# Patient Record
Sex: MALE | Race: WHITE | NOT HISPANIC OR LATINO
[De-identification: names, ages, dates, MRNs, and addresses within clinical notes are randomized per-mention and may not be internally consistent; named-entity substitution may affect disease eponyms.]

---

## 2017-01-04 ENCOUNTER — RX RENEWAL (OUTPATIENT)
Age: 57
End: 2017-01-04

## 2017-01-05 ENCOUNTER — OTHER (OUTPATIENT)
Age: 57
End: 2017-01-05

## 2017-01-09 ENCOUNTER — RX RENEWAL (OUTPATIENT)
Age: 57
End: 2017-01-09

## 2017-02-03 ENCOUNTER — RX RENEWAL (OUTPATIENT)
Age: 57
End: 2017-02-03

## 2017-02-08 ENCOUNTER — RX RENEWAL (OUTPATIENT)
Age: 57
End: 2017-02-08

## 2017-03-06 ENCOUNTER — RX RENEWAL (OUTPATIENT)
Age: 57
End: 2017-03-06

## 2017-03-20 ENCOUNTER — RX RENEWAL (OUTPATIENT)
Age: 57
End: 2017-03-20

## 2017-04-03 ENCOUNTER — RX RENEWAL (OUTPATIENT)
Age: 57
End: 2017-04-03

## 2017-04-05 ENCOUNTER — RX RENEWAL (OUTPATIENT)
Age: 57
End: 2017-04-05

## 2017-04-08 ENCOUNTER — RX RENEWAL (OUTPATIENT)
Age: 57
End: 2017-04-08

## 2017-04-13 ENCOUNTER — RX RENEWAL (OUTPATIENT)
Age: 57
End: 2017-04-13

## 2017-04-20 ENCOUNTER — OUTPATIENT (OUTPATIENT)
Dept: OUTPATIENT SERVICES | Facility: HOSPITAL | Age: 57
LOS: 1 days | End: 2017-04-20
Payer: MEDICAID

## 2017-04-20 ENCOUNTER — APPOINTMENT (OUTPATIENT)
Dept: FAMILY MEDICINE | Facility: HOSPITAL | Age: 57
End: 2017-04-20

## 2017-04-20 VITALS
OXYGEN SATURATION: 99 % | BODY MASS INDEX: 35.14 KG/M2 | WEIGHT: 251 LBS | RESPIRATION RATE: 16 BRPM | DIASTOLIC BLOOD PRESSURE: 86 MMHG | SYSTOLIC BLOOD PRESSURE: 129 MMHG | HEART RATE: 88 BPM | HEIGHT: 71 IN | TEMPERATURE: 98 F

## 2017-04-20 DIAGNOSIS — E11.9 TYPE 2 DIABETES MELLITUS WITHOUT COMPLICATIONS: ICD-10-CM

## 2017-04-20 DIAGNOSIS — K21.9 GASTRO-ESOPHAGEAL REFLUX DISEASE WITHOUT ESOPHAGITIS: ICD-10-CM

## 2017-04-20 DIAGNOSIS — R10.13 EPIGASTRIC PAIN: ICD-10-CM

## 2017-04-20 DIAGNOSIS — K92.0 HEMATEMESIS: ICD-10-CM

## 2017-04-20 DIAGNOSIS — R25.2 CRAMP AND SPASM: ICD-10-CM

## 2017-04-21 ENCOUNTER — FORM ENCOUNTER (OUTPATIENT)
Age: 57
End: 2017-04-21

## 2017-04-22 PROCEDURE — 85025 COMPLETE CBC W/AUTO DIFF WBC: CPT

## 2017-04-22 PROCEDURE — G0463: CPT

## 2017-04-22 PROCEDURE — 80053 COMPREHEN METABOLIC PANEL: CPT

## 2017-04-22 PROCEDURE — 83735 ASSAY OF MAGNESIUM: CPT

## 2017-04-22 PROCEDURE — 36415 COLL VENOUS BLD VENIPUNCTURE: CPT

## 2017-04-22 PROCEDURE — 83036 HEMOGLOBIN GLYCOSYLATED A1C: CPT

## 2017-04-22 PROCEDURE — 72074 X-RAY EXAM THORAC SPINE4/>VW: CPT

## 2017-04-22 PROCEDURE — 82728 ASSAY OF FERRITIN: CPT

## 2017-04-22 PROCEDURE — 72070 X-RAY EXAM THORAC SPINE 2VWS: CPT

## 2017-05-03 ENCOUNTER — OUTPATIENT (OUTPATIENT)
Dept: OUTPATIENT SERVICES | Facility: HOSPITAL | Age: 57
LOS: 1 days | End: 2017-05-03
Payer: MEDICAID

## 2017-05-03 ENCOUNTER — APPOINTMENT (OUTPATIENT)
Dept: FAMILY MEDICINE | Facility: HOSPITAL | Age: 57
End: 2017-05-03

## 2017-05-03 VITALS
DIASTOLIC BLOOD PRESSURE: 96 MMHG | OXYGEN SATURATION: 98 % | RESPIRATION RATE: 14 BRPM | SYSTOLIC BLOOD PRESSURE: 141 MMHG | TEMPERATURE: 98 F | WEIGHT: 250 LBS | HEART RATE: 83 BPM | BODY MASS INDEX: 34.87 KG/M2

## 2017-05-03 DIAGNOSIS — E11.9 TYPE 2 DIABETES MELLITUS WITHOUT COMPLICATIONS: ICD-10-CM

## 2017-05-03 DIAGNOSIS — I10 ESSENTIAL (PRIMARY) HYPERTENSION: ICD-10-CM

## 2017-05-03 PROCEDURE — G0463: CPT

## 2017-05-03 PROCEDURE — 82043 UR ALBUMIN QUANTITATIVE: CPT

## 2017-05-19 ENCOUNTER — RX RENEWAL (OUTPATIENT)
Age: 57
End: 2017-05-19

## 2017-05-30 ENCOUNTER — RX RENEWAL (OUTPATIENT)
Age: 57
End: 2017-05-30

## 2017-06-19 ENCOUNTER — RX RENEWAL (OUTPATIENT)
Age: 57
End: 2017-06-19

## 2017-07-13 ENCOUNTER — MEDICATION RENEWAL (OUTPATIENT)
Age: 57
End: 2017-07-13

## 2017-07-13 RX ORDER — NAPROXEN 500 MG/1
500 TABLET ORAL
Qty: 14 | Refills: 0 | Status: DISCONTINUED | COMMUNITY
Start: 2017-05-03 | End: 2017-07-13

## 2017-07-14 ENCOUNTER — MEDICATION RENEWAL (OUTPATIENT)
Age: 57
End: 2017-07-14

## 2017-07-18 ENCOUNTER — RX RENEWAL (OUTPATIENT)
Age: 57
End: 2017-07-18

## 2017-09-05 ENCOUNTER — MEDICATION RENEWAL (OUTPATIENT)
Age: 57
End: 2017-09-05

## 2017-09-05 LAB — MICROALBUMIN/CREAT 24H UR-RTO: 10

## 2017-09-07 ENCOUNTER — MEDICATION RENEWAL (OUTPATIENT)
Age: 57
End: 2017-09-07

## 2017-09-11 ENCOUNTER — RX RENEWAL (OUTPATIENT)
Age: 57
End: 2017-09-11

## 2017-10-03 ENCOUNTER — RX RENEWAL (OUTPATIENT)
Age: 57
End: 2017-10-03

## 2017-10-03 ENCOUNTER — APPOINTMENT (OUTPATIENT)
Dept: OPHTHALMOLOGY | Facility: CLINIC | Age: 57
End: 2017-10-03

## 2017-10-09 ENCOUNTER — RX RENEWAL (OUTPATIENT)
Age: 57
End: 2017-10-09

## 2017-10-10 ENCOUNTER — RX RENEWAL (OUTPATIENT)
Age: 57
End: 2017-10-10

## 2017-11-01 ENCOUNTER — OTHER (OUTPATIENT)
Age: 57
End: 2017-11-01

## 2017-11-02 ENCOUNTER — MEDICATION RENEWAL (OUTPATIENT)
Age: 57
End: 2017-11-02

## 2017-11-06 ENCOUNTER — MEDICATION RENEWAL (OUTPATIENT)
Age: 57
End: 2017-11-06

## 2017-11-08 ENCOUNTER — MEDICATION RENEWAL (OUTPATIENT)
Age: 57
End: 2017-11-08

## 2017-11-18 ENCOUNTER — OUTPATIENT (OUTPATIENT)
Dept: OUTPATIENT SERVICES | Facility: HOSPITAL | Age: 57
LOS: 1 days | End: 2017-11-18
Payer: MEDICAID

## 2017-11-18 ENCOUNTER — APPOINTMENT (OUTPATIENT)
Dept: FAMILY MEDICINE | Facility: HOSPITAL | Age: 57
End: 2017-11-18

## 2017-11-18 VITALS
RESPIRATION RATE: 14 BRPM | DIASTOLIC BLOOD PRESSURE: 101 MMHG | BODY MASS INDEX: 35.01 KG/M2 | WEIGHT: 251 LBS | TEMPERATURE: 97.3 F | OXYGEN SATURATION: 97 % | SYSTOLIC BLOOD PRESSURE: 146 MMHG | HEART RATE: 71 BPM

## 2017-11-18 PROCEDURE — G0463: CPT

## 2017-11-18 PROCEDURE — 36415 COLL VENOUS BLD VENIPUNCTURE: CPT

## 2017-11-18 PROCEDURE — 80061 LIPID PANEL: CPT

## 2017-11-18 PROCEDURE — 82043 UR ALBUMIN QUANTITATIVE: CPT

## 2017-11-18 PROCEDURE — 80053 COMPREHEN METABOLIC PANEL: CPT

## 2017-11-29 DIAGNOSIS — E11.9 TYPE 2 DIABETES MELLITUS WITHOUT COMPLICATIONS: ICD-10-CM

## 2017-11-29 DIAGNOSIS — M19.90 UNSPECIFIED OSTEOARTHRITIS, UNSPECIFIED SITE: ICD-10-CM

## 2017-11-29 DIAGNOSIS — Z00.00 ENCOUNTER FOR GENERAL ADULT MEDICAL EXAMINATION WITHOUT ABNORMAL FINDINGS: ICD-10-CM

## 2017-11-29 DIAGNOSIS — F32.9 MAJOR DEPRESSIVE DISORDER, SINGLE EPISODE, UNSPECIFIED: ICD-10-CM

## 2018-02-06 ENCOUNTER — RX RENEWAL (OUTPATIENT)
Age: 58
End: 2018-02-06

## 2018-02-12 LAB
LDLC SERPL DIRECT ASSAY-MCNC: 129
MICROALBUMIN/CREAT 24H UR-RTO: 12

## 2018-02-21 ENCOUNTER — OUTPATIENT (OUTPATIENT)
Dept: OUTPATIENT SERVICES | Facility: HOSPITAL | Age: 58
LOS: 1 days | End: 2018-02-21
Payer: MEDICAID

## 2018-02-21 ENCOUNTER — APPOINTMENT (OUTPATIENT)
Dept: FAMILY MEDICINE | Facility: HOSPITAL | Age: 58
End: 2018-02-21

## 2018-02-21 VITALS
DIASTOLIC BLOOD PRESSURE: 97 MMHG | BODY MASS INDEX: 35.15 KG/M2 | RESPIRATION RATE: 16 BRPM | HEART RATE: 69 BPM | TEMPERATURE: 98.1 F | OXYGEN SATURATION: 96 % | SYSTOLIC BLOOD PRESSURE: 144 MMHG | WEIGHT: 252 LBS

## 2018-02-21 DIAGNOSIS — Z00.00 ENCOUNTER FOR GENERAL ADULT MEDICAL EXAMINATION WITHOUT ABNORMAL FINDINGS: ICD-10-CM

## 2018-02-21 PROCEDURE — G0463: CPT

## 2018-02-23 DIAGNOSIS — E11.9 TYPE 2 DIABETES MELLITUS WITHOUT COMPLICATIONS: ICD-10-CM

## 2018-05-10 ENCOUNTER — OUTPATIENT (OUTPATIENT)
Dept: OUTPATIENT SERVICES | Facility: HOSPITAL | Age: 58
LOS: 1 days | End: 2018-05-10
Payer: MEDICAID

## 2018-05-10 ENCOUNTER — MEDICATION RENEWAL (OUTPATIENT)
Age: 58
End: 2018-05-10

## 2018-05-10 DIAGNOSIS — E11.9 TYPE 2 DIABETES MELLITUS WITHOUT COMPLICATIONS: ICD-10-CM

## 2018-05-10 PROCEDURE — 85025 COMPLETE CBC W/AUTO DIFF WBC: CPT

## 2018-05-10 PROCEDURE — 80061 LIPID PANEL: CPT

## 2018-05-10 PROCEDURE — 36415 COLL VENOUS BLD VENIPUNCTURE: CPT

## 2018-05-10 PROCEDURE — 83036 HEMOGLOBIN GLYCOSYLATED A1C: CPT

## 2018-05-10 PROCEDURE — 80053 COMPREHEN METABOLIC PANEL: CPT

## 2018-05-15 ENCOUNTER — OUTPATIENT (OUTPATIENT)
Dept: OUTPATIENT SERVICES | Facility: HOSPITAL | Age: 58
LOS: 1 days | End: 2018-05-15
Payer: MEDICAID

## 2018-05-15 ENCOUNTER — APPOINTMENT (OUTPATIENT)
Dept: FAMILY MEDICINE | Facility: HOSPITAL | Age: 58
End: 2018-05-15

## 2018-05-15 VITALS
TEMPERATURE: 98.6 F | BODY MASS INDEX: 34.17 KG/M2 | HEART RATE: 75 BPM | RESPIRATION RATE: 16 BRPM | SYSTOLIC BLOOD PRESSURE: 131 MMHG | OXYGEN SATURATION: 96 % | DIASTOLIC BLOOD PRESSURE: 88 MMHG | WEIGHT: 245 LBS

## 2018-05-15 DIAGNOSIS — Z00.00 ENCOUNTER FOR GENERAL ADULT MEDICAL EXAMINATION WITHOUT ABNORMAL FINDINGS: ICD-10-CM

## 2018-05-15 PROCEDURE — G0463: CPT

## 2018-05-15 NOTE — ASSESSMENT
[FreeTextEntry1] : 57 year old male pt here today for DM f/u\par \par \par #DM\par -lipids elevated on most recent blood work. Will increase atorvastatin to 40 mg daily\par -c/w glyburide\par -podiatry referral\par -opthalmology referral\par \par #Health Maintenance\par -labs renewed with pt\par -meds refilled\par \par \par f/u in 3 months\par

## 2018-05-15 NOTE — COUNSELING
[Weight management counseling provided] : Weight management [Healthy eating counseling provided] : healthy eating [Weight Self Once Weekly] : Weight self once weekly [Decrease Portions] : Decrease food portions [Low Salt Diet] : Low salt diet [Walking] : Walking [None] : None

## 2018-05-16 DIAGNOSIS — E11.9 TYPE 2 DIABETES MELLITUS WITHOUT COMPLICATIONS: ICD-10-CM

## 2018-05-31 ENCOUNTER — RX RENEWAL (OUTPATIENT)
Age: 58
End: 2018-05-31

## 2018-08-20 LAB
HBA1C MFR BLD HPLC: 5.7
LDLC SERPL DIRECT ASSAY-MCNC: NORMAL

## 2018-08-24 LAB
HBA1C MFR BLD HPLC: 5.7
LDLC SERPL DIRECT ASSAY-MCNC: NORMAL
LDLC SERPL DIRECT ASSAY-MCNC: NORMAL
MICROALBUMIN/CREAT 24H UR-RTO: 12

## 2018-09-06 ENCOUNTER — OUTPATIENT (OUTPATIENT)
Dept: OUTPATIENT SERVICES | Facility: HOSPITAL | Age: 58
LOS: 1 days | End: 2018-09-06
Payer: MEDICAID

## 2018-09-06 ENCOUNTER — APPOINTMENT (OUTPATIENT)
Dept: PODIATRY | Facility: HOSPITAL | Age: 58
End: 2018-09-06

## 2018-09-06 DIAGNOSIS — Z00.00 ENCOUNTER FOR GENERAL ADULT MEDICAL EXAMINATION WITHOUT ABNORMAL FINDINGS: ICD-10-CM

## 2018-09-06 PROCEDURE — G0463: CPT

## 2018-09-07 DIAGNOSIS — E11.9 TYPE 2 DIABETES MELLITUS WITHOUT COMPLICATIONS: ICD-10-CM

## 2018-09-07 DIAGNOSIS — B35.3 TINEA PEDIS: ICD-10-CM

## 2018-09-12 ENCOUNTER — OUTPATIENT (OUTPATIENT)
Dept: OUTPATIENT SERVICES | Facility: HOSPITAL | Age: 58
LOS: 1 days | End: 2018-09-12
Payer: MEDICAID

## 2018-09-12 ENCOUNTER — APPOINTMENT (OUTPATIENT)
Dept: FAMILY MEDICINE | Facility: HOSPITAL | Age: 58
End: 2018-09-12

## 2018-09-12 ENCOUNTER — MED ADMIN CHARGE (OUTPATIENT)
Age: 58
End: 2018-09-12

## 2018-09-12 VITALS
WEIGHT: 238 LBS | RESPIRATION RATE: 16 BRPM | HEART RATE: 79 BPM | OXYGEN SATURATION: 98 % | BODY MASS INDEX: 33.19 KG/M2 | DIASTOLIC BLOOD PRESSURE: 90 MMHG | SYSTOLIC BLOOD PRESSURE: 139 MMHG | TEMPERATURE: 98 F

## 2018-09-12 DIAGNOSIS — Z00.00 ENCOUNTER FOR GENERAL ADULT MEDICAL EXAMINATION WITHOUT ABNORMAL FINDINGS: ICD-10-CM

## 2018-09-12 PROCEDURE — G0463: CPT

## 2018-09-12 NOTE — COUNSELING
[Weight management counseling provided] : Weight management [Activity counseling provided] : activity [Disease Management counseling provided] : disease management  [Smoking cessation counseling provided] : smoking cessation [Weight Self Once Weekly] : Weight self once weekly [Low Fat Diet] : Low fat diet [Low Salt Diet] : Low salt diet [Take medications as directed] : Take medications as directed [Check feet daily] : Check feet daily [Maintain symptoms  log] : Maintain symptoms log [Take your weight daily] : Take your weight daily [Maintain weight log] : Maintain weight log [Check BP at home ___ x/week] : Check blood pressure at home [unfilled] times per week [Keep BP log to bring to next visit] : Keep BP log to bring to next visit [Avoid Social Isolation] : Avoid social isolation [None] : None

## 2018-09-12 NOTE — HISTORY OF PRESENT ILLNESS
[Diabetes Mellitus] : Diabetes Mellitus [Patient was last seen on ___] : Patient was last seen on [unfilled] [No episodes] : No hypoglycemic episodes since the last visit. [Regular podiatrist visits] : Patient had regular podiatrist visits [Understanding of foot care] : Patient expressed understanding of foot care [Most Recent A1C: ___] : Most recent A1C was [unfilled] [Target A1C:  ___] : Target A1C is [unfilled] [Near target goal] : A1C near target goal [High Intensity] : Patient is currently on high intensity statin therapy [FreeTextEntry6] : 58 year old male pt presents for DM follow up. Pt compliant with meds. Recently had podiatry and opthalmology appt. No complaints [de-identified] : less than 5.7

## 2018-09-12 NOTE — PLAN
[FreeTextEntry1] : 58 year old male pt presents today for DM follow up\par \par #DM maintenance \par -c/w glyburide. good glycemic control on medication\par -pt got opthalmology and podiatry exam done recently\par -lifestyle changes encouraged\par -weight loss commended\par \par #Health Maintenance \par -Flu Shot done at [facility]\par -Tetanus shot given today\par -GI referral for Colonoscopy. Last colonoscopy in 2013 recommended 3-5 follow up\par \par \par -f/u in 4 months\par -FIT kit

## 2018-09-13 DIAGNOSIS — E11.9 TYPE 2 DIABETES MELLITUS WITHOUT COMPLICATIONS: ICD-10-CM

## 2018-10-30 ENCOUNTER — EMERGENCY (EMERGENCY)
Facility: HOSPITAL | Age: 58
LOS: 1 days | Discharge: HOME | End: 2018-10-30
Attending: INTERNAL MEDICINE | Admitting: INTERNAL MEDICINE
Payer: MEDICAID

## 2018-10-30 VITALS
TEMPERATURE: 98 F | WEIGHT: 244.93 LBS | HEIGHT: 71 IN | DIASTOLIC BLOOD PRESSURE: 97 MMHG | RESPIRATION RATE: 18 BRPM | OXYGEN SATURATION: 96 % | SYSTOLIC BLOOD PRESSURE: 150 MMHG | HEART RATE: 88 BPM

## 2018-10-30 VITALS
TEMPERATURE: 98 F | RESPIRATION RATE: 18 BRPM | HEART RATE: 80 BPM | OXYGEN SATURATION: 99 % | SYSTOLIC BLOOD PRESSURE: 134 MMHG | DIASTOLIC BLOOD PRESSURE: 80 MMHG

## 2018-10-30 DIAGNOSIS — R05 COUGH: ICD-10-CM

## 2018-10-30 LAB
ALBUMIN SERPL ELPH-MCNC: 3.4 G/DL — SIGNIFICANT CHANGE UP (ref 3.3–5)
ALP SERPL-CCNC: 71 U/L — SIGNIFICANT CHANGE UP (ref 40–120)
ALT FLD-CCNC: 23 U/L DA — SIGNIFICANT CHANGE UP (ref 10–45)
ANION GAP SERPL CALC-SCNC: 15 MMOL/L — SIGNIFICANT CHANGE UP (ref 5–17)
APPEARANCE UR: CLEAR — SIGNIFICANT CHANGE UP
APTT BLD: 29.1 SEC — SIGNIFICANT CHANGE UP (ref 27.5–37.4)
AST SERPL-CCNC: 20 U/L — SIGNIFICANT CHANGE UP (ref 10–40)
BASOPHILS # BLD AUTO: 0.1 K/UL — SIGNIFICANT CHANGE UP (ref 0–0.2)
BASOPHILS NFR BLD AUTO: 0.5 % — SIGNIFICANT CHANGE UP (ref 0–2)
BILIRUB SERPL-MCNC: 0.6 MG/DL — SIGNIFICANT CHANGE UP (ref 0.2–1.2)
BILIRUB UR-MCNC: NEGATIVE — SIGNIFICANT CHANGE UP
BUN SERPL-MCNC: 12 MG/DL — SIGNIFICANT CHANGE UP (ref 7–23)
CALCIUM SERPL-MCNC: 9.2 MG/DL — SIGNIFICANT CHANGE UP (ref 8.4–10.5)
CHLORIDE SERPL-SCNC: 100 MMOL/L — SIGNIFICANT CHANGE UP (ref 96–108)
CO2 SERPL-SCNC: 23 MMOL/L — SIGNIFICANT CHANGE UP (ref 22–31)
COLOR SPEC: YELLOW — SIGNIFICANT CHANGE UP
CREAT SERPL-MCNC: 1.14 MG/DL — SIGNIFICANT CHANGE UP (ref 0.5–1.3)
DIFF PNL FLD: NEGATIVE — SIGNIFICANT CHANGE UP
EOSINOPHIL # BLD AUTO: 0.1 K/UL — SIGNIFICANT CHANGE UP (ref 0–0.5)
EOSINOPHIL NFR BLD AUTO: 0.8 % — SIGNIFICANT CHANGE UP (ref 0–6)
GLUCOSE SERPL-MCNC: 133 MG/DL — HIGH (ref 70–99)
GLUCOSE UR QL: NEGATIVE — SIGNIFICANT CHANGE UP
HCT VFR BLD CALC: 37.6 % — LOW (ref 39–50)
HGB BLD-MCNC: 13.2 G/DL — SIGNIFICANT CHANGE UP (ref 13–17)
INR BLD: 1.04 RATIO — SIGNIFICANT CHANGE UP (ref 0.88–1.16)
KETONES UR-MCNC: NEGATIVE — SIGNIFICANT CHANGE UP
LACTATE SERPL-SCNC: 1.2 MMOL/L — SIGNIFICANT CHANGE UP (ref 0.7–2)
LEUKOCYTE ESTERASE UR-ACNC: NEGATIVE — SIGNIFICANT CHANGE UP
LYMPHOCYTES # BLD AUTO: 1.8 K/UL — SIGNIFICANT CHANGE UP (ref 1–3.3)
LYMPHOCYTES # BLD AUTO: 13.9 % — SIGNIFICANT CHANGE UP (ref 13–44)
MCHC RBC-ENTMCNC: 35.1 GM/DL — SIGNIFICANT CHANGE UP (ref 32–36)
MCHC RBC-ENTMCNC: 35.1 PG — HIGH (ref 27–34)
MCV RBC AUTO: 99.9 FL — SIGNIFICANT CHANGE UP (ref 80–100)
MONOCYTES # BLD AUTO: 1.1 K/UL — HIGH (ref 0–0.9)
MONOCYTES NFR BLD AUTO: 8.5 % — SIGNIFICANT CHANGE UP (ref 1–9)
NEUTROPHILS # BLD AUTO: 10 K/UL — HIGH (ref 1.8–7.4)
NEUTROPHILS NFR BLD AUTO: 76.2 % — SIGNIFICANT CHANGE UP (ref 43–77)
NITRITE UR-MCNC: NEGATIVE — SIGNIFICANT CHANGE UP
NT-PROBNP SERPL-SCNC: 202 PG/ML — SIGNIFICANT CHANGE UP (ref 0–300)
PH UR: 6 — SIGNIFICANT CHANGE UP (ref 5–8)
PLATELET # BLD AUTO: 249 K/UL — SIGNIFICANT CHANGE UP (ref 150–400)
POTASSIUM SERPL-MCNC: 3.7 MMOL/L — SIGNIFICANT CHANGE UP (ref 3.5–5.3)
POTASSIUM SERPL-SCNC: 3.7 MMOL/L — SIGNIFICANT CHANGE UP (ref 3.5–5.3)
PROT SERPL-MCNC: 7.7 G/DL — SIGNIFICANT CHANGE UP (ref 6–8.3)
PROT UR-MCNC: NEGATIVE — SIGNIFICANT CHANGE UP
PROTHROM AB SERPL-ACNC: 11.6 SEC — SIGNIFICANT CHANGE UP (ref 9.8–12.7)
RBC # BLD: 3.76 M/UL — LOW (ref 4.2–5.8)
RBC # FLD: 11.6 % — SIGNIFICANT CHANGE UP (ref 10.3–14.5)
SODIUM SERPL-SCNC: 138 MMOL/L — SIGNIFICANT CHANGE UP (ref 135–145)
SP GR SPEC: 1.01 — SIGNIFICANT CHANGE UP (ref 1.01–1.02)
TROPONIN I SERPL-MCNC: <.017 NG/ML — LOW (ref 0.02–0.06)
UROBILINOGEN FLD QL: NEGATIVE — SIGNIFICANT CHANGE UP
WBC # BLD: 13.2 K/UL — HIGH (ref 3.8–10.5)
WBC # FLD AUTO: 13.2 K/UL — HIGH (ref 3.8–10.5)

## 2018-10-30 PROCEDURE — 99285 EMERGENCY DEPT VISIT HI MDM: CPT | Mod: 25

## 2018-10-30 PROCEDURE — 94640 AIRWAY INHALATION TREATMENT: CPT

## 2018-10-30 PROCEDURE — 93010 ELECTROCARDIOGRAM REPORT: CPT

## 2018-10-30 PROCEDURE — 71045 X-RAY EXAM CHEST 1 VIEW: CPT | Mod: 26

## 2018-10-30 PROCEDURE — 87040 BLOOD CULTURE FOR BACTERIA: CPT

## 2018-10-30 PROCEDURE — 93005 ELECTROCARDIOGRAM TRACING: CPT

## 2018-10-30 PROCEDURE — 71045 X-RAY EXAM CHEST 1 VIEW: CPT

## 2018-10-30 PROCEDURE — 85730 THROMBOPLASTIN TIME PARTIAL: CPT

## 2018-10-30 PROCEDURE — 83880 ASSAY OF NATRIURETIC PEPTIDE: CPT

## 2018-10-30 PROCEDURE — 87086 URINE CULTURE/COLONY COUNT: CPT

## 2018-10-30 PROCEDURE — 84484 ASSAY OF TROPONIN QUANT: CPT

## 2018-10-30 PROCEDURE — 83605 ASSAY OF LACTIC ACID: CPT

## 2018-10-30 PROCEDURE — 85027 COMPLETE CBC AUTOMATED: CPT

## 2018-10-30 PROCEDURE — 96374 THER/PROPH/DIAG INJ IV PUSH: CPT

## 2018-10-30 PROCEDURE — 80053 COMPREHEN METABOLIC PANEL: CPT

## 2018-10-30 PROCEDURE — 85610 PROTHROMBIN TIME: CPT

## 2018-10-30 RX ORDER — IPRATROPIUM/ALBUTEROL SULFATE 18-103MCG
3 AEROSOL WITH ADAPTER (GRAM) INHALATION
Qty: 0 | Refills: 0 | Status: COMPLETED | OUTPATIENT
Start: 2018-10-30 | End: 2018-10-30

## 2018-10-30 RX ORDER — SODIUM CHLORIDE 9 MG/ML
3400 INJECTION INTRAMUSCULAR; INTRAVENOUS; SUBCUTANEOUS ONCE
Qty: 0 | Refills: 0 | Status: COMPLETED | OUTPATIENT
Start: 2018-10-30 | End: 2018-10-30

## 2018-10-30 RX ORDER — CIPROFLOXACIN LACTATE 400MG/40ML
1 VIAL (ML) INTRAVENOUS
Qty: 10 | Refills: 0
Start: 2018-10-30 | End: 2018-11-08

## 2018-10-30 RX ORDER — ALBUTEROL 90 UG/1
2 AEROSOL, METERED ORAL
Qty: 1 | Refills: 0
Start: 2018-10-30 | End: 2018-11-28

## 2018-10-30 RX ADMIN — SODIUM CHLORIDE 3400 MILLILITER(S): 9 INJECTION INTRAMUSCULAR; INTRAVENOUS; SUBCUTANEOUS at 08:39

## 2018-10-30 RX ADMIN — Medication 3 MILLILITER(S): at 09:59

## 2018-10-30 RX ADMIN — Medication 3 MILLILITER(S): at 09:58

## 2018-10-30 RX ADMIN — Medication 3 MILLILITER(S): at 09:19

## 2018-10-30 NOTE — ED PROVIDER NOTE - PHYSICAL EXAMINATION
General:     NAD, well-nourished, well looking  Head:     NC/AT, EOMI, oral mucosa moist  Neck:     trachea midline  Lungs:     bilateral wheezing   CVS:     S1S2, RRR, no m/g/r  Abd:     +BS, s/nt/nd, no organomegaly  Ext:    2+ radial and pedal pulses, no c/c/e  Neuro: AAOx3, no sensory/motor deficits

## 2018-10-30 NOTE — ED PROVIDER NOTE - PMH
Benign hypertension    Type 2 diabetes mellitus without complication, without long-term current use of insulin

## 2018-10-30 NOTE — ED PROVIDER NOTE - CHPI ED SYMPTOMS POS
CHEST CONGESTION/COUGH/phlegm , pain on chest antteriorly phlegm , pain on chest anteriorly/CHEST CONGESTION/COUGH

## 2018-10-30 NOTE — ED PROVIDER NOTE - NS ED ROS FT
Constitutional: (-) fever  (-)chills  (-)sweats  Eyes/ENT: (-) blurry vision, (-) epistaxis  (-)rhinorrhea   (-) sore throat    Cardiovascular: (+) chest pain, (-) palpitations (-) edema   Respiratory: (+) cough, (-) shortness of breath   Gastrointestinal: (-)nausea  (-)vomiting, (-) diarrhea  (-) abdominal pain   :  (-)dysuria, (-)frequency, (-)urgency, (-)hematuria  Musculoskeletal: (-) neck pain, (+) back pain, (-) joint pain  Integumentary: (-) rash, (-) edema  Neurological: (-) headache, (-) altered mental status  (-)LOC

## 2018-10-31 LAB
CULTURE RESULTS: NO GROWTH — SIGNIFICANT CHANGE UP
SPECIMEN SOURCE: SIGNIFICANT CHANGE UP

## 2018-11-04 LAB
CULTURE RESULTS: SIGNIFICANT CHANGE UP
CULTURE RESULTS: SIGNIFICANT CHANGE UP
SPECIMEN SOURCE: SIGNIFICANT CHANGE UP
SPECIMEN SOURCE: SIGNIFICANT CHANGE UP

## 2018-12-07 ENCOUNTER — RX RENEWAL (OUTPATIENT)
Age: 58
End: 2018-12-07

## 2018-12-12 ENCOUNTER — RX RENEWAL (OUTPATIENT)
Age: 58
End: 2018-12-12

## 2018-12-14 ENCOUNTER — RX RENEWAL (OUTPATIENT)
Age: 58
End: 2018-12-14

## 2019-02-25 ENCOUNTER — RX RENEWAL (OUTPATIENT)
Age: 59
End: 2019-02-25

## 2019-03-07 PROBLEM — I10 ESSENTIAL (PRIMARY) HYPERTENSION: Chronic | Status: ACTIVE | Noted: 2018-10-30

## 2019-03-12 ENCOUNTER — OUTPATIENT (OUTPATIENT)
Dept: OUTPATIENT SERVICES | Facility: HOSPITAL | Age: 59
LOS: 1 days | End: 2019-03-12
Payer: MEDICAID

## 2019-03-12 ENCOUNTER — LABORATORY RESULT (OUTPATIENT)
Age: 59
End: 2019-03-12

## 2019-03-12 ENCOUNTER — APPOINTMENT (OUTPATIENT)
Age: 59
End: 2019-03-12

## 2019-03-12 VITALS
SYSTOLIC BLOOD PRESSURE: 145 MMHG | BODY MASS INDEX: 32.9 KG/M2 | OXYGEN SATURATION: 98 % | TEMPERATURE: 97.8 F | HEART RATE: 80 BPM | HEIGHT: 71 IN | DIASTOLIC BLOOD PRESSURE: 91 MMHG | RESPIRATION RATE: 16 BRPM | WEIGHT: 235 LBS

## 2019-03-12 DIAGNOSIS — Z00.00 ENCOUNTER FOR GENERAL ADULT MEDICAL EXAMINATION WITHOUT ABNORMAL FINDINGS: ICD-10-CM

## 2019-03-12 PROCEDURE — 87902 NFCT AGT GNTYP ALYS HEP C: CPT

## 2019-03-12 PROCEDURE — 80061 LIPID PANEL: CPT

## 2019-03-12 PROCEDURE — 83036 HEMOGLOBIN GLYCOSYLATED A1C: CPT

## 2019-03-12 PROCEDURE — G0463: CPT

## 2019-03-12 PROCEDURE — 80053 COMPREHEN METABOLIC PANEL: CPT

## 2019-03-12 PROCEDURE — 87521 HEPATITIS C PROBE&RVRS TRNSC: CPT

## 2019-03-12 PROCEDURE — 86803 HEPATITIS C AB TEST: CPT

## 2019-03-12 NOTE — ASSESSMENT
[FreeTextEntry1] : 58 year old male pt here today for health Maintenance visit\par \par #Health Maintenance\par -hep c testing done today\par -vaccines up to date\par -glyburide discontinued based on prior A1C\par -lifestyle changes stressed\par -dash diet\par -f/u cbc/ A1C/ lipid panel/ A1C. CMP\par -RTC  in 3 months to follow up htn\par -NSAIDS prn for joint pain

## 2019-03-12 NOTE — COUNSELING
[Weight management counseling provided] : Weight management [Healthy eating counseling provided] : healthy eating [Low Salt Diet] : Low salt diet [Decrease Portions] : Decrease food portions [Keep Food Diary] : Keep food diary [Walking] : Walking

## 2019-03-12 NOTE — HISTORY OF PRESENT ILLNESS
[FreeTextEntry1] : health maintenance visit [de-identified] : 58 year old male pt her today for health maintenance visit. Only complaint being aches and pains related to his job as a .

## 2019-03-18 ENCOUNTER — RX RENEWAL (OUTPATIENT)
Age: 59
End: 2019-03-18

## 2019-03-25 ENCOUNTER — RX RENEWAL (OUTPATIENT)
Age: 59
End: 2019-03-25

## 2019-06-03 ENCOUNTER — OUTPATIENT (OUTPATIENT)
Dept: OUTPATIENT SERVICES | Facility: HOSPITAL | Age: 59
LOS: 1 days | End: 2019-06-03

## 2019-06-03 ENCOUNTER — RX RENEWAL (OUTPATIENT)
Age: 59
End: 2019-06-03

## 2019-06-03 DIAGNOSIS — Z00.00 ENCOUNTER FOR GENERAL ADULT MEDICAL EXAMINATION WITHOUT ABNORMAL FINDINGS: ICD-10-CM

## 2019-06-04 LAB
ALBUMIN SERPL ELPH-MCNC: 4.1 G/DL
ALP BLD-CCNC: 59 U/L
ALT SERPL-CCNC: 26 U/L
ANION GAP SERPL CALC-SCNC: 14 MMOL/L
AST SERPL-CCNC: 39 U/L
BASOPHILS # BLD AUTO: 0.03 K/UL
BASOPHILS NFR BLD AUTO: 0.4 %
BILIRUB SERPL-MCNC: 0.5 MG/DL
BUN SERPL-MCNC: 17 MG/DL
CALCIUM SERPL-MCNC: 8.8 MG/DL
CHLORIDE SERPL-SCNC: 105 MMOL/L
CHOLEST SERPL-MCNC: 235 MG/DL
CHOLEST/HDLC SERPL: 6.2 RATIO
CO2 SERPL-SCNC: 23 MMOL/L
CREAT SERPL-MCNC: 1.21 MG/DL
EOSINOPHIL # BLD AUTO: 0.12 K/UL
EOSINOPHIL NFR BLD AUTO: 1.7 %
ESTIMATED AVERAGE GLUCOSE: 108 MG/DL
GLUCOSE SERPL-MCNC: 156 MG/DL
HBA1C MFR BLD HPLC: 5.4 %
HCT VFR BLD CALC: 40.8 %
HCV AB SER QL: REACTIVE
HCV S/CO RATIO: 9.78 S/CO
HDLC SERPL-MCNC: 38 MG/DL
HGB BLD-MCNC: 14 G/DL
IMM GRANULOCYTES NFR BLD AUTO: 0.4 %
LDLC SERPL CALC-MCNC: NORMAL MG/DL
LYMPHOCYTES # BLD AUTO: 1.23 K/UL
LYMPHOCYTES NFR BLD AUTO: 17.6 %
MAN DIFF?: NORMAL
MCHC RBC-ENTMCNC: 34.3 GM/DL
MCHC RBC-ENTMCNC: 35.4 PG
MCV RBC AUTO: 103.3 FL
MONOCYTES # BLD AUTO: 0.5 K/UL
MONOCYTES NFR BLD AUTO: 7.2 %
NEUTROPHILS # BLD AUTO: 5.06 K/UL
NEUTROPHILS NFR BLD AUTO: 72.7 %
PLATELET # BLD AUTO: 206 K/UL
POTASSIUM SERPL-SCNC: 4.6 MMOL/L
PROT SERPL-MCNC: 6.7 G/DL
RBC # BLD: 3.95 M/UL
RBC # FLD: 12.3 %
SODIUM SERPL-SCNC: 142 MMOL/L
TRIGL SERPL-MCNC: 1318 MG/DL
WBC # FLD AUTO: 6.97 K/UL

## 2019-06-07 ENCOUNTER — APPOINTMENT (OUTPATIENT)
Dept: FAMILY MEDICINE | Facility: HOSPITAL | Age: 59
End: 2019-06-07

## 2019-06-07 ENCOUNTER — OUTPATIENT (OUTPATIENT)
Dept: OUTPATIENT SERVICES | Facility: HOSPITAL | Age: 59
LOS: 1 days | End: 2019-06-07
Payer: MEDICAID

## 2019-06-07 ENCOUNTER — RX RENEWAL (OUTPATIENT)
Age: 59
End: 2019-06-07

## 2019-06-07 VITALS
BODY MASS INDEX: 32.36 KG/M2 | RESPIRATION RATE: 16 BRPM | HEART RATE: 79 BPM | TEMPERATURE: 98.2 F | SYSTOLIC BLOOD PRESSURE: 135 MMHG | WEIGHT: 232 LBS | DIASTOLIC BLOOD PRESSURE: 88 MMHG | OXYGEN SATURATION: 98 %

## 2019-06-07 DIAGNOSIS — Z87.891 PERSONAL HISTORY OF NICOTINE DEPENDENCE: ICD-10-CM

## 2019-06-07 DIAGNOSIS — Z00.00 ENCOUNTER FOR GENERAL ADULT MEDICAL EXAMINATION WITHOUT ABNORMAL FINDINGS: ICD-10-CM

## 2019-06-07 DIAGNOSIS — M25.561 PAIN IN RIGHT KNEE: ICD-10-CM

## 2019-06-07 DIAGNOSIS — M25.562 PAIN IN RIGHT KNEE: ICD-10-CM

## 2019-06-07 PROCEDURE — G0463: CPT

## 2019-06-07 NOTE — PHYSICAL EXAM
[Well Nourished] : well nourished [No Acute Distress] : no acute distress [Well Developed] : well developed [PERRL] : pupils equal round and reactive to light [Well-Appearing] : well-appearing [Normal Sclera/Conjunctiva] : normal sclera/conjunctiva [Normal Oropharynx] : the oropharynx was normal [Normal Outer Ear/Nose] : the outer ears and nose were normal in appearance [EOMI] : extraocular movements intact [Supple] : supple [No Lymphadenopathy] : no lymphadenopathy [No JVD] : no jugular venous distention [Thyroid Normal, No Nodules] : the thyroid was normal and there were no nodules present [No Respiratory Distress] : no respiratory distress  [Clear to Auscultation] : lungs were clear to auscultation bilaterally [Normal Rate] : normal rate  [Regular Rhythm] : with a regular rhythm [No Accessory Muscle Use] : no accessory muscle use [Normal S1, S2] : normal S1 and S2 [No Murmur] : no murmur heard [No Carotid Bruits] : no carotid bruits [No Abdominal Bruit] : a ~M bruit was not heard ~T in the abdomen [No Varicosities] : no varicosities [Pedal Pulses Present] : the pedal pulses are present [No Extremity Clubbing/Cyanosis] : no extremity clubbing/cyanosis [No Palpable Aorta] : no palpable aorta [No Edema] : there was no peripheral edema [Non-distended] : non-distended [Soft] : abdomen soft [No Masses] : no abdominal mass palpated [Non Tender] : non-tender [No HSM] : no HSM [Normal Bowel Sounds] : normal bowel sounds [Normal Posterior Cervical Nodes] : no posterior cervical lymphadenopathy [No CVA Tenderness] : no CVA  tenderness [Normal Anterior Cervical Nodes] : no anterior cervical lymphadenopathy [No Spinal Tenderness] : no spinal tenderness [Grossly Normal Strength/Tone] : grossly normal strength/tone [No Joint Swelling] : no joint swelling [Normal Gait] : normal gait [Coordination Grossly Intact] : coordination grossly intact [No Focal Deficits] : no focal deficits [Deep Tendon Reflexes (DTR)] : deep tendon reflexes were 2+ and symmetric [Normal Affect] : the affect was normal [Normal Insight/Judgement] : insight and judgment were intact [de-identified] : hypertrophic skin rash with slight erythema and excoriation, notably of fungal rash on plantar aspect of L foot.

## 2019-06-07 NOTE — HISTORY OF PRESENT ILLNESS
[FreeTextEntry1] : Called in for abnormal lab work. [de-identified] : 58M presents to clinic due to call for abnormal lab work. Patient noticed to have positive HCV ab's with negative HCV RNA reflexive test. After looking through charts, this HCV diagnosis had been found in the past, and seem to be a past infection, was seen by hepatology and further appropriate was taken, including HepA titers (neg) and HepB vaccine series started in 2015. Patient understands this diagnosis and has been informed in the past, admits he has had HIV testing negative in the past and declines it today. Patient also with complaints of foot rash, known to be fungal has not been compliant with taking the proper medication.

## 2019-06-11 DIAGNOSIS — R10.13 EPIGASTRIC PAIN: ICD-10-CM

## 2019-06-11 DIAGNOSIS — Z23 ENCOUNTER FOR IMMUNIZATION: ICD-10-CM

## 2019-06-15 LAB
GRAZOPREVIR RESISTANCE: NORMAL
HCV NS3 MUT DET ISLT GENOTYP: NOT DETECTED
PARITAPREVIR RESISTANCE: NORMAL
SIMPREVIR RESISTANCE: NORMAL

## 2019-06-22 ENCOUNTER — APPOINTMENT (OUTPATIENT)
Dept: FAMILY MEDICINE | Facility: HOSPITAL | Age: 59
End: 2019-06-22

## 2019-06-27 ENCOUNTER — RX RENEWAL (OUTPATIENT)
Age: 59
End: 2019-06-27

## 2019-10-21 ENCOUNTER — APPOINTMENT (OUTPATIENT)
Dept: FAMILY MEDICINE | Facility: HOSPITAL | Age: 59
End: 2019-10-21

## 2019-12-13 ENCOUNTER — APPOINTMENT (OUTPATIENT)
Dept: FAMILY MEDICINE | Facility: HOSPITAL | Age: 59
End: 2019-12-13

## 2020-01-11 ENCOUNTER — MEDICATION RENEWAL (OUTPATIENT)
Age: 60
End: 2020-01-11

## 2020-01-13 ENCOUNTER — RESULT CHARGE (OUTPATIENT)
Age: 60
End: 2020-01-13

## 2020-01-14 ENCOUNTER — OUTPATIENT (OUTPATIENT)
Dept: OUTPATIENT SERVICES | Facility: HOSPITAL | Age: 60
LOS: 1 days | End: 2020-01-14
Payer: MEDICAID

## 2020-01-14 ENCOUNTER — APPOINTMENT (OUTPATIENT)
Dept: FAMILY MEDICINE | Facility: HOSPITAL | Age: 60
End: 2020-01-14
Payer: MEDICAID

## 2020-01-14 VITALS
BODY MASS INDEX: 31.8 KG/M2 | WEIGHT: 228 LBS | TEMPERATURE: 97.7 F | OXYGEN SATURATION: 96 % | DIASTOLIC BLOOD PRESSURE: 102 MMHG | HEART RATE: 79 BPM | SYSTOLIC BLOOD PRESSURE: 163 MMHG | RESPIRATION RATE: 17 BRPM

## 2020-01-14 VITALS — SYSTOLIC BLOOD PRESSURE: 140 MMHG | DIASTOLIC BLOOD PRESSURE: 95 MMHG

## 2020-01-14 DIAGNOSIS — Z00.00 ENCOUNTER FOR GENERAL ADULT MEDICAL EXAMINATION WITHOUT ABNORMAL FINDINGS: ICD-10-CM

## 2020-01-14 PROCEDURE — 80061 LIPID PANEL: CPT

## 2020-01-14 PROCEDURE — 93010 ELECTROCARDIOGRAM REPORT: CPT

## 2020-01-14 PROCEDURE — G0463: CPT

## 2020-01-14 PROCEDURE — 93005 ELECTROCARDIOGRAM TRACING: CPT

## 2020-01-14 PROCEDURE — 83036 HEMOGLOBIN GLYCOSYLATED A1C: CPT

## 2020-01-14 PROCEDURE — 80053 COMPREHEN METABOLIC PANEL: CPT

## 2020-01-14 RX ORDER — IBUPROFEN 600 MG/1
600 TABLET, FILM COATED ORAL 3 TIMES DAILY
Qty: 30 | Refills: 2 | Status: COMPLETED | COMMUNITY
Start: 2017-05-10 | End: 2020-01-14

## 2020-01-14 RX ORDER — VITS A,C,E/LUTEIN/MINERALS 300MCG-200
TABLET ORAL DAILY
Qty: 90 | Refills: 0 | Status: COMPLETED | COMMUNITY
Start: 2019-03-12 | End: 2020-01-14

## 2020-01-14 RX ORDER — CLOTRIMAZOLE AND BETAMETHASONE DIPROPIONATE 10; .5 MG/G; MG/G
1-0.05 CREAM TOPICAL TWICE DAILY
Qty: 1 | Refills: 1 | Status: COMPLETED | COMMUNITY
Start: 2018-09-06 | End: 2020-01-14

## 2020-01-14 NOTE — HISTORY OF PRESENT ILLNESS
[FreeTextEntry1] : CPE [de-identified] : 59M with HTN, HLD, GERD, and anxiety presents for CPE\par -States quetiapine is for sleep. Has seen psychiatrists in the past. States he would go days without sleeping and nothing else helped. States that that coincided with prior heavy alcohol use. Denies diagnosis of bipolar disorder. States he still has occasional anxiety, but insomnia is well-controlled. States that his psychiatrist told him he requires a high dose of quetiapine because he is resistant to it\par -On ROS, patient endorsed occasional sensation of racing heart. Stated it occurrs mostly when he is anxious\par -Patient also complains of dry eyes upon awakening\par -Patient also notes  hard mass on the chest. First noticed it about one year prior. Is not associated with pain, numbness, or tingling\par -Health risk assessment below

## 2020-01-14 NOTE — PLAN
[FreeTextEntry1] : 59M with anxiety, HTN, and HLD presents for annual health maintenance\par -BP elevated initially, improved on repeat manual measurement. Will not change antihypertensive regimen at this time. Medications refilled\par -Social work referral for anxiety. Discussed with patient the importance of psychiatric follow up to help determine if high dose of quetiapine is still appropriate as he ages, or if the dose should be reduced\par -Lubricating eyedrops for dry eyes in the morning. Discussed that this is likely due to dry air during the winter\par -EKG performed to r/o QTc prolongation given patient recorded occasional sensation of heart racing and is on a high dose of quetiapine. QTc not prolonged. Will monitor\par -Recommended reducing intake of carbohydrates and reducing salt intake. Advised patient to continue high intake of vegetables\par -Recommended regular exercise, at least 30 minutes of moderate activity daily\par -CBC, CMP, lipid panel, HgbA1c ordered\par -RTC 3 months for HTN f/u\par \par Patient requested lab test results by mail as his phone is not working

## 2020-01-14 NOTE — HEALTH RISK ASSESSMENT
[] : Yes [0-5] : 0-5 [Never (0 pts)] : Never (0 points) [No] : In the past 12 months have you used drugs other than those required for medical reasons? No [No falls in past year] : Patient reported no falls in the past year [1] : 2) Feeling down, depressed, or hopeless for several days (1) [HIV test declined] : HIV test declined [Alone] : lives alone [Employed] : employed [Fully functional (bathing, dressing, toileting, transferring, walking, feeding)] : Fully functional (bathing, dressing, toileting, transferring, walking, feeding) [de-identified] : 10 years, 1/2 pack a day [de-identified] : Stopped 4 years ago. Prior daily alcohol use in his 20s [de-identified] : Walking [de-identified] : Eats out frequently, including at his brother's restaurant. Frequently eats bagels for breakfast, skips lunch, and has pasta for dinner. 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

## 2020-01-14 NOTE — PHYSICAL EXAM
[No Edema] : there was no peripheral edema [No Extremity Clubbing/Cyanosis] : no extremity clubbing/cyanosis [Soft] : abdomen soft [Non Tender] : non-tender [Non-distended] : non-distended [No Masses] : no abdominal mass palpated [Normal Bowel Sounds] : normal bowel sounds [Kyphosis] : no kyphosis [Scoliosis] : no scoliosis [Normal] : affect was normal and insight and judgment were intact [de-identified] : Bony growth off the base of the right costal cartilage. Nontender

## 2020-01-14 NOTE — REVIEW OF SYSTEMS
[Dryness] : dryness [Palpitations] : palpitations [Back Pain] : back pain [Joint Pain] : joint pain [Dizziness] : dizziness [Headache] : headache [Anxiety] : anxiety [Fever] : no fever [Chills] : no chills [Pain] : no pain [Redness] : no redness [Earache] : no earache [Hearing Loss] : no hearing loss [Chest Pain] : no chest pain [Shortness Of Breath] : no shortness of breath [Cough] : no cough [Abdominal Pain] : no abdominal pain [Nausea] : no nausea [Vomiting] : no vomiting [Dysuria] : no dysuria [Incontinence] : no incontinence [Itching] : no itching [Skin Rash] : no skin rash [Suicidal] : not suicidal [Insomnia] : no insomnia [Depression] : no depression [Easy Bleeding] : no easy bleeding [FreeTextEntry9] : B/l shoulder pain, s/p multiple shoulder surgeries [Easy Bruising] : no easy bruising [de-identified] : HA if he reads too long, dizziness if he stands up too fast [de-identified] : Reports occasional anxiety

## 2020-01-18 LAB
ALBUMIN SERPL ELPH-MCNC: 4.5 G/DL
ALP BLD-CCNC: 68 U/L
ALT SERPL-CCNC: 17 U/L
ANION GAP SERPL CALC-SCNC: 14 MMOL/L
AST SERPL-CCNC: 32 U/L
BASOPHILS # BLD AUTO: 0.04 K/UL
BASOPHILS NFR BLD AUTO: 0.5 %
BILIRUB SERPL-MCNC: 0.6 MG/DL
BUN SERPL-MCNC: 14 MG/DL
CALCIUM SERPL-MCNC: 9.8 MG/DL
CHLORIDE SERPL-SCNC: 101 MMOL/L
CHOLEST SERPL-MCNC: 168 MG/DL
CHOLEST/HDLC SERPL: 3.5 RATIO
CO2 SERPL-SCNC: 25 MMOL/L
CREAT SERPL-MCNC: 0.97 MG/DL
EOSINOPHIL # BLD AUTO: 0.17 K/UL
EOSINOPHIL NFR BLD AUTO: 2.2 %
ESTIMATED AVERAGE GLUCOSE: 128 MG/DL
GLUCOSE SERPL-MCNC: 121 MG/DL
HBA1C MFR BLD HPLC: 6.1 %
HCT VFR BLD CALC: 41.4 %
HDLC SERPL-MCNC: 48 MG/DL
HGB BLD-MCNC: 14.3 G/DL
IMM GRANULOCYTES NFR BLD AUTO: 0.5 %
LDLC SERPL CALC-MCNC: NORMAL MG/DL
LYMPHOCYTES # BLD AUTO: 2.25 K/UL
LYMPHOCYTES NFR BLD AUTO: 28.9 %
MAN DIFF?: NORMAL
MCHC RBC-ENTMCNC: 34.5 GM/DL
MCHC RBC-ENTMCNC: 35.1 PG
MCV RBC AUTO: 101.7 FL
MONOCYTES # BLD AUTO: 0.63 K/UL
MONOCYTES NFR BLD AUTO: 8.1 %
NEUTROPHILS # BLD AUTO: 4.65 K/UL
NEUTROPHILS NFR BLD AUTO: 59.8 %
PLATELET # BLD AUTO: 281 K/UL
POTASSIUM SERPL-SCNC: 4.4 MMOL/L
PROT SERPL-MCNC: 7.1 G/DL
RBC # BLD: 4.07 M/UL
RBC # FLD: 11.7 %
SODIUM SERPL-SCNC: 140 MMOL/L
TRIGL SERPL-MCNC: 491 MG/DL
WBC # FLD AUTO: 7.78 K/UL

## 2020-01-23 ENCOUNTER — FORM ENCOUNTER (OUTPATIENT)
Age: 60
End: 2020-01-23

## 2020-01-24 ENCOUNTER — OUTPATIENT (OUTPATIENT)
Dept: OUTPATIENT SERVICES | Facility: HOSPITAL | Age: 60
LOS: 1 days | End: 2020-01-24
Payer: MEDICAID

## 2020-01-24 DIAGNOSIS — M89.8X9 OTHER SPECIFIED DISORDERS OF BONE, UNSPECIFIED SITE: ICD-10-CM

## 2020-01-24 PROCEDURE — 71045 X-RAY EXAM CHEST 1 VIEW: CPT | Mod: 26

## 2020-01-24 PROCEDURE — 71045 X-RAY EXAM CHEST 1 VIEW: CPT

## 2020-05-21 ENCOUNTER — TRANSCRIPTION ENCOUNTER (OUTPATIENT)
Age: 60
End: 2020-05-21

## 2020-07-22 ENCOUNTER — OUTPATIENT (OUTPATIENT)
Dept: OUTPATIENT SERVICES | Facility: HOSPITAL | Age: 60
LOS: 1 days | End: 2020-07-22
Payer: MEDICAID

## 2020-07-22 ENCOUNTER — APPOINTMENT (OUTPATIENT)
Dept: FAMILY MEDICINE | Facility: HOSPITAL | Age: 60
End: 2020-07-22

## 2020-07-22 VITALS
SYSTOLIC BLOOD PRESSURE: 164 MMHG | BODY MASS INDEX: 29.43 KG/M2 | TEMPERATURE: 97.6 F | DIASTOLIC BLOOD PRESSURE: 103 MMHG | HEART RATE: 74 BPM | WEIGHT: 211 LBS | OXYGEN SATURATION: 97 % | RESPIRATION RATE: 16 BRPM

## 2020-07-22 VITALS — SYSTOLIC BLOOD PRESSURE: 146 MMHG | DIASTOLIC BLOOD PRESSURE: 103 MMHG

## 2020-07-22 DIAGNOSIS — Z00.00 ENCOUNTER FOR GENERAL ADULT MEDICAL EXAMINATION WITHOUT ABNORMAL FINDINGS: ICD-10-CM

## 2020-07-22 PROCEDURE — G0463: CPT

## 2020-07-23 DIAGNOSIS — M54.9 DORSALGIA, UNSPECIFIED: ICD-10-CM

## 2020-07-23 NOTE — PHYSICAL EXAM
[No Respiratory Distress] : no respiratory distress  [No Acute Distress] : no acute distress [Normal Rate] : normal rate  [Clear to Auscultation] : lungs were clear to auscultation bilaterally [Regular Rhythm] : with a regular rhythm [No Spinal Tenderness] : no spinal tenderness [Normal S1, S2] : normal S1 and S2 [No Murmur] : no murmur heard [No Rash] : no rash [Coordination Grossly Intact] : coordination grossly intact [Grossly Normal Strength/Tone] : grossly normal strength/tone [Normal Gait] : normal gait [No Focal Deficits] : no focal deficits [de-identified] : Right paravertebral tenderness of thoracic area on deep palpation; left paravertebral area intact, no tenderness [de-identified] : R arm with ROM intact, reported pain in the right upper scapular area of back with arms placed behind back; symmetry of shoulders

## 2020-07-23 NOTE — REVIEW OF SYSTEMS
[Muscle Pain] : muscle pain [Back Pain] : back pain [Negative] : Gastrointestinal [Muscle Weakness] : no muscle weakness

## 2020-07-23 NOTE — HISTORY OF PRESENT ILLNESS
[FreeTextEntry8] : 60 yo male with h/o anxiety/depression, HTN, h/p insomnia, Hypertriglyceridemia ( in 1/2020, improved from previous level of 1318), prediabetes (A1C of 6.1% on 1/2020), former smoker, presenting today for an acute visit with c/o right back pain and R arm pain for the past "couple of weeks". He reports pain to be sharp, is able to point specifically to where pain is (right shoulder blade), at times feels like it's "shooting from the back to the front of chest). Pt does not know of any trauma to the affected part of the body. Reports that he starts having the pain especially at the end of the day, when going to sleep, making it difficult to sleep on that side of the body. 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 Walking does not affect the pain, bending forward does not affect the pain. He denies any fever, denies decreased sensation, tingling or numbness of the affected arm. States that naproxen has not helped with the pain.

## 2020-08-05 ENCOUNTER — APPOINTMENT (OUTPATIENT)
Dept: FAMILY MEDICINE | Facility: HOSPITAL | Age: 60
End: 2020-08-05

## 2020-08-05 ENCOUNTER — OUTPATIENT (OUTPATIENT)
Dept: OUTPATIENT SERVICES | Facility: HOSPITAL | Age: 60
LOS: 1 days | End: 2020-08-05
Payer: MEDICAID

## 2020-08-05 VITALS
SYSTOLIC BLOOD PRESSURE: 128 MMHG | DIASTOLIC BLOOD PRESSURE: 88 MMHG | OXYGEN SATURATION: 97 % | TEMPERATURE: 98.6 F | HEART RATE: 83 BPM | RESPIRATION RATE: 16 BRPM

## 2020-08-05 DIAGNOSIS — Z00.00 ENCOUNTER FOR GENERAL ADULT MEDICAL EXAMINATION WITHOUT ABNORMAL FINDINGS: ICD-10-CM

## 2020-08-05 PROCEDURE — 86769 SARS-COV-2 COVID-19 ANTIBODY: CPT

## 2020-08-05 PROCEDURE — 80048 BASIC METABOLIC PNL TOTAL CA: CPT

## 2020-08-05 PROCEDURE — G0463: CPT

## 2020-08-05 RX ORDER — NAPROXEN 500 MG/1
500 TABLET ORAL
Qty: 60 | Refills: 5 | Status: COMPLETED | COMMUNITY
Start: 2017-07-13 | End: 2020-08-05

## 2020-08-05 RX ORDER — POLYETHYLENE GLYCOL, PROPYLENE GLYCOL .4; .3 G/100ML; G/100ML
0.4-0.3 LIQUID OPHTHALMIC EVERY 4 HOURS
Qty: 14 | Refills: 5 | Status: COMPLETED | COMMUNITY
Start: 2020-01-14 | End: 2020-08-05

## 2020-08-05 RX ORDER — FAMOTIDINE 10 MG/1
10 TABLET, FILM COATED ORAL DAILY
Qty: 30 | Refills: 0 | Status: COMPLETED | COMMUNITY
Start: 2020-07-22 | End: 2020-08-05

## 2020-08-05 RX ORDER — CYCLOBENZAPRINE HYDROCHLORIDE 10 MG/1
10 TABLET, FILM COATED ORAL
Qty: 10 | Refills: 0 | Status: COMPLETED | COMMUNITY
Start: 2020-07-22 | End: 2020-08-05

## 2020-08-05 NOTE — HISTORY OF PRESENT ILLNESS
[FreeTextEntry1] : f/u back pain [de-identified] : 59M with HTN, HLD, GERD, and anxiety presents for f/u R back and arm pain\par -Patient reports that right upper back pain has not improved. Was not alleviated by flexeril or naproxen. Worse when lying on it. States pain is a 9/10. Radiates to right chest\par -Also reports b/l calf cramps for the past few days. States he had similar cramps when he had hypokalemia treated with potassium chloride\par -Patient also is concerned because he saw advertisements stating that ranitidine causes cancer and stopped taking the medication. Has had substernal burning in the morning since stopping ranitidine. Requests an alternative

## 2020-08-05 NOTE — PHYSICAL EXAM
[Normal] : normal rate, regular rhythm, normal S1 and S2 and no murmur heard [No Rash] : no rash [de-identified] : Numbness of R posterior proximal arm (cannot distinguish sharp/soft). No other focal neurologic deficits [de-identified] : Positive right thoracic paraspinal tenderness. Positive R Neer test. Positive R Bañuelos-Franck. R shoulder normal to inspection and palpation, full passive and active ROM. L shoulder exam normal

## 2020-08-05 NOTE — ASSESSMENT
[FreeTextEntry1] : 59M with HTN, HLD, GERD, and anxiety presents for f/u R back and shoulder pain\par -R paraspinal thoracic tenderness likely musculoskeletal, but R arm numbness and R shoulder pain, as well as lack of improvement with flexeril, raise concern for nerve impingement in the shoulder\par -Refer to ortho\par -Continue anti-inflammatory therapy PRN\par -Substituted ranitidine with famotidine for patient caused by recall

## 2020-08-05 NOTE — REVIEW OF SYSTEMS
[Joint Pain] : joint pain [Muscle Pain] : muscle pain [Back Pain] : back pain [Chest Pain] : no chest pain [Palpitations] : no palpitations [Cough] : no cough [Shortness Of Breath] : no shortness of breath [Joint Stiffness] : no joint stiffness [Joint Swelling] : no joint swelling [Itching] : no itching [Muscle Weakness] : no muscle weakness [Skin Rash] : no skin rash

## 2020-08-07 DIAGNOSIS — R25.2 CRAMP AND SPASM: ICD-10-CM

## 2020-08-10 LAB
ANION GAP SERPL CALC-SCNC: 25 MMOL/L
BUN SERPL-MCNC: 18 MG/DL
CALCIUM SERPL-MCNC: 9.8 MG/DL
CHLORIDE SERPL-SCNC: 96 MMOL/L
CO2 SERPL-SCNC: 20 MMOL/L
CREAT SERPL-MCNC: 1.14 MG/DL
GLUCOSE SERPL-MCNC: 111 MG/DL
POTASSIUM SERPL-SCNC: 4.5 MMOL/L
SARS-COV-2 IGG SERPL IA-ACNC: 30.7 AU/ML
SARS-COV-2 IGG SERPL QL IA: POSITIVE
SODIUM SERPL-SCNC: 141 MMOL/L

## 2020-08-11 ENCOUNTER — APPOINTMENT (OUTPATIENT)
Dept: ORTHOPEDIC SURGERY | Facility: HOSPITAL | Age: 60
End: 2020-08-11

## 2020-08-11 ENCOUNTER — RESULT REVIEW (OUTPATIENT)
Age: 60
End: 2020-08-11

## 2020-08-11 ENCOUNTER — OUTPATIENT (OUTPATIENT)
Dept: OUTPATIENT SERVICES | Facility: HOSPITAL | Age: 60
LOS: 1 days | End: 2020-08-11
Payer: MEDICAID

## 2020-08-11 VITALS
SYSTOLIC BLOOD PRESSURE: 119 MMHG | RESPIRATION RATE: 15 BRPM | WEIGHT: 198 LBS | BODY MASS INDEX: 27.62 KG/M2 | TEMPERATURE: 97.1 F | HEART RATE: 94 BPM | DIASTOLIC BLOOD PRESSURE: 87 MMHG | OXYGEN SATURATION: 98 %

## 2020-08-11 DIAGNOSIS — Z00.00 ENCOUNTER FOR GENERAL ADULT MEDICAL EXAMINATION WITHOUT ABNORMAL FINDINGS: ICD-10-CM

## 2020-08-11 DIAGNOSIS — R20.0 ANESTHESIA OF SKIN: ICD-10-CM

## 2020-08-11 DIAGNOSIS — R25.2 CRAMP AND SPASM: ICD-10-CM

## 2020-08-11 PROCEDURE — 93970 EXTREMITY STUDY: CPT

## 2020-08-11 PROCEDURE — G0463: CPT

## 2020-08-11 PROCEDURE — 93970 EXTREMITY STUDY: CPT | Mod: 26

## 2020-08-11 RX ORDER — FAMOTIDINE 20 MG/1
20 TABLET, FILM COATED ORAL DAILY
Qty: 90 | Refills: 2 | Status: DISCONTINUED | COMMUNITY
Start: 2020-08-05 | End: 2020-08-11

## 2020-08-12 DIAGNOSIS — R63.4 ABNORMAL WEIGHT LOSS: ICD-10-CM

## 2020-08-12 DIAGNOSIS — R20.0 ANESTHESIA OF SKIN: ICD-10-CM

## 2020-09-08 ENCOUNTER — APPOINTMENT (OUTPATIENT)
Dept: ORTHOPEDIC SURGERY | Facility: HOSPITAL | Age: 60
End: 2020-09-08

## 2020-09-29 ENCOUNTER — OUTPATIENT (OUTPATIENT)
Dept: OUTPATIENT SERVICES | Facility: HOSPITAL | Age: 60
LOS: 1 days | Discharge: ANOTHER HOSPITAL | End: 2020-09-29
Payer: MEDICAID

## 2020-09-29 ENCOUNTER — INPATIENT (INPATIENT)
Facility: HOSPITAL | Age: 60
LOS: 16 days | Discharge: HOME | End: 2020-10-16
Attending: PSYCHIATRY & NEUROLOGY | Admitting: PSYCHIATRY & NEUROLOGY
Payer: MEDICAID

## 2020-09-29 VITALS
RESPIRATION RATE: 16 BRPM | DIASTOLIC BLOOD PRESSURE: 103 MMHG | TEMPERATURE: 98 F | HEART RATE: 77 BPM | HEIGHT: 71 IN | OXYGEN SATURATION: 100 % | SYSTOLIC BLOOD PRESSURE: 152 MMHG

## 2020-09-29 LAB
AMPHET UR-MCNC: NEGATIVE — SIGNIFICANT CHANGE UP
ANION GAP SERPL CALC-SCNC: 17 MMO/L — HIGH (ref 7–14)
ANISOCYTOSIS BLD QL: SLIGHT — SIGNIFICANT CHANGE UP
APAP SERPL-MCNC: < 15 UG/ML — LOW (ref 15–25)
APPEARANCE UR: CLEAR — SIGNIFICANT CHANGE UP
BARBITURATES UR SCN-MCNC: NEGATIVE — SIGNIFICANT CHANGE UP
BASOPHILS # BLD AUTO: 0.02 K/UL — SIGNIFICANT CHANGE UP (ref 0–0.2)
BASOPHILS NFR BLD AUTO: 0.4 % — SIGNIFICANT CHANGE UP (ref 0–2)
BENZODIAZ UR-MCNC: NEGATIVE — SIGNIFICANT CHANGE UP
BILIRUB UR-MCNC: NEGATIVE — SIGNIFICANT CHANGE UP
BLOOD UR QL VISUAL: NEGATIVE — SIGNIFICANT CHANGE UP
BUN SERPL-MCNC: 18 MG/DL — SIGNIFICANT CHANGE UP (ref 7–23)
CALCIUM SERPL-MCNC: 9.9 MG/DL — SIGNIFICANT CHANGE UP (ref 8.4–10.5)
CANNABINOIDS UR-MCNC: NEGATIVE — SIGNIFICANT CHANGE UP
CHLORIDE SERPL-SCNC: 97 MMOL/L — LOW (ref 98–107)
CO2 SERPL-SCNC: 25 MMOL/L — SIGNIFICANT CHANGE UP (ref 22–31)
COCAINE METAB.OTHER UR-MCNC: NEGATIVE — SIGNIFICANT CHANGE UP
COLOR SPEC: YELLOW — SIGNIFICANT CHANGE UP
CREAT SERPL-MCNC: 0.98 MG/DL — SIGNIFICANT CHANGE UP (ref 0.5–1.3)
EOSINOPHIL # BLD AUTO: 0.04 K/UL — SIGNIFICANT CHANGE UP (ref 0–0.5)
EOSINOPHIL NFR BLD AUTO: 0.8 % — SIGNIFICANT CHANGE UP (ref 0–6)
ETHANOL BLD-MCNC: < 10 MG/DL — SIGNIFICANT CHANGE UP
GLUCOSE SERPL-MCNC: 114 MG/DL — HIGH (ref 70–99)
GLUCOSE UR-MCNC: NEGATIVE — SIGNIFICANT CHANGE UP
HCT VFR BLD CALC: 29 % — LOW (ref 39–50)
HGB BLD-MCNC: 9.9 G/DL — LOW (ref 13–17)
IMM GRANULOCYTES NFR BLD AUTO: 1.4 % — SIGNIFICANT CHANGE UP (ref 0–1.5)
KETONES UR-MCNC: NEGATIVE — SIGNIFICANT CHANGE UP
LEUKOCYTE ESTERASE UR-ACNC: NEGATIVE — SIGNIFICANT CHANGE UP
LYMPHOCYTES # BLD AUTO: 1.34 K/UL — SIGNIFICANT CHANGE UP (ref 1–3.3)
LYMPHOCYTES # BLD AUTO: 26.6 % — SIGNIFICANT CHANGE UP (ref 13–44)
MACROCYTES BLD QL: SLIGHT — SIGNIFICANT CHANGE UP
MANUAL SMEAR VERIFICATION: SIGNIFICANT CHANGE UP
MCHC RBC-ENTMCNC: 34.1 % — SIGNIFICANT CHANGE UP (ref 32–36)
MCHC RBC-ENTMCNC: 36.3 PG — HIGH (ref 27–34)
MCV RBC AUTO: 106.2 FL — HIGH (ref 80–100)
METHADONE UR-MCNC: NEGATIVE — SIGNIFICANT CHANGE UP
MONOCYTES # BLD AUTO: 0.45 K/UL — SIGNIFICANT CHANGE UP (ref 0–0.9)
MONOCYTES NFR BLD AUTO: 8.9 % — SIGNIFICANT CHANGE UP (ref 2–14)
NEUTROPHILS # BLD AUTO: 3.11 K/UL — SIGNIFICANT CHANGE UP (ref 1.8–7.4)
NEUTROPHILS NFR BLD AUTO: 61.9 % — SIGNIFICANT CHANGE UP (ref 43–77)
NITRITE UR-MCNC: NEGATIVE — SIGNIFICANT CHANGE UP
NRBC # FLD: 0 K/UL — SIGNIFICANT CHANGE UP (ref 0–0)
OPIATES UR-MCNC: NEGATIVE — SIGNIFICANT CHANGE UP
OXYCODONE UR-MCNC: NEGATIVE — SIGNIFICANT CHANGE UP
PCP UR-MCNC: NEGATIVE — SIGNIFICANT CHANGE UP
PH UR: 6.5 — SIGNIFICANT CHANGE UP (ref 5–8)
PLATELET # BLD AUTO: 198 K/UL — SIGNIFICANT CHANGE UP (ref 150–400)
PLATELET COUNT - ESTIMATE: NORMAL — SIGNIFICANT CHANGE UP
PMV BLD: 9.7 FL — SIGNIFICANT CHANGE UP (ref 7–13)
POTASSIUM SERPL-MCNC: 3.5 MMOL/L — SIGNIFICANT CHANGE UP (ref 3.5–5.3)
POTASSIUM SERPL-SCNC: 3.5 MMOL/L — SIGNIFICANT CHANGE UP (ref 3.5–5.3)
PROT UR-MCNC: 10 — SIGNIFICANT CHANGE UP
RBC # BLD: 2.73 M/UL — LOW (ref 4.2–5.8)
RBC # FLD: 14.1 % — SIGNIFICANT CHANGE UP (ref 10.3–14.5)
SALICYLATES SERPL-MCNC: < 5 MG/DL — LOW (ref 15–30)
SODIUM SERPL-SCNC: 139 MMOL/L — SIGNIFICANT CHANGE UP (ref 135–145)
SP GR SPEC: 1.02 — SIGNIFICANT CHANGE UP (ref 1–1.04)
TSH SERPL-MCNC: 1.91 UIU/ML — SIGNIFICANT CHANGE UP (ref 0.27–4.2)
UROBILINOGEN FLD QL: NORMAL — SIGNIFICANT CHANGE UP
WBC # BLD: 5.03 K/UL — SIGNIFICANT CHANGE UP (ref 3.8–10.5)
WBC # FLD AUTO: 5.03 K/UL — SIGNIFICANT CHANGE UP (ref 3.8–10.5)

## 2020-09-29 RX ORDER — ACETAMINOPHEN 500 MG
650 TABLET ORAL ONCE
Refills: 0 | Status: COMPLETED | OUTPATIENT
Start: 2020-09-29 | End: 2020-09-29

## 2020-09-29 RX ORDER — HYDRALAZINE HCL 50 MG
10 TABLET ORAL ONCE
Refills: 0 | Status: COMPLETED | OUTPATIENT
Start: 2020-09-29 | End: 2020-09-29

## 2020-09-29 RX ADMIN — Medication 50 MILLIGRAM(S): at 23:07

## 2020-09-29 RX ADMIN — Medication 10 MILLIGRAM(S): at 22:59

## 2020-09-29 RX ADMIN — Medication 650 MILLIGRAM(S): at 21:19

## 2020-09-29 NOTE — ED ADULT NURSE REASSESSMENT NOTE - NS ED NURSE REASSESS COMMENT FT1
Received report from RN. Pt resting comfortably, resp. even and unlabored. Reports slight nausea and HA. Pt noted to be hypertensive. MD Murillo made aware. Awaiting med orders. Denies CP, SOB, and dizziness. See CIWA in flowsheet. VS as noted. NSR on the CM. NAD. Will continue to monitor.

## 2020-09-29 NOTE — ED PROVIDER NOTE - OBJECTIVE STATEMENT
61 yo M with hx of HTN and HLD presenting with depressed mood x 2 weeks. Patient states he has been suffering from severe depression associated with loss of appetite, loss of interest in usual activities, and associated 61 yo M with hx of HTN and HLD presenting with depressed mood x 2 weeks. Patient states he has been suffering from severe depression associated with loss of appetite/interest in usual activities, difficulty sleeping and associated suicidal thoughts. He denies plan for suicide or hx of suicidal attempts. He denies any major life changes and states that he "has no friends." Patient also reports drinking a pint of tequila daily for the last week, last drink last night. He denies tremors/seizures, hx of withdrawal seizures or hospitalizations related to alcohol consumption.

## 2020-09-29 NOTE — ED PROVIDER NOTE - ATTENDING CONTRIBUTION TO CARE
60M with hx of HTN and HLD presenting with binge drinking episode and worsening depression x 2 weeks. Patient states he has been suffering from severe depression associated with loss of appetite/interest in usual activities, difficulty sleeping and associated suicidal thoughts. no plan. Reports drinking a pint of tequila daily for the last week, last drink last night.     ***GEN - NAD; well appearing; A+O x3 ***HEAD - NC/AT ***EYES/NOSE - PERRL, EOMI, mucous membranes moist, no discharge ***THROAT: Oral cavity and pharynx normal. No inflammation, swelling, exudate, or lesions.  ***NECK: Neck supple, non-tender without lymphadenopathy, no masses, no thyromegaly.   ***PULMONARY - CTA b/l, symmetric breath sounds. ***CARDIAC -s1s2, RRR, no M,G,R  ***ABDOMEN - +BS, ND, NT, soft, no guarding, no rebound, no masses   ***BACK - no CVA tenderness, Normal  spine ***EXTREMITIES - symmetric pulses, 2+ dp, capillary refill < 2 seconds, no clubbing, no cyanosis, no edema ***SKIN - no rash or bruising   ***NEUROLOGIC - alert, CN 2-12 intact ***PSYCH - insight and judgment nl, memory nl, affect nl, thought nl    MDM: 60M with passive SI and binge drinking episode x2 weeks. med eval, psych eval.

## 2020-09-29 NOTE — ED ADULT NURSE NOTE - OBJECTIVE STATEMENT
pt to ED Room 11, a/ox4 c/o depression and SI. pt denies plan. pt states he has been increasingly depressed the last two weeks and has been drinking a small bottle of tequila daily for the last two weeks. pt denies auditory and visual hallucinations. pt denies cp, sob, v/d, abd pain. Speaking in full sentences, breathing unlabored. Bed in lowest position, pt in no acute distress, will continue to assess.

## 2020-09-29 NOTE — ED ADULT TRIAGE NOTE - CHIEF COMPLAINT QUOTE
Pt c/o increased depression for the past 2 weeks, has been drinking daily, last drink was yesterday. Reports suicidal thoughts, denies plan/intent. Hx: HTN, HLD. Sent in by MD for r/o withdrawals. No tremors visible in triage. Had nausea earlier today, c/o  HA and CP at this time.

## 2020-09-29 NOTE — ED ADULT NURSE REASSESSMENT NOTE - NS ED NURSE REASSESS COMMENT FT1
Report given to BILLY Ziegler in . Pt resting comfortably, resp. even and unlabored. Denies CP, SOB, HA, and dizziness. NAD. NSR on the CM. VS as noted. Transported to . Report given to BILLY Ziegler in . Pt resting comfortably, resp. even and unlabored. Denies CP, SOB, HA, and dizziness. NAD. NSR on the CM. VS as noted. Belongings across room 21. Transported to . Report given to BILLY Ziegler in . Pt resting comfortably, resp. even and unlabored. Denies CP, SOB, HA, and dizziness. NAD. NSR on the CM. VS as noted. Belongings in locker @ . Transported to . Report given to BILLY Ziegler in . Pt resting comfortably, resp. even and unlabored. Denies CP, SOB, HA, and dizziness. NAD. NSR on the CM. VS as noted. Belongings in  locker #2. Transported to . Report given to BILLY Ziegler in . Pt resting comfortably, resp. even and unlabored. Denies CP, SOB, HA, and dizziness. NAD. NSR on the CM. VS as noted. Belongings in  locker #2. IV access discontinued. Transported to .

## 2020-09-29 NOTE — ED PROVIDER NOTE - CLINICAL SUMMARY MEDICAL DECISION MAKING FREE TEXT BOX
59 yo M with hx of HTN and HLD presenting with depressed mood with associated decreased appetite, difficulty sleeping and suicidal thoughts without plan/intent. Patient is well appearing with depressed affect. Patient is everyday drinker, 1 pint per day over the last 2 weeks, concerning for possible withdrawal. Current withdrawal symptoms include tongue fasciculations, tachycardia and hypertension.  Will order basic lab work, CIWA monitoring and likelly consult psych for inpatient psych.

## 2020-09-29 NOTE — ED PROVIDER NOTE - PHYSICAL EXAMINATION
General: Patient is well appearing and in no apparent distress, AAO x 3.   HEENT: Head atraumatic. Oral mucosa moist. Conjunctiva normal.   Cardiac: Regular rhythm and rate. No pretibial edema b/l  Respiratory: No respiratory distress, speaking in full sentences. Lungs clear b/l and symmetric  Gastrointestinal: Abdomen soft, nondistended, nontender  Musculoskeletal: Moves all extremities spontaneously. No tremors appreciated. Mild tongue fasciculations.   Neurological: AAOx3.   Psychiatric: Cooperative with exam, appears withdrawn and avoids eye contact.

## 2020-09-29 NOTE — ED PROVIDER NOTE - NS ED ROS FT
Constitutional: Denies weakness, fatigue, fevers and chills.   HEENT: Denies injury, headache, LOC, dizziness/LH. Denies visual changes. Denies nasal discharge. Denies neck pain/stiffness.   Cardiovascular: Denies chest pain, palpitations.  Respiratory: Denies shortness of breath, cough.   Gastrointestinal: +decreased PO. Denies abdominal pain, nausea, vomiting, diarrhea, constipation.   Musculoskeletal: Denies LE swelling, extremity pain.  Genitourinary: Denies dysuria or hematuria.  Neurological: Denies focal weakness or tingling.

## 2020-09-30 DIAGNOSIS — F41.9 ANXIETY DISORDER, UNSPECIFIED: ICD-10-CM

## 2020-09-30 DIAGNOSIS — F33.2 MAJOR DEPRESSIVE DISORDER, RECURRENT SEVERE WITHOUT PSYCHOTIC FEATURES: ICD-10-CM

## 2020-09-30 DIAGNOSIS — F10.10 ALCOHOL ABUSE, UNCOMPLICATED: ICD-10-CM

## 2020-09-30 LAB — SARS-COV-2 RNA SPEC QL NAA+PROBE: SIGNIFICANT CHANGE UP

## 2020-09-30 PROCEDURE — 99285 EMERGENCY DEPT VISIT HI MDM: CPT

## 2020-09-30 PROCEDURE — 99222 1ST HOSP IP/OBS MODERATE 55: CPT

## 2020-09-30 RX ORDER — SERTRALINE 25 MG/1
50 TABLET, FILM COATED ORAL ONCE
Refills: 0 | Status: DISCONTINUED | OUTPATIENT
Start: 2020-09-30 | End: 2020-10-16

## 2020-09-30 RX ORDER — HALOPERIDOL DECANOATE 100 MG/ML
5 INJECTION INTRAMUSCULAR EVERY 6 HOURS
Refills: 0 | Status: DISCONTINUED | OUTPATIENT
Start: 2020-09-30 | End: 2020-10-16

## 2020-09-30 RX ORDER — THIAMINE MONONITRATE (VIT B1) 100 MG
100 TABLET ORAL DAILY
Refills: 0 | Status: COMPLETED | OUTPATIENT
Start: 2020-09-30 | End: 2020-10-02

## 2020-09-30 RX ORDER — FAMOTIDINE 10 MG/ML
20 INJECTION INTRAVENOUS DAILY
Refills: 0 | Status: DISCONTINUED | OUTPATIENT
Start: 2020-09-30 | End: 2020-10-02

## 2020-09-30 RX ORDER — SIMETHICONE 80 MG/1
80 TABLET, CHEWABLE ORAL ONCE
Refills: 0 | Status: COMPLETED | OUTPATIENT
Start: 2020-09-30 | End: 2020-09-30

## 2020-09-30 RX ORDER — NICOTINE POLACRILEX 2 MG
2 GUM BUCCAL
Refills: 0 | Status: DISCONTINUED | OUTPATIENT
Start: 2020-09-30 | End: 2020-10-16

## 2020-09-30 RX ORDER — TRAZODONE HCL 50 MG
50 TABLET ORAL AT BEDTIME
Refills: 0 | Status: DISCONTINUED | OUTPATIENT
Start: 2020-09-30 | End: 2020-10-01

## 2020-09-30 RX ORDER — QUETIAPINE FUMARATE 200 MG/1
300 TABLET, FILM COATED ORAL AT BEDTIME
Refills: 0 | Status: DISCONTINUED | OUTPATIENT
Start: 2020-09-30 | End: 2020-10-13

## 2020-09-30 RX ORDER — SERTRALINE 25 MG/1
50 TABLET, FILM COATED ORAL DAILY
Refills: 0 | Status: DISCONTINUED | OUTPATIENT
Start: 2020-10-01 | End: 2020-10-05

## 2020-09-30 RX ORDER — INFLUENZA VIRUS VACCINE 15; 15; 15; 15 UG/.5ML; UG/.5ML; UG/.5ML; UG/.5ML
0.5 SUSPENSION INTRAMUSCULAR ONCE
Refills: 0 | Status: COMPLETED | OUTPATIENT
Start: 2020-09-30 | End: 2020-10-01

## 2020-09-30 RX ORDER — ASPIRIN/CALCIUM CARB/MAGNESIUM 324 MG
81 TABLET ORAL DAILY
Refills: 0 | Status: DISCONTINUED | OUTPATIENT
Start: 2020-09-30 | End: 2020-10-16

## 2020-09-30 RX ORDER — TAMSULOSIN HYDROCHLORIDE 0.4 MG/1
0.4 CAPSULE ORAL AT BEDTIME
Refills: 0 | Status: DISCONTINUED | OUTPATIENT
Start: 2020-09-30 | End: 2020-10-16

## 2020-09-30 RX ORDER — NICOTINE POLACRILEX 2 MG
1 GUM BUCCAL DAILY
Refills: 0 | Status: DISCONTINUED | OUTPATIENT
Start: 2020-10-01 | End: 2020-10-16

## 2020-09-30 RX ORDER — ATORVASTATIN CALCIUM 80 MG/1
40 TABLET, FILM COATED ORAL AT BEDTIME
Refills: 0 | Status: DISCONTINUED | OUTPATIENT
Start: 2020-09-30 | End: 2020-10-07

## 2020-09-30 RX ORDER — FOLIC ACID 0.8 MG
1 TABLET ORAL DAILY
Refills: 0 | Status: COMPLETED | OUTPATIENT
Start: 2020-09-30 | End: 2020-10-06

## 2020-09-30 RX ORDER — METOPROLOL TARTRATE 50 MG
50 TABLET ORAL DAILY
Refills: 0 | Status: DISCONTINUED | OUTPATIENT
Start: 2020-09-30 | End: 2020-10-02

## 2020-09-30 RX ORDER — LOSARTAN POTASSIUM 100 MG/1
50 TABLET, FILM COATED ORAL DAILY
Refills: 0 | Status: DISCONTINUED | OUTPATIENT
Start: 2020-09-30 | End: 2020-10-16

## 2020-09-30 RX ORDER — DIPHENHYDRAMINE HCL 50 MG
50 CAPSULE ORAL EVERY 4 HOURS
Refills: 0 | Status: DISCONTINUED | OUTPATIENT
Start: 2020-09-30 | End: 2020-10-13

## 2020-09-30 RX ORDER — LANOLIN ALCOHOL/MO/W.PET/CERES
5 CREAM (GRAM) TOPICAL AT BEDTIME
Refills: 0 | Status: DISCONTINUED | OUTPATIENT
Start: 2020-09-30 | End: 2020-10-16

## 2020-09-30 RX ADMIN — ATORVASTATIN CALCIUM 40 MILLIGRAM(S): 80 TABLET, FILM COATED ORAL at 20:36

## 2020-09-30 RX ADMIN — QUETIAPINE FUMARATE 300 MILLIGRAM(S): 200 TABLET, FILM COATED ORAL at 20:37

## 2020-09-30 RX ADMIN — SIMETHICONE 80 MILLIGRAM(S): 80 TABLET, CHEWABLE ORAL at 00:50

## 2020-09-30 RX ADMIN — Medication 2 MILLIGRAM(S): at 17:33

## 2020-09-30 RX ADMIN — Medication 5 MILLIGRAM(S): at 20:36

## 2020-09-30 RX ADMIN — Medication 50 MILLIGRAM(S): at 11:45

## 2020-09-30 RX ADMIN — Medication 1 MILLIGRAM(S): at 11:45

## 2020-09-30 RX ADMIN — Medication 1 TABLET(S): at 11:45

## 2020-09-30 RX ADMIN — Medication 100 MILLIGRAM(S): at 11:45

## 2020-09-30 RX ADMIN — TAMSULOSIN HYDROCHLORIDE 0.4 MILLIGRAM(S): 0.4 CAPSULE ORAL at 20:37

## 2020-09-30 RX ADMIN — Medication 81 MILLIGRAM(S): at 11:45

## 2020-09-30 RX ADMIN — LOSARTAN POTASSIUM 50 MILLIGRAM(S): 100 TABLET, FILM COATED ORAL at 11:45

## 2020-09-30 RX ADMIN — FAMOTIDINE 20 MILLIGRAM(S): 10 INJECTION INTRAVENOUS at 11:45

## 2020-09-30 NOTE — ED BEHAVIORAL HEALTH ASSESSMENT NOTE - SUICIDE RISK FACTORS
Mood Disorder current/past/Agitation/Severe Anxiety/Panic/Recent onset of current/past psychiatric diagnosis/Hopelessness or despair/Anhedonia/Unable to engage in safety planning/Insomnia

## 2020-09-30 NOTE — ED BEHAVIORAL HEALTH NOTE - BEHAVIORAL HEALTH NOTE
COVID Exposure Screen- Patient    1.	*In the past 14 days, have you been around anyone with a positive COVID-19 test?*   (  ) Yes   ( X ) No   (  ) Unknown- Reason (e.g. patient uncertain, sedated, refusing to answer, etc.):  ______  IF YES PROCEED TO QUESTION #2. IF NO or UNKNOWN, PLEASE SKIP TO QUESTION #7  2.	Were you within 6 feet of them for at least 15 minutes? (  ) Yes   (  ) No   (  ) Unknown- Reason: ______    3.	Have you provided care for them? (  ) Yes   (  ) No   (  ) Unknown- Reason: ______    4.	Have you had direct physical contact with them (touched, hugged, or kissed them)? (  ) Yes   (  ) No    (  ) Unknown- Reason: ______    5.	Have you shared eating or drinking utensils with them? (  ) Yes   (  ) No    (  ) Unknown- Reason: ______    6.	Have they sneezed, coughed, or somehow got respiratory droplets on you? (  ) Yes   (  ) No    (  ) Unknown- Reason: ______    7.	*Have you been out of [place] within the past 14 days?*  (  ) Yes   ( X ) No   (  ) Unknown- Reason (e.g. patient uncertain, sedated, refusing to answer, etc.): _______  IF YES PLEASE ANSWER THE FOLLOWING QUESTIONS:  8.	Which state/country have you been to? ______   9.	Were you there over 24 hours? (  ) Yes   (  ) No    (  ) Unknown- Reason: ______    10.	What date did you return to [facility]? ______

## 2020-09-30 NOTE — ED BEHAVIORAL HEALTH ASSESSMENT NOTE - OTHER
P[facility] STOP Reference #: [id number] - no controlled substances prescribed see HPI for details sister in law self employed

## 2020-09-30 NOTE — ED BEHAVIORAL HEALTH ASSESSMENT NOTE - MEDICAL ISSUES AND PLAN (INCLUDE STANDING AND PRN MEDICATION)
Pepcid 20mg daily, ASA 81mg daily, Lipitor 40mg HS, Folic acid 1mg daily, Tamsulosin 0.4mg HS, MVI 1 tab daily, Losartan 50mg daily, Metoprolol 50mg daily, Folic acid daily

## 2020-09-30 NOTE — ED ADULT NURSE REASSESSMENT NOTE - NS ED NURSE REASSESS COMMENT FT1
break rn : pt noted to have 5 beats of PAT. nad noted. talking to psych md at bedside. md gu aware, no further orders. will continue to monitor.

## 2020-09-30 NOTE — ED ADULT NURSE REASSESSMENT NOTE - NS ED NURSE REASSESS COMMENT FT1
Pt is awake, a&ox4, clam with depressed affect. pt denies intent or plan to harm self. No s/s of etoh w/d observed or reported, CIWA score 0. Vitals obtained, Pt denies HA, chest pain, dizziness, SOB, blurred vision. Covid result pending for [facility] voluntary admission. Meal and PO fluids provided and tolerated. Will continue to monitor for safety.

## 2020-09-30 NOTE — ED BEHAVIORAL HEALTH ASSESSMENT NOTE - SELF INJURIOUS BEHAVIOR WITHOUT SUICIDAL INTENT:
Post-Op Assessment Note      CV Status:  Stable    Mental Status:  Alert and awake    Hydration Status:  Euvolemic    PONV Controlled:  Controlled    Airway Patency:  Patent    Post Op Vitals Reviewed: Yes          Staff: CRNA           BP 95/50 (03/19/18 1539)    Temp      Pulse 81 (03/19/18 1539)   Resp 16 (03/19/18 1539)    SpO2 99 % (03/19/18 1539)
None known

## 2020-09-30 NOTE — ED BEHAVIORAL HEALTH NOTE - BEHAVIORAL HEALTH NOTE
Sister In Law: Catrina [place] - [phone]     Sister In Law brought him to crisis clinic and they were referred to ED for possible withdrawal symptoms.   Pt has never been . Father  in a fire when pt 13. Mom passed away in  - he fell into his first depression and has always had some sort of mental health issues.   In the last 9 months, patient has been more depressed, not functioning, telling his family that he's not been sleeping and eating. He has not been working consistently when usually he works daily. Recently has only been working as a  occasionally.   Rents a room in [place] in a boarding house and is continuously talking to sister in law making suicidal statements "does not want to be here" "life isn't worth living". He has been making these statements for the last 9 months.  Never made any suicidal plans. No past SA.   Hx of being addicted to opioids that were prescribed by PMD and was once hospitalized for mixing opioids and alcohol. Pt was addicted for a year [id number]. No recent opioid abuse. Active alcohol use: he stopped for many years but restarted 8 months ago (amount unknown) and when he's drinking alcohol he makes more suicidal statements and appears more depressed. When he's not depressed he's appropriately social, not isolative and works daily.   Treated as an outpatient - unknown meds.   DUI - half-way: .   Sister in Law fears that if he gets turned away he may harm himself and possibly overdose.   Unaware if there's access to firearms.   Medical Problems: HTN  Family: none known          ISTOP :Reference #: [id number]  There are no results for the search terms that you entered.

## 2020-09-30 NOTE — ED BEHAVIORAL HEALTH ASSESSMENT NOTE - DESCRIPTION (FIRST USE, LAST USE, QUANTITY, FREQUENCY, DURATION)
smoking 5-6 cigarettes per day for the past 3-4 yrs hx of alcoholism - relapsed x 8 months ago. Last drink: 1 bottle tequila on 9/28/2020 PM reported past hx of opioid abuse - has maintained sobriety  for yrs now

## 2020-09-30 NOTE — ED BEHAVIORAL HEALTH ASSESSMENT NOTE - HPI (INCLUDE ILLNESS QUALITY, SEVERITY, DURATION, TIMING, CONTEXT, MODIFYING FACTORS, ASSOCIATED SIGNS AND SYMPTOMS)
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  Pt has hx of depression and anxiety, has not been under the care of a psychiatrist for many yrs now, no hx of in-Pt psych admissions, no hx of SA and hx of alcohol and opiate abuse (sober from opiates for many yrs now).  Today, self presented to the as a referral from his MD due to concern for possible withdrawal symptoms.  As he was being seen at the main ED, he verbalized feeling increasingly depressed and was having suicidal thoughts but no intent or plans.      He is seen bedside.  Appeared calm, dysphoric and cooperative.  Reports that he has been suffering from depression and anxiety for many yrs now.  However, for the past 6-7 months, claims that said depression has been worsening.  Describes depressive symptoms as having sad mood daily + anhedonia.  There is associated dec appetite (of which, he claimed loosing around 50 lbs within this yr), low energy level, impaired concentration, and early/middle/terminal insomnia.  He reports increasingly feeling lonely lately and feels hopeless/ helpless/ worthless - as he is alone in his apartment; Reported that he struggles with his ADLs and iADLs.  All attributed to his worsening depression.  Admits that intermittently, been having passive SI but no intent or plans raised.  Claimed that he was sober (from alcohol) for many years but restarted to drink again x 8 months ago as a means to later on alleviate, depressive symptoms.  Along with the depression, Pt reported experiencing anxiety symptoms.  Described anxiety as having panic attacks - with sudden onset of palpitations, SOB, chest tightness and abdominal discomfort.  Claims each panic attacks lasting for a few hours.  There is also experience of anticipatory anxiety and each panic attack, following a crescendo-decrescendo pattern.  Along with this described panic attack episodes, he also admitted to excessively worrying about everything; but mostly, about his finances, feelings of loneliness because he lives alone, etc.  Pt denied experiencing any other specific anxiety disorder symptoms.  He denied experiencing any hypomanic/ manic symptoms.  Does not feel paranoid nor experiencing any perceptual disturbances.  Pt reports he is being prescribed with Seroquel 500mg HS which he gets from his PCP - for control of his "anxiety".  However, lately  - the clinic where he follows up has refused to refill the Seroquel.      COLLATERAL INFORMATION FROM HIS SISTER: Who reported that she initially brought Pt to the [facility] for an evaluation but was later adviced there my staff that they should proceed to the ED because there was concern that Pt might be exhibiting possible withdrawal symptoms.    Sister reports that for the past 9 months, she has seen Pt to be more depressed; not eating and not sleeping well.  Overall, the Pt has not been functioning well.  For the past 9 months, the Pt has been continuously talking to the sister and making making suicidal statements like " he does not want to be here" or that "life is not worth living".  There has never been any intent or plans verbalized though.   Pt has hx of alcohol and opioid abuse.  He has maintained sobriety from the opioid but not from the alcohol use.  Pt reportedly relapsed x 8 months ago.  Sister reported that whenever he drinks alcohol, the pt would make suicidal statements, also appearing "more depressed".   Sister raised concern re: possibility that Pt may harm himself and possibly overdose.

## 2020-09-30 NOTE — ED BEHAVIORAL HEALTH ASSESSMENT NOTE - VIOLENCE PROTECTIVE FACTORS:
Insight into violence risk and need for management/treatment/Residential stability/Relationship stability

## 2020-09-30 NOTE — ED BEHAVIORAL HEALTH ASSESSMENT NOTE - RISK ASSESSMENT
RISK ASSESSMENT:   Modifiable risk factors: depression, anxiety, SI  Unmodifiable risk factors: hx of mood and anxiety disorders, passive Suicidality, Pt is doing poorly, comorbid substance, not receiving treatment   Protective factors: domiciled, part time employed, help seeking, sister is supportive, future-oriented, feels a sense of responsibility to family, no access to guns, no hx of violence, no family hx of SA, no hx of SA, currently not intoxicated or in withdrawal, no complex medical issues or chronic pain, no objective findings of acute suicidality or homicidality, not acutely manic or psychotic, Holiness    At this time given all risks taken into consideration, the Pt is at low acute risk of suicide but remains at chronically elevated risk of harming self.  Hence, Pt is not be deemed dischargeable back to the community.  Current increased risk can be mitigated by a psychiatric admission with supervision, continuing psych meds with titration towards efficacious dosing range Low Acute Suicide Risk

## 2020-09-30 NOTE — ED BEHAVIORAL HEALTH ASSESSMENT NOTE - AXIS IV
Occupational problems/Problems with primary support/Problem related to social environment/Economic problems/Other psychosocial and environmental problems

## 2020-09-30 NOTE — ED BEHAVIORAL HEALTH ASSESSMENT NOTE - ACTIVATING EVENTS/STRESSORS
Inadequate social supports/Non-compliant or not receiving treatment/Substance intoxication or withdrawal/Current or pending social isolation

## 2020-09-30 NOTE — ED BEHAVIORAL HEALTH ASSESSMENT NOTE - SUMMARY
60/M with hx of depression and anxiety, has not been under the care of a psychiatrist for many yrs now, no hx of in-Pt psych admissions, no hx of SA and hx of alcohol and opiate abuse (sober from opiates for many yrs now).  Today, self presented to the as a referral from his MD due to concern for possible withdrawal symptoms.  As he was being seen at the main ED, he verbalized feeling increasingly depressed and was having suicidal thoughts but no intent or plans.    At this time, the pt continues to endorse symptoms of worsening depression and anxiety.  Depressive symptoms meet severe MDD criteria whereas anxiety symptoms likely meeting DILIA with panic attacks; an underlying panic disorder cannot be excluded.  These symptoms have been propagated by ongoing alcoholism (which is a natural depressant).  He has relapsed from alcohol - alcoholism noted as a means to alleviate his "pain" (as per Pt).  There are also psychosocial stressors which influence the underlying psych symptoms like limited social support, financial difficulties, dealing with ongoing life situation like the ongoing pandemic, living alone - hence, feeling more lonely, etc.  At this time, though he is not actively suicidal, pt is having passive/ intermittent SI (no intent or plans).  He is unable to partake towards safety planning enough to warrant discharging him.  Rather, he is help seeking and requests admission for the purpose of mood stabilization and ensuring safety.      RECOMMENDATIONS:   1. WILL DEFER STARTING ANTIDEPRESSANT TO PRIMARY TREATMENT TEAM.  likely to start on an SSRI/ SNRI; also consider Mirtazepine (in view of poor appetite and sleep disturbance). Do not think that an AAP like Seroquel is appropriate treatment fort this pt as he is not psychotic nor manic.    2. CIWA activated symptoms  3. PRN: Ativan 2mg PO/IM q6Hrs for agitation; Trazodone 50mg HS PRN for sleep disturbances (discuss rare side effect of priapism)  4. to control his BP prior to transfer to in-Pt psych unit  - once medically cleared, facilitate admission to [facility] on 9.13 status

## 2020-09-30 NOTE — ED BEHAVIORAL HEALTH ASSESSMENT NOTE - DETAILS
Dr TERENCE Martinez sister - hx of breast cA sister updated , discussed with Dr ROSALINO Foster no hx of SA or self injurious behavior

## 2020-09-30 NOTE — ED BEHAVIORAL HEALTH ASSESSMENT NOTE - DESCRIPTION
Since his ED arrival, the Pt has been calm and cooperative.  There has been no agitation/aggressive behavior.  No verbalization of active SI/HI.   There are no signs/symptoms of acute elisa or florid psychosis.  Pt is not showing any signs/symptoms of intoxication or withdrawal.  Pt is not delirious.  He has not tested limits.. Has maintained appropriate boundaries. Pt has been easily redirected.  Pt's BP however has not been controlled.  Overall, there has been no management issues.    Vital Signs Last 24 Hrs  T(C): 37 (30 Sep 2020 02:01), Max: 37 (30 Sep 2020 02:01)  T(F): 98.6 (30 Sep 2020 02:01), Max: 98.6 (30 Sep 2020 02:01)  HR: 81 (30 Sep 2020 02:01) (54 - 83)  BP: 162/117 (30 Sep 2020 02:01) (152/103 - 185/105)  BP(mean): --  RR: 16 (30 Sep 2020 02:01) (16 - 18)  SpO2: 97% (30 Sep 2020 02:01) (97% - 100%) HTN, DM, GERD, HLD single, no children, has 5 siblings (reports a brother whom he has not spoken to in yrs over some "past misunderstanding". Buddhism. Likes to watch TV shows (60's, 70's 80's theme)

## 2020-09-30 NOTE — ED BEHAVIORAL HEALTH ASSESSMENT NOTE - SUICIDE PROTECTIVE FACTORS
Responsibility to family and others/Amish beliefs/Supportive social network of family or friends/Positive therapeutic relationships/Identifies reasons for living/Engaged in work or school/Has future plans

## 2020-10-01 ENCOUNTER — OUTPATIENT (OUTPATIENT)
Dept: OUTPATIENT SERVICES | Facility: HOSPITAL | Age: 60
LOS: 1 days | End: 2020-10-01
Payer: MEDICAID

## 2020-10-01 PROCEDURE — 99232 SBSQ HOSP IP/OBS MODERATE 35: CPT

## 2020-10-01 PROCEDURE — 90853 GROUP PSYCHOTHERAPY: CPT

## 2020-10-01 RX ORDER — CALCIUM CARBONATE 500(1250)
1 TABLET ORAL
Refills: 0 | Status: DISCONTINUED | OUTPATIENT
Start: 2020-10-01 | End: 2020-10-16

## 2020-10-01 RX ORDER — TRAZODONE HCL 50 MG
50 TABLET ORAL AT BEDTIME
Refills: 0 | Status: DISCONTINUED | OUTPATIENT
Start: 2020-10-01 | End: 2020-10-02

## 2020-10-01 RX ADMIN — Medication 1 PATCH: at 08:05

## 2020-10-01 RX ADMIN — Medication 1 PATCH: at 19:43

## 2020-10-01 RX ADMIN — Medication 5 MILLIGRAM(S): at 20:03

## 2020-10-01 RX ADMIN — Medication 50 MILLIGRAM(S): at 20:03

## 2020-10-01 RX ADMIN — Medication 50 MILLIGRAM(S): at 08:05

## 2020-10-01 RX ADMIN — Medication 1 TABLET(S): at 21:55

## 2020-10-01 RX ADMIN — ATORVASTATIN CALCIUM 40 MILLIGRAM(S): 80 TABLET, FILM COATED ORAL at 20:03

## 2020-10-01 RX ADMIN — FAMOTIDINE 20 MILLIGRAM(S): 10 INJECTION INTRAVENOUS at 08:05

## 2020-10-01 RX ADMIN — Medication 81 MILLIGRAM(S): at 08:05

## 2020-10-01 RX ADMIN — Medication 100 MILLIGRAM(S): at 08:05

## 2020-10-01 RX ADMIN — TAMSULOSIN HYDROCHLORIDE 0.4 MILLIGRAM(S): 0.4 CAPSULE ORAL at 20:03

## 2020-10-01 RX ADMIN — Medication 50 MILLIGRAM(S): at 00:11

## 2020-10-01 RX ADMIN — Medication 50 MILLIGRAM(S): at 21:55

## 2020-10-01 RX ADMIN — QUETIAPINE FUMARATE 300 MILLIGRAM(S): 200 TABLET, FILM COATED ORAL at 20:03

## 2020-10-01 RX ADMIN — Medication 1 MILLIGRAM(S): at 08:05

## 2020-10-01 RX ADMIN — SERTRALINE 50 MILLIGRAM(S): 25 TABLET, FILM COATED ORAL at 08:05

## 2020-10-01 RX ADMIN — Medication 1 TABLET(S): at 08:05

## 2020-10-01 RX ADMIN — Medication 2 MILLIGRAM(S): at 20:00

## 2020-10-01 RX ADMIN — LOSARTAN POTASSIUM 50 MILLIGRAM(S): 100 TABLET, FILM COATED ORAL at 08:05

## 2020-10-01 RX ADMIN — INFLUENZA VIRUS VACCINE 0.5 MILLILITER(S): 15; 15; 15; 15 SUSPENSION INTRAMUSCULAR at 12:46

## 2020-10-02 DIAGNOSIS — M19.019 PRIMARY OSTEOARTHRITIS, UNSPECIFIED SHOULDER: Chronic | ICD-10-CM

## 2020-10-02 PROCEDURE — 99232 SBSQ HOSP IP/OBS MODERATE 35: CPT

## 2020-10-02 PROCEDURE — 93010 ELECTROCARDIOGRAM REPORT: CPT

## 2020-10-02 PROCEDURE — 99223 1ST HOSP IP/OBS HIGH 75: CPT

## 2020-10-02 RX ORDER — METOPROLOL TARTRATE 50 MG
100 TABLET ORAL DAILY
Refills: 0 | Status: DISCONTINUED | OUTPATIENT
Start: 2020-10-03 | End: 2020-10-16

## 2020-10-02 RX ORDER — IBUPROFEN 200 MG
600 TABLET ORAL EVERY 6 HOURS
Refills: 0 | Status: DISCONTINUED | OUTPATIENT
Start: 2020-10-02 | End: 2020-10-08

## 2020-10-02 RX ORDER — PANTOPRAZOLE SODIUM 20 MG/1
40 TABLET, DELAYED RELEASE ORAL
Refills: 0 | Status: DISCONTINUED | OUTPATIENT
Start: 2020-10-03 | End: 2020-10-16

## 2020-10-02 RX ORDER — LIDOCAINE 4 G/100G
2 CREAM TOPICAL ONCE
Refills: 0 | Status: COMPLETED | OUTPATIENT
Start: 2020-10-02 | End: 2020-10-02

## 2020-10-02 RX ORDER — PANTOPRAZOLE SODIUM 20 MG/1
40 TABLET, DELAYED RELEASE ORAL ONCE
Refills: 0 | Status: COMPLETED | OUTPATIENT
Start: 2020-10-02 | End: 2020-10-02

## 2020-10-02 RX ORDER — LIDOCAINE 4 G/100G
2 CREAM TOPICAL DAILY
Refills: 0 | Status: DISCONTINUED | OUTPATIENT
Start: 2020-10-02 | End: 2020-10-16

## 2020-10-02 RX ORDER — TRAZODONE HCL 50 MG
100 TABLET ORAL AT BEDTIME
Refills: 0 | Status: DISCONTINUED | OUTPATIENT
Start: 2020-10-02 | End: 2020-10-14

## 2020-10-02 RX ADMIN — Medication 5 MILLIGRAM(S): at 20:46

## 2020-10-02 RX ADMIN — Medication 1 TABLET(S): at 09:57

## 2020-10-02 RX ADMIN — TAMSULOSIN HYDROCHLORIDE 0.4 MILLIGRAM(S): 0.4 CAPSULE ORAL at 20:46

## 2020-10-02 RX ADMIN — Medication 1 PATCH: at 09:59

## 2020-10-02 RX ADMIN — Medication 1 PATCH: at 20:46

## 2020-10-02 RX ADMIN — Medication 1 PATCH: at 09:58

## 2020-10-02 RX ADMIN — SERTRALINE 50 MILLIGRAM(S): 25 TABLET, FILM COATED ORAL at 09:59

## 2020-10-02 RX ADMIN — LOSARTAN POTASSIUM 50 MILLIGRAM(S): 100 TABLET, FILM COATED ORAL at 09:57

## 2020-10-02 RX ADMIN — Medication 600 MILLIGRAM(S): at 22:15

## 2020-10-02 RX ADMIN — Medication 1 MILLIGRAM(S): at 17:58

## 2020-10-02 RX ADMIN — Medication 600 MILLIGRAM(S): at 21:55

## 2020-10-02 RX ADMIN — Medication 2 MILLIGRAM(S): at 06:48

## 2020-10-02 RX ADMIN — Medication 2 MILLIGRAM(S): at 21:54

## 2020-10-02 RX ADMIN — Medication 50 MILLIGRAM(S): at 09:57

## 2020-10-02 RX ADMIN — Medication 100 MILLIGRAM(S): at 20:46

## 2020-10-02 RX ADMIN — LIDOCAINE 2 PATCH: 4 CREAM TOPICAL at 20:46

## 2020-10-02 RX ADMIN — Medication 81 MILLIGRAM(S): at 09:57

## 2020-10-02 RX ADMIN — Medication 2 MILLIGRAM(S): at 10:00

## 2020-10-02 RX ADMIN — Medication 100 MILLIGRAM(S): at 09:59

## 2020-10-02 RX ADMIN — LIDOCAINE 2 PATCH: 4 CREAM TOPICAL at 12:19

## 2020-10-02 RX ADMIN — Medication 600 MILLIGRAM(S): at 12:59

## 2020-10-02 RX ADMIN — ATORVASTATIN CALCIUM 40 MILLIGRAM(S): 80 TABLET, FILM COATED ORAL at 20:45

## 2020-10-02 RX ADMIN — Medication 30 MILLILITER(S): at 22:05

## 2020-10-02 RX ADMIN — QUETIAPINE FUMARATE 300 MILLIGRAM(S): 200 TABLET, FILM COATED ORAL at 20:46

## 2020-10-02 RX ADMIN — PANTOPRAZOLE SODIUM 40 MILLIGRAM(S): 20 TABLET, DELAYED RELEASE ORAL at 09:59

## 2020-10-02 RX ADMIN — Medication 1 MILLIGRAM(S): at 09:57

## 2020-10-02 RX ADMIN — Medication 1 MILLIGRAM(S): at 12:26

## 2020-10-02 RX ADMIN — Medication 1 MILLIGRAM(S): at 23:14

## 2020-10-02 NOTE — CONSULT NOTE ADULT - SUBJECTIVE AND OBJECTIVE BOX
HPI:     PAST MEDICAL & SURGICAL HISTORY:  Benign hypertension    Type 2 diabetes mellitus without complication, without long-term current use of insulin    Shoulder arthritis  Multiple surgeries on left shoulder        Review of Systems:   CONSTITUTIONAL: No fever, weight loss, or fatigue  EYES: No eye pain, visual disturbances, or discharge  ENMT:  No difficulty hearing, tinnitus, vertigo; No sinus or throat pain  NECK: No pain or stiffness  RESPIRATORY: No cough, wheezing, chills or hemoptysis; No shortness of breath  CARDIOVASCULAR: No chest pain, palpitations, dizziness, or leg swelling  GASTROINTESTINAL: No abdominal or epigastric pain. No nausea, vomiting, or hematemesis; No diarrhea or constipation. No melena or hematochezia.  GENITOURINARY: No dysuria, frequency, hematuria, or incontinence  NEUROLOGICAL: No headaches, memory loss, loss of strength, numbness, or tremors  SKIN: No itching, burning, rashes, or lesions   LYMPH NODES: No enlarged glands  ENDOCRINE: No heat or cold intolerance; No hair loss  MUSCULOSKELETAL: No joint pain or swelling; No muscle, back, or extremity pain  HEME/LYMPH: No easy bruising, or bleeding gums  ALLERY AND IMMUNOLOGIC: No hives or eczema    Allergies    No Known Drug Allergies  shellfish (Unknown)    Intolerances        Social History:     FAMILY HISTORY:      MEDICATIONS  (STANDING):  aspirin  chewable 81 milliGRAM(s) Oral daily  atorvastatin 40 milliGRAM(s) Oral at bedtime  folic acid 1 milliGRAM(s) Oral daily  lidocaine   Patch 2 Patch Transdermal daily  LORazepam     Tablet 1 milliGRAM(s) Oral every 6 hours  losartan 50 milliGRAM(s) Oral daily  melatonin. 5 milliGRAM(s) Oral at bedtime  multivitamin 1 Tablet(s) Oral daily  nicotine -  14 mG/24Hr(s) Patch 1 patch Transdermal daily  QUEtiapine 300 milliGRAM(s) Oral at bedtime  sertraline 50 milliGRAM(s) Oral once  sertraline 50 milliGRAM(s) Oral daily  tamsulosin 0.4 milliGRAM(s) Oral at bedtime  traZODone 100 milliGRAM(s) Oral at bedtime    MEDICATIONS  (PRN):  calcium carbonate    500 mG (Tums) Chewable 1 Tablet(s) Chew two times a day PRN heart burn  diphenhydrAMINE 50 milliGRAM(s) Oral every 4 hours PRN anxiety or insomnia  haloperidol     Tablet 5 milliGRAM(s) Oral every 6 hours PRN agitation  ibuprofen  Tablet. 600 milliGRAM(s) Oral every 6 hours PRN Mild Pain (1 - 3), Moderate Pain (4 - 6)  LORazepam     Tablet 2 milliGRAM(s) Oral every 2 hours PRN CIWA score increase by 2 points and current CIWA score GREATER THAN 9  LORazepam     Tablet 2 milliGRAM(s) Oral every 6 hours PRN Agitation  LORazepam   Injectable 2 milliGRAM(s) IntraMuscular every 6 hours PRN SEVERE AGITATION  nicotine  Polacrilex Gum 2 milliGRAM(s) Oral every 3 hours PRN nicotine cravings      Vital Signs Last 24 Hrs  T(C): 36.6 (02 Oct 2020 06:37), Max: 36.6 (01 Oct 2020 20:14)  T(F): 97.9 (02 Oct 2020 06:37), Max: 97.9 (02 Oct 2020 06:37)  HR: 59 (02 Oct 2020 17:15) (59 - 86)  BP: 152/99 (02 Oct 2020 17:15) (140/90 - 152/99)  BP(mean): --  RR: --  SpO2: --  CAPILLARY BLOOD GLUCOSE            PHYSICAL EXAM:  GENERAL: NAD, well-developed  HEAD:  Atraumatic, Normocephalic  EYES: EOMI, conjunctiva and sclera clear  NECK: Supple, No JVD  CHEST/LUNG: Clear to auscultation bilaterally; No wheeze  HEART: Regular rate and rhythm; No murmurs, rubs, or gallops  ABDOMEN: Soft, Nontender, Nondistended; Bowel sounds present  EXTREMITIES:  2+ Peripheral Pulses, No clubbing, cyanosis, or edema  NEUROLOGY: non-focal  SKIN: No rashes or lesions    LABS:                    EKG(personally reviewed):    RADIOLOGY & ADDITIONAL TESTS:    Imaging Personally Reviewed:    Consultant(s) Notes Reviewed:      Care Discussed with Consultants/Other Providers:   HPI: Pt is 60M admitted to [facility] 9/30/20 for management of depression.  He complains today of sharp pain in his right side radiating to shoulder, which he can't lift because of pain.  In August he saw PCP for similar complaints, was referred to ortho and CT scan was ordered which insurance wouldn't approve.  he had numbness in his fingers on the right at the time which comes and goes, is not there now.     PAST MEDICAL & SURGICAL HISTORY:  Benign hypertension    Type 2 diabetes mellitus without complication, without long-term current use of insulin    Shoulder arthritis  Multiple surgeries on left shoulder        Review of Systems:   CONSTITUTIONAL: No fever, weight loss, or fatigue  EYES: No eye pain, visual disturbances, or discharge  ENMT:  No difficulty hearing, tinnitus, vertigo; No sinus or throat pain  NECK: No pain or stiffness  RESPIRATORY: No cough, wheezing, chills or hemoptysis; No shortness of breath  CARDIOVASCULAR: No chest pain, palpitations, dizziness, or leg swelling  GASTROINTESTINAL: No abdominal or epigastric pain. No nausea, vomiting, or hematemesis; No diarrhea or constipation. No melena or hematochezia.  GENITOURINARY: No dysuria, frequency, hematuria, or incontinence  NEUROLOGICAL: No headaches, memory loss, loss of strength, numbness, or tremors  SKIN: No itching, burning, rashes, or lesions   LYMPH NODES: No enlarged glands  ENDOCRINE: No heat or cold intolerance; No hair loss  MUSCULOSKELETAL: No joint pain or swelling; No muscle, back, or extremity pain  HEME/LYMPH: No easy bruising, or bleeding gums  ALLERY AND IMMUNOLOGIC: No hives or eczema    Allergies    No Known Drug Allergies  shellfish (Unknown)    Intolerances        Social History: relapsed etoh dependence, prior heroin use, none recently, no tob    FAMILY HISTORY:noncontributory      MEDICATIONS  (STANDING):  aspirin  chewable 81 milliGRAM(s) Oral daily  atorvastatin 40 milliGRAM(s) Oral at bedtime  folic acid 1 milliGRAM(s) Oral daily  lidocaine   Patch 2 Patch Transdermal daily  LORazepam     Tablet 1 milliGRAM(s) Oral every 6 hours  losartan 50 milliGRAM(s) Oral daily  melatonin. 5 milliGRAM(s) Oral at bedtime  multivitamin 1 Tablet(s) Oral daily  nicotine -  14 mG/24Hr(s) Patch 1 patch Transdermal daily  QUEtiapine 300 milliGRAM(s) Oral at bedtime  sertraline 50 milliGRAM(s) Oral once  sertraline 50 milliGRAM(s) Oral daily  tamsulosin 0.4 milliGRAM(s) Oral at bedtime  traZODone 100 milliGRAM(s) Oral at bedtime    MEDICATIONS  (PRN):  calcium carbonate    500 mG (Tums) Chewable 1 Tablet(s) Chew two times a day PRN heart burn  diphenhydrAMINE 50 milliGRAM(s) Oral every 4 hours PRN anxiety or insomnia  haloperidol     Tablet 5 milliGRAM(s) Oral every 6 hours PRN agitation  ibuprofen  Tablet. 600 milliGRAM(s) Oral every 6 hours PRN Mild Pain (1 - 3), Moderate Pain (4 - 6)  LORazepam     Tablet 2 milliGRAM(s) Oral every 2 hours PRN CIWA score increase by 2 points and current CIWA score GREATER THAN 9  LORazepam     Tablet 2 milliGRAM(s) Oral every 6 hours PRN Agitation  LORazepam   Injectable 2 milliGRAM(s) IntraMuscular every 6 hours PRN SEVERE AGITATION  nicotine  Polacrilex Gum 2 milliGRAM(s) Oral every 3 hours PRN nicotine cravings      Vital Signs Last 24 Hrs  T(C): 36.6 (02 Oct 2020 06:37), Max: 36.6 (01 Oct 2020 20:14)  T(F): 97.9 (02 Oct 2020 06:37), Max: 97.9 (02 Oct 2020 06:37)  HR: 59 (02 Oct 2020 17:15) (59 - 86)  BP: 152/99 (02 Oct 2020 17:15) (140/90 - 152/99)  BP(mean): --  RR: --  SpO2: --  CAPILLARY BLOOD GLUCOSE            PHYSICAL EXAM:  GENERAL: complaining of pain, well-developed  HEAD:  Atraumatic, Normocephalic  EYES: EOMI, conjunctiva and sclera clear  NECK: Supple, No JVD  CHEST/LUNG: Clear to auscultation bilaterally; No wheeze  HEART: Regular rate and rhythm; No murmurs, rubs, or gallops  ABDOMEN: Soft, Nontender, Nondistended; Bowel sounds present  EXTREMITIES:  2+ Peripheral Pulses, No clubbing, cyanosis, or edema  MSK: Right sided pain on upper back reproducible with palpation of muscle around right scapula. Full passive ROM right shoulder  NEUROLOGY: non-focal  SKIN: No rashes or lesions    LABS:      Reviewed in sunrise              EKG(personally reviewed): NSR no changes vs prior        Care Discussed with Consultants/Other Providers: Dr Lewis

## 2020-10-02 NOTE — CONSULT NOTE ADULT - ASSESSMENT
60M HTN etoh abuse GERD HLD BPH hx DM (not on meds) with right upper back and shoulder pain, recurring for several months    Plan:  Right sided back pain: EKG normal no suspicion of cardiac source. Trial of lidoderm on upper right back, tylenol prn. Acoid opioids. Outpatient ortho follow up    HTN: BP elevated, may be a component of pain but it has been persistent.  Will increase toprol to 100ng daily.  Monitor BP.  Continue losartan    Hx DM, now ?diet controlled: Check HbA1c 10/5    GERD: Continue protonix    HLD: Continue statin    Depression: Management per primary team

## 2020-10-03 PROCEDURE — 99232 SBSQ HOSP IP/OBS MODERATE 35: CPT

## 2020-10-03 RX ORDER — ACETAMINOPHEN 500 MG
650 TABLET ORAL ONCE
Refills: 0 | Status: COMPLETED | OUTPATIENT
Start: 2020-10-03 | End: 2020-10-03

## 2020-10-03 RX ADMIN — Medication 1 MILLIGRAM(S): at 12:13

## 2020-10-03 RX ADMIN — Medication 650 MILLIGRAM(S): at 11:30

## 2020-10-03 RX ADMIN — LIDOCAINE 2 PATCH: 4 CREAM TOPICAL at 21:00

## 2020-10-03 RX ADMIN — Medication 30 MILLILITER(S): at 20:31

## 2020-10-03 RX ADMIN — TAMSULOSIN HYDROCHLORIDE 0.4 MILLIGRAM(S): 0.4 CAPSULE ORAL at 20:16

## 2020-10-03 RX ADMIN — Medication 600 MILLIGRAM(S): at 10:59

## 2020-10-03 RX ADMIN — Medication 1 MILLIGRAM(S): at 06:37

## 2020-10-03 RX ADMIN — Medication 100 MILLIGRAM(S): at 09:19

## 2020-10-03 RX ADMIN — Medication 5 MILLIGRAM(S): at 20:15

## 2020-10-03 RX ADMIN — PANTOPRAZOLE SODIUM 40 MILLIGRAM(S): 20 TABLET, DELAYED RELEASE ORAL at 09:19

## 2020-10-03 RX ADMIN — Medication 1 MILLIGRAM(S): at 18:03

## 2020-10-03 RX ADMIN — Medication 1 MILLIGRAM(S): at 09:19

## 2020-10-03 RX ADMIN — QUETIAPINE FUMARATE 300 MILLIGRAM(S): 200 TABLET, FILM COATED ORAL at 20:15

## 2020-10-03 RX ADMIN — Medication 1 PATCH: at 20:15

## 2020-10-03 RX ADMIN — SERTRALINE 50 MILLIGRAM(S): 25 TABLET, FILM COATED ORAL at 09:19

## 2020-10-03 RX ADMIN — Medication 81 MILLIGRAM(S): at 09:19

## 2020-10-03 RX ADMIN — Medication 600 MILLIGRAM(S): at 20:15

## 2020-10-03 RX ADMIN — LIDOCAINE 2 PATCH: 4 CREAM TOPICAL at 20:15

## 2020-10-03 RX ADMIN — Medication 2 MILLIGRAM(S): at 20:15

## 2020-10-03 RX ADMIN — Medication 1 PATCH: at 09:29

## 2020-10-03 RX ADMIN — Medication 650 MILLIGRAM(S): at 12:00

## 2020-10-03 RX ADMIN — ATORVASTATIN CALCIUM 40 MILLIGRAM(S): 80 TABLET, FILM COATED ORAL at 20:14

## 2020-10-03 RX ADMIN — LOSARTAN POTASSIUM 50 MILLIGRAM(S): 100 TABLET, FILM COATED ORAL at 09:19

## 2020-10-03 RX ADMIN — LIDOCAINE 2 PATCH: 4 CREAM TOPICAL at 00:18

## 2020-10-03 RX ADMIN — Medication 1 PATCH: at 09:19

## 2020-10-03 RX ADMIN — Medication 600 MILLIGRAM(S): at 09:30

## 2020-10-03 RX ADMIN — Medication 1 TABLET(S): at 09:19

## 2020-10-03 RX ADMIN — LIDOCAINE 2 PATCH: 4 CREAM TOPICAL at 09:19

## 2020-10-03 RX ADMIN — Medication 100 MILLIGRAM(S): at 20:16

## 2020-10-04 PROCEDURE — 99232 SBSQ HOSP IP/OBS MODERATE 35: CPT

## 2020-10-04 RX ADMIN — TAMSULOSIN HYDROCHLORIDE 0.4 MILLIGRAM(S): 0.4 CAPSULE ORAL at 20:31

## 2020-10-04 RX ADMIN — Medication 100 MILLIGRAM(S): at 09:15

## 2020-10-04 RX ADMIN — Medication 5 MILLIGRAM(S): at 20:31

## 2020-10-04 RX ADMIN — Medication 1 TABLET(S): at 09:15

## 2020-10-04 RX ADMIN — Medication 1 MILLIGRAM(S): at 18:08

## 2020-10-04 RX ADMIN — Medication 1 PATCH: at 09:16

## 2020-10-04 RX ADMIN — Medication 81 MILLIGRAM(S): at 09:15

## 2020-10-04 RX ADMIN — Medication 1 PATCH: at 09:18

## 2020-10-04 RX ADMIN — ATORVASTATIN CALCIUM 40 MILLIGRAM(S): 80 TABLET, FILM COATED ORAL at 20:31

## 2020-10-04 RX ADMIN — Medication 1 MILLIGRAM(S): at 06:15

## 2020-10-04 RX ADMIN — Medication 1 TABLET(S): at 22:07

## 2020-10-04 RX ADMIN — Medication 1 MILLIGRAM(S): at 12:59

## 2020-10-04 RX ADMIN — LIDOCAINE 2 PATCH: 4 CREAM TOPICAL at 19:49

## 2020-10-04 RX ADMIN — Medication 1 PATCH: at 19:49

## 2020-10-04 RX ADMIN — SERTRALINE 50 MILLIGRAM(S): 25 TABLET, FILM COATED ORAL at 09:16

## 2020-10-04 RX ADMIN — LOSARTAN POTASSIUM 50 MILLIGRAM(S): 100 TABLET, FILM COATED ORAL at 09:15

## 2020-10-04 RX ADMIN — LIDOCAINE 2 PATCH: 4 CREAM TOPICAL at 12:19

## 2020-10-04 RX ADMIN — Medication 1 MILLIGRAM(S): at 23:11

## 2020-10-04 RX ADMIN — Medication 600 MILLIGRAM(S): at 20:31

## 2020-10-04 RX ADMIN — Medication 1 MILLIGRAM(S): at 09:15

## 2020-10-04 RX ADMIN — QUETIAPINE FUMARATE 300 MILLIGRAM(S): 200 TABLET, FILM COATED ORAL at 20:31

## 2020-10-04 RX ADMIN — Medication 600 MILLIGRAM(S): at 23:06

## 2020-10-04 RX ADMIN — Medication 1 PATCH: at 12:20

## 2020-10-04 RX ADMIN — Medication 50 MILLIGRAM(S): at 22:07

## 2020-10-04 RX ADMIN — PANTOPRAZOLE SODIUM 40 MILLIGRAM(S): 20 TABLET, DELAYED RELEASE ORAL at 09:16

## 2020-10-04 RX ADMIN — Medication 100 MILLIGRAM(S): at 20:31

## 2020-10-05 LAB — HBA1C BLD-MCNC: 5.5 % — SIGNIFICANT CHANGE UP (ref 4–5.6)

## 2020-10-05 PROCEDURE — 99232 SBSQ HOSP IP/OBS MODERATE 35: CPT

## 2020-10-05 RX ORDER — CYCLOBENZAPRINE HYDROCHLORIDE 10 MG/1
5 TABLET, FILM COATED ORAL THREE TIMES A DAY
Refills: 0 | Status: DISCONTINUED | OUTPATIENT
Start: 2020-10-05 | End: 2020-10-16

## 2020-10-05 RX ORDER — SERTRALINE 25 MG/1
100 TABLET, FILM COATED ORAL DAILY
Refills: 0 | Status: DISCONTINUED | OUTPATIENT
Start: 2020-10-05 | End: 2020-10-12

## 2020-10-05 RX ADMIN — QUETIAPINE FUMARATE 300 MILLIGRAM(S): 200 TABLET, FILM COATED ORAL at 20:32

## 2020-10-05 RX ADMIN — Medication 1 TABLET(S): at 09:01

## 2020-10-05 RX ADMIN — Medication 1 PATCH: at 12:50

## 2020-10-05 RX ADMIN — ATORVASTATIN CALCIUM 40 MILLIGRAM(S): 80 TABLET, FILM COATED ORAL at 20:32

## 2020-10-05 RX ADMIN — Medication 100 MILLIGRAM(S): at 09:01

## 2020-10-05 RX ADMIN — PANTOPRAZOLE SODIUM 40 MILLIGRAM(S): 20 TABLET, DELAYED RELEASE ORAL at 06:32

## 2020-10-05 RX ADMIN — Medication 1 MILLIGRAM(S): at 23:21

## 2020-10-05 RX ADMIN — Medication 100 MILLIGRAM(S): at 20:31

## 2020-10-05 RX ADMIN — Medication 81 MILLIGRAM(S): at 09:01

## 2020-10-05 RX ADMIN — TAMSULOSIN HYDROCHLORIDE 0.4 MILLIGRAM(S): 0.4 CAPSULE ORAL at 20:31

## 2020-10-05 RX ADMIN — LOSARTAN POTASSIUM 50 MILLIGRAM(S): 100 TABLET, FILM COATED ORAL at 09:01

## 2020-10-05 RX ADMIN — LIDOCAINE 2 PATCH: 4 CREAM TOPICAL at 00:35

## 2020-10-05 RX ADMIN — Medication 5 MILLIGRAM(S): at 20:32

## 2020-10-05 RX ADMIN — Medication 1 MILLIGRAM(S): at 09:01

## 2020-10-05 RX ADMIN — SERTRALINE 50 MILLIGRAM(S): 25 TABLET, FILM COATED ORAL at 09:01

## 2020-10-05 RX ADMIN — CYCLOBENZAPRINE HYDROCHLORIDE 5 MILLIGRAM(S): 10 TABLET, FILM COATED ORAL at 20:32

## 2020-10-05 RX ADMIN — Medication 1 MILLIGRAM(S): at 12:44

## 2020-10-05 RX ADMIN — Medication 1 PATCH: at 12:51

## 2020-10-05 RX ADMIN — Medication 1 MILLIGRAM(S): at 06:28

## 2020-10-05 RX ADMIN — Medication 1 MILLIGRAM(S): at 17:53

## 2020-10-05 RX ADMIN — LIDOCAINE 2 PATCH: 4 CREAM TOPICAL at 12:50

## 2020-10-05 RX ADMIN — Medication 50 MILLIGRAM(S): at 20:32

## 2020-10-06 PROCEDURE — 99232 SBSQ HOSP IP/OBS MODERATE 35: CPT

## 2020-10-06 RX ADMIN — Medication 100 MILLIGRAM(S): at 20:56

## 2020-10-06 RX ADMIN — SERTRALINE 100 MILLIGRAM(S): 25 TABLET, FILM COATED ORAL at 09:02

## 2020-10-06 RX ADMIN — Medication 100 MILLIGRAM(S): at 09:02

## 2020-10-06 RX ADMIN — Medication 1 MILLIGRAM(S): at 06:32

## 2020-10-06 RX ADMIN — Medication 600 MILLIGRAM(S): at 20:55

## 2020-10-06 RX ADMIN — LIDOCAINE 2 PATCH: 4 CREAM TOPICAL at 12:20

## 2020-10-06 RX ADMIN — ATORVASTATIN CALCIUM 40 MILLIGRAM(S): 80 TABLET, FILM COATED ORAL at 20:56

## 2020-10-06 RX ADMIN — Medication 1 PATCH: at 12:20

## 2020-10-06 RX ADMIN — Medication 50 MILLIGRAM(S): at 23:30

## 2020-10-06 RX ADMIN — Medication 1 MILLIGRAM(S): at 12:21

## 2020-10-06 RX ADMIN — LOSARTAN POTASSIUM 50 MILLIGRAM(S): 100 TABLET, FILM COATED ORAL at 09:02

## 2020-10-06 RX ADMIN — Medication 1 MILLIGRAM(S): at 17:39

## 2020-10-06 RX ADMIN — Medication 81 MILLIGRAM(S): at 09:01

## 2020-10-06 RX ADMIN — Medication 1 TABLET(S): at 09:02

## 2020-10-06 RX ADMIN — Medication 1 MILLIGRAM(S): at 09:01

## 2020-10-06 RX ADMIN — Medication 5 MILLIGRAM(S): at 20:56

## 2020-10-06 RX ADMIN — PANTOPRAZOLE SODIUM 40 MILLIGRAM(S): 20 TABLET, DELAYED RELEASE ORAL at 09:02

## 2020-10-06 RX ADMIN — TAMSULOSIN HYDROCHLORIDE 0.4 MILLIGRAM(S): 0.4 CAPSULE ORAL at 20:56

## 2020-10-06 RX ADMIN — Medication 1 MILLIGRAM(S): at 23:30

## 2020-10-06 RX ADMIN — QUETIAPINE FUMARATE 300 MILLIGRAM(S): 200 TABLET, FILM COATED ORAL at 20:56

## 2020-10-07 LAB
ALBUMIN SERPL ELPH-MCNC: 3.8 G/DL — SIGNIFICANT CHANGE UP (ref 3.3–5)
ALP SERPL-CCNC: 54 U/L — SIGNIFICANT CHANGE UP (ref 40–120)
ALT FLD-CCNC: 23 U/L — SIGNIFICANT CHANGE UP (ref 4–41)
ANION GAP SERPL CALC-SCNC: 8 MMO/L — SIGNIFICANT CHANGE UP (ref 7–14)
AST SERPL-CCNC: 24 U/L — SIGNIFICANT CHANGE UP (ref 4–40)
BASOPHILS # BLD AUTO: 0.05 K/UL — SIGNIFICANT CHANGE UP (ref 0–0.2)
BASOPHILS NFR BLD AUTO: 0.7 % — SIGNIFICANT CHANGE UP (ref 0–2)
BILIRUB SERPL-MCNC: 0.3 MG/DL — SIGNIFICANT CHANGE UP (ref 0.2–1.2)
BUN SERPL-MCNC: 16 MG/DL — SIGNIFICANT CHANGE UP (ref 7–23)
CALCIUM SERPL-MCNC: 9.8 MG/DL — SIGNIFICANT CHANGE UP (ref 8.4–10.5)
CHLORIDE SERPL-SCNC: 102 MMOL/L — SIGNIFICANT CHANGE UP (ref 98–107)
CHOLEST SERPL-MCNC: 109 MG/DL — LOW (ref 120–199)
CO2 SERPL-SCNC: 29 MMOL/L — SIGNIFICANT CHANGE UP (ref 22–31)
CREAT SERPL-MCNC: 1.01 MG/DL — SIGNIFICANT CHANGE UP (ref 0.5–1.3)
EOSINOPHIL # BLD AUTO: 0.06 K/UL — SIGNIFICANT CHANGE UP (ref 0–0.5)
EOSINOPHIL NFR BLD AUTO: 0.9 % — SIGNIFICANT CHANGE UP (ref 0–6)
GLUCOSE SERPL-MCNC: 102 MG/DL — HIGH (ref 70–99)
HCT VFR BLD CALC: 29.4 % — LOW (ref 39–50)
HDLC SERPL-MCNC: 56 MG/DL — HIGH (ref 35–55)
HGB BLD-MCNC: 9.3 G/DL — LOW (ref 13–17)
IMM GRANULOCYTES NFR BLD AUTO: 1.3 % — SIGNIFICANT CHANGE UP (ref 0–1.5)
LIPID PNL WITH DIRECT LDL SERPL: 45 MG/DL — SIGNIFICANT CHANGE UP
LYMPHOCYTES # BLD AUTO: 1.51 K/UL — SIGNIFICANT CHANGE UP (ref 1–3.3)
LYMPHOCYTES # BLD AUTO: 22.5 % — SIGNIFICANT CHANGE UP (ref 13–44)
MCHC RBC-ENTMCNC: 31.6 % — LOW (ref 32–36)
MCHC RBC-ENTMCNC: 36 PG — HIGH (ref 27–34)
MCV RBC AUTO: 114 FL — HIGH (ref 80–100)
MONOCYTES # BLD AUTO: 0.62 K/UL — SIGNIFICANT CHANGE UP (ref 0–0.9)
MONOCYTES NFR BLD AUTO: 9.3 % — SIGNIFICANT CHANGE UP (ref 2–14)
NEUTROPHILS # BLD AUTO: 4.37 K/UL — SIGNIFICANT CHANGE UP (ref 1.8–7.4)
NEUTROPHILS NFR BLD AUTO: 65.3 % — SIGNIFICANT CHANGE UP (ref 43–77)
NRBC # FLD: 0 K/UL — SIGNIFICANT CHANGE UP (ref 0–0)
PLATELET # BLD AUTO: 290 K/UL — SIGNIFICANT CHANGE UP (ref 150–400)
PMV BLD: 9.8 FL — SIGNIFICANT CHANGE UP (ref 7–13)
POTASSIUM SERPL-MCNC: 4.5 MMOL/L — SIGNIFICANT CHANGE UP (ref 3.5–5.3)
POTASSIUM SERPL-SCNC: 4.5 MMOL/L — SIGNIFICANT CHANGE UP (ref 3.5–5.3)
PROT SERPL-MCNC: 6.5 G/DL — SIGNIFICANT CHANGE UP (ref 6–8.3)
RBC # BLD: 2.58 M/UL — LOW (ref 4.2–5.8)
RBC # FLD: 14 % — SIGNIFICANT CHANGE UP (ref 10.3–14.5)
SODIUM SERPL-SCNC: 139 MMOL/L — SIGNIFICANT CHANGE UP (ref 135–145)
TRIGL SERPL-MCNC: 88 MG/DL — SIGNIFICANT CHANGE UP (ref 10–149)
WBC # BLD: 6.7 K/UL — SIGNIFICANT CHANGE UP (ref 3.8–10.5)
WBC # FLD AUTO: 6.7 K/UL — SIGNIFICANT CHANGE UP (ref 3.8–10.5)

## 2020-10-07 PROCEDURE — 99232 SBSQ HOSP IP/OBS MODERATE 35: CPT

## 2020-10-07 RX ADMIN — Medication 100 MILLIGRAM(S): at 08:48

## 2020-10-07 RX ADMIN — LIDOCAINE 2 PATCH: 4 CREAM TOPICAL at 08:49

## 2020-10-07 RX ADMIN — Medication 81 MILLIGRAM(S): at 08:48

## 2020-10-07 RX ADMIN — Medication 1 MILLIGRAM(S): at 12:57

## 2020-10-07 RX ADMIN — SERTRALINE 100 MILLIGRAM(S): 25 TABLET, FILM COATED ORAL at 08:49

## 2020-10-07 RX ADMIN — Medication 600 MILLIGRAM(S): at 09:24

## 2020-10-07 RX ADMIN — TAMSULOSIN HYDROCHLORIDE 0.4 MILLIGRAM(S): 0.4 CAPSULE ORAL at 21:05

## 2020-10-07 RX ADMIN — Medication 100 MILLIGRAM(S): at 21:05

## 2020-10-07 RX ADMIN — Medication 1 MILLIGRAM(S): at 07:15

## 2020-10-07 RX ADMIN — Medication 1 MILLIGRAM(S): at 18:51

## 2020-10-07 RX ADMIN — QUETIAPINE FUMARATE 300 MILLIGRAM(S): 200 TABLET, FILM COATED ORAL at 21:05

## 2020-10-07 RX ADMIN — Medication 600 MILLIGRAM(S): at 19:50

## 2020-10-07 RX ADMIN — Medication 1 PATCH: at 08:49

## 2020-10-07 RX ADMIN — LOSARTAN POTASSIUM 50 MILLIGRAM(S): 100 TABLET, FILM COATED ORAL at 08:48

## 2020-10-07 RX ADMIN — Medication 600 MILLIGRAM(S): at 18:50

## 2020-10-07 RX ADMIN — Medication 5 MILLIGRAM(S): at 21:05

## 2020-10-07 RX ADMIN — CYCLOBENZAPRINE HYDROCHLORIDE 5 MILLIGRAM(S): 10 TABLET, FILM COATED ORAL at 21:05

## 2020-10-07 RX ADMIN — Medication 600 MILLIGRAM(S): at 09:50

## 2020-10-07 RX ADMIN — PANTOPRAZOLE SODIUM 40 MILLIGRAM(S): 20 TABLET, DELAYED RELEASE ORAL at 08:49

## 2020-10-08 PROCEDURE — 99231 SBSQ HOSP IP/OBS SF/LOW 25: CPT

## 2020-10-08 PROCEDURE — 99233 SBSQ HOSP IP/OBS HIGH 50: CPT

## 2020-10-08 RX ORDER — COLCHICINE 0.6 MG
0.6 TABLET ORAL
Refills: 0 | Status: COMPLETED | OUTPATIENT
Start: 2020-10-08 | End: 2020-10-13

## 2020-10-08 RX ORDER — ACETAMINOPHEN 500 MG
650 TABLET ORAL ONCE
Refills: 0 | Status: COMPLETED | OUTPATIENT
Start: 2020-10-08 | End: 2020-10-08

## 2020-10-08 RX ORDER — COLCHICINE 0.6 MG
0.6 TABLET ORAL ONCE
Refills: 0 | Status: COMPLETED | OUTPATIENT
Start: 2020-10-08 | End: 2020-10-08

## 2020-10-08 RX ADMIN — PANTOPRAZOLE SODIUM 40 MILLIGRAM(S): 20 TABLET, DELAYED RELEASE ORAL at 08:55

## 2020-10-08 RX ADMIN — LOSARTAN POTASSIUM 50 MILLIGRAM(S): 100 TABLET, FILM COATED ORAL at 08:54

## 2020-10-08 RX ADMIN — Medication 1 MILLIGRAM(S): at 06:18

## 2020-10-08 RX ADMIN — Medication 81 MILLIGRAM(S): at 08:54

## 2020-10-08 RX ADMIN — Medication 650 MILLIGRAM(S): at 18:27

## 2020-10-08 RX ADMIN — Medication 5 MILLIGRAM(S): at 21:11

## 2020-10-08 RX ADMIN — Medication 1 PATCH: at 08:55

## 2020-10-08 RX ADMIN — Medication 100 MILLIGRAM(S): at 08:54

## 2020-10-08 RX ADMIN — Medication 50 MILLIGRAM(S): at 23:58

## 2020-10-08 RX ADMIN — Medication 1 MILLIGRAM(S): at 12:42

## 2020-10-08 RX ADMIN — Medication 1 MILLIGRAM(S): at 18:17

## 2020-10-08 RX ADMIN — SERTRALINE 100 MILLIGRAM(S): 25 TABLET, FILM COATED ORAL at 08:54

## 2020-10-08 RX ADMIN — Medication 0.6 MILLIGRAM(S): at 18:17

## 2020-10-08 RX ADMIN — Medication 1 MILLIGRAM(S): at 23:57

## 2020-10-08 RX ADMIN — Medication 500 MILLIGRAM(S): at 14:15

## 2020-10-08 RX ADMIN — Medication 500 MILLIGRAM(S): at 19:44

## 2020-10-08 RX ADMIN — LIDOCAINE 2 PATCH: 4 CREAM TOPICAL at 08:55

## 2020-10-08 RX ADMIN — Medication 500 MILLIGRAM(S): at 18:18

## 2020-10-08 RX ADMIN — Medication 500 MILLIGRAM(S): at 13:57

## 2020-10-08 RX ADMIN — Medication 600 MILLIGRAM(S): at 06:18

## 2020-10-08 RX ADMIN — Medication 0.6 MILLIGRAM(S): at 13:57

## 2020-10-08 RX ADMIN — Medication 600 MILLIGRAM(S): at 07:00

## 2020-10-08 RX ADMIN — TAMSULOSIN HYDROCHLORIDE 0.4 MILLIGRAM(S): 0.4 CAPSULE ORAL at 21:11

## 2020-10-08 RX ADMIN — CYCLOBENZAPRINE HYDROCHLORIDE 5 MILLIGRAM(S): 10 TABLET, FILM COATED ORAL at 21:11

## 2020-10-08 RX ADMIN — QUETIAPINE FUMARATE 300 MILLIGRAM(S): 200 TABLET, FILM COATED ORAL at 21:11

## 2020-10-08 RX ADMIN — Medication 650 MILLIGRAM(S): at 19:44

## 2020-10-08 RX ADMIN — LIDOCAINE 2 PATCH: 4 CREAM TOPICAL at 21:10

## 2020-10-08 RX ADMIN — Medication 100 MILLIGRAM(S): at 21:11

## 2020-10-08 NOTE — PROGRESS NOTE ADULT - ASSESSMENT
60M HTN etoh abuse GERD HLD BPH hx DM (not on meds) with right upper back and shoulder pain, recurring for several months    Plan:  Ankle pain: Possibly gout, will treat empirically with naproxen and colchicine, check uric acid and inflammatory markers in AM    Macrocytic anemia: Sending iron studies and B12    Right sided back pain: EKG normal no suspicion of cardiac source. Trial of lidoderm on upper right back, tylenol prn. Avoid opioids. Outpatient ortho follow up    HTN: Contineu losartan, metoprolol    Reported Hx DM, no DM now, HbA1c 5.5%    GERD: Continue protonix    HLD: Statin discontinued by primary team due to reported myalgias    Depression: Management per primary team

## 2020-10-08 NOTE — PROGRESS NOTE ADULT - SUBJECTIVE AND OBJECTIVE BOX
CC/Reason for Consult: " I have gout"    SUBJECTIVE / OVERNIGHT EVENTS: left ankle swollen and tender, can't walk, denies trauma.  Reporst history of gout, does not remember in what joint he had a flare in the past or what medicine he took.  Received ibuprofen 600 this AM without effect    MEDICATIONS  (STANDING):  aspirin  chewable 81 milliGRAM(s) Oral daily  colchicine 0.6 milliGRAM(s) Oral two times a day  lidocaine   Patch 2 Patch Transdermal daily  LORazepam     Tablet 1 milliGRAM(s) Oral every 6 hours  losartan 50 milliGRAM(s) Oral daily  melatonin. 5 milliGRAM(s) Oral at bedtime  metoprolol succinate  milliGRAM(s) Oral daily  naproxen 500 milliGRAM(s) Oral two times a day  nicotine -  14 mG/24Hr(s) Patch 1 patch Transdermal daily  pantoprazole    Tablet 40 milliGRAM(s) Oral before breakfast  QUEtiapine 300 milliGRAM(s) Oral at bedtime  sertraline 50 milliGRAM(s) Oral once  sertraline 100 milliGRAM(s) Oral daily  tamsulosin 0.4 milliGRAM(s) Oral at bedtime  traZODone 100 milliGRAM(s) Oral at bedtime    MEDICATIONS  (PRN):  calcium carbonate    500 mG (Tums) Chewable 1 Tablet(s) Chew two times a day PRN heart burn  cyclobenzaprine 5 milliGRAM(s) Oral three times a day PRN Muscle Spasm  diphenhydrAMINE 50 milliGRAM(s) Oral every 4 hours PRN anxiety or insomnia  haloperidol     Tablet 5 milliGRAM(s) Oral every 6 hours PRN agitation  LORazepam     Tablet 2 milliGRAM(s) Oral every 2 hours PRN CIWA score increase by 2 points and current CIWA score GREATER THAN 9  LORazepam     Tablet 2 milliGRAM(s) Oral every 6 hours PRN Agitation  LORazepam   Injectable 2 milliGRAM(s) IntraMuscular every 6 hours PRN SEVERE AGITATION  nicotine  Polacrilex Gum 2 milliGRAM(s) Oral every 3 hours PRN nicotine cravings      Vital Signs Last 24 Hrs  T(C): 36.6 (08 Oct 2020 08:50), Max: 36.7 (07 Oct 2020 19:17)  T(F): 97.8 (08 Oct 2020 08:50), Max: 98.1 (07 Oct 2020 19:17)  HR: 68 (08 Oct 2020 14:02) (68 - 77)  BP: 128/81 (08 Oct 2020 14:02) (128/81 - 144/78)  BP(mean): --  RR: 15              PHYSICAL EXAM:  GENERAL: NAD, well-developed  HEAD:  Atraumatic, Normocephalic  EYES: EOMI, conjunctiva and sclera clear  NECK: Supple, No JVD  CHEST/LUNG: Clear to auscultation bilaterally; No wheeze  HEART: Regular rate and rhythm; No murmurs, rubs, or gallops  ABDOMEN: Soft, Nontender, Nondistended; Bowel sounds present  EXTREMITIES:  2+ Peripheral Pulses, left ankle swollem, tender with ROM and palpation of joint spaces  PSYCH: AAOx3  NEUROLOGY: non-focal  SKIN: No rashes or lesions    LABS:                        9.3    6.70  )-----------( 290      ( 07 Oct 2020 08:06 )             29.4     10-07    139  |  102  |  16  ----------------------------<  102<H>  4.5   |  29  |  1.01    Ca    9.8      07 Oct 2020 08:06    TPro  6.5  /  Alb  3.8  /  TBili  0.3  /  DBili  x   /  AST  24  /  ALT  23  /  AlkPhos  54  10-07        Care Discussed with Consultants/Other Providers: Dr Lewis

## 2020-10-09 LAB
CRP SERPL-MCNC: 45.6 MG/L — HIGH
ERYTHROCYTE [SEDIMENTATION RATE] IN BLOOD: 101 MM/HR — HIGH (ref 1–15)
FERRITIN SERPL-MCNC: 842 NG/ML — HIGH (ref 30–400)
IRON SATN MFR SERPL: 270 UG/DL — SIGNIFICANT CHANGE UP (ref 155–535)
IRON SATN MFR SERPL: 65 UG/DL — SIGNIFICANT CHANGE UP (ref 45–165)
UIBC SERPL-MCNC: 205.1 UG/DL — SIGNIFICANT CHANGE UP (ref 110–370)
URATE SERPL-MCNC: 5.2 MG/DL — SIGNIFICANT CHANGE UP (ref 3.4–8.8)
VIT B12 SERPL-MCNC: 351 PG/ML — SIGNIFICANT CHANGE UP (ref 200–900)

## 2020-10-09 PROCEDURE — 99232 SBSQ HOSP IP/OBS MODERATE 35: CPT

## 2020-10-09 PROCEDURE — 99231 SBSQ HOSP IP/OBS SF/LOW 25: CPT

## 2020-10-09 RX ADMIN — SERTRALINE 100 MILLIGRAM(S): 25 TABLET, FILM COATED ORAL at 08:28

## 2020-10-09 RX ADMIN — Medication 0.6 MILLIGRAM(S): at 20:31

## 2020-10-09 RX ADMIN — QUETIAPINE FUMARATE 300 MILLIGRAM(S): 200 TABLET, FILM COATED ORAL at 20:33

## 2020-10-09 RX ADMIN — Medication 500 MILLIGRAM(S): at 20:32

## 2020-10-09 RX ADMIN — LIDOCAINE 2 PATCH: 4 CREAM TOPICAL at 19:32

## 2020-10-09 RX ADMIN — Medication 500 MILLIGRAM(S): at 08:29

## 2020-10-09 RX ADMIN — Medication 1 MILLIGRAM(S): at 18:37

## 2020-10-09 RX ADMIN — Medication 100 MILLIGRAM(S): at 20:33

## 2020-10-09 RX ADMIN — Medication 0.6 MILLIGRAM(S): at 08:28

## 2020-10-09 RX ADMIN — Medication 500 MILLIGRAM(S): at 21:19

## 2020-10-09 RX ADMIN — Medication 1 PATCH: at 08:34

## 2020-10-09 RX ADMIN — LOSARTAN POTASSIUM 50 MILLIGRAM(S): 100 TABLET, FILM COATED ORAL at 08:28

## 2020-10-09 RX ADMIN — Medication 100 MILLIGRAM(S): at 08:28

## 2020-10-09 RX ADMIN — Medication 1 MILLIGRAM(S): at 07:15

## 2020-10-09 RX ADMIN — TAMSULOSIN HYDROCHLORIDE 0.4 MILLIGRAM(S): 0.4 CAPSULE ORAL at 20:33

## 2020-10-09 RX ADMIN — Medication 1 MILLIGRAM(S): at 13:19

## 2020-10-09 RX ADMIN — Medication 81 MILLIGRAM(S): at 08:28

## 2020-10-09 RX ADMIN — PANTOPRAZOLE SODIUM 40 MILLIGRAM(S): 20 TABLET, DELAYED RELEASE ORAL at 08:28

## 2020-10-09 RX ADMIN — LIDOCAINE 2 PATCH: 4 CREAM TOPICAL at 20:32

## 2020-10-09 RX ADMIN — Medication 5 MILLIGRAM(S): at 20:32

## 2020-10-09 RX ADMIN — Medication 1 PATCH: at 19:32

## 2020-10-09 RX ADMIN — LIDOCAINE 2 PATCH: 4 CREAM TOPICAL at 08:34

## 2020-10-09 NOTE — PROGRESS NOTE ADULT - ASSESSMENT
60M HTN etoh abuse GERD HLD BPH hx DM (not on meds) with right upper back and shoulder pain, recurring for several months    Plan:  Ankle pain: Uric caid is not elevated, but seems to have had some improvement with colchicine and naproxen.  Inflammatory markers are positive.     Macrocytic anemia: B12 low normal, unclear etiology.  Stable.  Possibly ACD as may have some chronic inflammatory disorder.  Studies should be repeated as outpatient and referral to hematology if persists.    Right sided back pain: EKG normal no suspicion of cardiac source. Trial of lidoderm on upper right back, tylenol prn. Avoid opioids. Outpatient ortho follow up    HTN: Continus losartan, metoprolol    Reported Hx DM, no DM now, HbA1c 5.5%    GERD: Continue protonix    HLD: Statin discontinued by primary team due to reported myalgias    Depression: Management per primary team

## 2020-10-09 NOTE — PROGRESS NOTE ADULT - SUBJECTIVE AND OBJECTIVE BOX
CC/Reason for Consult: ankle pain    SUBJECTIVE / OVERNIGHT EVENTS: Still hurts, able to walk    MEDICATIONS  (STANDING):  aspirin  chewable 81 milliGRAM(s) Oral daily  colchicine 0.6 milliGRAM(s) Oral two times a day  lidocaine   Patch 2 Patch Transdermal daily  LORazepam     Tablet 1 milliGRAM(s) Oral every 6 hours  losartan 50 milliGRAM(s) Oral daily  melatonin. 5 milliGRAM(s) Oral at bedtime  metoprolol succinate  milliGRAM(s) Oral daily  naproxen 500 milliGRAM(s) Oral two times a day  nicotine -  14 mG/24Hr(s) Patch 1 patch Transdermal daily  pantoprazole    Tablet 40 milliGRAM(s) Oral before breakfast  QUEtiapine 300 milliGRAM(s) Oral at bedtime  sertraline 50 milliGRAM(s) Oral once  sertraline 100 milliGRAM(s) Oral daily  tamsulosin 0.4 milliGRAM(s) Oral at bedtime  traZODone 100 milliGRAM(s) Oral at bedtime    MEDICATIONS  (PRN):  calcium carbonate    500 mG (Tums) Chewable 1 Tablet(s) Chew two times a day PRN heart burn  cyclobenzaprine 5 milliGRAM(s) Oral three times a day PRN Muscle Spasm  diphenhydrAMINE 50 milliGRAM(s) Oral every 4 hours PRN anxiety or insomnia  haloperidol     Tablet 5 milliGRAM(s) Oral every 6 hours PRN agitation  LORazepam     Tablet 2 milliGRAM(s) Oral every 2 hours PRN CIWA score increase by 2 points and current CIWA score GREATER THAN 9  LORazepam     Tablet 2 milliGRAM(s) Oral every 6 hours PRN Agitation  LORazepam   Injectable 2 milliGRAM(s) IntraMuscular every 6 hours PRN SEVERE AGITATION  nicotine  Polacrilex Gum 2 milliGRAM(s) Oral every 3 hours PRN nicotine cravings      Vital Signs Last 24 Hrs  T(C): 36.2 (09 Oct 2020 08:20), Max: 36.2 (09 Oct 2020 08:20)  T(F): 97.1 (09 Oct 2020 08:20), Max: 97.1 (09 Oct 2020 08:20)  HR: 68 (08 Oct 2020 14:02) (68 - 68)  BP: 128/81 (08 Oct 2020 14:02) (128/81 - 128/81)  BP(mean): --  RR: 15              PHYSICAL EXAM:  GENERAL: NAD, well-developed  HEAD:  Atraumatic, Normocephalic  EYES: EOMI, conjunctiva and sclera clear  NECK: Supple, No JVD  CHEST/LUNG: Clear to auscultation bilaterally; No wheeze  HEART: Regular rate and rhythm; No murmurs, rubs, or gallops  ABDOMEN: Soft, Nontender, Nondistended; Bowel sounds present  EXTREMITIES:  2+ Peripheral Pulses, left ankle swollen, tender with ROM and palpation of joint spaces, less erythema and swelling, less tender than 10/8  PSYCH: AAOx3  NEUROLOGY: non-focal  SKIN: No rashes or lesions    LABS:    Sedimentation Rate, Erythrocyte (10.09.20 @ 08:20)    Sedimentation Rate, Erythrocyte: 101 mm/hr  C-Reactive Protein, Serum (10.09.20 @ 08:20)    C-Reactive Protein, Serum: 45.6 mg/L  Vitamin B12, Serum in AM (10.09.20 @ 08:20)    Vitamin B12, Serum: 351 pg/mL  Uric Acid, Serum (10.09.20 @ 08:20)    Uric Acid, Serum: 5.2 mg/dL  Ferritin, Serum in AM (10.09.20 @ 08:20)    Ferritin, Serum: 842.0 ng/mL  Iron with Total Binding Capacity in AM (10.09.20 @ 08:20)    Iron Total, Serum: 65 ug/dL    Unsaturated Iron Binding Capacity: 205.1 ug/dL    Total Iron Binding Capacity.: 270 ug/dL                              RADIOLOGY & ADDITIONAL TESTS:    Imaging Personally Reviewed:    Consultant(s) Notes Reviewed:      Care Discussed with Consultants/Other Providers:

## 2020-10-10 RX ORDER — ACETAMINOPHEN 500 MG
650 TABLET ORAL ONCE
Refills: 0 | Status: COMPLETED | OUTPATIENT
Start: 2020-10-10 | End: 2020-10-10

## 2020-10-10 RX ADMIN — Medication 500 MILLIGRAM(S): at 20:43

## 2020-10-10 RX ADMIN — Medication 1 PATCH: at 12:37

## 2020-10-10 RX ADMIN — Medication 50 MILLIGRAM(S): at 20:45

## 2020-10-10 RX ADMIN — Medication 100 MILLIGRAM(S): at 20:44

## 2020-10-10 RX ADMIN — Medication 650 MILLIGRAM(S): at 17:19

## 2020-10-10 RX ADMIN — Medication 1 PATCH: at 09:24

## 2020-10-10 RX ADMIN — Medication 500 MILLIGRAM(S): at 21:15

## 2020-10-10 RX ADMIN — PANTOPRAZOLE SODIUM 40 MILLIGRAM(S): 20 TABLET, DELAYED RELEASE ORAL at 09:24

## 2020-10-10 RX ADMIN — LIDOCAINE 2 PATCH: 4 CREAM TOPICAL at 12:37

## 2020-10-10 RX ADMIN — LOSARTAN POTASSIUM 50 MILLIGRAM(S): 100 TABLET, FILM COATED ORAL at 09:23

## 2020-10-10 RX ADMIN — LIDOCAINE 2 PATCH: 4 CREAM TOPICAL at 20:43

## 2020-10-10 RX ADMIN — SERTRALINE 100 MILLIGRAM(S): 25 TABLET, FILM COATED ORAL at 09:24

## 2020-10-10 RX ADMIN — Medication 1 MILLIGRAM(S): at 12:36

## 2020-10-10 RX ADMIN — Medication 100 MILLIGRAM(S): at 09:24

## 2020-10-10 RX ADMIN — Medication 1 MILLIGRAM(S): at 06:54

## 2020-10-10 RX ADMIN — Medication 81 MILLIGRAM(S): at 09:23

## 2020-10-10 RX ADMIN — Medication 0.6 MILLIGRAM(S): at 09:23

## 2020-10-10 RX ADMIN — TAMSULOSIN HYDROCHLORIDE 0.4 MILLIGRAM(S): 0.4 CAPSULE ORAL at 20:44

## 2020-10-10 RX ADMIN — QUETIAPINE FUMARATE 300 MILLIGRAM(S): 200 TABLET, FILM COATED ORAL at 20:44

## 2020-10-10 RX ADMIN — Medication 500 MILLIGRAM(S): at 12:37

## 2020-10-10 RX ADMIN — Medication 0.6 MILLIGRAM(S): at 20:43

## 2020-10-10 RX ADMIN — Medication 500 MILLIGRAM(S): at 09:24

## 2020-10-10 RX ADMIN — Medication 5 MILLIGRAM(S): at 20:43

## 2020-10-10 RX ADMIN — Medication 1 MILLIGRAM(S): at 00:19

## 2020-10-10 RX ADMIN — Medication 1 PATCH: at 20:43

## 2020-10-10 RX ADMIN — Medication 1 MILLIGRAM(S): at 17:20

## 2020-10-11 RX ORDER — ACETAMINOPHEN 500 MG
650 TABLET ORAL EVERY 6 HOURS
Refills: 0 | Status: DISCONTINUED | OUTPATIENT
Start: 2020-10-11 | End: 2020-10-16

## 2020-10-11 RX ADMIN — Medication 100 MILLIGRAM(S): at 20:54

## 2020-10-11 RX ADMIN — LIDOCAINE 2 PATCH: 4 CREAM TOPICAL at 20:54

## 2020-10-11 RX ADMIN — LOSARTAN POTASSIUM 50 MILLIGRAM(S): 100 TABLET, FILM COATED ORAL at 09:08

## 2020-10-11 RX ADMIN — Medication 1 PATCH: at 11:06

## 2020-10-11 RX ADMIN — Medication 650 MILLIGRAM(S): at 13:26

## 2020-10-11 RX ADMIN — Medication 500 MILLIGRAM(S): at 20:54

## 2020-10-11 RX ADMIN — Medication 0.6 MILLIGRAM(S): at 20:55

## 2020-10-11 RX ADMIN — Medication 500 MILLIGRAM(S): at 09:08

## 2020-10-11 RX ADMIN — QUETIAPINE FUMARATE 300 MILLIGRAM(S): 200 TABLET, FILM COATED ORAL at 20:54

## 2020-10-11 RX ADMIN — SERTRALINE 100 MILLIGRAM(S): 25 TABLET, FILM COATED ORAL at 09:09

## 2020-10-11 RX ADMIN — Medication 500 MILLIGRAM(S): at 11:05

## 2020-10-11 RX ADMIN — Medication 5 MILLIGRAM(S): at 20:54

## 2020-10-11 RX ADMIN — PANTOPRAZOLE SODIUM 40 MILLIGRAM(S): 20 TABLET, DELAYED RELEASE ORAL at 09:09

## 2020-10-11 RX ADMIN — Medication 100 MILLIGRAM(S): at 09:08

## 2020-10-11 RX ADMIN — Medication 1 MILLIGRAM(S): at 06:37

## 2020-10-11 RX ADMIN — Medication 81 MILLIGRAM(S): at 09:08

## 2020-10-11 RX ADMIN — CYCLOBENZAPRINE HYDROCHLORIDE 5 MILLIGRAM(S): 10 TABLET, FILM COATED ORAL at 18:11

## 2020-10-11 RX ADMIN — Medication 0.6 MILLIGRAM(S): at 09:08

## 2020-10-11 RX ADMIN — Medication 650 MILLIGRAM(S): at 13:27

## 2020-10-11 RX ADMIN — Medication 1 PATCH: at 11:05

## 2020-10-11 RX ADMIN — LIDOCAINE 2 PATCH: 4 CREAM TOPICAL at 11:03

## 2020-10-11 RX ADMIN — Medication 1 MILLIGRAM(S): at 00:12

## 2020-10-11 RX ADMIN — Medication 50 MILLIGRAM(S): at 20:54

## 2020-10-11 RX ADMIN — TAMSULOSIN HYDROCHLORIDE 0.4 MILLIGRAM(S): 0.4 CAPSULE ORAL at 20:54

## 2020-10-11 RX ADMIN — Medication 1 MILLIGRAM(S): at 13:18

## 2020-10-11 RX ADMIN — Medication 1 MILLIGRAM(S): at 17:04

## 2020-10-12 PROCEDURE — 73610 X-RAY EXAM OF ANKLE: CPT | Mod: 26,LT

## 2020-10-12 PROCEDURE — 99233 SBSQ HOSP IP/OBS HIGH 50: CPT

## 2020-10-12 PROCEDURE — 99231 SBSQ HOSP IP/OBS SF/LOW 25: CPT

## 2020-10-12 RX ORDER — SERTRALINE 25 MG/1
150 TABLET, FILM COATED ORAL DAILY
Refills: 0 | Status: DISCONTINUED | OUTPATIENT
Start: 2020-10-12 | End: 2020-10-16

## 2020-10-12 RX ADMIN — Medication 0.6 MILLIGRAM(S): at 09:00

## 2020-10-12 RX ADMIN — CYCLOBENZAPRINE HYDROCHLORIDE 5 MILLIGRAM(S): 10 TABLET, FILM COATED ORAL at 15:27

## 2020-10-12 RX ADMIN — Medication 500 MILLIGRAM(S): at 21:12

## 2020-10-12 RX ADMIN — Medication 1 MILLIGRAM(S): at 12:00

## 2020-10-12 RX ADMIN — Medication 50 MILLIGRAM(S): at 20:12

## 2020-10-12 RX ADMIN — Medication 100 MILLIGRAM(S): at 09:00

## 2020-10-12 RX ADMIN — TAMSULOSIN HYDROCHLORIDE 0.4 MILLIGRAM(S): 0.4 CAPSULE ORAL at 20:12

## 2020-10-12 RX ADMIN — QUETIAPINE FUMARATE 300 MILLIGRAM(S): 200 TABLET, FILM COATED ORAL at 20:12

## 2020-10-12 RX ADMIN — Medication 1 PATCH: at 09:00

## 2020-10-12 RX ADMIN — LIDOCAINE 2 PATCH: 4 CREAM TOPICAL at 13:06

## 2020-10-12 RX ADMIN — Medication 81 MILLIGRAM(S): at 09:00

## 2020-10-12 RX ADMIN — LOSARTAN POTASSIUM 50 MILLIGRAM(S): 100 TABLET, FILM COATED ORAL at 09:00

## 2020-10-12 RX ADMIN — Medication 500 MILLIGRAM(S): at 09:00

## 2020-10-12 RX ADMIN — SERTRALINE 100 MILLIGRAM(S): 25 TABLET, FILM COATED ORAL at 09:00

## 2020-10-12 RX ADMIN — Medication 500 MILLIGRAM(S): at 20:12

## 2020-10-12 RX ADMIN — PANTOPRAZOLE SODIUM 40 MILLIGRAM(S): 20 TABLET, DELAYED RELEASE ORAL at 09:00

## 2020-10-12 RX ADMIN — Medication 100 MILLIGRAM(S): at 20:12

## 2020-10-12 RX ADMIN — Medication 1 MILLIGRAM(S): at 06:48

## 2020-10-12 RX ADMIN — Medication 0.6 MILLIGRAM(S): at 20:12

## 2020-10-12 RX ADMIN — Medication 5 MILLIGRAM(S): at 20:12

## 2020-10-12 RX ADMIN — Medication 1 MILLIGRAM(S): at 18:35

## 2020-10-12 NOTE — PROGRESS NOTE ADULT - SUBJECTIVE AND OBJECTIVE BOX
CC/Reason for Consult: Gouty arthritis     SUBJECTIVE / OVERNIGHT EVENTS: Feels better, pain and swelling of left ankle improving.     MEDICATIONS  (STANDING):  aspirin  chewable 81 milliGRAM(s) Oral daily  colchicine 0.6 milliGRAM(s) Oral two times a day  lidocaine   Patch 2 Patch Transdermal daily  LORazepam     Tablet 1 milliGRAM(s) Oral every 6 hours  losartan 50 milliGRAM(s) Oral daily  melatonin. 5 milliGRAM(s) Oral at bedtime  metoprolol succinate  milliGRAM(s) Oral daily  naproxen 500 milliGRAM(s) Oral two times a day  nicotine -  14 mG/24Hr(s) Patch 1 patch Transdermal daily  pantoprazole    Tablet 40 milliGRAM(s) Oral before breakfast  QUEtiapine 300 milliGRAM(s) Oral at bedtime  sertraline 100 milliGRAM(s) Oral daily  sertraline 50 milliGRAM(s) Oral once  tamsulosin 0.4 milliGRAM(s) Oral at bedtime  traZODone 100 milliGRAM(s) Oral at bedtime    MEDICATIONS  (PRN):  acetaminophen   Tablet .. 650 milliGRAM(s) Oral every 6 hours PRN Moderate Pain (4 - 6)  calcium carbonate    500 mG (Tums) Chewable 1 Tablet(s) Chew two times a day PRN heart burn  cyclobenzaprine 5 milliGRAM(s) Oral three times a day PRN Muscle Spasm  diphenhydrAMINE 50 milliGRAM(s) Oral every 4 hours PRN anxiety or insomnia  haloperidol     Tablet 5 milliGRAM(s) Oral every 6 hours PRN agitation  LORazepam     Tablet 2 milliGRAM(s) Oral every 2 hours PRN CIWA score increase by 2 points and current CIWA score GREATER THAN 9  LORazepam     Tablet 2 milliGRAM(s) Oral every 6 hours PRN Agitation  LORazepam   Injectable 2 milliGRAM(s) IntraMuscular every 6 hours PRN SEVERE AGITATION  nicotine  Polacrilex Gum 2 milliGRAM(s) Oral every 3 hours PRN nicotine cravings      Vital Signs Last 24 Hrs  T(C): 36.6 (12 Oct 2020 07:57), Max: 36.6 (12 Oct 2020 07:57)  T(F): 97.8 (12 Oct 2020 07:57), Max: 97.8 (12 Oct 2020 07:57)  HR: 60 (12 Oct 2020 07:57) (60 - 60)  BP: 147/87 (12 Oct 2020 07:57) (147/87 - 147/87)  BP(mean): --  RR: 16 (12 Oct 2020 07:57) (16 - 16)  SpO2: --  CAPILLARY BLOOD GLUCOSE            PHYSICAL EXAM:  GENERAL: NAD, well-developed  HEAD:  Atraumatic, Normocephalic  EYES: EOMI, conjunctiva and sclera clear  NECK: Supple, No JVD  CHEST/LUNG: Clear to auscultation bilaterally; No wheeze  HEART: Regular rate and rhythm; No murmurs, rubs, or gallops  ABDOMEN: Soft, Nontender, Nondistended; Bowel sounds present  EXTREMITIES:  2+ Peripheral Pulses, No clubbing, cyanosis, or edema, (+) swelling of left ankle with decreased ROM due to pain.   PSYCH: AAOx3  NEUROLOGY: non-focal  SKIN: No rashes or lesions    LABS:                    RADIOLOGY & ADDITIONAL TESTS:    Imaging Personally Reviewed:    Consultant(s) Notes Reviewed:      Care Discussed with Consultants/Other Providers:

## 2020-10-12 NOTE — PROGRESS NOTE ADULT - ASSESSMENT
60M HTN etoh abuse GERD HLD BPH hx DM (not on meds) with right upper back and shoulder pain, recurring for several months    Plan:  1. Ankle pain: Uric caid is not elevated, but seems to have had some improvement with colchicine and naproxen.  Inflammatory markers are positive. Likely flare of gout, improving on Chochicine and Naprosyn 4/5 days     - Cont. Colchicin and Naprosyn for total 5 days   - rec. X-ray of left ankle.  - Consider low dose steroids if not resolving after 5 days of Colchicine and Naprosyn     2. Macrocytic anemia: B12 low normal, unclear etiology.  Check folate levels. Pt will need outpatient hematology f/u     3. Right sided back pain: EKG normal no suspicion of cardiac source. Trial of lidoderm on upper right back, tylenol prn. Avoid opioids. Outpatient ortho follow up    4. HTN: Continus losartan, metoprolol    5. Reported Hx DM, no DM now, HbA1c 5.5%    6. GERD: Continue protonix    7. HLD: Statin discontinued by primary team due to reported myalgias    8. Depression: Management per primary team

## 2020-10-13 LAB
FOLATE SERPL-MCNC: 15.8 NG/ML — SIGNIFICANT CHANGE UP (ref 4.7–20)
URATE SERPL-MCNC: 6.2 MG/DL — SIGNIFICANT CHANGE UP (ref 3.4–8.8)
VIT B12 SERPL-MCNC: 405 PG/ML — SIGNIFICANT CHANGE UP (ref 200–900)

## 2020-10-13 PROCEDURE — 99231 SBSQ HOSP IP/OBS SF/LOW 25: CPT

## 2020-10-13 PROCEDURE — 99233 SBSQ HOSP IP/OBS HIGH 50: CPT

## 2020-10-13 RX ORDER — DIPHENHYDRAMINE HCL 50 MG
100 CAPSULE ORAL AT BEDTIME
Refills: 0 | Status: DISCONTINUED | OUTPATIENT
Start: 2020-10-13 | End: 2020-10-15

## 2020-10-13 RX ORDER — QUETIAPINE FUMARATE 200 MG/1
200 TABLET, FILM COATED ORAL AT BEDTIME
Refills: 0 | Status: DISCONTINUED | OUTPATIENT
Start: 2020-10-13 | End: 2020-10-15

## 2020-10-13 RX ORDER — COLCHICINE 0.6 MG
0.6 TABLET ORAL
Refills: 0 | Status: COMPLETED | OUTPATIENT
Start: 2020-10-13 | End: 2020-10-16

## 2020-10-13 RX ADMIN — PANTOPRAZOLE SODIUM 40 MILLIGRAM(S): 20 TABLET, DELAYED RELEASE ORAL at 09:56

## 2020-10-13 RX ADMIN — Medication 1 PATCH: at 09:56

## 2020-10-13 RX ADMIN — LIDOCAINE 2 PATCH: 4 CREAM TOPICAL at 19:32

## 2020-10-13 RX ADMIN — Medication 500 MILLIGRAM(S): at 22:11

## 2020-10-13 RX ADMIN — Medication 0.6 MILLIGRAM(S): at 09:32

## 2020-10-13 RX ADMIN — LOSARTAN POTASSIUM 50 MILLIGRAM(S): 100 TABLET, FILM COATED ORAL at 09:32

## 2020-10-13 RX ADMIN — Medication 1 MILLIGRAM(S): at 06:16

## 2020-10-13 RX ADMIN — Medication 1 PATCH: at 19:33

## 2020-10-13 RX ADMIN — TAMSULOSIN HYDROCHLORIDE 0.4 MILLIGRAM(S): 0.4 CAPSULE ORAL at 20:55

## 2020-10-13 RX ADMIN — Medication 81 MILLIGRAM(S): at 09:32

## 2020-10-13 RX ADMIN — LIDOCAINE 2 PATCH: 4 CREAM TOPICAL at 10:23

## 2020-10-13 RX ADMIN — LIDOCAINE 2 PATCH: 4 CREAM TOPICAL at 10:22

## 2020-10-13 RX ADMIN — Medication 500 MILLIGRAM(S): at 09:56

## 2020-10-13 RX ADMIN — LIDOCAINE 2 PATCH: 4 CREAM TOPICAL at 20:54

## 2020-10-13 RX ADMIN — SERTRALINE 150 MILLIGRAM(S): 25 TABLET, FILM COATED ORAL at 09:32

## 2020-10-13 RX ADMIN — Medication 100 MILLIGRAM(S): at 20:55

## 2020-10-13 RX ADMIN — Medication 500 MILLIGRAM(S): at 20:54

## 2020-10-13 RX ADMIN — Medication 5 MILLIGRAM(S): at 20:54

## 2020-10-13 RX ADMIN — Medication 500 MILLIGRAM(S): at 09:55

## 2020-10-13 RX ADMIN — Medication 0.6 MILLIGRAM(S): at 20:54

## 2020-10-13 RX ADMIN — Medication 1 PATCH: at 09:32

## 2020-10-13 RX ADMIN — Medication 0.5 MILLIGRAM(S): at 12:51

## 2020-10-13 RX ADMIN — Medication 0.5 MILLIGRAM(S): at 09:32

## 2020-10-13 RX ADMIN — QUETIAPINE FUMARATE 200 MILLIGRAM(S): 200 TABLET, FILM COATED ORAL at 20:54

## 2020-10-13 RX ADMIN — Medication 0.5 MILLIGRAM(S): at 18:01

## 2020-10-13 RX ADMIN — Medication 0.5 MILLIGRAM(S): at 20:54

## 2020-10-13 RX ADMIN — CYCLOBENZAPRINE HYDROCHLORIDE 5 MILLIGRAM(S): 10 TABLET, FILM COATED ORAL at 09:57

## 2020-10-13 RX ADMIN — Medication 100 MILLIGRAM(S): at 09:32

## 2020-10-13 NOTE — PROGRESS NOTE ADULT - SUBJECTIVE AND OBJECTIVE BOX
CC/Reason for Consult: left ankle pain/swelling    SUBJECTIVE / OVERNIGHT EVENTS: Feels better today, decreased ankle pain and swelling, no fever or chills.     MEDICATIONS  (STANDING):  aspirin  chewable 81 milliGRAM(s) Oral daily  lidocaine   Patch 2 Patch Transdermal daily  LORazepam     Tablet 0.5 milliGRAM(s) Oral four times a day  losartan 50 milliGRAM(s) Oral daily  melatonin. 5 milliGRAM(s) Oral at bedtime  metoprolol succinate  milliGRAM(s) Oral daily  nicotine -  14 mG/24Hr(s) Patch 1 patch Transdermal daily  pantoprazole    Tablet 40 milliGRAM(s) Oral before breakfast  QUEtiapine 300 milliGRAM(s) Oral at bedtime  sertraline 50 milliGRAM(s) Oral once  sertraline 150 milliGRAM(s) Oral daily  tamsulosin 0.4 milliGRAM(s) Oral at bedtime  traZODone 100 milliGRAM(s) Oral at bedtime    MEDICATIONS  (PRN):  acetaminophen   Tablet .. 650 milliGRAM(s) Oral every 6 hours PRN Moderate Pain (4 - 6)  calcium carbonate    500 mG (Tums) Chewable 1 Tablet(s) Chew two times a day PRN heart burn  cyclobenzaprine 5 milliGRAM(s) Oral three times a day PRN Muscle Spasm  diphenhydrAMINE 50 milliGRAM(s) Oral every 4 hours PRN anxiety or insomnia  haloperidol     Tablet 5 milliGRAM(s) Oral every 6 hours PRN agitation  LORazepam     Tablet 2 milliGRAM(s) Oral every 6 hours PRN Agitation  LORazepam   Injectable 2 milliGRAM(s) IntraMuscular every 6 hours PRN SEVERE AGITATION  nicotine  Polacrilex Gum 2 milliGRAM(s) Oral every 3 hours PRN nicotine cravings      Vital Signs Last 24 Hrs  T(C): 36.1 (13 Oct 2020 08:16), Max: 36.6 (12 Oct 2020 19:29)  T(F): 97 (13 Oct 2020 08:16), Max: 97.8 (12 Oct 2020 19:29)  HR: --63  BP: --149/85  BP(mean): --  RR: --  SpO2: --  CAPILLARY BLOOD GLUCOSE            PHYSICAL EXAM:  GENERAL: NAD, well-developed  HEAD:  Atraumatic, Normocephalic  EYES: EOMI, conjunctiva and sclera clear  NECK: Supple, No JVD  CHEST/LUNG: Clear to auscultation bilaterally; No wheeze  HEART: Regular rate and rhythm; No murmurs, rubs, or gallops  ABDOMEN: Soft, Nontender, Nondistended; Bowel sounds present  EXTREMITIES:  2+ Peripheral Pulses, No clubbing, cyanosis, (+) decreased swelling of left ankle, no erythema, (+) improved ROM of left ankle.   PSYCH: AAOx3  NEUROLOGY: non-focal  SKIN: No rashes or lesions    LABS:                    RADIOLOGY & ADDITIONAL TESTS:  < from: Xray Ankle Complete 3 Views, Left (10.12.20 @ 16:16) >  IMPRESSION:  Soft tissue swelling. No tracking soft tissue gas collections, radiopaque foreign bodies, or gross radiographic evidence for osteomyelitis.    No fractures or dislocations.    Congruent ankle mortise with smooth and intacttalar dome.    Preserved remaining visualized joint spaces.    Small posterior calcaneal enthesophyte.    Appearance of a pes cavus deformity on lateral view however nonweightbearing.    No lytic or blastic lesions.    < end of copied text >      Imaging Personally Reviewed:    Consultant(s) Notes Reviewed:      Care Discussed with Consultants/Other Providers:CC/Reason for Consult:

## 2020-10-14 PROCEDURE — 99233 SBSQ HOSP IP/OBS HIGH 50: CPT

## 2020-10-14 PROCEDURE — 99231 SBSQ HOSP IP/OBS SF/LOW 25: CPT

## 2020-10-14 RX ORDER — TRAZODONE HCL 50 MG
150 TABLET ORAL AT BEDTIME
Refills: 0 | Status: DISCONTINUED | OUTPATIENT
Start: 2020-10-14 | End: 2020-10-16

## 2020-10-14 RX ADMIN — Medication 500 MILLIGRAM(S): at 08:28

## 2020-10-14 RX ADMIN — Medication 0.5 MILLIGRAM(S): at 12:33

## 2020-10-14 RX ADMIN — Medication 0.6 MILLIGRAM(S): at 08:27

## 2020-10-14 RX ADMIN — LIDOCAINE 2 PATCH: 4 CREAM TOPICAL at 09:03

## 2020-10-14 RX ADMIN — Medication 81 MILLIGRAM(S): at 08:27

## 2020-10-14 RX ADMIN — Medication 1 PATCH: at 19:39

## 2020-10-14 RX ADMIN — QUETIAPINE FUMARATE 200 MILLIGRAM(S): 200 TABLET, FILM COATED ORAL at 20:51

## 2020-10-14 RX ADMIN — Medication 5 MILLIGRAM(S): at 20:51

## 2020-10-14 RX ADMIN — Medication 150 MILLIGRAM(S): at 20:51

## 2020-10-14 RX ADMIN — Medication 500 MILLIGRAM(S): at 20:51

## 2020-10-14 RX ADMIN — Medication 100 MILLIGRAM(S): at 20:50

## 2020-10-14 RX ADMIN — Medication 1 PATCH: at 09:02

## 2020-10-14 RX ADMIN — LIDOCAINE 2 PATCH: 4 CREAM TOPICAL at 19:37

## 2020-10-14 RX ADMIN — LOSARTAN POTASSIUM 50 MILLIGRAM(S): 100 TABLET, FILM COATED ORAL at 08:27

## 2020-10-14 RX ADMIN — Medication 0.6 MILLIGRAM(S): at 20:50

## 2020-10-14 RX ADMIN — LIDOCAINE 2 PATCH: 4 CREAM TOPICAL at 20:50

## 2020-10-14 RX ADMIN — SERTRALINE 150 MILLIGRAM(S): 25 TABLET, FILM COATED ORAL at 08:28

## 2020-10-14 RX ADMIN — Medication 500 MILLIGRAM(S): at 21:43

## 2020-10-14 RX ADMIN — PANTOPRAZOLE SODIUM 40 MILLIGRAM(S): 20 TABLET, DELAYED RELEASE ORAL at 08:28

## 2020-10-14 RX ADMIN — Medication 100 MILLIGRAM(S): at 08:27

## 2020-10-14 RX ADMIN — TAMSULOSIN HYDROCHLORIDE 0.4 MILLIGRAM(S): 0.4 CAPSULE ORAL at 20:51

## 2020-10-14 RX ADMIN — Medication 0.5 MILLIGRAM(S): at 08:27

## 2020-10-14 RX ADMIN — Medication 0.5 MILLIGRAM(S): at 20:51

## 2020-10-14 NOTE — PROGRESS NOTE ADULT - SUBJECTIVE AND OBJECTIVE BOX
CC/Reason for Consult: Left ankle gout flare    SUBJECTIVE / OVERNIGHT EVENTS: Feels better, decreased pain and swelling of left ankle, able to walk and weigh bearing, denies any fever or chills.     MEDICATIONS  (STANDING):  aspirin  chewable 81 milliGRAM(s) Oral daily  colchicine 0.6 milliGRAM(s) Oral two times a day  lidocaine   Patch 2 Patch Transdermal daily  LORazepam     Tablet 0.5 milliGRAM(s) Oral four times a day  losartan 50 milliGRAM(s) Oral daily  melatonin. 5 milliGRAM(s) Oral at bedtime  metoprolol succinate  milliGRAM(s) Oral daily  naproxen 500 milliGRAM(s) Oral two times a day  nicotine -  14 mG/24Hr(s) Patch 1 patch Transdermal daily  pantoprazole    Tablet 40 milliGRAM(s) Oral before breakfast  QUEtiapine 200 milliGRAM(s) Oral at bedtime  sertraline 150 milliGRAM(s) Oral daily  sertraline 50 milliGRAM(s) Oral once  tamsulosin 0.4 milliGRAM(s) Oral at bedtime  traZODone 100 milliGRAM(s) Oral at bedtime    MEDICATIONS  (PRN):  acetaminophen   Tablet .. 650 milliGRAM(s) Oral every 6 hours PRN Moderate Pain (4 - 6)  calcium carbonate    500 mG (Tums) Chewable 1 Tablet(s) Chew two times a day PRN heart burn  cyclobenzaprine 5 milliGRAM(s) Oral three times a day PRN Muscle Spasm  diphenhydrAMINE 100 milliGRAM(s) Oral at bedtime PRN insomnia  haloperidol     Tablet 5 milliGRAM(s) Oral every 6 hours PRN agitation  LORazepam     Tablet 2 milliGRAM(s) Oral every 6 hours PRN Agitation  LORazepam   Injectable 2 milliGRAM(s) IntraMuscular every 6 hours PRN SEVERE AGITATION  nicotine  Polacrilex Gum 2 milliGRAM(s) Oral every 3 hours PRN nicotine cravings      Vital Signs Last 24 Hrs  T(C): 36.2 (14 Oct 2020 07:47), Max: 36.2 (14 Oct 2020 07:47)  T(F): 97.2 (14 Oct 2020 07:47), Max: 97.2 (14 Oct 2020 07:47)  HR: --60  BP: --150/90  BP(mean): --  RR: --  SpO2: --  CAPILLARY BLOOD GLUCOSE            PHYSICAL EXAM: Focused exam performed due to encounter setting   GENERAL: NAD, well-developed  HEAD:  Atraumatic, Normocephalic  EYES: EOMI, conjunctiva and sclera clear  NECK: Supple, No JVD  CHEST/LUNG: Breathing comfortably  HEART: HR 60  ABDOMEN: Soft, Nontender, Nondistended; Bowel sounds present  EXTREMITIES:  2+ Peripheral Pulses, No clubbing, cyanosis, Much decreased left ankle swelling, no erythema, improving ROM   PSYCH: AAOx3  NEUROLOGY: non-focal  SKIN: No rashes or lesions    LABS:                    RADIOLOGY & ADDITIONAL TESTS:    Imaging Personally Reviewed:    Consultant(s) Notes Reviewed:      Care Discussed with Consultants/Other Providers:

## 2020-10-14 NOTE — PROGRESS NOTE ADULT - ASSESSMENT
60M HTN etoh abuse GERD HLD BPH hx DM (not on meds) with right upper back and shoulder pain, recurring for several months    Plan:  1. Ankle pain: Uric caid is not elevated, but seems to have had some improvement with colchicine and naproxen.  Inflammatory markers are positive. X-ray showed soft tissue swelling and  no acute pathology. Likely flare of gout, improving on Colchicine  and Naprosyn for 5 days    - Cont. Colchicine  and Naprosyn to complete 3 more days   -GI prophylaxis with PPI  - consider rheum consult or rheumatology f/u as an outpatient     2. Macrocytic anemia: B12 low normal, unclear etiology.  B12 and Folate level WNL. Pt will need outpatient hematology f/u     3. Right sided back pain: EKG normal no suspicion of cardiac source. Trial of lidoderm on upper right back, tylenol prn. Avoid opioids. Outpatient ortho follow up    4. HTN: Continus losartan, metoprolol    5. Reported Hx DM, no DM now, HbA1c 5.5%    6. GERD: Continue protonix    7. HLD: Statin discontinued by primary team due to reported myalgias    8. Depression: Management per primary team

## 2020-10-15 PROCEDURE — 99232 SBSQ HOSP IP/OBS MODERATE 35: CPT

## 2020-10-15 PROCEDURE — 99233 SBSQ HOSP IP/OBS HIGH 50: CPT

## 2020-10-15 RX ORDER — DIPHENHYDRAMINE HCL 50 MG
150 CAPSULE ORAL AT BEDTIME
Refills: 0 | Status: DISCONTINUED | OUTPATIENT
Start: 2020-10-15 | End: 2020-10-15

## 2020-10-15 RX ORDER — DIPHENHYDRAMINE HCL 50 MG
150 CAPSULE ORAL AT BEDTIME
Refills: 0 | Status: DISCONTINUED | OUTPATIENT
Start: 2020-10-15 | End: 2020-10-16

## 2020-10-15 RX ORDER — QUETIAPINE FUMARATE 200 MG/1
100 TABLET, FILM COATED ORAL AT BEDTIME
Refills: 0 | Status: DISCONTINUED | OUTPATIENT
Start: 2020-10-15 | End: 2020-10-16

## 2020-10-15 RX ADMIN — LOSARTAN POTASSIUM 50 MILLIGRAM(S): 100 TABLET, FILM COATED ORAL at 08:41

## 2020-10-15 RX ADMIN — Medication 0.5 MILLIGRAM(S): at 08:41

## 2020-10-15 RX ADMIN — SERTRALINE 150 MILLIGRAM(S): 25 TABLET, FILM COATED ORAL at 08:41

## 2020-10-15 RX ADMIN — Medication 0.5 MILLIGRAM(S): at 20:50

## 2020-10-15 RX ADMIN — Medication 150 MILLIGRAM(S): at 20:50

## 2020-10-15 RX ADMIN — Medication 500 MILLIGRAM(S): at 22:43

## 2020-10-15 RX ADMIN — CYCLOBENZAPRINE HYDROCHLORIDE 5 MILLIGRAM(S): 10 TABLET, FILM COATED ORAL at 11:49

## 2020-10-15 RX ADMIN — Medication 0.6 MILLIGRAM(S): at 20:50

## 2020-10-15 RX ADMIN — LIDOCAINE 2 PATCH: 4 CREAM TOPICAL at 08:40

## 2020-10-15 RX ADMIN — Medication 0.6 MILLIGRAM(S): at 08:40

## 2020-10-15 RX ADMIN — CYCLOBENZAPRINE HYDROCHLORIDE 5 MILLIGRAM(S): 10 TABLET, FILM COATED ORAL at 20:53

## 2020-10-15 RX ADMIN — Medication 1 PATCH: at 12:53

## 2020-10-15 RX ADMIN — Medication 81 MILLIGRAM(S): at 08:40

## 2020-10-15 RX ADMIN — Medication 650 MILLIGRAM(S): at 20:53

## 2020-10-15 RX ADMIN — Medication 150 MILLIGRAM(S): at 20:53

## 2020-10-15 RX ADMIN — Medication 500 MILLIGRAM(S): at 20:51

## 2020-10-15 RX ADMIN — Medication 500 MILLIGRAM(S): at 12:53

## 2020-10-15 RX ADMIN — Medication 100 MILLIGRAM(S): at 08:41

## 2020-10-15 RX ADMIN — Medication 0.5 MILLIGRAM(S): at 12:34

## 2020-10-15 RX ADMIN — Medication 1 PATCH: at 08:41

## 2020-10-15 RX ADMIN — Medication 5 MILLIGRAM(S): at 20:50

## 2020-10-15 RX ADMIN — TAMSULOSIN HYDROCHLORIDE 0.4 MILLIGRAM(S): 0.4 CAPSULE ORAL at 20:53

## 2020-10-15 RX ADMIN — QUETIAPINE FUMARATE 100 MILLIGRAM(S): 200 TABLET, FILM COATED ORAL at 20:52

## 2020-10-15 RX ADMIN — Medication 500 MILLIGRAM(S): at 08:41

## 2020-10-15 RX ADMIN — PANTOPRAZOLE SODIUM 40 MILLIGRAM(S): 20 TABLET, DELAYED RELEASE ORAL at 08:41

## 2020-10-15 RX ADMIN — Medication 650 MILLIGRAM(S): at 22:23

## 2020-10-15 NOTE — PROGRESS NOTE ADULT - ASSESSMENT
60M HTN etoh abuse GERD HLD BPH hx DM (not on meds) with right upper back and shoulder pain, recurring for several months    Plan:  1. Ankle pain: Uric caid is not elevated.  Inflammatory markers are positive. X-ray showed soft tissue swelling and  no acute pathology. Likely flare of gout, improving on Colchicine  and Naprosyn.    - Cont. Colchicine  and Naprosyn to complete 3 more days   -PRN Motrin at discharge  -GI prophylaxis with PPI  -  rheumatology f/u as an outpatient     2. Macrocytic anemia: B12 low normal, unclear etiology.  B12 and Folate level WNL. Pt will need outpatient hematology f/u     3. Right sided back pain: EKG normal no suspicion of cardiac source. Trial of lidoderm on upper right back, tylenol prn. Avoid opioids. Outpatient ortho follow up    4. HTN: One episode of elevated BP this morning before meds. asymptomatic.   -Monitor BP  -Continue losartan, metoprolol  -Cont titrate up Losartan if BP persistently elevated.     5. Reported Hx DM, no DM now, HbA1c 5.5%    6. GERD: Continue protonix    7. HLD: Statin discontinued by primary team due to reported myalgias    8. Depression: Management per primary team

## 2020-10-16 VITALS
SYSTOLIC BLOOD PRESSURE: 128 MMHG | HEART RATE: 78 BPM | TEMPERATURE: 98 F | DIASTOLIC BLOOD PRESSURE: 86 MMHG | RESPIRATION RATE: 16 BRPM

## 2020-10-16 PROCEDURE — 99239 HOSP IP/OBS DSCHRG MGMT >30: CPT

## 2020-10-16 PROCEDURE — 99232 SBSQ HOSP IP/OBS MODERATE 35: CPT

## 2020-10-16 RX ORDER — TAMSULOSIN HYDROCHLORIDE 0.4 MG/1
1 CAPSULE ORAL
Qty: 30 | Refills: 0
Start: 2020-10-16 | End: 2020-11-17

## 2020-10-16 RX ORDER — ASPIRIN/CALCIUM CARB/MAGNESIUM 324 MG
1 TABLET ORAL
Qty: 30 | Refills: 0
Start: 2020-10-16 | End: 2020-11-14

## 2020-10-16 RX ORDER — TRAZODONE HCL 50 MG
1 TABLET ORAL
Qty: 30 | Refills: 0
Start: 2020-10-16 | End: 2020-11-14

## 2020-10-16 RX ORDER — CYCLOBENZAPRINE HYDROCHLORIDE 10 MG/1
1 TABLET, FILM COATED ORAL
Qty: 30 | Refills: 0
Start: 2020-10-16 | End: 2020-11-14

## 2020-10-16 RX ORDER — ALBUTEROL 90 UG/1
2 AEROSOL, METERED ORAL
Qty: 1 | Refills: 0
Start: 2020-10-16 | End: 2020-11-14

## 2020-10-16 RX ORDER — METOPROLOL TARTRATE 50 MG
1 TABLET ORAL
Qty: 30 | Refills: 0
Start: 2020-10-16 | End: 2020-11-14

## 2020-10-16 RX ORDER — DIPHENHYDRAMINE HCL 50 MG
3 CAPSULE ORAL
Qty: 90 | Refills: 0
Start: 2020-10-16 | End: 2020-11-14

## 2020-10-16 RX ORDER — TAMSULOSIN HYDROCHLORIDE 0.4 MG/1
1 CAPSULE ORAL
Qty: 30 | Refills: 0
Start: 2020-10-16 | End: 2020-11-14

## 2020-10-16 RX ORDER — LOSARTAN POTASSIUM 100 MG/1
1 TABLET, FILM COATED ORAL
Qty: 30 | Refills: 0
Start: 2020-10-16 | End: 2020-11-14

## 2020-10-16 RX ORDER — PNEUMOCOCCAL 23-VAL P-SAC VAC 25MCG/0.5
0.5 VIAL (ML) INJECTION ONCE
Refills: 0 | Status: COMPLETED | OUTPATIENT
Start: 2020-10-16 | End: 2020-10-16

## 2020-10-16 RX ORDER — LANOLIN ALCOHOL/MO/W.PET/CERES
1 CREAM (GRAM) TOPICAL
Qty: 30 | Refills: 0
Start: 2020-10-16 | End: 2020-11-14

## 2020-10-16 RX ORDER — PANTOPRAZOLE SODIUM 20 MG/1
1 TABLET, DELAYED RELEASE ORAL
Qty: 30 | Refills: 0
Start: 2020-10-16 | End: 2020-11-14

## 2020-10-16 RX ORDER — SERTRALINE 25 MG/1
3 TABLET, FILM COATED ORAL
Qty: 90 | Refills: 0
Start: 2020-10-16 | End: 2020-11-14

## 2020-10-16 RX ADMIN — LIDOCAINE 2 PATCH: 4 CREAM TOPICAL at 09:07

## 2020-10-16 RX ADMIN — SERTRALINE 150 MILLIGRAM(S): 25 TABLET, FILM COATED ORAL at 08:29

## 2020-10-16 RX ADMIN — Medication 81 MILLIGRAM(S): at 08:29

## 2020-10-16 RX ADMIN — Medication 100 MILLIGRAM(S): at 08:29

## 2020-10-16 RX ADMIN — LOSARTAN POTASSIUM 50 MILLIGRAM(S): 100 TABLET, FILM COATED ORAL at 08:29

## 2020-10-16 RX ADMIN — Medication 0.5 MILLILITER(S): at 12:28

## 2020-10-16 RX ADMIN — PANTOPRAZOLE SODIUM 40 MILLIGRAM(S): 20 TABLET, DELAYED RELEASE ORAL at 08:29

## 2020-10-16 RX ADMIN — Medication 0.6 MILLIGRAM(S): at 08:29

## 2020-10-16 RX ADMIN — Medication 1 PATCH: at 08:29

## 2020-10-16 RX ADMIN — Medication 0.5 MILLIGRAM(S): at 08:29

## 2020-10-16 RX ADMIN — Medication 500 MILLIGRAM(S): at 08:29

## 2020-10-16 NOTE — PROGRESS NOTE ADULT - ASSESSMENT
60M HTN etoh abuse GERD HLD BPH hx DM (not on meds) with right upper back and shoulder pain, recurring for several months    Plan:  1. Ankle pain: Uric caid is not elevated.  Inflammatory markers are positive. X-ray showed soft tissue swelling and  no acute pathology. Likely flare of gout, Resolving  on Colchicine  and Naprosyn.    - S/P Colchicine  and Naprosyn X 8 days total    - PRN Motrin at discharge  - GI prophylaxis with PPI  -  rheumatology f/u as an outpatient     2. Macrocytic anemia: B12 low normal, unclear etiology.  B12 and Folate level WNL. Pt will need outpatient hematology f/u     3. Right sided back pain: EKG normal no suspicion of cardiac source. Trial of lidoderm on upper right back, tylenol prn. Avoid opioids. Outpatient ortho follow up    4. HTN: BP well controlled today   -Monitor BP  -Continue losartan, metoprolol  -F/U with [facility]     5. Reported Hx DM, no DM now, HbA1c 5.5%    6. GERD: Continue protonix    7. HLD: Statin discontinued by primary team due to reported myalgias    8. Depression: Management per primary team

## 2020-10-16 NOTE — PROGRESS NOTE ADULT - SUBJECTIVE AND OBJECTIVE BOX
CC/Reason for Consult: Gout flare    SUBJECTIVE / OVERNIGHT EVENTS: No complaints. left ankle swelling and pain resolved. Ambulate well     MEDICATIONS  (STANDING):  aspirin  chewable 81 milliGRAM(s) Oral daily  diphenhydrAMINE 150 milliGRAM(s) Oral at bedtime  lidocaine   Patch 2 Patch Transdermal daily  LORazepam     Tablet 0.5 milliGRAM(s) Oral two times a day  losartan 50 milliGRAM(s) Oral daily  melatonin. 5 milliGRAM(s) Oral at bedtime  metoprolol succinate  milliGRAM(s) Oral daily  nicotine -  14 mG/24Hr(s) Patch 1 patch Transdermal daily  pantoprazole    Tablet 40 milliGRAM(s) Oral before breakfast  QUEtiapine 100 milliGRAM(s) Oral at bedtime  sertraline 50 milliGRAM(s) Oral once  sertraline 150 milliGRAM(s) Oral daily  tamsulosin 0.4 milliGRAM(s) Oral at bedtime  traZODone 150 milliGRAM(s) Oral at bedtime    MEDICATIONS  (PRN):  acetaminophen   Tablet .. 650 milliGRAM(s) Oral every 6 hours PRN Moderate Pain (4 - 6)  calcium carbonate    500 mG (Tums) Chewable 1 Tablet(s) Chew two times a day PRN heart burn  cyclobenzaprine 5 milliGRAM(s) Oral three times a day PRN Muscle Spasm  haloperidol     Tablet 5 milliGRAM(s) Oral every 6 hours PRN agitation  LORazepam     Tablet 2 milliGRAM(s) Oral every 6 hours PRN Agitation  LORazepam   Injectable 2 milliGRAM(s) IntraMuscular every 6 hours PRN SEVERE AGITATION  nicotine  Polacrilex Gum 2 milliGRAM(s) Oral every 3 hours PRN nicotine cravings      Vital Signs Last 24 Hrs  T(C): 36.8 (16 Oct 2020 08:09), Max: 36.8 (16 Oct 2020 08:09)  T(F): 98.2 (16 Oct 2020 08:09), Max: 98.2 (16 Oct 2020 08:09)  HR: 78 (16 Oct 2020 08:09) (78 - 78)  BP: 128/86 (16 Oct 2020 08:09) (128/86 - 128/86)  BP(mean): --  RR: 16 (16 Oct 2020 08:09) (16 - 16)  SpO2: --  CAPILLARY BLOOD GLUCOSE            PHYSICAL EXAM:  GENERAL: NAD, well-developed  HEAD:  Atraumatic, Normocephalic  EYES: EOMI, conjunctiva and sclera clear  NECK: Supple, No JVD  CHEST/LUNG: Clear to auscultation bilaterally; No wheeze  HEART: Regular rate and rhythm; No murmurs, rubs, or gallops  ABDOMEN: Soft, Nontender, Nondistended; Bowel sounds present  EXTREMITIES:  2+ Peripheral Pulses, No clubbing, cyanosis, or edema. Left ankle: no swelling or tenderness, (+) full ROM.   PSYCH: AAOx3  NEUROLOGY: non-focal  SKIN: No rashes or lesions    LABS:                    RADIOLOGY & ADDITIONAL TESTS:    Imaging Personally Reviewed:    Consultant(s) Notes Reviewed:      Care Discussed with Consultants/Other Providers:

## 2020-10-16 NOTE — PROGRESS NOTE ADULT - ATTENDING COMMENTS
Spoke to pt at length about f/u issues. He was instructed to f/u with Rheumatology for his gout and Hematology for his anemia. He understood and agreed with the plan.  D/W Psych re: d/c planning and f/u issues yesterday
Spoke to Rheum consult on the phone. Rec to f/u with Rheum as an outpatient after discharge: [phone].

## 2020-10-21 DIAGNOSIS — Z71.89 OTHER SPECIFIED COUNSELING: ICD-10-CM

## 2020-10-28 ENCOUNTER — OUTPATIENT (OUTPATIENT)
Dept: OUTPATIENT SERVICES | Facility: HOSPITAL | Age: 60
LOS: 1 days | End: 2020-10-28
Payer: MEDICAID

## 2020-10-28 ENCOUNTER — APPOINTMENT (OUTPATIENT)
Dept: FAMILY MEDICINE | Facility: HOSPITAL | Age: 60
End: 2020-10-28

## 2020-10-28 VITALS
DIASTOLIC BLOOD PRESSURE: 75 MMHG | BODY MASS INDEX: 28.87 KG/M2 | WEIGHT: 207 LBS | TEMPERATURE: 97.2 F | SYSTOLIC BLOOD PRESSURE: 122 MMHG | RESPIRATION RATE: 16 BRPM | OXYGEN SATURATION: 100 % | HEART RATE: 74 BPM

## 2020-10-28 DIAGNOSIS — M19.019 PRIMARY OSTEOARTHRITIS, UNSPECIFIED SHOULDER: Chronic | ICD-10-CM

## 2020-10-28 DIAGNOSIS — Z00.00 ENCOUNTER FOR GENERAL ADULT MEDICAL EXAMINATION WITHOUT ABNORMAL FINDINGS: ICD-10-CM

## 2020-10-28 DIAGNOSIS — R25.2 CRAMP AND SPASM: ICD-10-CM

## 2020-10-28 PROCEDURE — G0463: CPT

## 2020-10-30 DIAGNOSIS — M10.9 GOUT, UNSPECIFIED: ICD-10-CM

## 2020-11-02 NOTE — REVIEW OF SYSTEMS
[Joint Pain] : joint pain [Joint Swelling] : joint swelling [Fever] : no fever [Chills] : no chills [Shortness Of Breath] : no shortness of breath [Cough] : no cough

## 2020-11-02 NOTE — HISTORY OF PRESENT ILLNESS
[FreeTextEntry1] : F/u gout and inpatient f/u [de-identified] : 60M with medical hx including MDD, gout, prediabetes, TRIVEDI, and GERD presents for left ankle pain and swelling\par -L ankle pain, swelling and warmth for about one week\par -Pain makes it difficult to sleep. Hurts to even put socks on\par -First episode of gout about 10 years ago, states it usually flares up once every few years, but this is his second episode in one month\par -Minimal relief with advil\par -Does not recall what medications helped his prior gout flares\par \par Interval event: Hospitalized at [facility] 9/30/20-10/16/20 for MDD with suicidal ideation and alcohol use disorder\par -Prior to hospitalization, patient states he was drinking one small bottle of tequila daily\par -Admitted for suicidal thinking\par -Has f/u with psychiatry and  at the [facility]. Apt with psychiatrist Dr. PRITCHETT on 11/9\par \par Discharge medication reconciliation was reviewed with the patient in detail. Changes from prior regimen to discharge regimen are detailed below. For clarity, the outcome of today's medication reconciliation is included here in parentheses rather than in the plan:\par      -Metoprolol tartrate 50mg daily was discontinued. Metoprolol succinate 100mg daily was prescribed (refilled metoprolol succinate 100mg daily)\par              -Note: Initially sent metoprolol tartrate 100mg daily in error. Contacted pharmacy, patient already picked up metoprolol tartrate. Pharmacy will reach out to patient to exchange the medication)\par      -Losartan 50mg daily was continued (refilled)\par      -Quetiapine was discontinued (discontinued)\par      -Sertraline 150mg daily was started (refilled)\par      -Trazodone 150mg qhs was started (refilled)\par      -Lorazepam 0.5mg BID was started (short term refill sent)\par      -Aspirin 81mg daily was continued (refilled)\par      -Flexeril 5mg daily PRN was prescribed (discontinued at this visit)\par      -Banophen 150mg qhs was prescribed (discontinued at this visit)\par      -Albuterol PRN was prescribed (discontinued at this visit; patient denies hx of pulmonary disease)\par      -Atorvastatin 40mg daily was discontinued (restarted/refilled at this visit)\par      -Omeprazole 40mg daily was replaced with pantoprazole 40mg daily (discontinued pantoprazole and resumed/refilled omeprazole)\par      -Tamsulosin 0.4mg qhs was continued (refilled today)\par \par \par Patient also requesting nicotine patch. Currently smokes about 5 cigarettes a day

## 2020-11-02 NOTE — PHYSICAL EXAM
[Normal] : normal rate, regular rhythm, normal S1 and S2 and no murmur heard [de-identified] : Warmth, erythema, and tenderness of the L ankle

## 2020-11-02 NOTE — ASSESSMENT
[FreeTextEntry1] : 60M with PMH including MDD, alcohol use disorder, GERD, and gout presents for acute gout flare of the L ankle and inpatient f/u\par -Acute gout flare of L ankle - Prescribed prednisone course with taper. Discussed that alcohol is the cause of his gout flares, and counselled abstinence from alcohol\par -F/u hospitalization for alcohol use disorder and MDD - F/u with [facility]\par -Nicotine dependence - Prescribed nicotine patch. Emphasized that it should not be used if he continues to smoke\par \par Medication reconciliation performed after review of prior regimen, discharge reconciliation, and current symptoms/clinical reasoning. Results of this reconciliation above in the HPI

## 2020-11-07 ENCOUNTER — OUTPATIENT (OUTPATIENT)
Dept: OUTPATIENT SERVICES | Facility: HOSPITAL | Age: 60
LOS: 1 days | End: 2020-11-07
Payer: MEDICAID

## 2020-11-07 ENCOUNTER — APPOINTMENT (OUTPATIENT)
Dept: FAMILY MEDICINE | Facility: HOSPITAL | Age: 60
End: 2020-11-07

## 2020-11-07 VITALS
DIASTOLIC BLOOD PRESSURE: 91 MMHG | HEIGHT: 71 IN | WEIGHT: 207 LBS | SYSTOLIC BLOOD PRESSURE: 129 MMHG | RESPIRATION RATE: 16 BRPM | OXYGEN SATURATION: 97 % | BODY MASS INDEX: 28.98 KG/M2 | TEMPERATURE: 97 F | HEART RATE: 67 BPM

## 2020-11-07 DIAGNOSIS — M19.019 PRIMARY OSTEOARTHRITIS, UNSPECIFIED SHOULDER: Chronic | ICD-10-CM

## 2020-11-07 DIAGNOSIS — Z00.00 ENCOUNTER FOR GENERAL ADULT MEDICAL EXAMINATION WITHOUT ABNORMAL FINDINGS: ICD-10-CM

## 2020-11-08 NOTE — REVIEW OF SYSTEMS
[Fever] : no fever [Chills] : no chills [Joint Pain] : joint pain [Joint Stiffness] : no joint stiffness [Joint Swelling] : no joint swelling

## 2020-11-08 NOTE — ASSESSMENT
[FreeTextEntry1] : 60M with MDD, HTN, GERD, and gout presents for f/u acute gout of L ankle. CBC, CMP, A1c, lipid panel ordered for overweight and multiple comorbidities. Problem specific plan as above.

## 2020-11-08 NOTE — PHYSICAL EXAM
[Normal] : no acute distress, well nourished, well developed and well-appearing [de-identified] : Left ankle swelling and erythema resolved. No tenderness to palpation. Full ROM and strength

## 2020-11-08 NOTE — HISTORY OF PRESENT ILLNESS
[FreeTextEntry1] : f/u gout [de-identified] : 60M with MDD, HTN, GERD, and prediabetes presents for f/u acute gout of the left ankle\par -Prescribed steroid taper one week prior, currently at 20mg daily\par -Reports swelling and erythema resolved\par -Notes pain has improved, but worsened since decreasing steroid dose\par -Positive hep C noted on chart review. Patient uncertain if this was treated

## 2020-11-09 DIAGNOSIS — M10.9 GOUT, UNSPECIFIED: ICD-10-CM

## 2020-11-11 PROCEDURE — 83036 HEMOGLOBIN GLYCOSYLATED A1C: CPT

## 2020-11-11 PROCEDURE — G0463: CPT

## 2020-11-11 PROCEDURE — 82306 VITAMIN D 25 HYDROXY: CPT

## 2020-11-11 PROCEDURE — 80061 LIPID PANEL: CPT

## 2020-11-11 PROCEDURE — 85025 COMPLETE CBC W/AUTO DIFF WBC: CPT

## 2020-11-11 PROCEDURE — 80053 COMPREHEN METABOLIC PANEL: CPT

## 2020-11-13 LAB
25(OH)D3 SERPL-MCNC: 30.2 NG/ML
ALBUMIN SERPL ELPH-MCNC: 4.5 G/DL
ALP BLD-CCNC: 64 U/L
ALT SERPL-CCNC: 27 U/L
ANION GAP SERPL CALC-SCNC: 15 MMOL/L
AST SERPL-CCNC: 27 U/L
BASOPHILS # BLD AUTO: 0.06 K/UL
BASOPHILS NFR BLD AUTO: 0.5 %
BILIRUB SERPL-MCNC: 0.3 MG/DL
BUN SERPL-MCNC: 10 MG/DL
CALCIUM SERPL-MCNC: 10.3 MG/DL
CHLORIDE SERPL-SCNC: 99 MMOL/L
CHOLEST SERPL-MCNC: 134 MG/DL
CO2 SERPL-SCNC: 25 MMOL/L
CREAT SERPL-MCNC: 0.91 MG/DL
EOSINOPHIL # BLD AUTO: 0.18 K/UL
EOSINOPHIL NFR BLD AUTO: 1.4 %
ESTIMATED AVERAGE GLUCOSE: 117 MG/DL
GLUCOSE SERPL-MCNC: 121 MG/DL
HBA1C MFR BLD HPLC: 5.7 %
HCT VFR BLD CALC: 38.9 %
HDLC SERPL-MCNC: 46 MG/DL
HGB BLD-MCNC: 12.8 G/DL
IMM GRANULOCYTES NFR BLD AUTO: 0.6 %
LDLC SERPL CALC-MCNC: 51 MG/DL
LYMPHOCYTES # BLD AUTO: 1.78 K/UL
LYMPHOCYTES NFR BLD AUTO: 14.1 %
MAN DIFF?: NORMAL
MCHC RBC-ENTMCNC: 32.9 GM/DL
MCHC RBC-ENTMCNC: 34.6 PG
MCV RBC AUTO: 105.1 FL
MONOCYTES # BLD AUTO: 0.99 K/UL
MONOCYTES NFR BLD AUTO: 7.8 %
NEUTROPHILS # BLD AUTO: 9.57 K/UL
NEUTROPHILS NFR BLD AUTO: 75.6 %
NONHDLC SERPL-MCNC: 88 MG/DL
PLATELET # BLD AUTO: 385 K/UL
POTASSIUM SERPL-SCNC: 4.8 MMOL/L
PROT SERPL-MCNC: 7.2 G/DL
RBC # BLD: 3.7 M/UL
RBC # FLD: 12.2 %
SODIUM SERPL-SCNC: 139 MMOL/L
TRIGL SERPL-MCNC: 189 MG/DL
WBC # FLD AUTO: 12.66 K/UL

## 2020-11-30 ENCOUNTER — OUTPATIENT (OUTPATIENT)
Dept: OUTPATIENT SERVICES | Facility: HOSPITAL | Age: 60
LOS: 1 days | End: 2020-11-30
Payer: MEDICAID

## 2020-11-30 ENCOUNTER — APPOINTMENT (OUTPATIENT)
Dept: FAMILY MEDICINE | Facility: HOSPITAL | Age: 60
End: 2020-11-30
Payer: MEDICAID

## 2020-11-30 ENCOUNTER — RESULT REVIEW (OUTPATIENT)
Age: 60
End: 2020-11-30

## 2020-11-30 VITALS — SYSTOLIC BLOOD PRESSURE: 132 MMHG | DIASTOLIC BLOOD PRESSURE: 87 MMHG

## 2020-11-30 VITALS
SYSTOLIC BLOOD PRESSURE: 144 MMHG | HEART RATE: 65 BPM | TEMPERATURE: 97.3 F | OXYGEN SATURATION: 96 % | RESPIRATION RATE: 16 BRPM | DIASTOLIC BLOOD PRESSURE: 94 MMHG

## 2020-11-30 DIAGNOSIS — Z92.29 PERSONAL HISTORY OF OTHER DRUG THERAPY: ICD-10-CM

## 2020-11-30 DIAGNOSIS — Z87.39 PERSONAL HISTORY OF OTHER DISEASES OF THE MUSCULOSKELETAL SYSTEM AND CONNECTIVE TISSUE: ICD-10-CM

## 2020-11-30 DIAGNOSIS — S46.211A STRAIN OF MUSCLE, FASCIA AND TENDON OF OTHER PARTS OF BICEPS, RIGHT ARM, INITIAL ENCOUNTER: ICD-10-CM

## 2020-11-30 DIAGNOSIS — Z87.2 PERSONAL HISTORY OF DISEASES OF THE SKIN AND SUBCUTANEOUS TISSUE: ICD-10-CM

## 2020-11-30 DIAGNOSIS — R03.1 NONSPECIFIC LOW BLOOD-PRESSURE READING: ICD-10-CM

## 2020-11-30 DIAGNOSIS — Z87.19 PERSONAL HISTORY OF OTHER DISEASES OF THE DIGESTIVE SYSTEM: ICD-10-CM

## 2020-11-30 DIAGNOSIS — Z00.00 ENCOUNTER FOR GENERAL ADULT MEDICAL EXAMINATION WITHOUT ABNORMAL FINDINGS: ICD-10-CM

## 2020-11-30 DIAGNOSIS — Z87.898 PERSONAL HISTORY OF OTHER SPECIFIED CONDITIONS: ICD-10-CM

## 2020-11-30 DIAGNOSIS — M19.019 PRIMARY OSTEOARTHRITIS, UNSPECIFIED SHOULDER: Chronic | ICD-10-CM

## 2020-11-30 DIAGNOSIS — L23.7 ALLERGIC CONTACT DERMATITIS DUE TO PLANTS, EXCEPT FOOD: ICD-10-CM

## 2020-11-30 DIAGNOSIS — M25.559 PAIN IN UNSPECIFIED HIP: ICD-10-CM

## 2020-11-30 DIAGNOSIS — Z86.59 PERSONAL HISTORY OF OTHER MENTAL AND BEHAVIORAL DISORDERS: ICD-10-CM

## 2020-11-30 DIAGNOSIS — R19.7 DIARRHEA, UNSPECIFIED: ICD-10-CM

## 2020-11-30 DIAGNOSIS — R10.13 EPIGASTRIC PAIN: ICD-10-CM

## 2020-11-30 DIAGNOSIS — R74.8 ABNORMAL LEVELS OF OTHER SERUM ENZYMES: ICD-10-CM

## 2020-11-30 DIAGNOSIS — Z86.39 PERSONAL HISTORY OF OTHER ENDOCRINE, NUTRITIONAL AND METABOLIC DISEASE: ICD-10-CM

## 2020-11-30 DIAGNOSIS — Z15.89 GENETIC SUSCEPTIBILITY TO OTHER DISEASE: ICD-10-CM

## 2020-11-30 DIAGNOSIS — Z86.19 PERSONAL HISTORY OF OTHER INFECTIOUS AND PARASITIC DISEASES: ICD-10-CM

## 2020-11-30 DIAGNOSIS — M89.8X9 OTHER SPECIFIED DISORDERS OF BONE, UNSPECIFIED SITE: ICD-10-CM

## 2020-11-30 DIAGNOSIS — Z87.448 PERSONAL HISTORY OF OTHER DISEASES OF URINARY SYSTEM: ICD-10-CM

## 2020-11-30 DIAGNOSIS — K76.0 FATTY (CHANGE OF) LIVER, NOT ELSEWHERE CLASSIFIED: ICD-10-CM

## 2020-11-30 DIAGNOSIS — R94.5 ABNORMAL RESULTS OF LIVER FUNCTION STUDIES: ICD-10-CM

## 2020-11-30 DIAGNOSIS — M25.512 PAIN IN LEFT SHOULDER: ICD-10-CM

## 2020-11-30 DIAGNOSIS — R05 COUGH: ICD-10-CM

## 2020-11-30 DIAGNOSIS — M75.100 UNSPECIFIED ROTATOR CUFF TEAR OR RUPTURE OF UNSPECIFIED SHOULDER, NOT SPECIFIED AS TRAUMATIC: ICD-10-CM

## 2020-11-30 DIAGNOSIS — R41.3 OTHER AMNESIA: ICD-10-CM

## 2020-11-30 DIAGNOSIS — Z86.69 PERSONAL HISTORY OF OTHER DISEASES OF THE NERVOUS SYSTEM AND SENSE ORGANS: ICD-10-CM

## 2020-11-30 DIAGNOSIS — B19.10 UNSPECIFIED VIRAL HEPATITIS B W/OUT HEPATIC COMA: ICD-10-CM

## 2020-11-30 DIAGNOSIS — L03.119 CELLULITIS OF UNSPECIFIED PART OF LIMB: ICD-10-CM

## 2020-11-30 DIAGNOSIS — R73.01 IMPAIRED FASTING GLUCOSE: ICD-10-CM

## 2020-11-30 DIAGNOSIS — R79.89 OTHER SPECIFIED ABNORMAL FINDINGS OF BLOOD CHEMISTRY: ICD-10-CM

## 2020-11-30 DIAGNOSIS — H04.123 DRY EYE SYNDROME OF BILATERAL LACRIMAL GLANDS: ICD-10-CM

## 2020-11-30 DIAGNOSIS — M25.569 PAIN IN UNSPECIFIED KNEE: ICD-10-CM

## 2020-11-30 PROCEDURE — 71046 X-RAY EXAM CHEST 2 VIEWS: CPT | Mod: 26

## 2020-11-30 PROCEDURE — 73610 X-RAY EXAM OF ANKLE: CPT | Mod: 26,LT

## 2020-11-30 RX ORDER — LORAZEPAM 0.5 MG/1
0.5 TABLET ORAL TWICE DAILY
Qty: 28 | Refills: 0 | Status: COMPLETED | COMMUNITY
Start: 2020-10-30 | End: 2020-11-30

## 2020-11-30 RX ORDER — PREDNISONE 10 MG/1
10 TABLET ORAL
Qty: 32 | Refills: 0 | Status: COMPLETED | COMMUNITY
Start: 2020-10-28 | End: 2020-11-30

## 2020-12-01 DIAGNOSIS — M10.9 GOUT, UNSPECIFIED: ICD-10-CM

## 2020-12-01 NOTE — REVIEW OF SYSTEMS
[Joint Pain] : joint pain [Joint Swelling] : joint swelling [Fever] : no fever [Chills] : no chills [Chest Pain] : no chest pain [Palpitations] : no palpitations [Shortness Of Breath] : no shortness of breath [Cough] : no cough

## 2020-12-01 NOTE — HISTORY OF PRESENT ILLNESS
[FreeTextEntry1] : L ankle pain [de-identified] : 60M with chronic R shoulder pain and hx gout presents for f/u L ankle pain\par -Reports L ankle pain has been present for 2 months\par -Given hx of gout, patient was treated with allopurinol, indomethacin, and multiple courses of prednisone. However, this has provided only partial relief, and pain recurs when he stops prednisone\par -Patient states he was diagnosed with gout many years ago, and it was not similar to present symptoms. States he has never had pain of the great toe and never had a fluid aspiration done for diagnosis confirmation\par -Reports aching pain of the left medial ankle, worse with walking, better with rest\par -A/w intermittent L ankle swelling, none at present

## 2020-12-01 NOTE — PHYSICAL EXAM
[Normal] : normal rate, regular rhythm, normal S1 and S2 and no murmur heard [de-identified] : L ankle: No TTP, full ROM, no erythema or edema

## 2020-12-01 NOTE — ASSESSMENT
[FreeTextEntry1] : 60M with hx arthritis, HTN, and chronic right shoulder pain presents for L ankle pain two months. Problem specific plan as above.\par

## 2020-12-03 ENCOUNTER — RESULT REVIEW (OUTPATIENT)
Age: 60
End: 2020-12-03

## 2020-12-03 PROCEDURE — G0463: CPT

## 2020-12-03 PROCEDURE — 71046 X-RAY EXAM CHEST 2 VIEWS: CPT

## 2020-12-03 PROCEDURE — 71250 CT THORAX DX C-: CPT | Mod: 26

## 2020-12-03 PROCEDURE — 71250 CT THORAX DX C-: CPT

## 2020-12-03 PROCEDURE — 73610 X-RAY EXAM OF ANKLE: CPT

## 2020-12-07 ENCOUNTER — OUTPATIENT (OUTPATIENT)
Dept: OUTPATIENT SERVICES | Facility: HOSPITAL | Age: 60
LOS: 1 days | End: 2020-12-07
Payer: MEDICAID

## 2020-12-07 ENCOUNTER — EMERGENCY (EMERGENCY)
Facility: HOSPITAL | Age: 60
LOS: 1 days | Discharge: HOME | End: 2020-12-07
Attending: INTERNAL MEDICINE | Admitting: INTERNAL MEDICINE
Payer: MEDICAID

## 2020-12-07 ENCOUNTER — RESULT REVIEW (OUTPATIENT)
Age: 60
End: 2020-12-07

## 2020-12-07 VITALS
RESPIRATION RATE: 16 BRPM | HEART RATE: 75 BPM | DIASTOLIC BLOOD PRESSURE: 91 MMHG | HEIGHT: 71 IN | WEIGHT: 220.02 LBS | TEMPERATURE: 98 F | SYSTOLIC BLOOD PRESSURE: 149 MMHG | OXYGEN SATURATION: 95 %

## 2020-12-07 DIAGNOSIS — S61.451A OPEN BITE OF RIGHT HAND, INITIAL ENCOUNTER: ICD-10-CM

## 2020-12-07 DIAGNOSIS — M19.019 PRIMARY OSTEOARTHRITIS, UNSPECIFIED SHOULDER: Chronic | ICD-10-CM

## 2020-12-07 DIAGNOSIS — M25.511 PAIN IN RIGHT SHOULDER: ICD-10-CM

## 2020-12-07 DIAGNOSIS — M54.12 RADICULOPATHY, CERVICAL REGION: ICD-10-CM

## 2020-12-07 LAB — SARS-COV-2 RNA SPEC QL NAA+PROBE: SIGNIFICANT CHANGE UP

## 2020-12-07 PROCEDURE — 99284 EMERGENCY DEPT VISIT MOD MDM: CPT

## 2020-12-07 PROCEDURE — 90471 IMMUNIZATION ADMIN: CPT

## 2020-12-07 PROCEDURE — 73030 X-RAY EXAM OF SHOULDER: CPT

## 2020-12-07 PROCEDURE — U0003: CPT

## 2020-12-07 PROCEDURE — 99283 EMERGENCY DEPT VISIT LOW MDM: CPT | Mod: 25

## 2020-12-07 PROCEDURE — 73030 X-RAY EXAM OF SHOULDER: CPT | Mod: 26,RT

## 2020-12-07 NOTE — ED PROVIDER NOTE - PATIENT PORTAL LINK FT
You can access the FollowMyHealth Patient Portal offered by [facility] by registering at the following website: [email or web address]. By joining [facility]’s FollowMyHealth portal, you will also be able to view your health information using other applications (apps) compatible with our system.

## 2020-12-07 NOTE — ED PROVIDER NOTE - PHYSICAL EXAMINATION
General:     NAD, well-nourished, well-appearing  Eyes: PERRL  Head:     NC/AT, EOMI, oral mucosa moist  Neck:     trachea midline, FROM, no meningismus   Lungs:     CTA b/l  CVS:     RRR  Abd:     +BS, s/nt/nd  Ext:   right hand: 2 puncture wound on dorsum of hand. +swelling and erythema of dorsum of hand withou streaking up arm. FROM all digits and writs. 2+ radial pulse. sensation intact. cap refill <3  Neuro: AAOx3, no sensory/motor deficits

## 2020-12-07 NOTE — ED PROVIDER NOTE - CLINICAL SUMMARY MEDICAL DECISION MAKING FREE TEXT BOX
cat bite. abx and tdap update. will also covid swab pt. Discussed with patient need to return to ED if symptoms don't continue to improve or recur or develops any new or worsening symptoms that are of concern.

## 2020-12-07 NOTE — ED PROVIDER NOTE - OBJECTIVE STATEMENT
pt 60y m c/o bite to his right hand from his sisters indoor cat. cat is UTD on vaccines. pt unsure of his last tdap. c/o pain and redness and swelling of right hand that worsened this AM. hasn't taken any medications today  of note pt also has intermittent headache, nasal congestion, fatigue for the last week. no known covid + contacts

## 2020-12-07 NOTE — ED PROVIDER NOTE - NSFOLLOWUPINSTRUCTIONS_ED_ALL_ED_FT
Follow up with your primary care provider within 48-72 hours for wound check.   Clean with soap and water daily.  Apply bacitracin and cover.    Take your antibiotics as prescribed  Any increased pain, redness, streaking (red lines), swelling, fever, chills return to ER.         Animal Bite    Animal bites can range from mild to serious. An animal bite can result in a scratch on the skin, a deep open cut, a puncture of the skin, a crush injury, or tearing away of the skin or a body part. Treatment includes wound care, updating your tetanus shot, and in some cases, administering a rabies vaccine. If you were prescribed an antibiotic, take it as told by your health care provider. Do not stop using the antibiotic even if your condition improves.      SEEK IMMEDIATE MEDICAL CARE IF YOU HAVE ANY OF THE FOLLOWING SYMPTOMS: red streaking away from the wound, fluid/blood/pus coming from the wound, fever or chills, trouble moving the injured area, numbness or tingling extending beyond the wound.       1. You were seen in the ED and underwent testing for the novel coronarvirus (COVID-19). The results are not back yet and you will be contacted with the result in 5-7 days, but may take longer. You were also tested for other common viruses such as the flu and cold viruses. You will be notified if you test positive for any of these.    2. Until your test results are back, YOU MUST SELF-QUARANTINE until you are told to other otherwise by [facility] or the local [facility] department. This is extremely important to limit the spread of this virus. Please refer closely to the packet provided to you on the specifics of the process of self-quarantine.    3. If you end up testing positive for the virus, you will instructed as to when you can return to your usual activities. If you do not hear from anyone in 7 days, call [phone].     4. Return to the ED for difficulty breathing.    5. You may take over the counter acetaminophen (Tylenol) 650mg every 6 hours as needed for fever or pain. There is some concern in the medical community about using ibuprofen (Advil, Motrin) and other NSAIDs in people with COVID infections and until there is more research on this subject it may be best to avoid NSAIDs like ibuprofen, unless you have an allergy to acetaminophen (Tylenol).  Do NOT exceed 3500mg acetaminophen in 24 hours.  Please do not take these medications if you do not have pain or fever or if you have any history of liver disease.     -------------    What is a coronavirus?  Coronaviruses are a large family of viruses that cause illnesses ranging from the common cold to more severe diseases such as Middle East Respiratory Syndrome (MERS) and Severe Acute Respiratory Syndrome (SARS).    What is Novel Coronavirus (COVID-19)?  The Centers for Disease Control and Prevention (CDC) is closely monitoring the outbreak caused by COVID-19. For the latest information about COVID-19, visit the CDC website at CDC.gov/Coronavirus    How are coronaviruses spread?  Coronaviruses can be transmitted from person-toperson, usually after close contact with an infected  person (for example, in a household, workplace, or healthcare setting), via droplets that become airborne after a cough or sneeze. These droplets can then infect a nearby person. Transmission can also occur by touching recently contaminated surfaces.    Is there a treatment for a COVID-19?  There is no specific treatment for disease caused by COVID-19. However, many of the symptoms can be treated based on the patient’s clinical condition. Supportive care for infected persons can be highly effective.    What are the symptoms of coronavirus infection?  It depends on the virus, but common signs include fever and/or respiratory symptoms such as cough and shortness of breath. In more severe cases, infection can cause pneumonia, severe acute respiratory syndrome, kidney failure and even death. Fortunately, most cases of COVID-19 have an illness no different than the influenza (flu), with a majority of these patients having mild symptoms and overall mortality which appears to be not much different than the flu.    What can I do to protect myself?  The best precautionary measures:  – washing your hands  – covering your cough  – disinfecting surfaces  – it is also advisable to avoid close contact with anyone showing symptoms of respiratory illness such as coughing and sneezing  – those with symptoms should wear a surgical mask when around others    What can I do to protect those around me?  If you have been identified as someone who may be infected with COVID-19, we recommend you follow the self-isolation procedures outlined on the following page to protect those around you and to limit the spread of this virus.    We recommend the below precautionary steps from now until 14 days from when you returned from your travel or date of your last known possible contact:    — Do not go to work, school or public areas. Avoid using public transportation, ridesharing or taxis.  — As much as possible, separate yourself from other people in your home. If you can, you should stay in a room and away from other people. Also, you should use a separate bathroom if available.  — Wear the supplied mask whenever you are around other people.  — If you have a non-urgent medical appointment, please reschedule for a later date. If the appointment is urgent, please call the health care provider and tell them that you are on self-isolation for possible COVID-19. This will help the health care provider’s office take steps to keep other people from getting infected or exposed. If you can reschedule routine appointments, do so.  — Wash your hands often with soap and water for at least 15 to 20 seconds or clean your hands with an alcohol-based hand  that contains 60 to 95% alcohol, covering all surfaces of your hands and rubbing them together until they feel dry. Soap and water should be used preferentially if hands are visibly dirty.  — Cover your mouth and nose with a tissue when you cough or sneeze. Throw used tissues in a lined trash can. Immediately wash your hands.  — Avoid touching your eyes, nose, and mouth with your hands.  — Avoid sharing personal household items. You should not share dishes, drinking glasses, cups, eating utensils, towels, or bedding with other people or pets in your home. After using these items, they should be washed thoroughly with soap and water.  — Clean and disinfect all “high-touch” surfaces every day. High touch surfaces include counters, tabletops, doorknobs, light switches, remote controls, bathroom fixtures, toilets, phones, keyboards, tablets, and bedside tables. Also, clean any surfaces that may have blood, stool, or body fluids on them.    ------------------------------------------  Information for patients who have received a COVID-19 test.    The COVID-19 (novel coronavirus) test  Results may take up to 5-7 days to become available.      If your result is positive, you will receive a phone call from one of our coronavirus specialists. While we will do our best to also call patients with a negative test result, the sheer volume of tests being performed may make this difficult to do in a timely fashion. If you haven’t heard from us within 5 days and you’d like to check on your results, you can check our [employer] lory or call one of our coronavirus specialists at [address] [place] [address] (available 24/7)    Please DO NOT call the site where you received the test to obtain your results.    If the test is positive -   You will continue home isolation until you are completely well, you have no fever, and it has been at least 14 days since your positive test. The health department in your city or county may also contact you with additional instructions.    If your test is negative -    You will be able to stop home isolation and resume standard precautions, similar to how you would manage the common cold or flu.  If you have any questions, you can reach out to one of our coronavirus specialists  at [address] [place] [address].    REMEMBER - a negative COVID test means you were negative AT THE TIME OF TESTING, and it is possible to have contracted COVID after being tested.        CORONAVIRUS DISCHARGE INSTRUCTIONS  COVID-19 (Coronavirus Disease 2019)    WHAT YOU NEED TO KNOW:    COVID-19 is the disease caused by the 2019 novel (new) coronavirus. It was first found in individuals in a part of [place] in late 2019. The virus is spreading to other countries as infected persons travel. Coronaviruses generally cause respiratory (nose, throat, and lung) infections, such as a cold. They can also cause serious infections such as Middle East respiratory syndrome (MERS) and severe acute respiratory syndrome (SARS). The new virus is related to the SARS coronavirus, so it is officially named SARS coronavirus 2 (SARS-CoV-2).    DISCHARGE INSTRUCTIONS:    If you think you or someone you know may be infected: It is important for anyone who may be infected to get tested right away. Do the following to protect others:     If emergency care is needed, tell the  about the possible infection, or call ahead and tell the emergency department.      Call a healthcare provider to be seen in the office. Anyone who may be infected should not arrive without calling first. The provider will need to protect staff members and other patients.      The person who may be infected needs to wear a medical mask before getting medical care. The mask needs to stay on until the provider says to remove it.    Call your local emergency number (911 in the [place]) or go to the emergency department if: You have signs or symptoms of COVID-19, and any of the following is true:     You traveled within the last 14 days to an area where the virus is active or had close contact with someone who did.      You had close contact within the last 14 days with someone who has a confirmed infection.    Call your doctor if: You do not have signs or symptoms of COVID-19, but any of the following is true:     You traveled within the last 14 days to an area where the virus is active or had close contact with someone who did.      You had close contact within the last 14 days with someone who has a confirmed infection.      You have questions or concerns about your condition or care.    Medicines: You may need any of the following for mild symptoms:     Decongestants help reduce nasal congestion and help you breathe more easily. If you take decongestant pills, they may make you feel restless or cause problems with your sleep. Do not use decongestant sprays for more than a few days.      Cough suppressants help reduce coughing. Ask your healthcare provider which type of cough medicine is best for you.      Acetaminophen decreases pain and fever. It is available without a doctor's order. Ask how much to take and how often to take it. Follow directions. Read the labels of all other medicines you are using to see if they also contain acetaminophen, or ask your doctor or pharmacist. Acetaminophen can cause liver damage if not taken correctly. Do not use more than 4 grams (4,000 milligrams) total of acetaminophen in one day.     Take your medicine as directed. Contact your healthcare provider if you think your medicine is not helping or if you have side effects. Tell him or her if you are allergic to any medicine. Keep a list of the medicines, vitamins, and herbs you take. Include the amounts, and when and why you take them. Bring the list or the pill bottles to follow-up visits. Carry your medicine list with you in case of an emergency.    How the 2019 coronavirus spreads: The virus appears to spread quickly and easily. The following are ways the virus is thought to spread, but more information may be coming:     Droplets are the most common way all coronaviruses spread. The virus can travel in droplets that form when a person talks, coughs, or sneezes. Anyone who breathes in the virus or gets it in his or her eyes can become infected.      An infected person may be able to leave the virus on objects and surfaces. Another person can get the virus on his or her hands by touching the object or surface. Infection happens if the person then touches his or her eyes or mouth with unwashed hands. It is not yet known how long the virus can stay on an object or surface. Evidence shows it may be as long as 9 days at room temperature.      Person-to-person contact may spread the virus. For example, an infected person can spread the virus by shaking hands with someone. At this time, it does not appear that the virus can be passed to a baby during pregnancy or delivery. The virus also does not appear to spread during breastfeeding. If you are pregnant or breastfeeding, talk to your healthcare provider or obstetrician about any concerns you have.      An infected animal may be able to infect a person who touches it. This may happen at live markets or on a farm.    Prevent a 2019 coronavirus infection: Everyone should do the following to prevent getting or spreading the virus:     Wash your hands often. Use soap and water every time you wash your hands. Rub your soapy hands together, lacing your fingers. Use the fingers of one hand to scrub under the nails of the other hand. Wash for at least 20 seconds. Rinse with warm, running water for several seconds. Then dry your hands. Use germ-killing gel if soap and water are not available. Do not touch your eyes or mouth without washing your hands first. Handwashing           Cover a sneeze or cough. Use a tissue that covers your mouth and nose. Throw the tissue away in a trash can right away. Use the bend of your arm if a tissue is not available. Wash your hands well with soap and water or use a hand . Do not stand close to anyone who is sneezing or coughing.      Be careful around others. The best way to prevent infection is to avoid anyone who is infected, but this may be difficult. An infected person may be able to spread the virus before signs or symptoms begin. Until more is known, you may not want to shake hands with others. If you do shake hands, wash your hands or use hand  as soon as possible. You do not need to wear a medical mask if you are well and not caring for an infected person.      Ask about vaccines you may need. No vaccine is available for the new coronavirus. But any infection can affect your immune system. A weakened immune system makes you more vulnerable to the new coronavirus. Until a vaccine against the new virus is developed, do the following:   Get a flu vaccine as soon as recommended each year. The flu vaccine is available starting in September or October. Flu viruses change, so it is important to get a flu vaccine every year.      Talk to your healthcare provider about your vaccine history. Tell him or her if you did not get certain vaccines as a child, or you did not get all recommended doses. Tell him or her if you do not know your vaccine history. He or she will tell you which vaccines you need, and when to get them.           If you have COVID-19: Healthcare providers will give you specific instructions to follow. The following are general guidelines to remind you of how to keep others safe until you are well:     Limit close contact with others. Your healthcare provider will tell you when it is okay to be around others. This may be when you do not have a fever, do not take fever medicine, and have no symptoms. Fluid from your respiratory tract will need to test negative for the virus 2 times at least 24 hours apart. Until then, do the following along with any instructions from your provider:   Only go out of the house for medical appointments. Always call the provider’s office first so he or she can prepare to keep others safe.      Stay at least 6 feet (2 meters) away from others.      Sleep in a different room from others in the house.      Do not shake hands with other people.      Wear a medical mask when others are near you. This can help prevent droplets from spreading the virus when you talk, sneeze, or cough.      Do not share items. Do not share dishes, towels, or other items with anyone. Items need to be washed after you use them.      Do not handle live animals. The virus may be spread to animals, including pets. Until more is known, it is best not to touch, play with, or handle live animals.    Self-care:     Drink more liquids as directed. Liquids will help thin and loosen mucus so you can cough it up. Liquids will also help prevent dehydration. Liquids that help prevent dehydration include water, fruit juice, and broth. Do not drink liquids that contain caffeine. Caffeine can increase your risk for dehydration. Ask your healthcare provider how much liquid to drink each day.      Soothe a sore throat. Gargle with warm salt water. This may help your sore throat feel better. Make salt water by dissolving ¼ teaspoon salt in 1 cup warm water. You may also suck on hard candy or throat lozenges. You may use a sore throat spray.      Use a humidifier or vaporizer. Use a cool mist humidifier or a vaporizer to increase air moisture in your home. This may make it easier for you to breathe and help decrease your cough.      Use saline nasal drops as directed. These help relieve congestion.      Apply petroleum-based jelly around the outside of your nostrils. This can decrease irritation from blowing your nose.      Do not smoke. Nicotine and other chemicals in cigarettes and cigars can make your symptoms worse. They can also cause infections such as bronchitis or pneumonia. Ask your healthcare provider for information if you currently smoke and need help to quit. E-cigarettes or smokeless tobacco still contain nicotine. Talk to your healthcare provider before you use these products.    If you take care of someone who has COVID-19:     Wear a medical mask when you are near the person. This can help protect you from droplets that carry the virus when the person talks, sneezes, or coughs.      Do not allow others to go near the person. No one should come to the person's home unless it is necessary. Keep the room's door shut unless you need to go in or out.      Make sure the person's room has good air flow. You may be able to open the window if the weather allows. An air conditioner can also be turned on to help air move.      Clean items the person uses or touches. Wear disposable gloves to handle the person's laundry. Place the laundry in a plastic bag. Use hot water and soap to wash the person's laundry. Wash eating utensils and other items after the person uses them. Throw your gloves away after you use them.      Clean surfaces often. Use bleach diluted with water or disinfecting wipes. Clean counters, doorknobs, toilet seats, and other surfaces.    Follow up with your doctor as directed: Write down your questions so you remember to ask them during your visits.    For more information:     Centers for Disease Control and Prevention  [address], [place]  Phone: [phone]  Phone: 2-280-7424050  Web Address: http://www.cdc.gov

## 2020-12-07 NOTE — ED ADULT NURSE NOTE - OBJECTIVE STATEMENT
60 yr old male to ED A&Ox4 presents with +cat bite to RT hand.  Pt states that he was bit by his sisters cat while petting the cat.  two puncture wounds noted to RT hand. +redness and swelling noted to RT hand.  Pt also states that he has had a headache and has been feeling weak over the past week.  Pt had COVID back in the spring and is concerned he may have it again.  NO acute resp distress noted. Resp even and unlabored.  [place]. Skin warm and dry.

## 2020-12-07 NOTE — ED PROVIDER NOTE - ATTENDING CONTRIBUTION TO CARE
cat bite. abx and tdap update. will also covid swab pt. Discussed with patient need to return to ED if symptoms don't continue to improve or recur or develops any new or worsening symptoms that are of concern.  Dr. aGn:  I have reviewed and discussed with the PA/ resident the case specifics, including the history, physical assessment, evaluation, conclusion, laboratory results, and medical plan. I agree with the contents, and conclusions. I have personally examined, and interviewed the patient.

## 2020-12-08 ENCOUNTER — NON-APPOINTMENT (OUTPATIENT)
Age: 60
End: 2020-12-08

## 2020-12-10 ENCOUNTER — NON-APPOINTMENT (OUTPATIENT)
Age: 60
End: 2020-12-10

## 2020-12-10 ENCOUNTER — RX RENEWAL (OUTPATIENT)
Age: 60
End: 2020-12-10

## 2020-12-18 ENCOUNTER — OUTPATIENT (OUTPATIENT)
Dept: OUTPATIENT SERVICES | Facility: HOSPITAL | Age: 60
LOS: 1 days | End: 2020-12-18
Payer: MEDICAID

## 2020-12-18 ENCOUNTER — APPOINTMENT (OUTPATIENT)
Dept: MRI IMAGING | Facility: HOSPITAL | Age: 60
End: 2020-12-18
Payer: MEDICAID

## 2020-12-18 ENCOUNTER — APPOINTMENT (OUTPATIENT)
Dept: MRI IMAGING | Facility: HOSPITAL | Age: 60
End: 2020-12-18

## 2020-12-18 DIAGNOSIS — M25.511 PAIN IN RIGHT SHOULDER: ICD-10-CM

## 2020-12-18 DIAGNOSIS — M54.12 RADICULOPATHY, CERVICAL REGION: ICD-10-CM

## 2020-12-18 DIAGNOSIS — M19.019 PRIMARY OSTEOARTHRITIS, UNSPECIFIED SHOULDER: Chronic | ICD-10-CM

## 2020-12-18 PROCEDURE — 73221 MRI JOINT UPR EXTREM W/O DYE: CPT | Mod: 26,RT

## 2020-12-18 PROCEDURE — 72141 MRI NECK SPINE W/O DYE: CPT

## 2020-12-18 PROCEDURE — 73221 MRI JOINT UPR EXTREM W/O DYE: CPT

## 2020-12-18 PROCEDURE — 72141 MRI NECK SPINE W/O DYE: CPT | Mod: 26

## 2020-12-21 ENCOUNTER — APPOINTMENT (OUTPATIENT)
Dept: FAMILY MEDICINE | Facility: HOSPITAL | Age: 60
End: 2020-12-21

## 2020-12-22 ENCOUNTER — OUTPATIENT (OUTPATIENT)
Dept: OUTPATIENT SERVICES | Facility: HOSPITAL | Age: 60
LOS: 1 days | End: 2020-12-22
Payer: MEDICAID

## 2020-12-22 ENCOUNTER — APPOINTMENT (OUTPATIENT)
Dept: FAMILY MEDICINE | Facility: HOSPITAL | Age: 60
End: 2020-12-22

## 2020-12-22 VITALS
WEIGHT: 230 LBS | OXYGEN SATURATION: 97 % | HEART RATE: 70 BPM | TEMPERATURE: 98 F | BODY MASS INDEX: 32.08 KG/M2 | SYSTOLIC BLOOD PRESSURE: 126 MMHG | RESPIRATION RATE: 15 BRPM | DIASTOLIC BLOOD PRESSURE: 81 MMHG

## 2020-12-22 DIAGNOSIS — Z00.00 ENCOUNTER FOR GENERAL ADULT MEDICAL EXAMINATION WITHOUT ABNORMAL FINDINGS: ICD-10-CM

## 2020-12-22 DIAGNOSIS — M19.019 PRIMARY OSTEOARTHRITIS, UNSPECIFIED SHOULDER: Chronic | ICD-10-CM

## 2020-12-22 PROCEDURE — G0463: CPT

## 2020-12-22 RX ORDER — INDOMETHACIN 50 MG/1
50 CAPSULE ORAL
Qty: 30 | Refills: 0 | Status: COMPLETED | COMMUNITY
Start: 2020-11-25 | End: 2020-12-22

## 2020-12-22 RX ORDER — ALLOPURINOL 100 MG/1
100 TABLET ORAL DAILY
Qty: 45 | Refills: 0 | Status: COMPLETED | COMMUNITY
Start: 2020-11-25 | End: 2020-12-22

## 2020-12-22 NOTE — REVIEW OF SYSTEMS
[Joint Pain] : joint pain [Joint Stiffness] : joint stiffness [Muscle Pain] : muscle pain [Fever] : no fever [Chills] : no chills [Shortness Of Breath] : no shortness of breath [Cough] : no cough [Muscle Weakness] : no muscle weakness [Joint Swelling] : no joint swelling

## 2020-12-22 NOTE — PHYSICAL EXAM
[Normal] : normal rate, regular rhythm, normal S1 and S2 and no murmur heard [de-identified] : Right shoulder TTP, ROM limited by pain

## 2020-12-22 NOTE — HISTORY OF PRESENT ILLNESS
[FreeTextEntry1] : f/u R shoulder pain [de-identified] : 60M presents for f/u R shoulder pain\par -Reports ongoing pain of the right shoulder a/w right arm numbness\par -Presents today because he wishes to review MRI results in person and discuss treatment options\par -Patient states he was told in the past that he needs a left shoulder replacement, but did not want it because the stated recovery time was too long\par -Patient currently is not working, states he is limited by the pain in his right shoulder

## 2020-12-22 NOTE — ASSESSMENT
[FreeTextEntry1] : 60M presents for f/u chronic R shoulder pain. Problem specific plan as above.\par

## 2020-12-23 DIAGNOSIS — M25.511 PAIN IN RIGHT SHOULDER: ICD-10-CM

## 2021-01-04 ENCOUNTER — APPOINTMENT (OUTPATIENT)
Dept: ORTHOPEDIC SURGERY | Facility: CLINIC | Age: 61
End: 2021-01-04
Payer: MEDICAID

## 2021-01-04 VITALS
DIASTOLIC BLOOD PRESSURE: 87 MMHG | SYSTOLIC BLOOD PRESSURE: 134 MMHG | HEART RATE: 62 BPM | HEIGHT: 71 IN | WEIGHT: 225 LBS | BODY MASS INDEX: 31.5 KG/M2

## 2021-01-04 PROCEDURE — 99214 OFFICE O/P EST MOD 30 MIN: CPT

## 2021-01-04 PROCEDURE — 99072 ADDL SUPL MATRL&STAF TM PHE: CPT

## 2021-01-04 PROCEDURE — 73502 X-RAY EXAM HIP UNI 2-3 VIEWS: CPT | Mod: RT

## 2021-01-04 NOTE — CONSULT LETTER
[Dear  ___] : Dear  [unfilled], [Consult Letter:] : I had the pleasure of evaluating your patient, [unfilled]. [Please see my note below.] : Please see my note below. [Consult Closing:] : Thank you very much for allowing me to participate in the care of this patient.  If you have any questions, please do not hesitate to contact me. [Sincerely,] : Sincerely, [FreeTextEntry3] : Dr. Stevenson Guajardo \par \par

## 2021-01-04 NOTE — HISTORY OF PRESENT ILLNESS
[de-identified] : LEIF GOODWIN is a 60 year old RHD male presenting to the office complaining of right shoulder pain.  Patient reports pain intermittently for years. Patient denies injury or trauma to the area. He has been under the care of  for his shoulder. He presents today with an MRI and Xray of the right shoulder from [facility]. The patient describes the pain as a dull aching, and occasionally sharp pain localized to the anterior aspect of his right shoulder that is intermittent in nature. His  symptoms are exacerbated with any movement of the shoulder. Patient reports the pain is waking him up at night.  Patient reports associated weakness. Denies numbness and tingling in the upper extremity.  Patient has completed a home exercise program noting improvements in strength and range of motion but not pain. Patient also reports right hip pain. Denies injury or trauma to the area. Patient reports increased pain with internal rotation and adduction of the hip. The pain is located in the groin region. Denies numbness and tingling.  Patient is taking NSAIDs for pain relief with mild relief in symptoms. Patient denies any other complaints at this time.

## 2021-01-04 NOTE — DISCUSSION/SUMMARY
[de-identified] : The underlying pathophysiology was reviewed in great detail with the patient as well as the various treatment options, including ice, analgesics, NSAIDs, Physical therapy, steroid injections.\par \par MRI of the right shoulder was reviewed and discussed in great detail today. \par \par A home exercise sheet was given and discussed with the patient to follow.\par \par Activity modifications and restrictions were discussed. I advised avoiding overhead lifting. I advised the patient to work on good posture.\par \par FU 6 weeks. \par \par All questions were answered, all alternatives discussed and the patient is in complete agreement with that plan. Follow-up appointment as instructed. Any issues and the patient will call or come in sooner.

## 2021-01-04 NOTE — PHYSICAL EXAM
[de-identified] : Right Upper Extremity\par o Shoulder :\par ¦ Inspection/Palpation : tenderness to palpation greater tuberosity,  no swelling, no deformities\par ¦ Range of Motion : ACTIVE FORWARD ELEVATION: Measured at 145 degrees, ACTIVE EXTERNAL ROTATION: Measured at 45 degrees, ACTIVE INTERNAL ROTATION: Measured at T12\par ¦ Strength : external rotation 5/5, internal rotation 5/5, supraspinatus 5/5\par ¦ Stability : no joint instability on provocative testing\par ¦ Tests/Signs : Neer (+), Bañuelos (+)\par o Upper Arm : no tenderness, no swelling, no deformities\par o Muscle Bulk : no atrophy\par o Sensation : sensation intact to light touch\par o Skin : no skin rash or discoloration\par o Vascular Exam : no edema, no cyanosis, radial and ulnar pulses normal\par \par Right Lower Extremity\par o Hip :\par ¦ Inspection/Palpation : no tenderness to palpation, no swelling, no deformity\par ¦ Range of Motion : full with pain on flexion and internal rotation\par ¦ Stability : joint stability intact\par ¦ Strength : hip flexion 5/5\par ¦ Tests and Signs : all tests for stability normal\par o Muscle Tone : tone normal\par o Muscle Bulk : normal muscle bulk present\par o Skin : no erythema, no ecchymosis\par o Sensation : sensation to light touch intact\par o Vascular Exam : no edema, no cyanosis, dorsalis pedis artery pulse 2+, posterior tibial artery pulse 2+\par o Special Tests: FADIR Test (+ groin pain) JACINDA Test (-)\par \par Left Lower Extremity\par o Hip :\par ¦ Inspection/Palpation : no tenderness, no swelling, no deformity\par ¦ Range of Motion : full and painless in all planes, no crepitus\par ¦ Stability : joint stability intact\par ¦ Strength : hip flexion 5/5\par ¦ Tests and Signs : all tests for stability normal\par o Muscle Tone : tone normal\par o Muscle Bulk : normal muscle bulk present\par o Skin : no erythema, no ecchymosis\par o Sensation : sensation to light touch intact\par o Vascular Exam : no edema, no cyanosis, dorsalis pedis artery pulse 2+, posterior tibial artery pulse 2+\par  [de-identified] : o Right Hip and pelvis : AP and lateral views were obtained, there are no soft tissue abnormalities, no fractures, alignment is normal, normal bone density, Mild femoroacetabular osteoarthritis no bony lesions.\par \par  \par o MRI of the right shoulder performed on 12/18/2020 at [facility]: Impression: \par ¦ Tiny low-grade tear at the distal infraspinatus footprint, which would likely be concealed at arthroscopy. \par ¦ Tendinosis of the subscapularis tendon. \par ¦ No full-thickness rotator cuff tear is seen. \par ¦ Small fluid in the subacromial/subdeltoid bursa, likely bursitis.\par ¦ The intra-articular long head of biceps tendon is not well visualized and may be torn. Evaluation is limited by motion artifact.\par ¦ Mild AC joint arthrosis. Small to moderate AC joint effusion.\par ¦ Degeneration and fraying of the labrum.\par ¦ There is likely high-grade chondrosis in the glenoid, with small subchondral cystic change.\par

## 2021-01-05 ENCOUNTER — OUTPATIENT (OUTPATIENT)
Dept: OUTPATIENT SERVICES | Facility: HOSPITAL | Age: 61
LOS: 1 days | End: 2021-01-05
Payer: MEDICAID

## 2021-01-05 ENCOUNTER — APPOINTMENT (OUTPATIENT)
Dept: FAMILY MEDICINE | Facility: HOSPITAL | Age: 61
End: 2021-01-05

## 2021-01-05 VITALS
RESPIRATION RATE: 18 BRPM | DIASTOLIC BLOOD PRESSURE: 84 MMHG | TEMPERATURE: 96.8 F | OXYGEN SATURATION: 98 % | SYSTOLIC BLOOD PRESSURE: 135 MMHG | WEIGHT: 234 LBS | BODY MASS INDEX: 32.64 KG/M2 | HEART RATE: 72 BPM

## 2021-01-05 DIAGNOSIS — Z86.69 PERSONAL HISTORY OF OTHER DISEASES OF THE NERVOUS SYSTEM AND SENSE ORGANS: ICD-10-CM

## 2021-01-05 DIAGNOSIS — R20.0 ANESTHESIA OF SKIN: ICD-10-CM

## 2021-01-05 DIAGNOSIS — Z87.898 PERSONAL HISTORY OF OTHER SPECIFIED CONDITIONS: ICD-10-CM

## 2021-01-05 DIAGNOSIS — G89.29 PAIN IN RIGHT SHOULDER: ICD-10-CM

## 2021-01-05 DIAGNOSIS — Z00.00 ENCOUNTER FOR GENERAL ADULT MEDICAL EXAMINATION WITHOUT ABNORMAL FINDINGS: ICD-10-CM

## 2021-01-05 DIAGNOSIS — M19.019 PRIMARY OSTEOARTHRITIS, UNSPECIFIED SHOULDER: Chronic | ICD-10-CM

## 2021-01-05 DIAGNOSIS — M25.511 PAIN IN RIGHT SHOULDER: ICD-10-CM

## 2021-01-05 PROCEDURE — G0463: CPT

## 2021-01-05 PROCEDURE — 87635 SARS-COV-2 COVID-19 AMP PRB: CPT

## 2021-01-05 RX ORDER — METOPROLOL SUCCINATE 100 MG/1
100 TABLET, EXTENDED RELEASE ORAL
Qty: 90 | Refills: 0 | Status: COMPLETED | COMMUNITY
Start: 2020-11-11 | End: 2021-01-05

## 2021-01-05 RX ORDER — QUETIAPINE FUMARATE 400 MG/1
400 TABLET ORAL
Qty: 30 | Refills: 0 | Status: ACTIVE | COMMUNITY
Start: 2020-12-07

## 2021-01-05 RX ORDER — PHENYLEPHRINE HCL 10 MG
7 TABLET ORAL DAILY
Qty: 14 | Refills: 0 | Status: COMPLETED | COMMUNITY
Start: 2020-10-28 | End: 2021-01-05

## 2021-01-05 RX ORDER — ASPIRIN 81 MG/1
81 TABLET, CHEWABLE ORAL
Qty: 30 | Refills: 0 | Status: COMPLETED | COMMUNITY
Start: 2020-10-16 | End: 2021-01-05

## 2021-01-05 RX ORDER — DIPHENHYDRAMINE HCL 50 MG/1
50 CAPSULE ORAL
Qty: 90 | Refills: 0 | Status: COMPLETED | COMMUNITY
Start: 2020-10-16 | End: 2021-01-05

## 2021-01-05 RX ORDER — SERTRALINE HYDROCHLORIDE 100 MG/1
100 TABLET, FILM COATED ORAL
Qty: 45 | Refills: 0 | Status: COMPLETED | COMMUNITY
Start: 2021-01-02 | End: 2021-01-05

## 2021-01-05 RX ORDER — ALBUTEROL SULFATE 90 UG/1
108 (90 BASE) INHALANT RESPIRATORY (INHALATION)
Qty: 8 | Refills: 0 | Status: COMPLETED | COMMUNITY
Start: 2020-10-16 | End: 2021-01-05

## 2021-01-05 NOTE — HISTORY OF PRESENT ILLNESS
[FreeTextEntry1] : CPE [de-identified] : 60M with OA, prediabetes, obesity, and tobacco dependence presents for CPE\par -[employer] was shut down for COVID-19 positive; requests testing. Asymptomatic\par -Diet: Occasional pizza, chinese food. Yesterday veal cutlet\par -Exercise: Walks\par -Tobacco: Prior smoker, using the patch and gum. Not ready to taper\par -Alcohol: None\par -Drug: None\par -Reports chronic blurry vision and dryness of the right eye. Scheduled to see opthalmology tomorrow\par -Continued right shoulder, right hip, and low back pain. Saw ortho for R shoulder OA since last visit. Conservative therapy recommended. Per patient, ortho also took XR of the right hip, which were normal

## 2021-01-05 NOTE — ASSESSMENT
[FreeTextEntry1] : 60M with OA, prediabetes, obesity, and tobacco dependence presents for CPE\par Encouraged healthy diet and regular exercise.\par Problem specific plan as above.\par

## 2021-01-05 NOTE — PHYSICAL EXAM
[PERRL] : pupils equal round and reactive to light [No Lymphadenopathy] : no lymphadenopathy [Supple] : supple [No Edema] : there was no peripheral edema [No Extremity Clubbing/Cyanosis] : no extremity clubbing/cyanosis [Soft] : abdomen soft [Non Tender] : non-tender [Non-distended] : non-distended [Normal] : affect was normal and insight and judgment were intact [de-identified] : Mild increased vascularity of the conjunctiva b/l. No lesions of the eyes or eyelids visible on gross exam [de-identified] : Low back paraspinal tenderness [de-identified] :  R shoulder abduction limited by pain. Pain of the R hip with crossing of the right leg of the left knee. 5/5 strength of upper and lower extremities

## 2021-01-05 NOTE — REVIEW OF SYSTEMS
[Fever] : no fever [Chills] : no chills [Recent Change In Weight] : ~T no recent weight change [Pain] : no pain [Redness] : no redness [Dryness] : dryness [Vision Problems] : vision problems [Itching] : no itching [Nasal Discharge] : no nasal discharge [Sore Throat] : no sore throat [Chest Pain] : no chest pain [Palpitations] : no palpitations [Shortness Of Breath] : no shortness of breath [Cough] : no cough [Nausea] : no nausea [Vomiting] : no vomiting [Incontinence] : no incontinence [Frequency] : no frequency [Joint Pain] : joint pain [Joint Stiffness] : joint stiffness [Back Pain] : back pain [Joint Swelling] : no joint swelling [Skin Rash] : no skin rash [Headache] : no headache [Dizziness] : no dizziness [Suicidal] : not suicidal [Depression] : depression [Easy Bleeding] : no easy bleeding [Easy Bruising] : no easy bruising

## 2021-02-08 LAB — SARS-COV-2 N GENE NPH QL NAA+PROBE: NOT DETECTED

## 2021-02-22 ENCOUNTER — APPOINTMENT (OUTPATIENT)
Dept: FAMILY MEDICINE | Facility: HOSPITAL | Age: 61
End: 2021-02-22

## 2021-02-22 ENCOUNTER — APPOINTMENT (OUTPATIENT)
Dept: ORTHOPEDIC SURGERY | Facility: CLINIC | Age: 61
End: 2021-02-22
Payer: MEDICAID

## 2021-03-01 ENCOUNTER — APPOINTMENT (OUTPATIENT)
Dept: ORTHOPEDIC SURGERY | Facility: CLINIC | Age: 61
End: 2021-03-01
Payer: MEDICAID

## 2021-03-04 ENCOUNTER — APPOINTMENT (OUTPATIENT)
Dept: ORTHOPEDIC SURGERY | Facility: CLINIC | Age: 61
End: 2021-03-04
Payer: MEDICAID

## 2021-03-04 DIAGNOSIS — M21.829 OTHER SPECIFIED ACQUIRED DEFORMITIES OF UNSPECIFIED UPPER ARM: ICD-10-CM

## 2021-03-04 DIAGNOSIS — M54.12 RADICULOPATHY, CERVICAL REGION: ICD-10-CM

## 2021-03-04 PROCEDURE — 99214 OFFICE O/P EST MOD 30 MIN: CPT | Mod: 25

## 2021-03-04 PROCEDURE — 99072 ADDL SUPL MATRL&STAF TM PHE: CPT

## 2021-03-04 PROCEDURE — 20610 DRAIN/INJ JOINT/BURSA W/O US: CPT | Mod: RT

## 2021-03-04 NOTE — PHYSICAL EXAM
[de-identified] : Cervical Spine/Neck\par Inspection/Palpation :\par ¦ Inspection : alignment midline, normal degree of lordosis present\par ¦ Skin : normal appearance, no masses, no tenderness, trachea midline\par ¦ Palpation : right paraspinal and periscapular musculature is tender to palpation with palpable tightness. \par ¦ Tests and Signs : Spurling’s (+ mild to the right), Lhermitte’s (-) Sangita’s Reflex (-) \par ¦ Range of Motion : arc of motion full in all planes, no crepitus or pain with ROM\par ¦ Stability : no subluxations or other evidence of instability demonstrated during range of motion testing\par o Muscle Strength : paraspinal muscle strength within normal limits\par o Muscle Tone : paraspinal muscle tone within normal limits\par o Muscle Bulk : normal, no atrophy\par o Cervical Lymph Nodes : no lymphadenopathy present\par \par Right Upper Extremity\par o Shoulder :\par ¦ Inspection/Palpation : tenderness to palpation greater tuberosity,  no swelling, no deformities\par ¦ Range of Motion : ACTIVE FORWARD ELEVATION: Measured at 145 degrees, ACTIVE EXTERNAL ROTATION: Measured at 45 degrees, ACTIVE INTERNAL ROTATION: Measured at T12\par ¦ Strength : external rotation 5/5, internal rotation 5/5, supraspinatus 5/5\par ¦ Stability : no joint instability on provocative testing\par ¦ Tests/Signs : Neer (+), Bañuelos (+)\par o Upper Arm : no tenderness, no swelling, no deformities\par o Muscle Bulk : no atrophy\par o Sensation : sensation intact to light touch\par o Skin : no skin rash or discoloration\par o Vascular Exam : no edema, no cyanosis, radial and ulnar pulses normal\par \par Right Lower Extremity\par o Hip :\par ¦ Inspection/Palpation : no tenderness to palpation, no swelling, no deformity\par ¦ Range of Motion : full with pain on flexion and internal rotation\par ¦ Stability : joint stability intact\par ¦ Strength : hip flexion 5/5\par ¦ Tests and Signs : all tests for stability normal\par o Muscle Tone : tone normal\par o Muscle Bulk : normal muscle bulk present\par o Skin : no erythema, no ecchymosis\par o Sensation : sensation to light touch intact\par o Vascular Exam : diffuse lower extremity edema, no cyanosis, dorsalis pedis artery pulse 2+, posterior tibial artery pulse 2+\par o Special Tests: FADIR Test (+ groin pain) JACINDA Test (-)\par \par o Knee :\par ¦ Inspection/Palpation : gastrocnemius tenderness to palpation with moderate diffuse lower leg swelling no deformity\par ¦ Range of Motion : 0-120 degrees, no crepitus\par ¦ Stability : no valgus or varus instability present on provocative testing, Lachman’s Test (-)\par ¦ Strength : flexion and extension 5/5 with pain. \par \par Left Lower Extremity\par o Hip :\par ¦ Inspection/Palpation : no tenderness, no swelling, no deformity\par ¦ Range of Motion : full and painless in all planes, no crepitus\par ¦ Stability : joint stability intact\par ¦ Strength : hip flexion 5/5\par ¦ Tests and Signs : all tests for stability normal\par o Muscle Tone : tone normal\par o Muscle Bulk : normal muscle bulk present\par o Skin : no erythema, no ecchymosis\par o Sensation : sensation to light touch intact\par o Vascular Exam : no edema, no cyanosis, dorsalis pedis artery pulse 2+, posterior tibial artery pulse 2+\par

## 2021-03-04 NOTE — DISCUSSION/SUMMARY
[de-identified] : The underlying pathophysiology was reviewed in great detail with the patient as well as the various treatment options, including ice, analgesics, NSAIDs, Physical therapy, steroid injections.\par \par A prescription was provided for a Duplex US of the right lower extremity to rule out DVT. \par \par A prescription for Physical Therapy was provided for the cervical spine and right shoulder. \par \par Continue home exercise program. A home exercise sheet was given and discussed with the patient to follow.\par \par Activity modifications and restrictions were discussed. I advised avoiding overhead lifting. I advised the patient to work on good posture.\par \par FU once US results are obtained and/or in 6 weeks. \par \par All questions were answered, all alternatives discussed and the patient is in complete agreement with that plan. Follow-up appointment as instructed. Any issues and the patient will call or come in sooner.

## 2021-03-04 NOTE — PROCEDURE
[de-identified] : At this point I recommended a therapeutic injection and under sterile precautions an injection of 4 cc 1% lidocaine with 0.5 cc of Kenalog and 0.5 cc of Dexamethasone- was placed into the subacromial space of the Right shoulder without complication, and after several minutes, the patient felt significant relief.\par \par \par

## 2021-03-04 NOTE — HISTORY OF PRESENT ILLNESS
[de-identified] : LEIF GOODWIN is a 60 year old RHD male presenting to the office complaining of right shoulder pain.  Patient reports pain intermittently for years. Patient denies injury or trauma to the area. He has been under the care of  for his shoulder.  MRI of the right shoulder performed on 12/18/2020 at [facility] revealed Tiny low-grade tear at the distal infraspinatus footprint, which would likely be concealed at arthroscopy,  Tendinosis of the subscapularis tendon,  No full-thickness rotator cuff tear is seen, Small fluid in the subacromial/subdeltoid bursa, likely bursitis, The intra-articular long head of biceps tendon is not well visualized and may be torn. Evaluation is limited by motion artifact., Mild AC joint arthrosis. Small to moderate AC joint effusion,  Degeneration and fraying of the labrum and There is likely high-grade chondrosis in the glenoid, with small subchondral cystic change. He  has been performing a home exercise noting improvements in range of motion and strength but not in pain. \par The patient describes the pain as a dull aching, and occasionally sharp pain localized to the anterior aspect of his right shoulder that is intermittent in nature. His  symptoms are exacerbated with any movement of the shoulder. Patient reports the pain is waking him up at night.  Patient reports associated weakness. Denies numbness and tingling in the upper extremity.. Patient also reports right hip pain. Denies injury or trauma to the area. Patient reports increased pain with internal rotation and adduction of the hip. The pain is located in the groin region. Denies numbness and tingling. Patient notes increased swelling of his right lower leg assocaited with pain. He cannot attribute this swelling to any one action or activity.  Patient is taking NSAIDs for pain relief with mild relief in symptoms. Patient denies any other complaints at this time.

## 2021-03-22 ENCOUNTER — OUTPATIENT (OUTPATIENT)
Dept: OUTPATIENT SERVICES | Facility: HOSPITAL | Age: 61
LOS: 1 days | End: 2021-03-22
Payer: MEDICAID

## 2021-03-22 ENCOUNTER — APPOINTMENT (OUTPATIENT)
Dept: FAMILY MEDICINE | Facility: HOSPITAL | Age: 61
End: 2021-03-22

## 2021-03-22 DIAGNOSIS — Z00.00 ENCOUNTER FOR GENERAL ADULT MEDICAL EXAMINATION WITHOUT ABNORMAL FINDINGS: ICD-10-CM

## 2021-03-22 DIAGNOSIS — M19.019 PRIMARY OSTEOARTHRITIS, UNSPECIFIED SHOULDER: Chronic | ICD-10-CM

## 2021-03-22 PROCEDURE — U0005: CPT

## 2021-03-22 PROCEDURE — U0003: CPT

## 2021-03-22 PROCEDURE — G0463: CPT

## 2021-03-22 NOTE — PLAN
[FreeTextEntry1] : Given patient complaint of BRBPR for 1 week and dyspnea, he was advised to present to the ER immediately for evaluation. Discussed the risk of rapid progression of both GI bleeds and COVID-19. Patient refused, states he will go tomorrow. States he understands the risks of delaying evaluation, but is highly concerned about missing work.

## 2021-03-22 NOTE — HISTORY OF PRESENT ILLNESS
[Home] : at home, [unfilled] , at the time of the visit. [Medical Office: ([facility])___] : at the medical office located in  [Verbal consent obtained from patient] : the patient, [unfilled]

## 2021-03-23 DIAGNOSIS — Z20.822 CONTACT WITH AND (SUSPECTED) EXPOSURE TO COVID-19: ICD-10-CM

## 2021-03-24 ENCOUNTER — NON-APPOINTMENT (OUTPATIENT)
Age: 61
End: 2021-03-24

## 2021-03-24 LAB — SARS-COV-2 N GENE NPH QL NAA+PROBE: NOT DETECTED

## 2021-03-26 ENCOUNTER — INPATIENT (INPATIENT)
Facility: HOSPITAL | Age: 61
LOS: 8 days | Discharge: ANOTHER HOSPITAL | DRG: 628 | End: 2021-04-04
Attending: FAMILY MEDICINE | Admitting: INTERNAL MEDICINE
Payer: MEDICAID

## 2021-03-26 VITALS
RESPIRATION RATE: 17 BRPM | DIASTOLIC BLOOD PRESSURE: 85 MMHG | SYSTOLIC BLOOD PRESSURE: 117 MMHG | WEIGHT: 214.95 LBS | OXYGEN SATURATION: 95 % | TEMPERATURE: 97 F | HEIGHT: 71 IN | HEART RATE: 80 BPM

## 2021-03-26 DIAGNOSIS — E11.10 TYPE 2 DIABETES MELLITUS WITH KETOACIDOSIS WITHOUT COMA: ICD-10-CM

## 2021-03-26 DIAGNOSIS — M19.019 PRIMARY OSTEOARTHRITIS, UNSPECIFIED SHOULDER: Chronic | ICD-10-CM

## 2021-03-26 LAB
ACETONE SERPL-MCNC: ABNORMAL
ALBUMIN SERPL ELPH-MCNC: 3.1 G/DL — LOW (ref 3.3–5)
ALP SERPL-CCNC: 103 U/L — SIGNIFICANT CHANGE UP (ref 40–120)
ALT FLD-CCNC: 19 U/L — SIGNIFICANT CHANGE UP (ref 10–45)
ANION GAP SERPL CALC-SCNC: 10 MMOL/L — SIGNIFICANT CHANGE UP (ref 5–17)
ANION GAP SERPL CALC-SCNC: 18 MMOL/L — HIGH (ref 5–17)
APPEARANCE UR: CLEAR — SIGNIFICANT CHANGE UP
AST SERPL-CCNC: 17 U/L — SIGNIFICANT CHANGE UP (ref 10–40)
BASOPHILS # BLD AUTO: 0.06 K/UL — SIGNIFICANT CHANGE UP (ref 0–0.2)
BASOPHILS NFR BLD AUTO: 0.5 % — SIGNIFICANT CHANGE UP (ref 0–2)
BILIRUB SERPL-MCNC: 0.7 MG/DL — SIGNIFICANT CHANGE UP (ref 0.2–1.2)
BILIRUB UR-MCNC: NEGATIVE — SIGNIFICANT CHANGE UP
BUN SERPL-MCNC: 19 MG/DL — SIGNIFICANT CHANGE UP (ref 7–23)
BUN SERPL-MCNC: 26 MG/DL — HIGH (ref 7–23)
CALCIUM SERPL-MCNC: 8.8 MG/DL — SIGNIFICANT CHANGE UP (ref 8.4–10.5)
CALCIUM SERPL-MCNC: 9.6 MG/DL — SIGNIFICANT CHANGE UP (ref 8.4–10.5)
CHLORIDE SERPL-SCNC: 102 MMOL/L — SIGNIFICANT CHANGE UP (ref 96–108)
CHLORIDE SERPL-SCNC: 91 MMOL/L — LOW (ref 96–108)
CO2 BLDV-SCNC: 19 MMOL/L — LOW (ref 21–29)
CO2 SERPL-SCNC: 16 MMOL/L — LOW (ref 22–31)
CO2 SERPL-SCNC: 25 MMOL/L — SIGNIFICANT CHANGE UP (ref 22–31)
COLOR SPEC: YELLOW — SIGNIFICANT CHANGE UP
CREAT SERPL-MCNC: 1 MG/DL — SIGNIFICANT CHANGE UP (ref 0.5–1.3)
CREAT SERPL-MCNC: 1.31 MG/DL — HIGH (ref 0.5–1.3)
DIFF PNL FLD: NEGATIVE — SIGNIFICANT CHANGE UP
EOSINOPHIL # BLD AUTO: 0.09 K/UL — SIGNIFICANT CHANGE UP (ref 0–0.5)
EOSINOPHIL NFR BLD AUTO: 0.7 % — SIGNIFICANT CHANGE UP (ref 0–6)
GAS PNL BLDV: SIGNIFICANT CHANGE UP
GLUCOSE BLDC GLUCOMTR-MCNC: 199 MG/DL — HIGH (ref 70–99)
GLUCOSE BLDC GLUCOMTR-MCNC: 228 MG/DL — HIGH (ref 70–99)
GLUCOSE BLDC GLUCOMTR-MCNC: 270 MG/DL — HIGH (ref 70–99)
GLUCOSE BLDC GLUCOMTR-MCNC: 317 MG/DL — HIGH (ref 70–99)
GLUCOSE BLDC GLUCOMTR-MCNC: 350 MG/DL — HIGH (ref 70–99)
GLUCOSE BLDC GLUCOMTR-MCNC: 372 MG/DL — HIGH (ref 70–99)
GLUCOSE BLDC GLUCOMTR-MCNC: 419 MG/DL — HIGH (ref 70–99)
GLUCOSE BLDC GLUCOMTR-MCNC: 433 MG/DL — HIGH (ref 70–99)
GLUCOSE SERPL-MCNC: 268 MG/DL — HIGH (ref 70–99)
GLUCOSE SERPL-MCNC: 674 MG/DL — CRITICAL HIGH (ref 70–99)
GLUCOSE UR QL: 1000 MG/DL
HCT VFR BLD CALC: 38.6 % — LOW (ref 39–50)
HGB BLD-MCNC: 13.3 G/DL — SIGNIFICANT CHANGE UP (ref 13–17)
IMM GRANULOCYTES NFR BLD AUTO: 1.9 % — HIGH (ref 0–1.5)
KETONES UR-MCNC: ABNORMAL
LEUKOCYTE ESTERASE UR-ACNC: NEGATIVE — SIGNIFICANT CHANGE UP
LIDOCAIN IGE QN: 133 U/L — SIGNIFICANT CHANGE UP (ref 73–393)
LYMPHOCYTES # BLD AUTO: 1.45 K/UL — SIGNIFICANT CHANGE UP (ref 1–3.3)
LYMPHOCYTES # BLD AUTO: 11.5 % — LOW (ref 13–44)
MAGNESIUM SERPL-MCNC: 1.9 MG/DL — SIGNIFICANT CHANGE UP (ref 1.6–2.6)
MCHC RBC-ENTMCNC: 32.4 PG — SIGNIFICANT CHANGE UP (ref 27–34)
MCHC RBC-ENTMCNC: 34.5 GM/DL — SIGNIFICANT CHANGE UP (ref 32–36)
MCV RBC AUTO: 93.9 FL — SIGNIFICANT CHANGE UP (ref 80–100)
MONOCYTES # BLD AUTO: 0.83 K/UL — SIGNIFICANT CHANGE UP (ref 0–0.9)
MONOCYTES NFR BLD AUTO: 6.6 % — SIGNIFICANT CHANGE UP (ref 2–14)
NEUTROPHILS # BLD AUTO: 9.95 K/UL — HIGH (ref 1.8–7.4)
NEUTROPHILS NFR BLD AUTO: 78.8 % — HIGH (ref 43–77)
NITRITE UR-MCNC: NEGATIVE — SIGNIFICANT CHANGE UP
NRBC # BLD: 0 /100 WBCS — SIGNIFICANT CHANGE UP (ref 0–0)
OB PNL STL: NEGATIVE — SIGNIFICANT CHANGE UP
PCO2 BLDV: 34 MMHG — LOW (ref 42–55)
PH BLDV: 7.35 — SIGNIFICANT CHANGE UP (ref 7.35–7.45)
PH UR: 5 — SIGNIFICANT CHANGE UP (ref 5–8)
PHOSPHATE SERPL-MCNC: 4.1 MG/DL — SIGNIFICANT CHANGE UP (ref 2.5–4.5)
PLATELET # BLD AUTO: 280 K/UL — SIGNIFICANT CHANGE UP (ref 150–400)
PO2 BLDV: 34 MMHG — SIGNIFICANT CHANGE UP (ref 25–45)
POTASSIUM SERPL-MCNC: 2.9 MMOL/L — CRITICAL LOW (ref 3.5–5.3)
POTASSIUM SERPL-MCNC: 4.1 MMOL/L — SIGNIFICANT CHANGE UP (ref 3.5–5.3)
POTASSIUM SERPL-SCNC: 2.9 MMOL/L — CRITICAL LOW (ref 3.5–5.3)
POTASSIUM SERPL-SCNC: 4.1 MMOL/L — SIGNIFICANT CHANGE UP (ref 3.5–5.3)
PROT SERPL-MCNC: 7.8 G/DL — SIGNIFICANT CHANGE UP (ref 6–8.3)
PROT UR-MCNC: NEGATIVE — SIGNIFICANT CHANGE UP
RBC # BLD: 4.11 M/UL — LOW (ref 4.2–5.8)
RBC # FLD: 11.9 % — SIGNIFICANT CHANGE UP (ref 10.3–14.5)
SAO2 % BLDV: 62 % — LOW (ref 67–88)
SARS-COV-2 RNA SPEC QL NAA+PROBE: SIGNIFICANT CHANGE UP
SODIUM SERPL-SCNC: 125 MMOL/L — LOW (ref 135–145)
SODIUM SERPL-SCNC: 137 MMOL/L — SIGNIFICANT CHANGE UP (ref 135–145)
SP GR SPEC: 1.01 — SIGNIFICANT CHANGE UP (ref 1.01–1.02)
TROPONIN I SERPL-MCNC: <.017 NG/ML — LOW (ref 0.02–0.06)
UROBILINOGEN FLD QL: NEGATIVE — SIGNIFICANT CHANGE UP
WBC # BLD: 12.62 K/UL — HIGH (ref 3.8–10.5)
WBC # FLD AUTO: 12.62 K/UL — HIGH (ref 3.8–10.5)

## 2021-03-26 PROCEDURE — 99292 CRITICAL CARE ADDL 30 MIN: CPT

## 2021-03-26 PROCEDURE — G1004: CPT

## 2021-03-26 PROCEDURE — 71260 CT THORAX DX C+: CPT | Mod: 26,MG

## 2021-03-26 PROCEDURE — 74177 CT ABD & PELVIS W/CONTRAST: CPT | Mod: 26,ME

## 2021-03-26 PROCEDURE — 99222 1ST HOSP IP/OBS MODERATE 55: CPT

## 2021-03-26 PROCEDURE — 93010 ELECTROCARDIOGRAM REPORT: CPT

## 2021-03-26 PROCEDURE — 99291 CRITICAL CARE FIRST HOUR: CPT

## 2021-03-26 PROCEDURE — 70450 CT HEAD/BRAIN W/O DYE: CPT | Mod: 26,MF

## 2021-03-26 PROCEDURE — 93971 EXTREMITY STUDY: CPT | Mod: 26,RT

## 2021-03-26 PROCEDURE — 71045 X-RAY EXAM CHEST 1 VIEW: CPT | Mod: 26

## 2021-03-26 RX ORDER — NIFEDIPINE 30 MG
10 TABLET, EXTENDED RELEASE 24 HR ORAL ONCE
Refills: 0 | Status: DISCONTINUED | OUTPATIENT
Start: 2021-03-26 | End: 2021-03-26

## 2021-03-26 RX ORDER — FAMOTIDINE 10 MG/ML
20 INJECTION INTRAVENOUS
Refills: 0 | Status: DISCONTINUED | OUTPATIENT
Start: 2021-03-26 | End: 2021-03-29

## 2021-03-26 RX ORDER — DEXTROSE 50 % IN WATER 50 %
12.5 SYRINGE (ML) INTRAVENOUS ONCE
Refills: 0 | Status: DISCONTINUED | OUTPATIENT
Start: 2021-03-26 | End: 2021-04-01

## 2021-03-26 RX ORDER — CEFTRIAXONE 500 MG/1
1000 INJECTION, POWDER, FOR SOLUTION INTRAMUSCULAR; INTRAVENOUS ONCE
Refills: 0 | Status: COMPLETED | OUTPATIENT
Start: 2021-03-26 | End: 2021-03-26

## 2021-03-26 RX ORDER — POTASSIUM CHLORIDE 20 MEQ
10 PACKET (EA) ORAL
Refills: 0 | Status: COMPLETED | OUTPATIENT
Start: 2021-03-26 | End: 2021-03-26

## 2021-03-26 RX ORDER — SODIUM CHLORIDE 9 MG/ML
1000 INJECTION, SOLUTION INTRAVENOUS
Refills: 0 | Status: DISCONTINUED | OUTPATIENT
Start: 2021-03-26 | End: 2021-04-01

## 2021-03-26 RX ORDER — ASPIRIN/CALCIUM CARB/MAGNESIUM 324 MG
81 TABLET ORAL DAILY
Refills: 0 | Status: DISCONTINUED | OUTPATIENT
Start: 2021-03-26 | End: 2021-04-01

## 2021-03-26 RX ORDER — INSULIN GLARGINE 100 [IU]/ML
20 INJECTION, SOLUTION SUBCUTANEOUS AT BEDTIME
Refills: 0 | Status: DISCONTINUED | OUTPATIENT
Start: 2021-03-26 | End: 2021-03-27

## 2021-03-26 RX ORDER — ACETAMINOPHEN 500 MG
650 TABLET ORAL EVERY 6 HOURS
Refills: 0 | Status: DISCONTINUED | OUTPATIENT
Start: 2021-03-26 | End: 2021-03-31

## 2021-03-26 RX ORDER — ATORVASTATIN CALCIUM 80 MG/1
40 TABLET, FILM COATED ORAL AT BEDTIME
Refills: 0 | Status: DISCONTINUED | OUTPATIENT
Start: 2021-03-26 | End: 2021-04-01

## 2021-03-26 RX ORDER — DEXTROSE 50 % IN WATER 50 %
25 SYRINGE (ML) INTRAVENOUS ONCE
Refills: 0 | Status: DISCONTINUED | OUTPATIENT
Start: 2021-03-26 | End: 2021-04-01

## 2021-03-26 RX ORDER — DEXTROSE 50 % IN WATER 50 %
15 SYRINGE (ML) INTRAVENOUS ONCE
Refills: 0 | Status: DISCONTINUED | OUTPATIENT
Start: 2021-03-26 | End: 2021-04-01

## 2021-03-26 RX ORDER — TRAZODONE HCL 50 MG
150 TABLET ORAL AT BEDTIME
Refills: 0 | Status: DISCONTINUED | OUTPATIENT
Start: 2021-03-26 | End: 2021-04-01

## 2021-03-26 RX ORDER — INSULIN LISPRO 100/ML
VIAL (ML) SUBCUTANEOUS
Refills: 0 | Status: DISCONTINUED | OUTPATIENT
Start: 2021-03-26 | End: 2021-03-27

## 2021-03-26 RX ORDER — CEFTRIAXONE 500 MG/1
1000 INJECTION, POWDER, FOR SOLUTION INTRAMUSCULAR; INTRAVENOUS EVERY 24 HOURS
Refills: 0 | Status: COMPLETED | OUTPATIENT
Start: 2021-03-26 | End: 2021-03-30

## 2021-03-26 RX ORDER — INSULIN HUMAN 100 [IU]/ML
6 INJECTION, SOLUTION SUBCUTANEOUS
Qty: 100 | Refills: 0 | Status: DISCONTINUED | OUTPATIENT
Start: 2021-03-26 | End: 2021-03-26

## 2021-03-26 RX ORDER — HEPARIN SODIUM 5000 [USP'U]/ML
7500 INJECTION INTRAVENOUS; SUBCUTANEOUS EVERY 8 HOURS
Refills: 0 | Status: ACTIVE | OUTPATIENT
Start: 2021-03-26 | End: 2022-02-22

## 2021-03-26 RX ORDER — CHLORHEXIDINE GLUCONATE 213 G/1000ML
1 SOLUTION TOPICAL
Refills: 0 | Status: DISCONTINUED | OUTPATIENT
Start: 2021-03-26 | End: 2021-03-29

## 2021-03-26 RX ORDER — SODIUM CHLORIDE 9 MG/ML
1000 INJECTION INTRAMUSCULAR; INTRAVENOUS; SUBCUTANEOUS ONCE
Refills: 0 | Status: COMPLETED | OUTPATIENT
Start: 2021-03-26 | End: 2021-03-26

## 2021-03-26 RX ORDER — GLUCAGON INJECTION, SOLUTION 0.5 MG/.1ML
1 INJECTION, SOLUTION SUBCUTANEOUS ONCE
Refills: 0 | Status: DISCONTINUED | OUTPATIENT
Start: 2021-03-26 | End: 2021-04-01

## 2021-03-26 RX ORDER — SODIUM CHLORIDE 9 MG/ML
1000 INJECTION, SOLUTION INTRAVENOUS
Refills: 0 | Status: DISCONTINUED | OUTPATIENT
Start: 2021-03-26 | End: 2021-03-30

## 2021-03-26 RX ORDER — INSULIN HUMAN 100 [IU]/ML
8 INJECTION, SOLUTION SUBCUTANEOUS ONCE
Refills: 0 | Status: COMPLETED | OUTPATIENT
Start: 2021-03-26 | End: 2021-03-26

## 2021-03-26 RX ORDER — SERTRALINE 25 MG/1
150 TABLET, FILM COATED ORAL DAILY
Refills: 0 | Status: DISCONTINUED | OUTPATIENT
Start: 2021-03-26 | End: 2021-04-01

## 2021-03-26 RX ORDER — AZITHROMYCIN 500 MG/1
500 TABLET, FILM COATED ORAL EVERY 24 HOURS
Refills: 0 | Status: DISCONTINUED | OUTPATIENT
Start: 2021-03-26 | End: 2021-03-29

## 2021-03-26 RX ORDER — SODIUM CHLORIDE 9 MG/ML
1000 INJECTION, SOLUTION INTRAVENOUS ONCE
Refills: 0 | Status: COMPLETED | OUTPATIENT
Start: 2021-03-26 | End: 2021-03-26

## 2021-03-26 RX ORDER — POTASSIUM CHLORIDE 20 MEQ
40 PACKET (EA) ORAL EVERY 4 HOURS
Refills: 0 | Status: COMPLETED | OUTPATIENT
Start: 2021-03-26 | End: 2021-03-26

## 2021-03-26 RX ORDER — AZITHROMYCIN 500 MG/1
500 TABLET, FILM COATED ORAL ONCE
Refills: 0 | Status: COMPLETED | OUTPATIENT
Start: 2021-03-26 | End: 2021-03-26

## 2021-03-26 RX ADMIN — AZITHROMYCIN 255 MILLIGRAM(S): 500 TABLET, FILM COATED ORAL at 23:45

## 2021-03-26 RX ADMIN — INSULIN GLARGINE 20 UNIT(S): 100 INJECTION, SOLUTION SUBCUTANEOUS at 19:43

## 2021-03-26 RX ADMIN — CEFTRIAXONE 100 MILLIGRAM(S): 500 INJECTION, POWDER, FOR SOLUTION INTRAMUSCULAR; INTRAVENOUS at 18:53

## 2021-03-26 RX ADMIN — FAMOTIDINE 20 MILLIGRAM(S): 10 INJECTION INTRAVENOUS at 19:43

## 2021-03-26 RX ADMIN — SODIUM CHLORIDE 500 MILLILITER(S): 9 INJECTION, SOLUTION INTRAVENOUS at 19:52

## 2021-03-26 RX ADMIN — SODIUM CHLORIDE 100 MILLILITER(S): 9 INJECTION, SOLUTION INTRAVENOUS at 21:30

## 2021-03-26 RX ADMIN — SODIUM CHLORIDE 1000 MILLILITER(S): 9 INJECTION INTRAMUSCULAR; INTRAVENOUS; SUBCUTANEOUS at 13:57

## 2021-03-26 RX ADMIN — Medication 40 MILLIEQUIVALENT(S): at 19:43

## 2021-03-26 RX ADMIN — Medication 100 MILLIEQUIVALENT(S): at 20:45

## 2021-03-26 RX ADMIN — INSULIN HUMAN 8 UNIT(S): 100 INJECTION, SOLUTION SUBCUTANEOUS at 14:46

## 2021-03-26 RX ADMIN — INSULIN HUMAN 6 UNIT(S)/HR: 100 INJECTION, SOLUTION SUBCUTANEOUS at 17:46

## 2021-03-26 RX ADMIN — Medication 650 MILLIGRAM(S): at 22:00

## 2021-03-26 RX ADMIN — Medication 40 MILLIEQUIVALENT(S): at 22:52

## 2021-03-26 RX ADMIN — INSULIN HUMAN 6 UNIT(S)/HR: 100 INJECTION, SOLUTION SUBCUTANEOUS at 15:28

## 2021-03-26 RX ADMIN — SODIUM CHLORIDE 1000 MILLILITER(S): 9 INJECTION INTRAMUSCULAR; INTRAVENOUS; SUBCUTANEOUS at 14:35

## 2021-03-26 RX ADMIN — HEPARIN SODIUM 7500 UNIT(S): 5000 INJECTION INTRAVENOUS; SUBCUTANEOUS at 21:10

## 2021-03-26 RX ADMIN — CEFTRIAXONE 100 MILLIGRAM(S): 500 INJECTION, POWDER, FOR SOLUTION INTRAMUSCULAR; INTRAVENOUS at 15:22

## 2021-03-26 RX ADMIN — Medication 1: at 21:48

## 2021-03-26 RX ADMIN — SODIUM CHLORIDE 1000 MILLILITER(S): 9 INJECTION INTRAMUSCULAR; INTRAVENOUS; SUBCUTANEOUS at 13:20

## 2021-03-26 RX ADMIN — Medication 100 MILLIEQUIVALENT(S): at 21:58

## 2021-03-26 RX ADMIN — Medication 100 MILLIEQUIVALENT(S): at 19:37

## 2021-03-26 RX ADMIN — SODIUM CHLORIDE 1000 MILLILITER(S): 9 INJECTION INTRAMUSCULAR; INTRAVENOUS; SUBCUTANEOUS at 14:00

## 2021-03-26 RX ADMIN — ATORVASTATIN CALCIUM 40 MILLIGRAM(S): 80 TABLET, FILM COATED ORAL at 21:10

## 2021-03-26 RX ADMIN — AZITHROMYCIN 255 MILLIGRAM(S): 500 TABLET, FILM COATED ORAL at 15:28

## 2021-03-26 RX ADMIN — Medication 650 MILLIGRAM(S): at 21:10

## 2021-03-26 RX ADMIN — Medication 150 MILLIGRAM(S): at 22:54

## 2021-03-26 NOTE — ED ADULT NURSE NOTE - CADM POA CENTRAL LINE
[place] [address]
Phone:  [phone] 2 D/M-MODE ECHOCARDIOGRAM     
_______________________________________________________________________________
 
Name:         CHRIS RIVERA            Room:          [address] [place] 
[facility]#:    [id number]     Account #:     [id number]  
Admission:    18    Attend Phys:   Kayla Dias, 
Discharge:                Date of Birth: [birth year]  
Date of Service: 18 1420  Report #:      [id number]
        [id number]
_______________________________________________________________________________
THIS REPORT FOR:  //name//                      
 
 
--------------- APPROVED REPORT --------------
 
 
Study performed:  2018 11:08:43
 
EXAM: Comprehensive 2D, Doppler, and color-flow 
Echocardiogram 
Patient Location: In-Patient   
Room #:  220     Status:  routine
 
      BSA:         1.81
HR: 56 bpm BP:          103/55 mmHg 
Rhythm: NSR     
 
Other Information 
Study Quality: Good
 
Indications
Chest Pain
Dizziness, hx of stroke
 
2D Dimensions
   LVEF(%):  51.21 (&gt;50%)
IVSd:  8.86 (7-11mm) LVOT Diam:  22.87 (18-24mm) 
LVDd:  48.89 mm  
PWd:  7.83 (7-11mm) Ascending Ao:  27.47 (22-36mm)
LVDs:  36.09 (25-40mm) 
Aortic Root:  26.80 mm 
   Espinoza's LVEF:  51.21 %
 
Volumes
Left Atrial Volume (Systole) 
    LA ESV Index:  29.00 mL/m2
 
Aortic Valve
AoV Peak Vasquez.:  1.11 m/s 
AO Peak Gr.:  4.95 mmHg  LVOT Max PG:  3.45 mmHg
AO Mean Gr.:  2.71 mmHg  LVOT Mean P.64 mmHg
    LVOT Max V:  0.93 m/s
AO V2 VTI:  23.12 cm  LVOT Mean V:  0.59 m/s
JAMEEL (VTI):  3.38 cm2  LVOT V1 VTI:  19.01 cm
 
Mitral Valve
 
 
[place] [address]
Phone:  [phone]                     2 D/M-MODE ECHOCARDIOGRAM     
_______________________________________________________________________________
 
Name:         CHRIS RIVERA            Room:          [address] [place] 
[facility]#:    [id number]     Account #:     [id number]  
Admission:    18    Attend Phys:   Kayla Dias, 
Discharge:                Date of Birth: [birth year]  
Date of Service: 18 1420  Report #:      [id number]
        [id number]
_______________________________________________________________________________
    E/A Ratio:  2.06
    MV Decel. Time:  155.40 ms
MV E Max Vasquez.:  0.81 m/s 
MV PHT:  45.06 ms  
MVA (PHT):  4.88 cm2  
 
TDI
E/Lateral E':  3.38 E/Medial E':  5.06
   Medial E' Vasquez.:  0.16 m/s
   Lateral E' Vasquez.:  0.24 m/s
 
Pulmonary Valve
PV Peak Vasquez.:  0.73 m/s PV Peak Gr.:  2.15 mmHg
 
Tricuspid Valve
TR Peak Gr.:  16.07 mmHg RVSP:  21.00 mmHg
 
Left Ventricle
The left ventricle is normal size. There is normal LV segmental wall 
motion. There is normal left ventricular wall thickness. Left 
ventricular systolic function is normal. The left ventricular 
ejection fraction is within the normal range. No left ventricle 
thrombus noted on this study. LVEF is 55-60%. The left ventricular 
diastolic function is normal.
 
Right Ventricle
The right ventricle is normal size. The right ventricular systolic 
function is normal.
 
Atria
The left atrium size is normal. Interatrial septum not well 
visualized.Grossly normal. Negative color flow, bubble study not 
performed The right atrium size is normal.
 
Aortic Valve
The aortic valve is normal in structure. No aortic regurgitation is 
present. There is no aortic valvular stenosis.
 
Mitral Valve
The mitral valve is normal in structure. There is no mitral valve 
regurgitation noted. No evidence of mitral valve stenosis.
 
Tricuspid Valve
The tricuspid valve is normal in structure. Trace tricuspid 
regurgitation. The RVSP is ___21____ mmHg.
 
 
 
[place] [address]
Phone:  [phone]                     2 D/M-MODE ECHOCARDIOGRAM     
_______________________________________________________________________________
 
Name:         CHRIS RIVERA            Room:          [address] [place] 
.#:    [id number]     Account #:     [id number]  
Admission:    18    Attend Phys:   Kayla Dias, 
Discharge:                Date of Birth: [birth year]  
Date of Service: 18 1420  Report #:      [id number]
        [id number]
_______________________________________________________________________________
Pulmonic Valve
The pulmonary valve is normal in structure. Trace pulmonic 
regurgitation.
 
Great Vessels
The aortic root is normal in size. IVC is normal in size and 
collapses with &gt;50% inspiration
 
Pericardium
There is no pericardial effusion.
 
&lt;Conclusion&gt;
LVEF is 55-60%.
There is normal LV segmental wall motion.
Interatrial septum not well visualized.Grossly normal. Negative color 
flow, bubble study not performed
Normal valvular appearance and function
 
 
 
 
 
 
 
 
 
 
 
 
 
 
 
 
 
 
 
 
 
 
 
 
 
 
 
  <ELECTRONICALLY SIGNED>
                                           By: Salomón Sapp MD, FACC   
  18     1420
D: 18 1420   _____________________________________
T: 18 1420   Salomón Sapp MD, FACC     /INF No

## 2021-03-26 NOTE — H&P ADULT - NSHPPHYSICALEXAM_GEN_ALL_CORE
Vital Signs Last 24 Hrs  T(C): 37.2 (26 Mar 2021 17:10), Max: 37.2 (26 Mar 2021 17:10)  T(F): 99 (26 Mar 2021 17:10), Max: 99 (26 Mar 2021 17:10)  HR: 71 (26 Mar 2021 17:10) (71 - 90)  BP: 92/57 (26 Mar 2021 17:10) (92/57 - 117/85)  BP(mean): --  RR: 16 (26 Mar 2021 17:10) (16 - 17)  SpO2: 97% (26 Mar 2021 17:10) (95% - 97%)    Physical Examination:  GENERAL:               Alert, Oriented, No acute distress.    HEENT:                    Pupils equal, reactive to light.  Symmetric. No JVD, Moist MM  PULM:                     Bilateral air entry, Clear to auscultation bilaterally, no significant sputum production, No Rales, No Rhonchi, No Wheezing  CVS:                         S1, S2,  No Murmur  ABD:                        Soft, nondistended, nontender, normoactive bowel sounds,   EXT:                         No edema, nontender, No Cyanosis or Clubbing   Vascular:                Warm Extremities, Normal Capillary refill, Normal Distal Pulses  SKIN:                       Warm and well perfused, no rashes noted.   NEURO:                  Alert, oriented, interactive, nonfocal, follows commands  PSYC:                      Calm, + Insight.

## 2021-03-26 NOTE — CONSULT NOTE ADULT - SUBJECTIVE AND OBJECTIVE BOX
This 60 year old man developed generalized abdominal pain and nausea two days ago. In the ED, a CT scan demonstrated a mass in the RLL and air in the GB and intrahepatic biliary system. The extrahepatic ducts were dilated.      PAST MEDICAL & SURGICAL HISTORY:  HTN  HLD  "pre-DM"    PFSH: All noncontributory    MEDICATIONS REVIEWED:acetaminophen   Tablet .. 650 milliGRAM(s) Oral every 6 hours PRN  aspirin  chewable 81 milliGRAM(s) Oral daily  atorvastatin 40 milliGRAM(s) Oral at bedtime  azithromycin  IVPB 500 milliGRAM(s) IV Intermittent every 24 hours  cefTRIAXone   IVPB 1000 milliGRAM(s) IV Intermittent every 24 hours  chlorhexidine 4% Liquid 1 Application(s) Topical <User Schedule>  dextrose 40% Gel 15 Gram(s) Oral once  dextrose 5%. 1000 milliLiter(s) IV Continuous <Continuous>  dextrose 5%. 1000 milliLiter(s) IV Continuous <Continuous>  dextrose 50% Injectable 25 Gram(s) IV Push once  dextrose 50% Injectable 12.5 Gram(s) IV Push once  dextrose 50% Injectable 25 Gram(s) IV Push once  dextrose 50% Injectable 25 Gram(s) IV Push once  dextrose 50% Injectable 12.5 Gram(s) IV Push once  dextrose 50% Injectable 25 Gram(s) IV Push once  famotidine    Tablet 20 milliGRAM(s) Oral two times a day  glucagon  Injectable 1 milliGRAM(s) IntraMuscular once  glucagon  Injectable 1 milliGRAM(s) IntraMuscular once  heparin   Injectable 7500 Unit(s) SubCutaneous every 8 hours  insulin glargine Injectable (LANTUS) 20 Unit(s) SubCutaneous at bedtime  insulin lispro (ADMELOG) corrective regimen sliding scale   SubCutaneous Before meals and at bedtime  insulin regular Infusion 6 Unit(s)/Hr IV Continuous <Continuous>  lactated ringers. 1000 milliLiter(s) IV Continuous <Continuous>  NIFEdipine IR 10 milliGRAM(s) Oral once  potassium chloride    Tablet ER 40 milliEquivalent(s) Oral every 4 hours  potassium chloride  10 mEq/100 mL IVPB 10 milliEquivalent(s) IV Intermittent every 1 hour  sertraline 150 milliGRAM(s) Oral daily  traZODone 150 milliGRAM(s) Oral at bedtime      ALLERGIES:No Known Drug Allergies  shellfish (Unknown)      PHYSICAL EXAMINATION:  RESPIRATORY: Clear to auscultation bilaterally, respirations non-labored.  CARDIAC: RR, normal S1S2, no murmurs.  ABDOMEN: soft, BS present, no masses, no hernias, no peritoneal signs, no KLS, nontender.  VASCULAR: Equal and normal pulses.  MUSCULOSKELETAL: Full ROM, no deformities.      T(C): 37 (03-26-21 @ 20:00), Max: 37.2 (03-26-21 @ 17:10)  HR: 63 (03-26-21 @ 21:00) (61 - 90)  BP: 117/73 (03-26-21 @ 21:00) (79/62 - 117/85)  RR: 11 (03-26-21 @ 21:00) (11 - 19)  SpO2: 93% (03-26-21 @ 21:00) (91% - 97%)                          13.3   12.62 )-----------( 280      ( 26 Mar 2021 13:30 )             38.6       03-26    137  |  102  |  19  ----------------------------<  268<H>  2.9<LL>   |  25  |  1.00    Ca    8.8      26 Mar 2021 18:46  Phos  4.1     03-26  Mg     1.9     03-26    TPro  7.8  /  Alb  3.1<L>  /  TBili  0.7  /  DBili  x   /  AST  17  /  ALT  19  /  AlkPhos  103  03-26

## 2021-03-26 NOTE — PROGRESS NOTE ADULT - SUBJECTIVE AND OBJECTIVE BOX
Patient is a 60y old  Male who presents with a chief complaint of DKA (26 Mar 2021 19:36)      BRIEF HOSPITAL COURSE:     60M w/ type 2 DM, admitted with mild DKA    Events last 24 hours:   -patient's repeat labs [facility] show closure of anion GAP ( 18-->10)  -K+ 2.9  -will begin transition form insulin drip to lantus and ISS  -also, incidental finding of billiary stone, non obstructing which surgery is aware of  -Right lung infiltrate as well, being Tx for PNA as he is Active smoker.     PAST MEDICAL & SURGICAL HISTORY:  Benign hypertension    Type 2 diabetes mellitus without complication, without long-term current use of insulin    Shoulder arthritis  Multiple surgeries on left shoulder        Review of Systems:  CONSTITUTIONAL: No fever, chills, or fatigue  EYES: No eye pain, visual disturbances, or discharge  ENMT:  No difficulty hearing, tinnitus, vertigo; No sinus or throat pain  NECK: No pain or stiffness  RESPIRATORY: No cough, wheezing, chills or hemoptysis; No shortness of breath  CARDIOVASCULAR: No chest pain, palpitations, dizziness, or leg swelling  GASTROINTESTINAL: No abdominal or epigastric pain. No nausea, vomiting, or hematemesis; No diarrhea or constipation. No melena or hematochezia.  GENITOURINARY: No dysuria, frequency, hematuria, or incontinence  NEUROLOGICAL: No headaches, memory loss, loss of strength, numbness, or tremors  SKIN: No itching, burning, rashes, or lesions   MUSCULOSKELETAL: No joint pain or swelling; No muscle, back, or extremity pain  PSYCHIATRIC: No depression, anxiety, mood swings, or difficulty sleeping      Medications:  azithromycin  IVPB 500 milliGRAM(s) IV Intermittent every 24 hours  cefTRIAXone   IVPB 1000 milliGRAM(s) IV Intermittent every 24 hours    NIFEdipine IR 10 milliGRAM(s) Oral once      acetaminophen   Tablet .. 650 milliGRAM(s) Oral every 6 hours PRN  sertraline 150 milliGRAM(s) Oral daily  traZODone 150 milliGRAM(s) Oral at bedtime      aspirin  chewable 81 milliGRAM(s) Oral daily  heparin   Injectable 7500 Unit(s) SubCutaneous every 8 hours    famotidine    Tablet 20 milliGRAM(s) Oral two times a day      atorvastatin 40 milliGRAM(s) Oral at bedtime  dextrose 40% Gel 15 Gram(s) Oral once  dextrose 50% Injectable 25 Gram(s) IV Push once  dextrose 50% Injectable 12.5 Gram(s) IV Push once  dextrose 50% Injectable 25 Gram(s) IV Push once  dextrose 50% Injectable 25 Gram(s) IV Push once  dextrose 50% Injectable 12.5 Gram(s) IV Push once  dextrose 50% Injectable 25 Gram(s) IV Push once  glucagon  Injectable 1 milliGRAM(s) IntraMuscular once  glucagon  Injectable 1 milliGRAM(s) IntraMuscular once  insulin glargine Injectable (LANTUS) 20 Unit(s) SubCutaneous at bedtime  insulin lispro (ADMELOG) corrective regimen sliding scale   SubCutaneous Before meals and at bedtime  insulin regular Infusion 6 Unit(s)/Hr IV Continuous <Continuous>    dextrose 5%. 1000 milliLiter(s) IV Continuous <Continuous>  dextrose 5%. 1000 milliLiter(s) IV Continuous <Continuous>  lactated ringers Bolus 1000 milliLiter(s) IV Bolus once  lactated ringers. 1000 milliLiter(s) IV Continuous <Continuous>  potassium chloride    Tablet ER 40 milliEquivalent(s) Oral every 4 hours  potassium chloride  10 mEq/100 mL IVPB 10 milliEquivalent(s) IV Intermittent every 1 hour      chlorhexidine 4% Liquid 1 Application(s) Topical <User Schedule>            ICU Vital Signs Last 24 Hrs  T(C): 36.4 (26 Mar 2021 18:10), Max: 37.2 (26 Mar 2021 17:10)  T(F): 97.6 (26 Mar 2021 18:10), Max: 99 (26 Mar 2021 17:10)  HR: 80 (26 Mar 2021 19:00) (61 - 90)  BP: 110/66 (26 Mar 2021 19:00) (79/62 - 117/85)  BP(mean): 79 (26 Mar 2021 19:00) (68 - 79)  RR: 12 (26 Mar 2021 19:00) (12 - 19)  SpO2: 91% (26 Mar 2021 19:00) (91% - 97%)          I&O's Detail    26 Mar 2021 07:01  -  26 Mar 2021 19:46  --------------------------------------------------------  IN:    Insulin: 10.2 mL    IV PiggyBack: 50 mL    Sodium Chloride 0.9% Bolus: 2000 mL  Total IN: 0.2 mL    OUT:  Total OUT: 0 mL    Total NET: 0.2 mL            LABS:                        13.3   12.62 )-----------( 280      ( 26 Mar 2021 13:30 )             38.6     -    137  |  102  |  19  ----------------------------<  268<H>  2.9<LL>   |  25  |  1.00    Ca    8.8      26 Mar 2021 18:46  Phos  4.1       Mg     1.9         TPro  7.8  /  Alb  3.1<L>  /  TBili  0.7  /  DBili  x   /  AST  17  /  ALT  19  /  AlkPhos  103        CARDIAC MARKERS ( 26 Mar 2021 13:30 )  <.017 ng/mL / x     / x     / x     / x          CAPILLARY BLOOD GLUCOSE      POCT Blood Glucose.: 228 mg/dL (26 Mar 2021 19:31)      Urinalysis Basic - ( 26 Mar 2021 13:30 )    Color: Yellow / Appearance: Clear / S.010 / pH: x  Gluc: x / Ketone: Large  / Bili: Negative / Urobili: Negative   Blood: x / Protein: Negative / Nitrite: Negative   Leuk Esterase: Negative / RBC: x / WBC x   Sq Epi: x / Non Sq Epi: x / Bacteria: x      CULTURES:      Physical Examination:    General: No acute distress.  Alert, oriented, interactive, nonfocal    HEENT: Pupils equal, reactive to light.  Symmetric.    PULM: Clear to auscultation bilaterally, no significant sputum production    CVS: Regular rate and rhythm, no murmurs, rubs, or gallops    ABD: Soft, nondistended, nontender, normoactive bowel sounds, no masses    EXT: No edema, nontender    SKIN: Warm and well perfused, no rashes noted.        CRITICAL CARE TIME SPENT: 35 minutes of critical care time spent providing medical care for patient's acute illness/conditions that impairs at least one vital organ system and/or poses a high risk of imminent or life threatening deterioration in the patient's condition. It includes time spent evaluating and treating the patient's acute illness as well as time spent reviewing labs, radiology, discussing goals of care with patient and/or patient's family, and discussing the case with a multidisciplinary team, including the eICU, in an effort to prevent further life threatening deterioration or end organ damage. This time is independent of any procedures performed.

## 2021-03-26 NOTE — ED PROVIDER NOTE - CARE PLAN
Principal Discharge DX:	DKA (diabetic ketoacidoses)  Secondary Diagnosis:	Opacity of lung on imaging study

## 2021-03-26 NOTE — CONSULT NOTE ADULT - ASSESSMENT
IMPRESSION: Air in biliary tract                       As the patient no longer has any abdominal pain, the LFT's are normal, and the abdomen is soft and nontender, I do not believe that there is any surgical problem in the liver/biliary tract, at this time.  However, the patient needs to be closely monitored for any signs or symptoms of cholangitis.    PLAN: Repeat LFT's in am

## 2021-03-26 NOTE — ED ADULT NURSE NOTE - NSIMPLEMENTINTERV_GEN_ALL_ED
Implemented All Fall Risk Interventions:  [place] to call system. Call bell, personal items and telephone within reach. Instruct patient to call for assistance. Room bathroom lighting operational. Non-slip footwear when patient is off stretcher. Physically safe environment: no spills, clutter or unnecessary equipment. Stretcher in lowest position, wheels locked, appropriate side rails in place. Provide visual cue, wrist band, yellow gown, etc. Monitor gait and stability. Monitor for mental status changes and reorient to person, place, and time. Review medications for side effects contributing to fall risk. Reinforce activity limits and safety measures with patient and family.

## 2021-03-26 NOTE — H&P ADULT - HISTORY OF PRESENT ILLNESS
61 yo M with PMHx of HTN, HLD, Pre-DM presents for eval of weakness, N/V, abd pain, chest pain. Patient reports several days of symptoms. He states he does not take any medications for DM. Patient denies trauma or inciting event. Patient also reports constipation with blood on toilet paper after wiping.     ED workup significant for Glucose 674, AG 18, BUN/Cr 26/1.31, Acetone moderate. CT Chest with mass like structure to RLL and CT Abd/Pel with CBD dilation and air in biliary tree. ED interventions include 2L IVF Bolus, IVP insulin,

## 2021-03-26 NOTE — ED PROVIDER NOTE - CLINICAL SUMMARY MEDICAL DECISION MAKING FREE TEXT BOX
61 yo male with h/o HTN, HLD, depression presents to the ED with multiple complaints. Patient c/o chest pain, cough, abdominal pain, nausea, vomiting, decreased appetite, weakness, falls x several days. Associated with weight loss of approx 25 months in last month. Patient also states when he has BM, hes noticed blood on toilet paper x several days. Admits to straining to have BMs. Patient states he was suppose to get an outpatient vascular duplex for right LE swelling (however never had imaging.) Last head injury was 2 weeks ago. Denies fever, chills, diarrhea, urinary sxs, flank pain. PE: as above. A/P: ekg, labs, cxr, ct head, chest, abd/pelvis, ua, fluids, reassess 59 yo male with h/o HTN, HLD, depression presents to the ED with multiple complaints. Patient c/o chest pain, cough, abdominal pain, nausea, vomiting, decreased appetite, weakness, falls x several days. Associated with weight loss of approx 25 months in last month. Patient also states when he has BM, hes noticed blood on toilet paper x several days. Admits to straining to have BMs. Patient states he was suppose to get an outpatient vascular duplex for right LE swelling (however never had imaging.) Last head injury was 2 weeks ago. Denies fever, chills, diarrhea, urinary sxs, flank pain. PE: as above. A/P: ekg, labs, cxr, ct head, chest, abd/pelvis, ua, fluids, reassess    Pt found to be in DKA. Infiltrate in lung noted. Given myriad of complaints, pan scan to consider malignancy. D/W ICU for admission for DKA.

## 2021-03-26 NOTE — H&P ADULT - NSICDXPASTMEDICALHX_GEN_ALL_CORE_FT
PAST MEDICAL HISTORY:  Benign hypertension     Type 2 diabetes mellitus without complication, without long-term current use of insulin

## 2021-03-26 NOTE — ED PROVIDER NOTE - OBJECTIVE STATEMENT
59 yo male with h/o HTN, HLD, depression presents to the ED with multiple complaints. Patient c/o chest pain, cough, abdominal pain, nausea, vomiting, decreased appetite, weakness, falls x several days. Associated with weight loss of approx 25 months in last month. Patient also states when he has BM, hes noticed blood on toilet paper x several days. Admits to straining to have BMs. Patient states he was suppose to get an outpatient vascular duplex for right LE swelling (however never had imaging.) Last head injury was 2 weeks ago. Denies fever, chills, diarrhea, urinary sxs, flank pain.     pmd: family practice clinic. 61 yo male with h/o HTN, HLD, depression presents to the ED with multiple complaints. Patient c/o chest pain, cough, abdominal pain, nausea, vomiting, decreased appetite, weakness, falls x several days. Associated with weight loss of approx 25 months in last month. Patient also states when he has BM, hes noticed blood on toilet paper x several days. Admits to straining to have BMs. Patient states he was suppose to get an outpatient vascular duplex for right LE swelling (however never had imaging.) Last head injury was 2 weeks ago. Denies fever, chills, diarrhea, urinary sxs, flank pain.   PMD: family practice clinic.

## 2021-03-26 NOTE — ED PROVIDER NOTE - PROGRESS NOTE DETAILS
Called patient's pharmacy, confirmed no diabetes medication at this time. Case discussed with ICU SHAHEED Burnette and EICU attending Dr. Lopez, accept admission to ICU CT results still pending, ICU PA Stephan aware and will follow up results. CT results (air in biliary tree and GB lumen, + gallstone) discussed with surgery Dr. Gloria - aware of results. ICU PA Vasile aware of CT findings as well.

## 2021-03-26 NOTE — ED ADULT NURSE NOTE - OBJECTIVE STATEMENT
patient with multiple c/o nausea, loss of appetite, blood on toilet paper, weight loss and frequent falls

## 2021-03-26 NOTE — PROGRESS NOTE ADULT - ASSESSMENT
60M w/ type 2 DM, admitted with mild DKA    Events last 24 hours:   -patient's repeat labs [facility] show closure of anion GAP ( 18-->10)  -K+ 2.9  -will begin transition form insulin drip to lantus and ISS  -also, incidental finding of billiary stone, non obstructing which surgery is aware of  -Right lung infiltrate as well, being Tx for PNA as he is Active smoker.      PLAN:    -admitted to MICU  -On insulin drip, titrate per MICU protocol  - Q1H accuchecks  -aggressive fluid hydration, goal UOP >0.5 cc/kg/hr. will give bolus of LR now  -Q4-6H BMP, magnesium, phosphorous level, acetone  -last set of labs showed closure of anion gap will start transition off Insulin drip now. Also K+ note ella 2.9. Will give 40 mews of KCL PO every 4 hours x 2 as well as KCL rider 10mews x 3. total of 110 MEQS should bring K+ 2.9 up to 4  --give lantusnow, once lantus given, continue insulin drip for 2 hours, and then begin using sliding scale insulin w/ meals and at bedtime or Q6H if remains NPO  -if patient able to tolerate PO start on diet, if not continue IVF at low rate  -once drip id stoipped, will sign patient out to medicine service  -surgical team to follow incidental findings of billiary stone   -will also need to finish course of anbx for treatment of PNA.  -offer nicotine patch   -Diabetic education and counseling  -send HbA1C       CRITICAL CARE TIME SPENT: 35 minutes of critical care time spent providing medical care for patient's acute illness/conditions that impairs at least one vital organ system and/or poses a high risk of imminent or life threatening deterioration in the patient's condition. It includes time spent evaluating and treating the patient's acute illness as well as time spent reviewing labs, radiology, discussing goals of care with patient and/or patient's family, and discussing the case with a multidisciplinary team, including the eICU, in an effort to prevent further life threatening deterioration or end organ damage. This time is independent of any procedures performed.

## 2021-03-26 NOTE — PROVIDER CONTACT NOTE (EICU) - BACKGROUND
60 M [facility] history of HTN, HLD, and depression presents with chest pain, cough, abdominal pain, nausea, vomiting, lethargy, and weight loss. Has multiple laboratory abnormalities including hypglycemia, hyponatremia. AGMA, and ketonuria. Given fluids and ordered for insulin gtt for DKA.

## 2021-03-26 NOTE — H&P ADULT - NSHPREVIEWOFSYSTEMS_GEN_ALL_CORE
Review of Systems:   	CONSTITUTIONAL: Reports fatigue  	ENT: Denies dysphagia, odynophagia, blurred vision, tinnitus, neck stiffness  	RESPIRATORY: Reports cough  	CARDIOVASCULAR: Denies chest pain, palpitations irregular HR  	GASTROINTESTINAL:  Reports abd pain, N/V  	GENITOURINARY: Dysuria, hematuria, urinary frequency, urinary incontinence  	NEUROLOGICAL: Denies headaches, syncope, near syncope, dizziness, lightheadedness, headaches, seizures  	SKIN: Denies rashes, masses, bruising, lesions   	ENDOCRINE: Denies heat or cold intolerance; No hair loss  	MUSCULOSKELETAL: Denies history of OA or gout, joint swelling  	PSYCHIATRIC: Denies depression, anxiety, mood swings, or difficulty sleeping  	HEME: Denies history of DVT/PE, blood transfusion    	All other ROS reviewed and negative except as otherwise stated

## 2021-03-26 NOTE — ED PROVIDER NOTE - ATTENDING CONTRIBUTION TO CARE
Fredis with SHAHEED Wise. 61 yo male with h/o HTN, HLD, depression presents to the ED with multiple complaints. Patient c/o chest pain, cough, abdominal pain, nausea, vomiting, decreased appetite, weakness, falls x several days. Associated with weight loss of approx 25 months in last month. Patient also states when he has BM, hes noticed blood on toilet paper x several days. Admits to straining to have BMs. Patient states he was suppose to get an outpatient vascular duplex for right LE swelling (however never had imaging.) Last head injury was 2 weeks ago. Denies fever, chills, diarrhea, urinary sxs, flank pain. PE: as above. A/P: ekg, labs, cxr, ct head, chest, abd/pelvis, ua, fluids, reassess    Pt found to be in DKA. Infiltrate in lung noted. Given myriad of complaints, pan scan to consider malignancy. D/W ICU for admission for DKA.     Patient was critically ill with a high probability of imminent or life threatening deterioration.  direct patient care (not related to procedure), additional history taking, interpretation of diagnostic studies, documentation, consultation with other physicians, telephone consultation with the patient's family  I have personally provided the amount of critical care time documented below excluding time spent on separate procedures.     I performed a face to face bedside interview with patient regarding history of present illness, review of symptoms and past medical history. I completed an independent physical exam.  I have discussed the patient's plan of care with Physician Assistant (PA). I agree with note as stated above, having amended the EMR as needed to reflect my findings.   This includes History of Present Illness, HIV, Past Medical/Surgical/Family/Social History, Allergies and Home Medications, Review of Systems, Physical Exam, and any Progress Notes during the time I functioned as the attending physician for this patient.

## 2021-03-26 NOTE — H&P ADULT - ASSESSMENT
ASSESSMENT:  1. DKA  2. RLL mass like lesion  3. Gallstones, Biliary tree air  4. HARRISON      PLAN:  - Admit to ICU  - Insulin gtt, titrate per normogram  - LR @ 100cc/hr  - Monitor Potassium  - Trend labs and supplement lytes  - Endocrine consult  - Monitor I&Os, Avoid nephrotoxins, Renal dose meds  - Surgery consult  - Broad spectrum abx for PNA was mass  - Eventual Thoracic Surgery consult for lesion biopsy  - NPO except meds, allow ice chips  - PPX: Pepcid and Heparin SQ  - GOC: Full CODE

## 2021-03-27 LAB
A1C WITH ESTIMATED AVERAGE GLUCOSE RESULT: >15.5 % — HIGH (ref 4–5.6)
ANION GAP SERPL CALC-SCNC: 12 MMOL/L — SIGNIFICANT CHANGE UP (ref 5–17)
ANION GAP SERPL CALC-SCNC: 12 MMOL/L — SIGNIFICANT CHANGE UP (ref 5–17)
ANION GAP SERPL CALC-SCNC: 13 MMOL/L — SIGNIFICANT CHANGE UP (ref 5–17)
ANION GAP SERPL CALC-SCNC: 13 MMOL/L — SIGNIFICANT CHANGE UP (ref 5–17)
ANION GAP SERPL CALC-SCNC: 15 MMOL/L — SIGNIFICANT CHANGE UP (ref 5–17)
ANION GAP SERPL CALC-SCNC: 9 MMOL/L — SIGNIFICANT CHANGE UP (ref 5–17)
BUN SERPL-MCNC: 11 MG/DL — SIGNIFICANT CHANGE UP (ref 7–23)
BUN SERPL-MCNC: 13 MG/DL — SIGNIFICANT CHANGE UP (ref 7–23)
BUN SERPL-MCNC: 13 MG/DL — SIGNIFICANT CHANGE UP (ref 7–23)
BUN SERPL-MCNC: 14 MG/DL — SIGNIFICANT CHANGE UP (ref 7–23)
BUN SERPL-MCNC: 15 MG/DL — SIGNIFICANT CHANGE UP (ref 7–23)
BUN SERPL-MCNC: 16 MG/DL — SIGNIFICANT CHANGE UP (ref 7–23)
CALCIUM SERPL-MCNC: 8.3 MG/DL — LOW (ref 8.4–10.5)
CALCIUM SERPL-MCNC: 8.5 MG/DL — SIGNIFICANT CHANGE UP (ref 8.4–10.5)
CALCIUM SERPL-MCNC: 8.7 MG/DL — SIGNIFICANT CHANGE UP (ref 8.4–10.5)
CALCIUM SERPL-MCNC: 8.8 MG/DL — SIGNIFICANT CHANGE UP (ref 8.4–10.5)
CALCIUM SERPL-MCNC: 8.9 MG/DL — SIGNIFICANT CHANGE UP (ref 8.4–10.5)
CALCIUM SERPL-MCNC: 9.2 MG/DL — SIGNIFICANT CHANGE UP (ref 8.4–10.5)
CHLORIDE SERPL-SCNC: 101 MMOL/L — SIGNIFICANT CHANGE UP (ref 96–108)
CHLORIDE SERPL-SCNC: 103 MMOL/L — SIGNIFICANT CHANGE UP (ref 96–108)
CHLORIDE SERPL-SCNC: 104 MMOL/L — SIGNIFICANT CHANGE UP (ref 96–108)
CHLORIDE SERPL-SCNC: 104 MMOL/L — SIGNIFICANT CHANGE UP (ref 96–108)
CHLORIDE SERPL-SCNC: 99 MMOL/L — SIGNIFICANT CHANGE UP (ref 96–108)
CHLORIDE SERPL-SCNC: 99 MMOL/L — SIGNIFICANT CHANGE UP (ref 96–108)
CO2 SERPL-SCNC: 18 MMOL/L — LOW (ref 22–31)
CO2 SERPL-SCNC: 20 MMOL/L — LOW (ref 22–31)
CO2 SERPL-SCNC: 21 MMOL/L — LOW (ref 22–31)
CO2 SERPL-SCNC: 22 MMOL/L — SIGNIFICANT CHANGE UP (ref 22–31)
CO2 SERPL-SCNC: 22 MMOL/L — SIGNIFICANT CHANGE UP (ref 22–31)
CO2 SERPL-SCNC: 23 MMOL/L — SIGNIFICANT CHANGE UP (ref 22–31)
COVID-19 SPIKE DOMAIN AB INTERP: POSITIVE
COVID-19 SPIKE DOMAIN ANTIBODY RESULT: 5.46 U/ML — HIGH
CREAT SERPL-MCNC: 0.77 MG/DL — SIGNIFICANT CHANGE UP (ref 0.5–1.3)
CREAT SERPL-MCNC: 0.79 MG/DL — SIGNIFICANT CHANGE UP (ref 0.5–1.3)
CREAT SERPL-MCNC: 0.79 MG/DL — SIGNIFICANT CHANGE UP (ref 0.5–1.3)
CREAT SERPL-MCNC: 0.9 MG/DL — SIGNIFICANT CHANGE UP (ref 0.5–1.3)
CREAT SERPL-MCNC: 0.96 MG/DL — SIGNIFICANT CHANGE UP (ref 0.5–1.3)
CREAT SERPL-MCNC: 1.34 MG/DL — HIGH (ref 0.5–1.3)
CULTURE RESULTS: NO GROWTH — SIGNIFICANT CHANGE UP
ESTIMATED AVERAGE GLUCOSE: >398 MG/DL — HIGH (ref 68–114)
GLUCOSE BLDC GLUCOMTR-MCNC: 274 MG/DL — HIGH (ref 70–99)
GLUCOSE BLDC GLUCOMTR-MCNC: 315 MG/DL — HIGH (ref 70–99)
GLUCOSE BLDC GLUCOMTR-MCNC: 319 MG/DL — HIGH (ref 70–99)
GLUCOSE BLDC GLUCOMTR-MCNC: 351 MG/DL — HIGH (ref 70–99)
GLUCOSE SERPL-MCNC: 172 MG/DL — HIGH (ref 70–99)
GLUCOSE SERPL-MCNC: 299 MG/DL — HIGH (ref 70–99)
GLUCOSE SERPL-MCNC: 305 MG/DL — HIGH (ref 70–99)
GLUCOSE SERPL-MCNC: 374 MG/DL — HIGH (ref 70–99)
GLUCOSE SERPL-MCNC: 380 MG/DL — HIGH (ref 70–99)
GLUCOSE SERPL-MCNC: 394 MG/DL — HIGH (ref 70–99)
HCT VFR BLD CALC: 34.3 % — LOW (ref 39–50)
HCV AB S/CO SERPL IA: 9.4 S/CO — HIGH (ref 0–0.99)
HCV AB SERPL-IMP: REACTIVE
HGB BLD-MCNC: 11.9 G/DL — LOW (ref 13–17)
MAGNESIUM SERPL-MCNC: 1.7 MG/DL — SIGNIFICANT CHANGE UP (ref 1.6–2.6)
MCHC RBC-ENTMCNC: 32.2 PG — SIGNIFICANT CHANGE UP (ref 27–34)
MCHC RBC-ENTMCNC: 34.7 GM/DL — SIGNIFICANT CHANGE UP (ref 32–36)
MCV RBC AUTO: 92.7 FL — SIGNIFICANT CHANGE UP (ref 80–100)
NRBC # BLD: 0 /100 WBCS — SIGNIFICANT CHANGE UP (ref 0–0)
PHOSPHATE SERPL-MCNC: 2.8 MG/DL — SIGNIFICANT CHANGE UP (ref 2.5–4.5)
PLATELET # BLD AUTO: 234 K/UL — SIGNIFICANT CHANGE UP (ref 150–400)
POTASSIUM SERPL-MCNC: 3.7 MMOL/L — SIGNIFICANT CHANGE UP (ref 3.5–5.3)
POTASSIUM SERPL-MCNC: 3.8 MMOL/L — SIGNIFICANT CHANGE UP (ref 3.5–5.3)
POTASSIUM SERPL-MCNC: 3.8 MMOL/L — SIGNIFICANT CHANGE UP (ref 3.5–5.3)
POTASSIUM SERPL-MCNC: 3.9 MMOL/L — SIGNIFICANT CHANGE UP (ref 3.5–5.3)
POTASSIUM SERPL-MCNC: 4 MMOL/L — SIGNIFICANT CHANGE UP (ref 3.5–5.3)
POTASSIUM SERPL-MCNC: 4.2 MMOL/L — SIGNIFICANT CHANGE UP (ref 3.5–5.3)
POTASSIUM SERPL-SCNC: 3.7 MMOL/L — SIGNIFICANT CHANGE UP (ref 3.5–5.3)
POTASSIUM SERPL-SCNC: 3.8 MMOL/L — SIGNIFICANT CHANGE UP (ref 3.5–5.3)
POTASSIUM SERPL-SCNC: 3.8 MMOL/L — SIGNIFICANT CHANGE UP (ref 3.5–5.3)
POTASSIUM SERPL-SCNC: 3.9 MMOL/L — SIGNIFICANT CHANGE UP (ref 3.5–5.3)
POTASSIUM SERPL-SCNC: 4 MMOL/L — SIGNIFICANT CHANGE UP (ref 3.5–5.3)
POTASSIUM SERPL-SCNC: 4.2 MMOL/L — SIGNIFICANT CHANGE UP (ref 3.5–5.3)
RBC # BLD: 3.7 M/UL — LOW (ref 4.2–5.8)
RBC # FLD: 12.1 % — SIGNIFICANT CHANGE UP (ref 10.3–14.5)
SARS-COV-2 IGG+IGM SERPL QL IA: 5.46 U/ML — HIGH
SARS-COV-2 IGG+IGM SERPL QL IA: POSITIVE
SODIUM SERPL-SCNC: 130 MMOL/L — LOW (ref 135–145)
SODIUM SERPL-SCNC: 132 MMOL/L — LOW (ref 135–145)
SODIUM SERPL-SCNC: 135 MMOL/L — SIGNIFICANT CHANGE UP (ref 135–145)
SODIUM SERPL-SCNC: 137 MMOL/L — SIGNIFICANT CHANGE UP (ref 135–145)
SODIUM SERPL-SCNC: 138 MMOL/L — SIGNIFICANT CHANGE UP (ref 135–145)
SODIUM SERPL-SCNC: 138 MMOL/L — SIGNIFICANT CHANGE UP (ref 135–145)
SPECIMEN SOURCE: SIGNIFICANT CHANGE UP
WBC # BLD: 11.84 K/UL — HIGH (ref 3.8–10.5)
WBC # FLD AUTO: 11.84 K/UL — HIGH (ref 3.8–10.5)

## 2021-03-27 PROCEDURE — 71045 X-RAY EXAM CHEST 1 VIEW: CPT | Mod: 26

## 2021-03-27 PROCEDURE — 99232 SBSQ HOSP IP/OBS MODERATE 35: CPT | Mod: GC

## 2021-03-27 PROCEDURE — 99232 SBSQ HOSP IP/OBS MODERATE 35: CPT

## 2021-03-27 RX ORDER — SODIUM CHLORIDE 9 MG/ML
1000 INJECTION, SOLUTION INTRAVENOUS ONCE
Refills: 0 | Status: COMPLETED | OUTPATIENT
Start: 2021-03-27 | End: 2021-03-27

## 2021-03-27 RX ORDER — INSULIN LISPRO 100/ML
5 VIAL (ML) SUBCUTANEOUS
Refills: 0 | Status: DISCONTINUED | OUTPATIENT
Start: 2021-03-27 | End: 2021-03-28

## 2021-03-27 RX ORDER — ONDANSETRON 8 MG/1
4 TABLET, FILM COATED ORAL EVERY 8 HOURS
Refills: 0 | Status: DISCONTINUED | OUTPATIENT
Start: 2021-03-27 | End: 2021-04-01

## 2021-03-27 RX ORDER — POLYETHYLENE GLYCOL 3350 17 G/17G
17 POWDER, FOR SOLUTION ORAL DAILY
Refills: 0 | Status: DISCONTINUED | OUTPATIENT
Start: 2021-03-27 | End: 2021-03-27

## 2021-03-27 RX ORDER — INSULIN GLARGINE 100 [IU]/ML
25 INJECTION, SOLUTION SUBCUTANEOUS AT BEDTIME
Refills: 0 | Status: DISCONTINUED | OUTPATIENT
Start: 2021-03-27 | End: 2021-03-28

## 2021-03-27 RX ORDER — INSULIN LISPRO 100/ML
4 VIAL (ML) SUBCUTANEOUS
Refills: 0 | Status: DISCONTINUED | OUTPATIENT
Start: 2021-03-27 | End: 2021-03-27

## 2021-03-27 RX ORDER — SENNA PLUS 8.6 MG/1
2 TABLET ORAL AT BEDTIME
Refills: 0 | Status: DISCONTINUED | OUTPATIENT
Start: 2021-03-27 | End: 2021-04-01

## 2021-03-27 RX ORDER — POTASSIUM CHLORIDE 20 MEQ
40 PACKET (EA) ORAL ONCE
Refills: 0 | Status: COMPLETED | OUTPATIENT
Start: 2021-03-27 | End: 2021-03-27

## 2021-03-27 RX ORDER — INSULIN LISPRO 100/ML
VIAL (ML) SUBCUTANEOUS AT BEDTIME
Refills: 0 | Status: DISCONTINUED | OUTPATIENT
Start: 2021-03-27 | End: 2021-04-01

## 2021-03-27 RX ORDER — HYDROCORTISONE 1 %
1 OINTMENT (GRAM) TOPICAL
Refills: 0 | Status: DISCONTINUED | OUTPATIENT
Start: 2021-03-27 | End: 2021-04-01

## 2021-03-27 RX ORDER — POLYETHYLENE GLYCOL 3350 17 G/17G
17 POWDER, FOR SOLUTION ORAL DAILY
Refills: 0 | Status: DISCONTINUED | OUTPATIENT
Start: 2021-03-27 | End: 2021-03-28

## 2021-03-27 RX ORDER — BENZOCAINE AND MENTHOL 5; 1 G/100ML; G/100ML
1 LIQUID ORAL EVERY 6 HOURS
Refills: 0 | Status: DISCONTINUED | OUTPATIENT
Start: 2021-03-27 | End: 2021-04-01

## 2021-03-27 RX ORDER — INSULIN LISPRO 100/ML
VIAL (ML) SUBCUTANEOUS AT BEDTIME
Refills: 0 | Status: DISCONTINUED | OUTPATIENT
Start: 2021-03-27 | End: 2021-03-27

## 2021-03-27 RX ORDER — INSULIN LISPRO 100/ML
VIAL (ML) SUBCUTANEOUS
Refills: 0 | Status: DISCONTINUED | OUTPATIENT
Start: 2021-03-27 | End: 2021-04-01

## 2021-03-27 RX ORDER — TAMSULOSIN HYDROCHLORIDE 0.4 MG/1
0.4 CAPSULE ORAL AT BEDTIME
Refills: 0 | Status: DISCONTINUED | OUTPATIENT
Start: 2021-03-27 | End: 2021-04-01

## 2021-03-27 RX ADMIN — FAMOTIDINE 20 MILLIGRAM(S): 10 INJECTION INTRAVENOUS at 05:20

## 2021-03-27 RX ADMIN — AZITHROMYCIN 255 MILLIGRAM(S): 500 TABLET, FILM COATED ORAL at 23:11

## 2021-03-27 RX ADMIN — SERTRALINE 150 MILLIGRAM(S): 25 TABLET, FILM COATED ORAL at 11:45

## 2021-03-27 RX ADMIN — Medication 4: at 17:42

## 2021-03-27 RX ADMIN — Medication 40 MILLIEQUIVALENT(S): at 22:03

## 2021-03-27 RX ADMIN — TAMSULOSIN HYDROCHLORIDE 0.4 MILLIGRAM(S): 0.4 CAPSULE ORAL at 21:42

## 2021-03-27 RX ADMIN — Medication 150 MILLIGRAM(S): at 21:42

## 2021-03-27 RX ADMIN — HEPARIN SODIUM 7500 UNIT(S): 5000 INJECTION INTRAVENOUS; SUBCUTANEOUS at 14:20

## 2021-03-27 RX ADMIN — ATORVASTATIN CALCIUM 40 MILLIGRAM(S): 80 TABLET, FILM COATED ORAL at 21:42

## 2021-03-27 RX ADMIN — Medication 4 UNIT(S): at 17:43

## 2021-03-27 RX ADMIN — SENNA PLUS 2 TABLET(S): 8.6 TABLET ORAL at 21:42

## 2021-03-27 RX ADMIN — Medication 5: at 11:45

## 2021-03-27 RX ADMIN — Medication 650 MILLIGRAM(S): at 11:49

## 2021-03-27 RX ADMIN — Medication 4 UNIT(S): at 11:46

## 2021-03-27 RX ADMIN — HEPARIN SODIUM 7500 UNIT(S): 5000 INJECTION INTRAVENOUS; SUBCUTANEOUS at 21:43

## 2021-03-27 RX ADMIN — Medication 81 MILLIGRAM(S): at 11:45

## 2021-03-27 RX ADMIN — Medication 100 MILLIGRAM(S): at 21:36

## 2021-03-27 RX ADMIN — Medication 650 MILLIGRAM(S): at 00:00

## 2021-03-27 RX ADMIN — FAMOTIDINE 20 MILLIGRAM(S): 10 INJECTION INTRAVENOUS at 17:42

## 2021-03-27 RX ADMIN — HEPARIN SODIUM 7500 UNIT(S): 5000 INJECTION INTRAVENOUS; SUBCUTANEOUS at 05:20

## 2021-03-27 RX ADMIN — SODIUM CHLORIDE 1000 MILLILITER(S): 9 INJECTION, SOLUTION INTRAVENOUS at 20:28

## 2021-03-27 RX ADMIN — CEFTRIAXONE 100 MILLIGRAM(S): 500 INJECTION, POWDER, FOR SOLUTION INTRAMUSCULAR; INTRAVENOUS at 20:24

## 2021-03-27 RX ADMIN — BENZOCAINE AND MENTHOL 1 LOZENGE: 5; 1 LIQUID ORAL at 20:26

## 2021-03-27 RX ADMIN — Medication 4: at 21:43

## 2021-03-27 RX ADMIN — INSULIN GLARGINE 25 UNIT(S): 100 INJECTION, SOLUTION SUBCUTANEOUS at 21:44

## 2021-03-27 RX ADMIN — CHLORHEXIDINE GLUCONATE 1 APPLICATION(S): 213 SOLUTION TOPICAL at 08:08

## 2021-03-27 RX ADMIN — Medication 3: at 08:10

## 2021-03-27 NOTE — PROGRESS NOTE ADULT - SUBJECTIVE AND OBJECTIVE BOX
The patient denies any abdominal pain today. He is tolerating PO fluids. He remains afebrile with a slightly elevated WBC.     PFSH: All noncontributory    MEDICATIONS REVIEWED:acetaminophen   Tablet .. 650 milliGRAM(s) Oral every 6 hours PRN  aspirin  chewable 81 milliGRAM(s) Oral daily  atorvastatin 40 milliGRAM(s) Oral at bedtime  azithromycin  IVPB 500 milliGRAM(s) IV Intermittent every 24 hours  benzocaine 15 mG/menthol 3.6 mG (Sugar-Free) Lozenge 1 Lozenge Oral every 6 hours PRN  cefTRIAXone   IVPB 1000 milliGRAM(s) IV Intermittent every 24 hours  chlorhexidine 4% Liquid 1 Application(s) Topical <User Schedule>  dextrose 40% Gel 15 Gram(s) Oral once  dextrose 5%. 1000 milliLiter(s) IV Continuous <Continuous>  dextrose 5%. 1000 milliLiter(s) IV Continuous <Continuous>  dextrose 50% Injectable 25 Gram(s) IV Push once  dextrose 50% Injectable 12.5 Gram(s) IV Push once  dextrose 50% Injectable 25 Gram(s) IV Push once  dextrose 50% Injectable 25 Gram(s) IV Push once  dextrose 50% Injectable 12.5 Gram(s) IV Push once  dextrose 50% Injectable 25 Gram(s) IV Push once  famotidine    Tablet 20 milliGRAM(s) Oral two times a day  glucagon  Injectable 1 milliGRAM(s) IntraMuscular once  glucagon  Injectable 1 milliGRAM(s) IntraMuscular once  heparin   Injectable 7500 Unit(s) SubCutaneous every 8 hours  hydrocortisone 2.5% Rectal Cream 1 Application(s) Rectal two times a day  insulin glargine Injectable (LANTUS) 20 Unit(s) SubCutaneous at bedtime  insulin lispro (ADMELOG) corrective regimen sliding scale   SubCutaneous Before meals and at bedtime  insulin lispro (ADMELOG) corrective regimen sliding scale   SubCutaneous at bedtime  insulin lispro Injectable (ADMELOG) 4 Unit(s) SubCutaneous three times a day before meals  lactated ringers. 1000 milliLiter(s) IV Continuous <Continuous>  polyethylene glycol 3350 17 Gram(s) Oral daily PRN  senna 2 Tablet(s) Oral at bedtime  sertraline 150 milliGRAM(s) Oral daily  tamsulosin 0.4 milliGRAM(s) Oral at bedtime  traZODone 150 milliGRAM(s) Oral at bedtime      ALLERGIES:No Known Drug Allergies  shellfish (Unknown)      PHYSICAL EXAMINATION:  RESPIRATORY: Clear to auscultation bilaterally, respirations non-labored.  CARDIAC: RR, normal S1S2, no murmurs.  ABDOMEN: soft, BS present, no masses, no hernias, no peritoneal signs, no KLS, nontender.  VASCULAR: Equal and normal pulses.  MUSCULOSKELETAL: Full ROM, no deformities.      T(C): 37.1 (03-27-21 @ 15:47), Max: 37.2 (03-27-21 @ 05:04)  HR: 75 (03-27-21 @ 15:47) (61 - 80)  BP: 109/75 (03-27-21 @ 15:47) (91/57 - 123/78)  RR: 20 (03-27-21 @ 15:47) (11 - 20)  SpO2: 96% (03-27-21 @ 15:47) (91% - 98%)                          11.9   11.84 )-----------( 234      ( 27 Mar 2021 05:00 )             34.3       03-27    135  |  101  |  11  ----------------------------<  374<H>  3.8   |  21<L>  |  0.96    Ca    8.5      27 Mar 2021 14:20  Phos  2.8     03-27  Mg     1.7     03-27    TPro  7.8  /  Alb  3.1<L>  /  TBili  0.7  /  DBili  x   /  AST  17  /  ALT  19  /  AlkPhos  103  03-26

## 2021-03-27 NOTE — PROGRESS NOTE ADULT - ASSESSMENT
Physical Examination:  GENERAL:               Alert, Oriented, No acute distress.    HEENT:                    Pupils equal, reactive to light.  Symmetric. No JVD, Moist MM  PULM:                      Bilateral air entry, No Rales, No Rhonchi, No Wheezing  CVS:                        S1, S2,  No Murmur  ABD:                        Soft, nondistended, nontender, normoactive bowel sounds,   EXT:                         No edema, nontender, No Cyanosis or Clubbing   Vascular:                 Warm Extremities, Normal Capillary refill, Normal Distal Pulses  SKIN:                       Warm and well perfused, no rashes noted.   NEURO:                   Alert, oriented, interactive, nonfocal, follows commands  PSYC:                      Calm, + Insight.    Assessment:  1. Diabetic ketoacidosis   2. Acute kidney injury   3. Right upper lobe opacity   4. Gallstones  5. Hypertension  6. Hyperlipidemia     Plan  - DKA resolved and patient transitioned off insulin infusion overnight. He is now on basal/bolus insulin regimen with sliding scale coverage  - HARRISON is improving with hydration as well   - Oral diet encouraged  - CT scan showing heterogenous mass, ? fluid filled cavity. Seems to be new compared to 3 months ago. Suspicious for an abscess given rapid growth.  - IV antibiotics  - ID consult recommended   - Surgery follow up   - BP control   - Statins   - DVT prophylaxis  - Will follow

## 2021-03-27 NOTE — PROGRESS NOTE ADULT - SUBJECTIVE AND OBJECTIVE BOX
Patient is a 60y old  Male who presents with a chief complaint of DKA (27 Mar 2021 19:37)      BRIEF HOSPITAL COURSE:     60M w/ type 2 DM, also with incidental finding of biliary stone (non-obstructing), initially admitted to ICU with DKA (Anion gap of 18 at max), GAP quickly closed and aptient was transferred out of ICU overnight. Now with concern for potential re-opening of anion gap    Events last 24 hours:   -on review of labs for tonight, last set at 7pm this evening, it appears that although anion gap is still closed and within normal limits, it has been slowly "opening" back up (10 --> 12 -->12-->13-->9-->13-->15 at 7pm tonight), also serum bicarb has been slowly decreasing as well most recently noted at 18. No acetone or VBG  -patient was appropriately transitioned from insulin infusion to lantus and ISS plus meal time covdrage insulin  -will repeat labs at 0100 to ensure anion gap does not continue to open, with mag/phos/acetone/vbg      PAST MEDICAL & SURGICAL HISTORY:  Benign hypertension    Type 2 diabetes mellitus without complication, without long-term current use of insulin    Shoulder arthritis  Multiple surgeries on left shoulder        Review of Systems:  CONSTITUTIONAL: No fever, chills, or fatigue  EYES: No eye pain, visual disturbances, or discharge  ENMT:  No difficulty hearing, tinnitus, vertigo; No sinus or throat pain  NECK: No pain or stiffness  RESPIRATORY: No cough, wheezing, chills or hemoptysis; No shortness of breath  CARDIOVASCULAR: No chest pain, palpitations, dizziness, or leg swelling  GASTROINTESTINAL: No abdominal or epigastric pain. No nausea, vomiting, or hematemesis; No diarrhea or constipation. No melena or hematochezia.  GENITOURINARY: No dysuria, frequency, hematuria, or incontinence  NEUROLOGICAL: No headaches, memory loss, loss of strength, numbness, or tremors  SKIN: No itching, burning, rashes, or lesions   MUSCULOSKELETAL: No joint pain or swelling; No muscle, back, or extremity pain  PSYCHIATRIC: No depression, anxiety, mood swings, or difficulty sleeping      Medications:  azithromycin  IVPB 500 milliGRAM(s) IV Intermittent every 24 hours  cefTRIAXone   IVPB 1000 milliGRAM(s) IV Intermittent every 24 hours    tamsulosin 0.4 milliGRAM(s) Oral at bedtime    guaiFENesin   Syrup  (Sugar-Free) 100 milliGRAM(s) Oral every 6 hours PRN    acetaminophen   Tablet .. 650 milliGRAM(s) Oral every 6 hours PRN  ondansetron Injectable 4 milliGRAM(s) IV Push every 8 hours PRN  sertraline 150 milliGRAM(s) Oral daily  traZODone 150 milliGRAM(s) Oral at bedtime      aspirin  chewable 81 milliGRAM(s) Oral daily  heparin   Injectable 7500 Unit(s) SubCutaneous every 8 hours    famotidine    Tablet 20 milliGRAM(s) Oral two times a day  polyethylene glycol 3350 17 Gram(s) Oral daily  senna 2 Tablet(s) Oral at bedtime      atorvastatin 40 milliGRAM(s) Oral at bedtime  dextrose 40% Gel 15 Gram(s) Oral once  dextrose 50% Injectable 25 Gram(s) IV Push once  dextrose 50% Injectable 12.5 Gram(s) IV Push once  dextrose 50% Injectable 25 Gram(s) IV Push once  dextrose 50% Injectable 25 Gram(s) IV Push once  dextrose 50% Injectable 12.5 Gram(s) IV Push once  dextrose 50% Injectable 25 Gram(s) IV Push once  glucagon  Injectable 1 milliGRAM(s) IntraMuscular once  glucagon  Injectable 1 milliGRAM(s) IntraMuscular once  insulin glargine Injectable (LANTUS) 25 Unit(s) SubCutaneous at bedtime  insulin lispro (ADMELOG) corrective regimen sliding scale   SubCutaneous three times a day before meals  insulin lispro (ADMELOG) corrective regimen sliding scale   SubCutaneous at bedtime  insulin lispro Injectable (ADMELOG) 5 Unit(s) SubCutaneous three times a day before meals    dextrose 5%. 1000 milliLiter(s) IV Continuous <Continuous>  dextrose 5%. 1000 milliLiter(s) IV Continuous <Continuous>  lactated ringers Bolus 1000 milliLiter(s) IV Bolus once  lactated ringers. 1000 milliLiter(s) IV Continuous <Continuous>  potassium chloride    Tablet ER 40 milliEquivalent(s) Oral once      benzocaine 15 mG/menthol 3.6 mG (Sugar-Free) Lozenge 1 Lozenge Oral every 6 hours PRN  chlorhexidine 4% Liquid 1 Application(s) Topical <User Schedule>  hydrocortisone 2.5% Rectal Cream 1 Application(s) Rectal two times a day            ICU Vital Signs Last 24 Hrs  T(C): 37.9 (27 Mar 2021 19:47), Max: 37.9 (27 Mar 2021 19:47)  T(F): 100.2 (27 Mar 2021 19:47), Max: 100.2 (27 Mar 2021 19:47)  HR: 90 (27 Mar 2021 19:47) (61 - 90)  BP: 124/68 (27 Mar 2021 19:47) (91/57 - 124/68)  BP(mean): 69 (27 Mar 2021 02:00) (65 - 93)  RR: 13 (27 Mar 2021 19:47) (11 - 20)  SpO2: 95% (27 Mar 2021 19:47) (91% - 98%)          I&O's Detail    26 Mar 2021 07:01  -  27 Mar 2021 07:00  --------------------------------------------------------  IN:    Insulin: 21.2 mL    IV PiggyBack: 250 mL    IV PiggyBack: 300 mL    IV PiggyBack: 50 mL    Lactated Ringers: 700 mL    Lactated Ringers Bolus: 1000 mL    Oral Fluid: 480 mL    Sodium Chloride 0.9% Bolus: 2000 mL  Total IN: 4801.2 mL    OUT:    Voided (mL): 350 mL  Total OUT: 350 mL    Total NET: 4451.2 mL      27 Mar 2021 07:01  -  27 Mar 2021 20:06  --------------------------------------------------------  IN:    Lactated Ringers: 500 mL    Oral Fluid: 1020 mL  Total IN: 1520 mL    OUT:    Voided (mL): 1300 mL  Total OUT: 1300 mL    Total NET: 220 mL            LABS:                        11.9   11.84 )-----------( 234      ( 27 Mar 2021 05:00 )             34.3     03-27    132<L>  |  99  |  13  ----------------------------<  380<H>  3.7   |  18<L>  |  1.34<H>    Ca    8.3<L>      27 Mar 2021 19:00  Phos  2.8       Mg     1.7         TPro  7.8  /  Alb  3.1<L>  /  TBili  0.7  /  DBili  x   /  AST  17  /  ALT  19  /  AlkPhos  103        CARDIAC MARKERS ( 26 Mar 2021 13:30 )  <.017 ng/mL / x     / x     / x     / x          CAPILLARY BLOOD GLUCOSE      POCT Blood Glucose.: 315 mg/dL (27 Mar 2021 17:40)      Urinalysis Basic - ( 26 Mar 2021 13:30 )    Color: Yellow / Appearance: Clear / S.010 / pH: x  Gluc: x / Ketone: Large  / Bili: Negative / Urobili: Negative   Blood: x / Protein: Negative / Nitrite: Negative   Leuk Esterase: Negative / RBC: x / WBC x   Sq Epi: x / Non Sq Epi: x / Bacteria: x      CULTURES:  Culture Results:   No growth (21 @ 13:30)      Physical Examination:    General: No acute distress.  Alert, oriented, interactive, nonfocal    HEENT: Pupils equal, reactive to light.  Symmetric.    PULM: Clear to auscultation bilaterally, no significant sputum production    CVS: Regular rate and rhythm, no murmurs, rubs, or gallops    ABD: Soft, nondistended, nontender, normoactive bowel sounds, no masses    EXT: No edema, nontender    SKIN: Warm and well perfused, no rashes noted.        CRITICAL CARE TIME SPENT:  35 minutes of critical care time spent providing medical care for patient's acute illness/conditions that impairs at least one vital organ system and/or poses a high risk of imminent or life threatening deterioration in the patient's condition. It includes time spent evaluating and treating the patient's acute illness as well as time spent reviewing labs, radiology, discussing goals of care with patient and/or patient's family, and discussing the case with a multidisciplinary team, including the eICU, in an effort to prevent further life threatening deterioration or end organ damage. This time is independent of any procedures performed.

## 2021-03-27 NOTE — PROGRESS NOTE ADULT - ASSESSMENT
59 yo M with PMHx of HTN, HLD, Pre-DM admitted for DKA after presenting to the ER with c/o weakness, N/V, abdominal pain and chest pain, found have glucose level of 674, AG 18, BUN/Cr 26/1.31, Acetone moderate. CT Chest with mass like structure to RLL and CT Abd/Pel with CBD dilation and air in biliary tree. ED interventions include 2L IVF Bolus, IVP insulin. Patient was admitted to ICU, now downgraded to telemetry. (26 Mar 2021 16:30)    #Diabetic Ketoacidosis   -BGM on admission 674, +acetone   -Improved  -Downgraded from ICU overnight   -S/P insulin drip   -Continue Insulin sliding scale  -Continue pre-meal insulin at 4units  -Continue Lantus 20units qhs   -Trend BGM closely and titrate insulin as per requirements  -Accuchecks AC&HS  -Hypoglycemia Protocol   -Diet: Consistent Carbs w/ Evening Snack  -Follow up HbA1c    #Hypertension   -DASH/TLC diet   -Hold Bp meds at this time due to hypotension  -monitor BP & titrate BP meds PRN    #Hyperlipidemia  -Continue Lipitor 40mg po qhs      61 yo M with PMHx of HTN, HLD, Pre-DM admitted for DKA after presenting to the ER with c/o weakness, N/V, abdominal pain and chest pain, found have glucose level of 674, AG 18, BUN/Cr 26/1.31, Acetone moderate. CT Chest with mass like structure to RLL and CT Abd/Pel with CBD dilation and air in biliary tree. ED interventions include 2L IVF Bolus, IVP insulin. Patient was admitted to ICU, now downgraded to telemetry. (26 Mar 2021 16:30)    #Diabetic Ketoacidosis   -Hx of prediabetes   -BGM on admission 674, +acetone   -Improved  -Downgraded from ICU overnight   -S/P insulin drip   -Continue Insulin sliding scale  -Continue pre-meal insulin at 4units  -Continue Lantus 20units qhs   -Continue IVF LR @ 100ml/hr   -Trend BGM closely and titrate insulin as per requirements  -Accuchecks AC&HS  -Hypoglycemia Protocol   -DM educator   -Diet: Consistent Carbs w/ Evening Snack  -HbA1C 11/2020: 5.7%  -Follow up HbA1c  -Trend and replete electrolytes PRN  -BMP, phos q4hrs     #Right Lower Lobe masslike opacity   -CT chest: 4.5 cm heterogeneous in attenuation masslike opacity within the right lower lobe abutting the right major fissure and right hilar region, (changed from 12/2020). Small nodules are identified adjacent to the mass measuring up to 5 mm   -Recent low dose CT chest for lung cancer screening done 12/2020 without evidence of opacity to RLL noted now  -History of cigarette smoking, now on nicotine patches/ gum, recent weight loss  -Continue Ceftriaxone and azithromycin for now     #Hypertension   -DASH/TLC diet   -Hold Bp meds at this time due to hypotension  -monitor BP & titrate BP meds PRN    #Hyperlipidemia  -Continue Lipitor 40mg po qhs     #Hemorrhoids/Constipation  -Start Miralax PRN, Senna qhs  -Hydrocortisone rectal cream q12hrs     #VTE Prophylaxis  -Continue heparin 7500u subQ q8hrs    Above discussed with Dr. Quintero

## 2021-03-27 NOTE — PROGRESS NOTE ADULT - SUBJECTIVE AND OBJECTIVE BOX
HPI: 61 yo M with PMHx of HTN, HLD, Pre-DM presents for eval of weakness, N/V, abd pain, chest pain. Patient reports several days of symptoms. He states he does not take any medications for DM. Patient denies trauma or inciting event. Patient also reports constipation with blood on toilet paper after wiping.     ED workup significant for Glucose 674, AG 18, BUN/Cr 26/1.31, Acetone moderate. CT Chest with mass like structure to RLL and CT Abd/Pel with CBD dilation and air in biliary tree. ED interventions include 2L IVF Bolus, IVP insulin,  (26 Mar 2021 16:30)      MEDICATIONS  (STANDING):  aspirin  chewable 81 milliGRAM(s) Oral daily  atorvastatin 40 milliGRAM(s) Oral at bedtime  azithromycin  IVPB 500 milliGRAM(s) IV Intermittent every 24 hours  cefTRIAXone   IVPB 1000 milliGRAM(s) IV Intermittent every 24 hours  chlorhexidine 4% Liquid 1 Application(s) Topical <User Schedule>  dextrose 40% Gel 15 Gram(s) Oral once  dextrose 5%. 1000 milliLiter(s) (50 mL/Hr) IV Continuous <Continuous>  dextrose 5%. 1000 milliLiter(s) (100 mL/Hr) IV Continuous <Continuous>  dextrose 50% Injectable 25 Gram(s) IV Push once  dextrose 50% Injectable 12.5 Gram(s) IV Push once  dextrose 50% Injectable 25 Gram(s) IV Push once  dextrose 50% Injectable 25 Gram(s) IV Push once  dextrose 50% Injectable 12.5 Gram(s) IV Push once  dextrose 50% Injectable 25 Gram(s) IV Push once  famotidine    Tablet 20 milliGRAM(s) Oral two times a day  glucagon  Injectable 1 milliGRAM(s) IntraMuscular once  glucagon  Injectable 1 milliGRAM(s) IntraMuscular once  heparin   Injectable 7500 Unit(s) SubCutaneous every 8 hours  insulin glargine Injectable (LANTUS) 20 Unit(s) SubCutaneous at bedtime  insulin lispro (ADMELOG) corrective regimen sliding scale   SubCutaneous Before meals and at bedtime  insulin lispro (ADMELOG) corrective regimen sliding scale   SubCutaneous at bedtime  insulin lispro Injectable (ADMELOG) 4 Unit(s) SubCutaneous three times a day before meals  lactated ringers. 1000 milliLiter(s) (100 mL/Hr) IV Continuous <Continuous>  sertraline 150 milliGRAM(s) Oral daily  traZODone 150 milliGRAM(s) Oral at bedtime    MEDICATIONS  (PRN):  acetaminophen   Tablet .. 650 milliGRAM(s) Oral every 6 hours PRN Temp greater or equal to 38C (100.4F), Mild Pain (1 - 3)  benzocaine 15 mG/menthol 3.6 mG (Sugar-Free) Lozenge 1 Lozenge Oral every 6 hours PRN Sore Throat      Vital Signs Last 24 Hrs  T(C): 37.2 (27 Mar 2021 05:04), Max: 37.2 (26 Mar 2021 17:10)  T(F): 99 (27 Mar 2021 05:04), Max: 99 (26 Mar 2021 17:10)  HR: 65 (27 Mar 2021 05:04) (61 - 90)  BP: 123/78 (27 Mar 2021 05:04) (79/62 - 123/78)  BP(mean): 69 (27 Mar 2021 02:00) (65 - 93)  RR: 14 (27 Mar 2021 05:04) (11 - 19)  SpO2: 98% (27 Mar 2021 05:04) (91% - 98%)    GEN: NAD, comfortable, resting in bed  HEENT: NC/AT, EOMI, PERRLA, MMM, clear conjunctiva and sclera, normal hearing, no nasal discharge, throat clear, no thrush, normal dentition.   NECK: supple, no JVD, no LAD, no thyromegaly  BACK:  ROM intact, no spinal/paraspinal tenderness  CV: S1S2, RRR, no mumur  RESP: good air movement, CTABL, no rales, rhonchi or wheezing, respirations unlabored  ABD: +BS, soft, ND, NT, no guarding, no rigidity, no HSM  EXT: +2 radial and pedal pulses, no edema, no calve tenderness  SKIN: No visible Rashes or Ulcers  MSK: ROM intact in all extremities  NEURO:  sensory and CN 2-12 Grossly intact, no motor deficits, no, saddle anesthesia, AOx3  PSYCH: normal behavior         LABS:                          11.9   11.84 )-----------( 234      ( 27 Mar 2021 05:00 )             34.3     27 Mar 2021 08:00    137    |  104    |  14     ----------------------------<  305    4.0     |  20     |  0.79     Ca    9.2        27 Mar 2021 08:00  Phos  2.8       27 Mar 2021 05:00  Mg     1.7       27 Mar 2021 05:00    TPro  7.8    /  Alb  3.1    /  TBili  0.7    /  DBili  x      /  AST  17     /  ALT  19     /  AlkPhos  103    26 Mar 2021 13:30    LIVER FUNCTIONS - ( 26 Mar 2021 13:30 )  Alb: 3.1 g/dL / Pro: 7.8 g/dL / ALK PHOS: 103 U/L / ALT: 19 U/L / AST: 17 U/L / GGT: x             CAPILLARY BLOOD GLUCOSE      POCT Blood Glucose.: 274 mg/dL (27 Mar 2021 08:09)  POCT Blood Glucose.: 199 mg/dL (26 Mar 2021 21:42)  POCT Blood Glucose.: 228 mg/dL (26 Mar 2021 19:31)  POCT Blood Glucose.: 270 mg/dL (26 Mar 2021 18:32)  POCT Blood Glucose.: 317 mg/dL (26 Mar 2021 17:35)  POCT Blood Glucose.: 372 mg/dL (26 Mar 2021 16:53)  POCT Blood Glucose.: 350 mg/dL (26 Mar 2021 16:52)  POCT Blood Glucose.: 433 mg/dL (26 Mar 2021 15:32)  POCT Blood Glucose.: 419 mg/dL (26 Mar 2021 15:31)    CARDIAC MARKERS ( 26 Mar 2021 13:30 )  <.017 ng/mL / x     / x     / x     / x          Urinalysis Basic - ( 26 Mar 2021 13:30 )    Color: Yellow / Appearance: Clear / S.010 / pH: x  Gluc: x / Ketone: Large  / Bili: Negative / Urobili: Negative   Blood: x / Protein: Negative / Nitrite: Negative   Leuk Esterase: Negative / RBC: x / WBC x   Sq Epi: x / Non Sq Epi: x / Bacteria: x        RADIOLOGY:         HPI: 59 yo M with PMHx of HTN, HLD, Pre-DM presented for eval of weakness, N/V, abd pain, chest pain. Patient reports several days of symptoms. He states he does not take any medications for DM. Patient denies trauma or inciting event. Patient also reports constipation with blood on toilet paper after wiping. ED workup significant for Glucose 674, AG 18, BUN/Cr 26/1.31, Acetone moderate. CT Chest with mass like structure to RLL and CT Abd/Pel with CBD dilation and air in biliary tree. ED interventions include 2L IVF Bolus, IVP insulin,  (26 Mar 2021 16:30)    Subjective: Patient seen and examined at bedside in no apparent distress, c/o feeling tired, continues with minimal blood after BM. Patient downgraded from ICU overnight, insulin gtt discontinued. BGM have improved, continues on corrective insulin sliding scale, pre meal insulin and Lantus qhs.     REVIEW OF SYSTEMS:    CONSTITUTIONAL: No weakness, fevers or chills, + tiredness  EYES/ENT: No visual changes;  No vertigo or throat pain   NECK: No pain or stiffness  RESPIRATORY: No cough, wheezing, hemoptysis; No shortness of breath  CARDIOVASCULAR: No chest pain or palpitations  GASTROINTESTINAL: No abdominal or epigastric pain. No nausea, vomiting, or hematemesis; No diarrhea or constipation. No melena or hematochezia.  GENITOURINARY: No dysuria, frequency or hematuria  NEUROLOGICAL: No numbness or weakness  SKIN: No itching, rashes    Vital Signs Last 24 Hrs  T(C): 37.2 (27 Mar 2021 05:04), Max: 37.2 (26 Mar 2021 17:10)  T(F): 99 (27 Mar 2021 05:04), Max: 99 (26 Mar 2021 17:10)  HR: 65 (27 Mar 2021 05:04) (61 - 90)  BP: 123/78 (27 Mar 2021 05:04) (79/62 - 123/78)  BP(mean): 69 (27 Mar 2021 02:00) (65 - 93)  RR: 14 (27 Mar 2021 05:04) (11 - 19)  SpO2: 98% (27 Mar 2021 05:04) (91% - 98%)    PHYSICAL EXAM:  GENERAL: NAD, lying in bed comfortably  HEAD:  Atraumatic, Normocephalic  EYES: EOMI, PERRLA, conjunctiva and sclera clear  ENT: Moist mucous membranes  NECK: Supple, No JVD  CHEST/LUNG: Clear to auscultation bilaterally; No rales, rhonchi, wheezing, or rubs. Unlabored respirations  HEART: Regular rate and rhythm; No murmurs, rubs, or gallops  ABDOMEN: Bowel sounds present; Soft, Nontender, Nondistended. No hepatomegally  EXTREMITIES:  2+ Peripheral Pulses, brisk capillary refill. No clubbing, cyanosis, or edema  NERVOUS SYSTEM:  Alert & Oriented X3, speech clear. No deficits   MSK: FROM all 4 extremities, full and equal strength  SKIN: No rashes or lesions    MEDICATIONS  (STANDING):  aspirin  chewable 81 milliGRAM(s) Oral daily  atorvastatin 40 milliGRAM(s) Oral at bedtime  azithromycin  IVPB 500 milliGRAM(s) IV Intermittent every 24 hours  cefTRIAXone   IVPB 1000 milliGRAM(s) IV Intermittent every 24 hours  chlorhexidine 4% Liquid 1 Application(s) Topical <User Schedule>  dextrose 40% Gel 15 Gram(s) Oral once  dextrose 5%. 1000 milliLiter(s) (50 mL/Hr) IV Continuous <Continuous>  dextrose 5%. 1000 milliLiter(s) (100 mL/Hr) IV Continuous <Continuous>  dextrose 50% Injectable 25 Gram(s) IV Push once  dextrose 50% Injectable 12.5 Gram(s) IV Push once  dextrose 50% Injectable 25 Gram(s) IV Push once  dextrose 50% Injectable 25 Gram(s) IV Push once  dextrose 50% Injectable 12.5 Gram(s) IV Push once  dextrose 50% Injectable 25 Gram(s) IV Push once  famotidine    Tablet 20 milliGRAM(s) Oral two times a day  glucagon  Injectable 1 milliGRAM(s) IntraMuscular once  glucagon  Injectable 1 milliGRAM(s) IntraMuscular once  heparin   Injectable 7500 Unit(s) SubCutaneous every 8 hours  hydrocortisone 2.5% Rectal Cream 1 Application(s) Rectal two times a day  insulin glargine Injectable (LANTUS) 20 Unit(s) SubCutaneous at bedtime  insulin lispro (ADMELOG) corrective regimen sliding scale   SubCutaneous Before meals and at bedtime  insulin lispro (ADMELOG) corrective regimen sliding scale   SubCutaneous at bedtime  insulin lispro Injectable (ADMELOG) 4 Unit(s) SubCutaneous three times a day before meals  lactated ringers. 1000 milliLiter(s) (100 mL/Hr) IV Continuous <Continuous>  senna 2 Tablet(s) Oral at bedtime  sertraline 150 milliGRAM(s) Oral daily  tamsulosin 0.4 milliGRAM(s) Oral at bedtime  traZODone 150 milliGRAM(s) Oral at bedtime    MEDICATIONS  (PRN):  acetaminophen   Tablet .. 650 milliGRAM(s) Oral every 6 hours PRN Temp greater or equal to 38C (100.4F), Mild Pain (1 - 3)  benzocaine 15 mG/menthol 3.6 mG (Sugar-Free) Lozenge 1 Lozenge Oral every 6 hours PRN Sore Throat  polyethylene glycol 3350 17 Gram(s) Oral daily PRN Constipation    LABS:                          11.9   11.84 )-----------( 234      ( 27 Mar 2021 05:00 )             34.3     27 Mar 2021 08:00    137    |  104    |  14     ----------------------------<  305    4.0     |  20     |  0.79     Ca    9.2        27 Mar 2021 08:00  Phos  2.8       27 Mar 2021 05:00  Mg     1.7       27 Mar 2021 05:00    TPro  7.8    /  Alb  3.1    /  TBili  0.7    /  DBili  x      /  AST  17     /  ALT  19     /  AlkPhos  103    26 Mar 2021 13:30    LIVER FUNCTIONS - ( 26 Mar 2021 13:30 )  Alb: 3.1 g/dL / Pro: 7.8 g/dL / ALK PHOS: 103 U/L / ALT: 19 U/L / AST: 17 U/L / GGT: x             CAPILLARY BLOOD GLUCOSE      POCT Blood Glucose.: 274 mg/dL (27 Mar 2021 08:09)  POCT Blood Glucose.: 199 mg/dL (26 Mar 2021 21:42)  POCT Blood Glucose.: 228 mg/dL (26 Mar 2021 19:31)  POCT Blood Glucose.: 270 mg/dL (26 Mar 2021 18:32)  POCT Blood Glucose.: 317 mg/dL (26 Mar 2021 17:35)  POCT Blood Glucose.: 372 mg/dL (26 Mar 2021 16:53)  POCT Blood Glucose.: 350 mg/dL (26 Mar 2021 16:52)  POCT Blood Glucose.: 433 mg/dL (26 Mar 2021 15:32)  POCT Blood Glucose.: 419 mg/dL (26 Mar 2021 15:31)    CARDIAC MARKERS ( 26 Mar 2021 13:30 )  <.017 ng/mL / x     / x     / x     / x          Urinalysis Basic - ( 26 Mar 2021 13:30 )    Color: Yellow / Appearance: Clear / S.010 / pH: x  Gluc: x / Ketone: Large  / Bili: Negative / Urobili: Negative   Blood: x / Protein: Negative / Nitrite: Negative   Leuk Esterase: Negative / RBC: x / WBC x   Sq Epi: x / Non Sq Epi: x / Bacteria: x        RADIOLOGY:  < from: CT Chest w/ IV Cont (21 @ 14:44) >  FINDINGS:  CHEST:  LUNGS AND LARGE AIRWAYS: Patent central airways. There is a 4.5 cm heterogeneous in attenuation masslike opacity within the right lower lobe abutting the right major fissure and right hilar region, representing interval change from prior examination dated 12/3/2020. Small nodules are identified adjacent to the mass measuring up to 5 mm.  PLEURA: No evidence for pleural effusion. No pneumothorax.  VESSELS: Stable variant anatomy identified with a venous conduit extending from the left thorax into the left brachiocephalic vein. Thoracic aortic caliber appears unremarkable.  HEART: Heart size is normal. No pericardial effusion. Coronary arterial calcification.  MEDIASTINUM AND LIZZETTE: No mediastinal lymphadenopathy. No left hilar lymphadenopathy.  CHEST WALL AND LOWER NECK: Within normal limits.    ABDOMEN AND PELVIS:  LIVER: Normal in size. No focal hepatic masses.  BILE DUCTS: No evidence for intrahepatic biliary ductal dilatation. Scattered foci of air identified within the intrahepatic biliary tree. There is increased caliber of the extrahepatic CBD measuring 9-10 mm.  GALLBLADDER: A gallstone is noted in the gallbladder lumen measuring 1.1 cm. Air is noted within the gallbladder lumen. Contracted state of the gallbladder limits assessment for wall thickening. Emphysematous cholecystitis cannot be excluded. Correlate with clinical as well as procedure history recommended.  SPLEEN: Within normal limits. Stable 2.4 x 1.6 cm soft tissue nodularity medial to the spleen, likely accessory splenic tissue.  PANCREAS: Within normal limits.  ADRENALS: Within normal limits.  KIDNEYS/URETERS: Within normal limits.    BLADDER: Within normal limits.  REPRODUCTIVE ORGANS: The prostate appears unremarkable.    BOWEL: Fecal material scattered throughout the colon. No evidence for mechanical bowel obstruction. Colonic diverticulosis. No colonic wall thickening. The appendix appears unremarkable.  PERITONEUM: No free intraperitoneal air or fluid.  VESSELS: Within normal limits.  RETROPERITONEUM/LYMPH NODES: No lymphadenopathy.  ABDOMINAL WALL: Within normal limits.  BONES: Degenerative changes.    IMPRESSION:  1. There is a 4.5 cm heterogeneous in attenuation masslike opacity within the right lower lobe abutting the right major fissure and right hilar region, representing interval change from prior examination dated 12/3/2020. Small nodules are identified adjacent to the mass measuring up to 5 mm. Differential considerations would include both neoplastic and infectious etiologies.  2. Scattered foci of air identified within the intrahepatic biliary tree. There is increased caliber of the extrahepatic CBD measuring 9-10 mm.  A gallstone is noted in the gallbladder lumen measuring 1.1 cm. Air is noted within the gallbladder lumen. Contracted state of the gallbladder limits assessment for wall thickening. Emphysematous cholecystitis cannot be excluded. Correlate with clinical as well as procedure history recommended.               HPI: 61 yo M with PMHx of HTN, HLD, Pre-DM presented for eval of weakness, N/V, abd pain, chest pain. Patient reports several days of symptoms. He states he does not take any medications for DM. Patient denies trauma or inciting event. Patient also reports constipation with blood on toilet paper after wiping. ED workup significant for Glucose 674, AG 18, BUN/Cr 26/1.31, Acetone moderate. CT Chest with mass like structure to RLL and CT Abd/Pel with CBD dilation and air in biliary tree. ED interventions include 2L IVF Bolus, IVP insulin,  (26 Mar 2021 16:30)    Subjective: Patient seen and examined at bedside in no apparent distress, c/o feeling tired, continues with minimal blood after BM. Patient downgraded from ICU overnight, insulin gtt discontinued. BGM have improved, continues on corrective insulin sliding scale, pre meal insulin and Lantus qhs.     REVIEW OF SYSTEMS:    CONSTITUTIONAL: No weakness, fevers or chills, + tiredness  EYES/ENT: No visual changes;  No vertigo or throat pain   NECK: No pain or stiffness  RESPIRATORY: No cough, wheezing, hemoptysis; No shortness of breath  CARDIOVASCULAR: No chest pain or palpitations  GASTROINTESTINAL: No abdominal or epigastric pain. No nausea, vomiting, or hematemesis; No diarrhea or constipation. No melena or hematochezia.  GENITOURINARY: No dysuria, frequency or hematuria  NEUROLOGICAL: No numbness or weakness  SKIN: No itching, rashes    Vital Signs Last 24 Hrs  T(C): 37.2 (27 Mar 2021 05:04), Max: 37.2 (26 Mar 2021 17:10)  T(F): 99 (27 Mar 2021 05:04), Max: 99 (26 Mar 2021 17:10)  HR: 65 (27 Mar 2021 05:04) (61 - 90)  BP: 123/78 (27 Mar 2021 05:04) (79/62 - 123/78)  BP(mean): 69 (27 Mar 2021 02:00) (65 - 93)  RR: 14 (27 Mar 2021 05:04) (11 - 19)  SpO2: 98% (27 Mar 2021 05:04) (91% - 98%)    PHYSICAL EXAM:  GENERAL: NAD, lying in bed comfortably  HEAD:  Atraumatic, Normocephalic  EYES: EOMI, PERRLA, conjunctiva and sclera clear  ENT: Moist mucous membranes  NECK: Supple, No JVD  CHEST/LUNG: Clear to auscultation bilaterally; No rales, rhonchi, wheezing, or rubs. Unlabored respirations  HEART: Regular rate and rhythm; No murmurs, rubs, or gallops  ABDOMEN: Bowel sounds present; Soft, generalized tenderness to palpation, more pronounced to epigastric region and suprapubic, no guarding/ rebound   EXTREMITIES:  2+ Peripheral Pulses, brisk capillary refill. No clubbing, cyanosis, or edema  NERVOUS SYSTEM:  Alert & Oriented X3, speech clear. No deficits   MSK: FROM all 4 extremities, full and equal strength  SKIN: No rashes or lesions    MEDICATIONS  (STANDING):  aspirin  chewable 81 milliGRAM(s) Oral daily  atorvastatin 40 milliGRAM(s) Oral at bedtime  azithromycin  IVPB 500 milliGRAM(s) IV Intermittent every 24 hours  cefTRIAXone   IVPB 1000 milliGRAM(s) IV Intermittent every 24 hours  chlorhexidine 4% Liquid 1 Application(s) Topical <User Schedule>  dextrose 40% Gel 15 Gram(s) Oral once  dextrose 5%. 1000 milliLiter(s) (50 mL/Hr) IV Continuous <Continuous>  dextrose 5%. 1000 milliLiter(s) (100 mL/Hr) IV Continuous <Continuous>  dextrose 50% Injectable 25 Gram(s) IV Push once  dextrose 50% Injectable 12.5 Gram(s) IV Push once  dextrose 50% Injectable 25 Gram(s) IV Push once  dextrose 50% Injectable 25 Gram(s) IV Push once  dextrose 50% Injectable 12.5 Gram(s) IV Push once  dextrose 50% Injectable 25 Gram(s) IV Push once  famotidine    Tablet 20 milliGRAM(s) Oral two times a day  glucagon  Injectable 1 milliGRAM(s) IntraMuscular once  glucagon  Injectable 1 milliGRAM(s) IntraMuscular once  heparin   Injectable 7500 Unit(s) SubCutaneous every 8 hours  hydrocortisone 2.5% Rectal Cream 1 Application(s) Rectal two times a day  insulin glargine Injectable (LANTUS) 20 Unit(s) SubCutaneous at bedtime  insulin lispro (ADMELOG) corrective regimen sliding scale   SubCutaneous Before meals and at bedtime  insulin lispro (ADMELOG) corrective regimen sliding scale   SubCutaneous at bedtime  insulin lispro Injectable (ADMELOG) 4 Unit(s) SubCutaneous three times a day before meals  lactated ringers. 1000 milliLiter(s) (100 mL/Hr) IV Continuous <Continuous>  senna 2 Tablet(s) Oral at bedtime  sertraline 150 milliGRAM(s) Oral daily  tamsulosin 0.4 milliGRAM(s) Oral at bedtime  traZODone 150 milliGRAM(s) Oral at bedtime    MEDICATIONS  (PRN):  acetaminophen   Tablet .. 650 milliGRAM(s) Oral every 6 hours PRN Temp greater or equal to 38C (100.4F), Mild Pain (1 - 3)  benzocaine 15 mG/menthol 3.6 mG (Sugar-Free) Lozenge 1 Lozenge Oral every 6 hours PRN Sore Throat  polyethylene glycol 3350 17 Gram(s) Oral daily PRN Constipation    LABS:                          11.9   11.84 )-----------( 234      ( 27 Mar 2021 05:00 )             34.3     27 Mar 2021 08:00    137    |  104    |  14     ----------------------------<  305    4.0     |  20     |  0.79     Ca    9.2        27 Mar 2021 08:00  Phos  2.8       27 Mar 2021 05:00  Mg     1.7       27 Mar 2021 05:00    TPro  7.8    /  Alb  3.1    /  TBili  0.7    /  DBili  x      /  AST  17     /  ALT  19     /  AlkPhos  103    26 Mar 2021 13:30    LIVER FUNCTIONS - ( 26 Mar 2021 13:30 )  Alb: 3.1 g/dL / Pro: 7.8 g/dL / ALK PHOS: 103 U/L / ALT: 19 U/L / AST: 17 U/L / GGT: x             CAPILLARY BLOOD GLUCOSE      POCT Blood Glucose.: 274 mg/dL (27 Mar 2021 08:09)  POCT Blood Glucose.: 199 mg/dL (26 Mar 2021 21:42)  POCT Blood Glucose.: 228 mg/dL (26 Mar 2021 19:31)  POCT Blood Glucose.: 270 mg/dL (26 Mar 2021 18:32)  POCT Blood Glucose.: 317 mg/dL (26 Mar 2021 17:35)  POCT Blood Glucose.: 372 mg/dL (26 Mar 2021 16:53)  POCT Blood Glucose.: 350 mg/dL (26 Mar 2021 16:52)  POCT Blood Glucose.: 433 mg/dL (26 Mar 2021 15:32)  POCT Blood Glucose.: 419 mg/dL (26 Mar 2021 15:31)    CARDIAC MARKERS ( 26 Mar 2021 13:30 )  <.017 ng/mL / x     / x     / x     / x          Urinalysis Basic - ( 26 Mar 2021 13:30 )    Color: Yellow / Appearance: Clear / S.010 / pH: x  Gluc: x / Ketone: Large  / Bili: Negative / Urobili: Negative   Blood: x / Protein: Negative / Nitrite: Negative   Leuk Esterase: Negative / RBC: x / WBC x   Sq Epi: x / Non Sq Epi: x / Bacteria: x        RADIOLOGY:  < from: CT Chest w/ IV Cont (21 @ 14:44) >  FINDINGS:  CHEST:  LUNGS AND LARGE AIRWAYS: Patent central airways. There is a 4.5 cm heterogeneous in attenuation masslike opacity within the right lower lobe abutting the right major fissure and right hilar region, representing interval change from prior examination dated 12/3/2020. Small nodules are identified adjacent to the mass measuring up to 5 mm.  PLEURA: No evidence for pleural effusion. No pneumothorax.  VESSELS: Stable variant anatomy identified with a venous conduit extending from the left thorax into the left brachiocephalic vein. Thoracic aortic caliber appears unremarkable.  HEART: Heart size is normal. No pericardial effusion. Coronary arterial calcification.  MEDIASTINUM AND LIZZETTE: No mediastinal lymphadenopathy. No left hilar lymphadenopathy.  CHEST WALL AND LOWER NECK: Within normal limits.    ABDOMEN AND PELVIS:  LIVER: Normal in size. No focal hepatic masses.  BILE DUCTS: No evidence for intrahepatic biliary ductal dilatation. Scattered foci of air identified within the intrahepatic biliary tree. There is increased caliber of the extrahepatic CBD measuring 9-10 mm.  GALLBLADDER: A gallstone is noted in the gallbladder lumen measuring 1.1 cm. Air is noted within the gallbladder lumen. Contracted state of the gallbladder limits assessment for wall thickening. Emphysematous cholecystitis cannot be excluded. Correlate with clinical as well as procedure history recommended.  SPLEEN: Within normal limits. Stable 2.4 x 1.6 cm soft tissue nodularity medial to the spleen, likely accessory splenic tissue.  PANCREAS: Within normal limits.  ADRENALS: Within normal limits.  KIDNEYS/URETERS: Within normal limits.    BLADDER: Within normal limits.  REPRODUCTIVE ORGANS: The prostate appears unremarkable.    BOWEL: Fecal material scattered throughout the colon. No evidence for mechanical bowel obstruction. Colonic diverticulosis. No colonic wall thickening. The appendix appears unremarkable.  PERITONEUM: No free intraperitoneal air or fluid.  VESSELS: Within normal limits.  RETROPERITONEUM/LYMPH NODES: No lymphadenopathy.  ABDOMINAL WALL: Within normal limits.  BONES: Degenerative changes.    IMPRESSION:  1. There is a 4.5 cm heterogeneous in attenuation masslike opacity within the right lower lobe abutting the right major fissure and right hilar region, representing interval change from prior examination dated 12/3/2020. Small nodules are identified adjacent to the mass measuring up to 5 mm. Differential considerations would include both neoplastic and infectious etiologies.  2. Scattered foci of air identified within the intrahepatic biliary tree. There is increased caliber of the extrahepatic CBD measuring 9-10 mm.  A gallstone is noted in the gallbladder lumen measuring 1.1 cm. Air is noted within the gallbladder lumen. Contracted state of the gallbladder limits assessment for wall thickening. Emphysematous cholecystitis cannot be excluded. Correlate with clinical as well as procedure history recommended.

## 2021-03-27 NOTE — PROGRESS NOTE ADULT - ASSESSMENT
60M w/ type 2 DM, also with incidental finding of biliary stone (non-obstructing), initially admitted to ICU with DKA (Anion gap of 18 at max), GAP quickly closed and aptient was transferred out of ICU overnight. Now with concern for potential re-opening of anion gap    PLAN:  patient transitioned from Insulin drip to lantus, Sliding scale and mealtime coverage oivernight  -today patient remains hyperglycemic with BS noted between mid 200's to 380 tonight and review of labs show slowly increasing anion gap. GAP still close ella this time but has increased from 10 (when patient was transitoned off insulin drip) to 15 tonight with concomitant drop in serum bicarb.  -will send repeat labs at 0100 on 3/28/2021 to include BMP, mag, phos, acetone, and vbg  -if anion GAP >18-20 will consider restarting insulin gtt.   -for now will give additional sliding scale and lantus coverage  -surgical team continues to follow biliary stone, no surgical intevention at this time, on anbx  -K+ noted at 3.7. Considering possible need of insulin drip overnight will replete for goal K+ of 4-4.5.  -will f/u labs        CRITICAL CARE TIME SPENT:  35 minutes of critical care time spent providing medical care for patient's acute illness/conditions that impairs at least one vital organ system and/or poses a high risk of imminent or life threatening deterioration in the patient's condition. It includes time spent evaluating and treating the patient's acute illness as well as time spent reviewing labs, radiology, discussing goals of care with patient and/or patient's family, and discussing the case with a multidisciplinary team, including the eICU, in an effort to prevent further life threatening deterioration or end organ damage. This time is independent of any procedures performed.

## 2021-03-27 NOTE — PROGRESS NOTE ADULT - SUBJECTIVE AND OBJECTIVE BOX
Follow-up Critical Care Progress Note  Chief Complaint : Type 2 diabetes mellitus with ketoacidosis without coma    Admitted to the ICU overnight for DKA and transferred out Family medicine service. Seen this morning. States he feels fine, pain is better. No nausea or vomiting. But did endorse rectal bleeding when he wipes. No history of DM, but has been told he is prediabetic. Endorses about 20 pound weight loss over the last few months.     Allergies :No Known Drug Allergies  shellfish (Unknown)    PAST MEDICAL & SURGICAL HISTORY:  Benign hypertension    Type 2 diabetes mellitus without complication, without long-term current use of insulin    Shoulder arthritis  Multiple surgeries on left shoulder    Medications:  MEDICATIONS  (STANDING):  aspirin  chewable 81 milliGRAM(s) Oral daily  atorvastatin 40 milliGRAM(s) Oral at bedtime  azithromycin  IVPB 500 milliGRAM(s) IV Intermittent every 24 hours  cefTRIAXone   IVPB 1000 milliGRAM(s) IV Intermittent every 24 hours  chlorhexidine 4% Liquid 1 Application(s) Topical <User Schedule>  dextrose 40% Gel 15 Gram(s) Oral once  dextrose 5%. 1000 milliLiter(s) (50 mL/Hr) IV Continuous <Continuous>  dextrose 5%. 1000 milliLiter(s) (100 mL/Hr) IV Continuous <Continuous>  dextrose 50% Injectable 25 Gram(s) IV Push once  dextrose 50% Injectable 12.5 Gram(s) IV Push once  dextrose 50% Injectable 25 Gram(s) IV Push once  dextrose 50% Injectable 25 Gram(s) IV Push once  dextrose 50% Injectable 12.5 Gram(s) IV Push once  dextrose 50% Injectable 25 Gram(s) IV Push once  famotidine    Tablet 20 milliGRAM(s) Oral two times a day  glucagon  Injectable 1 milliGRAM(s) IntraMuscular once  glucagon  Injectable 1 milliGRAM(s) IntraMuscular once  heparin   Injectable 7500 Unit(s) SubCutaneous every 8 hours  insulin glargine Injectable (LANTUS) 20 Unit(s) SubCutaneous at bedtime  insulin lispro (ADMELOG) corrective regimen sliding scale   SubCutaneous Before meals and at bedtime  insulin lispro (ADMELOG) corrective regimen sliding scale   SubCutaneous at bedtime  lactated ringers. 1000 milliLiter(s) (100 mL/Hr) IV Continuous <Continuous>  sertraline 150 milliGRAM(s) Oral daily  traZODone 150 milliGRAM(s) Oral at bedtime    MEDICATIONS  (PRN):  acetaminophen   Tablet .. 650 milliGRAM(s) Oral every 6 hours PRN Temp greater or equal to 38C (100.4F), Mild Pain (1 - 3)  benzocaine 15 mG/menthol 3.6 mG (Sugar-Free) Lozenge 1 Lozenge Oral every 6 hours PRN Sore Throat      LABS:                        11.9   11.84 )-----------( 234      ( 27 Mar 2021 05:00 )             34.3         137  |  104  |  14  ----------------------------<  305<H>  4.0   |  20<L>  |  0.79    Ca    9.2      27 Mar 2021 08:00  Phos  2.8       Mg     1.7         TPro  7.8  /  Alb  3.1<L>  /  TBili  0.7  /  DBili  x   /  AST  17  /  ALT  19  /  AlkPhos  103      CARDIAC MARKERS ( 26 Mar 2021 13:30 )  <.017 ng/mL / x     / x     / x     / x        Urinalysis Basic - ( 26 Mar 2021 13:30 )    Color: Yellow / Appearance: Clear / S.010 / pH: x  Gluc: x / Ketone: Large  / Bili: Negative / Urobili: Negative   Blood: x / Protein: Negative / Nitrite: Negative   Leuk Esterase: Negative / RBC: x / WBC x   Sq Epi: x / Non Sq Epi: x / Bacteria: x    VITALS:  T(C): 37.2 (21 @ 05:04), Max: 37.2 (21 @ 17:10)  T(F): 99 (21 @ 05:04), Max: 99 (21 @ 17:10)  HR: 65 (21 @ 05:04) (61 - 90)  BP: 123/78 (21 @ 05:04) (79/62 - 123/78)  BP(mean): 69 (21 @ 02:00) (65 - 93)  ABP: --  ABP(mean): --  RR: 14 (21 @ 05:04) (11 - 19)  SpO2: 98% (21 @ 05:04) (91% - 98%)  CVP(mm Hg): --  CVP(cm H2O): --    Ins and Outs     21 @ 07:01  -  21 @ 07:00  --------------------------------------------------------  IN: 4801.2 mL / OUT: 350 mL / NET: 4451.2 mL    21 @ 07:01  -  21 @ 08:45  --------------------------------------------------------  IN: 200 mL / OUT: 0 mL / NET: 200 mL    Height (cm): 180.3 (21 @ 12:38)  Weight (kg): 97.5 (21 @ 12:38)  BMI (kg/m2): 30 (21 @ 12:38)    I&O's Detail    26 Mar 2021 07:  -  27 Mar 2021 07:00  --------------------------------------------------------  IN:    Insulin: 21.2 mL    IV PiggyBack: 250 mL    IV PiggyBack: 300 mL    IV PiggyBack: 50 mL    Lactated Ringers: 700 mL    Lactated Ringers Bolus: 1000 mL    Oral Fluid: 480 mL    Sodium Chloride 0.9% Bolus: 2000 mL  Total IN: 4801.2 mL    OUT:    Voided (mL): 350 mL  Total OUT: 350 mL    Total NET: 4451.2 mL      27 Mar 2021 07:  -  27 Mar 2021 08:45  --------------------------------------------------------  IN:    Lactated Ringers: 200 mL  Total IN: 200 mL    OUT:  Total OUT: 0 mL    Total NET: 200 mL

## 2021-03-28 LAB
ACETONE SERPL-MCNC: ABNORMAL
ALBUMIN SERPL ELPH-MCNC: 2.5 G/DL — LOW (ref 3.3–5)
ALP SERPL-CCNC: 80 U/L — SIGNIFICANT CHANGE UP (ref 40–120)
ALT FLD-CCNC: 14 U/L — SIGNIFICANT CHANGE UP (ref 10–45)
ANION GAP SERPL CALC-SCNC: 11 MMOL/L — SIGNIFICANT CHANGE UP (ref 5–17)
ANION GAP SERPL CALC-SCNC: 11 MMOL/L — SIGNIFICANT CHANGE UP (ref 5–17)
ANION GAP SERPL CALC-SCNC: 13 MMOL/L — SIGNIFICANT CHANGE UP (ref 5–17)
ANION GAP SERPL CALC-SCNC: 8 MMOL/L — SIGNIFICANT CHANGE UP (ref 5–17)
AST SERPL-CCNC: 15 U/L — SIGNIFICANT CHANGE UP (ref 10–40)
BILIRUB DIRECT SERPL-MCNC: 0.1 MG/DL — SIGNIFICANT CHANGE UP (ref 0–0.2)
BILIRUB INDIRECT FLD-MCNC: 0.3 MG/DL — SIGNIFICANT CHANGE UP (ref 0.2–1)
BILIRUB SERPL-MCNC: 0.4 MG/DL — SIGNIFICANT CHANGE UP (ref 0.2–1.2)
BUN SERPL-MCNC: 10 MG/DL — SIGNIFICANT CHANGE UP (ref 7–23)
BUN SERPL-MCNC: 10 MG/DL — SIGNIFICANT CHANGE UP (ref 7–23)
BUN SERPL-MCNC: 11 MG/DL — SIGNIFICANT CHANGE UP (ref 7–23)
BUN SERPL-MCNC: 13 MG/DL — SIGNIFICANT CHANGE UP (ref 7–23)
BUN SERPL-MCNC: 7 MG/DL — SIGNIFICANT CHANGE UP (ref 7–23)
BUN SERPL-MCNC: 8 MG/DL — SIGNIFICANT CHANGE UP (ref 7–23)
CALCIUM SERPL-MCNC: 8.3 MG/DL — LOW (ref 8.4–10.5)
CALCIUM SERPL-MCNC: 8.7 MG/DL — SIGNIFICANT CHANGE UP (ref 8.4–10.5)
CALCIUM SERPL-MCNC: 8.8 MG/DL — SIGNIFICANT CHANGE UP (ref 8.4–10.5)
CHLORIDE SERPL-SCNC: 100 MMOL/L — SIGNIFICANT CHANGE UP (ref 96–108)
CHLORIDE SERPL-SCNC: 100 MMOL/L — SIGNIFICANT CHANGE UP (ref 96–108)
CHLORIDE SERPL-SCNC: 101 MMOL/L — SIGNIFICANT CHANGE UP (ref 96–108)
CHLORIDE SERPL-SCNC: 102 MMOL/L — SIGNIFICANT CHANGE UP (ref 96–108)
CHLORIDE SERPL-SCNC: 102 MMOL/L — SIGNIFICANT CHANGE UP (ref 96–108)
CHLORIDE SERPL-SCNC: 103 MMOL/L — SIGNIFICANT CHANGE UP (ref 96–108)
CO2 BLDV-SCNC: 23 MMOL/L — SIGNIFICANT CHANGE UP (ref 21–29)
CO2 SERPL-SCNC: 20 MMOL/L — LOW (ref 22–31)
CO2 SERPL-SCNC: 20 MMOL/L — LOW (ref 22–31)
CO2 SERPL-SCNC: 21 MMOL/L — LOW (ref 22–31)
CO2 SERPL-SCNC: 22 MMOL/L — SIGNIFICANT CHANGE UP (ref 22–31)
CO2 SERPL-SCNC: 23 MMOL/L — SIGNIFICANT CHANGE UP (ref 22–31)
CO2 SERPL-SCNC: 24 MMOL/L — SIGNIFICANT CHANGE UP (ref 22–31)
CREAT SERPL-MCNC: 0.74 MG/DL — SIGNIFICANT CHANGE UP (ref 0.5–1.3)
CREAT SERPL-MCNC: 0.75 MG/DL — SIGNIFICANT CHANGE UP (ref 0.5–1.3)
CREAT SERPL-MCNC: 0.8 MG/DL — SIGNIFICANT CHANGE UP (ref 0.5–1.3)
CREAT SERPL-MCNC: 0.82 MG/DL — SIGNIFICANT CHANGE UP (ref 0.5–1.3)
CREAT SERPL-MCNC: 0.84 MG/DL — SIGNIFICANT CHANGE UP (ref 0.5–1.3)
CREAT SERPL-MCNC: 0.96 MG/DL — SIGNIFICANT CHANGE UP (ref 0.5–1.3)
CRP SERPL-MCNC: 118 MG/L — HIGH
ERYTHROCYTE [SEDIMENTATION RATE] IN BLOOD: 102 MM/HR — HIGH (ref 0–20)
GLUCOSE BLDC GLUCOMTR-MCNC: 207 MG/DL — HIGH (ref 70–99)
GLUCOSE BLDC GLUCOMTR-MCNC: 208 MG/DL — HIGH (ref 70–99)
GLUCOSE BLDC GLUCOMTR-MCNC: 252 MG/DL — HIGH (ref 70–99)
GLUCOSE BLDC GLUCOMTR-MCNC: 312 MG/DL — HIGH (ref 70–99)
GLUCOSE BLDC GLUCOMTR-MCNC: 557 MG/DL — CRITICAL HIGH (ref 70–99)
GLUCOSE SERPL-MCNC: 222 MG/DL — HIGH (ref 70–99)
GLUCOSE SERPL-MCNC: 253 MG/DL — HIGH (ref 70–99)
GLUCOSE SERPL-MCNC: 267 MG/DL — HIGH (ref 70–99)
GLUCOSE SERPL-MCNC: 282 MG/DL — HIGH (ref 70–99)
GLUCOSE SERPL-MCNC: 291 MG/DL — HIGH (ref 70–99)
GLUCOSE SERPL-MCNC: 330 MG/DL — HIGH (ref 70–99)
HCT VFR BLD CALC: 33.3 % — LOW (ref 39–50)
HGB BLD-MCNC: 11.7 G/DL — LOW (ref 13–17)
MAGNESIUM SERPL-MCNC: 1.3 MG/DL — LOW (ref 1.6–2.6)
MAGNESIUM SERPL-MCNC: 2 MG/DL — SIGNIFICANT CHANGE UP (ref 1.6–2.6)
MCHC RBC-ENTMCNC: 32.4 PG — SIGNIFICANT CHANGE UP (ref 27–34)
MCHC RBC-ENTMCNC: 35.1 GM/DL — SIGNIFICANT CHANGE UP (ref 32–36)
MCV RBC AUTO: 92.2 FL — SIGNIFICANT CHANGE UP (ref 80–100)
NRBC # BLD: 0 /100 WBCS — SIGNIFICANT CHANGE UP (ref 0–0)
PCO2 BLDV: 31 MMHG — LOW (ref 42–55)
PH BLDV: 7.45 — SIGNIFICANT CHANGE UP (ref 7.35–7.45)
PHOSPHATE SERPL-MCNC: 2.8 MG/DL — SIGNIFICANT CHANGE UP (ref 2.5–4.5)
PLATELET # BLD AUTO: 214 K/UL — SIGNIFICANT CHANGE UP (ref 150–400)
PO2 BLDV: 16 MMHG — LOW (ref 25–45)
POTASSIUM SERPL-MCNC: 3.3 MMOL/L — LOW (ref 3.5–5.3)
POTASSIUM SERPL-MCNC: 3.4 MMOL/L — LOW (ref 3.5–5.3)
POTASSIUM SERPL-MCNC: 3.4 MMOL/L — LOW (ref 3.5–5.3)
POTASSIUM SERPL-MCNC: 3.7 MMOL/L — SIGNIFICANT CHANGE UP (ref 3.5–5.3)
POTASSIUM SERPL-MCNC: 3.9 MMOL/L — SIGNIFICANT CHANGE UP (ref 3.5–5.3)
POTASSIUM SERPL-MCNC: 3.9 MMOL/L — SIGNIFICANT CHANGE UP (ref 3.5–5.3)
POTASSIUM SERPL-SCNC: 3.3 MMOL/L — LOW (ref 3.5–5.3)
POTASSIUM SERPL-SCNC: 3.4 MMOL/L — LOW (ref 3.5–5.3)
POTASSIUM SERPL-SCNC: 3.4 MMOL/L — LOW (ref 3.5–5.3)
POTASSIUM SERPL-SCNC: 3.7 MMOL/L — SIGNIFICANT CHANGE UP (ref 3.5–5.3)
POTASSIUM SERPL-SCNC: 3.9 MMOL/L — SIGNIFICANT CHANGE UP (ref 3.5–5.3)
POTASSIUM SERPL-SCNC: 3.9 MMOL/L — SIGNIFICANT CHANGE UP (ref 3.5–5.3)
PROCALCITONIN SERPL-MCNC: 0.08 NG/ML — SIGNIFICANT CHANGE UP
PROT SERPL-MCNC: 6.9 G/DL — SIGNIFICANT CHANGE UP (ref 6–8.3)
RBC # BLD: 3.61 M/UL — LOW (ref 4.2–5.8)
RBC # FLD: 11.9 % — SIGNIFICANT CHANGE UP (ref 10.3–14.5)
SAO2 % BLDV: 24 % — LOW (ref 67–88)
SODIUM SERPL-SCNC: 133 MMOL/L — LOW (ref 135–145)
SODIUM SERPL-SCNC: 134 MMOL/L — LOW (ref 135–145)
SODIUM SERPL-SCNC: 134 MMOL/L — LOW (ref 135–145)
SODIUM SERPL-SCNC: 135 MMOL/L — SIGNIFICANT CHANGE UP (ref 135–145)
SODIUM SERPL-SCNC: 135 MMOL/L — SIGNIFICANT CHANGE UP (ref 135–145)
SODIUM SERPL-SCNC: 136 MMOL/L — SIGNIFICANT CHANGE UP (ref 135–145)
WBC # BLD: 11.17 K/UL — HIGH (ref 3.8–10.5)
WBC # FLD AUTO: 11.17 K/UL — HIGH (ref 3.8–10.5)

## 2021-03-28 PROCEDURE — 99232 SBSQ HOSP IP/OBS MODERATE 35: CPT | Mod: GC

## 2021-03-28 PROCEDURE — 99232 SBSQ HOSP IP/OBS MODERATE 35: CPT

## 2021-03-28 RX ORDER — POTASSIUM CHLORIDE 20 MEQ
40 PACKET (EA) ORAL
Refills: 0 | Status: COMPLETED | OUTPATIENT
Start: 2021-03-28 | End: 2021-03-28

## 2021-03-28 RX ORDER — POTASSIUM CHLORIDE 20 MEQ
40 PACKET (EA) ORAL ONCE
Refills: 0 | Status: COMPLETED | OUTPATIENT
Start: 2021-03-28 | End: 2021-03-28

## 2021-03-28 RX ORDER — MAGNESIUM SULFATE 500 MG/ML
1 VIAL (ML) INJECTION
Refills: 0 | Status: COMPLETED | OUTPATIENT
Start: 2021-03-28 | End: 2021-03-28

## 2021-03-28 RX ORDER — POLYETHYLENE GLYCOL 3350 17 G/17G
17 POWDER, FOR SOLUTION ORAL
Refills: 0 | Status: DISCONTINUED | OUTPATIENT
Start: 2021-03-28 | End: 2021-04-01

## 2021-03-28 RX ORDER — INSULIN LISPRO 100/ML
7 VIAL (ML) SUBCUTANEOUS
Refills: 0 | Status: DISCONTINUED | OUTPATIENT
Start: 2021-03-28 | End: 2021-03-29

## 2021-03-28 RX ORDER — INSULIN GLARGINE 100 [IU]/ML
30 INJECTION, SOLUTION SUBCUTANEOUS AT BEDTIME
Refills: 0 | Status: DISCONTINUED | OUTPATIENT
Start: 2021-03-28 | End: 2021-03-29

## 2021-03-28 RX ADMIN — Medication 1 APPLICATION(S): at 05:41

## 2021-03-28 RX ADMIN — Medication 100 MILLIGRAM(S): at 21:17

## 2021-03-28 RX ADMIN — CEFTRIAXONE 100 MILLIGRAM(S): 500 INJECTION, POWDER, FOR SOLUTION INTRAMUSCULAR; INTRAVENOUS at 18:15

## 2021-03-28 RX ADMIN — Medication 40 MILLIEQUIVALENT(S): at 10:24

## 2021-03-28 RX ADMIN — Medication 6: at 09:01

## 2021-03-28 RX ADMIN — SENNA PLUS 2 TABLET(S): 8.6 TABLET ORAL at 21:14

## 2021-03-28 RX ADMIN — Medication 40 MILLIEQUIVALENT(S): at 03:50

## 2021-03-28 RX ADMIN — FAMOTIDINE 20 MILLIGRAM(S): 10 INJECTION INTRAVENOUS at 05:39

## 2021-03-28 RX ADMIN — HEPARIN SODIUM 7500 UNIT(S): 5000 INJECTION INTRAVENOUS; SUBCUTANEOUS at 05:39

## 2021-03-28 RX ADMIN — Medication 1 APPLICATION(S): at 18:16

## 2021-03-28 RX ADMIN — FAMOTIDINE 20 MILLIGRAM(S): 10 INJECTION INTRAVENOUS at 18:14

## 2021-03-28 RX ADMIN — SODIUM CHLORIDE 100 MILLILITER(S): 9 INJECTION, SOLUTION INTRAVENOUS at 21:12

## 2021-03-28 RX ADMIN — Medication 81 MILLIGRAM(S): at 13:08

## 2021-03-28 RX ADMIN — Medication 100 MILLIGRAM(S): at 00:32

## 2021-03-28 RX ADMIN — Medication 4: at 18:15

## 2021-03-28 RX ADMIN — TAMSULOSIN HYDROCHLORIDE 0.4 MILLIGRAM(S): 0.4 CAPSULE ORAL at 21:12

## 2021-03-28 RX ADMIN — Medication 5 UNIT(S): at 09:00

## 2021-03-28 RX ADMIN — HEPARIN SODIUM 7500 UNIT(S): 5000 INJECTION INTRAVENOUS; SUBCUTANEOUS at 13:08

## 2021-03-28 RX ADMIN — Medication 150 MILLIGRAM(S): at 21:12

## 2021-03-28 RX ADMIN — HEPARIN SODIUM 7500 UNIT(S): 5000 INJECTION INTRAVENOUS; SUBCUTANEOUS at 21:11

## 2021-03-28 RX ADMIN — SERTRALINE 150 MILLIGRAM(S): 25 TABLET, FILM COATED ORAL at 13:08

## 2021-03-28 RX ADMIN — AZITHROMYCIN 255 MILLIGRAM(S): 500 TABLET, FILM COATED ORAL at 23:12

## 2021-03-28 RX ADMIN — Medication 4: at 21:13

## 2021-03-28 RX ADMIN — ATORVASTATIN CALCIUM 40 MILLIGRAM(S): 80 TABLET, FILM COATED ORAL at 21:12

## 2021-03-28 RX ADMIN — Medication 100 MILLIGRAM(S): at 04:31

## 2021-03-28 RX ADMIN — INSULIN GLARGINE 30 UNIT(S): 100 INJECTION, SOLUTION SUBCUTANEOUS at 21:13

## 2021-03-28 RX ADMIN — Medication 100 GRAM(S): at 03:50

## 2021-03-28 RX ADMIN — Medication 7 UNIT(S): at 18:14

## 2021-03-28 RX ADMIN — Medication 100 GRAM(S): at 04:43

## 2021-03-28 RX ADMIN — Medication 40 MILLIEQUIVALENT(S): at 13:09

## 2021-03-28 RX ADMIN — Medication 100 GRAM(S): at 01:35

## 2021-03-28 RX ADMIN — Medication 100 GRAM(S): at 02:45

## 2021-03-28 RX ADMIN — Medication 7 UNIT(S): at 13:06

## 2021-03-28 RX ADMIN — Medication 4: at 13:06

## 2021-03-28 NOTE — PROGRESS NOTE ADULT - SUBJECTIVE AND OBJECTIVE BOX
The patient has been tolerating solid food with only mild increase in diffuse abdominal discomfort. He denied and nausea or vomiting. The patient passed normal stool earlier today.    PFSH: All noncontributory    MEDICATIONS REVIEWED:acetaminophen   Tablet .. 650 milliGRAM(s) Oral every 6 hours PRN  aspirin  chewable 81 milliGRAM(s) Oral daily  atorvastatin 40 milliGRAM(s) Oral at bedtime  azithromycin  IVPB 500 milliGRAM(s) IV Intermittent every 24 hours  benzocaine 15 mG/menthol 3.6 mG (Sugar-Free) Lozenge 1 Lozenge Oral every 6 hours PRN  cefTRIAXone   IVPB 1000 milliGRAM(s) IV Intermittent every 24 hours  chlorhexidine 4% Liquid 1 Application(s) Topical <User Schedule>  dextrose 40% Gel 15 Gram(s) Oral once  dextrose 5%. 1000 milliLiter(s) IV Continuous <Continuous>  dextrose 5%. 1000 milliLiter(s) IV Continuous <Continuous>  dextrose 50% Injectable 25 Gram(s) IV Push once  dextrose 50% Injectable 12.5 Gram(s) IV Push once  dextrose 50% Injectable 25 Gram(s) IV Push once  dextrose 50% Injectable 25 Gram(s) IV Push once  dextrose 50% Injectable 12.5 Gram(s) IV Push once  dextrose 50% Injectable 25 Gram(s) IV Push once  famotidine    Tablet 20 milliGRAM(s) Oral two times a day  glucagon  Injectable 1 milliGRAM(s) IntraMuscular once  glucagon  Injectable 1 milliGRAM(s) IntraMuscular once  guaiFENesin   Syrup  (Sugar-Free) 100 milliGRAM(s) Oral every 6 hours PRN  heparin   Injectable 7500 Unit(s) SubCutaneous every 8 hours  hydrocortisone 2.5% Rectal Cream 1 Application(s) Rectal two times a day  insulin glargine Injectable (LANTUS) 30 Unit(s) SubCutaneous at bedtime  insulin lispro (ADMELOG) corrective regimen sliding scale   SubCutaneous three times a day before meals  insulin lispro (ADMELOG) corrective regimen sliding scale   SubCutaneous at bedtime  insulin lispro Injectable (ADMELOG) 7 Unit(s) SubCutaneous three times a day before meals  lactated ringers. 1000 milliLiter(s) IV Continuous <Continuous>  ondansetron Injectable 4 milliGRAM(s) IV Push every 8 hours PRN  polyethylene glycol 3350 17 Gram(s) Oral two times a day  senna 2 Tablet(s) Oral at bedtime  sertraline 150 milliGRAM(s) Oral daily  tamsulosin 0.4 milliGRAM(s) Oral at bedtime  traZODone 150 milliGRAM(s) Oral at bedtime      ALLERGIES:No Known Drug Allergies  shellfish (Unknown)      PHYSICAL EXAMINATION:  RESPIRATORY: Clear to auscultation bilaterally, respirations non-labored.  CARDIAC: RR, normal S1S2, no murmurs.  ABDOMEN: soft, BS present, no masses, no hernias, no peritoneal signs, no KLS, nontender.  VASCULAR: Equal and normal pulses.  MUSCULOSKELETAL: Full ROM, no deformities.      T(C): 37.1 (03-28-21 @ 16:12), Max: 37.9 (03-27-21 @ 19:47)  HR: 80 (03-28-21 @ 16:12) (72 - 90)  BP: 118/73 (03-28-21 @ 16:12) (108/61 - 133/75)  RR: 11 (03-28-21 @ 16:12) (11 - 18)  SpO2: 96% (03-28-21 @ 16:12) (95% - 99%)                          11.7   11.17 )-----------( 214      ( 28 Mar 2021 11:54 )             33.3       03-28    134<L>  |  102  |  10  ----------------------------<  282<H>  3.7   |  24  |  0.80    Ca    8.3<L>      28 Mar 2021 14:50  Phos  2.8     03-27  Mg     2.0     03-28

## 2021-03-28 NOTE — DIETITIAN INITIAL EVALUATION ADULT. - ADD RECOMMEND
Ongoing diet education throughout hospitalization. Recommend f/u with outpatient RD/CDE for further support. Trend weights, labs & PO intake.

## 2021-03-28 NOTE — DIETITIAN INITIAL EVALUATION ADULT. - OTHER INFO
59 yo M with PMHx of HTN, HLD, Pre-DM admitted for DKA after presenting to the ER with c/o weakness, N/V, abdominal pain and chest pain, found have glucose level of 674, AG 18, BUN/Cr 26/1.31, Acetone moderate. CT Chest with mass like structure to RLL and CT Abd/Pel with CBD dilation and air in biliary tree.   Pt now tolerating diet- ate 100% of lunch, still reporting some abdominal pain. No nausea/vomiting. UBW 220lbs, indicates 6% weight loss in 2 weeks. Current A1c > 15.5%. Pt states that he has been very stressed due to loss of job, housing situation. States that his sister has been a support person. Limited knowledge on carbohydrate sources. Reviewed general teachings on consistent carbohydrate recommendations +written materials, but will need further review. Encouraged pt to follow up with outpatient RD/CDE for ongoing support/compliance with recommendations. Last BM 3/28. Menu provided and ordering procedures reviewed.

## 2021-03-28 NOTE — DIETITIAN INITIAL EVALUATION ADULT. - ORAL INTAKE PTA/DIET HISTORY
Pt reports polydipsia & polyuria PTA. States that he hasn't been able to tolerate food over the past 1.5 weeks +nausea/vomiting. Was not following any particular dietary guidelines PTA, was avoiding bread. 11/2020 A1c 5.7%, no prior DMII management. Does not eat shellfish. Reports hx swallowing difficulty with solid meats.

## 2021-03-28 NOTE — DIETITIAN INITIAL EVALUATION ADULT. - PERSON TAUGHT/METHOD
Consistent carbohydrate, MyPlate/verbal instruction/written material/patient instructed/teach back - (Patient repeats in own words)

## 2021-03-28 NOTE — CONSULT NOTE ADULT - ASSESSMENT
Full consult to follow 60y male with hx DM presented for eval of weakness, N/V, abd pain, chest pain. Patient reports several days of symptoms. He had cough for about 2 weeks, no fevers. CT Chest with mass like structure to RLL and CT Abd/Pel with CBD dilation and air in biliary tree. He was started on IV abx. Concern raised possible lung abscess. Pt denies any recent dental or sinus problems or interventions, no abx prior to admission, denies any abd pain    PLAN:  Cont CTX, ZMax for now  f/u cultures, sputum, MRSA screen, legionella Ag  check baseline ESR, CRP and procalcitonin  would consider diagnostic bronch  d/w Dr Velazquez

## 2021-03-28 NOTE — DIETITIAN NUTRITION RISK NOTIFICATION - ADDITIONAL COMMENTS/DIETITIAN RECOMMENDATIONS
1. Appetite returning with improving BG   2. Encouraged prioritization of protein at meals   3. Ongoing diet education   4. Recommend consult to CDE

## 2021-03-28 NOTE — DIETITIAN NUTRITION RISK NOTIFICATION - TREATMENT: THE FOLLOWING DIET HAS BEEN RECOMMENDED
Diet, Consistent Carbohydrate w/Evening Snack:   DASH/TLC {Sodium & Cholesterol Restricted} (03-27-21 @ 12:59) [Active]

## 2021-03-28 NOTE — PROGRESS NOTE ADULT - SUBJECTIVE AND OBJECTIVE BOX
Chart Review: 60M with a PMH Left Thyroid Nodule, SM and a known history of Pre-DM. He presented to the ED with abdominal pain, cough,, N/V, Chest Pain, intermittent and unintentional weight loss of over 25lbs in the last month. In the ED he was found to Hypoxic on RA with Borderline BPs. Laboratory findings were significant for WBC of 12.62 an AG of 18 with Serum glucose of 674. CT Angio reveled a RLL 4.5cm Heterogeneous masslike opacity, not seen in prior studies in December. Pt was initiated on IV Abx then admitted to ICU for further management.         Subjective: Pt was seen and examined at bedside. Per the nurse, overnight, the pt spiked a low grade Fever with Tmax on 100.2F, pt also complained of persistent productive cough for which he was initiated on antitussives, apart from the above the pt remaineded vitally stable. This morning the pt states that he has not had a BM as of yet. He endorses bowel discomfort from failure to empty his bowels, he also endorses persistence of his cough and states and denies any further febrile episodes          Vital Signs Last 24 Hrs  T(C): 37.1 (28 Mar 2021 16:12), Max: 37.9 (27 Mar 2021 19:47)  T(F): 98.7 (28 Mar 2021 16:12), Max: 100.2 (27 Mar 2021 19:47)  HR: 80 (28 Mar 2021 16:12) (72 - 90)  BP: 118/73 (28 Mar 2021 16:12) (108/61 - 133/75)  RR: 11 (28 Mar 2021 16:12) (11 - 18)  SpO2: 96% (28 Mar 2021 16:12) (95% - 99%)    I&O's Summary    27 Mar 2021 07:01  -  28 Mar 2021 07:00  --------------------------------------------------------  IN: 1520 mL / OUT: 3200 mL / NET: -1680 mL    28 Mar 2021 07:01  -  28 Mar 2021 17:46  --------------------------------------------------------  IN: 480 mL / OUT: 2700 mL / NET: -2220 mL  CAPILLARY BLOOD GLUCOSE  POCT Blood Glucose.: 208 mg/dL (28 Mar 2021 17:24)  POCT Blood Glucose.: 207 mg/dL (28 Mar 2021 13:03)  POCT Blood Glucose.: 252 mg/dL (28 Mar 2021 08:59)  POCT Blood Glucose.: 557 mg/dL (28 Mar 2021 08:57)  POCT Blood Glucose.: 319 mg/dL (27 Mar 2021 21:41)      PHYSICAL EXAM:  Constitutional: NAD, awake and alert, well-developed  HEENT: PERR, EOMI, Normal Hearing, MMM  Neck: Soft and supple, No LAD, No JVD  Respiratory: Breath sounds clear with decreased air entry at the left base,   Cardiovascular: S1 and S2, regular rate and rhythm, no Murmurs, gallops or rubs  Gastrointestinal: (+) Bowel Sounds, soft, nontender, mildly distended, no guarding/rebound  Extremities: No peripheral edema  Vascular: 2+ peripheral pulses  Neurological: A/O x 3, no focal deficits  Musculoskeletal: 5/5 strength b/l upper and lower extremities  Skin: No rashes    MEDICATIONS:  MEDICATIONS  (STANDING):  aspirin  chewable 81 milliGRAM(s) Oral daily  atorvastatin 40 milliGRAM(s) Oral at bedtime  azithromycin  IVPB 500 milliGRAM(s) IV Intermittent every 24 hours  cefTRIAXone   IVPB 1000 milliGRAM(s) IV Intermittent every 24 hours  chlorhexidine 4% Liquid 1 Application(s) Topical <User Schedule>  dextrose 40% Gel 15 Gram(s) Oral once  famotidine    Tablet 20 milliGRAM(s) Oral two times a day  glucagon  Injectable 1 milliGRAM(s) IntraMuscular once  glucagon  Injectable 1 milliGRAM(s) IntraMuscular once  heparin   Injectable 7500 Unit(s) SubCutaneous every 8 hours  hydrocortisone 2.5% Rectal Cream 1 Application(s) Rectal two times a day  insulin glargine Injectable (LANTUS) 30 Unit(s) SubCutaneous at bedtime  insulin lispro (ADMELOG) corrective regimen sliding scale   SubCutaneous three times a day before meals  insulin lispro (ADMELOG) corrective regimen sliding scale   SubCutaneous at bedtime  insulin lispro Injectable (ADMELOG) 7 Unit(s) SubCutaneous three times a day before meals  lactated ringers. 1000 milliLiter(s) (100 mL/Hr) IV Continuous <Continuous>  polyethylene glycol 3350 17 Gram(s) Oral two times a day  senna 2 Tablet(s) Oral at bedtime  sertraline 150 milliGRAM(s) Oral daily  tamsulosin 0.4 milliGRAM(s) Oral at bedtime  traZODone 150 milliGRAM(s) Oral at bedtime      LABS: All Labs Reviewed:                        11.7   11.17 )-----------( 214      ( 28 Mar 2021 11:54 )             33.3     03-28    134<L>  |  102  |  10  ----------------------------<  282<H>  3.7   |  24  |  0.80    Ca    8.3<L>      28 Mar 2021 14:50  Phos  2.8     03-27  Mg     2.0     03-28      Blood Culture: 03-26 @ 13:30  Organism --  Gram Stain Blood -- Gram Stain --  Specimen Source .Urine Clean Catch (Midstream)  Culture-Blood --        Imaging:     < from: CT Chest w/ IV Cont (03.26.21 @ 14:44) >  IMPRESSION:  1. There is a 4.5 cm heterogeneous in attenuation masslike opacity within the right lower lobe abutting the right major fissure and right hilar region, representing interval change from prior examination dated 12/3/2020. Small nodules are identified adjacent to the mass measuring up to 5 mm. Differential considerations would include both neoplastic and infectious etiologies.  2. Scattered foci of air identified within the intrahepatic biliary tree. There is increased caliber of the extrahepatic CBD measuring 9-10 mm.  A gallstone is noted in the gallbladder lumen measuring 1.1 cm. Air is noted within the gallbladder lumen. Contracted state of the gallbladder limits assessment for wall thickening. Emphysematous cholecystitis cannot be excluded. Correlate with clinical as well as procedure history recommended.

## 2021-03-28 NOTE — PROGRESS NOTE ADULT - ASSESSMENT
60M with a PMH Left Thyroid Nodule, HLD, Hep C and a known history of Pre-DM who presented to the ED with abdominal pain, cough, N/V, Chest Pain, intermittent and unintentional weight loss of over 25lbs in the last month. In the ED he was found to Hypoxic on RA with Borderline BPs. Laboratory findings were significant for WBC of 12.62 an AG of 18 with Serum glucose of 674. CT Angio reveled a RLL 4.5cm Heterogeneous masslike opacity, not seen in prior studies in December. Pt was initiated on IV Abx then admitted to ICU for further management. Pt was down graded after closure of his AG. Pt with progressive cough on current Abx with mild fevers the night prior. BCx and Sputum Cxs drawn with ID consulted for possible impending Abscess vs Postobstructive PNA in the setting of possible Pulmonary CA.       #DKA in the setting of New Dx of T2DM  -DKA resolved, Uncontrolled Sugars persists  -CMP this am: Closed AG- 13 from AG of 18 on admission with moderate ketones   -S/p downgrade from ICU   -F/u AM BMP continue to monitor for recurrence of AG   -Continue with ISS  -Continue with IVF Maintenance Fluids at 100cc/h  -Continue with IV Abx, as source of Infection may be PNA   -ID Consulted for possible pulmonary Abscess, f/u recc  -Continue basal Insulin/Lantus increased to 30U from 25U the night prior    -Continue with Pre-Meal Ademelog increased to 7U TID from 5U   -Hypoglycemic Protocol in place  -Diet: Carb-consistent   -DM educator consulted for pt education on New Dx of T2DM and medication management       #Productive Cough/Pulmonary Nodule   -Thought to be in the setting of CAP, consider Pulmonary Abscess vs Malignancy   -Pt with a known H/o Smoking   -Tmax 100.2 on 3/27/21, day 2 of IV Abx   -CT Angio: RLL 4.5cm Heterogeneous masslike opacity, not seen in prior studies in December  -Continue with IV Abx for now   -Would like to optimize Abx, consider Zosyn/Vanc await ID Recommendations   -ID Consulted for further evaluation   -F/u Blood and Sputum Cx  -Tylenol PRN for fevers   -Consider repeat CT as an outpt for close follow up         #Intermittent Hematochezia   #Abdominal Pain    -CT Chest: Cholelithiasis with air within GB, r/o Emphysematous Cholecystitis   -Blood streaks on wiping-Thought to be in the setting of Constipation/ Hemorrhoids  -CT Chest: Fecal Matter throughout colon w/o Obstruction, diverticulosis present    -F/u Colonoscopy as an out pt to also rule out malignancy   -FOBT deferred for now    -Continue with Famotidine 20mg qD   -Optimize Bowel Regimen to Miralax BID  -Continue with Senna qHs   -Continue with IVF Maintenance Fluids at 100cc/h  -PO Hydration encouraged   -Ambulation as tolerated also encouraged     #Advanced Directives   -Full Code per pt  -Pt with Capacity     #Chronic Ds Management  -BPH: Continue with Flomax 0.4mg qHs ;Monitor for signs of Urinary Retention   -Left Thyroid Nodule; To be followed up in an outpt setting   -HLD: Continue with Atorvastatin 40mg qHs   -Insomnia: Continue with Trazadone 150mg qHs   -Hepatitis C: Currently in Remission, Reflex MRNA negative in 2019    #DVT PPX  -SubQ Heparin          case d/w Dr. Quintero

## 2021-03-28 NOTE — PROGRESS NOTE ADULT - SUBJECTIVE AND OBJECTIVE BOX
Follow-up Critical Care Progress Note  Chief Complaint : Type 2 diabetes mellitus with ketoacidosis without coma    Endorsing cough and pleuritic chest pain. Other wise feels fine. Constipated.     Allergies :No Known Drug Allergies  shellfish (Unknown)      PAST MEDICAL & SURGICAL HISTORY:  Benign hypertension    Type 2 diabetes mellitus without complication, without long-term current use of insulin    Shoulder arthritis  Multiple surgeries on left shoulder        Medications:  MEDICATIONS  (STANDING):  aspirin  chewable 81 milliGRAM(s) Oral daily  atorvastatin 40 milliGRAM(s) Oral at bedtime  azithromycin  IVPB 500 milliGRAM(s) IV Intermittent every 24 hours  cefTRIAXone   IVPB 1000 milliGRAM(s) IV Intermittent every 24 hours  chlorhexidine 4% Liquid 1 Application(s) Topical <User Schedule>  dextrose 40% Gel 15 Gram(s) Oral once  dextrose 5%. 1000 milliLiter(s) (50 mL/Hr) IV Continuous <Continuous>  dextrose 5%. 1000 milliLiter(s) (100 mL/Hr) IV Continuous <Continuous>  dextrose 50% Injectable 25 Gram(s) IV Push once  dextrose 50% Injectable 12.5 Gram(s) IV Push once  dextrose 50% Injectable 25 Gram(s) IV Push once  dextrose 50% Injectable 25 Gram(s) IV Push once  dextrose 50% Injectable 12.5 Gram(s) IV Push once  dextrose 50% Injectable 25 Gram(s) IV Push once  famotidine    Tablet 20 milliGRAM(s) Oral two times a day  glucagon  Injectable 1 milliGRAM(s) IntraMuscular once  glucagon  Injectable 1 milliGRAM(s) IntraMuscular once  heparin   Injectable 7500 Unit(s) SubCutaneous every 8 hours  hydrocortisone 2.5% Rectal Cream 1 Application(s) Rectal two times a day  insulin glargine Injectable (LANTUS) 30 Unit(s) SubCutaneous at bedtime  insulin lispro (ADMELOG) corrective regimen sliding scale   SubCutaneous three times a day before meals  insulin lispro (ADMELOG) corrective regimen sliding scale   SubCutaneous at bedtime  insulin lispro Injectable (ADMELOG) 7 Unit(s) SubCutaneous three times a day before meals  lactated ringers. 1000 milliLiter(s) (100 mL/Hr) IV Continuous <Continuous>  polyethylene glycol 3350 17 Gram(s) Oral two times a day  senna 2 Tablet(s) Oral at bedtime  sertraline 150 milliGRAM(s) Oral daily  tamsulosin 0.4 milliGRAM(s) Oral at bedtime  traZODone 150 milliGRAM(s) Oral at bedtime    MEDICATIONS  (PRN):  acetaminophen   Tablet .. 650 milliGRAM(s) Oral every 6 hours PRN Temp greater or equal to 38C (100.4F), Mild Pain (1 - 3)  benzocaine 15 mG/menthol 3.6 mG (Sugar-Free) Lozenge 1 Lozenge Oral every 6 hours PRN Sore Throat  guaiFENesin   Syrup  (Sugar-Free) 100 milliGRAM(s) Oral every 6 hours PRN Cough  ondansetron Injectable 4 milliGRAM(s) IV Push every 8 hours PRN Nausea and/or Vomiting      LABS:                        11.7   11.17 )-----------( 214      ( 28 Mar 2021 11:54 )             33.3     03-28    136  |  103  |  7   ----------------------------<  291<H>  3.4<L>   |  20<L>  |  0.75    Ca    8.3<L>      28 Mar 2021 10:18  Phos  2.8     03-27  Mg     2.0     03-28                          CULTURES: (if applicable)    Culture - Urine (collected 03-26-21 @ 13:30)  Source: .Urine Clean Catch (Midstream)  Final Report (03-27-21 @ 14:49):    No growth  VITALS:  T(C): 37.4 (03-28-21 @ 14:05), Max: 37.9 (03-27-21 @ 19:47)  T(F): 99.3 (03-28-21 @ 14:05), Max: 100.2 (03-27-21 @ 19:47)  HR: 72 (03-28-21 @ 14:05) (72 - 90)  BP: 108/61 (03-28-21 @ 14:05) (108/61 - 133/75)  BP(mean): --  ABP: --  ABP(mean): --  RR: 12 (03-28-21 @ 14:05) (12 - 20)  SpO2: 96% (03-28-21 @ 14:05) (95% - 99%)  CVP(mm Hg): --  CVP(cm H2O): --    Ins and Outs     03-27-21 @ 07:01  -  03-28-21 @ 07:00  --------------------------------------------------------  IN: 1520 mL / OUT: 3200 mL / NET: -1680 mL    03-28-21 @ 07:01  -  03-28-21 @ 14:35  --------------------------------------------------------  IN: 480 mL / OUT: 2700 mL / NET: -2220 mL        Height (cm): 180.3 (03-26-21 @ 12:38)  Weight (kg): 97.5 (03-26-21 @ 12:38)  BMI (kg/m2): 30 (03-26-21 @ 12:38)        I&O's Detail    27 Mar 2021 07:01  -  28 Mar 2021 07:00  --------------------------------------------------------  IN:    Lactated Ringers: 500 mL    Oral Fluid: 1020 mL  Total IN: 1520 mL    OUT:    Voided (mL): 3200 mL  Total OUT: 3200 mL    Total NET: -1680 mL      28 Mar 2021 07:01  -  28 Mar 2021 14:35  --------------------------------------------------------  IN:    Oral Fluid: 480 mL  Total IN: 480 mL    OUT:    Voided (mL): 2700 mL  Total OUT: 2700 mL    Total NET: -2220 mL

## 2021-03-28 NOTE — CONSULT NOTE ADULT - SUBJECTIVE AND OBJECTIVE BOX
HPI:   Patient is a 60y male with    REVIEW OF SYSTEMS:  All other review of systems negative (Comprehensive ROS)    PAST MEDICAL & SURGICAL HISTORY:  Benign hypertension    Type 2 diabetes mellitus without complication, without long-term current use of insulin    Shoulder arthritis  Multiple surgeries on left shoulder        Allergies    No Known Drug Allergies  shellfish (Unknown)    Intolerances        Antimicrobials Day #    azithromycin  IVPB 500 milliGRAM(s) IV Intermittent every 24 hours  cefTRIAXone   IVPB 1000 milliGRAM(s) IV Intermittent every 24 hours    Other Medications:  acetaminophen   Tablet .. 650 milliGRAM(s) Oral every 6 hours PRN  aspirin  chewable 81 milliGRAM(s) Oral daily  atorvastatin 40 milliGRAM(s) Oral at bedtime  benzocaine 15 mG/menthol 3.6 mG (Sugar-Free) Lozenge 1 Lozenge Oral every 6 hours PRN  chlorhexidine 4% Liquid 1 Application(s) Topical <User Schedule>  dextrose 40% Gel 15 Gram(s) Oral once  dextrose 5%. 1000 milliLiter(s) IV Continuous <Continuous>  dextrose 5%. 1000 milliLiter(s) IV Continuous <Continuous>  dextrose 50% Injectable 25 Gram(s) IV Push once  dextrose 50% Injectable 12.5 Gram(s) IV Push once  dextrose 50% Injectable 25 Gram(s) IV Push once  dextrose 50% Injectable 25 Gram(s) IV Push once  dextrose 50% Injectable 12.5 Gram(s) IV Push once  dextrose 50% Injectable 25 Gram(s) IV Push once  famotidine    Tablet 20 milliGRAM(s) Oral two times a day  glucagon  Injectable 1 milliGRAM(s) IntraMuscular once  glucagon  Injectable 1 milliGRAM(s) IntraMuscular once  guaiFENesin   Syrup  (Sugar-Free) 100 milliGRAM(s) Oral every 6 hours PRN  heparin   Injectable 7500 Unit(s) SubCutaneous every 8 hours  hydrocortisone 2.5% Rectal Cream 1 Application(s) Rectal two times a day  insulin glargine Injectable (LANTUS) 30 Unit(s) SubCutaneous at bedtime  insulin lispro (ADMELOG) corrective regimen sliding scale   SubCutaneous three times a day before meals  insulin lispro (ADMELOG) corrective regimen sliding scale   SubCutaneous at bedtime  insulin lispro Injectable (ADMELOG) 7 Unit(s) SubCutaneous three times a day before meals  lactated ringers. 1000 milliLiter(s) IV Continuous <Continuous>  ondansetron Injectable 4 milliGRAM(s) IV Push every 8 hours PRN  polyethylene glycol 3350 17 Gram(s) Oral two times a day  senna 2 Tablet(s) Oral at bedtime  sertraline 150 milliGRAM(s) Oral daily  tamsulosin 0.4 milliGRAM(s) Oral at bedtime  traZODone 150 milliGRAM(s) Oral at bedtime      FAMILY HISTORY:      SOCIAL HISTORY:  Smoking:     ETOH:     Drug Use:     Single     T(F): 99.3 (03-28-21 @ 14:05), Max: 100.2 (03-27-21 @ 19:47)  HR: 72 (03-28-21 @ 14:05)  BP: 108/61 (03-28-21 @ 14:05)  RR: 12 (03-28-21 @ 14:05)  SpO2: 96% (03-28-21 @ 14:05)  Wt(kg): --    PHYSICAL EXAM:  General: alert, no acute distress  Eyes:  anicteric, no conjunctival injection, no discharge  Oropharynx: no lesions or injection 	  Neck: supple, without adenopathy  Lungs: clear to auscultation  Heart: regular rate and rhythm; no murmur, rubs or gallops  Abdomen: soft, nondistended, nontender, without mass or organomegaly  Skin: no lesions  Extremities: no clubbing, cyanosis, or edema  Neurologic: alert, oriented, moves all extremities    LAB RESULTS:                        11.7   11.17 )-----------( 214      ( 28 Mar 2021 11:54 )             33.3     03-28    136  |  103  |  7   ----------------------------<  291<H>  3.4<L>   |  20<L>  |  0.75    Ca    8.3<L>      28 Mar 2021 10:18  Phos  2.8     03-27  Mg     2.0     03-28            MICROBIOLOGY REVIEWED:    RADIOLOGY REVIEWED:   HPI:   Patient is a 60y male with hx DM presented for eval of weakness, N/V, abd pain, chest pain. Patient reports several days of symptoms. He had cough for about 2 weeks, no fevers. CT Chest with mass like structure to RLL and CT Abd/Pel with CBD dilation and air in biliary tree. He was started on IV abx. Concern raised possible lung abscess. Pt denies any recent dental or sinus problems or interventions, no abx prior to admission, denies any abd pain    REVIEW OF SYSTEMS:  All other review of systems negative (Comprehensive ROS)    PAST MEDICAL & SURGICAL HISTORY:  Benign hypertension    Type 2 diabetes mellitus without complication, without long-term current use of insulin    Shoulder arthritis  Multiple surgeries on left shoulder        Allergies    No Known Drug Allergies  shellfish (Unknown)    Intolerances        Antimicrobials Day #    azithromycin  IVPB 500 milliGRAM(s) IV Intermittent every 24 hours  cefTRIAXone   IVPB 1000 milliGRAM(s) IV Intermittent every 24 hours    Other Medications:  acetaminophen   Tablet .. 650 milliGRAM(s) Oral every 6 hours PRN  aspirin  chewable 81 milliGRAM(s) Oral daily  atorvastatin 40 milliGRAM(s) Oral at bedtime  benzocaine 15 mG/menthol 3.6 mG (Sugar-Free) Lozenge 1 Lozenge Oral every 6 hours PRN  chlorhexidine 4% Liquid 1 Application(s) Topical <User Schedule>  dextrose 40% Gel 15 Gram(s) Oral once  dextrose 5%. 1000 milliLiter(s) IV Continuous <Continuous>  dextrose 5%. 1000 milliLiter(s) IV Continuous <Continuous>  dextrose 50% Injectable 25 Gram(s) IV Push once  dextrose 50% Injectable 12.5 Gram(s) IV Push once  dextrose 50% Injectable 25 Gram(s) IV Push once  dextrose 50% Injectable 25 Gram(s) IV Push once  dextrose 50% Injectable 12.5 Gram(s) IV Push once  dextrose 50% Injectable 25 Gram(s) IV Push once  famotidine    Tablet 20 milliGRAM(s) Oral two times a day  glucagon  Injectable 1 milliGRAM(s) IntraMuscular once  glucagon  Injectable 1 milliGRAM(s) IntraMuscular once  guaiFENesin   Syrup  (Sugar-Free) 100 milliGRAM(s) Oral every 6 hours PRN  heparin   Injectable 7500 Unit(s) SubCutaneous every 8 hours  hydrocortisone 2.5% Rectal Cream 1 Application(s) Rectal two times a day  insulin glargine Injectable (LANTUS) 30 Unit(s) SubCutaneous at bedtime  insulin lispro (ADMELOG) corrective regimen sliding scale   SubCutaneous three times a day before meals  insulin lispro (ADMELOG) corrective regimen sliding scale   SubCutaneous at bedtime  insulin lispro Injectable (ADMELOG) 7 Unit(s) SubCutaneous three times a day before meals  lactated ringers. 1000 milliLiter(s) IV Continuous <Continuous>  ondansetron Injectable 4 milliGRAM(s) IV Push every 8 hours PRN  polyethylene glycol 3350 17 Gram(s) Oral two times a day  senna 2 Tablet(s) Oral at bedtime  sertraline 150 milliGRAM(s) Oral daily  tamsulosin 0.4 milliGRAM(s) Oral at bedtime  traZODone 150 milliGRAM(s) Oral at bedtime      FAMILY HISTORY:      SOCIAL HISTORY:  Smoking:     ETOH:     Drug Use:     Single     T(F): 99.3 (03-28-21 @ 14:05), Max: 100.2 (03-27-21 @ 19:47)  HR: 72 (03-28-21 @ 14:05)  BP: 108/61 (03-28-21 @ 14:05)  RR: 12 (03-28-21 @ 14:05)  SpO2: 96% (03-28-21 @ 14:05)  Wt(kg): --    PHYSICAL EXAM:  General: alert, no acute distress  Eyes:  anicteric, no conjunctival injection, no discharge  Oropharynx: no lesions or injection 	  Neck: supple, without adenopathy  Lungs: clear to auscultation  Heart: regular rate and rhythm; no murmur, rubs or gallops  Abdomen: soft, nondistended, nontender, without mass or organomegaly  Skin: no lesions  Extremities: no clubbing, cyanosis, or edema  Neurologic: alert, oriented, moves all extremities    LAB RESULTS:                        11.7   11.17 )-----------( 214      ( 28 Mar 2021 11:54 )             33.3     03-28    136  |  103  |  7   ----------------------------<  291<H>  3.4<L>   |  20<L>  |  0.75    Ca    8.3<L>      28 Mar 2021 10:18  Phos  2.8     03-27  Mg     2.0     03-28            MICROBIOLOGY REVIEWED:        RADIOLOGY REVIEWED:  < from: CT Chest w/ IV Cont (03.26.21 @ 14:44) >  1. There is a 4.5 cm heterogeneous in attenuation masslike opacity within the right lower lobe abutting the right major fissure and right hilar region, representing interval change from prior examination dated 12/3/2020. Small nodules are identified adjacent to the mass measuring up to 5 mm. Differential considerations would include both neoplastic and infectious etiologies.  2. Scattered foci of air identified within the intrahepatic biliary tree. There is increased caliber of the extrahepatic CBD measuring 9-10 mm.  A gallstone is noted in the gallbladder lumen measuring 1.1 cm. Air is noted within the gallbladder lumen. Contracted state of the gallbladder limits assessment for wall thickening. Emphysematous cholecystitis cannot be excluded. Correlate with clinical as well as procedure history recommended.    < end of copied text >

## 2021-03-28 NOTE — CHART NOTE - NSCHARTNOTEFT_GEN_A_CORE
-patient's repeat LABS have returned    03-27    134<L>  |  100  |  13  ----------------------------<  267<H>  3.4<L>   |  23  |  0.96    Ca    8.3<L>      27 Mar 2021 23:28  Phos  2.8     03-27  Mg     1.3     03-27    TPro  7.8  /  Alb  3.1<L>  /  TBili  0.7  /  DBili  x   /  AST  17  /  ALT  19  /  AlkPhos  103  03-26        -patients anion gap has improved 15 -->11  Scr improved 1.34 --> 0.86    -however on prior labs his K= was 3.7, now it is 3.4 this is despite recieiving 40 MEQS Po KCL  -a mag level was checked with these labs and was also noted to be very low, this is why patient's potassium has dropped despite repletion.    -for now will order total of 4 grams of mag sulfate and another 40 MEQS of KCL    -I have instructed bedside RN to give 2 grams of mag sulfate first, then give the Po KCL, then give additional 2 grams of mag    -repeat AM labs    -as anio gap has improved patient will not need insulin infusion would continue current insulin regimen.   -follow aftab closely

## 2021-03-28 NOTE — PROGRESS NOTE ADULT - ASSESSMENT
IMPRESSION:  IMPRESSION: No evidence of biliary tract disease    PLAN: Continue solids PO           Monitor LFT's - in am

## 2021-03-29 DIAGNOSIS — I10 ESSENTIAL (PRIMARY) HYPERTENSION: ICD-10-CM

## 2021-03-29 DIAGNOSIS — Z71.89 OTHER SPECIFIED COUNSELING: ICD-10-CM

## 2021-03-29 DIAGNOSIS — Z29.9 ENCOUNTER FOR PROPHYLACTIC MEASURES, UNSPECIFIED: ICD-10-CM

## 2021-03-29 DIAGNOSIS — K64.9 UNSPECIFIED HEMORRHOIDS: ICD-10-CM

## 2021-03-29 DIAGNOSIS — E43 UNSPECIFIED SEVERE PROTEIN-CALORIE MALNUTRITION: ICD-10-CM

## 2021-03-29 DIAGNOSIS — R10.9 UNSPECIFIED ABDOMINAL PAIN: ICD-10-CM

## 2021-03-29 DIAGNOSIS — R91.8 OTHER NONSPECIFIC ABNORMAL FINDING OF LUNG FIELD: ICD-10-CM

## 2021-03-29 DIAGNOSIS — E11.10 TYPE 2 DIABETES MELLITUS WITH KETOACIDOSIS WITHOUT COMA: ICD-10-CM

## 2021-03-29 DIAGNOSIS — E78.5 HYPERLIPIDEMIA, UNSPECIFIED: ICD-10-CM

## 2021-03-29 DIAGNOSIS — K59.00 CONSTIPATION, UNSPECIFIED: ICD-10-CM

## 2021-03-29 LAB
ALBUMIN SERPL ELPH-MCNC: 2.2 G/DL — LOW (ref 3.3–5)
ALP SERPL-CCNC: 70 U/L — SIGNIFICANT CHANGE UP (ref 40–120)
ALT FLD-CCNC: 12 U/L — SIGNIFICANT CHANGE UP (ref 10–45)
ANION GAP SERPL CALC-SCNC: 9 MMOL/L — SIGNIFICANT CHANGE UP (ref 5–17)
AST SERPL-CCNC: 14 U/L — SIGNIFICANT CHANGE UP (ref 10–40)
BILIRUB SERPL-MCNC: 0.5 MG/DL — SIGNIFICANT CHANGE UP (ref 0.2–1.2)
BUN SERPL-MCNC: 6 MG/DL — LOW (ref 7–23)
CALCIUM SERPL-MCNC: 8.8 MG/DL — SIGNIFICANT CHANGE UP (ref 8.4–10.5)
CHLORIDE SERPL-SCNC: 105 MMOL/L — SIGNIFICANT CHANGE UP (ref 96–108)
CO2 SERPL-SCNC: 24 MMOL/L — SIGNIFICANT CHANGE UP (ref 22–31)
CREAT SERPL-MCNC: 0.74 MG/DL — SIGNIFICANT CHANGE UP (ref 0.5–1.3)
GLUCOSE BLDC GLUCOMTR-MCNC: 223 MG/DL — HIGH (ref 70–99)
GLUCOSE BLDC GLUCOMTR-MCNC: 230 MG/DL — HIGH (ref 70–99)
GLUCOSE BLDC GLUCOMTR-MCNC: 269 MG/DL — HIGH (ref 70–99)
GLUCOSE BLDC GLUCOMTR-MCNC: 270 MG/DL — HIGH (ref 70–99)
GLUCOSE SERPL-MCNC: 244 MG/DL — HIGH (ref 70–99)
GLUCOSE SERPL-MCNC: 260 MG/DL — HIGH (ref 70–99)
GRAM STN FLD: SIGNIFICANT CHANGE UP
HCT VFR BLD CALC: 31.3 % — LOW (ref 39–50)
HGB BLD-MCNC: 11.1 G/DL — LOW (ref 13–17)
LEGIONELLA AG UR QL: NEGATIVE — SIGNIFICANT CHANGE UP
MCHC RBC-ENTMCNC: 32.4 PG — SIGNIFICANT CHANGE UP (ref 27–34)
MCHC RBC-ENTMCNC: 35.5 GM/DL — SIGNIFICANT CHANGE UP (ref 32–36)
MCV RBC AUTO: 91.3 FL — SIGNIFICANT CHANGE UP (ref 80–100)
MRSA PCR RESULT.: SIGNIFICANT CHANGE UP
NRBC # BLD: 0 /100 WBCS — SIGNIFICANT CHANGE UP (ref 0–0)
PLATELET # BLD AUTO: 207 K/UL — SIGNIFICANT CHANGE UP (ref 150–400)
POTASSIUM SERPL-MCNC: 3.9 MMOL/L — SIGNIFICANT CHANGE UP (ref 3.5–5.3)
POTASSIUM SERPL-SCNC: 3.9 MMOL/L — SIGNIFICANT CHANGE UP (ref 3.5–5.3)
PROT SERPL-MCNC: 6.4 G/DL — SIGNIFICANT CHANGE UP (ref 6–8.3)
RBC # BLD: 3.43 M/UL — LOW (ref 4.2–5.8)
RBC # FLD: 12.1 % — SIGNIFICANT CHANGE UP (ref 10.3–14.5)
S AUREUS DNA NOSE QL NAA+PROBE: SIGNIFICANT CHANGE UP
SODIUM SERPL-SCNC: 138 MMOL/L — SIGNIFICANT CHANGE UP (ref 135–145)
SPECIMEN SOURCE: SIGNIFICANT CHANGE UP
WBC # BLD: 10.88 K/UL — HIGH (ref 3.8–10.5)
WBC # FLD AUTO: 10.88 K/UL — HIGH (ref 3.8–10.5)

## 2021-03-29 PROCEDURE — 76705 ECHO EXAM OF ABDOMEN: CPT | Mod: 26,RT

## 2021-03-29 PROCEDURE — 99232 SBSQ HOSP IP/OBS MODERATE 35: CPT

## 2021-03-29 PROCEDURE — 99232 SBSQ HOSP IP/OBS MODERATE 35: CPT | Mod: GC

## 2021-03-29 RX ORDER — LIDOCAINE 4 G/100G
1 CREAM TOPICAL ONCE
Refills: 0 | Status: COMPLETED | OUTPATIENT
Start: 2021-03-29 | End: 2021-03-29

## 2021-03-29 RX ORDER — TRAMADOL HYDROCHLORIDE 50 MG/1
50 TABLET ORAL ONCE
Refills: 0 | Status: DISCONTINUED | OUTPATIENT
Start: 2021-03-29 | End: 2021-03-29

## 2021-03-29 RX ORDER — INSULIN GLARGINE 100 [IU]/ML
35 INJECTION, SOLUTION SUBCUTANEOUS AT BEDTIME
Refills: 0 | Status: DISCONTINUED | OUTPATIENT
Start: 2021-03-29 | End: 2021-03-31

## 2021-03-29 RX ORDER — IBUPROFEN 200 MG
400 TABLET ORAL ONCE
Refills: 0 | Status: COMPLETED | OUTPATIENT
Start: 2021-03-29 | End: 2021-03-29

## 2021-03-29 RX ORDER — INSULIN LISPRO 100/ML
10 VIAL (ML) SUBCUTANEOUS
Refills: 0 | Status: DISCONTINUED | OUTPATIENT
Start: 2021-03-29 | End: 2021-03-30

## 2021-03-29 RX ORDER — PANTOPRAZOLE SODIUM 20 MG/1
40 TABLET, DELAYED RELEASE ORAL
Refills: 0 | Status: DISCONTINUED | OUTPATIENT
Start: 2021-03-29 | End: 2021-04-01

## 2021-03-29 RX ADMIN — INSULIN GLARGINE 35 UNIT(S): 100 INJECTION, SOLUTION SUBCUTANEOUS at 21:13

## 2021-03-29 RX ADMIN — Medication 10 UNIT(S): at 12:31

## 2021-03-29 RX ADMIN — Medication 4: at 17:08

## 2021-03-29 RX ADMIN — HEPARIN SODIUM 7500 UNIT(S): 5000 INJECTION INTRAVENOUS; SUBCUTANEOUS at 21:13

## 2021-03-29 RX ADMIN — CHLORHEXIDINE GLUCONATE 1 APPLICATION(S): 213 SOLUTION TOPICAL at 05:25

## 2021-03-29 RX ADMIN — Medication 400 MILLIGRAM(S): at 22:47

## 2021-03-29 RX ADMIN — TAMSULOSIN HYDROCHLORIDE 0.4 MILLIGRAM(S): 0.4 CAPSULE ORAL at 21:14

## 2021-03-29 RX ADMIN — Medication 1 APPLICATION(S): at 17:13

## 2021-03-29 RX ADMIN — Medication 100 MILLIGRAM(S): at 05:25

## 2021-03-29 RX ADMIN — ATORVASTATIN CALCIUM 40 MILLIGRAM(S): 80 TABLET, FILM COATED ORAL at 21:14

## 2021-03-29 RX ADMIN — POLYETHYLENE GLYCOL 3350 17 GRAM(S): 17 POWDER, FOR SOLUTION ORAL at 05:25

## 2021-03-29 RX ADMIN — Medication 150 MILLIGRAM(S): at 21:14

## 2021-03-29 RX ADMIN — SODIUM CHLORIDE 100 MILLILITER(S): 9 INJECTION, SOLUTION INTRAVENOUS at 22:03

## 2021-03-29 RX ADMIN — POLYETHYLENE GLYCOL 3350 17 GRAM(S): 17 POWDER, FOR SOLUTION ORAL at 17:13

## 2021-03-29 RX ADMIN — TRAMADOL HYDROCHLORIDE 50 MILLIGRAM(S): 50 TABLET ORAL at 23:22

## 2021-03-29 RX ADMIN — Medication 30 MILLILITER(S): at 15:40

## 2021-03-29 RX ADMIN — LIDOCAINE 1 PATCH: 4 CREAM TOPICAL at 23:26

## 2021-03-29 RX ADMIN — HEPARIN SODIUM 7500 UNIT(S): 5000 INJECTION INTRAVENOUS; SUBCUTANEOUS at 05:25

## 2021-03-29 RX ADMIN — Medication 10 UNIT(S): at 17:08

## 2021-03-29 RX ADMIN — Medication 1 APPLICATION(S): at 05:28

## 2021-03-29 RX ADMIN — Medication 100 MILLIGRAM(S): at 20:24

## 2021-03-29 RX ADMIN — SENNA PLUS 2 TABLET(S): 8.6 TABLET ORAL at 21:14

## 2021-03-29 RX ADMIN — Medication 6: at 08:15

## 2021-03-29 RX ADMIN — SERTRALINE 150 MILLIGRAM(S): 25 TABLET, FILM COATED ORAL at 12:30

## 2021-03-29 RX ADMIN — PANTOPRAZOLE SODIUM 40 MILLIGRAM(S): 20 TABLET, DELAYED RELEASE ORAL at 09:25

## 2021-03-29 RX ADMIN — Medication 10 MILLIGRAM(S): at 15:40

## 2021-03-29 RX ADMIN — BENZOCAINE AND MENTHOL 1 LOZENGE: 5; 1 LIQUID ORAL at 20:24

## 2021-03-29 RX ADMIN — FAMOTIDINE 20 MILLIGRAM(S): 10 INJECTION INTRAVENOUS at 05:25

## 2021-03-29 RX ADMIN — CEFTRIAXONE 100 MILLIGRAM(S): 500 INJECTION, POWDER, FOR SOLUTION INTRAMUSCULAR; INTRAVENOUS at 17:13

## 2021-03-29 RX ADMIN — HEPARIN SODIUM 7500 UNIT(S): 5000 INJECTION INTRAVENOUS; SUBCUTANEOUS at 14:05

## 2021-03-29 RX ADMIN — Medication 81 MILLIGRAM(S): at 12:30

## 2021-03-29 RX ADMIN — Medication 400 MILLIGRAM(S): at 21:47

## 2021-03-29 RX ADMIN — Medication 2: at 21:13

## 2021-03-29 RX ADMIN — Medication 30 MILLILITER(S): at 20:29

## 2021-03-29 RX ADMIN — Medication 200 MILLIGRAM(S): at 20:52

## 2021-03-29 RX ADMIN — Medication 4: at 12:30

## 2021-03-29 RX ADMIN — Medication 7 UNIT(S): at 08:15

## 2021-03-29 NOTE — PROGRESS NOTE ADULT - SUBJECTIVE AND OBJECTIVE BOX
HPI: 59 yo M with PMHx of HTN, HLD, Pre-DM presented for eval of weakness, N/V, abd pain, chest pain. Patient reports several days of symptoms. He states he does not take any medications for DM. Patient denies trauma or inciting event. Patient also reports constipation with blood on toilet paper after wiping. ED workup significant for Glucose 674, AG 18, BUN/Cr 26/1.31, Acetone moderate. CT Chest with mass like structure to RLL and CT Abd/Pel with CBD dilation and air in biliary tree. ED interventions include 2L IVF Bolus, IVP insulin,  (26 Mar 2021 16:30)    Subjective: Patient seen and examined at bedside this morning, c/o epigastric tenderness, no BM as per patient, however 1 documented BM on 3/28 at 10:00am. Continues with productive cough. HbA1C resulted at 15.5%. Blood glucose still uncontrolled. Continued titration to goal blood sugar in progress. Pending Diabetic education today.     REVIEW OF SYSTEMS:    CONSTITUTIONAL: No weakness, fevers or chills, + tiredness  EYES/ENT: No visual changes;  No vertigo or throat pain   NECK: No pain or stiffness  RESPIRATORY: +cough, no wheezing, hemoptysis; No shortness of breath  CARDIOVASCULAR: No chest pain or palpitations  GASTROINTESTINAL: + epigastric pain. No nausea, vomiting, or hematemesis; No diarrhea or constipation. No melena or hematochezia.  GENITOURINARY: No dysuria, frequency or hematuria  NEUROLOGICAL: No numbness or weakness  SKIN: No itching, rashes    Vital Signs Last 24 Hrs  T(C): 37.7 (29 Mar 2021 05:46), Max: 37.7 (28 Mar 2021 20:27)  T(F): 99.8 (29 Mar 2021 05:46), Max: 99.8 (28 Mar 2021 20:27)  HR: 78 (29 Mar 2021 05:46) (72 - 88)  BP: 108/69 (29 Mar 2021 05:46) (108/61 - 120/66)  RR: 14 (29 Mar 2021 05:46) (11 - 16)  SpO2: 100% (29 Mar 2021 05:46) (96% - 100%)    PHYSICAL EXAM:  GENERAL: NAD, lying in bed comfortably  HEAD:  Atraumatic, Normocephalic  EYES: conjunctiva and sclera clear  ENT: Moist mucous membranes  NECK: Supple, No JVD  CHEST/LUNG: Clear to auscultation bilaterally; No rales, rhonchi, wheezing, or rubs. Unlabored respirations  HEART: Regular rate and rhythm; No murmurs, rubs, or gallops  ABDOMEN: Bowel sounds present; Soft, generalized tenderness to palpation, more pronounced to epigastric region, no guarding/ rebound   EXTREMITIES:  2+ Peripheral Pulses, brisk capillary refill. No clubbing, cyanosis, or edema  NERVOUS SYSTEM:  Alert & Oriented X3, speech clear. No deficits   MSK: FROM all 4 extremities, full and equal strength      MEDICATIONS  (STANDING):  aspirin  chewable 81 milliGRAM(s) Oral daily  atorvastatin 40 milliGRAM(s) Oral at bedtime  azithromycin  IVPB 500 milliGRAM(s) IV Intermittent every 24 hours  cefTRIAXone   IVPB 1000 milliGRAM(s) IV Intermittent every 24 hours  dextrose 40% Gel 15 Gram(s) Oral once  dextrose 5%. 1000 milliLiter(s) (50 mL/Hr) IV Continuous <Continuous>  dextrose 5%. 1000 milliLiter(s) (100 mL/Hr) IV Continuous <Continuous>  dextrose 50% Injectable 25 Gram(s) IV Push once  dextrose 50% Injectable 25 Gram(s) IV Push once  dextrose 50% Injectable 12.5 Gram(s) IV Push once  dextrose 50% Injectable 25 Gram(s) IV Push once  dextrose 50% Injectable 25 Gram(s) IV Push once  dextrose 50% Injectable 12.5 Gram(s) IV Push once  glucagon  Injectable 1 milliGRAM(s) IntraMuscular once  glucagon  Injectable 1 milliGRAM(s) IntraMuscular once  heparin   Injectable 7500 Unit(s) SubCutaneous every 8 hours  hydrocortisone 2.5% Rectal Cream 1 Application(s) Rectal two times a day  insulin glargine Injectable (LANTUS) 30 Unit(s) SubCutaneous at bedtime  insulin lispro (ADMELOG) corrective regimen sliding scale   SubCutaneous three times a day before meals  insulin lispro (ADMELOG) corrective regimen sliding scale   SubCutaneous at bedtime  insulin lispro Injectable (ADMELOG) 7 Unit(s) SubCutaneous three times a day before meals  lactated ringers. 1000 milliLiter(s) (100 mL/Hr) IV Continuous <Continuous>  pantoprazole    Tablet 40 milliGRAM(s) Oral before breakfast  polyethylene glycol 3350 17 Gram(s) Oral two times a day  senna 2 Tablet(s) Oral at bedtime  sertraline 150 milliGRAM(s) Oral daily  tamsulosin 0.4 milliGRAM(s) Oral at bedtime  traZODone 150 milliGRAM(s) Oral at bedtime    MEDICATIONS  (PRN):  acetaminophen   Tablet .. 650 milliGRAM(s) Oral every 6 hours PRN Temp greater or equal to 38C (100.4F), Mild Pain (1 - 3)  benzocaine 15 mG/menthol 3.6 mG (Sugar-Free) Lozenge 1 Lozenge Oral every 6 hours PRN Sore Throat  guaiFENesin   Syrup  (Sugar-Free) 100 milliGRAM(s) Oral every 6 hours PRN Cough  ondansetron Injectable 4 milliGRAM(s) IV Push every 8 hours PRN Nausea and/or Vomiting    LABS:                          11.1   10.88 )-----------( 207      ( 29 Mar 2021 07:15 )             31.3   03-    138  |  105  |  6<L>  ----------------------------<  244<H>  3.9   |  24  |  0.74    Ca    8.8      29 Mar 2021 07:15  Phos  2.8     03-  Mg     2.0         TPro  6.4  /  Alb  2.2<L>  /  TBili  0.5  /  DBili  x   /  AST  14  /  ALT  12  /  AlkPhos  70  03-29    LIVER FUNCTIONS - ( 29 Mar 2021 07:15 )  Alb: 2.2 g/dL / Pro: 6.4 g/dL / ALK PHOS: 70 U/L / ALT: 12 U/L / AST: 14 U/L / GGT: x                CAPILLARY BLOOD GLUCOSE    POCT Blood Glucose.: 269 mg/dL (29 Mar 2021 08:10)  POCT Blood Glucose.: 312 mg/dL (28 Mar 2021 20:52)  POCT Blood Glucose.: 208 mg/dL (28 Mar 2021 17:24)  POCT Blood Glucose.: 207 mg/dL (28 Mar 2021 13:03)      CARDIAC MARKERS ( 26 Mar 2021 13:30 )  <.017 ng/mL / x     / x     / x     / x          Urinalysis Basic - ( 26 Mar 2021 13:30 )    Color: Yellow / Appearance: Clear / S.010 / pH: x  Gluc: x / Ketone: Large  / Bili: Negative / Urobili: Negative   Blood: x / Protein: Negative / Nitrite: Negative   Leuk Esterase: Negative / RBC: x / WBC x   Sq Epi: x / Non Sq Epi: x / Bacteria: x        RADIOLOGY:  < from: CT Chest w/ IV Cont (21 @ 14:44) >  FINDINGS:  CHEST:  LUNGS AND LARGE AIRWAYS: Patent central airways. There is a 4.5 cm heterogeneous in attenuation masslike opacity within the right lower lobe abutting the right major fissure and right hilar region, representing interval change from prior examination dated 12/3/2020. Small nodules are identified adjacent to the mass measuring up to 5 mm.  PLEURA: No evidence for pleural effusion. No pneumothorax.  VESSELS: Stable variant anatomy identified with a venous conduit extending from the left thorax into the left brachiocephalic vein. Thoracic aortic caliber appears unremarkable.  HEART: Heart size is normal. No pericardial effusion. Coronary arterial calcification.  MEDIASTINUM AND LIZZETTE: No mediastinal lymphadenopathy. No left hilar lymphadenopathy.  CHEST WALL AND LOWER NECK: Within normal limits.    ABDOMEN AND PELVIS:  LIVER: Normal in size. No focal hepatic masses.  BILE DUCTS: No evidence for intrahepatic biliary ductal dilatation. Scattered foci of air identified within the intrahepatic biliary tree. There is increased caliber of the extrahepatic CBD measuring 9-10 mm.  GALLBLADDER: A gallstone is noted in the gallbladder lumen measuring 1.1 cm. Air is noted within the gallbladder lumen. Contracted state of the gallbladder limits assessment for wall thickening. Emphysematous cholecystitis cannot be excluded. Correlate with clinical as well as procedure history recommended.  SPLEEN: Within normal limits. Stable 2.4 x 1.6 cm soft tissue nodularity medial to the spleen, likely accessory splenic tissue.  PANCREAS: Within normal limits.  ADRENALS: Within normal limits.  KIDNEYS/URETERS: Within normal limits.    BLADDER: Within normal limits.  REPRODUCTIVE ORGANS: The prostate appears unremarkable.    BOWEL: Fecal material scattered throughout the colon. No evidence for mechanical bowel obstruction. Colonic diverticulosis. No colonic wall thickening. The appendix appears unremarkable.  PERITONEUM: No free intraperitoneal air or fluid.  VESSELS: Within normal limits.  RETROPERITONEUM/LYMPH NODES: No lymphadenopathy.  ABDOMINAL WALL: Within normal limits.  BONES: Degenerative changes.    IMPRESSION:  1. There is a 4.5 cm heterogeneous in attenuation masslike opacity within the right lower lobe abutting the right major fissure and right hilar region, representing interval change from prior examination dated 12/3/2020. Small nodules are identified adjacent to the mass measuring up to 5 mm. Differential considerations would include both neoplastic and infectious etiologies.  2. Scattered foci of air identified within the intrahepatic biliary tree. There is increased caliber of the extrahepatic CBD measuring 9-10 mm.  A gallstone is noted in the gallbladder lumen measuring 1.1 cm. Air is noted within the gallbladder lumen. Contracted state of the gallbladder limits assessment for wall thickening. Emphysematous cholecystitis cannot be excluded. Correlate with clinical as well as procedure history recommended.        < from: US Abdomen Upper Quadrant Right (21 @ 09:26) >  COMPARISON: CT dated 3/26/2021    TECHNIQUE: Sonography of the right upper quadrant.    FINDINGS:    Liver: Enlarged (20.2 cm). Increased echogenicity.  Bile ducts: Normal caliber. Common bile duct measures 6.7 mm.  Gallbladder: Cholelithiasis.  No focal tenderness or evidence of cholecystitis.  Pancreas: Visualized portions are within normal limits.  Right kidney: 13.3 cm. No hydronephrosis.  Ascites: None.  IVC: Visualized portions are within normal limits.    IMPRESSION:    Hepatomegaly.  Cholelithiasis.

## 2021-03-29 NOTE — PROGRESS NOTE ADULT - PROBLEM SELECTOR PLAN 2
#Right Lower Lobe masslike opacity   #Pulmonary Nodules   -CT chest: 4.5 cm heterogeneous in attenuation masslike opacity within the right lower lobe abutting the right major fissure and right hilar region, (changed from 12/2020). Small nodules are identified adjacent to the mass measuring up to 5 mm   -Recent low dose CT chest for lung cancer screening done 12/2020 without evidence of opacity to RLL noted now  -History of cigarette smoking, now on nicotine patches/ gum, recent weight loss  -Likely infectious process, with leukocytosis, productive cough and lesion present now compared to CT chest in December 2020  -Continue Ceftriaxone 1g q24hrs - day #4   -Continue Azithromycin - day #4, will continue until urine legionella antigen results available (if + , minimum 5 days Rx)  -ID consult appreciated: Continue Ceftriaxone, Azithromycin, may consider diagnostic bronchoscopy   -Sputum culture: Rare PMN, rare GNR, rare gram variable cocci, rare squamous epithelial cells    -Procalcitonin: 0.08, ESR: 102, CRP: 118   -F/u repeat blood cultures   -F/u urine legionella antigen   -Urine culture negative #Right Lower Lobe masslike opacity   #Pulmonary Nodules   -CT chest: 4.5 cm heterogeneous in attenuation masslike opacity within the right lower lobe abutting the right major fissure and right hilar region, (changed from 12/2020). Small nodules are identified adjacent to the mass measuring up to 5 mm   -Recent low dose CT chest for lung cancer screening done 12/2020 without evidence of opacity to RLL noted now  -History of cigarette smoking, now on nicotine patches/ gum, recent weight loss  -Likely infectious process, with leukocytosis, productive cough and lesion present now compared to CT chest in December 2020  -Continue Ceftriaxone 1g q24hrs - day #4   -Continue S/p Azithromycin - day #4   -ID consult appreciated: Continue Ceftriaxone, Azithromycin, may consider diagnostic bronchoscopy   -Sputum culture: Rare PMN, rare GNR, rare gram variable cocci, rare squamous epithelial cells    -Procalcitonin: 0.08, ESR: 102, CRP: 118   -F/u repeat blood cultures   -F/u urine legionella antigen   -Urine culture negative

## 2021-03-29 NOTE — PROGRESS NOTE ADULT - ASSESSMENT
59 y/o M with PMHx of HTN, HLD, Pre-DM admitted for DKA after presenting to the ER with c/o weakness, N/V, abdominal pain and chest pain, found have glucose level of 674, AG 18, BUN/Cr 26/1.31, Acetone moderate. CT Chest with masslike structure to RLL and CT Abd/Pel with CBD dilation and air in biliary tree. ED interventions include 2L IVF Bolus, IVP insulin. Patient was admitted to ICU, now downgraded to medicine.

## 2021-03-29 NOTE — ADVANCED PRACTICE NURSE CONSULT - ASSESSMENT
History per H&P: 11 y/o M with PMHx of HTN, HLD, Pre-DM admitted for DKA after presenting to the ER with c/o weakness, N/V, abdominal pain and chest pain, found have glucose level of 674, AG 18, BUN/Cr 26/1.31, Acetone moderate. CT Chest with masslike structure to RLL and CT Abd/Pel with CBD dilation and air in biliary tree. ED interventions include 2L IVF Bolus, IVP insulin. Patient was admitted to ICU, now downgraded to medicine.     Assessment: Pt is alert and Oriented times 4. He is c/o of a headache, RL extremity pain and blurred vision, as well as feeling very tired and  is therefore not up to talking much or feeling able to learn at this time.  Dr. Zapata came into see pt while I was with pt and he was informed of pt's c/o of headache and RL extremity pain.    Pt states he had Covid 19 virus 4/2020 and "was in bed for 5 weeks and very ill at that time". Since then, pt did have an eye exam and states he never felt the prescription glasses he was given were the correct prescription because his vision remained "blurry".    Feet are intact and gross sensation to feet WNL.   Pt states he knew about his pre-diabetes but was not on any medications for diabetes PTA. Pt states he lost about 25 lbs with Covid infection. Per out pt Chart review, it does not appear that he regained that weight. Pt reports being very thirsty and hungry for past few months and very nauseous for last few days PTA.  Pt was educated on T2DM disease process, S/S of hyperglycemia, Complication due to persistent hyperglycemia, Insulin- action, use, storage, expiration dates, pen use, injection site identification, prep, rotation and injection techniques, and sharps disposal. Pt was not able to perform teach back or provide a return demonstration because he was not feeling up to it. Will revisit for additional diabetes teaching. Primary RN also too provide diabetes teaching.     Pt states he rents a room, has a small fridge and microwave but no stove. Pt reports that he dose not eat breakfast, eats fast food like "[employer]" for lunch and either Pizza or Pasta for dinner.  Pt has no understanding of healthy eating or a  CC diet. Discussed with RD who will see pt for diet education.  Pt admits to past psychiatric hospitalizations which he states were due to depression.     Pt states that his sister-in-law- Catrina who is listed as emergency contact coordinates most of his care needs both medical and non-medical. With pt's permission I contacted Catrina who states that pt see a psychotherapist Christina Hernandez in [place]- [phone]. Catrina reports pt dx with depression and has H/O ETOH- per Catrina last drank alcohol around 10/2020 and was admitted to [facility] for tx and she does not think there  has been any ETOH since that time. Catrina reports that pt did have prescription opoid abuse problem for years which she believe ended in 2011.   Catrina also states that the ppt has a sister, Justo who lives on the same block as he does who is involved and supportive.   Catrina states that she will be involved in pt's care upon discharge and will shop for healthy packaged meals daily to provide healthier diet for pt. Educated Catrina briefly on 45 Gm breakfast, Lunch and Dinner options along with examples of 15 Gm carb snack with protein source. Assured Catrina that pt would be provided with additional education prior to discharge, home care services and follow up appointments at [facility] for education and follow upon discharge.   Case discussed with Dr. rosibel Portillo who will enter orders.   Provided pt with written resources- Leaving the Hospital - Diabetes, How to use insulin pen form BD, S/s and tx of hypoglycemia handout form Learning with Diabetes.      History per H&P: 61 y/o M with PMHx of HTN, HLD, Pre-DM admitted for DKA after presenting to the ER with c/o weakness, N/V, abdominal pain and chest pain, found have glucose level of 674, AG 18, BUN/Cr 26/1.31, Acetone moderate. CT Chest with masslike structure to RLL and CT Abd/Pel with CBD dilation and air in biliary tree. ED interventions include 2L IVF Bolus, IVP insulin. Patient was admitted to ICU, now downgraded to medicine.     Assessment: Pt is alert and Oriented times 4. He is c/o of a headache, RL extremity pain and blurred vision, as well as feeling very tired and  is therefore not up to talking much or feeling able to learn at this time.  Dr. Zapata came into see pt while I was with pt and he was informed of pt's c/o of headache and RL extremity pain.    Pt states he had Covid 19 virus 4/2020 and "was in bed for 5 weeks and very ill at that time". Since then, pt did have an eye exam and states he never felt the prescription glasses he was given were the correct prescription because his vision remained "blurry".    Feet are intact and gross sensation to feet WNL.   Pt states he knew about his pre-diabetes but was not on any medications for diabetes PTA. Pt states he lost about 25 lbs with Covid infection. Per out pt Chart review, it does not appear that he regained that weight. Pt reports being very thirsty and hungry for past few months and very nauseous for last few days PTA.  Pt was educated on T2DM disease process, S/S of hyperglycemia, Complication due to persistent hyperglycemia, Insulin- action, use, storage, expiration dates, pen use, injection site identification, prep, rotation and injection techniques, and sharps disposal. Pt was not able to perform teach back or provide a return demonstration because he was not feeling up to it. Will revisit for additional diabetes teaching. Primary RN also too provide diabetes teaching.     Pt states he rents a room, has a small fridge and microwave but no stove. Pt reports that he dose not eat breakfast, eats fast food like "[employer]" for lunch and either Pizza or Pasta for dinner.  Pt has no understanding of healthy eating or a  CC diet. Discussed with RD who will see pt for diet education.  Pt admits to past psychiatric hospitalizations which he states were due to depression.     Pt states that his sister-in-law- Catrina who is listed as emergency contact coordinates most of his care needs both medical and non-medical. With pt's permission I contacted Catrina who states that pt see a psychotherapist Christina Hernandez in [place]- [phone]. Catrina reports pt dx with depression and has H/O ETOH- per Catrina last drank alcohol around 10/2020 and was admitted to [facility] for tx and she does not think there  has been any ETOH since that time. Catrina reports that pt did have prescription opoid abuse problem for years which she believe ended in 2011.   Catrina also states that the ppt has a sister, Justo who lives on the same block as he does who is involved and supportive.   Catrina states that she will be involved in pt's care upon discharge and will shop for healthy packaged meals daily to provide healthier diet for pt. Educated Catrina briefly on 45 Gm breakfast, Lunch and Dinner options along with examples of 15 Gm carb snack with protein source. Assured Catrina that pt would be provided with additional education prior to discharge, home care services and follow up appointments at [facility] for education and follow upon discharge.   Case discussed with Dr. rosibel Portillo who will enter orders.   Provided pt with written resources- Leaving the Hospital - Diabetes, How to use insulin pen form BD, S/s and tx of hypoglycemia handout form Learning [facility] Diabetes.

## 2021-03-29 NOTE — ADVANCED PRACTICE NURSE CONSULT - RECOMMEDATIONS
Recommendations:  1. Labs- S. C- Peptide, S. Glucose, Islet Cell Antibody, Insulin Antibody, Human, Glutamic Acid Decarboxylase Antibody, Zinc Transporter B Ab.   2. Increase Lantus from 30 units to 35 units daily  3. Increase nutritional insulin from 7 units to 10 units before meals, three times/day.  3. Continue moderate Insulin correction scale ac and HS.   4. monitor BG ac and HS  5. Continue diabetes education

## 2021-03-29 NOTE — PROGRESS NOTE ADULT - ASSESSMENT
60y male with hx DM presented for eval of weakness, N/V, abd pain, chest pain. Patient reports several days of symptoms. He had cough for about 2 weeks, no fevers. CT Chest with mass like structure to RLL and CT Abd/Pel with CBD dilation and air in biliary tree. He was started on IV abx. Concern raised possible lung abscess. Pt initially denied any dental problems,, he now admits to loose tooth.He had a CT scan of chest in December 2020 with no rt sided infiltrates, therefor this is new c/w December 2020.He does not have any hemoptysis but does report atypical chest/abdominal pain.  PC level is less than .08, his CRP is 112 and esr is over 100.  PLAN:  Cont CTX, ZMax for now, day 4  f/u cultures, sputum, MRSA screen, legionella Ag-anticipate limiting azithro to 5 days  Diagnostic bronch on hold.  Will adjust antibiotics as per sputum cultures result.  We may treat with antibiotics and obtain interval chest CT 2 weeks after initial study.

## 2021-03-29 NOTE — PROGRESS NOTE ADULT - ASSESSMENT
Physical Examination:  GENERAL:               Alert, Oriented, No acute distress.    HEENT:                    Pupils equal, reactive to light.  Symmetric. No JVD, Moist MM  PULM:                      Bilateral air entry, No Rales, No Rhonchi, No Wheezing  CVS:                        S1, S2,  No Murmur  ABD:                        Soft, nondistended, nontender, normoactive bowel sounds,   EXT:                         No edema, nontender, No Cyanosis or Clubbing   Vascular:                 Warm Extremities, Normal Capillary refill, Normal Distal Pulses  SKIN:                       Warm and well perfused, no rashes noted.   NEURO:                   Alert, oriented, interactive, nonfocal, follows commands  PSYC:                      Calm, + Insight.    Assessment:  1. Diabetic ketoacidosis   2. Acute kidney injury   3. Right upper lobe opacity   4. Gallstones  5. Hypertension  6. Hyperlipidemia     Plan  - Sputum cultures showing Gram negative rods, follow up culture results  - CT scan showing heterogenous mass, ? fluid filled cavity. Seems to be new compared to 3 months ago. Suspicious for an abscess given rapid growth vs. malignancy.   - Will continue treatment with IV antibiotics  - Short interval repeat CT scan in Two weeks to document evolution   - Oral diet encouraged  - ID consult appreciated   - Surgery follow up   - BP and glucose control   - Statins   - DVT prophylaxis  - Will follow

## 2021-03-29 NOTE — PROGRESS NOTE ADULT - SUBJECTIVE AND OBJECTIVE BOX
CC: f/u for RLL mass like density and nonspecific abdominal pain    Patient reports: he recalls that he does have a loose tooth, he is not aware of loosing any teeth.    REVIEW OF SYSTEMS:  All other review of systems negative (Comprehensive ROS): nonspecific abdominal pain.    Antimicrobials Day #  :day 4  azithromycin  IVPB 500 milliGRAM(s) IV Intermittent every 24 hours  cefTRIAXone   IVPB 1000 milliGRAM(s) IV Intermittent every 24 hours    Other Medications Reviewed  MEDICATIONS  (STANDING):  aspirin  chewable 81 milliGRAM(s) Oral daily  atorvastatin 40 milliGRAM(s) Oral at bedtime  azithromycin  IVPB 500 milliGRAM(s) IV Intermittent every 24 hours  cefTRIAXone   IVPB 1000 milliGRAM(s) IV Intermittent every 24 hours  dextrose 40% Gel 15 Gram(s) Oral once  dextrose 5%. 1000 milliLiter(s) (50 mL/Hr) IV Continuous <Continuous>  dextrose 5%. 1000 milliLiter(s) (100 mL/Hr) IV Continuous <Continuous>  dextrose 50% Injectable 25 Gram(s) IV Push once  dextrose 50% Injectable 12.5 Gram(s) IV Push once  dextrose 50% Injectable 25 Gram(s) IV Push once  dextrose 50% Injectable 25 Gram(s) IV Push once  dextrose 50% Injectable 12.5 Gram(s) IV Push once  dextrose 50% Injectable 25 Gram(s) IV Push once  glucagon  Injectable 1 milliGRAM(s) IntraMuscular once  glucagon  Injectable 1 milliGRAM(s) IntraMuscular once  heparin   Injectable 7500 Unit(s) SubCutaneous every 8 hours  hydrocortisone 2.5% Rectal Cream 1 Application(s) Rectal two times a day  insulin glargine Injectable (LANTUS) 30 Unit(s) SubCutaneous at bedtime  insulin lispro (ADMELOG) corrective regimen sliding scale   SubCutaneous three times a day before meals  insulin lispro (ADMELOG) corrective regimen sliding scale   SubCutaneous at bedtime  insulin lispro Injectable (ADMELOG) 7 Unit(s) SubCutaneous three times a day before meals  lactated ringers. 1000 milliLiter(s) (100 mL/Hr) IV Continuous <Continuous>  pantoprazole    Tablet 40 milliGRAM(s) Oral before breakfast  polyethylene glycol 3350 17 Gram(s) Oral two times a day  senna 2 Tablet(s) Oral at bedtime  sertraline 150 milliGRAM(s) Oral daily  tamsulosin 0.4 milliGRAM(s) Oral at bedtime  traZODone 150 milliGRAM(s) Oral at bedtime    T(F): 99.8 (03-29-21 @ 05:46), Max: 99.8 (03-28-21 @ 20:27)  HR: 78 (03-29-21 @ 05:46)  BP: 108/69 (03-29-21 @ 05:46)  RR: 14 (03-29-21 @ 05:46)  SpO2: 100% (03-29-21 @ 05:46)  Wt(kg): --    PHYSICAL EXAM:  General: alert, no acute distress  Eyes:  anicteric, no conjunctival injection, no discharge  Oropharynx: no lesions or injection 	  Neck: supple, without adenopathy  Lungs: clear to auscultation, decreased at rt base  Heart: regular rate and rhythm; no murmur, rubs or gallops  Abdomen: soft, nondistended, nontender, without mass or organomegaly  Skin: no lesions  Extremities: no clubbing, cyanosis, or edema  Neurologic: alert, oriented, moves all extremities    LAB RESULTS:                        11.1   10.88 )-----------( 207      ( 29 Mar 2021 07:15 )             31.3     03-29    138  |  105  |  6<L>  ----------------------------<  244<H>  3.9   |  24  |  0.74    Ca    8.8      29 Mar 2021 07:15  Phos  2.8     03-27  Mg     2.0     03-28    TPro  6.4  /  Alb  2.2<L>  /  TBili  0.5  /  DBili  x   /  AST  14  /  ALT  12  /  AlkPhos  70  03-29    LIVER FUNCTIONS - ( 29 Mar 2021 07:15 )  Alb: 2.2 g/dL / Pro: 6.4 g/dL / ALK PHOS: 70 U/L / ALT: 12 U/L / AST: 14 U/L / GGT: x             MICROBIOLOGY:  RECENT CULTURES:  03-28 @ 22:10 .Sputum Sputum       Rare polymorphonuclear leukocytes per low power field  Rare Squamous epithelial cells per low power field  Rare Gram Negative Rods per oil power field  Rare Gram Variable Cocci per oil power field    03-26 @ 13:30 .Urine Clean Catch (Midstream)     No growth      PC .08    RADIOLOGY REVIEWED:    < from: US Abdomen Upper Quadrant Right (03.29.21 @ 09:26) >  IMPRESSION:    Hepatomegaly.  Cholelithiasis.    < end of copied text >  < from: CT Chest w/ IV Cont (03.26.21 @ 14:44) >    IMPRESSION:  1. There is a 4.5 cm heterogeneous in attenuation masslike opacity within the right lower lobe abutting the right major fissure and right hilar region, representing interval change from prior examination dated 12/3/2020. Small nodules are identified adjacent to the mass measuring up to 5 mm. Differential considerations would include both neoplastic and infectious etiologies.  2. Scattered foci of air identified within the intrahepatic biliary tree. There is increased caliber of the extrahepatic CBD measuring 9-10 mm.  A gallstone is noted in the gallbladder lumen measuring 1.1 cm. Air is noted within the gallbladder lumen. Contracted state of the gallbladder limits assessment for wall thickening. Emphysematous cholecystitis cannot be excluded. Correlate with clinical as well as procedure history recommended.    < end of copied text >

## 2021-03-29 NOTE — PROGRESS NOTE ADULT - SUBJECTIVE AND OBJECTIVE BOX
Follow-up Pulmonary Progress Note  Chief Complaint : Type 2 diabetes mellitus with ketoacidosis without coma    Intermittent cough with sputum production, Pleuritic chest pain reported      Allergies :No Known Drug Allergies  shellfish (Unknown)      PAST MEDICAL & SURGICAL HISTORY:  Benign hypertension    Type 2 diabetes mellitus without complication, without long-term current use of insulin    Shoulder arthritis  Multiple surgeries on left shoulder        Medications:  MEDICATIONS  (STANDING):  aspirin  chewable 81 milliGRAM(s) Oral daily  atorvastatin 40 milliGRAM(s) Oral at bedtime  bisacodyl Suppository 10 milliGRAM(s) Rectal once  cefTRIAXone   IVPB 1000 milliGRAM(s) IV Intermittent every 24 hours  dextrose 40% Gel 15 Gram(s) Oral once  dextrose 5%. 1000 milliLiter(s) (50 mL/Hr) IV Continuous <Continuous>  dextrose 5%. 1000 milliLiter(s) (100 mL/Hr) IV Continuous <Continuous>  dextrose 50% Injectable 25 Gram(s) IV Push once  dextrose 50% Injectable 12.5 Gram(s) IV Push once  dextrose 50% Injectable 25 Gram(s) IV Push once  dextrose 50% Injectable 25 Gram(s) IV Push once  dextrose 50% Injectable 12.5 Gram(s) IV Push once  dextrose 50% Injectable 25 Gram(s) IV Push once  glucagon  Injectable 1 milliGRAM(s) IntraMuscular once  glucagon  Injectable 1 milliGRAM(s) IntraMuscular once  heparin   Injectable 7500 Unit(s) SubCutaneous every 8 hours  hydrocortisone 2.5% Rectal Cream 1 Application(s) Rectal two times a day  insulin glargine Injectable (LANTUS) 35 Unit(s) SubCutaneous at bedtime  insulin lispro (ADMELOG) corrective regimen sliding scale   SubCutaneous three times a day before meals  insulin lispro (ADMELOG) corrective regimen sliding scale   SubCutaneous at bedtime  insulin lispro Injectable (ADMELOG) 10 Unit(s) SubCutaneous three times a day before meals  lactated ringers. 1000 milliLiter(s) (100 mL/Hr) IV Continuous <Continuous>  pantoprazole    Tablet 40 milliGRAM(s) Oral before breakfast  polyethylene glycol 3350 17 Gram(s) Oral two times a day  senna 2 Tablet(s) Oral at bedtime  sertraline 150 milliGRAM(s) Oral daily  tamsulosin 0.4 milliGRAM(s) Oral at bedtime  traZODone 150 milliGRAM(s) Oral at bedtime    MEDICATIONS  (PRN):  acetaminophen   Tablet .. 650 milliGRAM(s) Oral every 6 hours PRN Temp greater or equal to 38C (100.4F), Mild Pain (1 - 3)  aluminum hydroxide/magnesium hydroxide/simethicone Suspension 30 milliLiter(s) Oral every 4 hours PRN Dyspepsia  benzocaine 15 mG/menthol 3.6 mG (Sugar-Free) Lozenge 1 Lozenge Oral every 6 hours PRN Sore Throat  guaiFENesin   Syrup  (Sugar-Free) 100 milliGRAM(s) Oral every 6 hours PRN Cough  ondansetron Injectable 4 milliGRAM(s) IV Push every 8 hours PRN Nausea and/or Vomiting      LABS:                        11.1   10.88 )-----------( 207      ( 29 Mar 2021 07:15 )             31.3     03-29    x   |  x   |  x   ----------------------------<  260<H>  x    |  x   |  x     Ca    8.8      29 Mar 2021 07:15  Phos  2.8     03-27  Mg     2.0     03-28    TPro  6.4  /  Alb  2.2<L>  /  TBili  0.5  /  DBili  x   /  AST  14  /  ALT  12  /  AlkPhos  70  03-29                Procalcitonin, Serum: 0.08 ng/mL (03-28-21 @ 18:00)          CULTURES: (if applicable)    Culture - Sputum (collected 03-28-21 @ 22:10)  Source: .Sputum Sputum  Gram Stain (03-29-21 @ 05:58):    Rare polymorphonuclear leukocytes per low power field    Rare Squamous epithelial cells per low power field    Rare Gram Negative Rods per oil power field    Rare Gram Variable Cocci per oil power field    Culture - Urine (collected 03-26-21 @ 13:30)  Source: .Urine Clean Catch (Midstream)  Final Report (03-27-21 @ 14:49):    No growth            CAPILLARY BLOOD GLUCOSE      POCT Blood Glucose.: 223 mg/dL (29 Mar 2021 12:28)      RADIOLOGY  CXR:      CT:    ECHO:      VITALS:  T(C): 37 (03-29-21 @ 11:14), Max: 37.7 (03-28-21 @ 20:27)  T(F): 98.6 (03-29-21 @ 11:14), Max: 99.8 (03-28-21 @ 20:27)  HR: 84 (03-29-21 @ 11:14) (78 - 88)  BP: 114/69 (03-29-21 @ 11:14) (108/69 - 120/66)  BP(mean): --  ABP: --  ABP(mean): --  RR: 15 (03-29-21 @ 11:14) (11 - 16)  SpO2: 100% (03-29-21 @ 11:14) (96% - 100%)  CVP(mm Hg): --  CVP(cm H2O): --    Ins and Outs     03-28-21 @ 07:01  -  03-29-21 @ 07:00  --------------------------------------------------------  IN: 780 mL / OUT: 4825 mL / NET: -4045 mL    03-29-21 @ 07:01  -  03-29-21 @ 14:33  --------------------------------------------------------  IN: 640 mL / OUT: 8400 mL / NET: -7760 mL                I&O's Detail    28 Mar 2021 07:01  -  29 Mar 2021 07:00  --------------------------------------------------------  IN:    Oral Fluid: 780 mL  Total IN: 780 mL    OUT:    Voided (mL): 4825 mL  Total OUT: 4825 mL    Total NET: -4045 mL      29 Mar 2021 07:01  -  29 Mar 2021 14:33  --------------------------------------------------------  IN:    Oral Fluid: 640 mL  Total IN: 640 mL    OUT:    Voided (mL): 8400 mL  Total OUT: 8400 mL    Total NET: -7760 mL

## 2021-03-30 LAB
ANION GAP SERPL CALC-SCNC: 10 MMOL/L — SIGNIFICANT CHANGE UP (ref 5–17)
BASOPHILS # BLD AUTO: 0.04 K/UL — SIGNIFICANT CHANGE UP (ref 0–0.2)
BASOPHILS NFR BLD AUTO: 0.5 % — SIGNIFICANT CHANGE UP (ref 0–2)
BUN SERPL-MCNC: 6 MG/DL — LOW (ref 7–23)
C PEPTIDE SERPL-MCNC: 2 NG/ML — SIGNIFICANT CHANGE UP (ref 1.1–4.4)
CALCIUM SERPL-MCNC: 8.8 MG/DL — SIGNIFICANT CHANGE UP (ref 8.4–10.5)
CHLORIDE SERPL-SCNC: 107 MMOL/L — SIGNIFICANT CHANGE UP (ref 96–108)
CO2 SERPL-SCNC: 25 MMOL/L — SIGNIFICANT CHANGE UP (ref 22–31)
CREAT SERPL-MCNC: 0.65 MG/DL — SIGNIFICANT CHANGE UP (ref 0.5–1.3)
EOSINOPHIL # BLD AUTO: 0.29 K/UL — SIGNIFICANT CHANGE UP (ref 0–0.5)
EOSINOPHIL NFR BLD AUTO: 3.6 % — SIGNIFICANT CHANGE UP (ref 0–6)
GLUCOSE BLDC GLUCOMTR-MCNC: 195 MG/DL — HIGH (ref 70–99)
GLUCOSE BLDC GLUCOMTR-MCNC: 229 MG/DL — HIGH (ref 70–99)
GLUCOSE BLDC GLUCOMTR-MCNC: 243 MG/DL — HIGH (ref 70–99)
GLUCOSE BLDC GLUCOMTR-MCNC: 320 MG/DL — HIGH (ref 70–99)
GLUCOSE SERPL-MCNC: 170 MG/DL — HIGH (ref 70–99)
HCT VFR BLD CALC: 32.5 % — LOW (ref 39–50)
HGB BLD-MCNC: 11.1 G/DL — LOW (ref 13–17)
IMM GRANULOCYTES NFR BLD AUTO: 1.6 % — HIGH (ref 0–1.5)
LYMPHOCYTES # BLD AUTO: 1.75 K/UL — SIGNIFICANT CHANGE UP (ref 1–3.3)
LYMPHOCYTES # BLD AUTO: 21.5 % — SIGNIFICANT CHANGE UP (ref 13–44)
MAGNESIUM SERPL-MCNC: 2 MG/DL — SIGNIFICANT CHANGE UP (ref 1.6–2.6)
MCHC RBC-ENTMCNC: 32.1 PG — SIGNIFICANT CHANGE UP (ref 27–34)
MCHC RBC-ENTMCNC: 34.2 GM/DL — SIGNIFICANT CHANGE UP (ref 32–36)
MCV RBC AUTO: 93.9 FL — SIGNIFICANT CHANGE UP (ref 80–100)
MONOCYTES # BLD AUTO: 0.58 K/UL — SIGNIFICANT CHANGE UP (ref 0–0.9)
MONOCYTES NFR BLD AUTO: 7.1 % — SIGNIFICANT CHANGE UP (ref 2–14)
NEUTROPHILS # BLD AUTO: 5.34 K/UL — SIGNIFICANT CHANGE UP (ref 1.8–7.4)
NEUTROPHILS NFR BLD AUTO: 65.7 % — SIGNIFICANT CHANGE UP (ref 43–77)
NRBC # BLD: 0 /100 WBCS — SIGNIFICANT CHANGE UP (ref 0–0)
PHOSPHATE SERPL-MCNC: 4.6 MG/DL — HIGH (ref 2.5–4.5)
PLATELET # BLD AUTO: 224 K/UL — SIGNIFICANT CHANGE UP (ref 150–400)
POTASSIUM SERPL-MCNC: 3.6 MMOL/L — SIGNIFICANT CHANGE UP (ref 3.5–5.3)
POTASSIUM SERPL-SCNC: 3.6 MMOL/L — SIGNIFICANT CHANGE UP (ref 3.5–5.3)
RBC # BLD: 3.46 M/UL — LOW (ref 4.2–5.8)
RBC # FLD: 12 % — SIGNIFICANT CHANGE UP (ref 10.3–14.5)
SODIUM SERPL-SCNC: 142 MMOL/L — SIGNIFICANT CHANGE UP (ref 135–145)
WBC # BLD: 8.13 K/UL — SIGNIFICANT CHANGE UP (ref 3.8–10.5)
WBC # FLD AUTO: 8.13 K/UL — SIGNIFICANT CHANGE UP (ref 3.8–10.5)

## 2021-03-30 PROCEDURE — 99232 SBSQ HOSP IP/OBS MODERATE 35: CPT

## 2021-03-30 PROCEDURE — 71250 CT THORAX DX C-: CPT | Mod: 26

## 2021-03-30 PROCEDURE — 99232 SBSQ HOSP IP/OBS MODERATE 35: CPT | Mod: GC

## 2021-03-30 RX ORDER — LIDOCAINE 4 G/100G
1 CREAM TOPICAL ONCE
Refills: 0 | Status: COMPLETED | OUTPATIENT
Start: 2021-03-30 | End: 2021-03-30

## 2021-03-30 RX ORDER — INSULIN LISPRO 100/ML
13 VIAL (ML) SUBCUTANEOUS
Refills: 0 | Status: DISCONTINUED | OUTPATIENT
Start: 2021-03-30 | End: 2021-03-31

## 2021-03-30 RX ORDER — METRONIDAZOLE 500 MG
500 TABLET ORAL EVERY 8 HOURS
Refills: 0 | Status: DISCONTINUED | OUTPATIENT
Start: 2021-03-30 | End: 2021-04-01

## 2021-03-30 RX ORDER — CYCLOBENZAPRINE HYDROCHLORIDE 10 MG/1
5 TABLET, FILM COATED ORAL AT BEDTIME
Refills: 0 | Status: DISCONTINUED | OUTPATIENT
Start: 2021-03-30 | End: 2021-04-01

## 2021-03-30 RX ORDER — HYDROMORPHONE HYDROCHLORIDE 2 MG/ML
0.5 INJECTION INTRAMUSCULAR; INTRAVENOUS; SUBCUTANEOUS ONCE
Refills: 0 | Status: DISCONTINUED | OUTPATIENT
Start: 2021-03-30 | End: 2021-03-30

## 2021-03-30 RX ADMIN — SENNA PLUS 2 TABLET(S): 8.6 TABLET ORAL at 21:28

## 2021-03-30 RX ADMIN — Medication 200 MILLIGRAM(S): at 17:31

## 2021-03-30 RX ADMIN — LIDOCAINE 1 PATCH: 4 CREAM TOPICAL at 11:00

## 2021-03-30 RX ADMIN — TAMSULOSIN HYDROCHLORIDE 0.4 MILLIGRAM(S): 0.4 CAPSULE ORAL at 21:28

## 2021-03-30 RX ADMIN — Medication 4: at 17:32

## 2021-03-30 RX ADMIN — POLYETHYLENE GLYCOL 3350 17 GRAM(S): 17 POWDER, FOR SOLUTION ORAL at 05:38

## 2021-03-30 RX ADMIN — HEPARIN SODIUM 7500 UNIT(S): 5000 INJECTION INTRAVENOUS; SUBCUTANEOUS at 05:38

## 2021-03-30 RX ADMIN — Medication 1 APPLICATION(S): at 05:40

## 2021-03-30 RX ADMIN — HYDROMORPHONE HYDROCHLORIDE 0.5 MILLIGRAM(S): 2 INJECTION INTRAMUSCULAR; INTRAVENOUS; SUBCUTANEOUS at 22:29

## 2021-03-30 RX ADMIN — Medication 81 MILLIGRAM(S): at 12:14

## 2021-03-30 RX ADMIN — HEPARIN SODIUM 7500 UNIT(S): 5000 INJECTION INTRAVENOUS; SUBCUTANEOUS at 21:26

## 2021-03-30 RX ADMIN — Medication 200 MILLIGRAM(S): at 21:25

## 2021-03-30 RX ADMIN — Medication 500 MILLIGRAM(S): at 17:30

## 2021-03-30 RX ADMIN — PANTOPRAZOLE SODIUM 40 MILLIGRAM(S): 20 TABLET, DELAYED RELEASE ORAL at 05:38

## 2021-03-30 RX ADMIN — SERTRALINE 150 MILLIGRAM(S): 25 TABLET, FILM COATED ORAL at 12:14

## 2021-03-30 RX ADMIN — LIDOCAINE 1 PATCH: 4 CREAM TOPICAL at 22:14

## 2021-03-30 RX ADMIN — Medication 10 UNIT(S): at 12:15

## 2021-03-30 RX ADMIN — BENZOCAINE AND MENTHOL 1 LOZENGE: 5; 1 LIQUID ORAL at 12:14

## 2021-03-30 RX ADMIN — LIDOCAINE 1 PATCH: 4 CREAM TOPICAL at 08:47

## 2021-03-30 RX ADMIN — TRAMADOL HYDROCHLORIDE 50 MILLIGRAM(S): 50 TABLET ORAL at 00:22

## 2021-03-30 RX ADMIN — Medication 2: at 08:04

## 2021-03-30 RX ADMIN — Medication 10 UNIT(S): at 08:05

## 2021-03-30 RX ADMIN — Medication 1 APPLICATION(S): at 17:41

## 2021-03-30 RX ADMIN — ATORVASTATIN CALCIUM 40 MILLIGRAM(S): 80 TABLET, FILM COATED ORAL at 21:26

## 2021-03-30 RX ADMIN — Medication 100 MILLIGRAM(S): at 17:30

## 2021-03-30 RX ADMIN — HYDROMORPHONE HYDROCHLORIDE 0.5 MILLIGRAM(S): 2 INJECTION INTRAMUSCULAR; INTRAVENOUS; SUBCUTANEOUS at 22:14

## 2021-03-30 RX ADMIN — Medication 150 MILLIGRAM(S): at 21:25

## 2021-03-30 RX ADMIN — Medication 650 MILLIGRAM(S): at 12:17

## 2021-03-30 RX ADMIN — Medication 650 MILLIGRAM(S): at 13:15

## 2021-03-30 RX ADMIN — Medication 500 MILLIGRAM(S): at 21:27

## 2021-03-30 RX ADMIN — Medication 100 MILLIGRAM(S): at 05:38

## 2021-03-30 RX ADMIN — Medication 8: at 12:15

## 2021-03-30 RX ADMIN — BENZOCAINE AND MENTHOL 1 LOZENGE: 5; 1 LIQUID ORAL at 05:38

## 2021-03-30 RX ADMIN — CEFTRIAXONE 100 MILLIGRAM(S): 500 INJECTION, POWDER, FOR SOLUTION INTRAMUSCULAR; INTRAVENOUS at 17:40

## 2021-03-30 RX ADMIN — INSULIN GLARGINE 35 UNIT(S): 100 INJECTION, SOLUTION SUBCUTANEOUS at 21:24

## 2021-03-30 RX ADMIN — HEPARIN SODIUM 7500 UNIT(S): 5000 INJECTION INTRAVENOUS; SUBCUTANEOUS at 17:31

## 2021-03-30 RX ADMIN — BENZOCAINE AND MENTHOL 1 LOZENGE: 5; 1 LIQUID ORAL at 21:28

## 2021-03-30 RX ADMIN — Medication 13 UNIT(S): at 17:32

## 2021-03-30 RX ADMIN — POLYETHYLENE GLYCOL 3350 17 GRAM(S): 17 POWDER, FOR SOLUTION ORAL at 17:31

## 2021-03-30 RX ADMIN — Medication 200 MILLIGRAM(S): at 05:38

## 2021-03-30 NOTE — PROGRESS NOTE ADULT - SUBJECTIVE AND OBJECTIVE BOX
HPI: 61 yo M with PMHx of HTN, HLD, Pre-DM presented for eval of weakness, N/V, abd pain, chest pain. Patient reports several days of symptoms. He states he does not take any medications for DM. Patient denies trauma or inciting event. Patient also reports constipation with blood on toilet paper after wiping. ED workup significant for Glucose 674, AG 18, BUN/Cr 26/1.31, Acetone moderate. CT Chest with mass like structure to RLL and CT Abd/Pel with CBD dilation and air in biliary tree. ED interventions include 2L IVF Bolus, IVP insulin,  (26 Mar 2021 16:30)    Subjective: Patient seen and examined at bedside this morning, c/o right sided back pain and continues with epigastric tenderness, cough and constipation. Patient started with R sided lower back pain overnight, lidocaine patches were placed with some relief. No c/o of fever chills, dysuria, urinary frequency or hesitancy Patient had one small BM yesterday s/p dulcolax suppository. Has been out of bed and tolerating orally.     REVIEW OF SYSTEMS:    CONSTITUTIONAL: No weakness, fevers or chills, + tiredness  EYES/ENT: No visual changes;  No vertigo or throat pain   NECK: No pain or stiffness  RESPIRATORY: +cough, no wheezing, hemoptysis; No shortness of breath  CARDIOVASCULAR: No chest pain or palpitations  GASTROINTESTINAL: + epigastric pain. No nausea, vomiting, or hematemesis; No diarrhea or constipation. No melena or hematochezia.  GENITOURINARY: No dysuria, frequency or hematuria  NEUROLOGICAL: No numbness or weakness  SKIN: No itching, rashes  MSK: back pain+    Vital Signs Last 24 Hrs  T(C): 36.9 (30 Mar 2021 05:49), Max: 37 (29 Mar 2021 11:14)  T(F): 98.4 (30 Mar 2021 05:49), Max: 98.6 (29 Mar 2021 11:14)  HR: 70 (30 Mar 2021 05:49) (70 - 84)  BP: 106/66 (30 Mar 2021 05:49) (103/61 - 120/77)  BP(mean): 71 (29 Mar 2021 16:06) (71 - 71)  RR: 16 (30 Mar 2021 05:49) (15 - 16)  SpO2: 97% (30 Mar 2021 05:49) (94% - 100%)    PHYSICAL EXAM:  GENERAL: NAD, lying in bed comfortably  HEAD:  Atraumatic, Normocephalic  EYES: conjunctiva and sclera clear  ENT: Moist mucous membranes  NECK: Supple, No JVD  CHEST/LUNG: Clear to auscultation bilaterally; No rales, rhonchi, wheezing, or rubs. Unlabored respirations  HEART: Regular rate and rhythm; No murmurs, rubs, or gallops  ABDOMEN: Bowel sounds present; Soft, tenderness to epigastric region, no guarding/ rebound   EXTREMITIES:  2+ Peripheral Pulses, brisk capillary refill. No clubbing, cyanosis, or edema  NERVOUS SYSTEM:  Alert & Oriented X3, speech clear. No deficits   MSK: FROM all 4 extremities, full and equal strength, Lidocaine patch in place to R posterolateral aspect of back, non tender to palpation, some tenderness on active movement      MEDICATIONS  (STANDING):  aspirin  chewable 81 milliGRAM(s) Oral daily  atorvastatin 40 milliGRAM(s) Oral at bedtime  benzonatate 200 milliGRAM(s) Oral three times a day  cefTRIAXone   IVPB 1000 milliGRAM(s) IV Intermittent every 24 hours  dextrose 40% Gel 15 Gram(s) Oral once  dextrose 5%. 1000 milliLiter(s) (50 mL/Hr) IV Continuous <Continuous>  dextrose 5%. 1000 milliLiter(s) (100 mL/Hr) IV Continuous <Continuous>  dextrose 50% Injectable 25 Gram(s) IV Push once  dextrose 50% Injectable 12.5 Gram(s) IV Push once  dextrose 50% Injectable 25 Gram(s) IV Push once  dextrose 50% Injectable 25 Gram(s) IV Push once  dextrose 50% Injectable 12.5 Gram(s) IV Push once  dextrose 50% Injectable 25 Gram(s) IV Push once  glucagon  Injectable 1 milliGRAM(s) IntraMuscular once  glucagon  Injectable 1 milliGRAM(s) IntraMuscular once  heparin   Injectable 7500 Unit(s) SubCutaneous every 8 hours  hydrocortisone 2.5% Rectal Cream 1 Application(s) Rectal two times a day  insulin glargine Injectable (LANTUS) 35 Unit(s) SubCutaneous at bedtime  insulin lispro (ADMELOG) corrective regimen sliding scale   SubCutaneous three times a day before meals  insulin lispro (ADMELOG) corrective regimen sliding scale   SubCutaneous at bedtime  insulin lispro Injectable (ADMELOG) 10 Unit(s) SubCutaneous three times a day before meals  lactated ringers. 1000 milliLiter(s) (100 mL/Hr) IV Continuous <Continuous>  pantoprazole    Tablet 40 milliGRAM(s) Oral before breakfast  polyethylene glycol 3350 17 Gram(s) Oral two times a day  senna 2 Tablet(s) Oral at bedtime  sertraline 150 milliGRAM(s) Oral daily  tamsulosin 0.4 milliGRAM(s) Oral at bedtime  traZODone 150 milliGRAM(s) Oral at bedtime    MEDICATIONS  (PRN):  acetaminophen   Tablet .. 650 milliGRAM(s) Oral every 6 hours PRN Temp greater or equal to 38C (100.4F), Mild Pain (1 - 3)  aluminum hydroxide/magnesium hydroxide/simethicone Suspension 30 milliLiter(s) Oral every 4 hours PRN Dyspepsia  benzocaine 15 mG/menthol 3.6 mG (Sugar-Free) Lozenge 1 Lozenge Oral every 6 hours PRN Sore Throat  guaiFENesin   Syrup  (Sugar-Free) 100 milliGRAM(s) Oral every 6 hours PRN Cough  ondansetron Injectable 4 milliGRAM(s) IV Push every 8 hours PRN Nausea and/or Vomiting      LABS:                                  11.1   8.13  )-----------( 224      ( 30 Mar 2021 06:00 )             32.5   03-30    142  |  107  |  6<L>  ----------------------------<  170<H>  3.6   |  25  |  0.65    Ca    8.8      30 Mar 2021 06:00  Phos  4.6     03-30  Mg     2.0     03-30    TPro  6.4  /  Alb  2.2<L>  /  TBili  0.5  /  DBili  x   /  AST  14  /  ALT  12  /  AlkPhos  70  03-29    LIVER FUNCTIONS - ( 29 Mar 2021 07:15 )  Alb: 2.2 g/dL / Pro: 6.4 g/dL / ALK PHOS: 70 U/L / ALT: 12 U/L / AST: 14 U/L / GGT: x                  CAPILLARY BLOOD GLUCOSE      POCT Blood Glucose.: 195 mg/dL (30 Mar 2021 08:03)  POCT Blood Glucose.: 270 mg/dL (29 Mar 2021 20:32)  POCT Blood Glucose.: 230 mg/dL (29 Mar 2021 17:02)  POCT Blood Glucose.: 223 mg/dL (29 Mar 2021 12:28)      CARDIAC MARKERS ( 26 Mar 2021 13:30 )  <.017 ng/mL / x     / x     / x     / x          Urinalysis Basic - ( 26 Mar 2021 13:30 )    Color: Yellow / Appearance: Clear / S.010 / pH: x  Gluc: x / Ketone: Large  / Bili: Negative / Urobili: Negative   Blood: x / Protein: Negative / Nitrite: Negative   Leuk Esterase: Negative / RBC: x / WBC x   Sq Epi: x / Non Sq Epi: x / Bacteria: x        RADIOLOGY:  < from: CT Chest w/ IV Cont (21 @ 14:44) >  FINDINGS:  CHEST:  LUNGS AND LARGE AIRWAYS: Patent central airways. There is a 4.5 cm heterogeneous in attenuation masslike opacity within the right lower lobe abutting the right major fissure and right hilar region, representing interval change from prior examination dated 12/3/2020. Small nodules are identified adjacent to the mass measuring up to 5 mm.  PLEURA: No evidence for pleural effusion. No pneumothorax.  VESSELS: Stable variant anatomy identified with a venous conduit extending from the left thorax into the left brachiocephalic vein. Thoracic aortic caliber appears unremarkable.  HEART: Heart size is normal. No pericardial effusion. Coronary arterial calcification.  MEDIASTINUM AND LIZZETTE: No mediastinal lymphadenopathy. No left hilar lymphadenopathy.  CHEST WALL AND LOWER NECK: Within normal limits.    ABDOMEN AND PELVIS:  LIVER: Normal in size. No focal hepatic masses.  BILE DUCTS: No evidence for intrahepatic biliary ductal dilatation. Scattered foci of air identified within the intrahepatic biliary tree. There is increased caliber of the extrahepatic CBD measuring 9-10 mm.  GALLBLADDER: A gallstone is noted in the gallbladder lumen measuring 1.1 cm. Air is noted within the gallbladder lumen. Contracted state of the gallbladder limits assessment for wall thickening. Emphysematous cholecystitis cannot be excluded. Correlate with clinical as well as procedure history recommended.  SPLEEN: Within normal limits. Stable 2.4 x 1.6 cm soft tissue nodularity medial to the spleen, likely accessory splenic tissue.  PANCREAS: Within normal limits.  ADRENALS: Within normal limits.  KIDNEYS/URETERS: Within normal limits.    BLADDER: Within normal limits.  REPRODUCTIVE ORGANS: The prostate appears unremarkable.    BOWEL: Fecal material scattered throughout the colon. No evidence for mechanical bowel obstruction. Colonic diverticulosis. No colonic wall thickening. The appendix appears unremarkable.  PERITONEUM: No free intraperitoneal air or fluid.  VESSELS: Within normal limits.  RETROPERITONEUM/LYMPH NODES: No lymphadenopathy.  ABDOMINAL WALL: Within normal limits.  BONES: Degenerative changes.    IMPRESSION:  1. There is a 4.5 cm heterogeneous in attenuation masslike opacity within the right lower lobe abutting the right major fissure and right hilar region, representing interval change from prior examination dated 12/3/2020. Small nodules are identified adjacent to the mass measuring up to 5 mm. Differential considerations would include both neoplastic and infectious etiologies.  2. Scattered foci of air identified within the intrahepatic biliary tree. There is increased caliber of the extrahepatic CBD measuring 9-10 mm.  A gallstone is noted in the gallbladder lumen measuring 1.1 cm. Air is noted within the gallbladder lumen. Contracted state of the gallbladder limits assessment for wall thickening. Emphysematous cholecystitis cannot be excluded. Correlate with clinical as well as procedure history recommended.        < from: US Abdomen Upper Quadrant Right (. @ 09:26) >  COMPARISON: CT dated 3/26/2021    TECHNIQUE: Sonography of the right upper quadrant.    FINDINGS:    Liver: Enlarged (20.2 cm). Increased echogenicity.  Bile ducts: Normal caliber. Common bile duct measures 6.7 mm.  Gallbladder: Cholelithiasis.  No focal tenderness or evidence of cholecystitis.  Pancreas: Visualized portions are within normal limits.  Right kidney: 13.3 cm. No hydronephrosis.  Ascites: None.  IVC: Visualized portions are within normal limits.    IMPRESSION:    Hepatomegaly.  Cholelithiasis.             HPI: 59 yo M with PMHx of HTN, HLD, Pre-DM presented for eval of weakness, N/V, abd pain, chest pain. Patient reports several days of symptoms. He states he does not take any medications for DM. Patient denies trauma or inciting event. Patient also reports constipation with blood on toilet paper after wiping. ED workup significant for Glucose 674, AG 18, BUN/Cr 26/1.31, Acetone moderate. CT Chest with mass like structure to RLL and CT Abd/Pel with CBD dilation and air in biliary tree. ED interventions include 2L IVF Bolus, IVP insulin,  (26 Mar 2021 16:30)    Subjective: Patient seen and examined at bedside this morning, c/o right sided back pain and continues with epigastric tenderness, cough and constipation. Patient started with R sided lower back pain overnight, lidocaine patches were placed with some relief. No c/o of fever chills, dysuria, urinary frequency or hesitancy Patient had one small BM yesterday s/p dulcolax suppository. Has been out of bed and tolerating orally.     REVIEW OF SYSTEMS:    CONSTITUTIONAL: No weakness, fevers or chills, + tiredness  EYES/ENT: No visual changes;  No vertigo or throat pain   NECK: No pain or stiffness  RESPIRATORY: +cough, no wheezing, hemoptysis; No shortness of breath  CARDIOVASCULAR: No chest pain or palpitations  GASTROINTESTINAL: + epigastric pain. No nausea, vomiting, or hematemesis; No diarrhea or constipation. No melena or hematochezia.  GENITOURINARY: No dysuria, frequency or hematuria  NEUROLOGICAL: No numbness or weakness  SKIN: No itching, rashes  MSK: back pain+    Vital Signs Last 24 Hrs  T(C): 36.9 (30 Mar 2021 05:49), Max: 37 (29 Mar 2021 11:14)  T(F): 98.4 (30 Mar 2021 05:49), Max: 98.6 (29 Mar 2021 11:14)  HR: 70 (30 Mar 2021 05:49) (70 - 84)  BP: 106/66 (30 Mar 2021 05:49) (103/61 - 120/77)  BP(mean): 71 (29 Mar 2021 16:06) (71 - 71)  RR: 16 (30 Mar 2021 05:49) (15 - 16)  SpO2: 97% (30 Mar 2021 05:49) (94% - 100%)    PHYSICAL EXAM:  GENERAL: NAD, lying in bed comfortably  HEAD:  Atraumatic, Normocephalic  EYES: conjunctiva and sclera clear  ENT: Moist mucous membranes  NECK: Supple, No JVD  CHEST/LUNG: Clear to auscultation bilaterally; No rales, rhonchi, wheezing, or rubs. Unlabored respirations  HEART: Regular rate and rhythm; No murmurs, rubs, or gallops  ABDOMEN: Bowel sounds present; Soft, tenderness to epigastric region, no guarding/ rebound   EXTREMITIES:  2+ Peripheral Pulses, brisk capillary refill. No clubbing, cyanosis, or edema  NERVOUS SYSTEM:  Alert & Oriented X3, speech clear. No deficits   MSK: FROM all 4 extremities, full and equal strength, Lidocaine patch in place to R posterolateral aspect of back, tender to palpation, some tenderness on active movement, + spasms      MEDICATIONS  (STANDING):  aspirin  chewable 81 milliGRAM(s) Oral daily  atorvastatin 40 milliGRAM(s) Oral at bedtime  benzonatate 200 milliGRAM(s) Oral three times a day  cefTRIAXone   IVPB 1000 milliGRAM(s) IV Intermittent every 24 hours  dextrose 40% Gel 15 Gram(s) Oral once  dextrose 5%. 1000 milliLiter(s) (50 mL/Hr) IV Continuous <Continuous>  dextrose 5%. 1000 milliLiter(s) (100 mL/Hr) IV Continuous <Continuous>  dextrose 50% Injectable 25 Gram(s) IV Push once  dextrose 50% Injectable 12.5 Gram(s) IV Push once  dextrose 50% Injectable 25 Gram(s) IV Push once  dextrose 50% Injectable 25 Gram(s) IV Push once  dextrose 50% Injectable 12.5 Gram(s) IV Push once  dextrose 50% Injectable 25 Gram(s) IV Push once  glucagon  Injectable 1 milliGRAM(s) IntraMuscular once  glucagon  Injectable 1 milliGRAM(s) IntraMuscular once  heparin   Injectable 7500 Unit(s) SubCutaneous every 8 hours  hydrocortisone 2.5% Rectal Cream 1 Application(s) Rectal two times a day  insulin glargine Injectable (LANTUS) 35 Unit(s) SubCutaneous at bedtime  insulin lispro (ADMELOG) corrective regimen sliding scale   SubCutaneous three times a day before meals  insulin lispro (ADMELOG) corrective regimen sliding scale   SubCutaneous at bedtime  insulin lispro Injectable (ADMELOG) 10 Unit(s) SubCutaneous three times a day before meals  lactated ringers. 1000 milliLiter(s) (100 mL/Hr) IV Continuous <Continuous>  pantoprazole    Tablet 40 milliGRAM(s) Oral before breakfast  polyethylene glycol 3350 17 Gram(s) Oral two times a day  senna 2 Tablet(s) Oral at bedtime  sertraline 150 milliGRAM(s) Oral daily  tamsulosin 0.4 milliGRAM(s) Oral at bedtime  traZODone 150 milliGRAM(s) Oral at bedtime    MEDICATIONS  (PRN):  acetaminophen   Tablet .. 650 milliGRAM(s) Oral every 6 hours PRN Temp greater or equal to 38C (100.4F), Mild Pain (1 - 3)  aluminum hydroxide/magnesium hydroxide/simethicone Suspension 30 milliLiter(s) Oral every 4 hours PRN Dyspepsia  benzocaine 15 mG/menthol 3.6 mG (Sugar-Free) Lozenge 1 Lozenge Oral every 6 hours PRN Sore Throat  guaiFENesin   Syrup  (Sugar-Free) 100 milliGRAM(s) Oral every 6 hours PRN Cough  ondansetron Injectable 4 milliGRAM(s) IV Push every 8 hours PRN Nausea and/or Vomiting      LABS:                                  11.1   8.13  )-----------( 224      ( 30 Mar 2021 06:00 )             32.5   03-30    142  |  107  |  6<L>  ----------------------------<  170<H>  3.6   |  25  |  0.65    Ca    8.8      30 Mar 2021 06:00  Phos  4.6     03-30  Mg     2.0     03-30    TPro  6.4  /  Alb  2.2<L>  /  TBili  0.5  /  DBili  x   /  AST  14  /  ALT  12  /  AlkPhos  70  03-    LIVER FUNCTIONS - ( 29 Mar 2021 07:15 )  Alb: 2.2 g/dL / Pro: 6.4 g/dL / ALK PHOS: 70 U/L / ALT: 12 U/L / AST: 14 U/L / GGT: x                  CAPILLARY BLOOD GLUCOSE      POCT Blood Glucose.: 195 mg/dL (30 Mar 2021 08:03)  POCT Blood Glucose.: 270 mg/dL (29 Mar 2021 20:32)  POCT Blood Glucose.: 230 mg/dL (29 Mar 2021 17:02)  POCT Blood Glucose.: 223 mg/dL (29 Mar 2021 12:28)      CARDIAC MARKERS ( 26 Mar 2021 13:30 )  <.017 ng/mL / x     / x     / x     / x          Urinalysis Basic - ( 26 Mar 2021 13:30 )    Color: Yellow / Appearance: Clear / S.010 / pH: x  Gluc: x / Ketone: Large  / Bili: Negative / Urobili: Negative   Blood: x / Protein: Negative / Nitrite: Negative   Leuk Esterase: Negative / RBC: x / WBC x   Sq Epi: x / Non Sq Epi: x / Bacteria: x        RADIOLOGY:  < from: CT Chest w/ IV Cont (21 @ 14:44) >  FINDINGS:  CHEST:  LUNGS AND LARGE AIRWAYS: Patent central airways. There is a 4.5 cm heterogeneous in attenuation masslike opacity within the right lower lobe abutting the right major fissure and right hilar region, representing interval change from prior examination dated 12/3/2020. Small nodules are identified adjacent to the mass measuring up to 5 mm.  PLEURA: No evidence for pleural effusion. No pneumothorax.  VESSELS: Stable variant anatomy identified with a venous conduit extending from the left thorax into the left brachiocephalic vein. Thoracic aortic caliber appears unremarkable.  HEART: Heart size is normal. No pericardial effusion. Coronary arterial calcification.  MEDIASTINUM AND LIZZETTE: No mediastinal lymphadenopathy. No left hilar lymphadenopathy.  CHEST WALL AND LOWER NECK: Within normal limits.    ABDOMEN AND PELVIS:  LIVER: Normal in size. No focal hepatic masses.  BILE DUCTS: No evidence for intrahepatic biliary ductal dilatation. Scattered foci of air identified within the intrahepatic biliary tree. There is increased caliber of the extrahepatic CBD measuring 9-10 mm.  GALLBLADDER: A gallstone is noted in the gallbladder lumen measuring 1.1 cm. Air is noted within the gallbladder lumen. Contracted state of the gallbladder limits assessment for wall thickening. Emphysematous cholecystitis cannot be excluded. Correlate with clinical as well as procedure history recommended.  SPLEEN: Within normal limits. Stable 2.4 x 1.6 cm soft tissue nodularity medial to the spleen, likely accessory splenic tissue.  PANCREAS: Within normal limits.  ADRENALS: Within normal limits.  KIDNEYS/URETERS: Within normal limits.    BLADDER: Within normal limits.  REPRODUCTIVE ORGANS: The prostate appears unremarkable.    BOWEL: Fecal material scattered throughout the colon. No evidence for mechanical bowel obstruction. Colonic diverticulosis. No colonic wall thickening. The appendix appears unremarkable.  PERITONEUM: No free intraperitoneal air or fluid.  VESSELS: Within normal limits.  RETROPERITONEUM/LYMPH NODES: No lymphadenopathy.  ABDOMINAL WALL: Within normal limits.  BONES: Degenerative changes.    IMPRESSION:  1. There is a 4.5 cm heterogeneous in attenuation masslike opacity within the right lower lobe abutting the right major fissure and right hilar region, representing interval change from prior examination dated 12/3/2020. Small nodules are identified adjacent to the mass measuring up to 5 mm. Differential considerations would include both neoplastic and infectious etiologies.  2. Scattered foci of air identified within the intrahepatic biliary tree. There is increased caliber of the extrahepatic CBD measuring 9-10 mm.  A gallstone is noted in the gallbladder lumen measuring 1.1 cm. Air is noted within the gallbladder lumen. Contracted state of the gallbladder limits assessment for wall thickening. Emphysematous cholecystitis cannot be excluded. Correlate with clinical as well as procedure history recommended.        < from: US Abdomen Upper Quadrant Right (21 @ 09:26) >  COMPARISON: CT dated 3/26/2021    TECHNIQUE: Sonography of the right upper quadrant.    FINDINGS:    Liver: Enlarged (20.2 cm). Increased echogenicity.  Bile ducts: Normal caliber. Common bile duct measures 6.7 mm.  Gallbladder: Cholelithiasis.  No focal tenderness or evidence of cholecystitis.  Pancreas: Visualized portions are within normal limits.  Right kidney: 13.3 cm. No hydronephrosis.  Ascites: None.  IVC: Visualized portions are within normal limits.    IMPRESSION:    Hepatomegaly.  Cholelithiasis.

## 2021-03-30 NOTE — PROGRESS NOTE ADULT - ASSESSMENT
60y male with hx DM presented for eval of weakness, N/V, abd pain, chest pain. Patient reports several days of symptoms. He had cough for about 2 weeks, no fevers. CT Chest with mass like structure to RLL and CT Abd/Pel with CBD dilation and air in biliary tree. He was started on IV abx. Concern raised possible lung abscess. Pt initially denied any dental problems,, he now admits to loose tooth.He had a CT scan of chest in December 2020 with no rt sided infiltrates, therefor this is new c/w December 2020.He does not have any hemoptysis but does report atypical chest/abdominal pain.  PC level is less than .08, his CRP is 112 and esr is over 100.  His pain is difficult to correlate with a pulmonary infection such as a lung abscess.  The rt flank/RUQ pain is hard to correlate with pneumonia.He does not seem to have pleural involvement (to my review)  He received 2 grams of azithro, stopped yesterday  Legionella urine antigen and MRSA nasal screen are negative, sputum culture with MURF  PLAN:  Cont CTX for now, we may add additional anaerobic coverage  Diagnostic bronch on hold.  Await official CT scan reading  If treatment of evolving lung abscess is needed, he will be on antibiotics for at least 4-6 weeks, however the majority may be orally administered.

## 2021-03-30 NOTE — PROGRESS NOTE ADULT - SUBJECTIVE AND OBJECTIVE BOX
CC: f/u for Rt sided pneumonia/abscess    Patient reports: he c/o rt flank pain and vague anterior chest pain.He is not bringing up much phlegm, no pleuritic quality to discomfort.He is voiding okay     REVIEW OF SYSTEMS:  All other review of systems negative (Comprehensive ROS)    Antimicrobials Day #  :day 5  cefTRIAXone   IVPB 1000 milliGRAM(s) IV Intermittent every 24 hours    Other Medications Reviewed  MEDICATIONS  (STANDING):  aspirin  chewable 81 milliGRAM(s) Oral daily  atorvastatin 40 milliGRAM(s) Oral at bedtime  benzonatate 200 milliGRAM(s) Oral three times a day  cefTRIAXone   IVPB 1000 milliGRAM(s) IV Intermittent every 24 hours  dextrose 40% Gel 15 Gram(s) Oral once  dextrose 5%. 1000 milliLiter(s) (50 mL/Hr) IV Continuous <Continuous>  dextrose 5%. 1000 milliLiter(s) (100 mL/Hr) IV Continuous <Continuous>  dextrose 50% Injectable 25 Gram(s) IV Push once  dextrose 50% Injectable 12.5 Gram(s) IV Push once  dextrose 50% Injectable 25 Gram(s) IV Push once  dextrose 50% Injectable 25 Gram(s) IV Push once  dextrose 50% Injectable 12.5 Gram(s) IV Push once  dextrose 50% Injectable 25 Gram(s) IV Push once  glucagon  Injectable 1 milliGRAM(s) IntraMuscular once  glucagon  Injectable 1 milliGRAM(s) IntraMuscular once  heparin   Injectable 7500 Unit(s) SubCutaneous every 8 hours  hydrocortisone 2.5% Rectal Cream 1 Application(s) Rectal two times a day  insulin glargine Injectable (LANTUS) 35 Unit(s) SubCutaneous at bedtime  insulin lispro (ADMELOG) corrective regimen sliding scale   SubCutaneous three times a day before meals  insulin lispro (ADMELOG) corrective regimen sliding scale   SubCutaneous at bedtime  insulin lispro Injectable (ADMELOG) 10 Unit(s) SubCutaneous three times a day before meals  pantoprazole    Tablet 40 milliGRAM(s) Oral before breakfast  polyethylene glycol 3350 17 Gram(s) Oral two times a day  senna 2 Tablet(s) Oral at bedtime  sertraline 150 milliGRAM(s) Oral daily  tamsulosin 0.4 milliGRAM(s) Oral at bedtime  traZODone 150 milliGRAM(s) Oral at bedtime    T(F): 98.4 (03-30-21 @ 05:49), Max: 98.6 (03-29-21 @ 20:55)  HR: 70 (03-30-21 @ 05:49)  BP: 106/66 (03-30-21 @ 05:49)  RR: 16 (03-30-21 @ 05:49)  SpO2: 97% (03-30-21 @ 05:49)  Wt(kg): --    PHYSICAL EXAM:  General: alert, no acute distress  Eyes:  anicteric, no conjunctival injection, no discharge  Oropharynx: no lesions or injection 	  Neck: supple, without adenopathy  Lungs: clear to auscultation  Heart: regular rate and rhythm; no murmur, rubs or gallops  Abdomen: soft, nondistended, tender in rt flank., without mass or organomegaly  Skin: no lesions  Extremities: no clubbing, cyanosis, or edema  Neurologic: alert, oriented, moves all extremities    LAB RESULTS:                        11.1   8.13  )-----------( 224      ( 30 Mar 2021 06:00 )             32.5     03-30    142  |  107  |  6<L>  ----------------------------<  170<H>  3.6   |  25  |  0.65    Ca    8.8      30 Mar 2021 06:00  Phos  4.6     03-30  Mg     2.0     03-30    TPro  6.4  /  Alb  2.2<L>  /  TBili  0.5  /  DBili  x   /  AST  14  /  ALT  12  /  AlkPhos  70  03-29    LIVER FUNCTIONS - ( 29 Mar 2021 07:15 )  Alb: 2.2 g/dL / Pro: 6.4 g/dL / ALK PHOS: 70 U/L / ALT: 12 U/L / AST: 14 U/L / GGT: x             MICROBIOLOGY:  RECENT CULTURES:  03-28 @ 22:10 .Sputum Sputum     Normal Respiratory Princess present    Rare polymorphonuclear leukocytes per low power field  Rare Squamous epithelial cells per low power field  Rare Gram Negative Rods per oil power field  Rare Gram Variable Cocci per oil power field    03-28 @ 14:50 .Blood Blood-Peripheral     No growth to date.      03-26 @ 13:30 .Urine Clean Catch (Midstream)     No growth          RADIOLOGY REVIEWED:    < from: US Abdomen Upper Quadrant Right (03.29.21 @ 09:26) >  IMPRESSION:    Hepatomegaly.  Cholelithiasis.    < end of copied text >

## 2021-03-30 NOTE — ADVANCED PRACTICE NURSE CONSULT - RECOMMEDATIONS
Discharge Recommendations:  1. Lantus Solostar Pen 100 units/ml daily- dose TBD  2. Admelog Solostar Pen 100 units/ml- dose TBD  3. Metformin 500 mg po daily with dinner meal  4. Alcohol swabs - per favorite list  5. Insulin pen needles- per favorite list   Discharge Recommendations:  1. Lantus Solostar Pen 100 units/ml daily- dose TBD  2. Admelog Solostar Pen 100 units/ml- dose TBD  3. Metformin 500 mg po daily with dinner meal  4. Alcohol swabs - on favorite list  5. Insulin pen needles- on favorite list  6. follow appointment in 1 week with Family Practice, [facility]   Discharge Recommendations:  1. Lantus Solostar Pen 100 units/ml daily- dose TBD-  on favorite list- verify insulin coverage prior to discharge  2. Admelog Solostar Pen 100 units/ml- dose TBD -  on favorite list- verify insulin coverage prior to discharge    3. Metformin 500 mg po daily with dinner meal  4. Alcohol swabs - on favorite list  5. Insulin pen needles- on favorite list  6. follow appointment in 1 week with Family Practice, [facility]

## 2021-03-30 NOTE — PROGRESS NOTE ADULT - ASSESSMENT
IMPRESSION: Epigastric and RUQ pain                      Air in biliary system and dilated extra-hepatic ducts    PLAN: If the pains continue, HIDA scan should be performed IMPRESSION: Epigastric and RUQ pain                      Air in biliary ductal system and GB; dilated extra-hepatic ducts on CT (not ultrasound)    PLAN: If the pains continue, HIDA scan should be performed

## 2021-03-30 NOTE — PROGRESS NOTE ADULT - PROBLEM SELECTOR PLAN 2
#Right Lower Lobe masslike opacity   #Pulmonary Nodules   -CT chest: 4.5 cm heterogeneous in attenuation masslike opacity within the right lower lobe abutting the right major fissure and right hilar region, (changed from 12/2020). Small nodules are identified adjacent to the mass measuring up to 5 mm   -Recent low dose CT chest for lung cancer screening done 12/2020 without evidence of opacity to RLL noted now  -History of cigarette smoking, now on nicotine patches/ gum, recent weight loss  -Likely infectious process, with leukocytosis, productive cough and lesion present now compared to CT chest in December 2020  -Continue Ceftriaxone 1g q24hrs - day #5  -S/p Azithromycin - 4 days  -ID consult appreciated: Continue Ceftriaxone, Azithromycin, may consider diagnostic bronchoscopy   -Sputum culture: Rare PMN, rare GNR, rare gram variable cocci, rare squamous epithelial cells    -Procalcitonin: 0.08, ESR: 102, CRP: 118   -Blood cultures - NGTD   -Urine legionella antigen - negative   -Urine culture negative #Right Lower Lobe masslike opacity   #Pulmonary Nodules   -CT chest: 4.5 cm heterogeneous in attenuation masslike opacity within the right lower lobe abutting the right major fissure and right hilar region, (changed from 12/2020). Small nodules are identified adjacent to the mass measuring up to 5 mm   -Recent low dose CT chest for lung cancer screening done 12/2020 without evidence of opacity to RLL noted now  -History of cigarette smoking, now on nicotine patches/ gum, recent weight loss  -Likely infectious process, with leukocytosis, productive cough and lesion present now compared to CT chest in December 2020  -Continue Ceftriaxone 1g q24hrs - day #5  -S/p Azithromycin - 4 days  -ID consult appreciated: Continue Ceftriaxone, Azithromycin, may consider diagnostic bronchoscopy   -Sputum culture: Rare PMN, rare GNR, rare gram variable cocci, rare squamous epithelial cells    -Procalcitonin: 0.08, ESR: 102, CRP: 118   -Blood cultures - NGTD   -Urine legionella antigen - negative   -Repeat Chest CT w/o contrast to re-evaluate lung lesion

## 2021-03-30 NOTE — ADVANCED PRACTICE NURSE CONSULT - ASSESSMENT
Fasting BG  195 today, 3/30 on Lantus 35 units daily. Pre-lunch BG- 320. Dr. Portillo increased nutritional insulin by 30 % to 13 units of Admelog before each meal. Discussed option of Basal/Bolus insulin regime of 4 injections daily verses 70/30 insulin regime of 2 insulin injections daily but less flexibly with food and exercise levels with pt. Pt stated he would like to try doing the basal/bolus with 4 injections daily and can re-evaluate on out pt follow up visit. Discussion with Nancy Johnson, NP, [facility] and Dr. Portillo regarding discharge plan- agreed that we will trial pt on basal/bolus insulin regime and metformin upon discharge with close out pt follow up .  Educated pt on how to access Diabetes Educational videos and to watch how to check BG, administer insulin and hypoglycemia video.   Discussion with primary RN, Narda who will provide follow teaching on BG monitoring, insulin administration and S/S and Tx of hypoglycemia.

## 2021-03-30 NOTE — PROGRESS NOTE ADULT - SUBJECTIVE AND OBJECTIVE BOX
The patient continues to complain of epigastric and mid back pain and occasional RUQ discomfort. There is no association of the pain with eating or drinking. The patient is tolerating solid food without any nausea or vomiting. The LFT's have remained normal.    PFSH: All noncontributory    MEDICATIONS REVIEWED:acetaminophen   Tablet .. 650 milliGRAM(s) Oral every 6 hours PRN  aluminum hydroxide/magnesium hydroxide/simethicone Suspension 30 milliLiter(s) Oral every 4 hours PRN  aspirin  chewable 81 milliGRAM(s) Oral daily  atorvastatin 40 milliGRAM(s) Oral at bedtime  benzocaine 15 mG/menthol 3.6 mG (Sugar-Free) Lozenge 1 Lozenge Oral every 6 hours PRN  benzonatate 200 milliGRAM(s) Oral three times a day  cyclobenzaprine 5 milliGRAM(s) Oral at bedtime PRN  dextrose 40% Gel 15 Gram(s) Oral once  dextrose 5%. 1000 milliLiter(s) IV Continuous <Continuous>  dextrose 5%. 1000 milliLiter(s) IV Continuous <Continuous>  dextrose 50% Injectable 25 Gram(s) IV Push once  dextrose 50% Injectable 12.5 Gram(s) IV Push once  dextrose 50% Injectable 25 Gram(s) IV Push once  dextrose 50% Injectable 25 Gram(s) IV Push once  dextrose 50% Injectable 12.5 Gram(s) IV Push once  dextrose 50% Injectable 25 Gram(s) IV Push once  glucagon  Injectable 1 milliGRAM(s) IntraMuscular once  glucagon  Injectable 1 milliGRAM(s) IntraMuscular once  guaiFENesin   Syrup  (Sugar-Free) 100 milliGRAM(s) Oral every 6 hours PRN  heparin   Injectable 7500 Unit(s) SubCutaneous every 8 hours  hydrocortisone 2.5% Rectal Cream 1 Application(s) Rectal two times a day  HYDROmorphone  Injectable 0.5 milliGRAM(s) IV Push once  insulin glargine Injectable (LANTUS) 35 Unit(s) SubCutaneous at bedtime  insulin lispro (ADMELOG) corrective regimen sliding scale   SubCutaneous three times a day before meals  insulin lispro (ADMELOG) corrective regimen sliding scale   SubCutaneous at bedtime  insulin lispro Injectable (ADMELOG) 13 Unit(s) SubCutaneous three times a day before meals  lidocaine   Patch 1 Patch Transdermal once  metroNIDAZOLE    Tablet 500 milliGRAM(s) Oral every 8 hours  ondansetron Injectable 4 milliGRAM(s) IV Push every 8 hours PRN  pantoprazole    Tablet 40 milliGRAM(s) Oral before breakfast  polyethylene glycol 3350 17 Gram(s) Oral two times a day  senna 2 Tablet(s) Oral at bedtime  sertraline 150 milliGRAM(s) Oral daily  tamsulosin 0.4 milliGRAM(s) Oral at bedtime  traZODone 150 milliGRAM(s) Oral at bedtime      ALLERGIES:No Known Drug Allergies  shellfish (Unknown)      PHYSICAL EXAMINATION:  RESPIRATORY: Clear to auscultation bilaterally, respirations non-labored.  CARDIAC: RR, normal S1S2, no murmurs.  ABDOMEN: soft, BS present, no masses, no hernias, no peritoneal signs, no KLS, nontender.  VASCULAR: Equal and normal pulses.  MUSCULOSKELETAL: Full ROM, no deformities.      T(C): 37.1 (03-30-21 @ 20:14), Max: 37.7 (03-30-21 @ 16:28)  HR: 89 (03-30-21 @ 20:14) (70 - 89)  BP: 121/96 (03-30-21 @ 20:14) (106/66 - 121/96)  RR: 16 (03-30-21 @ 20:14) (16 - 16)  SpO2: 100% (03-30-21 @ 20:14) (95% - 100%)                          11.1   8.13  )-----------( 224      ( 30 Mar 2021 06:00 )             32.5       03-30    142  |  107  |  6<L>  ----------------------------<  170<H>  3.6   |  25  |  0.65    Ca    8.8      30 Mar 2021 06:00  Phos  4.6     03-30  Mg     2.0     03-30    TPro  6.4  /  Alb  2.2<L>  /  TBili  0.5  /  DBili  x   /  AST  14  /  ALT  12  /  AlkPhos  70  03-29

## 2021-03-31 ENCOUNTER — NON-APPOINTMENT (OUTPATIENT)
Age: 61
End: 2021-03-31

## 2021-03-31 LAB
ANION GAP SERPL CALC-SCNC: 13 MMOL/L — SIGNIFICANT CHANGE UP (ref 5–17)
BASOPHILS # BLD AUTO: 0.05 K/UL — SIGNIFICANT CHANGE UP (ref 0–0.2)
BASOPHILS NFR BLD AUTO: 0.5 % — SIGNIFICANT CHANGE UP (ref 0–2)
BUN SERPL-MCNC: 7 MG/DL — SIGNIFICANT CHANGE UP (ref 7–23)
CALCIUM SERPL-MCNC: 9.3 MG/DL — SIGNIFICANT CHANGE UP (ref 8.4–10.5)
CHLORIDE SERPL-SCNC: 105 MMOL/L — SIGNIFICANT CHANGE UP (ref 96–108)
CO2 SERPL-SCNC: 21 MMOL/L — LOW (ref 22–31)
CREAT SERPL-MCNC: 0.92 MG/DL — SIGNIFICANT CHANGE UP (ref 0.5–1.3)
CULTURE RESULTS: SIGNIFICANT CHANGE UP
EOSINOPHIL # BLD AUTO: 0.32 K/UL — SIGNIFICANT CHANGE UP (ref 0–0.5)
EOSINOPHIL NFR BLD AUTO: 3.4 % — SIGNIFICANT CHANGE UP (ref 0–6)
GAMMA INTERFERON BACKGROUND BLD IA-ACNC: 0.02 IU/ML — SIGNIFICANT CHANGE UP
GLUCOSE BLDC GLUCOMTR-MCNC: 142 MG/DL — HIGH (ref 70–99)
GLUCOSE BLDC GLUCOMTR-MCNC: 190 MG/DL — HIGH (ref 70–99)
GLUCOSE BLDC GLUCOMTR-MCNC: 218 MG/DL — HIGH (ref 70–99)
GLUCOSE BLDC GLUCOMTR-MCNC: 257 MG/DL — HIGH (ref 70–99)
GLUCOSE SERPL-MCNC: 200 MG/DL — HIGH (ref 70–99)
HCT VFR BLD CALC: 33.3 % — LOW (ref 39–50)
HGB BLD-MCNC: 11.5 G/DL — LOW (ref 13–17)
HIV 1+2 AB+HIV1 P24 AG SERPL QL IA: SIGNIFICANT CHANGE UP
IMM GRANULOCYTES NFR BLD AUTO: 1.9 % — HIGH (ref 0–1.5)
ISLET CELL512 AB SER-ACNC: SIGNIFICANT CHANGE UP
LYMPHOCYTES # BLD AUTO: 2.12 K/UL — SIGNIFICANT CHANGE UP (ref 1–3.3)
LYMPHOCYTES # BLD AUTO: 22.4 % — SIGNIFICANT CHANGE UP (ref 13–44)
M TB IFN-G BLD-IMP: NEGATIVE — SIGNIFICANT CHANGE UP
M TB IFN-G CD4+ BCKGRND COR BLD-ACNC: -0.01 IU/ML — SIGNIFICANT CHANGE UP
M TB IFN-G CD4+CD8+ BCKGRND COR BLD-ACNC: -0.01 IU/ML — SIGNIFICANT CHANGE UP
MCHC RBC-ENTMCNC: 32.3 PG — SIGNIFICANT CHANGE UP (ref 27–34)
MCHC RBC-ENTMCNC: 34.5 GM/DL — SIGNIFICANT CHANGE UP (ref 32–36)
MCV RBC AUTO: 93.5 FL — SIGNIFICANT CHANGE UP (ref 80–100)
MONOCYTES # BLD AUTO: 0.79 K/UL — SIGNIFICANT CHANGE UP (ref 0–0.9)
MONOCYTES NFR BLD AUTO: 8.3 % — SIGNIFICANT CHANGE UP (ref 2–14)
NEUTROPHILS # BLD AUTO: 6.02 K/UL — SIGNIFICANT CHANGE UP (ref 1.8–7.4)
NEUTROPHILS NFR BLD AUTO: 63.5 % — SIGNIFICANT CHANGE UP (ref 43–77)
NRBC # BLD: 0 /100 WBCS — SIGNIFICANT CHANGE UP (ref 0–0)
PLATELET # BLD AUTO: 271 K/UL — SIGNIFICANT CHANGE UP (ref 150–400)
POTASSIUM SERPL-MCNC: 4.4 MMOL/L — SIGNIFICANT CHANGE UP (ref 3.5–5.3)
POTASSIUM SERPL-SCNC: 4.4 MMOL/L — SIGNIFICANT CHANGE UP (ref 3.5–5.3)
QUANT TB PLUS MITOGEN MINUS NIL: 3.88 IU/ML — SIGNIFICANT CHANGE UP
RBC # BLD: 3.56 M/UL — LOW (ref 4.2–5.8)
RBC # FLD: 12.1 % — SIGNIFICANT CHANGE UP (ref 10.3–14.5)
SARS-COV-2 RNA SPEC QL NAA+PROBE: SIGNIFICANT CHANGE UP
SODIUM SERPL-SCNC: 139 MMOL/L — SIGNIFICANT CHANGE UP (ref 135–145)
SPECIMEN SOURCE: SIGNIFICANT CHANGE UP
WBC # BLD: 9.48 K/UL — SIGNIFICANT CHANGE UP (ref 3.8–10.5)
WBC # FLD AUTO: 9.48 K/UL — SIGNIFICANT CHANGE UP (ref 3.8–10.5)

## 2021-03-31 PROCEDURE — 99232 SBSQ HOSP IP/OBS MODERATE 35: CPT | Mod: GC

## 2021-03-31 PROCEDURE — 99232 SBSQ HOSP IP/OBS MODERATE 35: CPT

## 2021-03-31 RX ORDER — INSULIN LISPRO 100/ML
15 VIAL (ML) SUBCUTANEOUS
Refills: 0 | Status: DISCONTINUED | OUTPATIENT
Start: 2021-03-31 | End: 2021-04-01

## 2021-03-31 RX ORDER — ACETAMINOPHEN 500 MG
650 TABLET ORAL EVERY 6 HOURS
Refills: 0 | Status: DISCONTINUED | OUTPATIENT
Start: 2021-03-31 | End: 2021-04-01

## 2021-03-31 RX ORDER — TRAMADOL HYDROCHLORIDE 50 MG/1
50 TABLET ORAL EVERY 8 HOURS
Refills: 0 | Status: DISCONTINUED | OUTPATIENT
Start: 2021-03-31 | End: 2021-04-01

## 2021-03-31 RX ORDER — CEFTRIAXONE 500 MG/1
1000 INJECTION, POWDER, FOR SOLUTION INTRAMUSCULAR; INTRAVENOUS EVERY 24 HOURS
Refills: 0 | Status: DISCONTINUED | OUTPATIENT
Start: 2021-03-31 | End: 2021-03-31

## 2021-03-31 RX ORDER — LIDOCAINE 4 G/100G
2 CREAM TOPICAL EVERY 24 HOURS
Refills: 0 | Status: DISCONTINUED | OUTPATIENT
Start: 2021-03-31 | End: 2021-04-01

## 2021-03-31 RX ORDER — HYDROMORPHONE HYDROCHLORIDE 2 MG/ML
0.5 INJECTION INTRAMUSCULAR; INTRAVENOUS; SUBCUTANEOUS ONCE
Refills: 0 | Status: DISCONTINUED | OUTPATIENT
Start: 2021-03-31 | End: 2021-03-31

## 2021-03-31 RX ORDER — INSULIN GLARGINE 100 [IU]/ML
40 INJECTION, SOLUTION SUBCUTANEOUS AT BEDTIME
Refills: 0 | Status: DISCONTINUED | OUTPATIENT
Start: 2021-03-31 | End: 2021-04-01

## 2021-03-31 RX ORDER — CEFTRIAXONE 500 MG/1
1000 INJECTION, POWDER, FOR SOLUTION INTRAMUSCULAR; INTRAVENOUS EVERY 24 HOURS
Refills: 0 | Status: DISCONTINUED | OUTPATIENT
Start: 2021-03-31 | End: 2021-04-01

## 2021-03-31 RX ADMIN — Medication 13 UNIT(S): at 08:28

## 2021-03-31 RX ADMIN — TRAMADOL HYDROCHLORIDE 50 MILLIGRAM(S): 50 TABLET ORAL at 13:11

## 2021-03-31 RX ADMIN — TAMSULOSIN HYDROCHLORIDE 0.4 MILLIGRAM(S): 0.4 CAPSULE ORAL at 21:56

## 2021-03-31 RX ADMIN — Medication 13 UNIT(S): at 12:01

## 2021-03-31 RX ADMIN — HEPARIN SODIUM 7500 UNIT(S): 5000 INJECTION INTRAVENOUS; SUBCUTANEOUS at 05:04

## 2021-03-31 RX ADMIN — TRAMADOL HYDROCHLORIDE 50 MILLIGRAM(S): 50 TABLET ORAL at 20:12

## 2021-03-31 RX ADMIN — Medication 150 MILLIGRAM(S): at 21:56

## 2021-03-31 RX ADMIN — SERTRALINE 150 MILLIGRAM(S): 25 TABLET, FILM COATED ORAL at 12:00

## 2021-03-31 RX ADMIN — POLYETHYLENE GLYCOL 3350 17 GRAM(S): 17 POWDER, FOR SOLUTION ORAL at 17:16

## 2021-03-31 RX ADMIN — HYDROMORPHONE HYDROCHLORIDE 0.5 MILLIGRAM(S): 2 INJECTION INTRAMUSCULAR; INTRAVENOUS; SUBCUTANEOUS at 05:52

## 2021-03-31 RX ADMIN — Medication 200 MILLIGRAM(S): at 05:04

## 2021-03-31 RX ADMIN — Medication 500 MILLIGRAM(S): at 21:55

## 2021-03-31 RX ADMIN — Medication 15 UNIT(S): at 17:07

## 2021-03-31 RX ADMIN — Medication 2: at 17:07

## 2021-03-31 RX ADMIN — Medication 5 MILLIGRAM(S): at 21:55

## 2021-03-31 RX ADMIN — PANTOPRAZOLE SODIUM 40 MILLIGRAM(S): 20 TABLET, DELAYED RELEASE ORAL at 05:03

## 2021-03-31 RX ADMIN — Medication 1 APPLICATION(S): at 05:09

## 2021-03-31 RX ADMIN — Medication 81 MILLIGRAM(S): at 12:00

## 2021-03-31 RX ADMIN — Medication 100 MILLIGRAM(S): at 05:05

## 2021-03-31 RX ADMIN — Medication 200 MILLIGRAM(S): at 21:55

## 2021-03-31 RX ADMIN — Medication 500 MILLIGRAM(S): at 17:07

## 2021-03-31 RX ADMIN — BENZOCAINE AND MENTHOL 1 LOZENGE: 5; 1 LIQUID ORAL at 20:14

## 2021-03-31 RX ADMIN — ATORVASTATIN CALCIUM 40 MILLIGRAM(S): 80 TABLET, FILM COATED ORAL at 21:56

## 2021-03-31 RX ADMIN — TRAMADOL HYDROCHLORIDE 50 MILLIGRAM(S): 50 TABLET ORAL at 12:11

## 2021-03-31 RX ADMIN — LIDOCAINE 1 PATCH: 4 CREAM TOPICAL at 12:08

## 2021-03-31 RX ADMIN — HYDROMORPHONE HYDROCHLORIDE 0.5 MILLIGRAM(S): 2 INJECTION INTRAMUSCULAR; INTRAVENOUS; SUBCUTANEOUS at 05:39

## 2021-03-31 RX ADMIN — POLYETHYLENE GLYCOL 3350 17 GRAM(S): 17 POWDER, FOR SOLUTION ORAL at 05:04

## 2021-03-31 RX ADMIN — Medication 1 APPLICATION(S): at 17:18

## 2021-03-31 RX ADMIN — Medication 650 MILLIGRAM(S): at 12:02

## 2021-03-31 RX ADMIN — TRAMADOL HYDROCHLORIDE 50 MILLIGRAM(S): 50 TABLET ORAL at 21:12

## 2021-03-31 RX ADMIN — INSULIN GLARGINE 40 UNIT(S): 100 INJECTION, SOLUTION SUBCUTANEOUS at 21:55

## 2021-03-31 RX ADMIN — CEFTRIAXONE 100 MILLIGRAM(S): 500 INJECTION, POWDER, FOR SOLUTION INTRAMUSCULAR; INTRAVENOUS at 17:18

## 2021-03-31 RX ADMIN — Medication 200 MILLIGRAM(S): at 17:07

## 2021-03-31 RX ADMIN — CYCLOBENZAPRINE HYDROCHLORIDE 5 MILLIGRAM(S): 10 TABLET, FILM COATED ORAL at 17:18

## 2021-03-31 RX ADMIN — Medication 100 MILLIGRAM(S): at 20:14

## 2021-03-31 RX ADMIN — SENNA PLUS 2 TABLET(S): 8.6 TABLET ORAL at 21:56

## 2021-03-31 RX ADMIN — Medication 500 MILLIGRAM(S): at 05:03

## 2021-03-31 RX ADMIN — HEPARIN SODIUM 7500 UNIT(S): 5000 INJECTION INTRAVENOUS; SUBCUTANEOUS at 21:54

## 2021-03-31 RX ADMIN — Medication 650 MILLIGRAM(S): at 08:28

## 2021-03-31 RX ADMIN — Medication 4: at 08:28

## 2021-03-31 RX ADMIN — Medication 6: at 12:01

## 2021-03-31 RX ADMIN — LIDOCAINE 1 PATCH: 4 CREAM TOPICAL at 07:37

## 2021-03-31 RX ADMIN — LIDOCAINE 2 PATCH: 4 CREAM TOPICAL at 19:40

## 2021-03-31 NOTE — PROGRESS NOTE ADULT - PROBLEM SELECTOR PLAN 2
#Right Lower Lobe masslike opacity - Likely abscess formation   #Pulmonary Nodules   -CT chest: 4.5 cm heterogeneous in attenuation masslike opacity within the right lower lobe abutting the right major fissure and right hilar region, (changed from 12/2020). Small nodules are identified adjacent to the mass measuring up to 5 mm   -Repeat CT chest 3/30/21: cavitary lesion involving the right lower lobe, development of an air-fluid level within this lesion and endobronchial spread into the right lower lobe  -Recent low dose CT chest for lung cancer screening done 12/2020 without evidence of opacity to RLL noted now  -History of cigarette smoking, now on nicotine patches/ gum, recent weight loss  -Sputum culture: Rare PMN, rare GNR, rare gram variable cocci, rare squamous epithelial cells    -Procalcitonin: 0.08, ESR: 102, CRP: 118   -Blood cultures - NGTD   -Urine legionella antigen - negative   -QuantiFeron negative   -Spoke with Cardiothoracic Surgery yesterday: Dr. Hassan, who does not cover [facility], Dr. Maddox who does is out this week, Dr. Hassan stated that if abscess formation was < 6cm and patient stable otherwise (afebrile/ normal WBC) may continue with prolonged antibiotic course, if drainage necessary will need to be transferred   -ID consult appreciated: Continue Ceftriaxone, add metronidazole, will await input from Pulmonology, possible IR drainage if necessary, will need extended abx: either oral or IV   -Continue to monitor closely   -Continue Ceftriaxone 1g q24hrs - day #6  -Continue Metronidazole 500mg po q12hrs day #2  -S/p Azithromycin - 4 days

## 2021-03-31 NOTE — PROGRESS NOTE ADULT - SUBJECTIVE AND OBJECTIVE BOX
HPI: 61 yo M with PMHx of HTN, HLD, Pre-DM presented for eval of weakness, N/V, abd pain, chest pain. Patient reports several days of symptoms. He states he does not take any medications for DM. Patient denies trauma or inciting event. Patient also reports constipation with blood on toilet paper after wiping. ED workup significant for Glucose 674, AG 18, BUN/Cr 26/1.31, Acetone moderate. CT Chest with mass like structure to RLL and CT Abd/Pel with CBD dilation and air in biliary tree. ED interventions include 2L IVF Bolus, IVP insulin,  (26 Mar 2021 16:30)    Subjective: Patient seen and examined at bedside this morning, states that he is feeling only a little better, continues with R sided back pain, with some relief overnight with muscle relaxer. Patient continues with cough productive of yellowish sputum. Repeat CT chest yesterday revealed air fluid levels with extension of RLL lesion. Patient was started on metronidazole orally.       REVIEW OF SYSTEMS:    CONSTITUTIONAL: No weakness, fevers or chills, + tiredness  EYES/ENT: No visual changes;  No vertigo or throat pain   NECK: No pain or stiffness  RESPIRATORY: +cough, no wheezing, hemoptysis; No shortness of breath  CARDIOVASCULAR: No chest pain or palpitations  GASTROINTESTINAL: + epigastric pain. No nausea, vomiting, or hematemesis; No diarrhea or constipation. No melena or hematochezia.  GENITOURINARY: No dysuria, frequency or hematuria  NEUROLOGICAL: No numbness or weakness  SKIN: No itching, rashes  MSK: back pain+    Vital Signs Last 24 Hrs  T(C): 36.9 (31 Mar 2021 08:47), Max: 37.7 (30 Mar 2021 16:28)  T(F): 98.4 (31 Mar 2021 08:47), Max: 99.9 (30 Mar 2021 16:28)  HR: 75 (31 Mar 2021 08:47) (70 - 89)  BP: 95/63 (31 Mar 2021 08:47) (95/63 - 121/96)  BP(mean): 71 (31 Mar 2021 05:00) (71 - 71)  RR: 17 (31 Mar 2021 08:47) (16 - 17)  SpO2: 98% (31 Mar 2021 08:47) (98% - 100%)    PHYSICAL EXAM:  GENERAL: NAD, lying in bed comfortably  HEAD:  Atraumatic, Normocephalic  EYES: conjunctiva and sclera clear  ENT: Moist mucous membranes  NECK: Supple, No JVD  CHEST/LUNG: Clear to auscultation bilaterally; No rales, rhonchi, wheezing, or rubs. Unlabored respirations  HEART: Regular rate and rhythm; No murmurs, rubs, or gallops  ABDOMEN: Bowel sounds present; Soft, tenderness to epigastric region, no guarding/ rebound   EXTREMITIES:  2+ Peripheral Pulses, brisk capillary refill. No clubbing, cyanosis, or edema  NERVOUS SYSTEM:  Alert & Oriented X3, speech clear. No deficits   MSK: FROM all 4 extremities, full and equal strength, Lidocaine patch in place to R posterolateral aspect of back, tender to palpation, some tenderness on active movement      MEDICATIONS  (STANDING):  aspirin  chewable 81 milliGRAM(s) Oral daily  atorvastatin 40 milliGRAM(s) Oral at bedtime  benzonatate 200 milliGRAM(s) Oral three times a day  dextrose 40% Gel 15 Gram(s) Oral once  dextrose 5%. 1000 milliLiter(s) (50 mL/Hr) IV Continuous <Continuous>  dextrose 5%. 1000 milliLiter(s) (100 mL/Hr) IV Continuous <Continuous>  dextrose 50% Injectable 25 Gram(s) IV Push once  dextrose 50% Injectable 12.5 Gram(s) IV Push once  dextrose 50% Injectable 25 Gram(s) IV Push once  dextrose 50% Injectable 25 Gram(s) IV Push once  dextrose 50% Injectable 12.5 Gram(s) IV Push once  dextrose 50% Injectable 25 Gram(s) IV Push once  glucagon  Injectable 1 milliGRAM(s) IntraMuscular once  glucagon  Injectable 1 milliGRAM(s) IntraMuscular once  heparin   Injectable 7500 Unit(s) SubCutaneous every 8 hours  hydrocortisone 2.5% Rectal Cream 1 Application(s) Rectal two times a day  insulin glargine Injectable (LANTUS) 35 Unit(s) SubCutaneous at bedtime  insulin lispro (ADMELOG) corrective regimen sliding scale   SubCutaneous three times a day before meals  insulin lispro (ADMELOG) corrective regimen sliding scale   SubCutaneous at bedtime  insulin lispro Injectable (ADMELOG) 13 Unit(s) SubCutaneous three times a day before meals  metroNIDAZOLE    Tablet 500 milliGRAM(s) Oral every 8 hours  pantoprazole    Tablet 40 milliGRAM(s) Oral before breakfast  polyethylene glycol 3350 17 Gram(s) Oral two times a day  senna 2 Tablet(s) Oral at bedtime  sertraline 150 milliGRAM(s) Oral daily  tamsulosin 0.4 milliGRAM(s) Oral at bedtime  traZODone 150 milliGRAM(s) Oral at bedtime    MEDICATIONS  (PRN):  acetaminophen   Tablet .. 650 milliGRAM(s) Oral every 6 hours PRN Temp greater or equal to 38C (100.4F), Mild Pain (1 - 3)  aluminum hydroxide/magnesium hydroxide/simethicone Suspension 30 milliLiter(s) Oral every 4 hours PRN Dyspepsia  benzocaine 15 mG/menthol 3.6 mG (Sugar-Free) Lozenge 1 Lozenge Oral every 6 hours PRN Sore Throat  cyclobenzaprine 5 milliGRAM(s) Oral at bedtime PRN Muscle Spasm  guaiFENesin   Syrup  (Sugar-Free) 100 milliGRAM(s) Oral every 6 hours PRN Cough  ondansetron Injectable 4 milliGRAM(s) IV Push every 8 hours PRN Nausea and/or Vomiting      LABS:                          11.5   9.48  )-----------( 271      ( 31 Mar 2021 05:00 )             33.3   03-31    139  |  105  |  7   ----------------------------<  200<H>  4.4   |  21<L>  |  0.92    Ca    9.3      31 Mar 2021 05:00  Phos  4.6     03-30  Mg     2.0     03-30           CAPILLARY BLOOD GLUCOSE  POCT Blood Glucose.: 218 mg/dL (31 Mar 2021 08:19)  POCT Blood Glucose.: 243 mg/dL (30 Mar 2021 20:52)  POCT Blood Glucose.: 229 mg/dL (30 Mar 2021 17:29)  POCT Blood Glucose.: 320 mg/dL (30 Mar 2021 12:12)      CARDIAC MARKERS ( 26 Mar 2021 13:30 )  <.017 ng/mL / x     / x     / x     / x          Urinalysis Basic - ( 26 Mar 2021 13:30 )    Color: Yellow / Appearance: Clear / S.010 / pH: x  Gluc: x / Ketone: Large  / Bili: Negative / Urobili: Negative   Blood: x / Protein: Negative / Nitrite: Negative   Leuk Esterase: Negative / RBC: x / WBC x   Sq Epi: x / Non Sq Epi: x / Bacteria: x        RADIOLOGY:  < from: CT Chest w/ IV Cont (21 @ 14:44) >  FINDINGS:  CHEST:  LUNGS AND LARGE AIRWAYS: Patent central airways. There is a 4.5 cm heterogeneous in attenuation masslike opacity within the right lower lobe abutting the right major fissure and right hilar region, representing interval change from prior examination dated 12/3/2020. Small nodules are identified adjacent to the mass measuring up to 5 mm.  PLEURA: No evidence for pleural effusion. No pneumothorax.  VESSELS: Stable variant anatomy identified with a venous conduit extending from the left thorax into the left brachiocephalic vein. Thoracic aortic caliber appears unremarkable.  HEART: Heart size is normal. No pericardial effusion. Coronary arterial calcification.  MEDIASTINUM AND LIZZETTE: No mediastinal lymphadenopathy. No left hilar lymphadenopathy.  CHEST WALL AND LOWER NECK: Within normal limits.    ABDOMEN AND PELVIS:  LIVER: Normal in size. No focal hepatic masses.  BILE DUCTS: No evidence for intrahepatic biliary ductal dilatation. Scattered foci of air identified within the intrahepatic biliary tree. There is increased caliber of the extrahepatic CBD measuring 9-10 mm.  GALLBLADDER: A gallstone is noted in the gallbladder lumen measuring 1.1 cm. Air is noted within the gallbladder lumen. Contracted state of the gallbladder limits assessment for wall thickening. Emphysematous cholecystitis cannot be excluded. Correlate with clinical as well as procedure history recommended.  SPLEEN: Within normal limits. Stable 2.4 x 1.6 cm soft tissue nodularity medial to the spleen, likely accessory splenic tissue.  PANCREAS: Within normal limits.  ADRENALS: Within normal limits.  KIDNEYS/URETERS: Within normal limits.    BLADDER: Within normal limits.  REPRODUCTIVE ORGANS: The prostate appears unremarkable.    BOWEL: Fecal material scattered throughout the colon. No evidence for mechanical bowel obstruction. Colonic diverticulosis. No colonic wall thickening. The appendix appears unremarkable.  PERITONEUM: No free intraperitoneal air or fluid.  VESSELS: Within normal limits.  RETROPERITONEUM/LYMPH NODES: No lymphadenopathy.  ABDOMINAL WALL: Within normal limits.  BONES: Degenerative changes.    IMPRESSION:  1. There is a 4.5 cm heterogeneous in attenuation masslike opacity within the right lower lobe abutting the right major fissure and right hilar region, representing interval change from prior examination dated 12/3/2020. Small nodules are identified adjacent to the mass measuring up to 5 mm. Differential considerations would include both neoplastic and infectious etiologies.  2. Scattered foci of air identified within the intrahepatic biliary tree. There is increased caliber of the extrahepatic CBD measuring 9-10 mm.  A gallstone is noted in the gallbladder lumen measuring 1.1 cm. Air is noted within the gallbladder lumen. Contracted state of the gallbladder limits assessment for wall thickening. Emphysematous cholecystitis cannot be excluded. Correlate with clinical as well as procedure history recommended.        < from: US Abdomen Upper Quadrant Right (21 @ 09:26) >  COMPARISON: CT dated 3/26/2021    TECHNIQUE: Sonography of the right upper quadrant.    FINDINGS:    Liver: Enlarged (20.2 cm). Increased echogenicity.  Bile ducts: Normal caliber. Common bile duct measures 6.7 mm.  Gallbladder: Cholelithiasis.  No focal tenderness or evidence of cholecystitis.  Pancreas: Visualized portions are within normal limits.  Right kidney: 13.3 cm. No hydronephrosis.  Ascites: None.  IVC: Visualized portions are within normal limits.    IMPRESSION:    Hepatomegaly.  Cholelithiasis.      < from: CT Chest No Cont (21 @ 12:46) >  FINDINGS:    LUNGS AND AIRWAYS: Patent central airways.  Right lower lobe lesion is slightly larger measuring approximately 5 cm. There has been interval development of an air-fluid level within this lesion. Multiple tree-in-bud opacities have developed in the right lower lobe indicating bronchial spread of disease process, possibly infection. Subsegmental atelectasis has developed in the dependent right lung base. Nonspecific focal groundglass opacity in right lung apex .  PLEURA: Trace left pleural effusion.  MEDIASTINUM AND LIZZETTE: No lymphadenopathy.  VESSELS: Coronary artery calcification.  HEART: Heart size is normal. No significant pericardial effusion.  CHEST WALL AND LOWER NECK: Within normal limits.  VISUALIZED UPPER ABDOMEN: Interval resolution of pneumobilia and improved extrahepatic biliary dilatation. Cholelithiasis and a contracted gallbladder are again evident.  BONES: Within normal limits.    IMPRESSION:  Evidence of a cavitary lesion involving the right lower lobe, development of an air-fluid level within this lesion and endobronchial spread into the right lower lobe in comparison to the prior study.    Interval resolution of pneumobilia and improvement in extrahepatic biliary dilatation. Redemonstration of cholelithiasis and a contracted gallbladder.

## 2021-03-31 NOTE — PROGRESS NOTE ADULT - SUBJECTIVE AND OBJECTIVE BOX
The patient continues to complain of RUQ and epigastric pain. The pain does not appear to be affected by eating or drinking. The LFT's and a recent abdominal ultrasound are normal. However, the admission CT scan demonstrated air in the intrahepatic bile ducts and gallbladder.     PFSH: All noncontributory    MEDICATIONS REVIEWED:acetaminophen   Tablet .. 650 milliGRAM(s) Oral every 6 hours PRN  aluminum hydroxide/magnesium hydroxide/simethicone Suspension 30 milliLiter(s) Oral every 4 hours PRN  aspirin  chewable 81 milliGRAM(s) Oral daily  atorvastatin 40 milliGRAM(s) Oral at bedtime  benzocaine 15 mG/menthol 3.6 mG (Sugar-Free) Lozenge 1 Lozenge Oral every 6 hours PRN  benzonatate 200 milliGRAM(s) Oral three times a day  bisacodyl 5 milliGRAM(s) Oral once  cefTRIAXone   IVPB 1000 milliGRAM(s) IV Intermittent every 24 hours  cyclobenzaprine 5 milliGRAM(s) Oral at bedtime PRN  dextrose 40% Gel 15 Gram(s) Oral once  dextrose 5%. 1000 milliLiter(s) IV Continuous <Continuous>  dextrose 5%. 1000 milliLiter(s) IV Continuous <Continuous>  dextrose 50% Injectable 25 Gram(s) IV Push once  dextrose 50% Injectable 12.5 Gram(s) IV Push once  dextrose 50% Injectable 25 Gram(s) IV Push once  dextrose 50% Injectable 12.5 Gram(s) IV Push once  dextrose 50% Injectable 25 Gram(s) IV Push once  dextrose 50% Injectable 25 Gram(s) IV Push once  glucagon  Injectable 1 milliGRAM(s) IntraMuscular once  glucagon  Injectable 1 milliGRAM(s) IntraMuscular once  guaiFENesin   Syrup  (Sugar-Free) 100 milliGRAM(s) Oral every 6 hours PRN  heparin   Injectable 7500 Unit(s) SubCutaneous every 8 hours  hydrocortisone 2.5% Rectal Cream 1 Application(s) Rectal two times a day  insulin glargine Injectable (LANTUS) 40 Unit(s) SubCutaneous at bedtime  insulin lispro (ADMELOG) corrective regimen sliding scale   SubCutaneous three times a day before meals  insulin lispro (ADMELOG) corrective regimen sliding scale   SubCutaneous at bedtime  insulin lispro Injectable (ADMELOG) 15 Unit(s) SubCutaneous three times a day before meals  lidocaine   Patch 2 Patch Transdermal every 24 hours  metroNIDAZOLE    Tablet 500 milliGRAM(s) Oral every 8 hours  ondansetron Injectable 4 milliGRAM(s) IV Push every 8 hours PRN  pantoprazole    Tablet 40 milliGRAM(s) Oral before breakfast  polyethylene glycol 3350 17 Gram(s) Oral two times a day  senna 2 Tablet(s) Oral at bedtime  sertraline 150 milliGRAM(s) Oral daily  tamsulosin 0.4 milliGRAM(s) Oral at bedtime  traMADol 50 milliGRAM(s) Oral every 8 hours PRN  traZODone 150 milliGRAM(s) Oral at bedtime      ALLERGIES:No Known Drug Allergies  shellfish (Unknown)      PHYSICAL EXAMINATION:  RESPIRATORY: Clear to auscultation bilaterally, respirations non-labored.  CARDIAC: RR, normal S1S2, no murmurs.  ABDOMEN: Tender to light palpation in RUQ and epigastrium, soft, BS present, no masses, no hernias, no peritoneal signs, no KLS  VASCULAR: Equal and normal pulses.  MUSCULOSKELETAL: Full ROM, no deformities.      T(C): 37.2 (03-31-21 @ 20:21), Max: 37.4 (03-31-21 @ 14:31)  HR: 73 (03-31-21 @ 20:21) (73 - 77)  BP: 115/75 (03-31-21 @ 20:21) (94/62 - 115/75)  RR: 16 (03-31-21 @ 20:21) (16 - 17)  SpO2: 95% (03-31-21 @ 20:21) (95% - 100%)                          11.5   9.48  )-----------( 271      ( 31 Mar 2021 05:00 )             33.3       03-31    139  |  105  |  7   ----------------------------<  200<H>  4.4   |  21<L>  |  0.92    Ca    9.3      31 Mar 2021 05:00  Phos  4.6     03-30  Mg     2.0     03-30

## 2021-03-31 NOTE — ADVANCED PRACTICE NURSE CONSULT - ASSESSMENT
Pt anxious and feeling overwhelmed. Pt was able to proper demonstrate how to use insulin pen and identify insulin injections sites. Pt states he has been giving his own insulin after RN prepares it.   I gave pt a Contour Next One BG Meter and educated pt on use of FS technique, use of severino device and BG  meter. Pt was able to demonstrate proper FS technique, use of severino device and BG  meter. Educated Primary RN to allow pt to perform his own FS when due with pt's own Lancet device and if blood sample adequate, pt to perform BG on his home meter after it has been performed on hospital meter.   Discussed having prepared  Diabetes meals delivered with pt if covered by insurance. Pt open to this and agreed to allow prior authorization to be submitted to insurance company. BLAKE Carmona completed and submitted the Prior Authorization to Insurance for Diabetes Meals, 3 meals/day, 7 days/week.    BG readings continue to be above goal. Total Insulin in last 24 hrs approximately- 82 units.   Discussed glycemic management with Dr. Portillo and agreed on the following plan:  1. Increase Lantus from 35 units to 40 units daily  2. Increase AC Admelog from 13 units to 15 units.  3. Continue moderate correction scale AC & HS

## 2021-03-31 NOTE — PROGRESS NOTE ADULT - ASSESSMENT
61 y/o M with PMHx of HTN, HLD, Pre-DM admitted for DKA after presenting to the ER with c/o weakness, N/V, abdominal pain and chest pain, found have glucose level of 674, AG 18, BUN/Cr 26/1.31, Acetone moderate. CT Chest with masslike structure to RLL and CT Abd/Pel with CBD dilation and air in biliary tree. ED interventions include 2L IVF Bolus, IVP insulin. Patient was admitted to ICU, now downgraded to medicine.           - PT evaluation       Case discussed with Dr. Zapata

## 2021-03-31 NOTE — PROGRESS NOTE ADULT - SUBJECTIVE AND OBJECTIVE BOX
CC: f/u for lung abscess    Patient reports: he c/o dry cough and RUQ pain.No N/V and cough is mainly at night.He denies any sputum production and pain is not pleuritic.    REVIEW OF SYSTEMS:  All other review of systems negative (Comprehensive ROS)    Antimicrobials Day #  :s/p 5 days of CTX  metroNIDAZOLE    Tablet 500 milliGRAM(s) Oral every 8 hours    Other Medications Reviewed    T(F): 98.4 (03-31-21 @ 08:47), Max: 99.9 (03-30-21 @ 16:28)  HR: 75 (03-31-21 @ 08:47)  BP: 95/63 (03-31-21 @ 08:47)  RR: 17 (03-31-21 @ 08:47)  SpO2: 98% (03-31-21 @ 08:47)  Wt(kg): --    PHYSICAL EXAM:  General: alert, no acute distress  Eyes:  anicteric, no conjunctival injection, no discharge  Oropharynx: no lesions or injection 	  Neck: supple, without adenopathy  Lungs: clear to auscultation  Heart: regular rate and rhythm; no murmur, rubs or gallops  Abdomen: soft, nondistended, tender in RUQ, without mass or organomegaly  Skin: no lesions  Extremities: no clubbing, cyanosis, or edema  Neurologic: alert, oriented, moves all extremities    LAB RESULTS:                        11.5   9.48  )-----------( 271      ( 31 Mar 2021 05:00 )             33.3     03-31    139  |  105  |  7   ----------------------------<  200<H>  4.4   |  21<L>  |  0.92    Ca    9.3      31 Mar 2021 05:00  Phos  4.6     03-30  Mg     2.0     03-30          MICROBIOLOGY:  RECENT CULTURES:  03-28 @ 22:10 .Sputum Sputum     Normal Respiratory Princess present    Rare polymorphonuclear leukocytes per low power field  Rare Squamous epithelial cells per low power field  Rare Gram Negative Rods per oil power field  Rare Gram Variable Cocci per oil power field    03-28 @ 14:50 .Blood Blood-Peripheral     No growth to date.      03-26 @ 13:30 .Urine Clean Catch (Midstream)     No growth          RADIOLOGY REVIEWED:    < from: CT Chest No Cont (03.30.21 @ 12:46) >  IMPRESSION:  Evidence of a cavitary lesion involving the right lower lobe, development of an air-fluid level within this lesion and endobronchial spread into the right lower lobe in comparison to the prior study.    Interval resolution of pneumobilia and improvement in extrahepatic biliary dilatation. Redemonstration of cholelithiasis and a contracted gallbladder.    < end of copied text >

## 2021-03-31 NOTE — PHYSICAL THERAPY INITIAL EVALUATION ADULT - ADDITIONAL COMMENTS
pt lives alone in room, 1 flight w/rail to enter. pt is independent w/ambulation and ADL's but does not drive. pt's sister lives nearby and is involved in pts care

## 2021-03-31 NOTE — PROGRESS NOTE ADULT - ASSESSMENT
IMPRESSION: Continued RUQ and epigastric pain and tenderness                      Air in bile ducts and GB, gallstone    PLAN: Repeat LFT's           Recommend HIDA to R/O biliary tract disease

## 2021-03-31 NOTE — PROGRESS NOTE ADULT - SUBJECTIVE AND OBJECTIVE BOX
Follow-up Pulmonary Progress Note  Chief Complaint : Type 2 diabetes mellitus with ketoacidosis without coma      Still with intermittent cough, right sided chest pain.     Allergies :No Known Drug Allergies  shellfish (Unknown)      PAST MEDICAL & SURGICAL HISTORY:  Type 2 diabetes mellitus without complication, without long-term current use of insulin    Benign hypertension    Shoulder arthritis  Multiple surgeries on left shoulder    Medications:  MEDICATIONS  (STANDING):  aspirin  chewable 81 milliGRAM(s) Oral daily  atorvastatin 40 milliGRAM(s) Oral at bedtime  benzonatate 200 milliGRAM(s) Oral three times a day  cefTRIAXone   IVPB 1000 milliGRAM(s) IV Intermittent every 24 hours  dextrose 40% Gel 15 Gram(s) Oral once  dextrose 5%. 1000 milliLiter(s) (50 mL/Hr) IV Continuous <Continuous>  dextrose 5%. 1000 milliLiter(s) (100 mL/Hr) IV Continuous <Continuous>  dextrose 50% Injectable 25 Gram(s) IV Push once  dextrose 50% Injectable 12.5 Gram(s) IV Push once  dextrose 50% Injectable 25 Gram(s) IV Push once  dextrose 50% Injectable 12.5 Gram(s) IV Push once  dextrose 50% Injectable 25 Gram(s) IV Push once  dextrose 50% Injectable 25 Gram(s) IV Push once  glucagon  Injectable 1 milliGRAM(s) IntraMuscular once  glucagon  Injectable 1 milliGRAM(s) IntraMuscular once  heparin   Injectable 7500 Unit(s) SubCutaneous every 8 hours  hydrocortisone 2.5% Rectal Cream 1 Application(s) Rectal two times a day  insulin glargine Injectable (LANTUS) 35 Unit(s) SubCutaneous at bedtime  insulin lispro (ADMELOG) corrective regimen sliding scale   SubCutaneous three times a day before meals  insulin lispro (ADMELOG) corrective regimen sliding scale   SubCutaneous at bedtime  insulin lispro Injectable (ADMELOG) 13 Unit(s) SubCutaneous three times a day before meals  metroNIDAZOLE    Tablet 500 milliGRAM(s) Oral every 8 hours  pantoprazole    Tablet 40 milliGRAM(s) Oral before breakfast  polyethylene glycol 3350 17 Gram(s) Oral two times a day  senna 2 Tablet(s) Oral at bedtime  sertraline 150 milliGRAM(s) Oral daily  tamsulosin 0.4 milliGRAM(s) Oral at bedtime  traZODone 150 milliGRAM(s) Oral at bedtime    MEDICATIONS  (PRN):  acetaminophen   Tablet .. 650 milliGRAM(s) Oral every 6 hours PRN Temp greater or equal to 38C (100.4F), Mild Pain (1 - 3)  aluminum hydroxide/magnesium hydroxide/simethicone Suspension 30 milliLiter(s) Oral every 4 hours PRN Dyspepsia  benzocaine 15 mG/menthol 3.6 mG (Sugar-Free) Lozenge 1 Lozenge Oral every 6 hours PRN Sore Throat  cyclobenzaprine 5 milliGRAM(s) Oral at bedtime PRN Muscle Spasm  guaiFENesin   Syrup  (Sugar-Free) 100 milliGRAM(s) Oral every 6 hours PRN Cough  ondansetron Injectable 4 milliGRAM(s) IV Push every 8 hours PRN Nausea and/or Vomiting  traMADol 50 milliGRAM(s) Oral every 8 hours PRN Moderate Pain (4 - 6)      LABS:                        11.5   9.48  )-----------( 271      ( 31 Mar 2021 05:00 )             33.3     03-31    139  |  105  |  7   ----------------------------<  200<H>  4.4   |  21<L>  |  0.92    Ca    9.3      31 Mar 2021 05:00  Phos  4.6     03-30  Mg     2.0     03-30    Procalcitonin, Serum: 0.08 ng/mL (03-28-21 @ 18:00)  CULTURES: (if applicable)    Culture - Sputum (collected 03-28-21 @ 22:10)  Source: .Sputum Sputum  Gram Stain (03-29-21 @ 05:58):    Rare polymorphonuclear leukocytes per low power field    Rare Squamous epithelial cells per low power field    Rare Gram Negative Rods per oil power field    Rare Gram Variable Cocci per oil power field  Final Report (03-31-21 @ 11:57):    Normal Respiratory Princess present    Culture - Blood (collected 03-28-21 @ 14:50)  Source: .Blood Blood-Peripheral  Preliminary Report (03-29-21 @ 19:02):    No growth to date.    Culture - Blood (collected 03-28-21 @ 14:50)  Source: .Blood Blood-Peripheral  Preliminary Report (03-29-21 @ 19:02):    No growth to date.    Culture - Urine (collected 03-26-21 @ 13:30)  Source: .Urine Clean Catch (Midstream)  Final Report (03-27-21 @ 14:49):    No growth    VITALS:  T(C): 37.4 (03-31-21 @ 14:31), Max: 37.7 (03-30-21 @ 16:28)  T(F): 99.4 (03-31-21 @ 14:31), Max: 99.9 (03-30-21 @ 16:28)  HR: 75 (03-31-21 @ 14:31) (70 - 89)  BP: 109/62 (03-31-21 @ 14:31) (95/63 - 121/96)  BP(mean): 71 (03-31-21 @ 05:00) (71 - 71)  ABP: --  ABP(mean): --  RR: 16 (03-31-21 @ 14:31) (16 - 17)  SpO2: 95% (03-31-21 @ 14:31) (95% - 100%)  CVP(mm Hg): --  CVP(cm H2O): --    Ins and Outs     03-30-21 @ 07:01  -  03-31-21 @ 07:00  --------------------------------------------------------  IN: 340 mL / OUT: 1700 mL / NET: -1360 mL    I&O's Detail    30 Mar 2021 07:01  -  31 Mar 2021 07:00  --------------------------------------------------------  IN:    Oral Fluid: 340 mL  Total IN: 340 mL    OUT:    Voided (mL): 1700 mL  Total OUT: 1700 mL    Total NET: -1360 mL

## 2021-03-31 NOTE — ADVANCED PRACTICE NURSE CONSULT - RECOMMEDATIONS
1. Increase Lantus from 35 units to 40 units daily  2. Increase AC Admelog from 13 units to 15 units.  3. Continue moderate correction scale AC & HS

## 2021-03-31 NOTE — PROGRESS NOTE ADULT - ASSESSMENT
Physical Examination:  GENERAL:               Alert, Oriented, No acute distress.    HEENT:                    Pupils equal, reactive to light.  Symmetric. No JVD, Moist MM  PULM:                      Bilateral air entry, No Rales, No Rhonchi, No Wheezing  CVS:                        S1, S2,  No Murmur  ABD:                        Soft, nondistended, nontender, normoactive bowel sounds,   EXT:                         No edema, nontender, No Cyanosis or Clubbing   Vascular:                 Warm Extremities, Normal Capillary refill, Normal Distal Pulses  SKIN:                       Warm and well perfused, no rashes noted.   NEURO:                   Alert, oriented, interactive, nonfocal, follows commands  PSYC:                      Calm, + Insight.    Assessment:  1. Diabetic ketoacidosis   2. Acute kidney injury   3. Right Lower lobe opacity   4. Gallstones  5. Hypertension  6. Hyperlipidemia     Plan  - Sputum cultures showing Gram negative rods, follow up culture results but non diagnostic thus far  - CT scan showing heterogenous mass, ? fluid filled cavity. Seems to be new compared to 3 months ago. Suspicious for an abscess given rapid growth vs. malignancy.   - Will continue treatment with IV antibiotics  - CT repeated yesterday showing evolving abscess  - Plan for bronchoscopy tomorrow with BAL  - NPO after midnight   - Repeat COVID19 swab prior to OR  - ID consult appreciated   - Surgery follow up   - BP and glucose control   - Check HIV status  - Statins   - DVT prophylaxis  - Will follow

## 2021-03-31 NOTE — PROGRESS NOTE ADULT - ASSESSMENT
60y male with hx DM presented for eval of weakness, N/V, abd pain, chest pain. Patient reports several days of symptoms. He had cough for about 2 weeks, no fevers. CT Chest with mass like structure to RLL and CT Abd/Pel with CBD dilation and air in biliary tree. He was started on IV abx. Concern raised possible lung abscess. Pt initially denied any dental problems,, he now admits to loose tooth.He had a CT scan of chest in December 2020 with no rt sided infiltrates, therefor this is new c/w December 2020.He does not have any hemoptysis but does report atypical chest/abdominal pain.  PC level is less than .08, his CRP is 112 and esr is over 100.  His pain is difficult to correlate with a pulmonary infection such as a lung abscess.  The rt flank/RUQ pain is hard to correlate with pneumonia.He does not seem to have pleural involvement (to my review)  He received 2 grams of azithro, stopped yesterday  Legionella urine antigen and MRSA nasal screen are negative, sputum culture with MURF  Repeat CT scan now with development of A/F level.Additional anaerobic coverage added yesterday with metronidazole  PLAN:  1.continue CTX and metronidazole  2.Favor HIDA scan, I am not convinced that RUQ pain is pulmonary  3.Await pulmonary input, ? if this could be drainable by IR, ? role for thoracic involvement  4.He will likely need extended course of antibiotics, either oral or IV however I will look to pulmonary and ? Thoracic on guidance as well.    I suspect bacterial abscess as it has evolved rather acutely and was not present 3 months ago. 60y male with hx DM presented for eval of weakness, N/V, abd pain, chest pain. Patient reports several days of symptoms. He had cough for about 2 weeks, no fevers. CT Chest with mass like structure to RLL and CT Abd/Pel with CBD dilation and air in biliary tree. He was started on IV abx. Concern raised possible lung abscess. Pt initially denied any dental problems,, he now admits to loose tooth.He had a CT scan of chest in December 2020 with no rt sided infiltrates, therefor this is new c/w December 2020.He does not have any hemoptysis but does report atypical chest/abdominal pain.  PC level is less than .08, his CRP is 112 and esr is over 100.  His pain is difficult to correlate with a pulmonary infection such as a lung abscess.  The rt flank/RUQ pain is hard to correlate with pneumonia.He does not seem to have pleural involvement (to my review)  He received 2 grams of azithro, stopped yesterday  Legionella urine antigen and MRSA nasal screen are negative, sputum culture with MURF  Repeat CT scan now with development of A/F level.Additional anaerobic coverage added yesterday with metronidazole  PLAN:  1.continue CTX and metronidazole( CTX has fallen off med list, sunrise is down, it will need to be added later when computer will allow.Pharmacy notified and aware of system failure.  2.Favor HIDA scan, I am not convinced that RUQ pain is pulmonary  3.Await pulmonary input, ? if this could be drainable by IR, ? role for thoracic involvement  4.He will likely need extended course of antibiotics, either oral or IV however I will look to pulmonary and ? Thoracic on guidance as well.    I suspect bacterial abscess as it has evolved rather acutely and was not present 3 months ago.

## 2021-04-01 ENCOUNTER — RESULT REVIEW (OUTPATIENT)
Age: 61
End: 2021-04-01

## 2021-04-01 LAB
ANION GAP SERPL CALC-SCNC: 10 MMOL/L — SIGNIFICANT CHANGE UP (ref 5–17)
BUN SERPL-MCNC: 11 MG/DL — SIGNIFICANT CHANGE UP (ref 7–23)
CALCIUM SERPL-MCNC: 8.9 MG/DL — SIGNIFICANT CHANGE UP (ref 8.4–10.5)
CHLORIDE SERPL-SCNC: 105 MMOL/L — SIGNIFICANT CHANGE UP (ref 96–108)
CO2 SERPL-SCNC: 25 MMOL/L — SIGNIFICANT CHANGE UP (ref 22–31)
CREAT SERPL-MCNC: 0.88 MG/DL — SIGNIFICANT CHANGE UP (ref 0.5–1.3)
GAD65 AB SER-MCNC: 0 NMOL/L — SIGNIFICANT CHANGE UP
GLUCOSE BLDC GLUCOMTR-MCNC: 187 MG/DL — HIGH (ref 70–99)
GLUCOSE BLDC GLUCOMTR-MCNC: 215 MG/DL — HIGH (ref 70–99)
GLUCOSE BLDC GLUCOMTR-MCNC: 309 MG/DL — HIGH (ref 70–99)
GLUCOSE SERPL-MCNC: 233 MG/DL — HIGH (ref 70–99)
GRAM STN FLD: SIGNIFICANT CHANGE UP
HCV RNA FLD QL NAA+PROBE: SIGNIFICANT CHANGE UP
POTASSIUM SERPL-MCNC: 4.5 MMOL/L — SIGNIFICANT CHANGE UP (ref 3.5–5.3)
POTASSIUM SERPL-SCNC: 4.5 MMOL/L — SIGNIFICANT CHANGE UP (ref 3.5–5.3)
SODIUM SERPL-SCNC: 140 MMOL/L — SIGNIFICANT CHANGE UP (ref 135–145)
SPECIMEN SOURCE: SIGNIFICANT CHANGE UP

## 2021-04-01 PROCEDURE — 88305 TISSUE EXAM BY PATHOLOGIST: CPT | Mod: 26

## 2021-04-01 PROCEDURE — 99232 SBSQ HOSP IP/OBS MODERATE 35: CPT | Mod: GC

## 2021-04-01 PROCEDURE — 99232 SBSQ HOSP IP/OBS MODERATE 35: CPT

## 2021-04-01 PROCEDURE — 71045 X-RAY EXAM CHEST 1 VIEW: CPT | Mod: 26

## 2021-04-01 PROCEDURE — 88112 CYTOPATH CELL ENHANCE TECH: CPT | Mod: 26

## 2021-04-01 RX ORDER — SODIUM CHLORIDE 9 MG/ML
1000 INJECTION, SOLUTION INTRAVENOUS
Refills: 0 | Status: DISCONTINUED | OUTPATIENT
Start: 2021-04-01 | End: 2021-04-01

## 2021-04-01 RX ORDER — PANTOPRAZOLE SODIUM 20 MG/1
40 TABLET, DELAYED RELEASE ORAL
Refills: 0 | Status: DISCONTINUED | OUTPATIENT
Start: 2021-04-01 | End: 2021-04-04

## 2021-04-01 RX ORDER — LIDOCAINE 4 G/100G
2 CREAM TOPICAL EVERY 24 HOURS
Refills: 0 | Status: DISCONTINUED | OUTPATIENT
Start: 2021-04-01 | End: 2021-04-04

## 2021-04-01 RX ORDER — METRONIDAZOLE 500 MG
500 TABLET ORAL EVERY 8 HOURS
Refills: 0 | Status: DISCONTINUED | OUTPATIENT
Start: 2021-04-01 | End: 2021-04-04

## 2021-04-01 RX ORDER — HEPARIN SODIUM 5000 [USP'U]/ML
5000 INJECTION INTRAVENOUS; SUBCUTANEOUS EVERY 8 HOURS
Refills: 0 | Status: DISCONTINUED | OUTPATIENT
Start: 2021-04-01 | End: 2021-04-04

## 2021-04-01 RX ORDER — INSULIN LISPRO 100/ML
VIAL (ML) SUBCUTANEOUS AT BEDTIME
Refills: 0 | Status: DISCONTINUED | OUTPATIENT
Start: 2021-04-01 | End: 2021-04-04

## 2021-04-01 RX ORDER — ATORVASTATIN CALCIUM 80 MG/1
40 TABLET, FILM COATED ORAL AT BEDTIME
Refills: 0 | Status: DISCONTINUED | OUTPATIENT
Start: 2021-04-01 | End: 2021-04-04

## 2021-04-01 RX ORDER — INSULIN LISPRO 100/ML
VIAL (ML) SUBCUTANEOUS
Refills: 0 | Status: DISCONTINUED | OUTPATIENT
Start: 2021-04-01 | End: 2021-04-04

## 2021-04-01 RX ORDER — HYDROMORPHONE HYDROCHLORIDE 2 MG/ML
0.5 INJECTION INTRAMUSCULAR; INTRAVENOUS; SUBCUTANEOUS
Refills: 0 | Status: DISCONTINUED | OUTPATIENT
Start: 2021-04-01 | End: 2021-04-01

## 2021-04-01 RX ORDER — POLYETHYLENE GLYCOL 3350 17 G/17G
17 POWDER, FOR SOLUTION ORAL
Refills: 0 | Status: DISCONTINUED | OUTPATIENT
Start: 2021-04-01 | End: 2021-04-04

## 2021-04-01 RX ORDER — DEXTROSE 50 % IN WATER 50 %
15 SYRINGE (ML) INTRAVENOUS ONCE
Refills: 0 | Status: DISCONTINUED | OUTPATIENT
Start: 2021-04-01 | End: 2021-04-04

## 2021-04-01 RX ORDER — ASPIRIN/CALCIUM CARB/MAGNESIUM 324 MG
81 TABLET ORAL DAILY
Refills: 0 | Status: DISCONTINUED | OUTPATIENT
Start: 2021-04-01 | End: 2021-04-04

## 2021-04-01 RX ORDER — INSULIN GLARGINE 100 [IU]/ML
40 INJECTION, SOLUTION SUBCUTANEOUS AT BEDTIME
Refills: 0 | Status: DISCONTINUED | OUTPATIENT
Start: 2021-04-01 | End: 2021-04-04

## 2021-04-01 RX ORDER — TRAMADOL HYDROCHLORIDE 50 MG/1
50 TABLET ORAL EVERY 8 HOURS
Refills: 0 | Status: DISCONTINUED | OUTPATIENT
Start: 2021-04-01 | End: 2021-04-04

## 2021-04-01 RX ORDER — SODIUM CHLORIDE 9 MG/ML
1000 INJECTION, SOLUTION INTRAVENOUS
Refills: 0 | Status: DISCONTINUED | OUTPATIENT
Start: 2021-04-01 | End: 2021-04-04

## 2021-04-01 RX ORDER — SENNA PLUS 8.6 MG/1
2 TABLET ORAL AT BEDTIME
Refills: 0 | Status: DISCONTINUED | OUTPATIENT
Start: 2021-04-01 | End: 2021-04-04

## 2021-04-01 RX ORDER — OXYCODONE AND ACETAMINOPHEN 5; 325 MG/1; MG/1
1 TABLET ORAL EVERY 4 HOURS
Refills: 0 | Status: DISCONTINUED | OUTPATIENT
Start: 2021-04-01 | End: 2021-04-02

## 2021-04-01 RX ORDER — DEXTROSE 50 % IN WATER 50 %
25 SYRINGE (ML) INTRAVENOUS ONCE
Refills: 0 | Status: DISCONTINUED | OUTPATIENT
Start: 2021-04-01 | End: 2021-04-04

## 2021-04-01 RX ORDER — DEXTROSE 50 % IN WATER 50 %
12.5 SYRINGE (ML) INTRAVENOUS ONCE
Refills: 0 | Status: DISCONTINUED | OUTPATIENT
Start: 2021-04-01 | End: 2021-04-04

## 2021-04-01 RX ORDER — TRAZODONE HCL 50 MG
150 TABLET ORAL AT BEDTIME
Refills: 0 | Status: DISCONTINUED | OUTPATIENT
Start: 2021-04-01 | End: 2021-04-04

## 2021-04-01 RX ORDER — TAMSULOSIN HYDROCHLORIDE 0.4 MG/1
0.4 CAPSULE ORAL AT BEDTIME
Refills: 0 | Status: DISCONTINUED | OUTPATIENT
Start: 2021-04-01 | End: 2021-04-04

## 2021-04-01 RX ORDER — ONDANSETRON 8 MG/1
4 TABLET, FILM COATED ORAL ONCE
Refills: 0 | Status: DISCONTINUED | OUTPATIENT
Start: 2021-04-01 | End: 2021-04-01

## 2021-04-01 RX ORDER — CEFTRIAXONE 500 MG/1
1000 INJECTION, POWDER, FOR SOLUTION INTRAMUSCULAR; INTRAVENOUS EVERY 24 HOURS
Refills: 0 | Status: DISCONTINUED | OUTPATIENT
Start: 2021-04-01 | End: 2021-04-04

## 2021-04-01 RX ORDER — HEPARIN SODIUM 5000 [USP'U]/ML
5000 INJECTION INTRAVENOUS; SUBCUTANEOUS EVERY 8 HOURS
Refills: 0 | Status: DISCONTINUED | OUTPATIENT
Start: 2021-04-01 | End: 2021-04-01

## 2021-04-01 RX ORDER — GLUCAGON INJECTION, SOLUTION 0.5 MG/.1ML
1 INJECTION, SOLUTION SUBCUTANEOUS ONCE
Refills: 0 | Status: DISCONTINUED | OUTPATIENT
Start: 2021-04-01 | End: 2021-04-04

## 2021-04-01 RX ORDER — SERTRALINE 25 MG/1
150 TABLET, FILM COATED ORAL DAILY
Refills: 0 | Status: DISCONTINUED | OUTPATIENT
Start: 2021-04-01 | End: 2021-04-04

## 2021-04-01 RX ORDER — ACETAMINOPHEN 500 MG
650 TABLET ORAL EVERY 6 HOURS
Refills: 0 | Status: DISCONTINUED | OUTPATIENT
Start: 2021-04-01 | End: 2021-04-04

## 2021-04-01 RX ORDER — CYCLOBENZAPRINE HYDROCHLORIDE 10 MG/1
5 TABLET, FILM COATED ORAL AT BEDTIME
Refills: 0 | Status: DISCONTINUED | OUTPATIENT
Start: 2021-04-01 | End: 2021-04-03

## 2021-04-01 RX ORDER — INSULIN LISPRO 100/ML
15 VIAL (ML) SUBCUTANEOUS
Refills: 0 | Status: DISCONTINUED | OUTPATIENT
Start: 2021-04-01 | End: 2021-04-03

## 2021-04-01 RX ADMIN — Medication 150 MILLIGRAM(S): at 22:01

## 2021-04-01 RX ADMIN — PANTOPRAZOLE SODIUM 40 MILLIGRAM(S): 20 TABLET, DELAYED RELEASE ORAL at 15:17

## 2021-04-01 RX ADMIN — LIDOCAINE 2 PATCH: 4 CREAM TOPICAL at 19:53

## 2021-04-01 RX ADMIN — Medication 650 MILLIGRAM(S): at 09:00

## 2021-04-01 RX ADMIN — Medication 15 UNIT(S): at 16:48

## 2021-04-01 RX ADMIN — Medication 500 MILLIGRAM(S): at 22:01

## 2021-04-01 RX ADMIN — HEPARIN SODIUM 5000 UNIT(S): 5000 INJECTION INTRAVENOUS; SUBCUTANEOUS at 22:01

## 2021-04-01 RX ADMIN — SENNA PLUS 2 TABLET(S): 8.6 TABLET ORAL at 22:01

## 2021-04-01 RX ADMIN — TRAMADOL HYDROCHLORIDE 50 MILLIGRAM(S): 50 TABLET ORAL at 16:23

## 2021-04-01 RX ADMIN — ATORVASTATIN CALCIUM 40 MILLIGRAM(S): 80 TABLET, FILM COATED ORAL at 22:01

## 2021-04-01 RX ADMIN — Medication 200 MILLIGRAM(S): at 15:16

## 2021-04-01 RX ADMIN — HEPARIN SODIUM 7500 UNIT(S): 5000 INJECTION INTRAVENOUS; SUBCUTANEOUS at 05:51

## 2021-04-01 RX ADMIN — TRAMADOL HYDROCHLORIDE 50 MILLIGRAM(S): 50 TABLET ORAL at 15:16

## 2021-04-01 RX ADMIN — INSULIN GLARGINE 40 UNIT(S): 100 INJECTION, SOLUTION SUBCUTANEOUS at 21:59

## 2021-04-01 RX ADMIN — OXYCODONE AND ACETAMINOPHEN 1 TABLET(S): 5; 325 TABLET ORAL at 20:42

## 2021-04-01 RX ADMIN — Medication 100 MILLIGRAM(S): at 22:02

## 2021-04-01 RX ADMIN — CEFTRIAXONE 100 MILLIGRAM(S): 500 INJECTION, POWDER, FOR SOLUTION INTRAMUSCULAR; INTRAVENOUS at 16:47

## 2021-04-01 RX ADMIN — Medication 200 MILLIGRAM(S): at 05:50

## 2021-04-01 RX ADMIN — TAMSULOSIN HYDROCHLORIDE 0.4 MILLIGRAM(S): 0.4 CAPSULE ORAL at 22:01

## 2021-04-01 RX ADMIN — LIDOCAINE 2 PATCH: 4 CREAM TOPICAL at 04:59

## 2021-04-01 RX ADMIN — LIDOCAINE 2 PATCH: 4 CREAM TOPICAL at 07:44

## 2021-04-01 RX ADMIN — Medication 650 MILLIGRAM(S): at 15:17

## 2021-04-01 RX ADMIN — OXYCODONE AND ACETAMINOPHEN 1 TABLET(S): 5; 325 TABLET ORAL at 21:42

## 2021-04-01 RX ADMIN — Medication 1 APPLICATION(S): at 05:54

## 2021-04-01 RX ADMIN — Medication 8: at 16:48

## 2021-04-01 RX ADMIN — LIDOCAINE 2 PATCH: 4 CREAM TOPICAL at 15:17

## 2021-04-01 RX ADMIN — Medication 81 MILLIGRAM(S): at 15:16

## 2021-04-01 RX ADMIN — TRAMADOL HYDROCHLORIDE 50 MILLIGRAM(S): 50 TABLET ORAL at 07:47

## 2021-04-01 RX ADMIN — Medication 650 MILLIGRAM(S): at 16:23

## 2021-04-01 RX ADMIN — Medication 650 MILLIGRAM(S): at 07:47

## 2021-04-01 RX ADMIN — SODIUM CHLORIDE 75 MILLILITER(S): 9 INJECTION, SOLUTION INTRAVENOUS at 14:20

## 2021-04-01 RX ADMIN — POLYETHYLENE GLYCOL 3350 17 GRAM(S): 17 POWDER, FOR SOLUTION ORAL at 16:48

## 2021-04-01 RX ADMIN — SERTRALINE 150 MILLIGRAM(S): 25 TABLET, FILM COATED ORAL at 15:16

## 2021-04-01 RX ADMIN — Medication 200 MILLIGRAM(S): at 22:00

## 2021-04-01 RX ADMIN — TRAMADOL HYDROCHLORIDE 50 MILLIGRAM(S): 50 TABLET ORAL at 08:59

## 2021-04-01 RX ADMIN — PANTOPRAZOLE SODIUM 40 MILLIGRAM(S): 20 TABLET, DELAYED RELEASE ORAL at 05:50

## 2021-04-01 RX ADMIN — POLYETHYLENE GLYCOL 3350 17 GRAM(S): 17 POWDER, FOR SOLUTION ORAL at 05:50

## 2021-04-01 RX ADMIN — Medication 500 MILLIGRAM(S): at 05:50

## 2021-04-01 NOTE — PROGRESS NOTE ADULT - SUBJECTIVE AND OBJECTIVE BOX
Follow-up Pulmonary Progress Note  Chief Complaint : Type 2 diabetes mellitus with ketoacidosis without coma    Awake and alert. Still with intermittent cough.     Allergies :No Known Drug Allergies  shellfish (Unknown)      PAST MEDICAL & SURGICAL HISTORY:  Type 2 diabetes mellitus without complication, without long-term current use of insulin    Benign hypertension    Shoulder arthritis  Multiple surgeries on left shoulder        Medications:  MEDICATIONS  (STANDING):  aspirin  chewable 81 milliGRAM(s) Oral daily  atorvastatin 40 milliGRAM(s) Oral at bedtime  benzonatate 200 milliGRAM(s) Oral three times a day  cefTRIAXone   IVPB 1000 milliGRAM(s) IV Intermittent every 24 hours  dextrose 40% Gel 15 Gram(s) Oral once  dextrose 5%. 1000 milliLiter(s) (50 mL/Hr) IV Continuous <Continuous>  dextrose 5%. 1000 milliLiter(s) (100 mL/Hr) IV Continuous <Continuous>  dextrose 50% Injectable 25 Gram(s) IV Push once  dextrose 50% Injectable 12.5 Gram(s) IV Push once  dextrose 50% Injectable 25 Gram(s) IV Push once  dextrose 50% Injectable 12.5 Gram(s) IV Push once  glucagon  Injectable 1 milliGRAM(s) IntraMuscular once  heparin   Injectable 5000 Unit(s) SubCutaneous every 8 hours  insulin glargine Injectable (LANTUS) 40 Unit(s) SubCutaneous at bedtime  insulin lispro (ADMELOG) corrective regimen sliding scale   SubCutaneous at bedtime  insulin lispro (ADMELOG) corrective regimen sliding scale   SubCutaneous three times a day before meals  insulin lispro Injectable (ADMELOG) 15 Unit(s) SubCutaneous three times a day before meals  lidocaine   Patch 2 Patch Transdermal every 24 hours  metroNIDAZOLE    Tablet 500 milliGRAM(s) Oral every 8 hours  pantoprazole    Tablet 40 milliGRAM(s) Oral before breakfast  polyethylene glycol 3350 17 Gram(s) Oral two times a day  senna 2 Tablet(s) Oral at bedtime  sertraline 150 milliGRAM(s) Oral daily  tamsulosin 0.4 milliGRAM(s) Oral at bedtime  traZODone 150 milliGRAM(s) Oral at bedtime    MEDICATIONS  (PRN):  acetaminophen   Tablet .. 650 milliGRAM(s) Oral every 6 hours PRN Temp greater or equal to 38C (100.4F), Mild Pain (1 - 3)  aluminum hydroxide/magnesium hydroxide/simethicone Suspension 30 milliLiter(s) Oral every 4 hours PRN Dyspepsia  cyclobenzaprine 5 milliGRAM(s) Oral at bedtime PRN Muscle Spasm  guaiFENesin   Syrup  (Sugar-Free) 100 milliGRAM(s) Oral every 6 hours PRN Cough  traMADol 50 milliGRAM(s) Oral every 8 hours PRN Moderate Pain (4 - 6)      LABS:                        10.4   8.57  )-----------( 270      ( 01 Apr 2021 06:45 )             31.7     04-01    140  |  105  |  11  ----------------------------<  233<H>  4.5   |  25  |  0.88    Ca    8.9      01 Apr 2021 06:45    TPro  6.6  /  Alb  2.2<L>  /  TBili  0.3  /  DBili  0.1  /  AST  22  /  ALT  16  /  AlkPhos  68  04-01    CULTURES: (if applicable)    Culture - Sputum (collected 03-28-21 @ 22:10)  Source: .Sputum Sputum  Gram Stain (03-29-21 @ 05:58):    Rare polymorphonuclear leukocytes per low power field    Rare Squamous epithelial cells per low power field    Rare Gram Negative Rods per oil power field    Rare Gram Variable Cocci per oil power field  Final Report (03-31-21 @ 11:57):    Normal Respiratory Princess present    Culture - Blood (collected 03-28-21 @ 14:50)  Source: .Blood Blood-Peripheral  Preliminary Report (03-29-21 @ 19:02):    No growth to date.    Culture - Blood (collected 03-28-21 @ 14:50)  Source: .Blood Blood-Peripheral  Preliminary Report (03-29-21 @ 19:02):    No growth to date.    Culture - Urine (collected 03-26-21 @ 13:30)  Source: .Urine Clean Catch (Midstream)  Final Report (03-27-21 @ 14:49):    No growth    VITALS:  T(C): 36.9 (04-01-21 @ 14:06), Max: 37.2 (03-31-21 @ 20:21)  T(F): 98.5 (04-01-21 @ 14:06), Max: 98.9 (03-31-21 @ 20:21)  HR: 74 (04-01-21 @ 14:06) (65 - 97)  BP: 118/81 (04-01-21 @ 14:06) (94/62 - 131/67)  BP(mean): 69 (03-31-21 @ 15:50) (69 - 69)  ABP: --  ABP(mean): --  RR: 17 (04-01-21 @ 14:06) (15 - 20)  SpO2: 97% (04-01-21 @ 14:06) (95% - 99%)  CVP(mm Hg): --  CVP(cm H2O): --    Ins and Outs     03-31-21 @ 07:01  -  04-01-21 @ 07:00  --------------------------------------------------------  IN: 340 mL / OUT: 450 mL / NET: -110 mL    04-01-21 @ 07:01  -  04-01-21 @ 15:41  --------------------------------------------------------  IN: 0 mL / OUT: 320 mL / NET: -320 mL          Weight (kg): 97.5 (04-01-21 @ 08:16)        I&O's Detail    31 Mar 2021 07:01  -  01 Apr 2021 07:00  --------------------------------------------------------  IN:    Oral Fluid: 340 mL  Total IN: 340 mL    OUT:    Voided (mL): 450 mL  Total OUT: 450 mL    Total NET: -110 mL      01 Apr 2021 07:01  -  01 Apr 2021 15:41  --------------------------------------------------------  IN:  Total IN: 0 mL    OUT:    Voided (mL): 320 mL  Total OUT: 320 mL    Total NET: -320 mL

## 2021-04-01 NOTE — PROGRESS NOTE ADULT - SUBJECTIVE AND OBJECTIVE BOX
CC: f/u for lung abscess    Patient reports: he still c/o RUQ pain.He has a cough, ? non productive    REVIEW OF SYSTEMS:  All other review of systems negative (Comprehensive ROS)    Antimicrobials Day #  :day 6    Other Medications Reviewed    T(F): 98.5 (04-01-21 @ 14:06), Max: 99.4 (03-31-21 @ 14:31)  HR: 74 (04-01-21 @ 14:06)  BP: 118/81 (04-01-21 @ 14:06)  RR: 17 (04-01-21 @ 14:06)  SpO2: 97% (04-01-21 @ 14:06)  Wt(kg): --    PHYSICAL EXAM:  General: alert, no acute distress  Eyes:  anicteric, no conjunctival injection, no discharge  Oropharynx: no lesions or injection 	  Neck: supple, without adenopathy  Lungs: clear to auscultation  Heart: regular rate and rhythm; no murmur, rubs or gallops  Abdomen: soft, nondistended, vague RUQ/Rt flank tenderness  Skin: no lesions  Extremities: no clubbing, cyanosis, or edema  Neurologic: alert, oriented, moves all extremities    LAB RESULTS:                        10.4   8.57  )-----------( 270      ( 01 Apr 2021 06:45 )             31.7     04-01    140  |  105  |  11  ----------------------------<  233<H>  4.5   |  25  |  0.88    Ca    8.9      01 Apr 2021 06:45    TPro  6.6  /  Alb  2.2<L>  /  TBili  0.3  /  DBili  0.1  /  AST  22  /  ALT  16  /  AlkPhos  68  04-01    LIVER FUNCTIONS - ( 01 Apr 2021 06:45 )  Alb: 2.2 g/dL / Pro: 6.6 g/dL / ALK PHOS: 68 U/L / ALT: 16 U/L / AST: 22 U/L / GGT: x             MICROBIOLOGY:  RECENT CULTURES:  03-28 @ 22:10 .Sputum Sputum     Normal Respiratory Princess present    Rare polymorphonuclear leukocytes per low power field  Rare Squamous epithelial cells per low power field  Rare Gram Negative Rods per oil power field  Rare Gram Variable Cocci per oil power field    03-28 @ 14:50 .Blood Blood-Peripheral     No growth to date.          RADIOLOGY REVIEWED:    < from: Xray Chest 1 View AP/PA (04.01.21 @ 13:27) >  PROCEDURE DATE:  04/01/2021        INTERPRETATION:  AP chest on April 1, 2021 at 1:13 PM. Patient has cough and had bronchoscopy.    Heart is magnified by technique.    Right base mass with irregular borders is again noted.    Slight right base pleural calcification again seen.    There is no pneumothorax.    Chest is similar to March 27.    IMPRESSION: No change in right base mass or chest appearance after bronchoscopy.    < end of copied text >

## 2021-04-01 NOTE — PROGRESS NOTE ADULT - ASSESSMENT
60y male with hx DM presented for eval of weakness, N/V, abd pain, chest pain. Patient reports several days of symptoms. He had cough for about 2 weeks, no fevers. CT Chest with mass like structure to RLL and CT Abd/Pel with CBD dilation and air in biliary tree. He was started on IV abx. Concern raised possible lung abscess. Pt initially denied any dental problems,, he now admits to loose tooth.He had a CT scan of chest in December 2020 with no rt sided infiltrates, therefor this is new c/w December 2020.He does not have any hemoptysis but does report atypical chest/abdominal pain.  PC level is less than .08, his CRP is 112 and esr is over 100.  His pain is difficult to correlate with a pulmonary infection such as a lung abscess.  The rt flank/RUQ pain is hard to correlate with pneumonia.He does not seem to have pleural involvement (to my review)  He received 2 grams of azithro, stopped yesterday  Legionella urine antigen and MRSA nasal screen are negative, sputum culture with MURF  Repeat CT scan now with development of A/F level.Additional anaerobic coverage added 3/30 with metronidazole  PLAN:  1.continue CTX and metronidazole  2.Favor HIDA scan, I am not convinced that RUQ pain is pulmonary  3.Appreciate pulmonary input, s/p bronch, no endobronchial lesions  4.He will likely need extended course of antibiotics, either oral or IV however I will look to pulmonary and ? Thoracic on guidance as well.    I suspect bacterial abscess as it has evolved rather acutely and was not present 3 months ago.Will consider ceftin/flagyl or augmentin as options.

## 2021-04-01 NOTE — PROGRESS NOTE ADULT - ASSESSMENT
61 y/o M with PMHx of HTN, HLD, Pre-DM admitted for DKA after presenting to the ER with c/o weakness, N/V, abdominal pain and chest pain, found have glucose level of 674, AG 18, BUN/Cr 26/1.31, Acetone moderate. CT Chest with masslike structure to RLL and CT Abd/Pel with CBD dilation and air in biliary tree. ED interventions include 2L IVF Bolus, IVP insulin. Patient was admitted to ICU, now downgraded to medicine.               Case discussed with Dr. Zpaata  61 y/o M with PMHx of HTN, HLD, Pre-DM admitted for DKA after presenting to the ER with c/o weakness, N/V, abdominal pain and chest pain, found have glucose level of 674, AG 18, BUN/Cr 26/1.31, Acetone moderate. CT Chest with masslike structure to RLL and CT Abd/Pel with CBD dilation and air in biliary tree. ED interventions include 2L IVF Bolus, IVP insulin. Patient was admitted to ICU, now downgraded to medicine.       #Headache  -Continue tylenol PRN     #Lightheadedness  -Will obtain Orthostatic vitals   -Fall risk precautions     Case discussed with Dr. Zapata

## 2021-04-01 NOTE — PROCEDURE NOTE - GENERAL PROCEDURE DETAILS
Patient placed under general anesthesia. Flexible bronchoscope placed through the LMA. All lobes of the lung inspected. Pink mucosa noted through out. Mucoid secretions noted. No endobronchial lesions noted. Bronchoalveolar lavage obtained from right lower lobe bronchus. 60 mL of saline was infused for lavage with about 20 cc mucoid secretions obtained.

## 2021-04-01 NOTE — PROGRESS NOTE ADULT - PROBLEM SELECTOR PLAN 2
#Right Lower Lobe masslike opacity - Likely abscess formation   #Pulmonary Nodules   -CT chest: 4.5 cm heterogeneous in attenuation masslike opacity within the right lower lobe abutting the right major fissure and right hilar region, (changed from 12/2020). Small nodules are identified adjacent to the mass measuring up to 5 mm   -Repeat CT chest 3/30/21: cavitary lesion involving the right lower lobe, development of an air-fluid level within this lesion and endobronchial spread into the right lower lobe  -Recent low dose CT chest for lung cancer screening done 12/2020 without evidence of opacity to RLL noted now  -History of cigarette smoking, now on nicotine patches/ gum, recent weight loss  -Sputum culture: Rare PMN, rare GNR, rare gram variable cocci, rare squamous epithelial cells    -Procalcitonin: 0.08, ESR: 102, CRP: 118   -Blood cultures - NGTD   -Urine legionella antigen - negative   -QuantiFeron negative   -Spoke with Cardiothoracic Surgery yesterday: Dr. Hassan, who does not cover [facility], Dr. Maddox who does is out this week, Dr. Hassan stated that if abscess formation was < 6cm and patient stable otherwise (afebrile/ normal WBC) may continue with prolonged antibiotic course, if drainage necessary will need to be transferred   -ID consult appreciated: Continue Ceftriaxone, add metronidazole  -Continue to monitor closely   -Continue Ceftriaxone 1g q24hrs - day #7  -Continue Metronidazole 500mg po q12hrs day #3  -S/p Azithromycin - 4 days  -Scheduled for bronchoscopy today with BAL

## 2021-04-01 NOTE — PROGRESS NOTE ADULT - SUBJECTIVE AND OBJECTIVE BOX
HPI: 61 yo M with PMHx of HTN, HLD, Pre-DM presented for eval of weakness, N/V, abd pain, chest pain. Patient reports several days of symptoms. He states he does not take any medications for DM. Patient denies trauma or inciting event. Patient also reports constipation with blood on toilet paper after wiping. ED workup significant for Glucose 674, AG 18, BUN/Cr 26/1.31, Acetone moderate. CT Chest with mass like structure to RLL and CT Abd/Pel with CBD dilation and air in biliary tree. ED interventions include 2L IVF Bolus, IVP insulin,  (26 Mar 2021 16:30)    Subjective: Patient seen and examined at bedside this morning, continues with R sided back pain, epigastric pain, now complains of constats headache which he states has been present since for a few days and lightheadedness upon standing from sitting position. Patient continues with cough productive of yellowish sputum. Planned for Bronchoscopy with BAL today. No overnight issues reported.   REVIEW OF SYSTEMS:    CONSTITUTIONAL: No weakness, fevers or chills  EYES/ENT: No visual changes;  No vertigo or throat pain   NECK: No pain or stiffness  RESPIRATORY: +cough, no wheezing, hemoptysis; No shortness of breath  CARDIOVASCULAR: No chest pain or palpitations  GASTROINTESTINAL: + epigastric pain. No nausea, vomiting, or hematemesis; No diarrhea or constipation. No melena or hematochezia.  GENITOURINARY: No dysuria, frequency or hematuria  NEUROLOGICAL: No numbness or weakness  SKIN: No itching, rashes  MSK: back pain+    Vital Signs Last 24 Hrs  T(C): 36.5 (2021 05:00), Max: 37.4 (31 Mar 2021 14:31)  T(F): 97.7 (2021 05:00), Max: 99.4 (31 Mar 2021 14:31)  HR: 69 (2021 05:00) (69 - 84)  BP: 99/57 (2021 05:00) (94/62 - 115/75)  BP(mean): 69 (31 Mar 2021 15:50) (69 - 69)  RR: 16 (2021 05:00) (16 - 17)  SpO2: 98% (2021 05:00) (95% - 98%)    PHYSICAL EXAM:  GENERAL: NAD, lying in bed comfortably  HEAD:  Atraumatic, Normocephalic  EYES: conjunctiva and sclera clear  ENT: Moist mucous membranes  NECK: Supple, No JVD  CHEST/LUNG: Clear to auscultation bilaterally; No rales, rhonchi, wheezing, or rubs. Unlabored respirations  HEART: Regular rate and rhythm; No murmurs, rubs, or gallops  ABDOMEN: Bowel sounds present; Soft, tenderness to epigastric region, no guarding/ rebound, negative Morales's sign   EXTREMITIES:  2+ Peripheral Pulses, brisk capillary refill. No clubbing, cyanosis, or edema  NERVOUS SYSTEM:  Alert & Oriented X3, speech clear. No deficits   MSK: FROM all 4 extremities, full and equal strength, Lidocaine patch in place to R posterolateral aspect of back, tender to palpation, some tenderness on active movement    MEDICATIONS  (STANDING):  aspirin  chewable 81 milliGRAM(s) Oral daily  atorvastatin 40 milliGRAM(s) Oral at bedtime  benzonatate 200 milliGRAM(s) Oral three times a day  cefTRIAXone   IVPB 1000 milliGRAM(s) IV Intermittent every 24 hours  dextrose 40% Gel 15 Gram(s) Oral once  dextrose 5%. 1000 milliLiter(s) (50 mL/Hr) IV Continuous <Continuous>  dextrose 5%. 1000 milliLiter(s) (100 mL/Hr) IV Continuous <Continuous>  dextrose 50% Injectable 25 Gram(s) IV Push once  dextrose 50% Injectable 12.5 Gram(s) IV Push once  dextrose 50% Injectable 25 Gram(s) IV Push once  dextrose 50% Injectable 25 Gram(s) IV Push once  dextrose 50% Injectable 12.5 Gram(s) IV Push once  dextrose 50% Injectable 25 Gram(s) IV Push once  glucagon  Injectable 1 milliGRAM(s) IntraMuscular once  glucagon  Injectable 1 milliGRAM(s) IntraMuscular once  heparin   Injectable 7500 Unit(s) SubCutaneous every 8 hours  hydrocortisone 2.5% Rectal Cream 1 Application(s) Rectal two times a day  insulin glargine Injectable (LANTUS) 40 Unit(s) SubCutaneous at bedtime  insulin lispro (ADMELOG) corrective regimen sliding scale   SubCutaneous three times a day before meals  insulin lispro (ADMELOG) corrective regimen sliding scale   SubCutaneous at bedtime  insulin lispro Injectable (ADMELOG) 15 Unit(s) SubCutaneous three times a day before meals  lidocaine   Patch 2 Patch Transdermal every 24 hours  metroNIDAZOLE    Tablet 500 milliGRAM(s) Oral every 8 hours  pantoprazole    Tablet 40 milliGRAM(s) Oral before breakfast  polyethylene glycol 3350 17 Gram(s) Oral two times a day  senna 2 Tablet(s) Oral at bedtime  sertraline 150 milliGRAM(s) Oral daily  tamsulosin 0.4 milliGRAM(s) Oral at bedtime  traZODone 150 milliGRAM(s) Oral at bedtime    MEDICATIONS  (PRN):  acetaminophen   Tablet .. 650 milliGRAM(s) Oral every 6 hours PRN Temp greater or equal to 38C (100.4F), Mild Pain (1 - 3)  aluminum hydroxide/magnesium hydroxide/simethicone Suspension 30 milliLiter(s) Oral every 4 hours PRN Dyspepsia  benzocaine 15 mG/menthol 3.6 mG (Sugar-Free) Lozenge 1 Lozenge Oral every 6 hours PRN Sore Throat  cyclobenzaprine 5 milliGRAM(s) Oral at bedtime PRN Muscle Spasm  guaiFENesin   Syrup  (Sugar-Free) 100 milliGRAM(s) Oral every 6 hours PRN Cough  ondansetron Injectable 4 milliGRAM(s) IV Push every 8 hours PRN Nausea and/or Vomiting  traMADol 50 milliGRAM(s) Oral every 8 hours PRN Moderate Pain (4 - 6)          LABS:                          10.4   8.57  )-----------( 270      ( 2021 06:45 )             31.7   04-    140  |  105  |  11  ----------------------------<  233<H>  4.5   |  25  |  0.88    Ca    8.9      2021 06:45    TPro  6.6  /  Alb  2.2<L>  /  TBili  0.3  /  DBili  0.1  /  AST  22  /  ALT  16  /  AlkPhos  68  04-      CAPILLARY BLOOD GLUCOSE  POCT Blood Glucose.: 207 mg/dL (2021 07:39)  POCT Blood Glucose.: 142 mg/dL (31 Mar 2021 21:39)  POCT Blood Glucose.: 190 mg/dL (31 Mar 2021 17:05)  POCT Blood Glucose.: 257 mg/dL (31 Mar 2021 11:23)      CARDIAC MARKERS ( 26 Mar 2021 13:30 )  <.017 ng/mL / x     / x     / x     / x          Urinalysis Basic - ( 26 Mar 2021 13:30 )    Color: Yellow / Appearance: Clear / S.010 / pH: x  Gluc: x / Ketone: Large  / Bili: Negative / Urobili: Negative   Blood: x / Protein: Negative / Nitrite: Negative   Leuk Esterase: Negative / RBC: x / WBC x   Sq Epi: x / Non Sq Epi: x / Bacteria: x        RADIOLOGY:  < from: CT Chest w/ IV Cont (21 @ 14:44) >  FINDINGS:  CHEST:  LUNGS AND LARGE AIRWAYS: Patent central airways. There is a 4.5 cm heterogeneous in attenuation masslike opacity within the right lower lobe abutting the right major fissure and right hilar region, representing interval change from prior examination dated 12/3/2020. Small nodules are identified adjacent to the mass measuring up to 5 mm.  PLEURA: No evidence for pleural effusion. No pneumothorax.  VESSELS: Stable variant anatomy identified with a venous conduit extending from the left thorax into the left brachiocephalic vein. Thoracic aortic caliber appears unremarkable.  HEART: Heart size is normal. No pericardial effusion. Coronary arterial calcification.  MEDIASTINUM AND LIZZETTE: No mediastinal lymphadenopathy. No left hilar lymphadenopathy.  CHEST WALL AND LOWER NECK: Within normal limits.    ABDOMEN AND PELVIS:  LIVER: Normal in size. No focal hepatic masses.  BILE DUCTS: No evidence for intrahepatic biliary ductal dilatation. Scattered foci of air identified within the intrahepatic biliary tree. There is increased caliber of the extrahepatic CBD measuring 9-10 mm.  GALLBLADDER: A gallstone is noted in the gallbladder lumen measuring 1.1 cm. Air is noted within the gallbladder lumen. Contracted state of the gallbladder limits assessment for wall thickening. Emphysematous cholecystitis cannot be excluded. Correlate with clinical as well as procedure history recommended.  SPLEEN: Within normal limits. Stable 2.4 x 1.6 cm soft tissue nodularity medial to the spleen, likely accessory splenic tissue.  PANCREAS: Within normal limits.  ADRENALS: Within normal limits.  KIDNEYS/URETERS: Within normal limits.    BLADDER: Within normal limits.  REPRODUCTIVE ORGANS: The prostate appears unremarkable.    BOWEL: Fecal material scattered throughout the colon. No evidence for mechanical bowel obstruction. Colonic diverticulosis. No colonic wall thickening. The appendix appears unremarkable.  PERITONEUM: No free intraperitoneal air or fluid.  VESSELS: Within normal limits.  RETROPERITONEUM/LYMPH NODES: No lymphadenopathy.  ABDOMINAL WALL: Within normal limits.  BONES: Degenerative changes.    IMPRESSION:  1. There is a 4.5 cm heterogeneous in attenuation masslike opacity within the right lower lobe abutting the right major fissure and right hilar region, representing interval change from prior examination dated 12/3/2020. Small nodules are identified adjacent to the mass measuring up to 5 mm. Differential considerations would include both neoplastic and infectious etiologies.  2. Scattered foci of air identified within the intrahepatic biliary tree. There is increased caliber of the extrahepatic CBD measuring 9-10 mm.  A gallstone is noted in the gallbladder lumen measuring 1.1 cm. Air is noted within the gallbladder lumen. Contracted state of the gallbladder limits assessment for wall thickening. Emphysematous cholecystitis cannot be excluded. Correlate with clinical as well as procedure history recommended.        < from: US Abdomen Upper Quadrant Right (21 @ 09:26) >  COMPARISON: CT dated 3/26/2021    TECHNIQUE: Sonography of the right upper quadrant.    FINDINGS:    Liver: Enlarged (20.2 cm). Increased echogenicity.  Bile ducts: Normal caliber. Common bile duct measures 6.7 mm.  Gallbladder: Cholelithiasis.  No focal tenderness or evidence of cholecystitis.  Pancreas: Visualized portions are within normal limits.  Right kidney: 13.3 cm. No hydronephrosis.  Ascites: None.  IVC: Visualized portions are within normal limits.    IMPRESSION:    Hepatomegaly.  Cholelithiasis.      < from: CT Chest No Cont (21 @ 12:46) >  FINDINGS:    LUNGS AND AIRWAYS: Patent central airways.  Right lower lobe lesion is slightly larger measuring approximately 5 cm. There has been interval development of an air-fluid level within this lesion. Multiple tree-in-bud opacities have developed in the right lower lobe indicating bronchial spread of disease process, possibly infection. Subsegmental atelectasis has developed in the dependent right lung base. Nonspecific focal groundglass opacity in right lung apex .  PLEURA: Trace left pleural effusion.  MEDIASTINUM AND LIZZETTE: No lymphadenopathy.  VESSELS: Coronary artery calcification.  HEART: Heart size is normal. No significant pericardial effusion.  CHEST WALL AND LOWER NECK: Within normal limits.  VISUALIZED UPPER ABDOMEN: Interval resolution of pneumobilia and improved extrahepatic biliary dilatation. Cholelithiasis and a contracted gallbladder are again evident.  BONES: Within normal limits.    IMPRESSION:  Evidence of a cavitary lesion involving the right lower lobe, development of an air-fluid level within this lesion and endobronchial spread into the right lower lobe in comparison to the prior study.    Interval resolution of pneumobilia and improvement in extrahepatic biliary dilatation. Redemonstration of cholelithiasis and a contracted gallbladder.

## 2021-04-01 NOTE — PROGRESS NOTE ADULT - ASSESSMENT
Physical Examination:  GENERAL:               Alert, Oriented, No acute distress.    HEENT:                    Pupils equal, reactive to light.  Symmetric. No JVD, Moist MM  PULM:                      Bilateral air entry, No Rales, No Rhonchi, No Wheezing  CVS:                        S1, S2,  No Murmur  ABD:                        Soft, nondistended, nontender, normoactive bowel sounds,   EXT:                         No edema, nontender, No Cyanosis or Clubbing   Vascular:                 Warm Extremities, Normal Capillary refill, Normal Distal Pulses  SKIN:                       Warm and well perfused, no rashes noted.   NEURO:                   Alert, oriented, interactive, nonfocal, follows commands  PSYC:                      Calm, + Insight.    Assessment:  1. Diabetic ketoacidosis   2. Acute kidney injury   3. Right Lower lobe opacity   4. Gallstones  5. Hypertension  6. Hyperlipidemia     Plan  - S/p Bronchoscopy today, please refer to the bronch note for details   - F/u BAL cultures and Cytology  - Post bronch chest xray with out PTX  - CT scan showing heterogenous mass, ? fluid filled cavity. Seems to be new compared to 3 months ago. Suspicious for an abscess given rapid growth vs. malignancy.   - Will continue treatment with IV antibiotics  - ID consult appreciated   - Surgery follow up   - BP and glucose control   - Statins   - DVT prophylaxis  - Will follow

## 2021-04-01 NOTE — CHART NOTE - NSCHARTNOTEFT_GEN_A_CORE
-patient with c/o pain, in throat and flank, paitent is S/P bronch today  -tramadol did not help  -will trial dose opf oxycodone now and re-assess pain in 35 minutes

## 2021-04-01 NOTE — PHARMACOTHERAPY INTERVENTION NOTE - COMMENTS
patient received 4 doses of zithromax 500 mg.  recommended to stop as therapy complete.
BMI 30. Recommended decreasing HSQ to 5000 units q8h for DVT px.

## 2021-04-02 LAB
ALBUMIN SERPL ELPH-MCNC: 2.3 G/DL — LOW (ref 3.3–5)
ALP SERPL-CCNC: 70 U/L — SIGNIFICANT CHANGE UP (ref 40–120)
ALT FLD-CCNC: 20 U/L — SIGNIFICANT CHANGE UP (ref 10–45)
ANION GAP SERPL CALC-SCNC: 11 MMOL/L — SIGNIFICANT CHANGE UP (ref 5–17)
AST SERPL-CCNC: 25 U/L — SIGNIFICANT CHANGE UP (ref 10–40)
BASOPHILS # BLD AUTO: 0.07 K/UL — SIGNIFICANT CHANGE UP (ref 0–0.2)
BASOPHILS NFR BLD AUTO: 0.8 % — SIGNIFICANT CHANGE UP (ref 0–2)
BILIRUB SERPL-MCNC: 0.3 MG/DL — SIGNIFICANT CHANGE UP (ref 0.2–1.2)
BUN SERPL-MCNC: 8 MG/DL — SIGNIFICANT CHANGE UP (ref 7–23)
CALCIUM SERPL-MCNC: 8.9 MG/DL — SIGNIFICANT CHANGE UP (ref 8.4–10.5)
CHLORIDE SERPL-SCNC: 105 MMOL/L — SIGNIFICANT CHANGE UP (ref 96–108)
CO2 SERPL-SCNC: 23 MMOL/L — SIGNIFICANT CHANGE UP (ref 22–31)
CREAT SERPL-MCNC: 0.8 MG/DL — SIGNIFICANT CHANGE UP (ref 0.5–1.3)
CULTURE RESULTS: SIGNIFICANT CHANGE UP
CULTURE RESULTS: SIGNIFICANT CHANGE UP
EOSINOPHIL # BLD AUTO: 0.29 K/UL — SIGNIFICANT CHANGE UP (ref 0–0.5)
EOSINOPHIL NFR BLD AUTO: 3.2 % — SIGNIFICANT CHANGE UP (ref 0–6)
GLUCOSE BLDC GLUCOMTR-MCNC: 106 MG/DL — HIGH (ref 70–99)
GLUCOSE BLDC GLUCOMTR-MCNC: 151 MG/DL — HIGH (ref 70–99)
GLUCOSE BLDC GLUCOMTR-MCNC: 164 MG/DL — HIGH (ref 70–99)
GLUCOSE BLDC GLUCOMTR-MCNC: 183 MG/DL — HIGH (ref 70–99)
GLUCOSE SERPL-MCNC: 177 MG/DL — HIGH (ref 70–99)
HCT VFR BLD CALC: 29.7 % — LOW (ref 39–50)
HGB BLD-MCNC: 9.9 G/DL — LOW (ref 13–17)
IMM GRANULOCYTES NFR BLD AUTO: 2.4 % — HIGH (ref 0–1.5)
LYMPHOCYTES # BLD AUTO: 1.88 K/UL — SIGNIFICANT CHANGE UP (ref 1–3.3)
LYMPHOCYTES # BLD AUTO: 20.6 % — SIGNIFICANT CHANGE UP (ref 13–44)
MCHC RBC-ENTMCNC: 31.9 PG — SIGNIFICANT CHANGE UP (ref 27–34)
MCHC RBC-ENTMCNC: 33.3 GM/DL — SIGNIFICANT CHANGE UP (ref 32–36)
MCV RBC AUTO: 95.8 FL — SIGNIFICANT CHANGE UP (ref 80–100)
MONOCYTES # BLD AUTO: 0.81 K/UL — SIGNIFICANT CHANGE UP (ref 0–0.9)
MONOCYTES NFR BLD AUTO: 8.9 % — SIGNIFICANT CHANGE UP (ref 2–14)
NEUTROPHILS # BLD AUTO: 5.87 K/UL — SIGNIFICANT CHANGE UP (ref 1.8–7.4)
NEUTROPHILS NFR BLD AUTO: 64.1 % — SIGNIFICANT CHANGE UP (ref 43–77)
NRBC # BLD: 0 /100 WBCS — SIGNIFICANT CHANGE UP (ref 0–0)
PLATELET # BLD AUTO: 296 K/UL — SIGNIFICANT CHANGE UP (ref 150–400)
POTASSIUM SERPL-MCNC: 4 MMOL/L — SIGNIFICANT CHANGE UP (ref 3.5–5.3)
POTASSIUM SERPL-SCNC: 4 MMOL/L — SIGNIFICANT CHANGE UP (ref 3.5–5.3)
PROT SERPL-MCNC: 6.7 G/DL — SIGNIFICANT CHANGE UP (ref 6–8.3)
RBC # BLD: 3.1 M/UL — LOW (ref 4.2–5.8)
RBC # FLD: 12.2 % — SIGNIFICANT CHANGE UP (ref 10.3–14.5)
SODIUM SERPL-SCNC: 139 MMOL/L — SIGNIFICANT CHANGE UP (ref 135–145)
SPECIMEN SOURCE: SIGNIFICANT CHANGE UP
SPECIMEN SOURCE: SIGNIFICANT CHANGE UP
WBC # BLD: 9.14 K/UL — SIGNIFICANT CHANGE UP (ref 3.8–10.5)
WBC # FLD AUTO: 9.14 K/UL — SIGNIFICANT CHANGE UP (ref 3.8–10.5)

## 2021-04-02 PROCEDURE — 78226 HEPATOBILIARY SYSTEM IMAGING: CPT | Mod: 26

## 2021-04-02 PROCEDURE — 99232 SBSQ HOSP IP/OBS MODERATE 35: CPT | Mod: GC

## 2021-04-02 RX ORDER — DIPHENHYDRAMINE HYDROCHLORIDE AND LIDOCAINE HYDROCHLORIDE AND ALUMINUM HYDROXIDE AND MAGNESIUM HYDRO
15 KIT
Refills: 0 | Status: DISCONTINUED | OUTPATIENT
Start: 2021-04-02 | End: 2021-04-04

## 2021-04-02 RX ORDER — HYDROMORPHONE HYDROCHLORIDE 2 MG/ML
0.5 INJECTION INTRAMUSCULAR; INTRAVENOUS; SUBCUTANEOUS ONCE
Refills: 0 | Status: DISCONTINUED | OUTPATIENT
Start: 2021-04-02 | End: 2021-04-02

## 2021-04-02 RX ADMIN — LIDOCAINE 2 PATCH: 4 CREAM TOPICAL at 03:24

## 2021-04-02 RX ADMIN — Medication 200 MILLIGRAM(S): at 14:34

## 2021-04-02 RX ADMIN — Medication 200 MILLIGRAM(S): at 22:57

## 2021-04-02 RX ADMIN — Medication 500 MILLIGRAM(S): at 05:12

## 2021-04-02 RX ADMIN — PANTOPRAZOLE SODIUM 40 MILLIGRAM(S): 20 TABLET, DELAYED RELEASE ORAL at 05:12

## 2021-04-02 RX ADMIN — HEPARIN SODIUM 5000 UNIT(S): 5000 INJECTION INTRAVENOUS; SUBCUTANEOUS at 05:12

## 2021-04-02 RX ADMIN — TRAMADOL HYDROCHLORIDE 50 MILLIGRAM(S): 50 TABLET ORAL at 11:04

## 2021-04-02 RX ADMIN — ATORVASTATIN CALCIUM 40 MILLIGRAM(S): 80 TABLET, FILM COATED ORAL at 22:58

## 2021-04-02 RX ADMIN — Medication 150 MILLIGRAM(S): at 22:57

## 2021-04-02 RX ADMIN — LIDOCAINE 2 PATCH: 4 CREAM TOPICAL at 19:27

## 2021-04-02 RX ADMIN — CEFTRIAXONE 100 MILLIGRAM(S): 500 INJECTION, POWDER, FOR SOLUTION INTRAMUSCULAR; INTRAVENOUS at 17:19

## 2021-04-02 RX ADMIN — Medication 500 MILLIGRAM(S): at 14:34

## 2021-04-02 RX ADMIN — Medication 500 MILLIGRAM(S): at 22:57

## 2021-04-02 RX ADMIN — Medication 15 UNIT(S): at 16:57

## 2021-04-02 RX ADMIN — Medication 15 UNIT(S): at 12:24

## 2021-04-02 RX ADMIN — DIPHENHYDRAMINE HYDROCHLORIDE AND LIDOCAINE HYDROCHLORIDE AND ALUMINUM HYDROXIDE AND MAGNESIUM HYDRO 15 MILLILITER(S): KIT at 14:34

## 2021-04-02 RX ADMIN — HEPARIN SODIUM 5000 UNIT(S): 5000 INJECTION INTRAVENOUS; SUBCUTANEOUS at 22:54

## 2021-04-02 RX ADMIN — POLYETHYLENE GLYCOL 3350 17 GRAM(S): 17 POWDER, FOR SOLUTION ORAL at 05:13

## 2021-04-02 RX ADMIN — Medication 100 MILLIGRAM(S): at 05:12

## 2021-04-02 RX ADMIN — HEPARIN SODIUM 5000 UNIT(S): 5000 INJECTION INTRAVENOUS; SUBCUTANEOUS at 14:34

## 2021-04-02 RX ADMIN — TRAMADOL HYDROCHLORIDE 50 MILLIGRAM(S): 50 TABLET ORAL at 20:55

## 2021-04-02 RX ADMIN — Medication 200 MILLIGRAM(S): at 05:12

## 2021-04-02 RX ADMIN — SERTRALINE 150 MILLIGRAM(S): 25 TABLET, FILM COATED ORAL at 11:04

## 2021-04-02 RX ADMIN — POLYETHYLENE GLYCOL 3350 17 GRAM(S): 17 POWDER, FOR SOLUTION ORAL at 17:19

## 2021-04-02 RX ADMIN — Medication 650 MILLIGRAM(S): at 12:06

## 2021-04-02 RX ADMIN — TRAMADOL HYDROCHLORIDE 50 MILLIGRAM(S): 50 TABLET ORAL at 12:04

## 2021-04-02 RX ADMIN — HYDROMORPHONE HYDROCHLORIDE 0.5 MILLIGRAM(S): 2 INJECTION INTRAMUSCULAR; INTRAVENOUS; SUBCUTANEOUS at 23:31

## 2021-04-02 RX ADMIN — TRAMADOL HYDROCHLORIDE 50 MILLIGRAM(S): 50 TABLET ORAL at 19:55

## 2021-04-02 RX ADMIN — Medication 2: at 12:24

## 2021-04-02 RX ADMIN — SENNA PLUS 2 TABLET(S): 8.6 TABLET ORAL at 22:57

## 2021-04-02 RX ADMIN — HYDROMORPHONE HYDROCHLORIDE 0.5 MILLIGRAM(S): 2 INJECTION INTRAMUSCULAR; INTRAVENOUS; SUBCUTANEOUS at 23:46

## 2021-04-02 RX ADMIN — Medication 81 MILLIGRAM(S): at 11:04

## 2021-04-02 RX ADMIN — OXYCODONE AND ACETAMINOPHEN 1 TABLET(S): 5; 325 TABLET ORAL at 06:13

## 2021-04-02 RX ADMIN — OXYCODONE AND ACETAMINOPHEN 1 TABLET(S): 5; 325 TABLET ORAL at 05:13

## 2021-04-02 RX ADMIN — LIDOCAINE 2 PATCH: 4 CREAM TOPICAL at 14:34

## 2021-04-02 RX ADMIN — INSULIN GLARGINE 40 UNIT(S): 100 INJECTION, SOLUTION SUBCUTANEOUS at 22:56

## 2021-04-02 RX ADMIN — TAMSULOSIN HYDROCHLORIDE 0.4 MILLIGRAM(S): 0.4 CAPSULE ORAL at 22:57

## 2021-04-02 RX ADMIN — Medication 650 MILLIGRAM(S): at 11:04

## 2021-04-02 NOTE — PROGRESS NOTE ADULT - ASSESSMENT
IMPRESSION: Abdominal pain and air in the biliary tree - unclear etiology of either    PLAN: No surgical problem evident

## 2021-04-02 NOTE — PROGRESS NOTE ADULT - SUBJECTIVE AND OBJECTIVE BOX
Follow-up Pulmonary Progress Note  Chief Complaint : Type 2 diabetes mellitus with ketoacidosis without coma    Endorsing sore throat post procedure, but slowly improving. Still with intermittent coughing. Otherwise he is stable. right sided flank pain endorsed.       Allergies :No Known Drug Allergies  shellfish (Unknown)      PAST MEDICAL & SURGICAL HISTORY:  Type 2 diabetes mellitus without complication, without long-term current use of insulin    Benign hypertension    Shoulder arthritis  Multiple surgeries on left shoulder        Medications:  MEDICATIONS  (STANDING):  aspirin  chewable 81 milliGRAM(s) Oral daily  atorvastatin 40 milliGRAM(s) Oral at bedtime  benzonatate 200 milliGRAM(s) Oral three times a day  cefTRIAXone   IVPB 1000 milliGRAM(s) IV Intermittent every 24 hours  dextrose 40% Gel 15 Gram(s) Oral once  dextrose 5%. 1000 milliLiter(s) (50 mL/Hr) IV Continuous <Continuous>  dextrose 5%. 1000 milliLiter(s) (100 mL/Hr) IV Continuous <Continuous>  dextrose 50% Injectable 25 Gram(s) IV Push once  dextrose 50% Injectable 12.5 Gram(s) IV Push once  dextrose 50% Injectable 25 Gram(s) IV Push once  dextrose 50% Injectable 12.5 Gram(s) IV Push once  glucagon  Injectable 1 milliGRAM(s) IntraMuscular once  heparin   Injectable 5000 Unit(s) SubCutaneous every 8 hours  insulin glargine Injectable (LANTUS) 40 Unit(s) SubCutaneous at bedtime  insulin lispro (ADMELOG) corrective regimen sliding scale   SubCutaneous at bedtime  insulin lispro (ADMELOG) corrective regimen sliding scale   SubCutaneous three times a day before meals  insulin lispro Injectable (ADMELOG) 15 Unit(s) SubCutaneous three times a day before meals  lidocaine   Patch 2 Patch Transdermal every 24 hours  metroNIDAZOLE    Tablet 500 milliGRAM(s) Oral every 8 hours  pantoprazole    Tablet 40 milliGRAM(s) Oral before breakfast  polyethylene glycol 3350 17 Gram(s) Oral two times a day  senna 2 Tablet(s) Oral at bedtime  sertraline 150 milliGRAM(s) Oral daily  tamsulosin 0.4 milliGRAM(s) Oral at bedtime  traZODone 150 milliGRAM(s) Oral at bedtime    MEDICATIONS  (PRN):  acetaminophen   Tablet .. 650 milliGRAM(s) Oral every 6 hours PRN Temp greater or equal to 38C (100.4F), Mild Pain (1 - 3)  aluminum hydroxide/magnesium hydroxide/simethicone Suspension 30 milliLiter(s) Oral every 4 hours PRN Dyspepsia  cyclobenzaprine 5 milliGRAM(s) Oral at bedtime PRN Muscle Spasm  FIRST- Mouthwash  BLM 15 milliLiter(s) Swish and Spit four times a day PRN pain  guaiFENesin   Syrup  (Sugar-Free) 100 milliGRAM(s) Oral every 6 hours PRN Cough  traMADol 50 milliGRAM(s) Oral every 8 hours PRN Moderate Pain (4 - 6)      LABS:                        9.9    9.14  )-----------( 296      ( 02 Apr 2021 06:50 )             29.7     04-02    139  |  105  |  8   ----------------------------<  177<H>  4.0   |  23  |  0.80    Ca    8.9      02 Apr 2021 06:50    TPro  6.7  /  Alb  2.3<L>  /  TBili  0.3  /  DBili  x   /  AST  25  /  ALT  20  /  AlkPhos  70  04-02                        CULTURES: (if applicable)    Culture - Body Fluid with Gram Stain (collected 04-01-21 @ 12:30)  Source: .Body Fluid RIGHT LUNG  Gram Stain (04-01-21 @ 22:25):    Moderate polymorphonuclear leukocytes per low power field    No organisms seen  Preliminary Report (04-02-21 @ 12:52):    No growth    Culture - Sputum (collected 03-28-21 @ 22:10)  Source: .Sputum Sputum  Gram Stain (03-29-21 @ 05:58):    Rare polymorphonuclear leukocytes per low power field    Rare Squamous epithelial cells per low power field    Rare Gram Negative Rods per oil power field    Rare Gram Variable Cocci per oil power field  Final Report (03-31-21 @ 11:57):    Normal Respiratory Princess present    Culture - Blood (collected 03-28-21 @ 14:50)  Source: .Blood Blood-Peripheral  Preliminary Report (03-29-21 @ 19:02):    No growth to date.    Culture - Blood (collected 03-28-21 @ 14:50)  Source: .Blood Blood-Peripheral  Preliminary Report (03-29-21 @ 19:02):    No growth to date.    Culture - Urine (collected 03-26-21 @ 13:30)  Source: .Urine Clean Catch (Midstream)  Final Report (03-27-21 @ 14:49):    No growth        VITALS:  T(C): 36.4 (04-02-21 @ 08:00), Max: 37.3 (04-01-21 @ 16:02)  T(F): 97.5 (04-02-21 @ 08:00), Max: 99.2 (04-01-21 @ 16:02)  HR: 76 (04-02-21 @ 08:00) (68 - 81)  BP: 115/72 (04-02-21 @ 08:00) (97/60 - 120/75)  BP(mean): --  ABP: --  ABP(mean): --  RR: 18 (04-02-21 @ 08:00) (17 - 20)  SpO2: 97% (04-02-21 @ 08:00) (97% - 99%)  CVP(mm Hg): --  CVP(cm H2O): --    Ins and Outs     04-01-21 @ 07:01  -  04-02-21 @ 07:00  --------------------------------------------------------  IN: 0 mL / OUT: 320 mL / NET: -320 mL          Weight (kg): 97.5 (04-01-21 @ 08:16)        I&O's Detail    01 Apr 2021 07:01  -  02 Apr 2021 07:00  --------------------------------------------------------  IN:  Total IN: 0 mL    OUT:    Voided (mL): 320 mL  Total OUT: 320 mL    Total NET: -320 mL

## 2021-04-02 NOTE — PROGRESS NOTE ADULT - ATTENDING COMMENTS
Patient is a 60y old  Male who presents with a chief complaint of DKA (01 Apr 2021 15:40)      Patient was seen on rounds, interviewed and examined with Dr. Portillo. Medical Record reviewed. History, review of systems, physical findings and lab results as documented confirmed, except as modified by me. Agree with management plan as described except as modified below.    SUBJECTIVE / OVERNIGHT EVENTS:        MEDICATIONS  (PRN):  acetaminophen   Tablet .. 650 milliGRAM(s) Oral every 6 hours PRN Temp greater or equal to 38C (100.4F), Mild Pain (1 - 3)  aluminum hydroxide/magnesium hydroxide/simethicone Suspension 30 milliLiter(s) Oral every 4 hours PRN Dyspepsia  cyclobenzaprine 5 milliGRAM(s) Oral at bedtime PRN Muscle Spasm  guaiFENesin   Syrup  (Sugar-Free) 100 milliGRAM(s) Oral every 6 hours PRN Cough  traMADol 50 milliGRAM(s) Oral every 8 hours PRN Moderate Pain (4 - 6)      CAPILLARY BLOOD GLUCOSE  POCT Blood Glucose.: 187 mg/dL (01 Apr 2021 21:27)  POCT Blood Glucose.: 309 mg/dL (01 Apr 2021 16:46)  POCT Blood Glucose.: 215 mg/dL (01 Apr 2021 11:26)        PHYSICAL EXAM: unchanged      LABS:                9.9    9.14  )-----------( 296      ( 02 Apr 2021 06:50 )             29.7     04-01    140  |  105  |  11  ----------------------------<  233<H>  4.5   |  25  |  0.88    Ca    8.9      01 Apr 2021 06:45    TPro  6.6  /  Alb  2.2<L>  /  TBili  0.3  /  DBili  0.1  /  AST  22  /  ALT  16  /  AlkPhos  68  04-01      Consultant(s) Notes Reviewed:  Pulmonary, Infectious Disease, Surgery    HEALTH ISSUES - PROBLEM Dx:  DKA (diabetic ketoacidoses)  Diabetes still poorly controlled  Work up for type 1 diabetes negative  Diabetes educator follow up    Opacity of lung on imaging study  S/P Bronchoscopy  Follow up cultures      Abdominal pain  Etiology unclear, follow up HIDA Scan
miralax daily, not prn
Patient is a 60y old  Male who presents with a chief complaint of DKA (02 Apr 2021 13:51)      Patient was seen on rounds, interviewed and examined with Dr. Portillo. Medical Record reviewed. History, review of systems, physical findings and lab results as documented confirmed, except as modified by me. Agree with management plan as described except as modified below.    SUBJECTIVE / OVERNIGHT EVENTS:    MEDICATIONS  (STANDING):  aspirin  chewable 81 milliGRAM(s) Oral daily  atorvastatin 40 milliGRAM(s) Oral at bedtime  benzonatate 200 milliGRAM(s) Oral three times a day  cefTRIAXone   IVPB 1000 milliGRAM(s) IV Intermittent every 24 hours  dextrose 40% Gel 15 Gram(s) Oral once  dextrose 5%. 1000 milliLiter(s) (50 mL/Hr) IV Continuous <Continuous>  dextrose 5%. 1000 milliLiter(s) (100 mL/Hr) IV Continuous <Continuous>  dextrose 50% Injectable 25 Gram(s) IV Push once  dextrose 50% Injectable 12.5 Gram(s) IV Push once  dextrose 50% Injectable 25 Gram(s) IV Push once  dextrose 50% Injectable 12.5 Gram(s) IV Push once  glucagon  Injectable 1 milliGRAM(s) IntraMuscular once  heparin   Injectable 5000 Unit(s) SubCutaneous every 8 hours  insulin glargine Injectable (LANTUS) 40 Unit(s) SubCutaneous at bedtime  insulin lispro (ADMELOG) corrective regimen sliding scale   SubCutaneous at bedtime  insulin lispro (ADMELOG) corrective regimen sliding scale   SubCutaneous three times a day before meals  insulin lispro Injectable (ADMELOG) 15 Unit(s) SubCutaneous three times a day before meals  lidocaine   Patch 2 Patch Transdermal every 24 hours  metroNIDAZOLE    Tablet 500 milliGRAM(s) Oral every 8 hours  pantoprazole    Tablet 40 milliGRAM(s) Oral before breakfast  polyethylene glycol 3350 17 Gram(s) Oral two times a day  senna 2 Tablet(s) Oral at bedtime  sertraline 150 milliGRAM(s) Oral daily  tamsulosin 0.4 milliGRAM(s) Oral at bedtime  traZODone 150 milliGRAM(s) Oral at bedtime    MEDICATIONS  (PRN):  acetaminophen   Tablet .. 650 milliGRAM(s) Oral every 6 hours PRN Temp greater or equal to 38C (100.4F), Mild Pain (1 - 3)  aluminum hydroxide/magnesium hydroxide/simethicone Suspension 30 milliLiter(s) Oral every 4 hours PRN Dyspepsia  cyclobenzaprine 5 milliGRAM(s) Oral at bedtime PRN Muscle Spasm  FIRST- Mouthwash  BLM 15 milliLiter(s) Swish and Spit four times a day PRN pain  guaiFENesin   Syrup  (Sugar-Free) 100 milliGRAM(s) Oral every 6 hours PRN Cough  traMADol 50 milliGRAM(s) Oral every 8 hours PRN Moderate Pain (4 - 6)      CAPILLARY BLOOD GLUCOSE      POCT Blood Glucose.: 164 mg/dL (02 Apr 2021 11:59)  POCT Blood Glucose.: 183 mg/dL (02 Apr 2021 08:04)  POCT Blood Glucose.: 187 mg/dL (01 Apr 2021 21:27)  POCT Blood Glucose.: 309 mg/dL (01 Apr 2021 16:46)        PHYSICAL EXAM: Unchanged.  Patient sitting in a chair at bedside. Appears uncomfortable but in no acute distress.      LABS:                        9.9    9.14  )-----------( 296      ( 02 Apr 2021 06:50 )             29.7     04-02    139  |  105  |  8   ----------------------------<  177<H>  4.0   |  23  |  0.80    Ca    8.9      02 Apr 2021 06:50    TPro  6.7  /  Alb  2.3<L>  /  TBili  0.3  /  DBili  x   /  AST  25  /  ALT  20  /  AlkPhos  70  04-02      RADIOLOGY & ADDITIONAL TESTS:    Imaging Personally Reviewed: HIDA scan negative    Consultant(s) Notes Reviewed:  Pulmonary    Continue antibiotics  Discuss possible long term antibiotic therapy with Infectious disease.  Please follow reccommendations
Patient is a 60y old  Male who presents with a chief complaint of DKA (29 Mar 2021 14:33)      Patient was seen on rounds, interviewed and examined with Dr. Portillo. Medical Record reviewed. History, review of systems, physical findings and lab results as documented confirmed, except as modified by me. Agree with management plan as described except as modified below.    SUBJECTIVE / OVERNIGHT EVENTS:    MEDICATIONS  (STANDING):  aspirin  chewable 81 milliGRAM(s) Oral daily  atorvastatin 40 milliGRAM(s) Oral at bedtime  bisacodyl Suppository 10 milliGRAM(s) Rectal once  cefTRIAXone   IVPB 1000 milliGRAM(s) IV Intermittent every 24 hours  dextrose 40% Gel 15 Gram(s) Oral once  dextrose 5%. 1000 milliLiter(s) (50 mL/Hr) IV Continuous <Continuous>  dextrose 5%. 1000 milliLiter(s) (100 mL/Hr) IV Continuous <Continuous>  dextrose 50% Injectable 25 Gram(s) IV Push once  dextrose 50% Injectable 12.5 Gram(s) IV Push once  dextrose 50% Injectable 25 Gram(s) IV Push once  dextrose 50% Injectable 25 Gram(s) IV Push once  dextrose 50% Injectable 12.5 Gram(s) IV Push once  dextrose 50% Injectable 25 Gram(s) IV Push once  glucagon  Injectable 1 milliGRAM(s) IntraMuscular once  glucagon  Injectable 1 milliGRAM(s) IntraMuscular once  heparin   Injectable 7500 Unit(s) SubCutaneous every 8 hours  hydrocortisone 2.5% Rectal Cream 1 Application(s) Rectal two times a day  insulin glargine Injectable (LANTUS) 35 Unit(s) SubCutaneous at bedtime  insulin lispro (ADMELOG) corrective regimen sliding scale   SubCutaneous three times a day before meals  insulin lispro (ADMELOG) corrective regimen sliding scale   SubCutaneous at bedtime  insulin lispro Injectable (ADMELOG) 10 Unit(s) SubCutaneous three times a day before meals  lactated ringers. 1000 milliLiter(s) (100 mL/Hr) IV Continuous <Continuous>  pantoprazole    Tablet 40 milliGRAM(s) Oral before breakfast  polyethylene glycol 3350 17 Gram(s) Oral two times a day  senna 2 Tablet(s) Oral at bedtime  sertraline 150 milliGRAM(s) Oral daily  tamsulosin 0.4 milliGRAM(s) Oral at bedtime  traZODone 150 milliGRAM(s) Oral at bedtime    MEDICATIONS  (PRN):  acetaminophen   Tablet .. 650 milliGRAM(s) Oral every 6 hours PRN Temp greater or equal to 38C (100.4F), Mild Pain (1 - 3)  aluminum hydroxide/magnesium hydroxide/simethicone Suspension 30 milliLiter(s) Oral every 4 hours PRN Dyspepsia  benzocaine 15 mG/menthol 3.6 mG (Sugar-Free) Lozenge 1 Lozenge Oral every 6 hours PRN Sore Throat  guaiFENesin   Syrup  (Sugar-Free) 100 milliGRAM(s) Oral every 6 hours PRN Cough  ondansetron Injectable 4 milliGRAM(s) IV Push every 8 hours PRN Nausea and/or Vomiting      CAPILLARY BLOOD GLUCOSE      POCT Blood Glucose.: 223 mg/dL (29 Mar 2021 12:28)  POCT Blood Glucose.: 269 mg/dL (29 Mar 2021 08:10)  POCT Blood Glucose.: 312 mg/dL (28 Mar 2021 20:52)  POCT Blood Glucose.: 208 mg/dL (28 Mar 2021 17:24)        PHYSICAL EXAM:      LABS:                        11.1   10.88 )-----------( 207      ( 29 Mar 2021 07:15 )             31.3     03-29    x   |  x   |  x   ----------------------------<  260<H>  x    |  x   |  x     Ca    8.8      29 Mar 2021 07:15  Phos  2.8     03-27  Mg     2.0     03-28    TPro  6.4  /  Alb  2.2<L>  /  TBili  0.5  /  DBili  x   /  AST  14  /  ALT  12  /  AlkPhos  70  03-29      Imaging Personally Reviewed: CT Abd    Consultant(s) Notes Reviewed:  ID, Surgery, Pulmonary    Care Discussed with Consultants/Other Providers: Dr Bergman (ID), Dr. Velazquez (Pulmonary)    Continue current antibiotics, reculture for t>100.5  check QuantiFeron  If no improvement in 1-2 days, consider broadening antibiotic coverage.    If improving plan to follow up pulmonary lesion after two weeks of therapy.
Patient is a 60y old  Male who presents with a chief complaint of DKA (31 Mar 2021 14:45)      Patient was seen on rounds, interviewed and examined with Dr. Portillo. Medical Record reviewed. History, review of systems, physical findings and lab results as documented confirmed, except as modified by me. Agree with management plan as described except as modified below.    SUBJECTIVE / OVERNIGHT EVENTS:    MEDICATIONS  (STANDING):  aspirin  chewable 81 milliGRAM(s) Oral daily  atorvastatin 40 milliGRAM(s) Oral at bedtime  benzonatate 200 milliGRAM(s) Oral three times a day  cefTRIAXone   IVPB 1000 milliGRAM(s) IV Intermittent every 24 hours  dextrose 40% Gel 15 Gram(s) Oral once  dextrose 5%. 1000 milliLiter(s) (50 mL/Hr) IV Continuous <Continuous>  dextrose 5%. 1000 milliLiter(s) (100 mL/Hr) IV Continuous <Continuous>  dextrose 50% Injectable 25 Gram(s) IV Push once  dextrose 50% Injectable 12.5 Gram(s) IV Push once  dextrose 50% Injectable 25 Gram(s) IV Push once  dextrose 50% Injectable 12.5 Gram(s) IV Push once  dextrose 50% Injectable 25 Gram(s) IV Push once  dextrose 50% Injectable 25 Gram(s) IV Push once  glucagon  Injectable 1 milliGRAM(s) IntraMuscular once  glucagon  Injectable 1 milliGRAM(s) IntraMuscular once  heparin   Injectable 7500 Unit(s) SubCutaneous every 8 hours  hydrocortisone 2.5% Rectal Cream 1 Application(s) Rectal two times a day  insulin glargine Injectable (LANTUS) 40 Unit(s) SubCutaneous at bedtime  insulin lispro (ADMELOG) corrective regimen sliding scale   SubCutaneous three times a day before meals  insulin lispro (ADMELOG) corrective regimen sliding scale   SubCutaneous at bedtime  insulin lispro Injectable (ADMELOG) 15 Unit(s) SubCutaneous three times a day before meals  metroNIDAZOLE    Tablet 500 milliGRAM(s) Oral every 8 hours  pantoprazole    Tablet 40 milliGRAM(s) Oral before breakfast  polyethylene glycol 3350 17 Gram(s) Oral two times a day  senna 2 Tablet(s) Oral at bedtime  sertraline 150 milliGRAM(s) Oral daily  tamsulosin 0.4 milliGRAM(s) Oral at bedtime  traZODone 150 milliGRAM(s) Oral at bedtime    MEDICATIONS  (PRN):  acetaminophen   Tablet .. 650 milliGRAM(s) Oral every 6 hours PRN Temp greater or equal to 38C (100.4F), Mild Pain (1 - 3)  aluminum hydroxide/magnesium hydroxide/simethicone Suspension 30 milliLiter(s) Oral every 4 hours PRN Dyspepsia  benzocaine 15 mG/menthol 3.6 mG (Sugar-Free) Lozenge 1 Lozenge Oral every 6 hours PRN Sore Throat  cyclobenzaprine 5 milliGRAM(s) Oral at bedtime PRN Muscle Spasm  guaiFENesin   Syrup  (Sugar-Free) 100 milliGRAM(s) Oral every 6 hours PRN Cough  ondansetron Injectable 4 milliGRAM(s) IV Push every 8 hours PRN Nausea and/or Vomiting  traMADol 50 milliGRAM(s) Oral every 8 hours PRN Moderate Pain (4 - 6)      CAPILLARY BLOOD GLUCOSE      POCT Blood Glucose.: 257 mg/dL (31 Mar 2021 11:23)  POCT Blood Glucose.: 218 mg/dL (31 Mar 2021 08:19)  POCT Blood Glucose.: 243 mg/dL (30 Mar 2021 20:52)  POCT Blood Glucose.: 229 mg/dL (30 Mar 2021 17:29)    PHYSICAL EXAM:  Unchanged      LABS:                        11.5   9.48  )-----------( 271      ( 31 Mar 2021 05:00 )             33.3     03-31    139  |  105  |  7   ----------------------------<  200<H>  4.4   |  21<L>  |  0.92    Ca    9.3      31 Mar 2021 05:00  Phos  4.6     03-30  Mg     2.0     03-30          Imaging Personally Reviewed: CT Chest    Consultant(s) Notes Reviewed:      Care Discussed with Consultants/Other Providers: Clinical Diabetes Educator, Infectious Disease - Dr. Bergman, Pulmonary- Dr. Velazquez      Adjust insulin as per recommendations  Add Flagyl for anaerobic coverage  For Bronchoscopy tomorrow with Dr. Velazquez.
Patient is a 60y old  Male who presents with a chief complaint of DKA (01 Apr 2021 15:40)      Patient was seen on rounds, interviewed and examined with Dr. Portillo. Medical Record reviewed. History, review of systems, physical findings and lab results as documented confirmed, except as modified by me. Agree with management plan as described except as modified below.    SUBJECTIVE / OVERNIGHT EVENTS:    MEDICATIONS  (PRN):  acetaminophen   Tablet .. 650 milliGRAM(s) Oral every 6 hours PRN Temp greater or equal to 38C (100.4F), Mild Pain (1 - 3)  aluminum hydroxide/magnesium hydroxide/simethicone Suspension 30 milliLiter(s) Oral every 4 hours PRN Dyspepsia  cyclobenzaprine 5 milliGRAM(s) Oral at bedtime PRN Muscle Spasm  guaiFENesin   Syrup  (Sugar-Free) 100 milliGRAM(s) Oral every 6 hours PRN Cough  traMADol 50 milliGRAM(s) Oral every 8 hours PRN Moderate Pain (4 - 6)      CAPILLARY BLOOD GLUCOSE      POCT Blood Glucose.: 183 mg/dL (02 Apr 2021 08:04)  POCT Blood Glucose.: 187 mg/dL (01 Apr 2021 21:27)  POCT Blood Glucose.: 309 mg/dL (01 Apr 2021 16:46)  POCT Blood Glucose.: 215 mg/dL (01 Apr 2021 11:26)        PHYSICAL EXAM: Unchanged      LABS:                        9.9    9.14  )-----------( 296      ( 02 Apr 2021 06:50 )             29.7     04-02    139  |  105  |  8   ----------------------------<  177<H>  4.0   |  23  |  0.80    Ca    8.9      02 Apr 2021 06:50    TPro  6.7  /  Alb  2.3<L>  /  TBili  0.3  /  DBili  x   /  AST  25  /  ALT  20  /  AlkPhos  70  04-02            Consultant(s) Notes Reviewed:  Infectious Disease, Pulmonary , Surgery    Diabetes - still with labile control  Diabetes educator follow up    Abd pain of unclear etiology  Follow up HIDA Scan    Pulmonary mass / abscess  Continue broad spectrum antiobiotics  Follow up BAL cultures.
Patient is a 60y old  Male who presents with a chief complaint of DKA (31 Mar 2021 14:45)      Patient was seen on rounds, interviewed and examined with Dr. Portillo. Medical Record reviewed. History, review of systems, physical findings and lab results as documented confirmed, except as modified by me. Agree with management plan as described except as modified below.    MEDICATIONS  (STANDING):  aspirin  chewable 81 milliGRAM(s) Oral daily  atorvastatin 40 milliGRAM(s) Oral at bedtime  benzonatate 200 milliGRAM(s) Oral three times a day  cefTRIAXone   IVPB 1000 milliGRAM(s) IV Intermittent every 24 hours  dextrose 40% Gel 15 Gram(s) Oral once  dextrose 5%. 1000 milliLiter(s) (50 mL/Hr) IV Continuous <Continuous>  dextrose 5%. 1000 milliLiter(s) (100 mL/Hr) IV Continuous <Continuous>  dextrose 50% Injectable 25 Gram(s) IV Push once  dextrose 50% Injectable 12.5 Gram(s) IV Push once  dextrose 50% Injectable 25 Gram(s) IV Push once  dextrose 50% Injectable 12.5 Gram(s) IV Push once  dextrose 50% Injectable 25 Gram(s) IV Push once  dextrose 50% Injectable 25 Gram(s) IV Push once  glucagon  Injectable 1 milliGRAM(s) IntraMuscular once  glucagon  Injectable 1 milliGRAM(s) IntraMuscular once  heparin   Injectable 7500 Unit(s) SubCutaneous every 8 hours  hydrocortisone 2.5% Rectal Cream 1 Application(s) Rectal two times a day  insulin glargine Injectable (LANTUS) 40 Unit(s) SubCutaneous at bedtime  insulin lispro (ADMELOG) corrective regimen sliding scale   SubCutaneous three times a day before meals  insulin lispro (ADMELOG) corrective regimen sliding scale   SubCutaneous at bedtime  insulin lispro Injectable (ADMELOG) 15 Unit(s) SubCutaneous three times a day before meals  metroNIDAZOLE    Tablet 500 milliGRAM(s) Oral every 8 hours  pantoprazole    Tablet 40 milliGRAM(s) Oral before breakfast  polyethylene glycol 3350 17 Gram(s) Oral two times a day  senna 2 Tablet(s) Oral at bedtime  sertraline 150 milliGRAM(s) Oral daily  tamsulosin 0.4 milliGRAM(s) Oral at bedtime  traZODone 150 milliGRAM(s) Oral at bedtime    MEDICATIONS  (PRN):  acetaminophen   Tablet .. 650 milliGRAM(s) Oral every 6 hours PRN Temp greater or equal to 38C (100.4F), Mild Pain (1 - 3)  aluminum hydroxide/magnesium hydroxide/simethicone Suspension 30 milliLiter(s) Oral every 4 hours PRN Dyspepsia  benzocaine 15 mG/menthol 3.6 mG (Sugar-Free) Lozenge 1 Lozenge Oral every 6 hours PRN Sore Throat  cyclobenzaprine 5 milliGRAM(s) Oral at bedtime PRN Muscle Spasm  guaiFENesin   Syrup  (Sugar-Free) 100 milliGRAM(s) Oral every 6 hours PRN Cough  ondansetron Injectable 4 milliGRAM(s) IV Push every 8 hours PRN Nausea and/or Vomiting  traMADol 50 milliGRAM(s) Oral every 8 hours PRN Moderate Pain (4 - 6)      CAPILLARY BLOOD GLUCOSE      POCT Blood Glucose.: 257 mg/dL (31 Mar 2021 11:23)  POCT Blood Glucose.: 218 mg/dL (31 Mar 2021 08:19)  POCT Blood Glucose.: 243 mg/dL (30 Mar 2021 20:52)  POCT Blood Glucose.: 229 mg/dL (30 Mar 2021 17:29)        PHYSICAL EXAM:  In moderate distress  left flank tenderness      LABS:                        11.5   9.48  )-----------( 271      ( 31 Mar 2021 05:00 )             33.3     03-31    139  |  105  |  7   ----------------------------<  200<H>  4.4   |  21<L>  |  0.92    Ca    9.3      31 Mar 2021 05:00  Phos  4.6     03-30  Mg     2.0     03-30                RADIOLOGY & ADDITIONAL TESTS:    Imaging Personally Reviewed:    Consultant(s) Notes Reviewed:      Care Discussed with Consultants/Other Providers: ID- Dr. Combs, Pulmonary, Dr. Velazquez, Radiology     Repeat CT shows worsening of mass / infiltrate.  Consider broadening antibiotics  Thoracic surgery eval for possible diagnostic or therapeutic procedure  Unlikely to be accessible by interventional radiology.

## 2021-04-02 NOTE — CHART NOTE - NSCHARTNOTEFT_GEN_A_CORE
Nutrition Follow Up Note  Hospital Course (Per Electronic Medical Record):   Source: Medical Record [X] Patient [X]  Nursing Staff [X]     Diet: Consistent Carbohydrate       Patient s/p bronchoscopy (4/1) s/p HIDA scan today , appetite is reported  fair , patient reports weight loss over past 2 months due to COVID , however reports he has regained weight . Patient with elevated A1c 15% , noted most recent was from (11/20) noted 5.7% .Patient reports he is total overwhelmed, resides in 1 room & has small refrigerator, "eats out" for all meals , Encouraged carbohydrate counting & portion control due to patient unable to cook meals where he resides . POCT noted       Current Weight: (4/2) 214.5/97.3kg     Pertinent Medications: MEDICATIONS  (STANDING):  aspirin  chewable 81 milliGRAM(s) Oral daily  atorvastatin 40 milliGRAM(s) Oral at bedtime  benzonatate 200 milliGRAM(s) Oral three times a day  cefTRIAXone   IVPB 1000 milliGRAM(s) IV Intermittent every 24 hours  dextrose 40% Gel 15 Gram(s) Oral once  dextrose 5%. 1000 milliLiter(s) (50 mL/Hr) IV Continuous <Continuous>  dextrose 5%. 1000 milliLiter(s) (100 mL/Hr) IV Continuous <Continuous>  dextrose 50% Injectable 25 Gram(s) IV Push once  dextrose 50% Injectable 12.5 Gram(s) IV Push once  dextrose 50% Injectable 25 Gram(s) IV Push once  dextrose 50% Injectable 12.5 Gram(s) IV Push once  glucagon  Injectable 1 milliGRAM(s) IntraMuscular once  heparin   Injectable 5000 Unit(s) SubCutaneous every 8 hours  insulin glargine Injectable (LANTUS) 40 Unit(s) SubCutaneous at bedtime  insulin lispro (ADMELOG) corrective regimen sliding scale   SubCutaneous at bedtime  insulin lispro (ADMELOG) corrective regimen sliding scale   SubCutaneous three times a day before meals  insulin lispro Injectable (ADMELOG) 15 Unit(s) SubCutaneous three times a day before meals  lidocaine   Patch 2 Patch Transdermal every 24 hours  metroNIDAZOLE    Tablet 500 milliGRAM(s) Oral every 8 hours  pantoprazole    Tablet 40 milliGRAM(s) Oral before breakfast  polyethylene glycol 3350 17 Gram(s) Oral two times a day  senna 2 Tablet(s) Oral at bedtime  sertraline 150 milliGRAM(s) Oral daily  tamsulosin 0.4 milliGRAM(s) Oral at bedtime  traZODone 150 milliGRAM(s) Oral at bedtime    MEDICATIONS  (PRN):  acetaminophen   Tablet .. 650 milliGRAM(s) Oral every 6 hours PRN Temp greater or equal to 38C (100.4F), Mild Pain (1 - 3)  aluminum hydroxide/magnesium hydroxide/simethicone Suspension 30 milliLiter(s) Oral every 4 hours PRN Dyspepsia  cyclobenzaprine 5 milliGRAM(s) Oral at bedtime PRN Muscle Spasm  FIRST- Mouthwash  BLM 15 milliLiter(s) Swish and Spit four times a day PRN pain  guaiFENesin   Syrup  (Sugar-Free) 100 milliGRAM(s) Oral every 6 hours PRN Cough  traMADol 50 milliGRAM(s) Oral every 8 hours PRN Moderate Pain (4 - 6)      Pertinent Labs:  04-02 Na139 mmol/L Glu 177 mg/dL<H> K+ 4.0 mmol/L Cr  0.80 mg/dL BUN 8 mg/dL 03-30 Phos 4.6 mg/dL<H> 04-02 Alb 2.3 g/dL<L>        Skin: intact     Edema: none     Last BM: on (3/31) with c/o constipation     Estimated Needs:   [X] No Change since Previous Assessment  [X] Recalculated:     Previous Nutrition Diagnosis: Acute Severe Malnutrition     Nutrition Diagnosis is [X] Ongoing       New Nutrition Diagnosis: [X] Not Applicable      Interventions:   1. Recommend continue current diet       Monitoring & Evaluation: will monitor:  [X] Weights   [X] PO Intake   [X] Follow Up (Per Protocol)  [X] Tolerance to Diet Prescription       RD to follow as per Nutrition protocol  Aubree Cortez RDN Nutrition Follow Up Note  Hospital Course (Per Electronic Medical Record):   Source: Medical Record [X] Patient [X]  Nursing Staff [X]     Diet: Consistent Carbohydrate       Patient s/p bronchoscopy (4/1) s/p HIDA scan today , appetite is reported  fair , patient reports weight loss over past 2 months due to COVID , however reports he has regained weight . Patient with elevated A1c 15% , noted most recent was from (11/20) noted 5.7% .Patient reports he is total overwhelmed, resides in 1 room & has small refrigerator, "eats out" for all meals , Encouraged carbohydrate counting & portion control due to patient unable to cook meals where he resides . POCT noted       Current Weight: (4/2) 214.5/97.3kg , increase in weight since admission ,[facility] reported ~220lbs.     Pertinent Medications: MEDICATIONS  (STANDING):  aspirin  chewable 81 milliGRAM(s) Oral daily  atorvastatin 40 milliGRAM(s) Oral at bedtime  benzonatate 200 milliGRAM(s) Oral three times a day  cefTRIAXone   IVPB 1000 milliGRAM(s) IV Intermittent every 24 hours  dextrose 40% Gel 15 Gram(s) Oral once  dextrose 5%. 1000 milliLiter(s) (50 mL/Hr) IV Continuous <Continuous>  dextrose 5%. 1000 milliLiter(s) (100 mL/Hr) IV Continuous <Continuous>  dextrose 50% Injectable 25 Gram(s) IV Push once  dextrose 50% Injectable 12.5 Gram(s) IV Push once  dextrose 50% Injectable 25 Gram(s) IV Push once  dextrose 50% Injectable 12.5 Gram(s) IV Push once  glucagon  Injectable 1 milliGRAM(s) IntraMuscular once  heparin   Injectable 5000 Unit(s) SubCutaneous every 8 hours  insulin glargine Injectable (LANTUS) 40 Unit(s) SubCutaneous at bedtime  insulin lispro (ADMELOG) corrective regimen sliding scale   SubCutaneous at bedtime  insulin lispro (ADMELOG) corrective regimen sliding scale   SubCutaneous three times a day before meals  insulin lispro Injectable (ADMELOG) 15 Unit(s) SubCutaneous three times a day before meals  lidocaine   Patch 2 Patch Transdermal every 24 hours  metroNIDAZOLE    Tablet 500 milliGRAM(s) Oral every 8 hours  pantoprazole    Tablet 40 milliGRAM(s) Oral before breakfast  polyethylene glycol 3350 17 Gram(s) Oral two times a day  senna 2 Tablet(s) Oral at bedtime  sertraline 150 milliGRAM(s) Oral daily  tamsulosin 0.4 milliGRAM(s) Oral at bedtime  traZODone 150 milliGRAM(s) Oral at bedtime    MEDICATIONS  (PRN):  acetaminophen   Tablet .. 650 milliGRAM(s) Oral every 6 hours PRN Temp greater or equal to 38C (100.4F), Mild Pain (1 - 3)  aluminum hydroxide/magnesium hydroxide/simethicone Suspension 30 milliLiter(s) Oral every 4 hours PRN Dyspepsia  cyclobenzaprine 5 milliGRAM(s) Oral at bedtime PRN Muscle Spasm  FIRST- Mouthwash  BLM 15 milliLiter(s) Swish and Spit four times a day PRN pain  guaiFENesin   Syrup  (Sugar-Free) 100 milliGRAM(s) Oral every 6 hours PRN Cough  traMADol 50 milliGRAM(s) Oral every 8 hours PRN Moderate Pain (4 - 6)      Pertinent Labs:  04-02 Na139 mmol/L Glu 177 mg/dL<H> K+ 4.0 mmol/L Cr  0.80 mg/dL BUN 8 mg/dL 03-30 Phos 4.6 mg/dL<H> 04-02 Alb 2.3 g/dL<L>  [id number],215,207      Skin: intact     Edema: none     Last BM: on (3/31) with c/o constipation, bowel meds noted     Estimated Needs:   [X] No Change since Previous Assessment    Previous Nutrition Diagnosis: Acute Severe Malnutrition     Nutrition Diagnosis is [X] Ongoing       New Nutrition Diagnosis: [X] Not Applicable      Interventions:   1. Recommend continue current diet       Monitoring & Evaluation: will monitor:  [X] Weights   [X] PO Intake   [X] Follow Up (Per Protocol)  [X] Tolerance to Diet Prescription       RD to follow as per Nutrition protocol  Aubree Cortez RDN

## 2021-04-02 NOTE — PROGRESS NOTE ADULT - SUBJECTIVE AND OBJECTIVE BOX
The patient continues with epigastric, RUQ, and back pain. The pain is unrelated to eating or drinking. The     PFSH: All noncontributory    MEDICATIONS REVIEWED:acetaminophen   Tablet .. 650 milliGRAM(s) Oral every 6 hours PRN  aluminum hydroxide/magnesium hydroxide/simethicone Suspension 30 milliLiter(s) Oral every 4 hours PRN  aspirin  chewable 81 milliGRAM(s) Oral daily  atorvastatin 40 milliGRAM(s) Oral at bedtime  benzonatate 200 milliGRAM(s) Oral three times a day  cefTRIAXone   IVPB 1000 milliGRAM(s) IV Intermittent every 24 hours  cyclobenzaprine 5 milliGRAM(s) Oral at bedtime PRN  dextrose 40% Gel 15 Gram(s) Oral once  dextrose 5%. 1000 milliLiter(s) IV Continuous <Continuous>  dextrose 5%. 1000 milliLiter(s) IV Continuous <Continuous>  dextrose 50% Injectable 25 Gram(s) IV Push once  dextrose 50% Injectable 12.5 Gram(s) IV Push once  dextrose 50% Injectable 25 Gram(s) IV Push once  dextrose 50% Injectable 12.5 Gram(s) IV Push once  FIRST- Mouthwash  BLM 15 milliLiter(s) Swish and Spit four times a day PRN  glucagon  Injectable 1 milliGRAM(s) IntraMuscular once  guaiFENesin   Syrup  (Sugar-Free) 100 milliGRAM(s) Oral every 6 hours PRN  heparin   Injectable 5000 Unit(s) SubCutaneous every 8 hours  insulin glargine Injectable (LANTUS) 40 Unit(s) SubCutaneous at bedtime  insulin lispro (ADMELOG) corrective regimen sliding scale   SubCutaneous at bedtime  insulin lispro (ADMELOG) corrective regimen sliding scale   SubCutaneous three times a day before meals  insulin lispro Injectable (ADMELOG) 15 Unit(s) SubCutaneous three times a day before meals  lidocaine   Patch 2 Patch Transdermal every 24 hours  metroNIDAZOLE    Tablet 500 milliGRAM(s) Oral every 8 hours  pantoprazole    Tablet 40 milliGRAM(s) Oral before breakfast  polyethylene glycol 3350 17 Gram(s) Oral two times a day  senna 2 Tablet(s) Oral at bedtime  sertraline 150 milliGRAM(s) Oral daily  tamsulosin 0.4 milliGRAM(s) Oral at bedtime  traMADol 50 milliGRAM(s) Oral every 8 hours PRN  traZODone 150 milliGRAM(s) Oral at bedtime      ALLERGIES:No Known Drug Allergies  shellfish (Unknown)      PHYSICAL EXAMINATION:  RESPIRATORY: Clear to auscultation bilaterally, respirations non-labored.  CARDIAC: RR, normal S1S2, no murmurs.  ABDOMEN: soft, BS present, no masses, no hernias, no peritoneal signs, no KLS, nontender.  VASCULAR: Equal and normal pulses.  MUSCULOSKELETAL: Full ROM, no deformities.      T(C): 36.9 (04-02-21 @ 16:15), Max: 37.3 (04-01-21 @ 21:29)  HR: 80 (04-02-21 @ 16:15) (68 - 80)  BP: 118/80 (04-02-21 @ 16:15) (97/60 - 118/80)  RR: 18 (04-02-21 @ 16:15) (18 - 18)  SpO2: 100% (04-02-21 @ 16:15) (97% - 100%)                          9.9    9.14  )-----------( 296      ( 02 Apr 2021 06:50 )             29.7       04-02    139  |  105  |  8   ----------------------------<  177<H>  4.0   |  23  |  0.80    Ca    8.9      02 Apr 2021 06:50    TPro  6.7  /  Alb  2.3<L>  /  TBili  0.3  /  DBili  x   /  AST  25  /  ALT  20  /  AlkPhos  70  04-02      HIDA scan today was normal.

## 2021-04-02 NOTE — PROGRESS NOTE ADULT - ASSESSMENT
Physical Examination:  GENERAL:               Alert, Oriented, No acute distress.    HEENT:                    Pupils equal, reactive to light.  Symmetric. No JVD, Moist MM  PULM:                      Bilateral air entry, No Rales, No Rhonchi, No Wheezing, right sided crackles  CVS:                        S1, S2,  No Murmur  ABD:                        Soft, nondistended, nontender, normoactive bowel sounds,   EXT:                         No edema, nontender, No Cyanosis or Clubbing   Vascular:                 Warm Extremities, Normal Capillary refill, Normal Distal Pulses  SKIN:                       Warm and well perfused, no rashes noted.   NEURO:                   Alert, oriented, interactive, nonfocal, follows commands  PSYC:                      Calm, + Insight.    Assessment:  1. Diabetic ketoacidosis   2. Acute kidney injury   3. Right Lower lobe opacity   4. Gallstones  5. Hypertension  6. Hyperlipidemia     Plan  - S/p Bronchoscopy 4/1  - F/u BAL cultures and Cytology, thus far no growth  - Continue treatment with IV antibiotics  - Repeat CT scan to evaluate for resolution  - ID consult appreciated   - Surgery follow up   - BP and glucose control   - Statins   - DVT prophylaxis  - Will follow

## 2021-04-02 NOTE — PROGRESS NOTE ADULT - ASSESSMENT
61 y/o M with PMHx of HTN, HLD, Pre-DM admitted for DKA after presenting to the ER with c/o weakness, N/V, abdominal pain and chest pain, found have glucose level of 674, AG 18, BUN/Cr 26/1.31, Acetone moderate. CT Chest with masslike structure to RLL and CT Abd/Pel with CBD dilation and air in biliary tree. ED interventions include 2L IVF Bolus, IVP insulin. Patient was admitted to ICU, now downgraded to medicine.       #Headache  -Continue tylenol PRN     #Lightheadedness  -Orthostatic vitals wnl   -Fall risk precautions     DISPO: Continue antibiotics, discharge planning     Case discussed with Dr. Zapata

## 2021-04-02 NOTE — PROGRESS NOTE ADULT - PROBLEM SELECTOR PLAN 2
#Right Lower Lobe masslike opacity - Likely abscess formation   #Pulmonary Nodules   -CT chest: 4.5 cm heterogeneous in attenuation masslike opacity within the right lower lobe abutting the right major fissure and right hilar region, (changed from 12/2020). Small nodules are identified adjacent to the mass measuring up to 5 mm   -Repeat CT chest 3/30/21: cavitary lesion involving the right lower lobe, development of an air-fluid level within this lesion and endobronchial spread into the right lower lobe  -Recent low dose CT chest for lung cancer screening done 12/2020 without evidence of opacity to RLL noted now  -History of cigarette smoking, now on nicotine patches/ gum, recent weight loss  -Sputum culture: Rare PMN, rare GNR, rare gram variable cocci, rare squamous epithelial cells    -Procalcitonin: 0.08, ESR: 102, CRP: 118   -Blood cultures - NGTD   -Urine legionella antigen - negative   -QuantiFeron negative   -Spoke with Cardiothoracic Surgery: Dr. Hassan, who does not cover [facility], Dr. Maddox who does is out this week, Dr. Hassan stated that if abscess formation was < 6cm and patient stable otherwise (afebrile/ normal WBC) may continue with prolonged antibiotic course, if drainage necessary will need to be transferred   -ID consult appreciated: Continue Ceftriaxone, add metronidazole, d/c home on ceftin/flagyl or Augmentin   -Continue to monitor closely   -Continue Ceftriaxone 1g q24hrs - day #8  -Continue Metronidazole 500mg po q12hrs day #4  -S/p Azithromycin - 4 days  -S/p bronchoscopy, BAL NGTD

## 2021-04-02 NOTE — PROGRESS NOTE ADULT - SUBJECTIVE AND OBJECTIVE BOX
HPI: 61 yo M with PMHx of HTN, HLD, Pre-DM presented for eval of weakness, N/V, abd pain, chest pain. Patient reports several days of symptoms. He states he does not take any medications for DM. Patient denies trauma or inciting event. Patient also reports constipation with blood on toilet paper after wiping. ED workup significant for Glucose 674, AG 18, BUN/Cr 26/1.31, Acetone moderate. CT Chest with mass like structure to RLL and CT Abd/Pel with CBD dilation and air in biliary tree. ED interventions include 2L IVF Bolus, IVP insulin,  (26 Mar 2021 16:30)    Subjective: Patient seen and examined at bedside, s/p bronchoscopy with BAL yesterday 2021, c/o toothache since procedure, had HIDA scan done today with normal findings. Blood sugars better controlled at this time. Continues with R sided back pain, was given oxycodone overnight.       REVIEW OF SYSTEMS:    CONSTITUTIONAL: No weakness, fevers or chills  EYES/ENT: No visual changes;  No vertigo or throat pain, +toothache   NECK: No pain or stiffness  RESPIRATORY: +cough, no wheezing, hemoptysis; No shortness of breath  CARDIOVASCULAR: No chest pain or palpitations  GASTROINTESTINAL: + epigastric pain. No nausea, vomiting, or hematemesis; No diarrhea or constipation. No melena or hematochezia.  GENITOURINARY: No dysuria, frequency or hematuria  NEUROLOGICAL: No numbness or weakness  SKIN: No itching, rashes  MSK: back pain+    Vital Signs Last 24 Hrs  T(C): 36.4 (2021 08:00), Max: 37.3 (2021 16:02)  T(F): 97.5 (2021 08:00), Max: 99.2 (2021 16:02)  HR: 76 (2021 08:00) (68 - 80)  BP: 115/72 (2021 08:00) (97/60 - 120/75)  RR: 18 (2021 08:00) (17 - 18)  SpO2: 97% (2021 08:00) (97% - 99%)    PHYSICAL EXAM:  GENERAL: NAD, lying in bed comfortably  HEAD:  Atraumatic, Normocephalic  EYES: conjunctiva and sclera clear  ENT: Moist mucous membranes  NECK: Supple, No JVD  CHEST/LUNG: Clear to auscultation bilaterally; No rales, rhonchi, wheezing, or rubs. Unlabored respirations  HEART: Regular rate and rhythm; No murmurs, rubs, or gallops  ABDOMEN: Bowel sounds present; Soft, tenderness to epigastric region, no guarding/ rebound, negative Morales's sign   EXTREMITIES:  2+ Peripheral Pulses, brisk capillary refill. No clubbing, cyanosis, or edema  NERVOUS SYSTEM:  Alert & Oriented X3, speech clear. No deficits   MSK: FROM all 4 extremities, full and equal strength, R posterolateral aspect of back, tender to palpation    MEDICATIONS  (STANDING):  aspirin  chewable 81 milliGRAM(s) Oral daily  atorvastatin 40 milliGRAM(s) Oral at bedtime  benzonatate 200 milliGRAM(s) Oral three times a day  cefTRIAXone   IVPB 1000 milliGRAM(s) IV Intermittent every 24 hours  dextrose 40% Gel 15 Gram(s) Oral once  dextrose 5%. 1000 milliLiter(s) (50 mL/Hr) IV Continuous <Continuous>  dextrose 5%. 1000 milliLiter(s) (100 mL/Hr) IV Continuous <Continuous>  dextrose 50% Injectable 25 Gram(s) IV Push once  dextrose 50% Injectable 12.5 Gram(s) IV Push once  dextrose 50% Injectable 25 Gram(s) IV Push once  dextrose 50% Injectable 12.5 Gram(s) IV Push once  glucagon  Injectable 1 milliGRAM(s) IntraMuscular once  heparin   Injectable 5000 Unit(s) SubCutaneous every 8 hours  insulin glargine Injectable (LANTUS) 40 Unit(s) SubCutaneous at bedtime  insulin lispro (ADMELOG) corrective regimen sliding scale   SubCutaneous at bedtime  insulin lispro (ADMELOG) corrective regimen sliding scale   SubCutaneous three times a day before meals  insulin lispro Injectable (ADMELOG) 15 Unit(s) SubCutaneous three times a day before meals  lidocaine   Patch 2 Patch Transdermal every 24 hours  metroNIDAZOLE    Tablet 500 milliGRAM(s) Oral every 8 hours  pantoprazole    Tablet 40 milliGRAM(s) Oral before breakfast  polyethylene glycol 3350 17 Gram(s) Oral two times a day  senna 2 Tablet(s) Oral at bedtime  sertraline 150 milliGRAM(s) Oral daily  tamsulosin 0.4 milliGRAM(s) Oral at bedtime  traZODone 150 milliGRAM(s) Oral at bedtime    MEDICATIONS  (PRN):  acetaminophen   Tablet .. 650 milliGRAM(s) Oral every 6 hours PRN Temp greater or equal to 38C (100.4F), Mild Pain (1 - 3)  aluminum hydroxide/magnesium hydroxide/simethicone Suspension 30 milliLiter(s) Oral every 4 hours PRN Dyspepsia  cyclobenzaprine 5 milliGRAM(s) Oral at bedtime PRN Muscle Spasm  FIRST- Mouthwash  BLM 15 milliLiter(s) Swish and Spit four times a day PRN pain  guaiFENesin   Syrup  (Sugar-Free) 100 milliGRAM(s) Oral every 6 hours PRN Cough  traMADol 50 milliGRAM(s) Oral every 8 hours PRN Moderate Pain (4 - 6)        LABS:                        9.9    9.14  )-----------( 296      ( 2021 06:50 )             29.7   04-    139  |  105  |  8   ----------------------------<  177<H>  4.0   |  23  |  0.80    Ca    8.9      2021 06:50    TPro  6.7  /  Alb  2.3<L>  /  TBili  0.3  /  DBili  x   /  AST  25  /  ALT  20  /  AlkPhos  70  04-02      CAPILLARY BLOOD GLUCOSE      POCT Blood Glucose.: 164 mg/dL (2021 11:59)  POCT Blood Glucose.: 183 mg/dL (2021 08:04)  POCT Blood Glucose.: 187 mg/dL (2021 21:27)  POCT Blood Glucose.: 309 mg/dL (2021 16:46)        CARDIAC MARKERS ( 26 Mar 2021 13:30 )  <.017 ng/mL / x     / x     / x     / x          Urinalysis Basic - ( 26 Mar 2021 13:30 )    Color: Yellow / Appearance: Clear / S.010 / pH: x  Gluc: x / Ketone: Large  / Bili: Negative / Urobili: Negative   Blood: x / Protein: Negative / Nitrite: Negative   Leuk Esterase: Negative / RBC: x / WBC x   Sq Epi: x / Non Sq Epi: x / Bacteria: x         RADIOLOGY:  < from: CT Chest w/ IV Cont (21 @ 14:44) >  FINDINGS:  CHEST:  LUNGS AND LARGE AIRWAYS: Patent central airways. There is a 4.5 cm heterogeneous in attenuation masslike opacity within the right lower lobe abutting the right major fissure and right hilar region, representing interval change from prior examination dated 12/3/2020. Small nodules are identified adjacent to the mass measuring up to 5 mm.  PLEURA: No evidence for pleural effusion. No pneumothorax.  VESSELS: Stable variant anatomy identified with a venous conduit extending from the left thorax into the left brachiocephalic vein. Thoracic aortic caliber appears unremarkable.  HEART: Heart size is normal. No pericardial effusion. Coronary arterial calcification.  MEDIASTINUM AND LIZZETTE: No mediastinal lymphadenopathy. No left hilar lymphadenopathy.  CHEST WALL AND LOWER NECK: Within normal limits.    ABDOMEN AND PELVIS:  LIVER: Normal in size. No focal hepatic masses.  BILE DUCTS: No evidence for intrahepatic biliary ductal dilatation. Scattered foci of air identified within the intrahepatic biliary tree. There is increased caliber of the extrahepatic CBD measuring 9-10 mm.  GALLBLADDER: A gallstone is noted in the gallbladder lumen measuring 1.1 cm. Air is noted within the gallbladder lumen. Contracted state of the gallbladder limits assessment for wall thickening. Emphysematous cholecystitis cannot be excluded. Correlate with clinical as well as procedure history recommended.  SPLEEN: Within normal limits. Stable 2.4 x 1.6 cm soft tissue nodularity medial to the spleen, likely accessory splenic tissue.  PANCREAS: Within normal limits.  ADRENALS: Within normal limits.  KIDNEYS/URETERS: Within normal limits.    BLADDER: Within normal limits.  REPRODUCTIVE ORGANS: The prostate appears unremarkable.    BOWEL: Fecal material scattered throughout the colon. No evidence for mechanical bowel obstruction. Colonic diverticulosis. No colonic wall thickening. The appendix appears unremarkable.  PERITONEUM: No free intraperitoneal air or fluid.  VESSELS: Within normal limits.  RETROPERITONEUM/LYMPH NODES: No lymphadenopathy.  ABDOMINAL WALL: Within normal limits.  BONES: Degenerative changes.    IMPRESSION:  1. There is a 4.5 cm heterogeneous in attenuation masslike opacity within the right lower lobe abutting the right major fissure and right hilar region, representing interval change from prior examination dated 12/3/2020. Small nodules are identified adjacent to the mass measuring up to 5 mm. Differential considerations would include both neoplastic and infectious etiologies.  2. Scattered foci of air identified within the intrahepatic biliary tree. There is increased caliber of the extrahepatic CBD measuring 9-10 mm.  A gallstone is noted in the gallbladder lumen measuring 1.1 cm. Air is noted within the gallbladder lumen. Contracted state of the gallbladder limits assessment for wall thickening. Emphysematous cholecystitis cannot be excluded. Correlate with clinical as well as procedure history recommended.        < from: US Abdomen Upper Quadrant Right (21 @ 09:26) >  COMPARISON: CT dated 3/26/2021    TECHNIQUE: Sonography of the right upper quadrant.    FINDINGS:    Liver: Enlarged (20.2 cm). Increased echogenicity.  Bile ducts: Normal caliber. Common bile duct measures 6.7 mm.  Gallbladder: Cholelithiasis.  No focal tenderness or evidence of cholecystitis.  Pancreas: Visualized portions are within normal limits.  Right kidney: 13.3 cm. No hydronephrosis.  Ascites: None.  IVC: Visualized portions are within normal limits.    IMPRESSION:    Hepatomegaly.  Cholelithiasis.      < from: CT Chest No Cont (21 @ 12:46) >  FINDINGS:    LUNGS AND AIRWAYS: Patent central airways.  Right lower lobe lesion is slightly larger measuring approximately 5 cm. There has been interval development of an air-fluid level within this lesion. Multiple tree-in-bud opacities have developed in the right lower lobe indicating bronchial spread of disease process, possibly infection. Subsegmental atelectasis has developed in the dependent right lung base. Nonspecific focal groundglass opacity in right lung apex .  PLEURA: Trace left pleural effusion.  MEDIASTINUM AND LIZZETTE: No lymphadenopathy.  VESSELS: Coronary artery calcification.  HEART: Heart size is normal. No significant pericardial effusion.  CHEST WALL AND LOWER NECK: Within normal limits.  VISUALIZED UPPER ABDOMEN: Interval resolution of pneumobilia and improved extrahepatic biliary dilatation. Cholelithiasis and a contracted gallbladder are again evident.  BONES: Within normal limits.    IMPRESSION:  Evidence of a cavitary lesion involving the right lower lobe, development of an air-fluid level within this lesion and endobronchial spread into the right lower lobe in comparison to the prior study.    Interval resolution of pneumobilia and improvement in extrahepatic biliary dilatation. Redemonstration of cholelithiasis and a contracted gallbladder.    < from: NM Hepatobiliary Imaging (21 @ 10:26) >    CLINICAL INFORMATION: 60 year old male with abdominal pain and cholelithiasis, referred to evaluate foracute cholecystitis    TECHNIQUE: Dynamic images of the anterior abdomen were obtained for approximately 1 hour immediately following radiotracer injection. Static images of the abdomen in the anterior, right anterior oblique and right lateral projections were also obtained.    COMPARISON: No previous hepatobiliary scan for comparison    FINDINGS: There is prompt uptake of the injected radiotracer by the hepatocytes. The gallbladder is first visualized at 30 minutes post tracer injection and bowel activity by 45 minutes. There is normal tracer clearance from the liver by the end of the study.    IMPRESSION: Normal hepatobiliary scan.    No scan evidence of acute cholecystitis.

## 2021-04-03 ENCOUNTER — TRANSCRIPTION ENCOUNTER (OUTPATIENT)
Age: 61
End: 2021-04-03

## 2021-04-03 LAB
ALBUMIN SERPL ELPH-MCNC: SIGNIFICANT CHANGE UP G/DL (ref 3.3–5)
ALP SERPL-CCNC: 83 U/L — SIGNIFICANT CHANGE UP (ref 40–120)
ALT FLD-CCNC: SIGNIFICANT CHANGE UP U/L (ref 10–45)
ANION GAP SERPL CALC-SCNC: 12 MMOL/L — SIGNIFICANT CHANGE UP (ref 5–17)
AST SERPL-CCNC: 49 U/L — HIGH (ref 10–40)
BASOPHILS # BLD AUTO: 0.13 K/UL — SIGNIFICANT CHANGE UP (ref 0–0.2)
BASOPHILS NFR BLD AUTO: 1.1 % — SIGNIFICANT CHANGE UP (ref 0–2)
BILIRUB SERPL-MCNC: 0.5 MG/DL — SIGNIFICANT CHANGE UP (ref 0.2–1.2)
BUN SERPL-MCNC: SIGNIFICANT CHANGE UP MG/DL (ref 7–23)
CALCIUM SERPL-MCNC: SIGNIFICANT CHANGE UP MG/DL (ref 8.4–10.5)
CHLORIDE SERPL-SCNC: 106 MMOL/L — SIGNIFICANT CHANGE UP (ref 96–108)
CO2 SERPL-SCNC: 15 MMOL/L — LOW (ref 22–31)
CREAT SERPL-MCNC: SIGNIFICANT CHANGE UP MG/DL (ref 0.5–1.3)
EOSINOPHIL # BLD AUTO: 0.29 K/UL — SIGNIFICANT CHANGE UP (ref 0–0.5)
EOSINOPHIL NFR BLD AUTO: 2.4 % — SIGNIFICANT CHANGE UP (ref 0–6)
GLUCOSE BLDC GLUCOMTR-MCNC: 102 MG/DL — HIGH (ref 70–99)
GLUCOSE BLDC GLUCOMTR-MCNC: 117 MG/DL — HIGH (ref 70–99)
GLUCOSE BLDC GLUCOMTR-MCNC: 136 MG/DL — HIGH (ref 70–99)
GLUCOSE BLDC GLUCOMTR-MCNC: 157 MG/DL — HIGH (ref 70–99)
GLUCOSE SERPL-MCNC: SIGNIFICANT CHANGE UP MG/DL (ref 70–99)
HCT VFR BLD CALC: 34.4 % — LOW (ref 39–50)
HGB BLD-MCNC: 11.3 G/DL — LOW (ref 13–17)
IMM GRANULOCYTES NFR BLD AUTO: 2.4 % — HIGH (ref 0–1.5)
INSULIN HUMAN IGE QN: <0.4 U/ML — SIGNIFICANT CHANGE UP
LYMPHOCYTES # BLD AUTO: 1.85 K/UL — SIGNIFICANT CHANGE UP (ref 1–3.3)
LYMPHOCYTES # BLD AUTO: 15.2 % — SIGNIFICANT CHANGE UP (ref 13–44)
MCHC RBC-ENTMCNC: 32.4 PG — SIGNIFICANT CHANGE UP (ref 27–34)
MCHC RBC-ENTMCNC: 32.8 GM/DL — SIGNIFICANT CHANGE UP (ref 32–36)
MCV RBC AUTO: 98.6 FL — SIGNIFICANT CHANGE UP (ref 80–100)
MONOCYTES # BLD AUTO: 1.09 K/UL — HIGH (ref 0–0.9)
MONOCYTES NFR BLD AUTO: 9 % — SIGNIFICANT CHANGE UP (ref 2–14)
NEUTROPHILS # BLD AUTO: 8.49 K/UL — HIGH (ref 1.8–7.4)
NEUTROPHILS NFR BLD AUTO: 69.9 % — SIGNIFICANT CHANGE UP (ref 43–77)
NRBC # BLD: 0 /100 WBCS — SIGNIFICANT CHANGE UP (ref 0–0)
PLATELET # BLD AUTO: 424 K/UL — HIGH (ref 150–400)
POTASSIUM SERPL-MCNC: 5 MMOL/L — SIGNIFICANT CHANGE UP (ref 3.5–5.3)
POTASSIUM SERPL-SCNC: 5 MMOL/L — SIGNIFICANT CHANGE UP (ref 3.5–5.3)
PROT SERPL-MCNC: 7.1 G/DL — SIGNIFICANT CHANGE UP (ref 6–8.3)
RBC # BLD: 3.49 M/UL — LOW (ref 4.2–5.8)
RBC # FLD: 12.6 % — SIGNIFICANT CHANGE UP (ref 10.3–14.5)
SODIUM SERPL-SCNC: 133 MMOL/L — LOW (ref 135–145)
WBC # BLD: 12.14 K/UL — HIGH (ref 3.8–10.5)
WBC # FLD AUTO: 12.14 K/UL — HIGH (ref 3.8–10.5)

## 2021-04-03 PROCEDURE — 99232 SBSQ HOSP IP/OBS MODERATE 35: CPT | Mod: GC

## 2021-04-03 PROCEDURE — 99231 SBSQ HOSP IP/OBS SF/LOW 25: CPT

## 2021-04-03 RX ORDER — CYCLOBENZAPRINE HYDROCHLORIDE 10 MG/1
5 TABLET, FILM COATED ORAL
Refills: 0 | Status: DISCONTINUED | OUTPATIENT
Start: 2021-04-03 | End: 2021-04-04

## 2021-04-03 RX ORDER — INSULIN LISPRO 100/ML
10 VIAL (ML) SUBCUTANEOUS
Refills: 0 | Status: DISCONTINUED | OUTPATIENT
Start: 2021-04-03 | End: 2021-04-04

## 2021-04-03 RX ORDER — HYDROMORPHONE HYDROCHLORIDE 2 MG/ML
0.5 INJECTION INTRAMUSCULAR; INTRAVENOUS; SUBCUTANEOUS ONCE
Refills: 0 | Status: DISCONTINUED | OUTPATIENT
Start: 2021-04-03 | End: 2021-04-03

## 2021-04-03 RX ADMIN — INSULIN GLARGINE 40 UNIT(S): 100 INJECTION, SOLUTION SUBCUTANEOUS at 22:26

## 2021-04-03 RX ADMIN — CYCLOBENZAPRINE HYDROCHLORIDE 5 MILLIGRAM(S): 10 TABLET, FILM COATED ORAL at 08:43

## 2021-04-03 RX ADMIN — POLYETHYLENE GLYCOL 3350 17 GRAM(S): 17 POWDER, FOR SOLUTION ORAL at 16:58

## 2021-04-03 RX ADMIN — Medication 81 MILLIGRAM(S): at 12:01

## 2021-04-03 RX ADMIN — Medication 100 MILLIGRAM(S): at 05:36

## 2021-04-03 RX ADMIN — POLYETHYLENE GLYCOL 3350 17 GRAM(S): 17 POWDER, FOR SOLUTION ORAL at 05:36

## 2021-04-03 RX ADMIN — HYDROMORPHONE HYDROCHLORIDE 0.5 MILLIGRAM(S): 2 INJECTION INTRAMUSCULAR; INTRAVENOUS; SUBCUTANEOUS at 23:50

## 2021-04-03 RX ADMIN — TRAMADOL HYDROCHLORIDE 50 MILLIGRAM(S): 50 TABLET ORAL at 05:34

## 2021-04-03 RX ADMIN — Medication 100 MILLIGRAM(S): at 12:01

## 2021-04-03 RX ADMIN — Medication 600 MILLIGRAM(S): at 16:58

## 2021-04-03 RX ADMIN — Medication 2: at 16:57

## 2021-04-03 RX ADMIN — Medication 15 UNIT(S): at 16:57

## 2021-04-03 RX ADMIN — Medication 500 MILLIGRAM(S): at 15:07

## 2021-04-03 RX ADMIN — Medication 200 MILLIGRAM(S): at 12:01

## 2021-04-03 RX ADMIN — TAMSULOSIN HYDROCHLORIDE 0.4 MILLIGRAM(S): 0.4 CAPSULE ORAL at 22:25

## 2021-04-03 RX ADMIN — Medication 15 UNIT(S): at 12:08

## 2021-04-03 RX ADMIN — Medication 650 MILLIGRAM(S): at 05:35

## 2021-04-03 RX ADMIN — HEPARIN SODIUM 5000 UNIT(S): 5000 INJECTION INTRAVENOUS; SUBCUTANEOUS at 15:06

## 2021-04-03 RX ADMIN — LIDOCAINE 2 PATCH: 4 CREAM TOPICAL at 12:05

## 2021-04-03 RX ADMIN — Medication 200 MILLIGRAM(S): at 05:36

## 2021-04-03 RX ADMIN — SENNA PLUS 2 TABLET(S): 8.6 TABLET ORAL at 22:25

## 2021-04-03 RX ADMIN — PANTOPRAZOLE SODIUM 40 MILLIGRAM(S): 20 TABLET, DELAYED RELEASE ORAL at 05:36

## 2021-04-03 RX ADMIN — DIPHENHYDRAMINE HYDROCHLORIDE AND LIDOCAINE HYDROCHLORIDE AND ALUMINUM HYDROXIDE AND MAGNESIUM HYDRO 15 MILLILITER(S): KIT at 05:39

## 2021-04-03 RX ADMIN — LIDOCAINE 2 PATCH: 4 CREAM TOPICAL at 22:03

## 2021-04-03 RX ADMIN — Medication 650 MILLIGRAM(S): at 06:35

## 2021-04-03 RX ADMIN — Medication 650 MILLIGRAM(S): at 12:01

## 2021-04-03 RX ADMIN — SERTRALINE 150 MILLIGRAM(S): 25 TABLET, FILM COATED ORAL at 12:01

## 2021-04-03 RX ADMIN — Medication 500 MILLIGRAM(S): at 22:25

## 2021-04-03 RX ADMIN — LIDOCAINE 2 PATCH: 4 CREAM TOPICAL at 02:44

## 2021-04-03 RX ADMIN — Medication 200 MILLIGRAM(S): at 22:25

## 2021-04-03 RX ADMIN — HEPARIN SODIUM 5000 UNIT(S): 5000 INJECTION INTRAVENOUS; SUBCUTANEOUS at 05:36

## 2021-04-03 RX ADMIN — ATORVASTATIN CALCIUM 40 MILLIGRAM(S): 80 TABLET, FILM COATED ORAL at 22:25

## 2021-04-03 RX ADMIN — LIDOCAINE 2 PATCH: 4 CREAM TOPICAL at 23:54

## 2021-04-03 RX ADMIN — Medication 15 UNIT(S): at 08:27

## 2021-04-03 RX ADMIN — CEFTRIAXONE 100 MILLIGRAM(S): 500 INJECTION, POWDER, FOR SOLUTION INTRAMUSCULAR; INTRAVENOUS at 15:06

## 2021-04-03 RX ADMIN — Medication 500 MILLIGRAM(S): at 05:39

## 2021-04-03 RX ADMIN — TRAMADOL HYDROCHLORIDE 50 MILLIGRAM(S): 50 TABLET ORAL at 15:07

## 2021-04-03 RX ADMIN — HEPARIN SODIUM 5000 UNIT(S): 5000 INJECTION INTRAVENOUS; SUBCUTANEOUS at 22:26

## 2021-04-03 NOTE — PROGRESS NOTE ADULT - ASSESSMENT
IMPRESSION: Abdominal pain - improved - etiology unclear    PLAN: No indication of any surgical problems

## 2021-04-03 NOTE — PROGRESS NOTE ADULT - ASSESSMENT
Physical Examination:  GENERAL:               Alert, Oriented, No acute distress.    HEENT:                    Pupils equal, reactive to light.  Symmetric. No JVD, Moist MM  PULM:                      Bilateral air entry, No Rales, No Rhonchi, No Wheezing  CVS:                        S1, S2,  No Murmur  ABD:                        Soft, nondistended, nontender, normoactive bowel sounds,   EXT:                         No edema, nontender, No Cyanosis or Clubbing   Vascular:                 Warm Extremities, Normal Capillary refill, Normal Distal Pulses  SKIN:                       Warm and well perfused, no rashes noted.   NEURO:                   Alert, oriented, interactive, nonfocal, follows commands  PSYC:                      Calm, + Insight.    Assessment:  1. Lung Abscess due to Streptococcus parasanguinis  2. Diabetic ketoacidosis in patient with new DM2 Resolved  3. Acute kidney injury   4. Gallstones with normal hida  5. Hypertension  6. Hyperlipidemia   7. active smoker.     Plan  patient complaining of headache, suspect from cough will add mucinex     - S/p Bronchoscopy , + Cultures on ceftriaxone suspect will need prolonged abx 4-6 weeks     will need discussion with ID regarding duration  - Repeat CT scan in 4-6 weeks with abx.   - F/u BAL cultures and Cytology    - CT scan showing heterogenous mass, ? fluid filled cavity. Seems to be new compared to 3 months ago. Suspicious for an abscess given rapid growth less malignancy.  - Will continue treatment with IV antibiotics    - BP and glucose control as per primary team  - DVT prophylaxis  - Will follow

## 2021-04-03 NOTE — PROGRESS NOTE ADULT - PROBLEM SELECTOR PLAN 7
-Continue Lipitor 40mg po qhs

## 2021-04-03 NOTE — PROGRESS NOTE ADULT - PROBLEM SELECTOR PLAN 6
-DASH/TLC diet   -BP range has been < 120/80  -monitor BP & restart/ titrate BP meds PRN

## 2021-04-03 NOTE — PROGRESS NOTE ADULT - PROBLEM SELECTOR PLAN 9
-Continue heparin 7500u subQ q8hrs  -Patient has been OOB
-Continue heparin 7500u subQ q8hrs  -Patient has been OOB
-Continue heparin 5000u subQ q8hrs  -Patient has been OOB
-Continue heparin 7500u subQ q8hrs  -Patient has been OOB

## 2021-04-03 NOTE — PROGRESS NOTE ADULT - PROBLEM SELECTOR PLAN 5
-Continue Miralax q12hrs, Senna qhs  -Last documented BM was 3/28  -Hydrocortisone rectal cream q12hrs
-Continue Miralax q12hrs, Senna qhs  -Hydrocortisone rectal cream q12hrs
-Continue Miralax q12hrs, Senna qhs  -Last documented BM was 3/28  -Hydrocortisone rectal cream q12hrs
-Continue Miralax q12hrs, Senna qhs  -Hydrocortisone rectal cream q12hrs
-Continue Miralax q12hrs, Senna qhs  -Hydrocortisone rectal cream q12hrs
-Continue Miralax q12hrs, Senna qhs  -Last documented BM was 3/28  -Hydrocortisone rectal cream q12hrs

## 2021-04-03 NOTE — DISCHARGE NOTE PROVIDER - NSDCFUSCHEDAPPT_GEN_ALL_CORE_FT
LEIF GOODWIN ; 04/15/2021 ; [facility] LEIF GOODWIN ; 04/04/2021 ; [facility]  LEIF GOODWIN ; 04/15/2021 ; [facility] LEIF GOODWIN ; 04/15/2021 ; [facility]

## 2021-04-03 NOTE — DISCHARGE NOTE PROVIDER - NSDCCPCAREPLAN_GEN_ALL_CORE_FT
PRINCIPAL DISCHARGE DIAGNOSIS  Diagnosis: DKA (diabetic ketoacidoses)  Assessment and Plan of Treatment: You were diagnosed with diabetic ketoacidosis, after being found to have elevated blood sugar levels, initially being admitted to the ICU on an insulin infusion. Adequate blood glucose control was achieved, you will continue with insulin at home, Lantus ...... at bedtime and .....  Monitor blood glucose levels before meals and at bedtime and keep logs. Follow up with your physician on ....      SECONDARY DISCHARGE DIAGNOSES  Diagnosis: Abscess of lung  Assessment and Plan of Treatment: On admisison you were found to have a lesion to the right lower lobe of lung, with repeat CT scan revealing an abscess. You completed ...days of ceftriaxone and ...days of metronidazole. You are to continue with Augmentin .... for ...weeks.    Diagnosis: Cholelithiasis  Assessment and Plan of Treatment:      PRINCIPAL DISCHARGE DIAGNOSIS  Diagnosis: DKA (diabetic ketoacidoses)  Assessment and Plan of Treatment: You were diagnosed with diabetic ketoacidosis, after being found to have elevated blood sugar levels, initially being admitted to the ICU on an insulin infusion. Adequate blood glucose control was achieved, you will continue with insulin at home, Lantus 40 at bedtime and 10U premeal TID  Monitor blood glucose levels before meals and at bedtime and keep logs. Follow up with your physician on ....      SECONDARY DISCHARGE DIAGNOSES  Diagnosis: Abscess of lung  Assessment and Plan of Treatment: On admisison you were found to have a lesion to the right lower lobe of lung, with repeat CT scan revealing an abscess. Now complicated by an active bleed for which you are being tranferred for possible lobectomy    Diagnosis: Cholelithiasis  Assessment and Plan of Treatment: No active Choleycystitis per HIDA scan, contnue to montior without surgical intervention for now

## 2021-04-03 NOTE — PROGRESS NOTE ADULT - PROBLEM SELECTOR PLAN 3
-Predominantly epigastric pain, likely gastritis  -CT:  foci of air identified within the intrahepatic biliary tree. There is increased caliber of the extrahepatic CBD measuring 9-10 mm.  A gallstone is noted in the gallbladder lumen measuring 1.1 cm. Air is noted within the gallbladder lumen  -US RUQ: hepatomegaly, cholelithiasis   -Continue PPI  -General Surgery consult appreciated  -HIDA scan normal findings
-Predominantly epigastric pain, likely gastritis  -CT:  foci of air identified within the intrahepatic biliary tree. There is increased caliber of the extrahepatic CBD measuring 9-10 mm.  A gallstone is noted in the gallbladder lumen measuring 1.1 cm. Air is noted within the gallbladder lumen  -US RUQ: hepatomegaly, cholelithiasis   -Continue PPI  -General Surgery consult appreciated  -HIDA scan normal findings
-Predominantly epigastric pain, likely gastritis  -CT:  foci of air identified within the intrahepatic biliary tree. There is increased caliber of the extrahepatic CBD measuring 9-10 mm.  A gallstone is noted in the gallbladder lumen measuring 1.1 cm. Air is noted within the gallbladder lumen  -US RUQ: hepatomegaly, cholelithiasis   -Will start PPI  -Famotidine d/c
-Predominantly epigastric pain, likely gastritis  -CT:  foci of air identified within the intrahepatic biliary tree. There is increased caliber of the extrahepatic CBD measuring 9-10 mm.  A gallstone is noted in the gallbladder lumen measuring 1.1 cm. Air is noted within the gallbladder lumen  -US RUQ: hepatomegaly, cholelithiasis   -Continue PPI  -General Surgery consult appreciated: recommended HIDA scan
-Predominantly epigastric pain, likely gastritis  -CT:  foci of air identified within the intrahepatic biliary tree. There is increased caliber of the extrahepatic CBD measuring 9-10 mm.  A gallstone is noted in the gallbladder lumen measuring 1.1 cm. Air is noted within the gallbladder lumen  -US RUQ: hepatomegaly, cholelithiasis   -Continue PPI
-Predominantly epigastric pain, likely gastritis  -CT:  foci of air identified within the intrahepatic biliary tree. There is increased caliber of the extrahepatic CBD measuring 9-10 mm.  A gallstone is noted in the gallbladder lumen measuring 1.1 cm. Air is noted within the gallbladder lumen  -US RUQ: hepatomegaly, cholelithiasis   -Continue PPI

## 2021-04-03 NOTE — PROGRESS NOTE ADULT - SUBJECTIVE AND OBJECTIVE BOX
The patient stated that his main problem is a headache. He still has mild, constant epigastric discomfort in the epigastrium unrelated to eating or drinking. He pas been passing stool and flatus.    PFSH: All noncontributory    MEDICATIONS REVIEWED:acetaminophen   Tablet .. 650 milliGRAM(s) Oral every 6 hours PRN  aluminum hydroxide/magnesium hydroxide/simethicone Suspension 30 milliLiter(s) Oral every 4 hours PRN  aspirin  chewable 81 milliGRAM(s) Oral daily  atorvastatin 40 milliGRAM(s) Oral at bedtime  benzonatate 200 milliGRAM(s) Oral three times a day  cefTRIAXone   IVPB 1000 milliGRAM(s) IV Intermittent every 24 hours  cyclobenzaprine 5 milliGRAM(s) Oral two times a day PRN  dextrose 40% Gel 15 Gram(s) Oral once  dextrose 5%. 1000 milliLiter(s) IV Continuous <Continuous>  dextrose 5%. 1000 milliLiter(s) IV Continuous <Continuous>  dextrose 50% Injectable 25 Gram(s) IV Push once  dextrose 50% Injectable 12.5 Gram(s) IV Push once  dextrose 50% Injectable 25 Gram(s) IV Push once  dextrose 50% Injectable 12.5 Gram(s) IV Push once  FIRST- Mouthwash  BLM 15 milliLiter(s) Swish and Spit four times a day PRN  glucagon  Injectable 1 milliGRAM(s) IntraMuscular once  guaiFENesin   Syrup  (Sugar-Free) 100 milliGRAM(s) Oral every 6 hours PRN  guaiFENesin  milliGRAM(s) Oral every 12 hours  heparin   Injectable 5000 Unit(s) SubCutaneous every 8 hours  insulin glargine Injectable (LANTUS) 40 Unit(s) SubCutaneous at bedtime  insulin lispro (ADMELOG) corrective regimen sliding scale   SubCutaneous at bedtime  insulin lispro (ADMELOG) corrective regimen sliding scale   SubCutaneous three times a day before meals  insulin lispro Injectable (ADMELOG) 15 Unit(s) SubCutaneous three times a day before meals  lidocaine   Patch 2 Patch Transdermal every 24 hours  metroNIDAZOLE    Tablet 500 milliGRAM(s) Oral every 8 hours  pantoprazole    Tablet 40 milliGRAM(s) Oral before breakfast  polyethylene glycol 3350 17 Gram(s) Oral two times a day  senna 2 Tablet(s) Oral at bedtime  sertraline 150 milliGRAM(s) Oral daily  tamsulosin 0.4 milliGRAM(s) Oral at bedtime  traMADol 50 milliGRAM(s) Oral every 8 hours PRN  traZODone 150 milliGRAM(s) Oral at bedtime      ALLERGIES:No Known Drug Allergies  shellfish (Unknown      PHYSICAL EXAMINATION:  RESPIRATORY: Clear to auscultation bilaterally, respirations non-labored.  CARDIAC: RR, normal S1S2, no murmurs.  ABDOMEN: soft, BS present, no masses, no hernias, no peritoneal signs, no KLS, nontender.  VASCULAR: Equal and normal pulses.  MUSCULOSKELETAL: Full ROM, no deformities.      T(C): 36.7 (04-03-21 @ 08:25), Max: 37.3 (04-02-21 @ 20:15)  HR: 69 (04-03-21 @ 08:25) (69 - 80)  BP: 101/63 (04-03-21 @ 08:25) (101/63 - 118/80)  RR: 17 (04-03-21 @ 08:25) (16 - 20)  SpO2: 98% (04-03-21 @ 08:25) (96% - 100%)                          11.3   12.14 )-----------( 424      ( 03 Apr 2021 07:00 )             34.4       04-03    133<L>  |  106  |  QNS  ----------------------------<  QNS  5.0   |  15<L>  |  QNS    Ca    QNS      03 Apr 2021 07:00    TPro  7.1  /  Alb  QNS  /  TBili  0.5  /  DBili  x   /  AST  49<H>  /  ALT  QNS  /  AlkPhos  83  04-03

## 2021-04-03 NOTE — PROGRESS NOTE ADULT - SUBJECTIVE AND OBJECTIVE BOX
HPI: 61 yo M with PMHx of HTN, HLD, Pre-DM presented for eval of weakness, N/V, abd pain, chest pain. Patient reports several days of symptoms. He states he does not take any medications for DM. Patient denies trauma or inciting event. Patient also reports constipation with blood on toilet paper after wiping. ED workup significant for Glucose 674, AG 18, BUN/Cr 26/1.31, Acetone moderate. CT Chest with mass like structure to RLL and CT Abd/Pel with CBD dilation and air in biliary tree. ED interventions include 2L IVF Bolus, IVP insulin,  (26 Mar 2021 16:30)    Subjective: Patient seen and examined at bedside this morning, c/o headache, toothache, R sided back pain, being unable to sleep and generally not feeling better. Patient states that his headache has been present since admission, not relieved with current pain medication ordered, Denies N/V/dizziness, continues with constipation, states that he has had small BM, last was watery.      REVIEW OF SYSTEMS:    CONSTITUTIONAL: No weakness, fevers or chills  EYES/ENT: No visual changes;  No vertigo or throat pain, +toothache   NECK: No pain or stiffness  RESPIRATORY: +cough, no wheezing, hemoptysis; No shortness of breath  CARDIOVASCULAR: No chest pain or palpitations  GASTROINTESTINAL: + epigastric pain. No nausea, vomiting, or hematemesis; No diarrhea or constipation. No melena or hematochezia.  GENITOURINARY: No dysuria, frequency or hematuria  NEUROLOGICAL: No numbness or weakness, +headache   SKIN: No itching, rashes  MSK: back pain+    Vital Signs Last 24 Hrs  T(C): 36.7 (2021 08:25), Max: 37.3 (2021 20:15)  T(F): 98.1 (2021 08:25), Max: 99.1 (2021 20:15)  HR: 69 (2021 08:25) (69 - 80)  BP: 101/63 (2021 08:25) (101/63 - 118/80)  RR: 17 (2021 08:25) (16 - 20)  SpO2: 98% (2021 08:25) (96% - 100%)    PHYSICAL EXAM:  GENERAL: NAD, lying in bed comfortably  HEAD:  Atraumatic, Normocephalic  EYES: conjunctiva and sclera clear  ENT: Moist mucous membranes  NECK: Supple, No JVD  CHEST/LUNG: Clear to auscultation bilaterally; No rales, rhonchi, wheezing, or rubs. Unlabored respirations  HEART: Regular rate and rhythm; No murmurs, rubs, or gallops  ABDOMEN: Bowel sounds present; Soft, tenderness to epigastric region, no guarding/ rebound, negative Morales's sign   EXTREMITIES:  2+ Peripheral Pulses, brisk capillary refill. No clubbing, cyanosis, or edema  NERVOUS SYSTEM:  Alert & Oriented X3, speech clear. No deficits   MSK: FROM all 4 extremities, full and equal strength, R posterolateral aspect of back, tender to palpation    MEDICATIONS  (STANDING):  aspirin  chewable 81 milliGRAM(s) Oral daily  atorvastatin 40 milliGRAM(s) Oral at bedtime  benzonatate 200 milliGRAM(s) Oral three times a day  cefTRIAXone   IVPB 1000 milliGRAM(s) IV Intermittent every 24 hours  dextrose 40% Gel 15 Gram(s) Oral once  dextrose 5%. 1000 milliLiter(s) (50 mL/Hr) IV Continuous <Continuous>  dextrose 5%. 1000 milliLiter(s) (100 mL/Hr) IV Continuous <Continuous>  dextrose 50% Injectable 25 Gram(s) IV Push once  dextrose 50% Injectable 12.5 Gram(s) IV Push once  dextrose 50% Injectable 25 Gram(s) IV Push once  dextrose 50% Injectable 12.5 Gram(s) IV Push once  glucagon  Injectable 1 milliGRAM(s) IntraMuscular once  heparin   Injectable 5000 Unit(s) SubCutaneous every 8 hours  insulin glargine Injectable (LANTUS) 40 Unit(s) SubCutaneous at bedtime  insulin lispro (ADMELOG) corrective regimen sliding scale   SubCutaneous at bedtime  insulin lispro (ADMELOG) corrective regimen sliding scale   SubCutaneous three times a day before meals  insulin lispro Injectable (ADMELOG) 15 Unit(s) SubCutaneous three times a day before meals  lidocaine   Patch 2 Patch Transdermal every 24 hours  metroNIDAZOLE    Tablet 500 milliGRAM(s) Oral every 8 hours  pantoprazole    Tablet 40 milliGRAM(s) Oral before breakfast  polyethylene glycol 3350 17 Gram(s) Oral two times a day  senna 2 Tablet(s) Oral at bedtime  sertraline 150 milliGRAM(s) Oral daily  tamsulosin 0.4 milliGRAM(s) Oral at bedtime  traZODone 150 milliGRAM(s) Oral at bedtime    MEDICATIONS  (PRN):  acetaminophen   Tablet .. 650 milliGRAM(s) Oral every 6 hours PRN Temp greater or equal to 38C (100.4F), Mild Pain (1 - 3)  aluminum hydroxide/magnesium hydroxide/simethicone Suspension 30 milliLiter(s) Oral every 4 hours PRN Dyspepsia  cyclobenzaprine 5 milliGRAM(s) Oral at bedtime PRN Muscle Spasm  FIRST- Mouthwash  BLM 15 milliLiter(s) Swish and Spit four times a day PRN pain  guaiFENesin   Syrup  (Sugar-Free) 100 milliGRAM(s) Oral every 6 hours PRN Cough  traMADol 50 milliGRAM(s) Oral every 8 hours PRN Moderate Pain (4 - 6)        LABS:                        11.3   12.14 )-----------( 424      ( 2021 07:00 )             34.4   04-03    133<L>  |  106  |  QNS  ----------------------------<  QNS  5.0   |  15<L>  |  QNS    Ca    QNS      2021 07:00    TPro  7.1  /  Alb  QNS  /  TBili  0.5  /  DBili  x   /  AST  49<H>  /  ALT  QNS  /  AlkPhos  83  04-03      CAPILLARY BLOOD GLUCOSE      POCT Blood Glucose.: 136 mg/dL (2021 08:26)  POCT Blood Glucose.: 151 mg/dL (2021 22:45)  POCT Blood Glucose.: 106 mg/dL (2021 16:48)  POCT Blood Glucose.: 164 mg/dL (2021 11:59)          CARDIAC MARKERS ( 26 Mar 2021 13:30 )  <.017 ng/mL / x     / x     / x     / x          Urinalysis Basic - ( 26 Mar 2021 13:30 )    Color: Yellow / Appearance: Clear / S.010 / pH: x  Gluc: x / Ketone: Large  / Bili: Negative / Urobili: Negative   Blood: x / Protein: Negative / Nitrite: Negative   Leuk Esterase: Negative / RBC: x / WBC x   Sq Epi: x / Non Sq Epi: x / Bacteria: x         RADIOLOGY:  < from: CT Chest w/ IV Cont (21 @ 14:44) >  FINDINGS:  CHEST:  LUNGS AND LARGE AIRWAYS: Patent central airways. There is a 4.5 cm heterogeneous in attenuation masslike opacity within the right lower lobe abutting the right major fissure and right hilar region, representing interval change from prior examination dated 12/3/2020. Small nodules are identified adjacent to the mass measuring up to 5 mm.  PLEURA: No evidence for pleural effusion. No pneumothorax.  VESSELS: Stable variant anatomy identified with a venous conduit extending from the left thorax into the left brachiocephalic vein. Thoracic aortic caliber appears unremarkable.  HEART: Heart size is normal. No pericardial effusion. Coronary arterial calcification.  MEDIASTINUM AND LIZZETTE: No mediastinal lymphadenopathy. No left hilar lymphadenopathy.  CHEST WALL AND LOWER NECK: Within normal limits.    ABDOMEN AND PELVIS:  LIVER: Normal in size. No focal hepatic masses.  BILE DUCTS: No evidence for intrahepatic biliary ductal dilatation. Scattered foci of air identified within the intrahepatic biliary tree. There is increased caliber of the extrahepatic CBD measuring 9-10 mm.  GALLBLADDER: A gallstone is noted in the gallbladder lumen measuring 1.1 cm. Air is noted within the gallbladder lumen. Contracted state of the gallbladder limits assessment for wall thickening. Emphysematous cholecystitis cannot be excluded. Correlate with clinical as well as procedure history recommended.  SPLEEN: Within normal limits. Stable 2.4 x 1.6 cm soft tissue nodularity medial to the spleen, likely accessory splenic tissue.  PANCREAS: Within normal limits.  ADRENALS: Within normal limits.  KIDNEYS/URETERS: Within normal limits.    BLADDER: Within normal limits.  REPRODUCTIVE ORGANS: The prostate appears unremarkable.    BOWEL: Fecal material scattered throughout the colon. No evidence for mechanical bowel obstruction. Colonic diverticulosis. No colonic wall thickening. The appendix appears unremarkable.  PERITONEUM: No free intraperitoneal air or fluid.  VESSELS: Within normal limits.  RETROPERITONEUM/LYMPH NODES: No lymphadenopathy.  ABDOMINAL WALL: Within normal limits.  BONES: Degenerative changes.    IMPRESSION:  1. There is a 4.5 cm heterogeneous in attenuation masslike opacity within the right lower lobe abutting the right major fissure and right hilar region, representing interval change from prior examination dated 12/3/2020. Small nodules are identified adjacent to the mass measuring up to 5 mm. Differential considerations would include both neoplastic and infectious etiologies.  2. Scattered foci of air identified within the intrahepatic biliary tree. There is increased caliber of the extrahepatic CBD measuring 9-10 mm.  A gallstone is noted in the gallbladder lumen measuring 1.1 cm. Air is noted within the gallbladder lumen. Contracted state of the gallbladder limits assessment for wall thickening. Emphysematous cholecystitis cannot be excluded. Correlate with clinical as well as procedure history recommended.        < from: US Abdomen Upper Quadrant Right (21 @ 09:26) >  COMPARISON: CT dated 3/26/2021    TECHNIQUE: Sonography of the right upper quadrant.    FINDINGS:    Liver: Enlarged (20.2 cm). Increased echogenicity.  Bile ducts: Normal caliber. Common bile duct measures 6.7 mm.  Gallbladder: Cholelithiasis.  No focal tenderness or evidence of cholecystitis.  Pancreas: Visualized portions are within normal limits.  Right kidney: 13.3 cm. No hydronephrosis.  Ascites: None.  IVC: Visualized portions are within normal limits.    IMPRESSION:    Hepatomegaly.  Cholelithiasis.      < from: CT Chest No Cont (21 @ 12:46) >  FINDINGS:    LUNGS AND AIRWAYS: Patent central airways.  Right lower lobe lesion is slightly larger measuring approximately 5 cm. There has been interval development of an air-fluid level within this lesion. Multiple tree-in-bud opacities have developed in the right lower lobe indicating bronchial spread of disease process, possibly infection. Subsegmental atelectasis has developed in the dependent right lung base. Nonspecific focal groundglass opacity in right lung apex .  PLEURA: Trace left pleural effusion.  MEDIASTINUM AND LIZZETTE: No lymphadenopathy.  VESSELS: Coronary artery calcification.  HEART: Heart size is normal. No significant pericardial effusion.  CHEST WALL AND LOWER NECK: Within normal limits.  VISUALIZED UPPER ABDOMEN: Interval resolution of pneumobilia and improved extrahepatic biliary dilatation. Cholelithiasis and a contracted gallbladder are again evident.  BONES: Within normal limits.    IMPRESSION:  Evidence of a cavitary lesion involving the right lower lobe, development of an air-fluid level within this lesion and endobronchial spread into the right lower lobe in comparison to the prior study.    Interval resolution of pneumobilia and improvement in extrahepatic biliary dilatation. Redemonstration of cholelithiasis and a contracted gallbladder.    < from: [facility] Hepatobiliary Imaging (21 @ 10:26) >    CLINICAL INFORMATION: 60 year old male with abdominal pain and cholelithiasis, referred to evaluate foracute cholecystitis    TECHNIQUE: Dynamic images of the anterior abdomen were obtained for approximately 1 hour immediately following radiotracer injection. Static images of the abdomen in the anterior, right anterior oblique and right lateral projections were also obtained.    COMPARISON: No previous hepatobiliary scan for comparison    FINDINGS: There is prompt uptake of the injected radiotracer by the hepatocytes. The gallbladder is first visualized at 30 minutes post tracer injection and bowel activity by 45 minutes. There is normal tracer clearance from the liver by the end of the study.    IMPRESSION: Normal hepatobiliary scan.    No scan evidence of acute cholecystitis.

## 2021-04-03 NOTE — PROGRESS NOTE ADULT - PROBLEM SELECTOR PLAN 1
-Resolved   -Newly diagnosed Diabetes  -A1C: 15.5%   -Hx of prediabetes, WITH a1c: 5.7 % in 11/2020  -BGM on admission 674, +acetone   -S/P insulin drip   -Continue Insulin sliding scale - moderate dose   -Continue pre-meal insulin at 15units  -Continue Lantus 40units qhs   -Trend BGM closely and titrate insulin as per requirements of correctional insulin   -Accuchecks AC&HS  -Hypoglycemia Protocol   -S/p diabetic education   -Work up in progress for type 1 DM, given rapid onset with DKA   -Diet: Consistent Carbs w/ Evening Snack  -Trend and replete electrolytes PRN
-Newly diagnosed Diabetes  -A1C: 15.5%   -Hx of prediabetes, WITH a1c: 5.7 % in 11/2020  -BGM on admission 674, +acetone   -Downgraded from ICU overnight   -S/P insulin drip   -Continue Insulin sliding scale - moderate dose   -Continue pre-meal insulin at 7units  -Continue Lantus 30units qhs   -Continue IVF LR @ 100ml/hr   -Trend BGM closely and titrate insulin as per requirements - required 24 units correctional insulin within last 24hrs   -Accuchecks AC&HS  -Hypoglycemia Protocol   -DM educator   -Diet: Consistent Carbs w/ Evening Snack  -Trend and replete electrolytes PRN
-Resolved   -Newly diagnosed Diabetes  -A1C: 15.5%   -Hx of prediabetes, WITH a1c: 5.7 % in 11/2020  -BGM on admission 674, +acetone   -S/P insulin drip   -Continue Insulin sliding scale - moderate dose   -Continue pre-meal insulin at 15units  -Continue Lantus 40units qhs   -Trend BGM closely and titrate insulin as per requirements of correctional insulin   -Accuchecks AC&HS  -Hypoglycemia Protocol   -S/p diabetic education   -Work up in progress for type 1 DM, given rapid onset with DKA   -Diet: Consistent Carbs w/ Evening Snack  -Trend and replete electrolytes PRN
-Resolved   -Newly diagnosed Diabetes  -A1C: 15.5%   -Hx of prediabetes, WITH a1c: 5.7 % in 11/2020  -BGM on admission 674, +acetone   -S/P insulin drip   -Continue Insulin sliding scale - moderate dose   -Continue pre-meal insulin at 15units  -Continue Lantus 40units qhs   -Trend BGM closely and titrate insulin as per requirements of correctional insulin   -Accuchecks AC&HS  -Hypoglycemia Protocol   -S/p diabetic education   -Work up in progress for type 1 DM, given rapid onset with DKA   -Diet: Consistent Carbs w/ Evening Snack  -Trend and replete electrolytes PRN
-Newly diagnosed Diabetes  -A1C: 15.5%   -Hx of prediabetes, WITH a1c: 5.7 % in 11/2020  -BGM on admission 674, +acetone   -S/P insulin drip   -Continue Insulin sliding scale - moderate dose   -Continue pre-meal insulin at 10units  -Continue Lantus 35units qhs   -D/c IVF LR @ 100ml/hr   -Trend BGM closely and titrate insulin as per requirements of correctional insulin   -Accuchecks AC&HS  -Hypoglycemia Protocol   -S/p diabetic education   -Work up in progress for type 1 DM, given rapid onset with DKA   -Diet: Consistent Carbs w/ Evening Snack  -Trend and replete electrolytes PRN
-Newly diagnosed Diabetes  -A1C: 15.5%   -Hx of prediabetes, WITH a1c: 5.7 % in 11/2020  -BGM on admission 674, +acetone   -S/P insulin drip   -Continue Insulin sliding scale - moderate dose   -Continue pre-meal insulin at 13units  -Continue Lantus 35units qhs   -Trend BGM closely and titrate insulin as per requirements of correctional insulin   -Accuchecks AC&HS  -Hypoglycemia Protocol   -S/p diabetic education   -Work up in progress for type 1 DM, given rapid onset with DKA   -Diet: Consistent Carbs w/ Evening Snack  -Trend and replete electrolytes PRN

## 2021-04-03 NOTE — PROGRESS NOTE ADULT - SUBJECTIVE AND OBJECTIVE BOX
CC: f/u for  lung abscess  Patient reports still has right sided pain and tooth loose and hurts    REVIEW OF SYSTEMS:  All other review of systems negative (Comprehensive ROS)    Antimicrobials Day #  :8  cefTRIAXone   IVPB 1000 milliGRAM(s) IV Intermittent every 24 hours  metroNIDAZOLE    Tablet 500 milliGRAM(s) Oral every 8 hours    Other Medications Reviewed    T(F): 98.2 (04-03-21 @ 16:39), Max: 99.1 (04-02-21 @ 20:15)  HR: 68 (04-03-21 @ 16:39)  BP: 109/70 (04-03-21 @ 16:39)  RR: 16 (04-03-21 @ 16:39)  SpO2: 95% (04-03-21 @ 16:39)  Wt(kg): --    PHYSICAL EXAM:  General: alert, no acute distress  Eyes:  anicteric, no conjunctival injection, no discharge  Oropharynx: no lesions or injection 	  Neck: supple, without adenopathy  Lungs: clear to auscultation  Heart: regular rate and rhythm; no murmur, rubs or gallops  Abdomen: soft, nondistended, nontender, without mass or organomegaly  Skin: no lesions  Extremities: no clubbing, cyanosis, or edema  Neurologic: alert, oriented, moves all extremities    LAB RESULTS:                        11.3   12.14 )-----------( 424      ( 03 Apr 2021 07:00 )             34.4     04-03    133<L>  |  106  |  QNS  ----------------------------<  QNS  5.0   |  15<L>  |  QNS    Ca    QNS      03 Apr 2021 07:00    TPro  7.1  /  Alb  QNS  /  TBili  0.5  /  DBili  x   /  AST  49<H>  /  ALT  QNS  /  AlkPhos  83  04-03    LIVER FUNCTIONS - ( 03 Apr 2021 07:00 )  Alb: QNS g/dL / Pro: 7.1 g/dL / ALK PHOS: 83 U/L / ALT: QNS U/L / AST: 49 U/L / GGT: x             MICROBIOLOGY:  RECENT CULTURES:  04-01 @ 12:30 .Body Fluid RIGHT LUNG     Rare Paula albicans  Growth in fluid media only Streptococcus parasanguinis    Moderate polymorphonuclear leukocytes per low power field  No organisms seen        RADIOLOGY REVIEWED:  < from: NM Hepatobiliary Imaging (04.02.21 @ 10:26) >    EXAM:  NM HEPATOBILIARY IMG      PROCEDURE DATE:  04/02/2021        INTERPRETATION:  RADIOPHARMACEUTICAL:  99mTc-Mebrofenin  DOSE: 3.8 mCi IV    CLINICAL INFORMATION: 60 year old male with abdominal pain and cholelithiasis, referred to evaluate foracute cholecystitis    TECHNIQUE: Dynamic images of the anterior abdomen were obtained for approximately 1 hour immediately following radiotracer injection. Static images of the abdomen in the anterior, right anterior oblique and right lateral projections were also obtained.    COMPARISON: No previous hepatobiliary scan for comparison    FINDINGS: There is prompt uptake of the injected radiotracer by the hepatocytes. The gallbladder is first visualized at 30 minutes post tracer injection and bowel activity by 45 minutes. There is normal tracer clearance from the liver by the end of the study.    IMPRESSION: Normal hepatobiliary scan.    No scan evidence of acute cholecystitis.      < end of copied text >    < from: CT Chest No Cont (03.30.21 @ 12:46) >  EXAM:  CT CHEST      PROCEDURE DATE:  03/30/2021        INTERPRETATION:  CLINICAL INFORMATION: Evaluation of right lower lobe lesion/abscess.    COMPARISON: 03/26/2021.    CONTRAST/COMPLICATIONS:  IV Contrast: NONE  Oral Contrast: NONE      PROCEDURE:  CT of the Chest was performed.  Sagittal and coronal reformats were performed.    FINDINGS:    LUNGS AND AIRWAYS: Patent central airways.  Right lower lobe lesion is slightly larger measuring approximately 5 cm. There has been interval development of an air-fluid level within this lesion. Multiple tree-in-bud opacities have developed in the right lower lobe indicating bronchial spread of disease process, possibly infection. Subsegmental atelectasis has developed in the dependent right lung base. Nonspecific focal groundglass opacity in right lung apex .  PLEURA: Trace left pleural effusion.  MEDIASTINUM AND LIZZETTE: No lymphadenopathy.  VESSELS: Coronary artery calcification.  HEART: Heart size is normal. No significant pericardial effusion.  CHEST WALL AND LOWER NECK: Within normal limits.  VISUALIZED UPPER ABDOMEN: Interval resolution of pneumobilia and improved extrahepatic biliary dilatation. Cholelithiasis and a contracted gallbladder are again evident.  BONES: Within normal limits.    IMPRESSION:  Evidence of a cavitary lesion involving the right lower lobe, development of an air-fluid level within this lesion and endobronchial spread into the right lower lobe in comparison to the prior study.    Interval resolution of pneumobilia and improvement in extrahepatic biliary dilatation. Redemonstration of cholelithiasis and a contracted gallbladder.  < from: CT Abdomen and Pelvis w/ IV Cont (03.26.21 @ 14:44) >  EXAM:  CT CHEST IC    EXAM:  CT ABDOMEN AND PELVIS IC      PROCEDURE DATE:  03/26/2021        INTERPRETATION:  CLINICAL INFORMATION: Chest and abdominal pain. Weight loss.    COMPARISON: CT chest 12/3/2020. CT abdomen/pelvis 3/9/2016.    CONTRAST/COMPLICATIONS:  IV Contrast: Omnipaque 350   90 cc administered   10 cc discarded  Oral Contrast: NONE  Complications: None reported at time of study completion    PROCEDURE:  CT of the Chest, Abdomen and Pelvis was performed.  Sagittal and coronal reformats were performed.    FINDINGS:  CHEST:  LUNGS AND LARGE AIRWAYS: Patent central airways. There is a 4.5 cm heterogeneous in attenuation masslike opacity within the right lower lobe abutting the right major fissure and right hilar region, representing interval change from prior examination dated 12/3/2020. Small nodules are identified adjacent to the mass measuring up to 5 mm.  PLEURA: No evidence for pleural effusion. No pneumothorax.  VESSELS: Stable variant anatomy identified with a venous conduit extending from the left thorax into the left brachiocephalic vein. Thoracic aortic caliber appears unremarkable.  HEART: Heart size is normal. No pericardial effusion. Coronary arterial calcification.  MEDIASTINUM AND LIZZETTE: No mediastinal lymphadenopathy. No left hilar lymphadenopathy.  CHEST WALL AND LOWER NECK: Within normal limits.    ABDOMEN AND PELVIS:  LIVER: Normal in size. No focal hepatic masses.  BILE DUCTS: No evidence for intrahepatic biliary ductal dilatation. Scattered foci of air identified within the intrahepatic biliary tree. There is increased caliber of the extrahepatic CBD measuring 9-10 mm.  GALLBLADDER: A gallstone is noted in the gallbladder lumen measuring 1.1 cm. Air is noted within the gallbladder lumen. Contracted state of the gallbladder limits assessment for wall thickening. Emphysematous cholecystitis cannot be excluded. Correlate with clinical as well as procedure history recommended.  SPLEEN: Within normal limits. Stable 2.4 x 1.6 cm soft tissue nodularity medial to the spleen, likely accessory splenic tissue.  PANCREAS: Within normal limits.  ADRENALS: Within normal limits.  KIDNEYS/URETERS: Within normal limits.    BLADDER: Within normal limits.  REPRODUCTIVE ORGANS: The prostate appears unremarkable.    BOWEL: Fecal material scattered throughout the colon. No evidence for mechanical bowel obstruction. Colonic diverticulosis. No colonic wall thickening. The appendix appears unremarkable.  PERITONEUM: No free intraperitoneal air or fluid.  VESSELS: Within normal limits.  RETROPERITONEUM/LYMPH NODES: No lymphadenopathy.  ABDOMINAL WALL: Within normal limits.  BONES: Degenerative changes.    IMPRESSION:  1. There is a 4.5 cm heterogeneous in attenuation masslike opacity within the right lower lobe abutting the right major fissure and right hilar region, representing interval change from prior examination dated 12/3/2020. Small nodules are identified adjacent to the mass measuring up to 5 mm. Differential considerations would include both neoplastic and infectious etiologies.  2. Scattered foci of air identified within the intrahepatic biliary tree. There is increased caliber of the extrahepatic CBD measuring 9-10 mm.  A gallstone is noted in the gallbladder lumen measuring 1.1 cm. Air is noted within the gallbladder lumen. Contracted state of the gallbladder limits assessment for wall thickening. Emphysematous cholecystitis cannot be excluded. Correlate with clinical as well as procedure history recommended.    Findings were discussed with Dr. MIRANDA ALLEN [phone] 3/26/2021 3:23 PM by Dr. Gunn with read back confirmation.        < end of copied text >      < end of copied text >  Assessment:  Patient admitted with nausea, vomiting, abdo pain, right side pain, was in DKA on admit, had air in biliary tree with stones in gb and somewhat dilated duct. CT also showed mass like opacity in the right lung which is new from December and has now cavitated. Suspect he passed a gall stone , suspect he was vomiting with bad teeth and aspirated into the lung with resultant lung abscess. Had bronch no endobronchial lesions and grew candida and strept parasanguis. HIDA is negative for acute sandi  Plan:  continue ctx and flagyl  can change to po augmentin likely by Monday  He will need about 5 more weeks of antibiotics  needs dental evaluation   would need gi f/u for the gall stone issue and may ultimately warrant cholecystectomy, timing per Dr. Gloria

## 2021-04-03 NOTE — PROGRESS NOTE ADULT - PROBLEM SELECTOR PLAN 2
#Right Lower Lobe masslike opacity - Likely abscess formation   #Pulmonary Nodules   -CT chest: 4.5 cm heterogeneous in attenuation masslike opacity within the right lower lobe abutting the right major fissure and right hilar region, (changed from 12/2020). Small nodules are identified adjacent to the mass measuring up to 5 mm   -Repeat CT chest 3/30/21: cavitary lesion involving the right lower lobe, development of an air-fluid level within this lesion and endobronchial spread into the right lower lobe  -Recent low dose CT chest for lung cancer screening done 12/2020 without evidence of opacity to RLL noted now  -History of cigarette smoking, now on nicotine patches/ gum, recent weight loss  -Sputum culture: Rare PMN, rare GNR, rare gram variable cocci, rare squamous epithelial cells    -Procalcitonin: 0.08, ESR: 102, CRP: 118   -Blood cultures - NGTD   -Urine legionella antigen - negative   -QuantiFeron negative   -Spoke with Cardiothoracic Surgery: Dr. Hassan, who does not cover [facility], Dr. Maddox who does is out this week, Dr. Hassan stated that if abscess formation was < 6cm and patient stable otherwise (afebrile/ normal WBC) may continue with prolonged antibiotic course, if drainage necessary will need to be transferred   -ID consult appreciated: Continue Ceftriaxone, add metronidazole, d/c home on ceftin/flagyl or Augmentin   -Continue to monitor closely   -Continue Ceftriaxone 1g q24hrs - day #9  -Continue Metronidazole 500mg po q12hrs day #5  -S/p Azithromycin - 4 days  -S/p bronchoscopy, BAL - streptococcus parasanguinis

## 2021-04-03 NOTE — PROGRESS NOTE ADULT - PROBLEM SELECTOR PROBLEM 6
Benign hypertension

## 2021-04-03 NOTE — PROGRESS NOTE ADULT - PROBLEM SELECTOR PLAN 4
-Continue Miralax q12hrs, Senna qhs  -Dulcolax supp PRN
-Continue Miralax q12hrs, Senna qhs  -Last documented BM was 3/28
-Continue Miralax q12hrs, Senna qhs  -Dulcolax supp PRN
-Continue Miralax q12hrs, Senna qhs  -Dulcolax supp PRN
-Continue Miralax q12hrs, Senna qhs  -Last BM yesterday - small quantity s/p dulcolax suppository  -Will administer enema today
-Continue Miralax q12hrs, Senna qhs  -Last BM yesterday - small quantity s/p tap water enema

## 2021-04-03 NOTE — PROGRESS NOTE ADULT - ASSESSMENT
61 y/o M with PMHx of HTN, HLD, Pre-DM admitted for DKA after presenting to the ER with c/o weakness, N/V, abdominal pain and chest pain, found have glucose level of 674, AG 18, BUN/Cr 26/1.31, Acetone moderate. CT Chest with masslike structure to RLL and CT Abd/Pel with CBD dilation and air in biliary tree. ED interventions include 2L IVF Bolus, IVP insulin. Patient was admitted to ICU, now downgraded to medicine.       #Headache  -Continue tylenol PRN     #Lightheadedness  -Orthostatic vitals wnl   -Fall risk precautions     #Hyponatremia  -Continue to monitor     #Leukocytosis in setting of active pulmonary infection  -WBC: 12.14  -Continue to trend  -BAL fluid with streptococcus parasanguinis   -Likely due to poor oral hygiene, pt with loose teeth   -Continue ceftriaxone   -F/u culture susceptibilities   -F/u ID recommendations     DISPO: Continue antibiotics    Case discussed with Dr. Josue

## 2021-04-03 NOTE — PROGRESS NOTE ADULT - PROBLEM SELECTOR PROBLEM 10
Advanced directives, counseling/discussion

## 2021-04-03 NOTE — DISCHARGE NOTE PROVIDER - CARE PROVIDER_API CALL
Beti Lane)  Family Medicine  [address] [place] [address]  Phone: [phone]  Fax: [phone]  Established Patient  Follow Up Time:

## 2021-04-03 NOTE — DISCHARGE NOTE PROVIDER - DETAILS OF MALNUTRITION DIAGNOSIS/DIAGNOSES
This patient has been assessed with a concern for Malnutrition and was treated during this hospitalization for the following Nutrition diagnosis/diagnoses:     -  03/28/2021: Severe protein-calorie malnutrition

## 2021-04-03 NOTE — DISCHARGE NOTE PROVIDER - HOSPITAL COURSE
59 y/o M with PMHx of HTN, HLD, Pre-DM admitted on 3/26/21 to ICU for DKA after presenting to the ER with c/o weakness, N/V, abdominal pain and chest pain, found have glucose level of 674, AG 18, BUN/Cr 26/1.31, with moderate acetone, was started on an insulin infusion and antibiotics. Patient was subsequently transferred to medicine after improvement of blood glucose and closure of anion gap. Patient is newly diagnosed, with A1C of 5.7 in 11/2020 now increased to 15.5%. During initial work up, there was a RLL masslike lesion measuring 4.5cm on CT scan of chest and dilated extrahepatic CBD with cholelithiasis. Repeat CT scan revealed extension of RLL lesion to lower lobe with air fluid levels. A bronchoscopy was performed on 4/1/21, with bronchoalveolar lavage, with subsequent cultures revealing streptococcus parasanguinis. On 4/2/2012 a HIDA scan was performed without cute findings after patient continued with abdominal pain. During admission patient had multiple complaints of back pain, which is chronic but worse that usual, with mild improvement with tylenol, lidocaine patches and flexeril. neena complained of toothache after bronschoscopy, patient had loose tooth prior to procedure with poor dental hygiene. He also complained of headache and dizziness, orthostatic vitals were within negative. Patient continued on ceftriaxone and metronidazole. Adequate blood glucose control was achieved with basal insulin and premeal insulin. Patient to be discharged on oral Augmentin ....to complete ..... weeks of treatment.      < from: CT Chest No Cont (03.30.21 @ 12:46) >    IMPRESSION:  Evidence of a cavitary lesion involving the right lower lobe, development of an air-fluid level within this lesion and endobronchial spread into the right lower lobe in comparison to the prior study.    Interval resolution of pneumobilia and improvement in extrahepatic biliary dilatation. Redemonstration of cholelithiasis and a contracted gallbladder.          < from: NM Hepatobiliary Imaging (04.02.21 @ 10:26) >    FINDINGS: There is prompt uptake of the injected radiotracer by the hepatocytes. The gallbladder is first visualized at 30 minutes post tracer injection and bowel activity by 45 minutes. There is normal tracer clearance from the liver by the end of the study.    IMPRESSION: Normal hepatobiliary scan.    No scan evidence of acute cholecystitis.    < from: CT Chest w/ IV Cont (03.26.21 @ 14:44) >    IMPRESSION:  1. There is a 4.5 cm heterogeneous in attenuation masslike opacity within the right lower lobe abutting the right major fissure and right hilar region, representing interval change from prior examination dated 12/3/2020. Small nodules are identified adjacent to the mass measuring up to 5 mm. Differential considerations would include both neoplastic and infectious etiologies.  2. Scattered foci of air identified within the intrahepatic biliary tree. There is increased caliber of the extrahepatic CBD measuring 9-10 mm.  A gallstone is noted in the gallbladder lumen measuring 1.1 cm. Air is noted within the gallbladder lumen. Contracted state of the gallbladder limits assessment for wall thickening. Emphysematous cholecystitis cannot be excluded. Correlate with clinical as well as procedure history recommended.           59 y/o M with PMHx of HTN, HLD, Pre-DM admitted on 3/26/21 to ICU for DKA after presenting to the ER with c/o weakness, N/V, abdominal pain and chest pain, found have glucose level of 674, AG 18, BUN/Cr 26/1.31, with moderate acetone, was started on an insulin infusion and antibiotics. Patient was subsequently transferred to medicine after improvement of blood glucose and closure of anion gap. Patient is newly diagnosed, with A1C of 5.7 in 11/2020 now increased to 15.5%. During initial work up, there was a RLL masslike lesion measuring 4.5cm on CT scan of chest and dilated extrahepatic CBD with cholelithiasis. Repeat CT scan revealed extension of RLL lesion to lower lobe with air fluid levels. A bronchoscopy was performed on 4/1/21, with bronchoalveolar lavage, with subsequent cultures revealing streptococcus parasanguinis. On 4/2/2012 a HIDA scan was performed without cute findings after patient continued with abdominal pain. During admission patient had multiple complaints of back pain, which is chronic but worse that usual, with mild improvement with tylenol, lidocaine patches and flexeril. neena complained of toothache after bronschoscopy, patient had loose tooth prior to procedure with poor dental hygiene. He also complained of headache and dizziness, orthostatic vitals were within negative. Patient continued on ceftriaxone and metronidazole. Adequate blood glucose control was achieved with basal insulin and premeal insulin and pt was prepped for an anticipated discharge home on a Long Term course of Augmentin as recommended by ID until his hospitalization course was complicated by development of mild Hemoptysis, originally thought to be traumatic in nature until it progressed to more productive volumes of blood filled sputum; Subsequent CT Angio performed revealed  a small focus of contrast extravasation within the cavitary lesion, which may reflect a small pseudoaneurysm or active extravasation given the history of hemoptysis.  CT Surgery in [facility] has been contacted via the Transfer Center. After reviewing the CT Angio, Dr. Adis Loomis has agreed to accept the pt to [facility]-ICU under the care of CT Surgery, where pt may be subjected to Lobectomy in opposed to an embolization, due to the nature of the abscess vascularity depicted by the presence of a pseudoaneurysm. Pt has been made aware of the above, and will be transferred to [facility] with Hycotin on board to reduce tussive episodes.    ogrVital Signs Last 24 Hrs  T(C): 36.8 (04 Apr 2021 05:00), Max: 36.9 (04 Apr 2021 00:00)  T(F): 98.2 (04 Apr 2021 05:00), Max: 98.5 (04 Apr 2021 00:00)  HR: 79 (04 Apr 2021 05:00) (68 - 79)  BP: 118/72 (04 Apr 2021 05:00) (109/70 - 132/74)  RR: 17 (04 Apr 2021 05:00) (16 - 17)  SpO2: 95% (04 Apr 2021 05:00) (95% - 98%)    I&O's Summary    03 Apr 2021 07:01  -  04 Apr 2021 07:00  --------------------------------------------------------  IN: 200 mL / OUT: 1100 mL / NET: -900 mL      CAPILLARY BLOOD GLUCOSE  POCT Blood Glucose.: 157 mg/dL (04 Apr 2021 08:37)  POCT Blood Glucose.: 102 mg/dL (03 Apr 2021 22:12)  POCT Blood Glucose.: 157 mg/dL (03 Apr 2021 16:55)  POCT Blood Glucose.: 117 mg/dL (03 Apr 2021 12:07)      PHYSICAL EXAM:  Constitutional: NAD, awake and alert, well-developed, comfortable appearing   HEENT: PERR, EOMI, Normal Hearing, MMM  Neck: Soft and supple, No LAD, No JVD  Respiratory: Breath sounds are clear bilaterally, No wheezing, rales or rhonchi  Cardiovascular: S1 and S2, regular rate and rhythm, no Murmurs, gallops or rubs  Gastrointestinal: Bowel Sounds present, soft, nontender, nondistended, no guarding, no rebound  Extremities: No peripheral edema      RADIOLOGY:     < from: CT Chest No Cont (03.30.21 @ 12:46) >    IMPRESSION:  Evidence of a cavitary lesion involving the right lower lobe, development of an air-fluid level within this lesion and endobronchial spread into the right lower lobe in comparison to the prior study.    Interval resolution of pneumobilia and improvement in extrahepatic biliary dilatation. Redemonstration of cholelithiasis and a contracted gallbladder.      < from: NM Hepatobiliary Imaging (04.02.21 @ 10:26) >    FINDINGS: There is prompt uptake of the injected radiotracer by the hepatocytes. The gallbladder is first visualized at 30 minutes post tracer injection and bowel activity by 45 minutes. There is normal tracer clearance from the liver by the end of the study.    IMPRESSION: Normal hepatobiliary scan.    No scan evidence of acute cholecystitis.    < from: CT Chest w/ IV Cont (03.26.21 @ 14:44) >    IMPRESSION:  1. There is a 4.5 cm heterogeneous in attenuation masslike opacity within the right lower lobe abutting the right major fissure and right hilar region, representing interval change from prior examination dated 12/3/2020. Small nodules are identified adjacent to the mass measuring up to 5 mm. Differential considerations would include both neoplastic and infectious etiologies.  2. Scattered foci of air identified within the intrahepatic biliary tree. There is increased caliber of the extrahepatic CBD measuring 9-10 mm.  A gallstone is noted in the gallbladder lumen measuring 1.1 cm. Air is noted within the gallbladder lumen. Contracted state of the gallbladder limits assessment for wall thickening. Emphysematous cholecystitis cannot be excluded. Correlate with clinical as well as procedure history recommended.      < from: CT Angio Chest w/ IV Cont (04.04.21 @ 15:21) >    IMPRESSION:      When compared with March 30, 2021:    Small focus of contrast extravasation within the right lower lobe cavitary lesion, best seen on series 4 image 339, which may reflect a small pseudoaneurysm or bleeding given the history of hemoptysis.    Interval decrease in size of cavitary component of right lower lobe consolidation seen on previous exam. New small focus of consolidation in the right lower lobe medially which may reflect an additional area of infection.    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    Discussed with Dr. Rodríguez at time of final signing.    < end of copied text >         59 y/o M with PMHx of HTN, HLD, Pre-DM admitted on 3/26/21 to ICU for DKA after presenting to the ER with c/o weakness, N/V, abdominal pain and chest pain, found have glucose level of 674, AG 18, BUN/Cr 26/1.31, with moderate acetone, was started on an insulin infusion and antibiotics. Patient was subsequently transferred to medicine after improvement of blood glucose and closure of anion gap. Patient is newly diagnosed, with A1C of 5.7 in 11/2020 now increased to 15.5%. During initial work up, there was a RLL masslike lesion measuring 4.5cm on CT scan of chest and dilated extrahepatic CBD with cholelithiasis. Repeat CT scan revealed extension of RLL lesion to lower lobe with air fluid levels. A bronchoscopy was performed on 4/1/21, with bronchoalveolar lavage, with subsequent cultures revealing streptococcus parasanguinis. On 4/2/2012 a HIDA scan was performed without cute findings after patient continued with abdominal pain. During admission patient had multiple complaints of back pain, which is chronic but worse that usual, with mild improvement with tylenol, lidocaine patches and flexeril. neena complained of toothache after bronschoscopy, patient had loose tooth prior to procedure with poor dental hygiene. He also complained of headache and dizziness, orthostatic vitals were within negative. Patient continued on ceftriaxone and metronidazole. Adequate blood glucose control was achieved with basal insulin and premeal insulin and pt was prepped for an anticipated discharge home on a Long Term course of Augmentin as recommended by ID until his hospitalization course was complicated by development of mild Hemoptysis, originally thought to be traumatic in nature until it progressed to more productive volumes of blood filled sputum; Subsequent CT Angio performed revealed  a small focus of contrast extravasation within the cavitary lesion, which may reflect a small pseudoaneurysm or active extravasation given the history of hemoptysis.  CT Surgery in [facility] has been contacted via the Transfer Center. After reviewing the CT Angio, Dr. Adis Loomis has agreed to accept the pt to [facility]-ICU under the care of CT Surgery, where pt may be subjected to Lobectomy in opposed to an embolization, due to the nature of the abscess vascularity depicted by the presence of a pseudoaneurysm. Pt has been made aware of the above, and will be transferred to [facility] with Hycotin on board to reduce tussive episodes.      Vital Signs Last 24 Hrs  T(C): 36.8 (04 Apr 2021 05:00), Max: 36.9 (04 Apr 2021 00:00)  T(F): 98.2 (04 Apr 2021 05:00), Max: 98.5 (04 Apr 2021 00:00)  HR: 79 (04 Apr 2021 05:00) (68 - 79)  BP: 118/72 (04 Apr 2021 05:00) (109/70 - 132/74)  RR: 17 (04 Apr 2021 05:00) (16 - 17)  SpO2: 95% (04 Apr 2021 05:00) (95% - 98%)    I&O's Summary    03 Apr 2021 07:01  -  04 Apr 2021 07:00  --------------------------------------------------------  IN: 200 mL / OUT: 1100 mL / NET: -900 mL      CAPILLARY BLOOD GLUCOSE  POCT Blood Glucose.: 157 mg/dL (04 Apr 2021 08:37)  POCT Blood Glucose.: 102 mg/dL (03 Apr 2021 22:12)  POCT Blood Glucose.: 157 mg/dL (03 Apr 2021 16:55)  POCT Blood Glucose.: 117 mg/dL (03 Apr 2021 12:07)      PHYSICAL EXAM:  Constitutional: NAD, awake and alert, well-developed, comfortable appearing   HEENT: PERR, EOMI, Normal Hearing, MMM  Neck: Soft and supple, No LAD, No JVD  Respiratory: Breath sounds are clear bilaterally, No wheezing, rales or rhonchi  Cardiovascular: S1 and S2, regular rate and rhythm, no Murmurs, gallops or rubs  Gastrointestinal: Bowel Sounds present, soft, nontender, nondistended, no guarding, no rebound  Extremities: No peripheral edema      RADIOLOGY:     < from: CT Chest No Cont (03.30.21 @ 12:46) >    IMPRESSION:  Evidence of a cavitary lesion involving the right lower lobe, development of an air-fluid level within this lesion and endobronchial spread into the right lower lobe in comparison to the prior study.    Interval resolution of pneumobilia and improvement in extrahepatic biliary dilatation. Redemonstration of cholelithiasis and a contracted gallbladder.      < from: NM Hepatobiliary Imaging (04.02.21 @ 10:26) >    FINDINGS: There is prompt uptake of the injected radiotracer by the hepatocytes. The gallbladder is first visualized at 30 minutes post tracer injection and bowel activity by 45 minutes. There is normal tracer clearance from the liver by the end of the study.    IMPRESSION: Normal hepatobiliary scan.    No scan evidence of acute cholecystitis.    < from: CT Chest w/ IV Cont (03.26.21 @ 14:44) >    IMPRESSION:  1. There is a 4.5 cm heterogeneous in attenuation masslike opacity within the right lower lobe abutting the right major fissure and right hilar region, representing interval change from prior examination dated 12/3/2020. Small nodules are identified adjacent to the mass measuring up to 5 mm. Differential considerations would include both neoplastic and infectious etiologies.  2. Scattered foci of air identified within the intrahepatic biliary tree. There is increased caliber of the extrahepatic CBD measuring 9-10 mm.  A gallstone is noted in the gallbladder lumen measuring 1.1 cm. Air is noted within the gallbladder lumen. Contracted state of the gallbladder limits assessment for wall thickening. Emphysematous cholecystitis cannot be excluded. Correlate with clinical as well as procedure history recommended.      < from: CT Angio Chest w/ IV Cont (04.04.21 @ 15:21) >    IMPRESSION:      When compared with March 30, 2021:    Small focus of contrast extravasation within the right lower lobe cavitary lesion, best seen on series 4 image 339, which may reflect a small pseudoaneurysm or bleeding given the history of hemoptysis.    Interval decrease in size of cavitary component of right lower lobe consolidation seen on previous exam. New small focus of consolidation in the right lower lobe medially which may reflect an additional area of infection.    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    Discussed with Dr. Rodríguez at time of final signing.    < end of copied text >

## 2021-04-03 NOTE — PROGRESS NOTE ADULT - PROBLEM SELECTOR PLAN 8
-Continue diet as per orders  -Recent weight loss, documented weight in 1/21 was 234lbs, in 11/2020 207lbs   -Wt 207lbs
-Continue diet as per orders  -Recent weight loss, documented weight in 1/21 was 234lbs, in 11/2020 207lbs   -Wt 207lbs
-Continue diet as per orders  -Recent weight loss, documented weight in 1/21 was 234lbs, in 11/2020 207lbs   -Wt today 207lbs
-Continue diet as per orders  -Recent weight loss, documented weight in 1/21 was 234lbs, in 11/2020 207lbs   -Wt 207lbs

## 2021-04-03 NOTE — PROGRESS NOTE ADULT - SUBJECTIVE AND OBJECTIVE BOX
Follow-up Pulmonary Progress Note  Chief Complaint : Type 2 diabetes mellitus with ketoacidosis without coma    patient feels well  complain of cough and headache  no cp, palp, n/v, sob.         Allergies :No Known Drug Allergies  shellfish (Unknown)      PAST MEDICAL & SURGICAL HISTORY:  Type 2 diabetes mellitus without complication, without long-term current use of insulin    Benign hypertension    Shoulder arthritis  Multiple surgeries on left shoulder        Medications:  MEDICATIONS  (STANDING):  aspirin  chewable 81 milliGRAM(s) Oral daily  atorvastatin 40 milliGRAM(s) Oral at bedtime  benzonatate 200 milliGRAM(s) Oral three times a day  cefTRIAXone   IVPB 1000 milliGRAM(s) IV Intermittent every 24 hours  dextrose 40% Gel 15 Gram(s) Oral once  dextrose 5%. 1000 milliLiter(s) (50 mL/Hr) IV Continuous <Continuous>  dextrose 5%. 1000 milliLiter(s) (100 mL/Hr) IV Continuous <Continuous>  dextrose 50% Injectable 25 Gram(s) IV Push once  dextrose 50% Injectable 12.5 Gram(s) IV Push once  dextrose 50% Injectable 25 Gram(s) IV Push once  dextrose 50% Injectable 12.5 Gram(s) IV Push once  glucagon  Injectable 1 milliGRAM(s) IntraMuscular once  heparin   Injectable 5000 Unit(s) SubCutaneous every 8 hours  insulin glargine Injectable (LANTUS) 40 Unit(s) SubCutaneous at bedtime  insulin lispro (ADMELOG) corrective regimen sliding scale   SubCutaneous at bedtime  insulin lispro (ADMELOG) corrective regimen sliding scale   SubCutaneous three times a day before meals  insulin lispro Injectable (ADMELOG) 15 Unit(s) SubCutaneous three times a day before meals  lidocaine   Patch 2 Patch Transdermal every 24 hours  metroNIDAZOLE    Tablet 500 milliGRAM(s) Oral every 8 hours  pantoprazole    Tablet 40 milliGRAM(s) Oral before breakfast  polyethylene glycol 3350 17 Gram(s) Oral two times a day  senna 2 Tablet(s) Oral at bedtime  sertraline 150 milliGRAM(s) Oral daily  tamsulosin 0.4 milliGRAM(s) Oral at bedtime  traZODone 150 milliGRAM(s) Oral at bedtime    MEDICATIONS  (PRN):  acetaminophen   Tablet .. 650 milliGRAM(s) Oral every 6 hours PRN Temp greater or equal to 38C (100.4F), Mild Pain (1 - 3)  aluminum hydroxide/magnesium hydroxide/simethicone Suspension 30 milliLiter(s) Oral every 4 hours PRN Dyspepsia  cyclobenzaprine 5 milliGRAM(s) Oral two times a day PRN Muscle Spasm  FIRST- Mouthwash  BLM 15 milliLiter(s) Swish and Spit four times a day PRN pain  guaiFENesin   Syrup  (Sugar-Free) 100 milliGRAM(s) Oral every 6 hours PRN Cough  traMADol 50 milliGRAM(s) Oral every 8 hours PRN Moderate Pain (4 - 6)      LABS:                        11.3   12.14 )-----------( 424      ( 03 Apr 2021 07:00 )             34.4     04-03    133<L>  |  106  |  QNS  ----------------------------<  QNS  5.0   |  15<L>  |  QNS    Ca    QNS      03 Apr 2021 07:00    TPro  7.1  /  Alb  QNS  /  TBili  0.5  /  DBili  x   /  AST  49<H>  /  ALT  QNS  /  AlkPhos  83  04-03         CULTURES: (if applicable)    Culture - Body Fluid with Gram Stain (collected 04-01-21 @ 12:30)  Source: .Body Fluid RIGHT LUNG  Gram Stain (04-01-21 @ 22:25):    Moderate polymorphonuclear leukocytes per low power field    No organisms seen  Preliminary Report (04-03-21 @ 11:16):    Rare Candida albicans    Growth in fluid media only Streptococcus parasanguinis    Culture - Sputum (collected 03-28-21 @ 22:10)  Source: .Sputum Sputum  Gram Stain (03-29-21 @ 05:58):    Rare polymorphonuclear leukocytes per low power field    Rare Squamous epithelial cells per low power field    Rare Gram Negative Rods per oil power field    Rare Gram Variable Cocci per oil power field  Final Report (03-31-21 @ 11:57):    Normal Respiratory Princess present    Culture - Blood (collected 03-28-21 @ 14:50)  Source: .Blood Blood-Peripheral  Final Report (04-02-21 @ 19:00):    No Growth Final    Culture - Blood (collected 03-28-21 @ 14:50)  Source: .Blood Blood-Peripheral  Final Report (04-02-21 @ 19:00):    No Growth Final    Culture - Urine (collected 03-26-21 @ 13:30)  Source: .Urine Clean Catch (Midstream)  Final Report (03-27-21 @ 14:49):    No growth     VITALS:  T(C): 36.7 (04-03-21 @ 08:25), Max: 37.3 (04-02-21 @ 20:15)  T(F): 98.1 (04-03-21 @ 08:25), Max: 99.1 (04-02-21 @ 20:15)  HR: 69 (04-03-21 @ 08:25) (69 - 80)  BP: 101/63 (04-03-21 @ 08:25) (101/63 - 118/80)  BP(mean): --  ABP: --  ABP(mean): --  RR: 17 (04-03-21 @ 08:25) (16 - 20)  SpO2: 98% (04-03-21 @ 08:25) (96% - 100%)  CVP(mm Hg): --  CVP(cm H2O): --    Ins and Outs     04-02-21 @ 07:01  -  04-03-21 @ 07:00  --------------------------------------------------------  IN: 604 mL / OUT: 0 mL / NET: 604 mL    04-03-21 @ 07:01  -  04-03-21 @ 14:09  --------------------------------------------------------  IN: 200 mL / OUT: 0 mL / NET: 200 mL          Weight (kg): 97.5 (04-01-21 @ 08:16)        I&O's Detail    02 Apr 2021 07:01  -  03 Apr 2021 07:00  --------------------------------------------------------  IN:    Oral Fluid: 604 mL  Total IN: 604 mL    OUT:  Total OUT: 0 mL    Total NET: 604 mL      03 Apr 2021 07:01  -  03 Apr 2021 14:09  --------------------------------------------------------  IN:    Oral Fluid: 200 mL  Total IN: 200 mL    OUT:  Total OUT: 0 mL    Total NET: 200 mL

## 2021-04-03 NOTE — DISCHARGE NOTE PROVIDER - NSDCMRMEDTOKEN_GEN_ALL_CORE_FT
aspirin 81 mg oral tablet, chewable: 1 tab(s) orally once a day  Lipitor:   naltrexone:   NIFEdipine:   Pepcid:   sertraline 50 mg oral tablet: 3 tab(s) orally once a day  traZODone 150 mg oral tablet: 1 tab(s) orally once a day (at bedtime)   acetaminophen 325 mg oral tablet: 2 tab(s) orally every 6 hours, As needed, Temp greater or equal to 38C (100.4F), Mild Pain (1 - 3)  atorvastatin 40 mg oral tablet: 1 tab(s) orally once a day (at bedtime)  benzonatate 100 mg oral capsule: 2 cap(s) orally 3 times a day  cyclobenzaprine 5 mg oral tablet: 1 tab(s) orally 2 times a day, As needed, Muscle Spasm  hydrocodone-homatropine 5 mg-1.5 mg/5 mL oral syrup: 5 milliliter(s) orally every 6 hours  insulin glargine: 40 unit(s) injectable once a day (at bedtime)  insulin lispro 100 units/mL injectable solution: 10 unit(s) injectable 3 times a day (before meals)  insulin lispro 100 units/mL injectable solution:  injectable   insulin lispro 100 units/mL injectable solution:  injectable   pantoprazole 40 mg oral delayed release tablet: 1 tab(s) orally once a day (before a meal)  sertraline 50 mg oral tablet: 3 tab(s) orally once a day  tamsulosin 0.4 mg oral capsule: 1 cap(s) orally once a day (at bedtime)  traMADol 50 mg oral tablet: 1 tab(s) orally every 8 hours, As needed, Moderate Pain (4 - 6)  traZODone 150 mg oral tablet: 1 tab(s) orally once a day (at bedtime)

## 2021-04-04 ENCOUNTER — NON-APPOINTMENT (OUTPATIENT)
Age: 61
End: 2021-04-04

## 2021-04-04 ENCOUNTER — INPATIENT (INPATIENT)
Facility: HOSPITAL | Age: 61
LOS: 7 days | Discharge: PSYCHIATRIC OR SUBSTANCE USE FACILITY | End: 2021-04-12
Attending: HOSPITALIST | Admitting: HOSPITALIST
Payer: MEDICAID

## 2021-04-04 ENCOUNTER — TRANSCRIPTION ENCOUNTER (OUTPATIENT)
Age: 61
End: 2021-04-04

## 2021-04-04 VITALS
SYSTOLIC BLOOD PRESSURE: 104 MMHG | DIASTOLIC BLOOD PRESSURE: 62 MMHG | HEART RATE: 76 BPM | TEMPERATURE: 98 F | OXYGEN SATURATION: 94 % | RESPIRATION RATE: 15 BRPM

## 2021-04-04 VITALS — WEIGHT: 214.95 LBS | HEIGHT: 70.98 IN

## 2021-04-04 DIAGNOSIS — M19.019 PRIMARY OSTEOARTHRITIS, UNSPECIFIED SHOULDER: Chronic | ICD-10-CM

## 2021-04-04 DIAGNOSIS — E11.9 TYPE 2 DIABETES MELLITUS WITHOUT COMPLICATIONS: ICD-10-CM

## 2021-04-04 DIAGNOSIS — J85.1 ABSCESS OF LUNG WITH PNEUMONIA: ICD-10-CM

## 2021-04-04 DIAGNOSIS — R04.2 HEMOPTYSIS: ICD-10-CM

## 2021-04-04 DIAGNOSIS — R04.89 HEMORRHAGE FROM OTHER SITES IN RESPIRATORY PASSAGES: ICD-10-CM

## 2021-04-04 LAB
ALBUMIN SERPL ELPH-MCNC: 2.5 G/DL — LOW (ref 3.3–5)
ALP SERPL-CCNC: 71 U/L — SIGNIFICANT CHANGE UP (ref 40–120)
ALT FLD-CCNC: 21 U/L — SIGNIFICANT CHANGE UP (ref 10–45)
ANION GAP SERPL CALC-SCNC: 11 MMOL/L — SIGNIFICANT CHANGE UP (ref 5–17)
AST SERPL-CCNC: 26 U/L — SIGNIFICANT CHANGE UP (ref 10–40)
BASOPHILS # BLD AUTO: 0.05 K/UL — SIGNIFICANT CHANGE UP (ref 0–0.2)
BASOPHILS NFR BLD AUTO: 0.5 % — SIGNIFICANT CHANGE UP (ref 0–2)
BILIRUB SERPL-MCNC: 0.3 MG/DL — SIGNIFICANT CHANGE UP (ref 0.2–1.2)
BUN SERPL-MCNC: 9 MG/DL — SIGNIFICANT CHANGE UP (ref 7–23)
CALCIUM SERPL-MCNC: 9.3 MG/DL — SIGNIFICANT CHANGE UP (ref 8.4–10.5)
CHLORIDE SERPL-SCNC: 105 MMOL/L — SIGNIFICANT CHANGE UP (ref 96–108)
CO2 SERPL-SCNC: 22 MMOL/L — SIGNIFICANT CHANGE UP (ref 22–31)
CREAT SERPL-MCNC: 0.82 MG/DL — SIGNIFICANT CHANGE UP (ref 0.5–1.3)
EOSINOPHIL # BLD AUTO: 0.29 K/UL — SIGNIFICANT CHANGE UP (ref 0–0.5)
EOSINOPHIL NFR BLD AUTO: 3 % — SIGNIFICANT CHANGE UP (ref 0–6)
GLUCOSE BLDC GLUCOMTR-MCNC: 107 MG/DL — HIGH (ref 70–99)
GLUCOSE BLDC GLUCOMTR-MCNC: 157 MG/DL — HIGH (ref 70–99)
GLUCOSE BLDC GLUCOMTR-MCNC: 158 MG/DL — HIGH (ref 70–99)
GLUCOSE BLDC GLUCOMTR-MCNC: 204 MG/DL — HIGH (ref 70–99)
GLUCOSE BLDC GLUCOMTR-MCNC: 219 MG/DL — HIGH (ref 70–99)
GLUCOSE SERPL-MCNC: 184 MG/DL — HIGH (ref 70–99)
HCT VFR BLD CALC: 30.6 % — LOW (ref 39–50)
HGB BLD-MCNC: 10.2 G/DL — LOW (ref 13–17)
IMM GRANULOCYTES NFR BLD AUTO: 3.4 % — HIGH (ref 0–1.5)
LYMPHOCYTES # BLD AUTO: 1.75 K/UL — SIGNIFICANT CHANGE UP (ref 1–3.3)
LYMPHOCYTES # BLD AUTO: 18.2 % — SIGNIFICANT CHANGE UP (ref 13–44)
MCHC RBC-ENTMCNC: 31.7 PG — SIGNIFICANT CHANGE UP (ref 27–34)
MCHC RBC-ENTMCNC: 33.3 GM/DL — SIGNIFICANT CHANGE UP (ref 32–36)
MCV RBC AUTO: 95 FL — SIGNIFICANT CHANGE UP (ref 80–100)
MONOCYTES # BLD AUTO: 0.83 K/UL — SIGNIFICANT CHANGE UP (ref 0–0.9)
MONOCYTES NFR BLD AUTO: 8.6 % — SIGNIFICANT CHANGE UP (ref 2–14)
NEUTROPHILS # BLD AUTO: 6.37 K/UL — SIGNIFICANT CHANGE UP (ref 1.8–7.4)
NEUTROPHILS NFR BLD AUTO: 66.3 % — SIGNIFICANT CHANGE UP (ref 43–77)
NRBC # BLD: 0 /100 WBCS — SIGNIFICANT CHANGE UP (ref 0–0)
PLATELET # BLD AUTO: 363 K/UL — SIGNIFICANT CHANGE UP (ref 150–400)
POTASSIUM SERPL-MCNC: 3.6 MMOL/L — SIGNIFICANT CHANGE UP (ref 3.5–5.3)
POTASSIUM SERPL-SCNC: 3.6 MMOL/L — SIGNIFICANT CHANGE UP (ref 3.5–5.3)
PROT SERPL-MCNC: 7.2 G/DL — SIGNIFICANT CHANGE UP (ref 6–8.3)
RBC # BLD: 3.22 M/UL — LOW (ref 4.2–5.8)
RBC # FLD: 12.4 % — SIGNIFICANT CHANGE UP (ref 10.3–14.5)
SODIUM SERPL-SCNC: 138 MMOL/L — SIGNIFICANT CHANGE UP (ref 135–145)
WBC # BLD: 9.62 K/UL — SIGNIFICANT CHANGE UP (ref 3.8–10.5)
WBC # FLD AUTO: 9.62 K/UL — SIGNIFICANT CHANGE UP (ref 3.8–10.5)

## 2021-04-04 PROCEDURE — 87521 HEPATITIS C PROBE&RVRS TRNSC: CPT

## 2021-04-04 PROCEDURE — 82803 BLOOD GASES ANY COMBINATION: CPT

## 2021-04-04 PROCEDURE — 70450 CT HEAD/BRAIN W/O DYE: CPT

## 2021-04-04 PROCEDURE — 71250 CT THORAX DX C-: CPT

## 2021-04-04 PROCEDURE — 71275 CT ANGIOGRAPHY CHEST: CPT | Mod: 26

## 2021-04-04 PROCEDURE — 99291 CRITICAL CARE FIRST HOUR: CPT | Mod: 25

## 2021-04-04 PROCEDURE — 86901 BLOOD TYPING SEROLOGIC RH(D): CPT

## 2021-04-04 PROCEDURE — 80053 COMPREHEN METABOLIC PANEL: CPT

## 2021-04-04 PROCEDURE — 80048 BASIC METABOLIC PNL TOTAL CA: CPT

## 2021-04-04 PROCEDURE — 85652 RBC SED RATE AUTOMATED: CPT

## 2021-04-04 PROCEDURE — 82272 OCCULT BLD FECES 1-3 TESTS: CPT

## 2021-04-04 PROCEDURE — 78226 HEPATOBILIARY SYSTEM IMAGING: CPT

## 2021-04-04 PROCEDURE — 93971 EXTREMITY STUDY: CPT

## 2021-04-04 PROCEDURE — 99233 SBSQ HOSP IP/OBS HIGH 50: CPT

## 2021-04-04 PROCEDURE — 71045 X-RAY EXAM CHEST 1 VIEW: CPT

## 2021-04-04 PROCEDURE — 86140 C-REACTIVE PROTEIN: CPT

## 2021-04-04 PROCEDURE — 86337 INSULIN ANTIBODIES: CPT

## 2021-04-04 PROCEDURE — 83036 HEMOGLOBIN GLYCOSYLATED A1C: CPT

## 2021-04-04 PROCEDURE — 87077 CULTURE AEROBIC IDENTIFY: CPT

## 2021-04-04 PROCEDURE — 71260 CT THORAX DX C+: CPT

## 2021-04-04 PROCEDURE — 82962 GLUCOSE BLOOD TEST: CPT

## 2021-04-04 PROCEDURE — 87186 SC STD MICRODIL/AGAR DIL: CPT

## 2021-04-04 PROCEDURE — A9537: CPT

## 2021-04-04 PROCEDURE — 86341 ISLET CELL ANTIBODY: CPT

## 2021-04-04 PROCEDURE — 84681 ASSAY OF C-PEPTIDE: CPT

## 2021-04-04 PROCEDURE — 83735 ASSAY OF MAGNESIUM: CPT

## 2021-04-04 PROCEDURE — 86480 TB TEST CELL IMMUN MEASURE: CPT

## 2021-04-04 PROCEDURE — 83690 ASSAY OF LIPASE: CPT

## 2021-04-04 PROCEDURE — 86850 RBC ANTIBODY SCREEN: CPT

## 2021-04-04 PROCEDURE — 87086 URINE CULTURE/COLONY COUNT: CPT

## 2021-04-04 PROCEDURE — 87075 CULTR BACTERIA EXCEPT BLOOD: CPT

## 2021-04-04 PROCEDURE — 82009 KETONE BODYS QUAL: CPT

## 2021-04-04 PROCEDURE — 87641 MR-STAPH DNA AMP PROBE: CPT

## 2021-04-04 PROCEDURE — 86769 SARS-COV-2 COVID-19 ANTIBODY: CPT

## 2021-04-04 PROCEDURE — 36415 COLL VENOUS BLD VENIPUNCTURE: CPT

## 2021-04-04 PROCEDURE — 82947 ASSAY GLUCOSE BLOOD QUANT: CPT

## 2021-04-04 PROCEDURE — 88112 CYTOPATH CELL ENHANCE TECH: CPT

## 2021-04-04 PROCEDURE — 76705 ECHO EXAM OF ABDOMEN: CPT

## 2021-04-04 PROCEDURE — 88305 TISSUE EXAM BY PATHOLOGIST: CPT

## 2021-04-04 PROCEDURE — 80076 HEPATIC FUNCTION PANEL: CPT

## 2021-04-04 PROCEDURE — 86900 BLOOD TYPING SEROLOGIC ABO: CPT

## 2021-04-04 PROCEDURE — 93005 ELECTROCARDIOGRAM TRACING: CPT

## 2021-04-04 PROCEDURE — 87449 NOS EACH ORGANISM AG IA: CPT

## 2021-04-04 PROCEDURE — 86803 HEPATITIS C AB TEST: CPT

## 2021-04-04 PROCEDURE — 96361 HYDRATE IV INFUSION ADD-ON: CPT

## 2021-04-04 PROCEDURE — 85027 COMPLETE CBC AUTOMATED: CPT

## 2021-04-04 PROCEDURE — 87205 SMEAR GRAM STAIN: CPT

## 2021-04-04 PROCEDURE — 87635 SARS-COV-2 COVID-19 AMP PRB: CPT

## 2021-04-04 PROCEDURE — 84100 ASSAY OF PHOSPHORUS: CPT

## 2021-04-04 PROCEDURE — 84145 PROCALCITONIN (PCT): CPT

## 2021-04-04 PROCEDURE — 96374 THER/PROPH/DIAG INJ IV PUSH: CPT

## 2021-04-04 PROCEDURE — 74177 CT ABD & PELVIS W/CONTRAST: CPT

## 2021-04-04 PROCEDURE — 87040 BLOOD CULTURE FOR BACTERIA: CPT

## 2021-04-04 PROCEDURE — 87070 CULTURE OTHR SPECIMN AEROBIC: CPT

## 2021-04-04 PROCEDURE — 71275 CT ANGIOGRAPHY CHEST: CPT

## 2021-04-04 PROCEDURE — 84484 ASSAY OF TROPONIN QUANT: CPT

## 2021-04-04 PROCEDURE — 87389 HIV-1 AG W/HIV-1&-2 AB AG IA: CPT

## 2021-04-04 PROCEDURE — 87640 STAPH A DNA AMP PROBE: CPT

## 2021-04-04 PROCEDURE — 99239 HOSP IP/OBS DSCHRG MGMT >30: CPT | Mod: GC

## 2021-04-04 PROCEDURE — 85025 COMPLETE CBC W/AUTO DIFF WBC: CPT

## 2021-04-04 RX ORDER — HYDROMORPHONE HYDROCHLORIDE 2 MG/ML
0.5 INJECTION INTRAMUSCULAR; INTRAVENOUS; SUBCUTANEOUS ONCE
Refills: 0 | Status: DISCONTINUED | OUTPATIENT
Start: 2021-04-04 | End: 2021-04-04

## 2021-04-04 RX ORDER — INSULIN LISPRO 100/ML
10 VIAL (ML) SUBCUTANEOUS
Refills: 0 | Status: DISCONTINUED | OUTPATIENT
Start: 2021-04-04 | End: 2021-04-07

## 2021-04-04 RX ORDER — INSULIN LISPRO 100/ML
VIAL (ML) SUBCUTANEOUS AT BEDTIME
Refills: 0 | Status: DISCONTINUED | OUTPATIENT
Start: 2021-04-04 | End: 2021-04-12

## 2021-04-04 RX ORDER — ACETAMINOPHEN 500 MG
2 TABLET ORAL
Qty: 0 | Refills: 0 | DISCHARGE
Start: 2021-04-04

## 2021-04-04 RX ORDER — INSULIN GLARGINE 100 [IU]/ML
40 INJECTION, SOLUTION SUBCUTANEOUS
Qty: 0 | Refills: 0 | DISCHARGE
Start: 2021-04-04

## 2021-04-04 RX ORDER — TAMSULOSIN HYDROCHLORIDE 0.4 MG/1
1 CAPSULE ORAL
Qty: 0 | Refills: 0 | DISCHARGE
Start: 2021-04-04

## 2021-04-04 RX ORDER — TAMSULOSIN HYDROCHLORIDE 0.4 MG/1
0.4 CAPSULE ORAL AT BEDTIME
Refills: 0 | Status: DISCONTINUED | OUTPATIENT
Start: 2021-04-04 | End: 2021-04-12

## 2021-04-04 RX ORDER — TRAMADOL HYDROCHLORIDE 50 MG/1
1 TABLET ORAL
Qty: 0 | Refills: 0 | DISCHARGE
Start: 2021-04-04

## 2021-04-04 RX ORDER — CEFTRIAXONE 500 MG/1
1000 INJECTION, POWDER, FOR SOLUTION INTRAMUSCULAR; INTRAVENOUS EVERY 24 HOURS
Refills: 0 | Status: DISCONTINUED | OUTPATIENT
Start: 2021-04-04 | End: 2021-04-04

## 2021-04-04 RX ORDER — FAMOTIDINE 10 MG/ML
0 INJECTION INTRAVENOUS
Qty: 0 | Refills: 0 | DISCHARGE

## 2021-04-04 RX ORDER — PANTOPRAZOLE SODIUM 20 MG/1
40 TABLET, DELAYED RELEASE ORAL
Refills: 0 | Status: DISCONTINUED | OUTPATIENT
Start: 2021-04-04 | End: 2021-04-12

## 2021-04-04 RX ORDER — SERTRALINE 25 MG/1
50 TABLET, FILM COATED ORAL DAILY
Refills: 0 | Status: DISCONTINUED | OUTPATIENT
Start: 2021-04-04 | End: 2021-04-07

## 2021-04-04 RX ORDER — ATORVASTATIN CALCIUM 80 MG/1
1 TABLET, FILM COATED ORAL
Qty: 0 | Refills: 0 | DISCHARGE
Start: 2021-04-04

## 2021-04-04 RX ORDER — TRAZODONE HCL 50 MG
1 TABLET ORAL
Qty: 0 | Refills: 0 | DISCHARGE
Start: 2021-04-04

## 2021-04-04 RX ORDER — PANTOPRAZOLE SODIUM 20 MG/1
1 TABLET, DELAYED RELEASE ORAL
Qty: 0 | Refills: 0 | DISCHARGE
Start: 2021-04-04

## 2021-04-04 RX ORDER — NALTREXONE HYDROCHLORIDE 50 MG/1
0 TABLET, FILM COATED ORAL
Qty: 0 | Refills: 0 | DISCHARGE

## 2021-04-04 RX ORDER — INSULIN GLARGINE 100 [IU]/ML
40 INJECTION, SOLUTION SUBCUTANEOUS AT BEDTIME
Refills: 0 | Status: DISCONTINUED | OUTPATIENT
Start: 2021-04-04 | End: 2021-04-07

## 2021-04-04 RX ORDER — SERTRALINE 25 MG/1
3 TABLET, FILM COATED ORAL
Qty: 0 | Refills: 0 | DISCHARGE
Start: 2021-04-04

## 2021-04-04 RX ORDER — TRAMADOL HYDROCHLORIDE 50 MG/1
50 TABLET ORAL EVERY 6 HOURS
Refills: 0 | Status: DISCONTINUED | OUTPATIENT
Start: 2021-04-04 | End: 2021-04-07

## 2021-04-04 RX ORDER — INSULIN LISPRO 100/ML
0 VIAL (ML) SUBCUTANEOUS
Qty: 0 | Refills: 0 | DISCHARGE
Start: 2021-04-04

## 2021-04-04 RX ORDER — METRONIDAZOLE 500 MG
500 TABLET ORAL EVERY 8 HOURS
Refills: 0 | Status: DISCONTINUED | OUTPATIENT
Start: 2021-04-04 | End: 2021-04-12

## 2021-04-04 RX ORDER — CYCLOBENZAPRINE HYDROCHLORIDE 10 MG/1
1 TABLET, FILM COATED ORAL
Qty: 0 | Refills: 0 | DISCHARGE
Start: 2021-04-04

## 2021-04-04 RX ORDER — TRAZODONE HCL 50 MG
150 TABLET ORAL AT BEDTIME
Refills: 0 | Status: DISCONTINUED | OUTPATIENT
Start: 2021-04-04 | End: 2021-04-07

## 2021-04-04 RX ORDER — CEFTRIAXONE 500 MG/1
1000 INJECTION, POWDER, FOR SOLUTION INTRAMUSCULAR; INTRAVENOUS EVERY 24 HOURS
Refills: 0 | Status: COMPLETED | OUTPATIENT
Start: 2021-04-05 | End: 2021-04-09

## 2021-04-04 RX ORDER — OXYCODONE HYDROCHLORIDE 5 MG/1
5 TABLET ORAL ONCE
Refills: 0 | Status: DISCONTINUED | OUTPATIENT
Start: 2021-04-04 | End: 2021-04-04

## 2021-04-04 RX ORDER — DEXTROSE 50 % IN WATER 50 %
25 SYRINGE (ML) INTRAVENOUS ONCE
Refills: 0 | Status: DISCONTINUED | OUTPATIENT
Start: 2021-04-04 | End: 2021-04-12

## 2021-04-04 RX ORDER — OXYCODONE HYDROCHLORIDE 5 MG/1
5 TABLET ORAL EVERY 6 HOURS
Refills: 0 | Status: DISCONTINUED | OUTPATIENT
Start: 2021-04-04 | End: 2021-04-07

## 2021-04-04 RX ORDER — ACETAMINOPHEN 500 MG
650 TABLET ORAL EVERY 6 HOURS
Refills: 0 | Status: DISCONTINUED | OUTPATIENT
Start: 2021-04-04 | End: 2021-04-12

## 2021-04-04 RX ORDER — INSULIN LISPRO 100/ML
10 VIAL (ML) SUBCUTANEOUS
Qty: 0 | Refills: 0 | DISCHARGE
Start: 2021-04-04

## 2021-04-04 RX ORDER — NIFEDIPINE 30 MG
0 TABLET, EXTENDED RELEASE 24 HR ORAL
Qty: 0 | Refills: 0 | DISCHARGE

## 2021-04-04 RX ORDER — ATORVASTATIN CALCIUM 80 MG/1
40 TABLET, FILM COATED ORAL AT BEDTIME
Refills: 0 | Status: DISCONTINUED | OUTPATIENT
Start: 2021-04-04 | End: 2021-04-12

## 2021-04-04 RX ORDER — INSULIN LISPRO 100/ML
VIAL (ML) SUBCUTANEOUS
Refills: 0 | Status: DISCONTINUED | OUTPATIENT
Start: 2021-04-04 | End: 2021-04-12

## 2021-04-04 RX ORDER — ATORVASTATIN CALCIUM 80 MG/1
0 TABLET, FILM COATED ORAL
Qty: 0 | Refills: 0 | DISCHARGE

## 2021-04-04 RX ORDER — HEPARIN SODIUM 5000 [USP'U]/ML
5000 INJECTION INTRAVENOUS; SUBCUTANEOUS EVERY 8 HOURS
Refills: 0 | Status: DISCONTINUED | OUTPATIENT
Start: 2021-04-04 | End: 2021-04-09

## 2021-04-04 RX ADMIN — Medication 100 MILLIGRAM(S): at 22:48

## 2021-04-04 RX ADMIN — SERTRALINE 150 MILLIGRAM(S): 25 TABLET, FILM COATED ORAL at 12:01

## 2021-04-04 RX ADMIN — Medication 200 MILLIGRAM(S): at 14:41

## 2021-04-04 RX ADMIN — Medication 10 UNIT(S): at 08:38

## 2021-04-04 RX ADMIN — LIDOCAINE 2 PATCH: 4 CREAM TOPICAL at 14:41

## 2021-04-04 RX ADMIN — Medication 2: at 12:06

## 2021-04-04 RX ADMIN — Medication 4: at 17:05

## 2021-04-04 RX ADMIN — Medication 2: at 08:38

## 2021-04-04 RX ADMIN — Medication 500 MILLIGRAM(S): at 04:57

## 2021-04-04 RX ADMIN — TAMSULOSIN HYDROCHLORIDE 0.4 MILLIGRAM(S): 0.4 CAPSULE ORAL at 22:48

## 2021-04-04 RX ADMIN — HEPARIN SODIUM 5000 UNIT(S): 5000 INJECTION INTRAVENOUS; SUBCUTANEOUS at 14:41

## 2021-04-04 RX ADMIN — Medication 150 MILLIGRAM(S): at 00:22

## 2021-04-04 RX ADMIN — HEPARIN SODIUM 5000 UNIT(S): 5000 INJECTION INTRAVENOUS; SUBCUTANEOUS at 22:47

## 2021-04-04 RX ADMIN — Medication 600 MILLIGRAM(S): at 04:57

## 2021-04-04 RX ADMIN — HYDROMORPHONE HYDROCHLORIDE 0.5 MILLIGRAM(S): 2 INJECTION INTRAMUSCULAR; INTRAVENOUS; SUBCUTANEOUS at 00:51

## 2021-04-04 RX ADMIN — OXYCODONE HYDROCHLORIDE 5 MILLIGRAM(S): 5 TABLET ORAL at 12:00

## 2021-04-04 RX ADMIN — ATORVASTATIN CALCIUM 40 MILLIGRAM(S): 80 TABLET, FILM COATED ORAL at 22:48

## 2021-04-04 RX ADMIN — POLYETHYLENE GLYCOL 3350 17 GRAM(S): 17 POWDER, FOR SOLUTION ORAL at 17:06

## 2021-04-04 RX ADMIN — POLYETHYLENE GLYCOL 3350 17 GRAM(S): 17 POWDER, FOR SOLUTION ORAL at 04:57

## 2021-04-04 RX ADMIN — OXYCODONE HYDROCHLORIDE 5 MILLIGRAM(S): 5 TABLET ORAL at 23:43

## 2021-04-04 RX ADMIN — Medication 100 MILLIGRAM(S): at 00:21

## 2021-04-04 RX ADMIN — Medication 150 MILLIGRAM(S): at 22:55

## 2021-04-04 RX ADMIN — HYDROMORPHONE HYDROCHLORIDE 0.5 MILLIGRAM(S): 2 INJECTION INTRAMUSCULAR; INTRAVENOUS; SUBCUTANEOUS at 23:48

## 2021-04-04 RX ADMIN — Medication 500 MILLIGRAM(S): at 14:41

## 2021-04-04 RX ADMIN — CEFTRIAXONE 100 MILLIGRAM(S): 500 INJECTION, POWDER, FOR SOLUTION INTRAMUSCULAR; INTRAVENOUS at 17:33

## 2021-04-04 RX ADMIN — Medication 10 UNIT(S): at 17:04

## 2021-04-04 RX ADMIN — Medication 10 UNIT(S): at 12:06

## 2021-04-04 RX ADMIN — Medication 650 MILLIGRAM(S): at 04:55

## 2021-04-04 RX ADMIN — PANTOPRAZOLE SODIUM 40 MILLIGRAM(S): 20 TABLET, DELAYED RELEASE ORAL at 08:39

## 2021-04-04 RX ADMIN — HYDROMORPHONE HYDROCHLORIDE 0.5 MILLIGRAM(S): 2 INJECTION INTRAMUSCULAR; INTRAVENOUS; SUBCUTANEOUS at 23:59

## 2021-04-04 RX ADMIN — OXYCODONE HYDROCHLORIDE 5 MILLIGRAM(S): 5 TABLET ORAL at 22:48

## 2021-04-04 RX ADMIN — Medication 81 MILLIGRAM(S): at 12:00

## 2021-04-04 RX ADMIN — HEPARIN SODIUM 5000 UNIT(S): 5000 INJECTION INTRAVENOUS; SUBCUTANEOUS at 05:00

## 2021-04-04 RX ADMIN — Medication 200 MILLIGRAM(S): at 04:56

## 2021-04-04 RX ADMIN — OXYCODONE HYDROCHLORIDE 5 MILLIGRAM(S): 5 TABLET ORAL at 13:00

## 2021-04-04 NOTE — H&P ADULT - NSHPREVIEWOFSYSTEMS_GEN_ALL_CORE
General: No Weight change +Fatigue, + HA, No more dizziness	  Skin/Breast: No Rashes, Lesions, Masses; + Pruritis of the feet  Ophthalmologic: No worsening vision- pt wears glasses  ENMT: No Hearing loss, + toothache and loose lower front teeth, no sore throat	  Respiratory and Thorax: Occasional cough/ No Wheezing/ No SOB/ +Hemoptysis/ No increased sputum production  Cardiovascular: No Chest pain, no Palpitations, no Diaphoresis	  Gastrointestinal: No recent Nausea, no recent Vomiting, No more constipation, + Diarrhea, no Appetite Change	  Genitourinary: No Hematuria, No Dysuria, +Frequency, + Nocturia	  Musculoskeletal: BL shoulder pains and chronic back pain	  Neurological: No Seizures	  Psychiatric: No Dementia, No Depression  Hematology/Lymphatics: No prior h/o bleeding, no edema	  Endocrine" + newly diagnosed DM  Allergic/Immunologic: No Anaphylaxis, Intolerance, or Recent illnesses

## 2021-04-04 NOTE — DISCHARGE NOTE NURSING/CASE MANAGEMENT/SOCIAL WORK - PATIENT PORTAL LINK FT
You can access the FollowMyHealth Patient Portal offered by [facility] by registering at the following website: [email or web address]. By joining [employer]’s FollowMyHealth portal, you will also be able to view your health information using other applications (apps) compatible with our system.

## 2021-04-04 NOTE — PROGRESS NOTE ADULT - REASON FOR ADMISSION
DKA

## 2021-04-04 NOTE — DISCHARGE NOTE NURSING/CASE MANAGEMENT/SOCIAL WORK - NSDCVIVACCINE_GEN_ALL_CORE_FT
Influenza , 2020/10/1 12:46 , Dominic Burr (RN)  Pneumococcal polysaccharide (PPSV23) , 2020/10/16 12:28 , Estela Hernandez (RN)

## 2021-04-04 NOTE — PROGRESS NOTE ADULT - ASSESSMENT
Physical Examination:  GENERAL:               Alert, Oriented, No acute distress.    HEENT:                    Pupils equal, reactive to light.  Symmetric. No JVD, Moist MM  PULM:                      Bilateral air entry, No Rales, No Rhonchi, No Wheezing  CVS:                        S1, S2,  No Murmur  ABD:                        Soft, nondistended, nontender, normoactive bowel sounds,   EXT:                         No edema, nontender, No Cyanosis or Clubbing   Vascular:                 Warm Extremities, Normal Capillary refill, Normal Distal Pulses  SKIN:                       Warm and well perfused, no rashes noted.   NEURO:                   Alert, oriented, interactive, nonfocal, follows commands  PSYC:                      Calm, + Insight.    Assessment:  1. Lung Abscess due to Streptococcus parasanguinis  2. Diabetic ketoacidosis in patient with new DM2 Resolved  3. Acute kidney injury   4. Gallstones with normal hida  5. Hypertension  6. Hyperlipidemia   7. active smoker.     Plan  New hemoptysis - r/o worsening abscess eroding into blood vessel, vs popped blood vessel from coughing   will obtain cta chest   d/c Hep sq,   sputum culture   if bleeding continues will need thoracic eval   will add hycodan x 1 day for cough and d/c mucinex    patient complaining of headache, suspect from cough will add Mucinex     - S/p Bronchoscopy , + Cultures on ceftriaxone suspect will need prolonged abx 4-6 weeks    d/w ID, when stable can change abx to augmentin  - Repeat CT scan in 4-6 weeks with abx.   - F/u BAL cultures and Cytology    - CT scan showing heterogenous mass, ? fluid filled cavity. Seems to be new compared to 3 months ago. Suspicious for an abscess given rapid growth less likely malignancy.  - Will continue treatment with IV antibiotics    - BP and glucose control as per primary team  - DVT prophylaxis with venodynes  - Will follow

## 2021-04-04 NOTE — H&P ADULT - HISTORY OF PRESENT ILLNESS
This 60 year old male who had had COVID-19 in May 2020 was admitted to the [facility] ICU on 3/26/21 with DKA after presenting to the ER c/o weakness, N/V, abdominal pain and chest pain and being found to have very elevated blood glucose levels, BUN, and Creatinine.  He was started on an insulin drip and was given antibiotics.  His blood glucose improved but he was considered to be a newly diagnosed diabetic as his A1c was also newly elevated.  During his initial work up, he was found to have a RLL lung mass-like lesion measuring 4.5 cm on a chest CT scan and a dilated extrahepatic CBD with cholelithiasis and pneumobilia.  A repeat CT scan revealed enlargement of the RLL lesion with air fluid levels.  A bronchoscopy with BAL was performed on 4/1/21 and cultures revealed Streptococcus parasanguinis. On 4/2/2012 a HIDA scan was negative for acute cholecystitis.  During his admission, he had multiple issues including worsening of his chronic back pain and headache.  However, his blood glucose was controlled with basal insulin and pre-meal insulin and he was getting ready for discharge on PO Augemntin as recommended by ID when he developed mild hemoptysis.  This was originally thought to be traumatic in nature until it worsened.  A CT Angio revealed  a small focus of contrast extravasation within the cavitary lesion, which was possibly a small pseudoaneurysm or an active extravasation with the history of hemoptysis.  CT Surgery at [facility] was contacted and the pt was transferred to the [facility] CTICU for observation and possible intervention.  He states that he has been spitting up small amounts of dark blood.  He continues to c/o headaches, diarrhea, and generalized abdominal pain.  He denies dyspnea or SOB.

## 2021-04-04 NOTE — PROGRESS NOTE ADULT - NSICDXPILOT_GEN_ALL_CORE
[place]

## 2021-04-04 NOTE — PROGRESS NOTE ADULT - ASSESSMENT
60M with a PMH Left Thyroid Nodule, HLD, Hep C and a known history of Pre-DM who presented to the ED with abdominal pain, cough, N/V, Chest Pain, intermittent and unintentional weight loss of over 25lbs in the last month. In the ED he was found to Hypoxic on RA with Borderline BPs. Laboratory findings were significant for WBC of 12.62 an AG of 18 with Serum glucose of 674. CT Angio reveled a RLL 4.5cm Heterogeneous masslike opacity, not seen in prior studies in December. Pt was initiated on IV Abx then admitted to ICU for further management. Pt was down graded after closure of his AG. Pt with progressive cough on current Abx with mild fevers the night prior. BCx and Sputum Cxs drawn with ID consulted for possible impending Abscess vs Postobstructive PNA in the setting of possible Pulmonary CA.       #DKA in the setting of New Dx of T2DM  -DKA resolved, Uncontrolled Sugars persists  -CMP this am: Closed AG- 13 from AG of 18 on admission with moderate ketones   -S/p downgrade from ICU   -F/u AM BMP continue to monitor for recurrence of AG   -Continue with ISS  -Continue with IVF Maintenance Fluids at 100cc/h  -Continue with IV Abx, as source of Infection may be PNA   -ID Consulted for possible pulmonary Abscess, f/u recc  -Continue basal Insulin/Lantus increased to 30U from 25U the night prior    -Continue with Pre-Meal Ademelog increased to 7U TID from 5U   -Hypoglycemic Protocol in place  -Diet: Carb-consistent   -DM educator consulted for pt education on New Dx of T2DM and medication management       #Productive Cough/Pulmonary Nodule   -Thought to be in the setting of CAP, consider Pulmonary Abscess vs Malignancy   -Pt with a known H/o Smoking   -Tmax 100.2 on 3/27/21, day 2 of IV Abx   -CT Angio: RLL 4.5cm Heterogeneous masslike opacity, not seen in prior studies in December  -Continue with IV Abx for now   -Would like to optimize Abx, consider Zosyn/Vanc await ID Recommendations   -ID Consulted for further evaluation   -F/u Blood and Sputum Cx  -Tylenol PRN for fevers   -Consider repeat CT as an outpt for close follow up         #Intermittent Hematochezia   #Abdominal Pain    -CT Chest: Cholelithiasis with air within GB, r/o Emphysematous Cholecystitis   -Blood streaks on wiping-Thought to be in the setting of Constipation/ Hemorrhoids  -CT Chest: Fecal Matter throughout colon w/o Obstruction, diverticulosis present    -F/u Colonoscopy as an out pt to also rule out malignancy   -FOBT deferred for now    -Continue with Famotidine 20mg qD   -Optimize Bowel Regimen to Miralax BID  -Continue with Senna qHs   -Continue with IVF Maintenance Fluids at 100cc/h  -PO Hydration encouraged   -Ambulation as tolerated also encouraged     #Advanced Directives   -Full Code per pt  -Pt with Capacity     #Chronic Ds Management  -BPH: Continue with Flomax 0.4mg qHs ;Monitor for signs of Urinary Retention   -Left Thyroid Nodule; To be followed up in an outpt setting   -HLD: Continue with Atorvastatin 40mg qHs   -Insomnia: Continue with Trazadone 150mg qHs   -Hepatitis C: Currently in Remission, Reflex MRNA negative in 2019    #DVT PPX  -SubQ Heparin          case d/w Dr. Quintero    60M with a PMH Left Thyroid Nodule, HLD, Hep C and a known history of Pre-DM who presented to the ED with abdominal pain, cough, N/V, Chest Pain, intermittent and unintentional weight loss of over 25lbs in the last month. In the ED he was found to Hypoxic on RA with Borderline BPs. Laboratory findings were significant for WBC of 12.62 an AG of 18 with Serum glucose of 674. CT Angio reveled a RLL 4.5cm Heterogeneous masslike opacity, not seen in prior studies in December. Pt was initiated on IV Abx then admitted to ICU for further management. Pt was down graded after closure of his AG, after failure to improved on former regimen, ID was consulted for management of suspected RLL abscess for which BAL was performed with Cxs obtained displaying growth of a rare Candida Albicans as well as Strep. Parasanguinis.       #Uncontrolled DM/ Resolved DKA  -DKA resolved with Sugars currently controlled on adjusted regimen   -CMP this am: Closed AG- 11 from AG of 18 on admission on 3/26   -F/u AM BMP continue to monitor for recurrence of AG   -Continue with ISS  -Continue with IV Abx-CTX , as source of Infection found to be RLL Abscess   -ID: Continue with IV CTX for now, Transition to Augmentin Tomorrow for LT Course  -Continue basal Insulin/Lantus increased to 40U  -Continue with Pre-Meal Ademelog 10U  -Hypoglycemic Protocol in place  -Diet: Carb-consistent   -DM educator On board       #Pulmonary Abscess s/p BAL    -Leukocytosis Resolved, WBC 9.62 from 12.14 the day prior   -CT Angio: RLL 4.5cm Heterogeneous masslike opacity, not seen in prior studies in December  -Repeat CT: cavitary lesion w/ air-fluid levels and endobronchial spread into RLL.  -CXR (4/1) No change in Lung pathology /Abcess  s/p BAL   -ID: Continue with IV CTX + Flagyl, switch to Augmentin x 5weeks starting 4/5  -Broch Cx: Rare Candida Albicans as well as Strep. Parasanguinis.   -Pulm: Pt with complaints of Hemoptysis, Loose teeth and Chest pain, would like CT Angio eval   -Oxy x 1 for pain managmt, would advise against Standing orders as pt will be DC'd in the am       #Intermittent Hematochezia   #Abdominal Pain thought to be 2/2 Acute Iraida  #Loose Stools   -CT Chest: Cholelithiasis with air within GB, r/o Emphysematous Cholecystitis   -HIDA Scan (4/2): Negative for Acute Choley  -Surgx: No Surgical Intervention at this time   -Blood on wiping prior to admission-Thought to be 2/2 Constipation/ Hemorrhoids, resolved  -CT Chest (3/26): Fecal Matter throughout colon w/o Obstruction, diverticulosis present    -F/u Colonoscopy as an out pt to also rule out malignancy, AVM or active diverticular bleed    -FOBT deferred for now    -Continue with Famotidine 20mg qD   -DC bowel Regimen in lue of recurrent loose stools, likely in the settign of Abx use  -Consider C.diff Studies should sxs persist in the am     #Advanced Directives   -Full Code per pt  -Pt with Capacity     #Chronic Ds Management  -BPH: Continue with Flomax 0.4mg qHs ;Monitor for signs of Urinary Retention   -Left Thyroid Nodule; To be followed up in an outpt setting   -HLD: Continue with Atorvastatin 40mg qHs   -Insomnia: Continue with Trazadone 150mg qHs   -Hepatitis C: Currently in Remission, Reflex MRNA negative in 2019    #DVT PPX  -SubQ Heparin          case d/w Dr. Josue   60M with a PMH Left Thyroid Nodule, HLD, Hep C and a known history of Pre-DM who presented to the ED with abdominal pain, cough, N/V, Chest Pain, intermittent and unintentional weight loss of over 25lbs in the last month. In the ED he was found to Hypoxic on RA with Borderline BPs. Laboratory findings were significant for WBC of 12.62 an AG of 18 with Serum glucose of 674. CT Angio reveled a RLL 4.5cm Heterogeneous masslike opacity, not seen in prior studies in December. Pt was initiated on IV Abx then admitted to ICU for further management. Pt was down graded after closure of his AG, after failure to improved on former regimen, ID was consulted for management of suspected RLL abscess for which BAL was performed with Cxs obtained displaying growth of a rare Candida Albicans as well as Strep. Parasanguinis.     #Pulmonary Abscess s/p BAL with recent Development of Hemoptysis   -Leukocytosis Resolved, WBC 9.62 from 12.14 the day prior   -CT Angio: RLL 4.5cm Heterogeneous masslike opacity, not seen in prior studies in December  -Repeat CT: cavitary lesion w/ air-fluid levels and endobronchial spread into RLL.  -CXR (4/1) No change in Lung pathology /Abcess  s/p BAL   -ID: Continue with IV CTX + Flagyl, switch to Augmentin x 5weeks starting 4/5  -Broch Cx: Rare Candida Albicans as well as Strep. Parasanguinis.   -Pulm: continue with Long Term Abx upon Discharge, f/u repeat CT Angio    -Persistent Hemoptysis s/p BAL, f/u Repeat CT angio to evaluate for source of bleed  -Consider Transfer to [facility] for embolization should significant findings present on repeat CT   -CT Surgery aware and willing to accept should CT return remarkable for active bleed       #Uncontrolled DM/ Resolved DKA  -DKA resolved with Sugars currently controlled on adjusted regimen   -CMP this am: Closed AG- 11 from AG of 18 on admission on 3/26   -F/u AM BMP continue to monitor for recurrence of AG   -Continue with ISS  -Continue with IV Abx-CTX , as source of Infection found to be RLL Abscess   -ID: Continue with IV CTX for now, Transition to Augmentin Tomorrow for LT Course  -Continue basal Insulin/Lantus increased to 40U  -Continue with Pre-Meal Ademelog 10U  -Hypoglycemic Protocol in place  -Diet: Carb-consistent   -DM educator On board         #Intermittent Hematochezia   #Abdominal Pain thought to be 2/2 Acute Iraida  #Loose Stools   -CT Chest: Cholelithiasis with air within GB, r/o Emphysematous Cholecystitis   -HIDA Scan (4/2): Negative for Acute Choley  -Surgx: No Surgical Intervention at this time   -Blood on wiping prior to admission-Thought to be 2/2 Constipation/ Hemorrhoids, resolved  -CT Chest (3/26): Fecal Matter throughout colon w/o Obstruction, diverticulosis present    -F/u Colonoscopy as an out pt to also rule out malignancy, AVM or active diverticular bleed    -FOBT deferred for now    -Continue with Famotidine 20mg qD   -DC bowel Regimen in lue of recurrent loose stools, likely in the settign of Abx use  -Consider C.diff Studies should sxs persist in the am     #Advanced Directives   -Full Code per pt  -Pt with Capacity     #Chronic Ds Management  -BPH: Continue with Flomax 0.4mg qHs ;Monitor for signs of Urinary Retention   -Left Thyroid Nodule; To be followed up in an outpt setting   -HLD: Continue with Atorvastatin 40mg qHs   -Insomnia: Continue with Trazadone 150mg qHs   -Hepatitis C: Currently in Remission, Reflex MRNA negative in 2019    #DVT PPX  -SubQ Heparin          case d/w Dr. Josue

## 2021-04-04 NOTE — H&P ADULT - NSHPSOCIALHISTORY_GEN_ALL_CORE
Single; unemployed ; lives in a rented room; denies h/o tobacco, EtOH, or recreational drugs Single; unemployed ; lives in a rented room; admits to having been a smoker until his admission at [facility]; denies EtOH, or recreational drugs

## 2021-04-04 NOTE — H&P ADULT - NSICDXPASTMEDICALHX_GEN_ALL_CORE_FT
PAST MEDICAL HISTORY:  2019 novel coronavirus disease (COVID-19) 5/2020    Back pain Chronic- mid and upper back    Benign hypertension     BPH associated with nocturia     Cholelithiasis Discovered at [facility] 3/2021    Diverticulosis Discovered at [facility] 3/2021    DKA (diabetic ketoacidosis) [facility] 3/2021    Hepatitis C in remission    HLD (hyperlipidemia)     Insomnia     Lung abscess RLL found at [facility] 3/2021    OA (osteoarthritis) BL shoulders    Thyroid nodule Found at [facility] 3/2021    Type 2 diabetes mellitus without complication, without long-term current use of insulin Newly diagnosed at [facility] 3/2021

## 2021-04-04 NOTE — PROGRESS NOTE ADULT - SUBJECTIVE AND OBJECTIVE BOX
CC: f/u for lung abscess    Patient reports he is coughing up blood    REVIEW OF SYSTEMS:  All other review of systems negative (Comprehensive ROS)    Antimicrobials Day #  :9  cefTRIAXone   IVPB 1000 milliGRAM(s) IV Intermittent every 24 hours  metroNIDAZOLE    Tablet 500 milliGRAM(s) Oral every 8 hours    Other Medications Reviewed    T(F): 98.1 (04-04-21 @ 11:58), Max: 98.5 (04-04-21 @ 00:00)  HR: 69 (04-04-21 @ 11:58)  BP: 122/75 (04-04-21 @ 11:58)  RR: 17 (04-04-21 @ 05:00)  SpO2: 97% (04-04-21 @ 11:58)  Wt(kg): --    PHYSICAL EXAM:  General: alert, no acute distress  Eyes:  anicteric, no conjunctival injection, no discharge  Oropharynx: no lesions or injection 	  Neck: supple, without adenopathy  Lungs: clear to auscultation  Heart: regular rate and rhythm; no murmur, rubs or gallops  Abdomen: soft, nondistended, nontender, without mass or organomegaly  Skin: no lesions  Extremities: no clubbing, cyanosis, or edema  Neurologic: alert, oriented, moves all extremities    LAB RESULTS:                        10.2   9.62  )-----------( 363      ( 04 Apr 2021 06:25 )             30.6     04-04    138  |  105  |  9   ----------------------------<  184<H>  3.6   |  22  |  0.82    Ca    9.3      04 Apr 2021 06:25    TPro  7.2  /  Alb  2.5<L>  /  TBili  0.3  /  DBili  x   /  AST  26  /  ALT  21  /  AlkPhos  71  04-04    LIVER FUNCTIONS - ( 04 Apr 2021 06:25 )  Alb: 2.5 g/dL / Pro: 7.2 g/dL / ALK PHOS: 71 U/L / ALT: 21 U/L / AST: 26 U/L / GGT: x             MICROBIOLOGY:  RECENT CULTURES:  04-01 @ 12:30 .Body Fluid RIGHT LUNG     Rare Paula albicans  Growth in fluid media only Streptococcus parasanguinis  Growth in fluid media only Staphylococcus epidermidis    Moderate polymorphonuclear leukocytes per low power field  No organisms seen        RADIOLOGY REVIEWED:    < from: NM Hepatobiliary Imaging (04.02.21 @ 10:26) >    EXAM:  [facility] HEPATOBILIARY IMG      PROCEDURE DATE:  04/02/2021        INTERPRETATION:  RADIOPHARMACEUTICAL:  99mTc-Mebrofenin  DOSE: 3.8 mCi IV    CLINICAL INFORMATION: 60 year old male with abdominal pain and cholelithiasis, referred to evaluate foracute cholecystitis    TECHNIQUE: Dynamic images of the anterior abdomen were obtained for approximately 1 hour immediately following radiotracer injection. Static images of the abdomen in the anterior, right anterior oblique and right lateral projections were also obtained.    COMPARISON: No previous hepatobiliary scan for comparison    FINDINGS: There is prompt uptake of the injected radiotracer by the hepatocytes. The gallbladder is first visualized at 30 minutes post tracer injection and bowel activity by 45 minutes. There is normal tracer clearance from the liver by the end of the study.    IMPRESSION: Normal hepatobiliary scan.    No scan evidence of acute cholecystitis.      < end of copied text >  `< from: Xray Chest 1 View AP/PA (04.01.21 @ 13:27) >  EXAM:  XR CHEST AP OR PA 1V      PROCEDURE DATE:  04/01/2021        INTERPRETATION:  AP chest on April 1, 2021 at 1:13 PM. Patient has cough and had bronchoscopy.    Heart is magnified by technique.    Right base mass with irregular borders is again noted.    Slight right base pleural calcification again seen.    There is no pneumothorax.    Chest is similar to March 27.    IMPRESSION: No change in right base mass or chest appearance after bronchoscopy.        < end of copied text >  < from: CT Chest No Cont (03.30.21 @ 12:46) >  EXAM:  CT CHEST      PROCEDURE DATE:  03/30/2021        INTERPRETATION:  CLINICAL INFORMATION: Evaluation of right lower lobe lesion/abscess.    COMPARISON: 03/26/2021.    CONTRAST/COMPLICATIONS:  IV Contrast: NONE  Oral Contrast: NONE      PROCEDURE:  CT of the Chest was performed.  Sagittal and coronal reformats were performed.    FINDINGS:    LUNGS AND AIRWAYS: Patent central airways.  Right lower lobe lesion is slightly larger measuring approximately 5 cm. There has been interval development of an air-fluid level within this lesion. Multiple tree-in-bud opacities have developed in the right lower lobe indicating bronchial spread of disease process, possibly infection. Subsegmental atelectasis has developed in the dependent right lung base. Nonspecific focal groundglass opacity in right lung apex .  PLEURA: Trace left pleural effusion.  MEDIASTINUM AND LIZZETTE: No lymphadenopathy.  VESSELS: Coronary artery calcification.  HEART: Heart size is normal. No significant pericardial effusion.  CHEST WALL AND LOWER NECK: Within normal limits.  VISUALIZED UPPER ABDOMEN: Interval resolution of pneumobilia and improved extrahepatic biliary dilatation. Cholelithiasis and a contracted gallbladder are again evident.  BONES: Within normal limits.    IMPRESSION:  Evidence of a cavitary lesion involving the right lower lobe, development of an air-fluid level within this lesion and endobronchial spread into the right lower lobe in comparison to the prior study.    Interval resolution of pneumobilia and improvement in extrahepatic biliary dilatation. Redemonstration of cholelithiasis and a contracted gallbladder.    < end of copied text >      Assessment:  Patient admitted with vomiting, right side and abdomen pain, found to have possible cholecystitsi and pneumobilia on CT, found to have a mass in the chest that is new and began to cavitate so c/w lung abscess . bronch no mass and grew strept parasanguis, staph epid and candida from sputum.  hida neg for sandi, now has gross hemoptysis  Plan:  continue ctx and flagyl for lung abscess  for cta of the chest  ct surgery eval for bleeding, r/w dr downs  may warrant eventual mrcp for liver issues

## 2021-04-04 NOTE — PROGRESS NOTE ADULT - SUBJECTIVE AND OBJECTIVE BOX
Follow-up Pulmonary Progress Note  Chief Complaint : Type 2 diabetes mellitus with ketoacidosis without coma      pt continues to have cough  now had one episode of bloody secretions, dark red.         Allergies :No Known Drug Allergies  shellfish (Unknown)      PAST MEDICAL & SURGICAL HISTORY:  Type 2 diabetes mellitus without complication, without long-term current use of insulin    Benign hypertension    Shoulder arthritis  Multiple surgeries on left shoulder        Medications:  MEDICATIONS  (STANDING):  aspirin  chewable 81 milliGRAM(s) Oral daily  atorvastatin 40 milliGRAM(s) Oral at bedtime  benzonatate 200 milliGRAM(s) Oral three times a day  cefTRIAXone   IVPB 1000 milliGRAM(s) IV Intermittent every 24 hours  dextrose 40% Gel 15 Gram(s) Oral once  dextrose 5%. 1000 milliLiter(s) (50 mL/Hr) IV Continuous <Continuous>  dextrose 5%. 1000 milliLiter(s) (100 mL/Hr) IV Continuous <Continuous>  dextrose 50% Injectable 25 Gram(s) IV Push once  dextrose 50% Injectable 12.5 Gram(s) IV Push once  dextrose 50% Injectable 25 Gram(s) IV Push once  dextrose 50% Injectable 12.5 Gram(s) IV Push once  glucagon  Injectable 1 milliGRAM(s) IntraMuscular once  guaiFENesin  milliGRAM(s) Oral every 12 hours  heparin   Injectable 5000 Unit(s) SubCutaneous every 8 hours  insulin glargine Injectable (LANTUS) 40 Unit(s) SubCutaneous at bedtime  insulin lispro (ADMELOG) corrective regimen sliding scale   SubCutaneous at bedtime  insulin lispro (ADMELOG) corrective regimen sliding scale   SubCutaneous three times a day before meals  insulin lispro Injectable (ADMELOG) 10 Unit(s) SubCutaneous three times a day before meals  lidocaine   Patch 2 Patch Transdermal every 24 hours  metroNIDAZOLE    Tablet 500 milliGRAM(s) Oral every 8 hours  pantoprazole    Tablet 40 milliGRAM(s) Oral before breakfast  polyethylene glycol 3350 17 Gram(s) Oral two times a day  senna 2 Tablet(s) Oral at bedtime  sertraline 150 milliGRAM(s) Oral daily  tamsulosin 0.4 milliGRAM(s) Oral at bedtime  traZODone 150 milliGRAM(s) Oral at bedtime    MEDICATIONS  (PRN):  acetaminophen   Tablet .. 650 milliGRAM(s) Oral every 6 hours PRN Temp greater or equal to 38C (100.4F), Mild Pain (1 - 3)  aluminum hydroxide/magnesium hydroxide/simethicone Suspension 30 milliLiter(s) Oral every 4 hours PRN Dyspepsia  cyclobenzaprine 5 milliGRAM(s) Oral two times a day PRN Muscle Spasm  FIRST- Mouthwash  BLM 15 milliLiter(s) Swish and Spit four times a day PRN pain  guaiFENesin   Syrup  (Sugar-Free) 100 milliGRAM(s) Oral every 6 hours PRN Cough  traMADol 50 milliGRAM(s) Oral every 8 hours PRN Moderate Pain (4 - 6)      LABS:                        10.2   9.62  )-----------( 363      ( 04 Apr 2021 06:25 )             30.6     04-04    138  |  105  |  9   ----------------------------<  184<H>  3.6   |  22  |  0.82    Ca    9.3      04 Apr 2021 06:25    TPro  7.2  /  Alb  2.5<L>  /  TBili  0.3  /  DBili  x   /  AST  26  /  ALT  21  /  AlkPhos  71  04-04           CULTURES: (if applicable)    Culture - Body Fluid with Gram Stain (collected 04-01-21 @ 12:30)  Source: .Body Fluid RIGHT LUNG  Gram Stain (04-01-21 @ 22:25):    Moderate polymorphonuclear leukocytes per low power field    No organisms seen  Preliminary Report (04-04-21 @ 11:57):    Rare Candida albicans    Growth in fluid media only Streptococcus parasanguinis    Growth in fluid media only Staphylococcus epidermidis    Culture - Sputum (collected 03-28-21 @ 22:10)  Source: .Sputum Sputum  Gram Stain (03-29-21 @ 05:58):    Rare polymorphonuclear leukocytes per low power field    Rare Squamous epithelial cells per low power field    Rare Gram Negative Rods per oil power field    Rare Gram Variable Cocci per oil power field  Final Report (03-31-21 @ 11:57):    Normal Respiratory Princess present    Culture - Blood (collected 03-28-21 @ 14:50)  Source: .Blood Blood-Peripheral  Final Report (04-02-21 @ 19:00):    No Growth Final    Culture - Blood (collected 03-28-21 @ 14:50)  Source: .Blood Blood-Peripheral  Final Report (04-02-21 @ 19:00):    No Growth Final    Culture - Urine (collected 03-26-21 @ 13:30)  Source: .Urine Clean Catch (Midstream)  Final Report (03-27-21 @ 14:49):    No growth          VITALS:  T(C): 36.7 (04-04-21 @ 11:58), Max: 36.9 (04-04-21 @ 00:00)  T(F): 98.1 (04-04-21 @ 11:58), Max: 98.5 (04-04-21 @ 00:00)  HR: 69 (04-04-21 @ 11:58) (68 - 79)  BP: 122/75 (04-04-21 @ 11:58) (109/70 - 132/74)  BP(mean): --  ABP: --  ABP(mean): --  RR: 17 (04-04-21 @ 05:00) (16 - 17)  SpO2: 97% (04-04-21 @ 11:58) (95% - 98%)  CVP(mm Hg): --  CVP(cm H2O): --    Ins and Outs     04-03-21 @ 07:01  -  04-04-21 @ 07:00  --------------------------------------------------------  IN: 200 mL / OUT: 1100 mL / NET: -900 mL                I&O's Detail    03 Apr 2021 07:01  -  04 Apr 2021 07:00  --------------------------------------------------------  IN:    Oral Fluid: 200 mL  Total IN: 200 mL    OUT:    Voided (mL): 1100 mL  Total OUT: 1100 mL    Total NET: -900 mL

## 2021-04-04 NOTE — H&P ADULT - NSHPPHYSICALEXAM_GEN_ALL_CORE
General: alert, no acute distress  Eyes:  anicteric, no conjunctival injection, no discharge  Oropharynx: no lesions or injection; poor dentition with multiple loose teeth in the anterior jaw; no obvious hemoptysis	  Neck: supple, no JVD, no stridor  Lungs: clear to auscultation  Heart: regular rate and rhythm; no murmur  Abdomen: soft, nondistended, mild, generalized tenderness to palpation  Skin: no lesions  Extremities: no clubbing, cyanosis, or edema  Neurologic: alert, oriented, moves all extremities VSS  General: alert, no acute distress  Eyes:  anicteric, no conjunctival injection, no discharge  Oropharynx: no lesions or injection; poor dentition with multiple loose teeth in the anterior jaw; no obvious hemoptysis	  Neck: supple, no JVD, no stridor  Lungs: clear to auscultation  Heart: regular rate and rhythm; no murmur  Abdomen: soft, nondistended, mild, generalized tenderness to palpation  Skin: no lesions  Extremities: no clubbing, cyanosis, or edema; well-healed L shoulder surgical scar; decreased ROM BL shoudlers  Neurologic: alert, oriented, moves all extremities

## 2021-04-04 NOTE — PROGRESS NOTE ADULT - NUTRITIONAL ASSESSMENT
This patient has been assessed with a concern for Malnutrition and has been determined to have a diagnosis/diagnoses of Severe protein-calorie malnutrition.    This patient is being managed with:   Diet NPO after Midnight-     NPO Start Date: 31-Mar-2021   NPO Start Time: 23:59  Entered: Mar 31 2021  2:44PM    Diet Consistent Carbohydrate w/Evening Snack-  DASH/TLC {Sodium & Cholesterol Restricted}  Entered: Mar 27 2021 12:59PM    
This patient has been assessed with a concern for Malnutrition and has been determined to have a diagnosis/diagnoses of Severe protein-calorie malnutrition.    This patient is being managed with:   Diet NPO after Midnight-     NPO Start Date: 01-Apr-2021   NPO Start Time: 23:59  Except Medications  Entered: Apr 1 2021  3:12PM    Diet Consistent Carbohydrate w/Evening Snack-  DASH/TLC {Sodium & Cholesterol Restricted}  Entered: Apr 1 2021 12:29PM    
This patient has been assessed with a concern for Malnutrition and has been determined to have a diagnosis/diagnoses of Severe protein-calorie malnutrition.    This patient is being managed with:   Diet NPO after Midnight-     NPO Start Date: 31-Mar-2021   NPO Start Time: 23:59  Entered: Mar 31 2021  2:44PM    Diet Consistent Carbohydrate w/Evening Snack-  DASH/TLC {Sodium & Cholesterol Restricted}  Entered: Mar 27 2021 12:59PM    
This patient has been assessed with a concern for Malnutrition and has been determined to have a diagnosis/diagnoses of Severe protein-calorie malnutrition.    This patient is being managed with:   Diet Consistent Carbohydrate w/Evening Snack-  DASH/TLC {Sodium & Cholesterol Restricted}  Entered: Apr 2 2021 11:00AM    
This patient has been assessed with a concern for Malnutrition and has been determined to have a diagnosis/diagnoses of Severe protein-calorie malnutrition.    This patient is being managed with:   Diet Consistent Carbohydrate w/Evening Snack-  DASH/TLC {Sodium & Cholesterol Restricted}  Entered: Mar 27 2021 12:59PM    
This patient has been assessed with a concern for Malnutrition and has been determined to have a diagnosis/diagnoses of Severe protein-calorie malnutrition.    This patient is being managed with:   Diet Consistent Carbohydrate w/Evening Snack-  DASH/TLC {Sodium & Cholesterol Restricted}  Entered: Apr 2 2021 11:00AM    Diet NPO after Midnight-     NPO Start Date: 01-Apr-2021   NPO Start Time: 23:59  Except Medications  Entered: Apr 1 2021  3:12PM    
This patient has been assessed with a concern for Malnutrition and has been determined to have a diagnosis/diagnoses of Severe protein-calorie malnutrition.    This patient is being managed with:   Diet Consistent Carbohydrate w/Evening Snack-  DASH/TLC {Sodium & Cholesterol Restricted}  Entered: Mar 27 2021 12:59PM    
This patient has been assessed with a concern for Malnutrition and has been determined to have a diagnosis/diagnoses of Severe protein-calorie malnutrition.    This patient is being managed with:   Diet Consistent Carbohydrate w/Evening Snack-  DASH/TLC {Sodium & Cholesterol Restricted}  Entered: Apr 2 2021 11:00AM    
This patient has been assessed with a concern for Malnutrition and has been determined to have a diagnosis/diagnoses of Severe protein-calorie malnutrition.    This patient is being managed with:   Diet Consistent Carbohydrate w/Evening Snack-  DASH/TLC {Sodium & Cholesterol Restricted}  Entered: Apr 2 2021 11:00AM    Diet NPO after Midnight-     NPO Start Date: 01-Apr-2021   NPO Start Time: 23:59  Except Medications  Entered: Apr 1 2021  3:12PM

## 2021-04-04 NOTE — PROGRESS NOTE ADULT - SUBJECTIVE AND OBJECTIVE BOX
PROCEDURE:   - Bronchoscopy with BAL at [facility] 4/1/2021      ISSUES:   Hemoptysis  Pneumonia - strep parasanguinis   DM2  HTN  Anxiety disorder  Depression -- hx of psych admission  Former smoker - quit 2020, 0.5 pack for 40 years  Hx of COVID in 2020  Hx of Pneumobilia - resolved on 4/4 CT scan. Had normal NM hepatobiliary scan on 4/2  BPH    INTERVAL EVENTS:   Transferred from [place] to [facility]  Estimates about 10mL of hemoptysis in past 24 hours of dark red blood clots.      HISTORY:   Patient reports cough and hemoptysis. Denies chest pain, dyspnea, pleuritic pain, fever.     PHYSICAL EXAM:   Gen: Comfortable, No acute distress  Eyes: Sclera white, Conjunctiva normal, Eyelids normal, Pupils symmetrical   ENT: Mucous membranes moist,  ,  ,    Neck: Trachea midline,  ,  ,  ,  ,    CV: Rate regular, Rhythm regular,  ,  ,    Resp: Breath sounds clear, No accessory muscles use,  ,    Abd: Soft, Non-distended, Non-tender, Bowel sounds normal,  ,  ,    Skin: Warm, No peripheral edema of lower extremities,  ,    : No morgan  Neuro: Moving all 4 extremities,    Psych: A&Ox3      ASSESSMENT AND PLAN:     NEURO:  No pain issues.  Depression and Anxiety disorder - stable. continue psych meds. denies suicidal and homicidal ideation.     RESPIRATORY:  Stable on room air - Incentive spirometry. Chest PT and suctioning of secretions. Out of bed to chair and ambulate with assistance. Continuous pulse oximetry for support & to prevent decompensation.       Hemoptysis - stable. will quantify total hemoptysis volume with specimen cups. airway monitoring.       CARDIOVASCULAR:  Hemodynamically stable - Not on pressors. Continue hemodynamic monitoring.        RENAL:  Stable - Monitor IOs and electrolytes. Keep K above 4.0 and Mg above 2.0.         GASTROINTESTINAL:  GI prophylaxis not indicated  Zofran and Reglan IV PRN for nausea  Regular consistency diet            HEMATOLOGIC:  No signs of active bleeding. Monitor Hgb in CBC in AM  DVT prophylaxis with heparin subQ and SCDs.         INFECTIOUS DISEASE:  PNA - ceftriaxone and flagyl.          ENDOCRINE:  DM2 – Stable. Monitor glucose fingersticks for goal 120-180. Insulin sliding scale. Carb control diet.            Pertinent clinical, laboratory, radiographic, hemodynamic, echocardiographic, respiratory data, microbiologic data and chart were reviewed by myself and analyzed frequently throughout the course of the day and night by myself.    Plan discussed at length with the CTICU staff and Attending CT Surgeon.     Patient's status was discussed with patient at bedside.       ________________________________________________    _________________________  VITAL SIGNS:  Vital Signs Last 24 Hrs  T(C): 36.9 (04 Apr 2021 19:24), Max: 36.9 (04 Apr 2021 00:00)  T(F): 98.4 (04 Apr 2021 19:24), Max: 98.5 (04 Apr 2021 00:00)  HR: 80 (04 Apr 2021 20:00) (69 - 80)  BP: 123/61 (04 Apr 2021 20:00) (104/62 - 132/74)  BP(mean): 77 (04 Apr 2021 20:00) (77 - 86)  RR: 20 (04 Apr 2021 20:00) (15 - 20)  SpO2: 98% (04 Apr 2021 20:00) (94% - 98%)  I/Os:   I&O's Detail    04 Apr 2021 07:01  -  04 Apr 2021 21:42  --------------------------------------------------------  IN:  Total IN: 0 mL    OUT:    Voided (mL): 480 mL  Total OUT: 480 mL    Total NET: -480 mL              MEDICATIONS:  MEDICATIONS  (STANDING):  atorvastatin 40 milliGRAM(s) Oral at bedtime  dextrose 50% Injectable 25 Gram(s) IV Push once  heparin   Injectable 5000 Unit(s) SubCutaneous every 8 hours  insulin glargine Injectable (LANTUS) 40 Unit(s) SubCutaneous at bedtime  insulin lispro (ADMELOG) corrective regimen sliding scale   SubCutaneous three times a day before meals  insulin lispro (ADMELOG) corrective regimen sliding scale   SubCutaneous at bedtime  insulin lispro Injectable (ADMELOG) 10 Unit(s) SubCutaneous three times a day before meals  metroNIDAZOLE  IVPB 500 milliGRAM(s) IV Intermittent every 8 hours  pantoprazole    Tablet 40 milliGRAM(s) Oral before breakfast  sertraline 50 milliGRAM(s) Oral daily  tamsulosin 0.4 milliGRAM(s) Oral at bedtime    MEDICATIONS  (PRN):  acetaminophen   Tablet .. 650 milliGRAM(s) Oral every 6 hours PRN Mild Pain (1 - 3)  oxyCODONE    IR 5 milliGRAM(s) Oral every 6 hours PRN Severe Pain (7 - 10)  traMADol 50 milliGRAM(s) Oral every 6 hours PRN Moderate Pain (4 - 6)  traZODone 150 milliGRAM(s) Oral at bedtime PRN insomnia      LABS:                        10.2   9.62  )-----------( 363      ( 04 Apr 2021 06:25 )             30.6     04-04    138  |  105  |  9   ----------------------------<  184<H>  3.6   |  22  |  0.82    Ca    9.3      04 Apr 2021 06:25    TPro  7.2  /  Alb  2.5<L>  /  TBili  0.3  /  DBili  x   /  AST  26  /  ALT  21  /  AlkPhos  71  04-04    LIVER FUNCTIONS - ( 04 Apr 2021 06:25 )  Alb: 2.5 g/dL / Pro: 7.2 g/dL / ALK PHOS: 71 U/L / ALT: 21 U/L / AST: 26 U/L / GGT: x                 _________________________

## 2021-04-04 NOTE — PROGRESS NOTE ADULT - SUBJECTIVE AND OBJECTIVE BOX
Chart Review: 60M with a PMH Left Thyroid Nodule, SM and a known history of Pre-DM. He presented to the ED with abdominal pain, cough,, N/V, Chest Pain, intermittent and unintentional weight loss of over 25lbs in the last month. In the ED he was found to Hypoxic on RA with Borderline BPs. Laboratory findings were significant for WBC of 12.62 an AG of 18 with Serum glucose of 674. CT Angio reveled a RLL 4.5cm Heterogeneous masslike opacity, not seen in prior studies in December. Pt was initiated on IV Abx then admitted to ICU for further management.         Subjective: Pt was seen and examined at bedside. Per the nurse, overnight, the pt spiked a low grade Fever with Tmax on 100.2F, pt also complained of persistent productive cough for which he was initiated on antitussives, apart from the above the pt remaineded vitally stable. This morning the pt states that he has not had a BM as of yet. He endorses bowel discomfort from failure to empty his bowels, he also endorses persistence of his cough and states and denies any further febrile episodes        Vital Signs Last 24 Hrs  T(C): 36.8 (04 Apr 2021 05:00), Max: 36.9 (04 Apr 2021 00:00)  T(F): 98.2 (04 Apr 2021 05:00), Max: 98.5 (04 Apr 2021 00:00)  HR: 79 (04 Apr 2021 05:00) (68 - 79)  BP: 118/72 (04 Apr 2021 05:00) (109/70 - 132/74)  RR: 17 (04 Apr 2021 05:00) (16 - 17)  SpO2: 95% (04 Apr 2021 05:00) (95% - 98%)    I&O's Summary    03 Apr 2021 07:01  -  04 Apr 2021 07:00  --------------------------------------------------------  IN: 200 mL / OUT: 1100 mL / NET: -900 mL        CAPILLARY BLOOD GLUCOSE      POCT Blood Glucose.: 157 mg/dL (04 Apr 2021 08:37)  POCT Blood Glucose.: 102 mg/dL (03 Apr 2021 22:12)  POCT Blood Glucose.: 157 mg/dL (03 Apr 2021 16:55)  POCT Blood Glucose.: 117 mg/dL (03 Apr 2021 12:07)      PHYSICAL EXAM:    Constitutional: NAD, awake and alert, well-developed  HEENT: PERR, EOMI, Normal Hearing, MMM  Neck: Soft and supple, No LAD, No JVD  Respiratory: Breath sounds are clear bilaterally, No wheezing, rales or rhonchi  Cardiovascular: S1 and S2, regular rate and rhythm, no Murmurs, gallops or rubs  Gastrointestinal: Bowel Sounds present, soft, nontender, nondistended, no guarding, no rebound  Extremities: No peripheral edema  Vascular: 2+ peripheral pulses  Neurological: A/O x 3, no focal deficits  Musculoskeletal: decreased yet equal 5/5 strength b/l upper and lower extremities  Skin: No rashes    MEDICATIONS:  MEDICATIONS  (STANDING):  aspirin  chewable 81 milliGRAM(s) Oral daily  atorvastatin 40 milliGRAM(s) Oral at bedtime  benzonatate 200 milliGRAM(s) Oral three times a day  cefTRIAXone   IVPB 1000 milliGRAM(s) IV Intermittent every 24 hours  dextrose 40% Gel 15 Gram(s) Oral once  dextrose 5%. 1000 milliLiter(s) (50 mL/Hr) IV Continuous <Continuous>  dextrose 5%. 1000 milliLiter(s) (100 mL/Hr) IV Continuous <Continuous>  dextrose 50% Injectable 25 Gram(s) IV Push once  dextrose 50% Injectable 12.5 Gram(s) IV Push once  dextrose 50% Injectable 25 Gram(s) IV Push once  dextrose 50% Injectable 12.5 Gram(s) IV Push once  glucagon  Injectable 1 milliGRAM(s) IntraMuscular once  guaiFENesin  milliGRAM(s) Oral every 12 hours  heparin   Injectable 5000 Unit(s) SubCutaneous every 8 hours  insulin glargine Injectable (LANTUS) 40 Unit(s) SubCutaneous at bedtime  insulin lispro (ADMELOG) corrective regimen sliding scale   SubCutaneous at bedtime  insulin lispro (ADMELOG) corrective regimen sliding scale   SubCutaneous three times a day before meals  insulin lispro Injectable (ADMELOG) 10 Unit(s) SubCutaneous three times a day before meals  lidocaine   Patch 2 Patch Transdermal every 24 hours  metroNIDAZOLE    Tablet 500 milliGRAM(s) Oral every 8 hours  pantoprazole    Tablet 40 milliGRAM(s) Oral before breakfast  polyethylene glycol 3350 17 Gram(s) Oral two times a day  senna 2 Tablet(s) Oral at bedtime  sertraline 150 milliGRAM(s) Oral daily  tamsulosin 0.4 milliGRAM(s) Oral at bedtime  traZODone 150 milliGRAM(s) Oral at bedtime      LABS: All Labs Reviewed:                        10.2   9.62  )-----------( 363      ( 04 Apr 2021 06:25 )             30.6     04-04    138  |  105  |  9   ----------------------------<  184<H>  3.6   |  22  |  0.82    Ca    9.3      04 Apr 2021 06:25    TPro  7.2  /  Alb  2.5<L>  /  TBili  0.3  /  DBili  x   /  AST  26  /  ALT  21  /  AlkPhos  71  04-04          Blood Culture: 04-01 @ 12:30  Organism --  Gram Stain Blood -- Gram Stain   Moderate polymorphonuclear leukocytes per low power field  No organisms seen  Specimen Source .Body Fluid RIGHT LUNG  Culture-Blood --        Imaging:     < from: CT Chest w/ IV Cont (03.26.21 @ 14:44) >  IMPRESSION:  1. There is a 4.5 cm heterogeneous in attenuation masslike opacity within the right lower lobe abutting the right major fissure and right hilar region, representing interval change from prior examination dated 12/3/2020. Small nodules are identified adjacent to the mass measuring up to 5 mm. Differential considerations would include both neoplastic and infectious etiologies.  2. Scattered foci of air identified within the intrahepatic biliary tree. There is increased caliber of the extrahepatic CBD measuring 9-10 mm.  A gallstone is noted in the gallbladder lumen measuring 1.1 cm. Air is noted within the gallbladder lumen. Contracted state of the gallbladder limits assessment for wall thickening. Emphysematous cholecystitis cannot be excluded. Correlate with clinical as well as procedure history recommended.        Chart Review: 60M with a PMH Left Thyroid Nodule, SM and a known history of Pre-DM. He presented to the ED with abdominal pain, cough,, N/V, Chest Pain, intermittent and unintentional weight loss of over 25lbs in the last month. In the ED he was found to Hypoxic on RA with Borderline BPs. Laboratory findings were significant for WBC of 12.62 an AG of 18 with Serum glucose of 674. CT Angio reveled a RLL 4.5cm Heterogeneous masslike opacity, not seen in prior studies in December. Pt was initiated on IV Abx then admitted to ICU for further management.         Subjective: Pt was seen and examined at bedside, OOB in chair, appeared to be comfortable.There have been no acute events reported over night, the pt remained vitally stable with HAs appropriately controlled on Tramadol. This morning the pt states that his HAs return once the effects of the tramadol wears out. He also states that his back pain has persisted  despite initiation of the Lidocaine patch. On further review of systems he denies Blurred vision, and states he has been experiencing Loose stools. He denies any further febrile episodes or abdominal Pain with remaining ROS unremarkable.        Vital Signs Last 24 Hrs  T(C): 36.8 (04 Apr 2021 05:00), Max: 36.9 (04 Apr 2021 00:00)  T(F): 98.2 (04 Apr 2021 05:00), Max: 98.5 (04 Apr 2021 00:00)  HR: 79 (04 Apr 2021 05:00) (68 - 79)  BP: 118/72 (04 Apr 2021 05:00) (109/70 - 132/74)  RR: 17 (04 Apr 2021 05:00) (16 - 17)  SpO2: 95% (04 Apr 2021 05:00) (95% - 98%)    I&O's Summary    03 Apr 2021 07:01  -  04 Apr 2021 07:00  --------------------------------------------------------  IN: 200 mL / OUT: 1100 mL / NET: -900 mL        CAPILLARY BLOOD GLUCOSE      POCT Blood Glucose.: 157 mg/dL (04 Apr 2021 08:37)  POCT Blood Glucose.: 102 mg/dL (03 Apr 2021 22:12)  POCT Blood Glucose.: 157 mg/dL (03 Apr 2021 16:55)  POCT Blood Glucose.: 117 mg/dL (03 Apr 2021 12:07)      PHYSICAL EXAM:    Constitutional: NAD, awake and alert, well-developed  HEENT: PERR, EOMI, Normal Hearing, MMM  Neck: Soft and supple, No LAD, No JVD  Respiratory: Breath sounds are clear bilaterally, No wheezing, rales or rhonchi  Cardiovascular: S1 and S2, regular rate and rhythm, no Murmurs, gallops or rubs  Gastrointestinal: Bowel Sounds present, soft, nontender, nondistended, no guarding, no rebound  Extremities: No peripheral edema  Vascular: 2+ peripheral pulses  Neurological: A/O x 3, no focal deficits  Musculoskeletal: decreased yet equal 5/5 strength b/l upper and lower extremities  Skin: No rashes    MEDICATIONS:  MEDICATIONS  (STANDING):  aspirin  chewable 81 milliGRAM(s) Oral daily  atorvastatin 40 milliGRAM(s) Oral at bedtime  benzonatate 200 milliGRAM(s) Oral three times a day  cefTRIAXone   IVPB 1000 milliGRAM(s) IV Intermittent every 24 hours  dextrose 40% Gel 15 Gram(s) Oral once  dextrose 5%. 1000 milliLiter(s) (50 mL/Hr) IV Continuous <Continuous>  dextrose 5%. 1000 milliLiter(s) (100 mL/Hr) IV Continuous <Continuous>  dextrose 50% Injectable 25 Gram(s) IV Push once  dextrose 50% Injectable 12.5 Gram(s) IV Push once  dextrose 50% Injectable 25 Gram(s) IV Push once  dextrose 50% Injectable 12.5 Gram(s) IV Push once  glucagon  Injectable 1 milliGRAM(s) IntraMuscular once  guaiFENesin  milliGRAM(s) Oral every 12 hours  heparin   Injectable 5000 Unit(s) SubCutaneous every 8 hours  insulin glargine Injectable (LANTUS) 40 Unit(s) SubCutaneous at bedtime  insulin lispro (ADMELOG) corrective regimen sliding scale   SubCutaneous at bedtime  insulin lispro (ADMELOG) corrective regimen sliding scale   SubCutaneous three times a day before meals  insulin lispro Injectable (ADMELOG) 10 Unit(s) SubCutaneous three times a day before meals  lidocaine   Patch 2 Patch Transdermal every 24 hours  metroNIDAZOLE    Tablet 500 milliGRAM(s) Oral every 8 hours  pantoprazole    Tablet 40 milliGRAM(s) Oral before breakfast  polyethylene glycol 3350 17 Gram(s) Oral two times a day  senna 2 Tablet(s) Oral at bedtime  sertraline 150 milliGRAM(s) Oral daily  tamsulosin 0.4 milliGRAM(s) Oral at bedtime  traZODone 150 milliGRAM(s) Oral at bedtime      LABS: All Labs Reviewed:                        10.2   9.62  )-----------( 363      ( 04 Apr 2021 06:25 )             30.6     04-04    138  |  105  |  9   ----------------------------<  184<H>  3.6   |  22  |  0.82    Ca    9.3      04 Apr 2021 06:25    TPro  7.2  /  Alb  2.5<L>  /  TBili  0.3  /  DBili  x   /  AST  26  /  ALT  21  /  AlkPhos  71  04-04          Blood Culture: 04-01 @ 12:30  Organism --  Gram Stain Blood -- Gram Stain   Moderate polymorphonuclear leukocytes per low power field  No organisms seen  Specimen Source .Body Fluid RIGHT LUNG  Culture-Blood --        Imaging:     < from: CT Chest w/ IV Cont (03.26.21 @ 14:44) >  IMPRESSION:  1. There is a 4.5 cm heterogeneous in attenuation masslike opacity within the right lower lobe abutting the right major fissure and right hilar region, representing interval change from prior examination dated 12/3/2020. Small nodules are identified adjacent to the mass measuring up to 5 mm. Differential considerations would include both neoplastic and infectious etiologies.  2. Scattered foci of air identified within the intrahepatic biliary tree. There is increased caliber of the extrahepatic CBD measuring 9-10 mm.  A gallstone is noted in the gallbladder lumen measuring 1.1 cm. Air is noted within the gallbladder lumen. Contracted state of the gallbladder limits assessment for wall thickening. Emphysematous cholecystitis cannot be excluded. Correlate with clinical as well as procedure history recommended.        Chart Review: 60M with a PMH Left Thyroid Nodule, SM and a known history of Pre-DM. He presented to the ED with abdominal pain, cough,, N/V, Chest Pain, intermittent and unintentional weight loss of over 25lbs in the last month. In the ED he was found to Hypoxic on RA with Borderline BPs. Laboratory findings were significant for WBC of 12.62 an AG of 18 with Serum glucose of 674. CT Angio reveled a RLL 4.5cm Heterogeneous masslike opacity, not seen in prior studies in December. Pt was initiated on IV Abx then admitted to ICU for further management.         Subjective: Pt was seen and examined at bedside, OOB in chair, appeared to be comfortable.There have been no acute events reported over night, the pt remained vitally stable with HAs appropriately controlled on Tramadol. This morning the pt states that his HAs return once the effects of the tramadol wears out. He also states that his back pain has persisted  despite initiation of the Lidocaine patch. On further review of systems he denies Blurred vision, and states he has been experiencing Loose stools. He denies any further febrile episodes or abdominal Pain with remaining ROS unremarkable.        Vital Signs Last 24 Hrs  T(C): 36.8 (04 Apr 2021 05:00), Max: 36.9 (04 Apr 2021 00:00)  T(F): 98.2 (04 Apr 2021 05:00), Max: 98.5 (04 Apr 2021 00:00)  HR: 79 (04 Apr 2021 05:00) (68 - 79)  BP: 118/72 (04 Apr 2021 05:00) (109/70 - 132/74)  RR: 17 (04 Apr 2021 05:00) (16 - 17)  SpO2: 95% (04 Apr 2021 05:00) (95% - 98%)    I&O's Summary    03 Apr 2021 07:01  -  04 Apr 2021 07:00  --------------------------------------------------------  IN: 200 mL / OUT: 1100 mL / NET: -900 mL        CAPILLARY BLOOD GLUCOSE      POCT Blood Glucose.: 157 mg/dL (04 Apr 2021 08:37)  POCT Blood Glucose.: 102 mg/dL (03 Apr 2021 22:12)  POCT Blood Glucose.: 157 mg/dL (03 Apr 2021 16:55)  POCT Blood Glucose.: 117 mg/dL (03 Apr 2021 12:07)      PHYSICAL EXAM:    Constitutional: NAD, awake and alert, well-developed, comfortable appearing   HEENT: PERR, EOMI, Normal Hearing, MMM  Neck: Soft and supple, No LAD, No JVD  Respiratory: Breath sounds are clear bilaterally, No wheezing, rales or rhonchi  Cardiovascular: S1 and S2, regular rate and rhythm, no Murmurs, gallops or rubs  Gastrointestinal: Bowel Sounds present, soft, nontender, nondistended, no guarding, no rebound  Extremities: No peripheral edema  Vascular: 2+ peripheral pulses  Neurological: A/O x 3, no focal deficits  Musculoskeletal: 5/5 strength b/l upper and lower extremities  Skin: No rashes    MEDICATIONS:  MEDICATIONS  (STANDING):  aspirin  chewable 81 milliGRAM(s) Oral daily  atorvastatin 40 milliGRAM(s) Oral at bedtime  benzonatate 200 milliGRAM(s) Oral three times a day  cefTRIAXone   IVPB 1000 milliGRAM(s) IV Intermittent every 24 hours  dextrose 40% Gel 15 Gram(s) Oral once  dextrose 5%. 1000 milliLiter(s) (50 mL/Hr) IV Continuous <Continuous>  dextrose 5%. 1000 milliLiter(s) (100 mL/Hr) IV Continuous <Continuous>  dextrose 50% Injectable 25 Gram(s) IV Push once  dextrose 50% Injectable 12.5 Gram(s) IV Push once  dextrose 50% Injectable 25 Gram(s) IV Push once  dextrose 50% Injectable 12.5 Gram(s) IV Push once  glucagon  Injectable 1 milliGRAM(s) IntraMuscular once  guaiFENesin  milliGRAM(s) Oral every 12 hours  heparin   Injectable 5000 Unit(s) SubCutaneous every 8 hours  insulin glargine Injectable (LANTUS) 40 Unit(s) SubCutaneous at bedtime  insulin lispro (ADMELOG) corrective regimen sliding scale   SubCutaneous at bedtime  insulin lispro (ADMELOG) corrective regimen sliding scale   SubCutaneous three times a day before meals  insulin lispro Injectable (ADMELOG) 10 Unit(s) SubCutaneous three times a day before meals  lidocaine   Patch 2 Patch Transdermal every 24 hours  metroNIDAZOLE    Tablet 500 milliGRAM(s) Oral every 8 hours  pantoprazole    Tablet 40 milliGRAM(s) Oral before breakfast  polyethylene glycol 3350 17 Gram(s) Oral two times a day  senna 2 Tablet(s) Oral at bedtime  sertraline 150 milliGRAM(s) Oral daily  tamsulosin 0.4 milliGRAM(s) Oral at bedtime  traZODone 150 milliGRAM(s) Oral at bedtime      LABS: All Labs Reviewed:                        10.2   9.62  )-----------( 363      ( 04 Apr 2021 06:25 )             30.6     04-04    138  |  105  |  9   ----------------------------<  184<H>  3.6   |  22  |  0.82    Ca    9.3      04 Apr 2021 06:25    TPro  7.2  /  Alb  2.5<L>  /  TBili  0.3  /  DBili  x   /  AST  26  /  ALT  21  /  AlkPhos  71  04-04          Blood Culture: 04-01 @ 12:30  Organism --  Gram Stain Blood -- Gram Stain   Moderate polymorphonuclear leukocytes per low power field  No organisms seen  Specimen Source .Body Fluid RIGHT LUNG  Culture-Blood --        Imaging:     < from: CT Chest w/ IV Cont (03.26.21 @ 14:44) >  IMPRESSION:  1. There is a 4.5 cm heterogeneous in attenuation masslike opacity within the right lower lobe abutting the right major fissure and right hilar region, representing interval change from prior examination dated 12/3/2020. Small nodules are identified adjacent to the mass measuring up to 5 mm. Differential considerations would include both neoplastic and infectious etiologies.  2. Scattered foci of air identified within the intrahepatic biliary tree. There is increased caliber of the extrahepatic CBD measuring 9-10 mm.  A gallstone is noted in the gallbladder lumen measuring 1.1 cm. Air is noted within the gallbladder lumen. Contracted state of the gallbladder limits assessment for wall thickening. Emphysematous cholecystitis cannot be excluded. Correlate with clinical as well as procedure history recommended.  < from: [facility] Hepatobiliary Imaging (04.02.21 @ 10:26) >    COMPARISON: No previous hepatobiliary scan for comparison    FINDINGS: There is prompt uptake of the injected radiotracer by the hepatocytes. The gallbladder is first visualized at 30 minutes post tracer injection and bowel activity by 45 minutes. There is normal tracer clearance from the liver by the end of the study.    IMPRESSION: Normal hepatobiliary scan.    No scan evidence of acute cholecystitis.      < end of copied text >  < from: Xray Chest 1 View AP/PA (04.01.21 @ 13:27) >    IMPRESSION: No change in right base mass or chest appearance after bronchoscopy.    < end of copied text >  < from: CT Chest No Cont (03.30.21 @ 12:46) >  IMPRESSION:  Evidence of a cavitary lesion involving the right lower lobe, development of an air-fluid level within this lesion and endobronchial spread into the right lower lobe in comparison to the prior study.    Interval resolution of pneumobilia and improvement in extrahepatic biliary dilatation. Redemonstration of cholelithiasis and a contracted gallbladder.        < end of copied text >  < from: US Abdomen Upper Quadrant Right (03.29.21 @ 09:26) >    IMPRESSION:    Hepatomegaly.  Cholelithiasis.    < end of copied text >

## 2021-04-04 NOTE — CHART NOTE - NSCHARTNOTEFT_GEN_A_CORE
Transfer of Services to [facility]- Cardiothoracic Care     60M with a PMH Left Thyroid Nodule, HLD, Hep C and a known history of Pre-DM who presented to the ED with abdominal pain, cough, N/V, Chest Pain, intermittent and unintentional weight loss of over 25lbs in the last month. In the ED he was found to Hypoxic on RA with Borderline BPs. Laboratory findings were significant for WBC of 12.62 an AG of 18 with Serum glucose of 674. CT Angio reveled a RLL 4.5cm Heterogeneous masslike opacity, not seen in prior studies in December. Pt was initiated on IV Abx then admitted to ICU for further management. Pt was down graded after closure of his AG, after failure to improved on former regimen, ID was consulted for management of suspected RLL abscess. Repeat CT displayed development of an air-fluid level within the lesion with endobronchial spread into the right lower lobe for which BAL was performed with Cxs obtained displaying growth of a rare Candida Albicans as well as Strep. Parasanguinis. His hospitalization course was complicated by development of mild Hemoptysis, originally thought to be traumatic in nature until it progressed to more productive volumes of blood filled sputum; Subsequent CT Angio performed revealed  a small focus of contrast extravasation within the cavitary lesion, which may reflect a small pseudoaneurysm or active extravasation given the history of hemoptysis.     CT Surgery in [facility] has been contacted via the Transfer Center.   After reviewing the CT Angio, Dr. Adis Loomis has agreed to accept the pt to [facility]-ICU under the care of CT Surgery, where pt may be subjected to Lobectomy in opposed to an embolization, due to the nature of the abscess vascularity depicted by the presence of a pseudoaneurysm.     Pt has been made aware of the above, and will be transferred to [facility] with Hycotin on board to reduce tussive episodes.

## 2021-04-04 NOTE — PROGRESS NOTE ADULT - PROVIDER SPECIALTY LIST ADULT
Infectious Disease
Surgery
Critical Care
Family Medicine
Family Medicine
Infectious Disease
Pulmonology
Critical Care
Family Medicine
Infectious Disease
Pulmonology
Surgery
Critical Care
Infectious Disease
Surgery
Surgery
Pulmonology
Family Medicine

## 2021-04-05 LAB
-  AMPICILLIN/SULBACTAM: SIGNIFICANT CHANGE UP
-  CEFAZOLIN: SIGNIFICANT CHANGE UP
-  CLINDAMYCIN: SIGNIFICANT CHANGE UP
-  ERYTHROMYCIN: SIGNIFICANT CHANGE UP
-  GENTAMICIN: SIGNIFICANT CHANGE UP
-  OXACILLIN: SIGNIFICANT CHANGE UP
-  PENICILLIN: SIGNIFICANT CHANGE UP
-  RIFAMPIN: SIGNIFICANT CHANGE UP
-  TETRACYCLINE: SIGNIFICANT CHANGE UP
-  TRIMETHOPRIM/SULFAMETHOXAZOLE: SIGNIFICANT CHANGE UP
-  VANCOMYCIN: SIGNIFICANT CHANGE UP
ANION GAP SERPL CALC-SCNC: 16 MMOL/L — HIGH (ref 7–14)
BLD GP AB SCN SERPL QL: NEGATIVE — SIGNIFICANT CHANGE UP
BUN SERPL-MCNC: 7 MG/DL — SIGNIFICANT CHANGE UP (ref 7–23)
CALCIUM SERPL-MCNC: 9.8 MG/DL — SIGNIFICANT CHANGE UP (ref 8.4–10.5)
CHLORIDE SERPL-SCNC: 100 MMOL/L — SIGNIFICANT CHANGE UP (ref 98–107)
CO2 SERPL-SCNC: 20 MMOL/L — LOW (ref 22–31)
COVID-19 SPIKE DOMAIN AB INTERP: POSITIVE
COVID-19 SPIKE DOMAIN ANTIBODY RESULT: 5.9 U/ML — HIGH
CREAT SERPL-MCNC: 0.7 MG/DL — SIGNIFICANT CHANGE UP (ref 0.5–1.3)
GLUCOSE BLDC GLUCOMTR-MCNC: 139 MG/DL — HIGH (ref 70–99)
GLUCOSE BLDC GLUCOMTR-MCNC: 205 MG/DL — HIGH (ref 70–99)
GLUCOSE BLDC GLUCOMTR-MCNC: 206 MG/DL — HIGH (ref 70–99)
GLUCOSE BLDC GLUCOMTR-MCNC: 234 MG/DL — HIGH (ref 70–99)
GLUCOSE SERPL-MCNC: 143 MG/DL — HIGH (ref 70–99)
HCT VFR BLD CALC: 30.8 % — LOW (ref 39–50)
HGB BLD-MCNC: 10.3 G/DL — LOW (ref 13–17)
MAGNESIUM SERPL-MCNC: 1.5 MG/DL — LOW (ref 1.6–2.6)
MCHC RBC-ENTMCNC: 31.6 PG — SIGNIFICANT CHANGE UP (ref 27–34)
MCHC RBC-ENTMCNC: 33.4 GM/DL — SIGNIFICANT CHANGE UP (ref 32–36)
MCV RBC AUTO: 94.5 FL — SIGNIFICANT CHANGE UP (ref 80–100)
METHOD TYPE: SIGNIFICANT CHANGE UP
NON-GYNECOLOGICAL CYTOLOGY STUDY: SIGNIFICANT CHANGE UP
NRBC # BLD: 0 /100 WBCS — SIGNIFICANT CHANGE UP
NRBC # FLD: 0 K/UL — SIGNIFICANT CHANGE UP
PHOSPHATE SERPL-MCNC: 4.8 MG/DL — HIGH (ref 2.5–4.5)
PLATELET # BLD AUTO: 459 K/UL — HIGH (ref 150–400)
POTASSIUM SERPL-MCNC: 3.8 MMOL/L — SIGNIFICANT CHANGE UP (ref 3.5–5.3)
POTASSIUM SERPL-SCNC: 3.8 MMOL/L — SIGNIFICANT CHANGE UP (ref 3.5–5.3)
RBC # BLD: 3.26 M/UL — LOW (ref 4.2–5.8)
RBC # FLD: 12.5 % — SIGNIFICANT CHANGE UP (ref 10.3–14.5)
RH IG SCN BLD-IMP: NEGATIVE — SIGNIFICANT CHANGE UP
RH IG SCN BLD-IMP: NEGATIVE — SIGNIFICANT CHANGE UP
SARS-COV-2 IGG+IGM SERPL QL IA: 5.9 U/ML — HIGH
SARS-COV-2 IGG+IGM SERPL QL IA: POSITIVE
SODIUM SERPL-SCNC: 136 MMOL/L — SIGNIFICANT CHANGE UP (ref 135–145)
WBC # BLD: 9.67 K/UL — SIGNIFICANT CHANGE UP (ref 3.8–10.5)
WBC # FLD AUTO: 9.67 K/UL — SIGNIFICANT CHANGE UP (ref 3.8–10.5)

## 2021-04-05 PROCEDURE — 71045 X-RAY EXAM CHEST 1 VIEW: CPT | Mod: 26

## 2021-04-05 PROCEDURE — 99233 SBSQ HOSP IP/OBS HIGH 50: CPT

## 2021-04-05 RX ORDER — ACETAMINOPHEN 500 MG
1000 TABLET ORAL ONCE
Refills: 0 | Status: COMPLETED | OUTPATIENT
Start: 2021-04-05 | End: 2021-04-05

## 2021-04-05 RX ORDER — ACETAMINOPHEN 500 MG
1000 TABLET ORAL ONCE
Refills: 0 | Status: COMPLETED | OUTPATIENT
Start: 2021-04-05 | End: 2022-03-04

## 2021-04-05 RX ORDER — HYDROMORPHONE HYDROCHLORIDE 2 MG/ML
1 INJECTION INTRAMUSCULAR; INTRAVENOUS; SUBCUTANEOUS ONCE
Refills: 0 | Status: DISCONTINUED | OUTPATIENT
Start: 2021-04-05 | End: 2021-04-05

## 2021-04-05 RX ORDER — MORPHINE SULFATE 50 MG/1
2 CAPSULE, EXTENDED RELEASE ORAL ONCE
Refills: 0 | Status: DISCONTINUED | OUTPATIENT
Start: 2021-04-05 | End: 2021-04-05

## 2021-04-05 RX ORDER — MAGNESIUM SULFATE 500 MG/ML
2 VIAL (ML) INJECTION ONCE
Refills: 0 | Status: COMPLETED | OUTPATIENT
Start: 2021-04-05 | End: 2021-04-05

## 2021-04-05 RX ORDER — KETOROLAC TROMETHAMINE 30 MG/ML
30 SYRINGE (ML) INJECTION ONCE
Refills: 0 | Status: DISCONTINUED | OUTPATIENT
Start: 2021-04-05 | End: 2021-04-05

## 2021-04-05 RX ADMIN — INSULIN GLARGINE 40 UNIT(S): 100 INJECTION, SOLUTION SUBCUTANEOUS at 23:10

## 2021-04-05 RX ADMIN — MORPHINE SULFATE 2 MILLIGRAM(S): 50 CAPSULE, EXTENDED RELEASE ORAL at 15:00

## 2021-04-05 RX ADMIN — TRAMADOL HYDROCHLORIDE 50 MILLIGRAM(S): 50 TABLET ORAL at 06:05

## 2021-04-05 RX ADMIN — Medication 100 MILLIGRAM(S): at 05:56

## 2021-04-05 RX ADMIN — PANTOPRAZOLE SODIUM 40 MILLIGRAM(S): 20 TABLET, DELAYED RELEASE ORAL at 06:05

## 2021-04-05 RX ADMIN — SERTRALINE 50 MILLIGRAM(S): 25 TABLET, FILM COATED ORAL at 12:26

## 2021-04-05 RX ADMIN — HYDROMORPHONE HYDROCHLORIDE 1 MILLIGRAM(S): 2 INJECTION INTRAMUSCULAR; INTRAVENOUS; SUBCUTANEOUS at 22:02

## 2021-04-05 RX ADMIN — HEPARIN SODIUM 5000 UNIT(S): 5000 INJECTION INTRAVENOUS; SUBCUTANEOUS at 05:56

## 2021-04-05 RX ADMIN — CEFTRIAXONE 100 MILLIGRAM(S): 500 INJECTION, POWDER, FOR SOLUTION INTRAMUSCULAR; INTRAVENOUS at 18:02

## 2021-04-05 RX ADMIN — MORPHINE SULFATE 2 MILLIGRAM(S): 50 CAPSULE, EXTENDED RELEASE ORAL at 14:23

## 2021-04-05 RX ADMIN — OXYCODONE HYDROCHLORIDE 5 MILLIGRAM(S): 5 TABLET ORAL at 05:14

## 2021-04-05 RX ADMIN — Medication 100 MILLIGRAM(S): at 23:08

## 2021-04-05 RX ADMIN — TAMSULOSIN HYDROCHLORIDE 0.4 MILLIGRAM(S): 0.4 CAPSULE ORAL at 23:09

## 2021-04-05 RX ADMIN — Medication 30 MILLIGRAM(S): at 18:30

## 2021-04-05 RX ADMIN — Medication 400 MILLIGRAM(S): at 09:21

## 2021-04-05 RX ADMIN — Medication 4: at 12:25

## 2021-04-05 RX ADMIN — Medication 100 MILLIGRAM(S): at 14:33

## 2021-04-05 RX ADMIN — Medication 30 MILLIGRAM(S): at 17:55

## 2021-04-05 RX ADMIN — ATORVASTATIN CALCIUM 40 MILLIGRAM(S): 80 TABLET, FILM COATED ORAL at 23:09

## 2021-04-05 RX ADMIN — OXYCODONE HYDROCHLORIDE 5 MILLIGRAM(S): 5 TABLET ORAL at 05:12

## 2021-04-05 RX ADMIN — Medication 100 MILLIGRAM(S): at 00:45

## 2021-04-05 RX ADMIN — Medication 50 GRAM(S): at 14:33

## 2021-04-05 RX ADMIN — TRAMADOL HYDROCHLORIDE 50 MILLIGRAM(S): 50 TABLET ORAL at 00:45

## 2021-04-05 RX ADMIN — Medication 150 MILLIGRAM(S): at 23:09

## 2021-04-05 RX ADMIN — Medication 4: at 18:02

## 2021-04-05 RX ADMIN — HYDROMORPHONE HYDROCHLORIDE 1 MILLIGRAM(S): 2 INJECTION INTRAMUSCULAR; INTRAVENOUS; SUBCUTANEOUS at 21:32

## 2021-04-05 RX ADMIN — HEPARIN SODIUM 5000 UNIT(S): 5000 INJECTION INTRAVENOUS; SUBCUTANEOUS at 23:09

## 2021-04-05 RX ADMIN — Medication 10 UNIT(S): at 12:25

## 2021-04-05 RX ADMIN — TRAMADOL HYDROCHLORIDE 50 MILLIGRAM(S): 50 TABLET ORAL at 01:00

## 2021-04-05 NOTE — PROGRESS NOTE ADULT - SUBJECTIVE AND OBJECTIVE BOX
Please refer to previous dental consult note for additional information.     *INTERVAL HPI/OVERNIGHT EVENTS:    MEDICATIONS  (STANDING):  atorvastatin 40 milliGRAM(s) Oral at bedtime  cefTRIAXone   IVPB 1000 milliGRAM(s) IV Intermittent every 24 hours  dextrose 50% Injectable 25 Gram(s) IV Push once  heparin   Injectable 5000 Unit(s) SubCutaneous every 8 hours  insulin glargine Injectable (LANTUS) 40 Unit(s) SubCutaneous at bedtime  insulin lispro (ADMELOG) corrective regimen sliding scale   SubCutaneous three times a day before meals  insulin lispro (ADMELOG) corrective regimen sliding scale   SubCutaneous at bedtime  insulin lispro Injectable (ADMELOG) 10 Unit(s) SubCutaneous three times a day before meals  metroNIDAZOLE  IVPB 500 milliGRAM(s) IV Intermittent every 8 hours  pantoprazole    Tablet 40 milliGRAM(s) Oral before breakfast  sertraline 50 milliGRAM(s) Oral daily  tamsulosin 0.4 milliGRAM(s) Oral at bedtime    MEDICATIONS  (PRN):  acetaminophen   Tablet .. 650 milliGRAM(s) Oral every 6 hours PRN Mild Pain (1 - 3)  benzonatate 100 milliGRAM(s) Oral every 8 hours PRN Cough  oxyCODONE    IR 5 milliGRAM(s) Oral every 6 hours PRN Severe Pain (7 - 10)  traMADol 50 milliGRAM(s) Oral every 6 hours PRN Moderate Pain (4 - 6)  traZODone 150 milliGRAM(s) Oral at bedtime PRN insomnia      Allergies    No Known Drug Allergies  shellfish (Unknown)    Intolerances    Vital Signs Last 24 Hrs  T(C): 37.1 (05 Apr 2021 11:01), Max: 37.1 (05 Apr 2021 08:00)  T(F): 98.7 (05 Apr 2021 11:01), Max: 98.8 (05 Apr 2021 08:00)  HR: 66 (05 Apr 2021 11:01) (66 - 82)  BP: 116/82 (05 Apr 2021 11:01) (93/57 - 128/77)  BP(mean): 78 (05 Apr 2021 08:00) (64 - 93)  RR: 14 (05 Apr 2021 11:01) (11 - 22)  SpO2: 97% (05 Apr 2021 11:01) (93% - 99%)    LABS:                        10.3   9.67  )-----------( 459      ( 05 Apr 2021 06:30 )             30.8     04-05    136  |  100  |  7   ----------------------------<  143<H>  3.8   |  20<L>  |  0.70    Ca    9.8      05 Apr 2021 06:30  Phos  4.8     04-05  Mg     1.5     04-05    TPro  7.2  /  Alb  2.5<L>  /  TBili  0.3  /  DBili  x   /  AST  26  /  ALT  21  /  AlkPhos  71  04-04    INR: 1.14 ratio [0.88 - 1.16] (04-05 @ 07:40)  WBC Count: 9.67 K/uL [3.80 - 10.50] (04-05 @ 06:30)  Platelet Count - Automated: 459 K/uL *H* [150 - 400] (04-05 @ 06:30)    CLINICAL EXAM: Limited exam performed   DENTAL RADIOGRAPHS:  2 PA's taken and interpreted  ASSESSMENT:  #22/27: No mobility noted   #18/#23-26 Grade 2/3 mobility noted on teeth due to periodontal dz and associated bone loss  #21/22: bone loss to junction of middle/apical 1/3  #24: bone loss to junction of apical 1/3  #25: fracture to gingival margin, root tip remains  #26: root caries and PARL    PLAN: Pt was informed that mobility of teeth #23-26 is attributed to periodontal dz and extensive bone loss in addition to root caries on tooth #26. Exo #[id number] recommended under local anesthesia without replacement on Wednesday if pt. is medically optimized. Patient must receive medical clearance for extraction of teeth under local anesthesia with epinephrine.     PROCEDURE:  Verbal consent given    RECOMMENDATIONS:  1) Exo #[id number] under local anesthesia on Wed.   2) F/U with outpatient dentist for comprehensive dental care upon discharge.  3) If swelling, fever, difficulty breathing/swallowing occurs, page Dental.     Lisa Morgan DDS #[id number], Milena Alvarenga #[id number]   Supervised by Elly De La Rosa DMD

## 2021-04-05 NOTE — CONSULT NOTE ADULT - SUBJECTIVE AND OBJECTIVE BOX
Patient is a 60y old  Male who presents with a chief complaint of Hemoptysis (05 Apr 2021 08:25)      HPI:  This 60 year old male who had had COVID-19 in May 2020 was admitted to the [facility] ICU on 3/26/21 with DKA after presenting to the ER c/o weakness, N/V, abdominal pain and chest pain and being found to have very elevated blood glucose levels, BUN, and Creatinine.  He was started on an insulin drip and was given antibiotics.  His blood glucose improved but he was considered to be a newly diagnosed diabetic as his A1c was also newly elevated.  During his initial work up, he was found to have a RLL lung mass-like lesion measuring 4.5 cm on a chest CT scan and a dilated extrahepatic CBD with cholelithiasis and pneumobilia.  A repeat CT scan revealed enlargement of the RLL lesion with air fluid levels.  A bronchoscopy with BAL was performed on 4/1/21 and cultures revealed Streptococcus parasanguinis. On 4/2/2012 a HIDA scan was negative for acute cholecystitis.  During his admission, he had multiple issues including worsening of his chronic back pain and headache.  However, his blood glucose was controlled with basal insulin and pre-meal insulin and he was getting ready for discharge on PO Augemntin as recommended by ID when he developed mild hemoptysis.  This was originally thought to be traumatic in nature until it worsened.  A CT Angio revealed  a small focus of contrast extravasation within the cavitary lesion, which was possibly a small pseudoaneurysm or an active extravasation with the history of hemoptysis.  CT Surgery at [facility] was contacted and the pt was transferred to the [facility] CTICU for observation and possible intervention.  He states that he has been spitting up small amounts of dark blood.  He continues to c/o headaches, diarrhea, and generalized abdominal pain.  He denies dyspnea or SOB.   (04 Apr 2021 20:48)      PAST MEDICAL & SURGICAL HISTORY:  Type 2 diabetes mellitus without complication, without long-term current use of insulin  Newly diagnosed at [facility] 3/2021    Benign hypertension    2019 novel coronavirus disease (COVID-19)  5/2020    BPH associated with nocturia    Insomnia    Cholelithiasis  Discovered at [facility] 3/2021    Diverticulosis  Discovered at [facility] 3/2021    DKA (diabetic ketoacidosis)  [facility] 3/2021    OA (osteoarthritis)  BL shoulders    Lung abscess  RLL found at [facility] 3/2021    HLD (hyperlipidemia)    Back pain  Chronic- mid and upper back    Hepatitis C  in remission    Thyroid nodule  Found at [facility] 3/2021    Shoulder arthritis  Multiple surgeries on left shoulder    MEDICATIONS  (STANDING):  atorvastatin 40 milliGRAM(s) Oral at bedtime  cefTRIAXone   IVPB 1000 milliGRAM(s) IV Intermittent every 24 hours  dextrose 50% Injectable 25 Gram(s) IV Push once  heparin   Injectable 5000 Unit(s) SubCutaneous every 8 hours  insulin glargine Injectable (LANTUS) 40 Unit(s) SubCutaneous at bedtime  insulin lispro (ADMELOG) corrective regimen sliding scale   SubCutaneous three times a day before meals  insulin lispro (ADMELOG) corrective regimen sliding scale   SubCutaneous at bedtime  insulin lispro Injectable (ADMELOG) 10 Unit(s) SubCutaneous three times a day before meals  metroNIDAZOLE  IVPB 500 milliGRAM(s) IV Intermittent every 8 hours  pantoprazole    Tablet 40 milliGRAM(s) Oral before breakfast  sertraline 50 milliGRAM(s) Oral daily  tamsulosin 0.4 milliGRAM(s) Oral at bedtime    MEDICATIONS  (PRN):  acetaminophen   Tablet .. 650 milliGRAM(s) Oral every 6 hours PRN Mild Pain (1 - 3)  benzonatate 100 milliGRAM(s) Oral every 8 hours PRN Cough  oxyCODONE    IR 5 milliGRAM(s) Oral every 6 hours PRN Severe Pain (7 - 10)  traMADol 50 milliGRAM(s) Oral every 6 hours PRN Moderate Pain (4 - 6)  traZODone 150 milliGRAM(s) Oral at bedtime PRN insomnia      Allergies    No Known Drug Allergies  shellfish (Unknown)    Intolerances    Vital Signs Last 24 Hrs  T(C): 37.1 (05 Apr 2021 08:00), Max: 37.1 (05 Apr 2021 08:00)  T(F): 98.8 (05 Apr 2021 08:00), Max: 98.8 (05 Apr 2021 08:00)  HR: 79 (05 Apr 2021 08:00) (66 - 82)  BP: 111/68 (05 Apr 2021 08:00) (93/57 - 128/77)  BP(mean): 78 (05 Apr 2021 08:00) (64 - 93)  RR: 20 (05 Apr 2021 08:00) (11 - 22)  SpO2: 97% (05 Apr 2021 08:00) (94% - 99%)    EOE:  TMJ ( - ) clicks                    (  - ) pops                    (  - ) crepitus             Mandible FROM             Facial bones and MOM grossly intact             ( - ) trismus             ( - ) LAD             ( - ) swelling             ( - ) asymmetry             ( - ) palpation             ( - ) SOB             ( - ) dysphagia             ( - ) LOC    IOE:   permanent dentition: grossly intact           hard/soft palate:  ( - ) palatal torus           tongue/FOM WNL           labial/buccal mucosa WNL            Mobility: #18, 23, 24, 26  Root tip: #3, 25  Missing: #30, 31    Radiographs: N/A    LABS:                        10.3   9.67  )-----------( 459      ( 05 Apr 2021 06:30 )             30.8     04-05    136  |  100  |  7   ----------------------------<  143<H>  3.8   |  20<L>  |  0.70    Ca    9.8      05 Apr 2021 06:30  Phos  4.8     04-05  Mg     1.5     04-05    TPro  7.2  /  Alb  2.5<L>  /  TBili  0.3  /  DBili  x   /  AST  26  /  ALT  21  /  AlkPhos  71  04-04    WBC Count: 9.67 K/uL [3.80 - 10.50] (04-05 @ 06:30)    Platelet Count - Automated: 459 K/uL *H* [150 - 400] (04-05 @ 06:30)  Platelet Count - Automated: 363 K/uL [150 - 400] (04-04 @ 06:25)    INR: 1.14 ratio [0.88 - 1.16] (04-05 @ 07:40)    RADIOLOGY & ADDITIONAL STUDIES: PAN to be obtained in [facility]    ASSESSMENT: Pt has extensive amalgam restorations, poor oral hygiene, and several mobile teeth. No erythema, swelling, or other sings of acute infection noted. Pt reports that prior to his bronchoscopy on 4/2/2021, his lower anterior teeth were not mobile. He now reports mobility and pain on #23, 24, 25, and 26. Limited bedside exam reveals these teeth have grade 2 mobility. Tooth #25 is a residual root tip and pt was informed it will need to be extracted. There is also extensive recession present on gingiva of lower anterior teeth. Recommended radiographs and further evaluation at [facility].    PROCEDURE:  Verbal consent given. Bedside exam performed.    RECOMMENDATIONS:   1) Soft diet, chew with posterior teeth; OTC pain management as per Med Team  2) Please note whether pt is medically cleared for transport, dental treatment including possible extractions, and treatment under local anesthesia with epinephrine  3) Dental F/U with outpatient dentist for comprehensive dental care.   4) If any difficulty swallowing/breathing, fever occur, page dental.     Milena Alvarenga DMD #[id number]  Lisa Morgan DDS #[id number]  Supervised by Dr. Elly De La Rosa, DMD

## 2021-04-05 NOTE — CONSULT NOTE ADULT - SUBJECTIVE AND OBJECTIVE BOX
Vascular & Interventional Radiology Brief Consult Note    Evaluate for Procedure: Pulmonary artery PSA embolization    HPI: 60y Male with hemoptysis found to have a right lung pulmonary artery vs bronchial artery PSA. IR consulted for angiogram with possible embolization on 4/4/21.     Allergies:   Medications (Abx/Cardiac/Anticoagulation/Blood Products)    heparin   Injectable: 5000 Unit(s) SubCutaneous (04-05 @ 05:56)  metroNIDAZOLE  IVPB: 100 mL/Hr IV Intermittent (04-05 @ 05:56)  tamsulosin: 0.4 milliGRAM(s) Oral (04-04 @ 22:48)    Data:  180.3  97.5  T(C): 37.1  HR: 75  BP: 111/68  RR: 11  SpO2: 93%    -WBC 9.67 / HgB 10.3 / Hct 30.8 / Plt 459  -Na 136 / Cl 100 / BUN 7 / Glucose 143  -K 3.8 / CO2 20 / Cr 0.70  -ALT -- / Alk Phos -- / T.Bili --  -INR 1.14 / PTT 39.7    Imaging: Right lower lobe consolidation with small contrast extravasation representing possible PSA.     Assessment/Plan:   -60y Male with hemoptysis found to have a right lung pulmonary artery vs bronchial artery PSA.  -Per primary team, patient is stable and the team would like to hold off on performing an angio.  -Please reconsult IR if an intervention is required.  -Discussed with SHAHEED Blanca of CT surgery.

## 2021-04-05 NOTE — PROGRESS NOTE ADULT - SUBJECTIVE AND OBJECTIVE BOX
LEIF GOODWIN      60y   Male   MRN-[id number]         No Known Drug Allergies  shellfish (Unknown)             Daily Height in cm: 180.3 (2021 19:02)    Daily Weight in k.8 (2021 19:24)Drug Dosing Weight  Height (cm): 180.3 (2021 19:02)  Weight (kg): 97.5 (2021 19:02)  BMI (kg/m2): 30 (2021 19:02)  BSA (m2): 2.17 (2021 19:02)    HPI:  This 60 year old male who had had COVID-19 in May 2020 was admitted to the [facility] ICU on 3/26/21 with DKA after presenting to the ER c/o weakness, N/V, abdominal pain and chest pain and being found to have very elevated blood glucose levels, BUN, and Creatinine.  He was started on an insulin drip and was given antibiotics.  His blood glucose improved but he was considered to be a newly diagnosed diabetic as his A1c was also newly elevated.  During his initial work up, he was found to have a RLL lung mass-like lesion measuring 4.5 cm on a chest CT scan and a dilated extrahepatic CBD with cholelithiasis and pneumobilia.  A repeat CT scan revealed enlargement of the RLL lesion with air fluid levels.  A bronchoscopy with BAL was performed on 21 and cultures revealed Streptococcus parasanguinis. On 2012 a HIDA scan was negative for acute cholecystitis.  During his admission, he had multiple issues including worsening of his chronic back pain and headache.  However, his blood glucose was controlled with basal insulin and pre-meal insulin and he was getting ready for discharge on PO Augemntin as recommended by ID when he developed mild hemoptysis.  This was originally thought to be traumatic in nature until it worsened.  A CT Angio revealed  a small focus of contrast extravasation within the cavitary lesion, which was possibly a small pseudoaneurysm or an active extravasation with the history of hemoptysis.  CT Surgery at [facility] was contacted and the pt was transferred to the [facility] CTICU for observation and possible intervention.  He states that he has been spitting up small amounts of dark blood.  He continues to c/o headaches, diarrhea, and generalized abdominal pain.  He denies dyspnea or SOB.   (2021 20:48)                           Issues:  Hemoptysis  Pneumonia - strep parasanguinis   DM2  HTN  Anxiety disorder  Depression -- hx of psych admission  Former smoker - quit , 0.5 pack for 40 years  Hx of COVID in   Hx of Pneumobilia - resolved on  CT scan. Had normal NM hepatobiliary scan on               BPH                               Home Medications:  acetaminophen 325 mg oral tablet: 2 tab(s) orally every 6 hours, As needed, Temp greater or equal to 38C (100.4F), Mild Pain (1 - 3) (2021 18:08)  atorvastatin 40 mg oral tablet: 1 tab(s) orally once a day (at bedtime) (2021 18:08)  benzonatate 100 mg oral capsule: 2 cap(s) orally 3 times a day (2021 18:08)  cyclobenzaprine 5 mg oral tablet: 1 tab(s) orally 2 times a day, As needed, Muscle Spasm (2021 18:08)  hydrocodone-homatropine 5 mg-1.5 mg/5 mL oral syrup: 5 milliliter(s) orally every 6 hours (2021 18:08)  insulin glargine: 40 unit(s) injectable once a day (at bedtime) (2021 18:08)  insulin lispro 100 units/mL injectable solution: 10 unit(s) injectable 3 times a day (before meals) (2021 18:08)  insulin lispro 100 units/mL injectable solution:  injectable  (2021 18:08)  insulin lispro 100 units/mL injectable solution:  injectable  (2021 18:08)  pantoprazole 40 mg oral delayed release tablet: 1 tab(s) orally once a day (before a meal) (2021 18:08)  sertraline 50 mg oral tablet: 3 tab(s) orally once a day (2021 18:08)  tamsulosin 0.4 mg oral capsule: 1 cap(s) orally once a day (at bedtime) (2021 18:08)  traMADol 50 mg oral tablet: 1 tab(s) orally every 8 hours, As needed, Moderate Pain (4 - 6) (2021 18:08)  traZODone 150 mg oral tablet: 1 tab(s) orally once a day (at bedtime) (2021 18:08)      PAST MEDICAL & SURGICAL HISTORY:  Type 2 diabetes mellitus without complication, without long-term current use of insulin  Newly diagnosed at [facility] 3/2021    Benign hypertension    2019 novel coronavirus disease (COVID-19)  2020    BPH associated with nocturia    Insomnia    Cholelithiasis  Discovered at [facility] 3/2021    Diverticulosis  Discovered at [facility] 3/2021    DKA (diabetic ketoacidosis)  [facility] 3/2021    OA (osteoarthritis)  BL shoulders    Lung abscess  RLL found at [facility] 3/2021    HLD (hyperlipidemia)    Back pain  Chronic- mid and upper back    Hepatitis C  in remission    Thyroid nodule  Found at [facility] 3/2021    Shoulder arthritis  Multiple surgeries on left shoulder      Vital Signs Last 24 Hrs  T(C): 37.1 (2021 08:00), Max: 37.1 (2021 08:00)  T(F): 98.8 (2021 08:00), Max: 98.8 (2021 08:00)  HR: 79 (2021 08:00) (66 - 82)  BP: 111/68 (2021 08:00) (93/57 - 128/77)  BP(mean): 78 (2021 08:00) (64 - 93)  RR: 20 (2021 08:00) (11 - 22)  SpO2: 97% (2021 08:00) (94% - 99%)  I&O's Detail    2021 07:01  -  2021 07:00  --------------------------------------------------------  IN:    Oral Fluid: 600 mL  Total IN: 600 mL    OUT:    Voided (mL): 2460 mL  Total OUT: 2460 mL    Total NET: -1860 mL        CAPILLARY BLOOD GLUCOSE      POCT Blood Glucose.: 139 mg/dL (2021 05:54)  POCT Blood Glucose.: 107 mg/dL (2021 22:53)  POCT Blood Glucose.: 204 mg/dL (2021 19:08)  POCT Blood Glucose.: 219 mg/dL (2021 17:01)  POCT Blood Glucose.: 158 mg/dL (2021 12:05)  POCT Blood Glucose.: 157 mg/dL (2021 08:37)    CAPILLARY BLOOD GLUCOSE      POCT Blood Glucose.: 139 mg/dL (2021 05:54)      Home Medications:  acetaminophen 325 mg oral tablet: 2 tab(s) orally every 6 hours, As needed, Temp greater or equal to 38C (100.4F), Mild Pain (1 - 3) (2021 18:08)  atorvastatin 40 mg oral tablet: 1 tab(s) orally once a day (at bedtime) (2021 18:08)  benzonatate 100 mg oral capsule: 2 cap(s) orally 3 times a day (2021 18:08)  cyclobenzaprine 5 mg oral tablet: 1 tab(s) orally 2 times a day, As needed, Muscle Spasm (2021 18:08)  hydrocodone-homatropine 5 mg-1.5 mg/5 mL oral syrup: 5 milliliter(s) orally every 6 hours (2021 18:08)  insulin glargine: 40 unit(s) injectable once a day (at bedtime) (2021 18:08)  insulin lispro 100 units/mL injectable solution: 10 unit(s) injectable 3 times a day (before meals) (2021 18:08)  insulin lispro 100 units/mL injectable solution:  injectable  (2021 18:08)  insulin lispro 100 units/mL injectable solution:  injectable  (2021 18:08)  pantoprazole 40 mg oral delayed release tablet: 1 tab(s) orally once a day (before a meal) (2021 18:08)  sertraline 50 mg oral tablet: 3 tab(s) orally once a day (2021 18:08)  tamsulosin 0.4 mg oral capsule: 1 cap(s) orally once a day (at bedtime) (2021 18:08)  traMADol 50 mg oral tablet: 1 tab(s) orally every 8 hours, As needed, Moderate Pain (4 - 6) (2021 18:08)  traZODone 150 mg oral tablet: 1 tab(s) orally once a day (at bedtime) (2021 18:08)      MEDICATIONS  (STANDING):  atorvastatin 40 milliGRAM(s) Oral at bedtime  cefTRIAXone   IVPB 1000 milliGRAM(s) IV Intermittent every 24 hours  dextrose 50% Injectable 25 Gram(s) IV Push once  heparin   Injectable 5000 Unit(s) SubCutaneous every 8 hours  insulin glargine Injectable (LANTUS) 40 Unit(s) SubCutaneous at bedtime  insulin lispro (ADMELOG) corrective regimen sliding scale   SubCutaneous three times a day before meals  insulin lispro (ADMELOG) corrective regimen sliding scale   SubCutaneous at bedtime  insulin lispro Injectable (ADMELOG) 10 Unit(s) SubCutaneous three times a day before meals  metroNIDAZOLE  IVPB 500 milliGRAM(s) IV Intermittent every 8 hours  pantoprazole    Tablet 40 milliGRAM(s) Oral before breakfast  sertraline 50 milliGRAM(s) Oral daily  tamsulosin 0.4 milliGRAM(s) Oral at bedtime    MEDICATIONS  (PRN):  acetaminophen   Tablet .. 650 milliGRAM(s) Oral every 6 hours PRN Mild Pain (1 - 3)  benzonatate 100 milliGRAM(s) Oral every 8 hours PRN Cough  oxyCODONE    IR 5 milliGRAM(s) Oral every 6 hours PRN Severe Pain (7 - 10)  traMADol 50 milliGRAM(s) Oral every 6 hours PRN Moderate Pain (4 - 6)  traZODone 150 milliGRAM(s) Oral at bedtime PRN insomnia      Physical exam:                             General:               Pt is awake, alert,  appears to be in pain but not in  distress                                                  Neuro:                  Nonfocal                             Cardiovascular:   S1 & S2, regular                           Respiratory:         Air entry is fair and equal on both sides                          GI:                          Soft, nondistended and nontender, Bowel sounds active                            Ext:                        No cyanosis or edema     Labs:                                                                           10.3   9.67  )-----------( 459      ( 2021 06:30 )             30.8             04-    136  |  100  |  7   ----------------------------<  143<H>  3.8   |  20<L>  |  0.70    Ca    9.8      2021 06:30  Phos  4.8     04-05  Mg     1.5     04-05    TPro  7.2  /  Alb  2.5<L>  /  TBili  0.3  /  DBili  x   /  AST  26  /  ALT  21  /  AlkPhos  71  04-04                  PT/INR - ( 2021 07:40 )   PT: 13.0 sec;   INR: 1.14 ratio         PTT - ( 2021 07:40 )  PTT:39.7 sec  LIVER FUNCTIONS - ( 2021 06:25 )  Alb: 2.5 g/dL / Pro: 7.2 g/dL / ALK PHOS: 71 U/L / ALT: 21 U/L / AST: 26 U/L / GGT: x             CXR:  < from: CT Angio Chest w/ IV Cont (21 @ 15:21) >  IMPRESSION:      When compared with 2021:    Small focus of contrast extravasation within the right lower lobe cavitary lesion, best seen on series 4 image 339, which may reflect a small pseudoaneurysm or bleeding given the history of hemoptysis.  Interval decrease in size of cavitary component of right lower lobe consolidation seen on previous exam. New small focus of consolidation in the right lower lobe medially which may reflect an additional area of infection.  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        Plan:    General: This 60 year old male who had had COVID-19 in May 2020 was admitted to the [facility] ICU on 3/26/21 with DKA after presenting to the ER c/o weakness, N/V, abdominal pain and chest pain and being found to have very elevated blood glucose levels, BUN, and Creatinine.  He was started on an insulin drip and was given antibiotics.  His blood glucose improved but he was considered to be a newly diagnosed diabetic as his A1c was also newly elevated.  During his initial work up, he was found to have a RLL lung mass-like lesion measuring 4.5 cm on a chest CT scan and a dilated extrahepatic CBD with cholelithiasis and pneumobilia.  A repeat CT scan revealed enlargement of the RLL lesion with air fluid levels.  A bronchoscopy with BAL was performed on 21 and cultures revealed   . On 2012 a HIDA scan was negative for acute cholecystitis.  During his admission, he had multiple issues including worsening of his chronic back pain and headache.  However, his blood glucose was controlled with basal insulin and pre-meal insulin and he was getting ready for discharge on PO Augemntin as recommended by ID when he developed mild hemoptysis.  This was originally thought to be traumatic in nature until it worsened.  A CT Angio revealed  a small focus of contrast extravasation within the cavitary lesion, which was possibly a small pseudoaneurysm or an active extravasation with the history of hemoptysis.  CT Surgery at [facility] was contacted and the pt was transferred to the [facility] CTICU for observation and possible intervention.  He states that he has been spitting up small amounts of dark blood.  He continues to c/o headaches, diarrhea, and generalized abdominal pain.  He denies dyspnea or SOB.   (2021 20:48)                            Neuro:                                         Pain control with  Tylenol PRN  Avoid NSAIDS because of hemoptysis     Anxiety / Depression: Continue Zoloft / Trazadone                            Cardiovascular:                                          HTN: Continue hemodynamic monitoring.    HLD: On Lipitor                            Respiratory:                                         Pt is on RA, looks comfortable                                         Hemoptysis: Probably secondary to  pneumonia.                                          Pneumonia: On Ceftriaxone                                                                                          GI                                         On  DASH / diabetic  diet as tolerated                                         Continue Zofran / Reglan for nausea - PRN	                                                                 Renal:                                         BPH: On Flomax     Monitor I/Os and electrolytes                                                                                        Hem/ Onc:                                         Monitor for hemoptysis &   signs of bleeding.                                          Follow CBC in AM.                            Infectious disease:      BAL growing Streptococcus parasanguinis      Continue Ceftriaxone and Flagyl for  pneumonia / lung abscess - per I.D recommendation                                          Monitor for fever / leukocytosis.                                                                   Endocrine                                             DM-2 / Hyperglycemia: Continue Lantus / Accu-Checks with coverage    Pt is on SQ Heparin and Venodyne boots for DVT prophylaxis.     Pertinent clinical, laboratory, radiographic, hemodynamic, echocardiographic, respiratory data, microbiologic data and chart were reviewed and analyzed frequently throughout the course of the day and night  Patient seen, examined and plan discussed with CT Surgeon Dr. Loomis  / CTICU team during rounds.    Status discussed with patient /  updated plan of care          Kenroy Johnson MD

## 2021-04-06 ENCOUNTER — TRANSCRIPTION ENCOUNTER (OUTPATIENT)
Age: 61
End: 2021-04-06

## 2021-04-06 LAB
ANION GAP SERPL CALC-SCNC: 14 MMOL/L — SIGNIFICANT CHANGE UP (ref 7–14)
BUN SERPL-MCNC: 13 MG/DL — SIGNIFICANT CHANGE UP (ref 7–23)
CALCIUM SERPL-MCNC: 9.3 MG/DL — SIGNIFICANT CHANGE UP (ref 8.4–10.5)
CHLORIDE SERPL-SCNC: 102 MMOL/L — SIGNIFICANT CHANGE UP (ref 98–107)
CO2 SERPL-SCNC: 18 MMOL/L — LOW (ref 22–31)
CREAT SERPL-MCNC: 0.86 MG/DL — SIGNIFICANT CHANGE UP (ref 0.5–1.3)
GLUCOSE BLDC GLUCOMTR-MCNC: 185 MG/DL — HIGH (ref 70–99)
GLUCOSE BLDC GLUCOMTR-MCNC: 193 MG/DL — HIGH (ref 70–99)
GLUCOSE BLDC GLUCOMTR-MCNC: 226 MG/DL — HIGH (ref 70–99)
GLUCOSE BLDC GLUCOMTR-MCNC: 237 MG/DL — HIGH (ref 70–99)
GLUCOSE SERPL-MCNC: 174 MG/DL — HIGH (ref 70–99)
HCT VFR BLD CALC: 32.5 % — LOW (ref 39–50)
HGB BLD-MCNC: 10.4 G/DL — LOW (ref 13–17)
MAGNESIUM SERPL-MCNC: 1.8 MG/DL — SIGNIFICANT CHANGE UP (ref 1.6–2.6)
MCHC RBC-ENTMCNC: 31.6 PG — SIGNIFICANT CHANGE UP (ref 27–34)
MCHC RBC-ENTMCNC: 32 GM/DL — SIGNIFICANT CHANGE UP (ref 32–36)
MCV RBC AUTO: 98.8 FL — SIGNIFICANT CHANGE UP (ref 80–100)
NRBC # BLD: 0 /100 WBCS — SIGNIFICANT CHANGE UP
NRBC # FLD: 0 K/UL — SIGNIFICANT CHANGE UP
PLATELET # BLD AUTO: 417 K/UL — HIGH (ref 150–400)
POTASSIUM SERPL-MCNC: 4.1 MMOL/L — SIGNIFICANT CHANGE UP (ref 3.5–5.3)
POTASSIUM SERPL-SCNC: 4.1 MMOL/L — SIGNIFICANT CHANGE UP (ref 3.5–5.3)
RBC # BLD: 3.29 M/UL — LOW (ref 4.2–5.8)
RBC # FLD: 12.8 % — SIGNIFICANT CHANGE UP (ref 10.3–14.5)
SARS-COV-2 RNA SPEC QL NAA+PROBE: SIGNIFICANT CHANGE UP
SODIUM SERPL-SCNC: 134 MMOL/L — LOW (ref 135–145)
WBC # BLD: 7.19 K/UL — SIGNIFICANT CHANGE UP (ref 3.8–10.5)
WBC # FLD AUTO: 7.19 K/UL — SIGNIFICANT CHANGE UP (ref 3.8–10.5)

## 2021-04-06 PROCEDURE — ZZZZZ: CPT

## 2021-04-06 PROCEDURE — 99233 SBSQ HOSP IP/OBS HIGH 50: CPT

## 2021-04-06 PROCEDURE — 71045 X-RAY EXAM CHEST 1 VIEW: CPT | Mod: 26

## 2021-04-06 RX ORDER — HYDROMORPHONE HYDROCHLORIDE 2 MG/ML
0.5 INJECTION INTRAMUSCULAR; INTRAVENOUS; SUBCUTANEOUS ONCE
Refills: 0 | Status: DISCONTINUED | OUTPATIENT
Start: 2021-04-06 | End: 2021-04-06

## 2021-04-06 RX ADMIN — HYDROMORPHONE HYDROCHLORIDE 0.5 MILLIGRAM(S): 2 INJECTION INTRAMUSCULAR; INTRAVENOUS; SUBCUTANEOUS at 16:07

## 2021-04-06 RX ADMIN — Medication 150 MILLIGRAM(S): at 22:43

## 2021-04-06 RX ADMIN — HYDROMORPHONE HYDROCHLORIDE 0.5 MILLIGRAM(S): 2 INJECTION INTRAMUSCULAR; INTRAVENOUS; SUBCUTANEOUS at 06:47

## 2021-04-06 RX ADMIN — HYDROMORPHONE HYDROCHLORIDE 0.5 MILLIGRAM(S): 2 INJECTION INTRAMUSCULAR; INTRAVENOUS; SUBCUTANEOUS at 06:17

## 2021-04-06 RX ADMIN — Medication 2: at 08:40

## 2021-04-06 RX ADMIN — HYDROMORPHONE HYDROCHLORIDE 0.5 MILLIGRAM(S): 2 INJECTION INTRAMUSCULAR; INTRAVENOUS; SUBCUTANEOUS at 11:19

## 2021-04-06 RX ADMIN — HYDROMORPHONE HYDROCHLORIDE 0.5 MILLIGRAM(S): 2 INJECTION INTRAMUSCULAR; INTRAVENOUS; SUBCUTANEOUS at 20:30

## 2021-04-06 RX ADMIN — Medication 100 MILLIGRAM(S): at 06:20

## 2021-04-06 RX ADMIN — HYDROMORPHONE HYDROCHLORIDE 0.5 MILLIGRAM(S): 2 INJECTION INTRAMUSCULAR; INTRAVENOUS; SUBCUTANEOUS at 11:45

## 2021-04-06 RX ADMIN — PANTOPRAZOLE SODIUM 40 MILLIGRAM(S): 20 TABLET, DELAYED RELEASE ORAL at 06:20

## 2021-04-06 RX ADMIN — HYDROMORPHONE HYDROCHLORIDE 0.5 MILLIGRAM(S): 2 INJECTION INTRAMUSCULAR; INTRAVENOUS; SUBCUTANEOUS at 16:30

## 2021-04-06 RX ADMIN — HYDROMORPHONE HYDROCHLORIDE 0.5 MILLIGRAM(S): 2 INJECTION INTRAMUSCULAR; INTRAVENOUS; SUBCUTANEOUS at 19:57

## 2021-04-06 RX ADMIN — HEPARIN SODIUM 5000 UNIT(S): 5000 INJECTION INTRAVENOUS; SUBCUTANEOUS at 06:19

## 2021-04-06 RX ADMIN — Medication 10 UNIT(S): at 08:39

## 2021-04-06 NOTE — PROGRESS NOTE ADULT - SUBJECTIVE AND OBJECTIVE BOX
CC: f/u for lung abscess    Patient reports: he still c/o RUQ pain.He has a cough, ? non productive    REVIEW OF SYSTEMS:  All other review of systems negative (Comprehensive ROS)    Antimicrobials Day #  :day 11  cefTRIAXone   IVPB 1000 milliGRAM(s) IV Intermittent every 24 hours  metroNIDAZOLE  IVPB 500 milliGRAM(s) IV Intermittent every 8 hours    Other Medications Reviewed    Vital Signs Last 24 Hrs  T(C): 37.3 (06 Apr 2021 16:49), Max: 37.3 (06 Apr 2021 16:49)  T(F): 99.1 (06 Apr 2021 16:49), Max: 99.1 (06 Apr 2021 16:49)  HR: 76 (06 Apr 2021 16:49) (68 - 81)  BP: 118/76 (06 Apr 2021 16:49) (108/62 - 118/76)  BP(mean): --  RR: 18 (06 Apr 2021 16:49) (18 - 18)  SpO2: 98% (06 Apr 2021 16:49) (93% - 98%)    PHYSICAL EXAM:  General: alert, no acute distress  Eyes:  anicteric, no conjunctival injection, no discharge  Oropharynx: no lesions or injection 	  Neck: supple, without adenopathy  Lungs: clear to auscultation  Heart: regular rate and rhythm; no murmur, rubs or gallops  Abdomen: soft, nondistended, vague RUQ/Rt flank tenderness  Skin: no lesions  Extremities: no clubbing, cyanosis, or edema  Neurologic: alert, oriented, moves all extremities    LAB RESULTS:                          10.4   7.19  )-----------( 417      ( 06 Apr 2021 07:08 )             32.5   04-06    134<L>  |  102  |  13  ----------------------------<  174<H>  4.1   |  18<L>  |  0.86    Ca    9.3      06 Apr 2021 07:08  Phos  4.8     04-05  Mg     1.8     04-06              MICROBIOLOGY:  RECENT CULTURES:    Culture - Body Fluid with Gram Stain (04.01.21 @ 12:30)   Gram Stain:   Moderate polymorphonuclear leukocytes per low power field   No organisms seen   - Ampicillin/Sulbactam: R <=8/4   - Cefazolin: R <=4   - Clindamycin: S <=0.25   - Erythromycin: R >4   - Gentamicin: S <=1 Should not be used as monotherapy   - Oxacillin: R >2   - Penicillin: R 8   - RIF- Rifampin: S <=1 Should not be used as monotherapy   - Tetra/Doxy: R >8   - Trimethoprim/Sulfamethoxazole: S <=0.5/9.5   - Vancomycin: S 2   Specimen Source: .Body Fluid RIGHT LUNG   Culture Results:   Rare Candida albicans   Growth in fluid media only Streptococcus parasanguinis   Growth in fluid media only Staphylococcus epidermidis   Organism Identification: Staphylococcus epidermidis   Organism: Staphylococcus epidermidis   Method Type: JILLIAN   03-28 @ 22:10 .Sputum Sputum     Normal Respiratory Princess present    Rare polymorphonuclear leukocytes per low power field  Rare Squamous epithelial cells per low power field  Rare Gram Negative Rods per oil power field  Rare Gram Variable Cocci per oil power field    03-28 @ 14:50 .Blood Blood-Peripheral     No growth to date.          RADIOLOGY REVIEWED:    < from: CT Angio Chest w/ IV Cont (04.04.21 @ 15:21) >  When compared with March 30, 2021:    Small focus of contrast extravasation within the right lower lobe cavitary lesion, best seen on series 4 image 339, which may reflect a small pseudoaneurysm or bleeding given the history of hemoptysis.    < end of copied text >  < from: Xray Chest 1 View- PORTABLE-Routine (04.05.21 @ 07:51) >      IMPRESSION:  Follow-up study with resolving right basilar haziness now with subsegmental atelectasis.    < end of copied text >  < from: NM Hepatobiliary Imaging (04.02.21 @ 10:26) >    IMPRESSION: Normal hepatobiliary scan.    No scan evidence of acute cholecystitis.    < end of copied text >     CC: f/u for lung abscess    Patient reports hemoptysis. He is upset and frustrated and wants to leave AMA. He is not cooperative with exam or interviewer    REVIEW OF SYSTEMS:  All other review of systems negative (Comprehensive ROS)    Antimicrobials Day #  :day 11  cefTRIAXone   IVPB 1000 milliGRAM(s) IV Intermittent every 24 hours  metroNIDAZOLE  IVPB 500 milliGRAM(s) IV Intermittent every 8 hours    Other Medications Reviewed    Vital Signs Last 24 Hrs  T(C): 37.3 (06 Apr 2021 16:49), Max: 37.3 (06 Apr 2021 16:49)  T(F): 99.1 (06 Apr 2021 16:49), Max: 99.1 (06 Apr 2021 16:49)  HR: 76 (06 Apr 2021 16:49) (68 - 81)  BP: 118/76 (06 Apr 2021 16:49) (108/62 - 118/76)  BP(mean): --  RR: 18 (06 Apr 2021 16:49) (18 - 18)  SpO2: 98% (06 Apr 2021 16:49) (93% - 98%)    PHYSICAL EXAM:  General: alert, no acute distress  Eyes:  anicteric, no conjunctival injection, no discharge  Oropharynx: no lesions or injection 	  Neck: supple, without adenopathy  Lungs: clear to auscultation  Heart: regular rate and rhythm; no murmur, rubs or gallops  Abdomen: soft, nondistended, vague RUQ/Rt flank tenderness  Skin: no lesions  Extremities: no clubbing, cyanosis, or edema  Neurologic: alert, oriented, moves all extremities    LAB RESULTS:                          10.4   7.19  )-----------( 417      ( 06 Apr 2021 07:08 )             32.5   04-06    134<L>  |  102  |  13  ----------------------------<  174<H>  4.1   |  18<L>  |  0.86    Ca    9.3      06 Apr 2021 07:08  Phos  4.8     04-05  Mg     1.8     04-06              MICROBIOLOGY:  RECENT CULTURES:    Culture - Body Fluid with Gram Stain (04.01.21 @ 12:30)   Gram Stain:   Moderate polymorphonuclear leukocytes per low power field   No organisms seen   - Ampicillin/Sulbactam: R <=8/4   - Cefazolin: R <=4   - Clindamycin: S <=0.25   - Erythromycin: R >4   - Gentamicin: S <=1 Should not be used as monotherapy   - Oxacillin: R >2   - Penicillin: R 8   - RIF- Rifampin: S <=1 Should not be used as monotherapy   - Tetra/Doxy: R >8   - Trimethoprim/Sulfamethoxazole: S <=0.5/9.5   - Vancomycin: S 2   Specimen Source: .Body Fluid RIGHT LUNG   Culture Results:   Rare Candida albicans   Growth in fluid media only Streptococcus parasanguinis   Growth in fluid media only Staphylococcus epidermidis   Organism Identification: Staphylococcus epidermidis   Organism: Staphylococcus epidermidis   Method Type: JILLIAN   03-28 @ 22:10 .Sputum Sputum     Normal Respiratory Princess present    Rare polymorphonuclear leukocytes per low power field  Rare Squamous epithelial cells per low power field  Rare Gram Negative Rods per oil power field  Rare Gram Variable Cocci per oil power field    03-28 @ 14:50 .Blood Blood-Peripheral     No growth to date.          RADIOLOGY REVIEWED:    < from: CT Angio Chest w/ IV Cont (04.04.21 @ 15:21) >  When compared with March 30, 2021:    Small focus of contrast extravasation within the right lower lobe cavitary lesion, best seen on series 4 image 339, which may reflect a small pseudoaneurysm or bleeding given the history of hemoptysis.    < end of copied text >  < from: Xray Chest 1 View- PORTABLE-Routine (04.05.21 @ 07:51) >      IMPRESSION:  Follow-up study with resolving right basilar haziness now with subsegmental atelectasis.    < end of copied text >  < from: NM Hepatobiliary Imaging (04.02.21 @ 10:26) >    IMPRESSION: Normal hepatobiliary scan.    No scan evidence of acute cholecystitis.    < end of copied text >

## 2021-04-06 NOTE — PROVIDER CONTACT NOTE (OTHER) - SITUATION
pt refusing all medications and care. Stating "I just want to die" "shoot me"
Patient with one episode of hemoptysis ~50cc dark red blood. Noted to be diluted from water in cup.
Patient refused midnight vital sand telemetry overnight

## 2021-04-06 NOTE — PROVIDER CONTACT NOTE (OTHER) - BACKGROUND
admitted 4/4 for hemoptysis
admitted for hemoptyis 4/4.
pt admitted for  hemoptysis and RLL lung abscess. Waiting to have tooth extraction

## 2021-04-06 NOTE — DISCHARGE NOTE PROVIDER - DETAILS OF MALNUTRITION DIAGNOSIS/DIAGNOSES
This patient has been assessed with a concern for Malnutrition and was treated during this hospitalization for the following Nutrition diagnosis/diagnoses:     -  04/11/2021: Severe protein-calorie malnutrition

## 2021-04-06 NOTE — DISCHARGE NOTE PROVIDER - NSDCFUADDINST_GEN_ALL_CORE_FT
Please continue with oral antibiotics (Flagyl and Ceftin) until May 3rd    As per Psych recommendations  Continue 1:1 Constant Observation for SI statements.   -Continue Zoloft 150mg daily, monitor platelets, Na level  -Continue Trazodone 150mg qHS  -PRN- may use Seroquel 25mg PO q6hrs for agitation if qtc < 500  -PRN- May use Zyprexa 2.5mg IM q6h prn ONLY for SEVERE agitation if qtc <500, once all diversional activities are exhausted.

## 2021-04-06 NOTE — CONSULT NOTE ADULT - ASSESSMENT
Type 2 diabetes mellitus without complication, without long-term current use of insulin  Newly diagnosed at [facility] 3/2021    Benign hypertension    2019 novel coronavirus disease (COVID-19)  5/2020    BPH associated with nocturia    Insomnia    Cholelithiasis  Discovered at [facility] 3/2021    Diverticulosis  Discovered at [facility] 3/2021    DKA (diabetic ketoacidosis)  [facility] 3/2021    OA (osteoarthritis)  BL shoulders    Lung abscess  RLL found at [facility] 3/2021    HLD (hyperlipidemia)    Back pain  Chronic- mid and upper back    Hepatitis C  in remission    Thyroid nodule  Found at [facility] 3/2021    Shoulder arthritis  Multiple surgeries on left shoulder     Mr. Guido is a 61 y/o male w/ newly diagnosed DM with recent DKA (3/2021), HTN, BPH, chronic arthralgia, and COVID19 (5/2020), who is transferred from OSH for hemoptysis and lung abscess for possible CT surgical procedure. Patient continues ot have hemoptysis. Medicine consulted for medical optimization for dental extraction.    Medical optimization  - Patient continues to have same chest pain with coughing up blood. Denies fever, chills, palpitation, SOB at rest. Hemodynamically stable.  - Patient has active infection currently.  - Patient has new diagnosis of DM c/b recent DKA, now on insulin. His Revised Cardiac risk index (Alexander's) is 1 (0.9%)   - Fair METs at baseline (>4) prior to admission.   - Patient takes ASA 81mg (last dose 4/4). He is not on AC other than DVT ppx (SQH).  - most recent INR 1.14, PT 13 PTT 39.7   - platelet count 417, H/H 10.4/32.5. BMP with mild hyponatremia (134) and normal with Cr 0.86.  - Thought patient had good METs at baseline and RCRI score is 1, patient continues to have chest pain and hemoptysis in setting of active infection. Patient is not medically optimized clinically, yet if the dental procedure is to source control for infection (BAL showed Strep parasanguinous and Staph epi), then pt may benefit from proceeding with procedure accepting the risk.    pending discussion with attending.   Mr. Guido is a 61 y/o male w/ newly diagnosed DM with recent DKA (3/2021), HTN, BPH, chronic arthralgia, and COVID19 (5/2020), who is transferred from OSH for hemoptysis and lung abscess for possible CT surgical procedure. Patient continues ot have hemoptysis. Medicine consulted for medical optimization for dental extraction.    Medical optimization  - Patient continues to have same chest pain with coughing up blood. Denies fever, chills, palpitation, SOB at rest. Hemodynamically stable.  - Patient has active infection currently.  - Patient has new diagnosis of DM c/b recent DKA, now on insulin. His Revised Cardiac risk index (Alexander's) is 1 (0.9%)   - Fair METs at baseline (>4) prior to admission.   - Patient takes ASA 81mg (last dose 4/4). He is not on AC other than DVT ppx (SQH).  - most recent INR 1.14, PT 13 PTT 39.7   - platelet count 417, H/H 10.4/32.5. BMP with mild hyponatremia (134) and normal with Cr 0.86.  - Thought patient had good METs at baseline and RCRI score is 1, patient continues to have chest pain and hemoptysis in setting of active infection. Patient is not medically optimized clinically, yet if the dental procedure is to source control for infection (BAL showed Strep parasanguinous and Staph epi), then pt may benefit from proceeding with procedure accepting the risk. Currently on ABx and ID on board.    pending discussion with attending.   Mr. Guido is a 61 y/o male w/ newly diagnosed DM with recent DKA (3/2021), HTN, BPH, chronic arthralgia, and COVID19 (5/2020), who is transferred from OSH for hemoptysis and lung abscess for possible CT surgical procedure. Patient continues ot have hemoptysis. Medicine consulted for medical optimization for dental extraction.    Medical optimization  - Patient continues to have same chest pain with coughing up blood. Denies fever, chills, palpitation, SOB at rest. Hemodynamically stable.  - Patient has active infection currently.  - Patient has new diagnosis of DM c/b recent DKA, now on insulin. His Revised Cardiac risk index (Alexander's) is 1 (0.9%)   - Fair METs at baseline (>4) prior to admission.   - Patient takes ASA 81mg (last dose 4/4). He is not on AC other than DVT ppx (SQH).  - most recent INR 1.14, PT 13 PTT 39.7   - platelet count 417, H/H 10.4/32.5. BMP with mild hyponatremia (134) and normal with Cr 0.86.  - Thought patient had good METs at baseline and RCRI score is 1, patient continues to have chest pain and hemoptysis in setting of active infection.     Pt may benefit from proceeding with procedure accepting the risk. Currently on ABx and ID on board.    pending discussion with attending.

## 2021-04-06 NOTE — PROGRESS NOTE ADULT - SUBJECTIVE AND OBJECTIVE BOX
Subjective: 61 y/o male presents sitting up in chair, no acute events overnight. No hemoptysis since yesterday. Patient's vitals WNL, CBC stable. He c/o generalized pain and lack of sleep but otherwise feels okay. Patient was being followed by endocrine at [facility] regarding his uncontrolled DM and new insulin. Endocrine consult and DM educator to be called here.     Vital Signs:  Vital Signs Last 24 Hrs  T(C): 36.4 (04-06-21 @ 08:46), Max: 37.1 (04-05-21 @ 20:03)  T(F): 97.6 (04-06-21 @ 08:46), Max: 98.7 (04-05-21 @ 20:03)  HR: 68 (04-06-21 @ 08:46) (68 - 81)  BP: 108/63 (04-06-21 @ 08:46) (108/62 - 127/83)  RR: 18 (04-06-21 @ 08:46) (18 - 18)  SpO2: 97% (04-06-21 @ 08:46) (93% - 99%) on (O2)    Pertinent Physical Exam:  Telemetry/Alarms: NO events   General: WN/WD NAD  Neurology: Awake, nonfocal, NEWTON x 4  Respiratory: CTA B/L, No wheezing, rales, rhonchi  CV: RRR  Psych: Oriented x 3, normal affect      I&O's Summary    05 Apr 2021 07:01  -  06 Apr 2021 07:00  --------------------------------------------------------  IN: 200 mL / OUT: 1550 mL / NET: -1350 mL        Relevant labs, radiology and Medications reviewed                        10.4   7.19  )-----------( 417      ( 06 Apr 2021 07:08 )             32.5     04-06    134<L>  |  102  |  13  ----------------------------<  174<H>  4.1   |  18<L>  |  0.86    Ca    9.3      06 Apr 2021 07:08  Phos  4.8     04-05  Mg     1.8     04-06      PT/INR - ( 05 Apr 2021 07:40 )   PT: 13.0 sec;   INR: 1.14 ratio         PTT - ( 05 Apr 2021 07:40 )  PTT:39.7 sec  MEDICATIONS  (STANDING):  atorvastatin 40 milliGRAM(s) Oral at bedtime  cefTRIAXone   IVPB 1000 milliGRAM(s) IV Intermittent every 24 hours  dextrose 50% Injectable 25 Gram(s) IV Push once  heparin   Injectable 5000 Unit(s) SubCutaneous every 8 hours  insulin glargine Injectable (LANTUS) 40 Unit(s) SubCutaneous at bedtime  insulin lispro (ADMELOG) corrective regimen sliding scale   SubCutaneous three times a day before meals  insulin lispro (ADMELOG) corrective regimen sliding scale   SubCutaneous at bedtime  insulin lispro Injectable (ADMELOG) 10 Unit(s) SubCutaneous three times a day before meals  metroNIDAZOLE  IVPB 500 milliGRAM(s) IV Intermittent every 8 hours  pantoprazole    Tablet 40 milliGRAM(s) Oral before breakfast  sertraline 50 milliGRAM(s) Oral daily  tamsulosin 0.4 milliGRAM(s) Oral at bedtime    MEDICATIONS  (PRN):  acetaminophen   Tablet .. 650 milliGRAM(s) Oral every 6 hours PRN Mild Pain (1 - 3)  benzonatate 100 milliGRAM(s) Oral every 8 hours PRN Cough  oxyCODONE    IR 5 milliGRAM(s) Oral every 6 hours PRN Severe Pain (7 - 10)  traMADol 50 milliGRAM(s) Oral every 6 hours PRN Moderate Pain (4 - 6)  traZODone 150 milliGRAM(s) Oral at bedtime PRN insomnia      Assessment  60y Male  w/ PAST MEDICAL & SURGICAL HISTORY:  Type 2 diabetes mellitus without complication, without long-term current use of insulin  Newly diagnosed at [facility] 3/2021      4/4 Transferred from  hospital wit hemoptysis and RLL lung abscess.   4/5 Cnt with care from . Dental consulted.   4/6 1x episode of hemoptysis. HCT stable. COVID neg. Dental clearance and procedure scheduled for tomorrow. Endocrine consulted, needs teaching and set up for home insulin (new and process started at ). Interventional Radiology will s/o and will recall if intervention needed.    PLAN  Neuro: Pain management  Pulm: Encourage coughing, deep breathing and use of incentive spirometry. Wean off supplemental oxygen as able. Daily CXR.   Cardio: Monitor telemetry/alarms  GI: Tolerating diet. Continue stool softeners.  Renal: monitor urine output, supplement electrolytes as needed  Vasc: Heparin SC/SCDs for DVT prophylaxis  Heme: Stable H/H. .   ID: Off antibiotics. Stable.  Therapy: OOB/ambulate  Endo: Consult  Disposition: Dental procedure tomorrow  Discussed with Cardiothoracic Team at AM rounds.

## 2021-04-06 NOTE — CONSULT NOTE ADULT - SUBJECTIVE AND OBJECTIVE BOX
Consult Question: Medical optimimzation    Brief HPI:  60M w/ PMHx of COVID-19 in May 2020, who was admitted to the [facility] ICU on 3/26/21 with DKA and HARRISON.  He was started on an insulin drip and was given antibiotics.  His blood glucose improved but he was considered to be a newly diagnosed diabetic as his A1c was also newly elevated.  During his initial work up, he was found to have a RLL lung mass-like lesion measuring 4.5 cm on a chest CT scan and a dilated extrahepatic CBD with cholelithiasis and pneumobilia.  A repeat CT scan revealed enlargement of the RLL lesion with air fluid levels.  A bronchoscopy with BAL was performed on 4/1/21 and cultures revealed Streptococcus parasanguinis. On 4/2/2012 a HIDA scan was negative for acute cholecystitis.  During his admission, he had multiple issues including worsening of his chronic back pain and headache.  However, his blood glucose was controlled with basal insulin and pre-meal insulin and he was getting ready for discharge on PO Augemntin as recommended by ID when he developed mild hemoptysis.  This was originally thought to be traumatic in nature until it worsened.  A CT Angio revealed  a small focus of contrast extravasation within the cavitary lesion, which was possibly a small pseudoaneurysm or an active extravasation with the history of hemoptysis.  CT Surgery at [facility] was contacted and the pt was transferred to the [facility] CTICU for observation and possible intervention.    Patient was transferred to [facility]. Dentistry was consulted and is planning for dental extraction on 4/7. Endocrine is consulted by primary team for ongoing diabetes management. Patient continued to have hemoptysis yet Hct has been stable. 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       PAST MEDICAL & SURGICAL HISTORY:  Type 2 diabetes mellitus without complication, without long-term current use of insulin  Newly diagnosed at [facility] 3/2021    Benign hypertension    2019 novel coronavirus disease (COVID-19)  5/2020    BPH associated with nocturia    Insomnia    Cholelithiasis  Discovered at [facility] 3/2021    Diverticulosis  Discovered at [facility] 3/2021    DKA (diabetic ketoacidosis)  [facility] 3/2021    OA (osteoarthritis)  BL shoulders    Lung abscess  RLL found at [facility] 3/2021    HLD (hyperlipidemia)    Back pain  Chronic- mid and upper back    Hepatitis C  in remission    Thyroid nodule  Found at [facility] 3/2021    Shoulder arthritis  Multiple surgeries on left shoulder        FAMILY HISTORY:  Family history unknown        SOCIAL HISTORY:  Smoking: __ packs x ___ years  EtOH Use:  Marital Status:  Occupation:  Recent Travel:  Country of Birth:  Advance Directives:    Allergies    No Known Drug Allergies  shellfish (Unknown)    Intolerances        HOME MEDICATIONS:    REVIEW OF SYSTEMS:  CONSTITUTIONAL: No fever, weight loss, or fatigue  EYES: No eye pain, visual disturbances, or discharge  ENMT:  No difficulty hearing, tinnitus, vertigo; No sinus or throat pain  RESPIRATORY: No cough, wheezing, chills or hemoptysis; No shortness of breath  CARDIOVASCULAR: No chest pain, palpitations, dizziness, or leg swelling  GASTROINTESTINAL: No abdominal or epigastric pain. No nausea, vomiting, or hematemesis; No diarrhea or constipation. No melena or hematochezia.  GENITOURINARY: No dysuria, frequency, hematuria, or incontinence  NEUROLOGICAL: No headaches, loss of strength, numbness, or tremors  SKIN: No itching, burning, rashes, or lesions   LYMPH NODES: No enlarged glands  ENDOCRINE: No heat or cold intolerance; No polydipsia or polyuria  MUSCULOSKELETAL: No joint pain or swelling;   PSYCHIATRIC: Denies depression, anxiety  HEME/LYMPH: No easy bruising, or bleeding gums  ALLERGY AND IMMUNOLOGIC: No hives or eczema    OBJECTIVE:  ICU Vital Signs Last 24 Hrs  T(C): 36.4 (06 Apr 2021 08:46), Max: 37.1 (05 Apr 2021 20:03)  T(F): 97.6 (06 Apr 2021 08:46), Max: 98.7 (05 Apr 2021 20:03)  HR: 68 (06 Apr 2021 08:46) (68 - 81)  BP: 108/63 (06 Apr 2021 08:46) (108/62 - 127/83)  BP(mean): --  ABP: --  ABP(mean): --  RR: 18 (06 Apr 2021 08:46) (18 - 18)  SpO2: 97% (06 Apr 2021 08:46) (93% - 99%)        04-05 @ 07:01  -  04-06 @ 07:00  --------------------------------------------------------  IN: 200 mL / OUT: 1550 mL / NET: -1350 mL      CAPILLARY BLOOD GLUCOSE      POCT Blood Glucose.: 237 mg/dL (06 Apr 2021 12:03)      PHYSICAL EXAM:  PHYSICAL EXAM:    Constitutional: NAD. well-developed; well-groomed; well-nourished;  HEENT: AT/NC, PERRLA; EOMI, MMM, no oropharyngeal lesions, no erythema, no exudates, Supple neck; normal thyroid gland, no cervical lymphadenopathy  Respiratory: CTAB. equal aeration bilaterally. no wheezing, no crackes, no rhonchi. no increase in WOB  Cardiovascular: RRR, no M/R/G. 2+ distal pulses. Cap refill < 2 seconds. no JVD  Gastrointestinal: soft; NT/ND, +BS, no rebounding tenderness / guarding / HSM / mass / ascites.  Genitourinary: not examined.  Extremities: no clubbing; no cyanosis; no pedal edema, non-tender to palpation, DP and Radial pulses intact.  Skin: warm and dry; color normal: no rash: no ulcers  Neurological: A&Ox 3; responds to pain; responds to verbal commands; epicritic and protopathic sensation intact: CN nerves grossly intact.   MSK/Back: no deformities. Active and passive ROM intact; strength intact, no CVA tenderness, No joint tenderness, swelling, erythema  Psychiatric: normal mood/affect. Denies SI/HI    HOSPITAL MEDICATIONS:  MEDICATIONS  (STANDING):  atorvastatin 40 milliGRAM(s) Oral at bedtime  cefTRIAXone   IVPB 1000 milliGRAM(s) IV Intermittent every 24 hours  dextrose 50% Injectable 25 Gram(s) IV Push once  heparin   Injectable 5000 Unit(s) SubCutaneous every 8 hours  insulin glargine Injectable (LANTUS) 40 Unit(s) SubCutaneous at bedtime  insulin lispro (ADMELOG) corrective regimen sliding scale   SubCutaneous three times a day before meals  insulin lispro (ADMELOG) corrective regimen sliding scale   SubCutaneous at bedtime  insulin lispro Injectable (ADMELOG) 10 Unit(s) SubCutaneous three times a day before meals  metroNIDAZOLE  IVPB 500 milliGRAM(s) IV Intermittent every 8 hours  pantoprazole    Tablet 40 milliGRAM(s) Oral before breakfast  sertraline 50 milliGRAM(s) Oral daily  tamsulosin 0.4 milliGRAM(s) Oral at bedtime    MEDICATIONS  (PRN):  acetaminophen   Tablet .. 650 milliGRAM(s) Oral every 6 hours PRN Mild Pain (1 - 3)  benzonatate 100 milliGRAM(s) Oral every 8 hours PRN Cough  oxyCODONE    IR 5 milliGRAM(s) Oral every 6 hours PRN Severe Pain (7 - 10)  traMADol 50 milliGRAM(s) Oral every 6 hours PRN Moderate Pain (4 - 6)  traZODone 150 milliGRAM(s) Oral at bedtime PRN insomnia      LABS:                        10.4   7.19  )-----------( 417      ( 06 Apr 2021 07:08 )             32.5     04-06    134<L>  |  102  |  13  ----------------------------<  174<H>  4.1   |  18<L>  |  0.86    Ca    9.3      06 Apr 2021 07:08  Phos  4.8     04-05  Mg     1.8     04-06      PT/INR - ( 05 Apr 2021 07:40 )   PT: 13.0 sec;   INR: 1.14 ratio         PTT - ( 05 Apr 2021 07:40 )  PTT:39.7 sec          MICROBIOLOGY:     RADIOLOGY:  [ ] Reviewed and interpreted by me    EKG: Consult Question: Medical optimimzation    Brief HPI:  60M w/ PMHx of COVID-Inocente in May 2020, who was admitted to the [facility] ICU on 3/26/21 with DKA and HARRISON.  He was started on an insulin drip and was given antibiotics.  His blood glucose improved but he was considered to be a newly diagnosed diabetic as his A1c was also newly elevated.  During his initial work up, he was found to have a RLL lung mass-like lesion measuring 4.5 cm on a chest CT scan and a dilated extrahepatic CBD with cholelithiasis and pneumobilia.  A repeat CT scan revealed enlargement of the RLL lesion with air fluid levels.  A bronchoscopy with BAL was performed on 4/1/21 and cultures revealed Streptococcus parasanguinis. On 4/2/2012 a HIDA scan was negative for acute cholecystitis.  During his admission, he had multiple issues including worsening of his chronic back pain and headache.  However, his blood glucose was controlled with basal insulin and pre-meal insulin and he was getting ready for discharge on PO Augemntin as recommended by ID when he developed mild hemoptysis.  This was originally thought to be traumatic in nature until it worsened.  A CT Angio revealed  a small focus of contrast extravasation within the cavitary lesion, which was possibly a small pseudoaneurysm or an active extravasation with the history of hemoptysis.  CT Surgery at [facility] was contacted and the pt was transferred to the [facility] CTICU for observation and possible intervention.    Patient was transferred to [facility]. Dentistry was consulted and is planning for dental extraction on 4/7. Endocrine is consulted by primary team for ongoing diabetes management. Patient continued to have hemoptysis yet Hct has been stable. 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     today, he is AOx3 yet in dysthymic mood, not cooperative for interview as much. He reports on going intermittent chest pain that has been there since admission with hemoptysis. He denies any SOB or palpitation, visual disturbance, or lightheadedness. He had bronchoscopy under general anesthesia at the outside hospital without issue. No FHx of adverse reaction to anesthesia. He is ambulating fine at baseline and had no trouble moving around heavy objects. Denies cardiac or pulmonary history though he is current smoker. He has been on Aspirin but not on any Anticoagulations at home.           PAST MEDICAL & SURGICAL HISTORY:  Type 2 diabetes mellitus without complication, without long-term current use of insulin  Newly diagnosed at [facility] 3/2021    Benign hypertension    2019 novel coronavirus disease (COVID-19)  5/2020    BPH associated with nocturia    Insomnia    Cholelithiasis  Discovered at [facility] 3/2021    Diverticulosis  Discovered at [facility] 3/2021    DKA (diabetic ketoacidosis)  [facility] 3/2021    OA (osteoarthritis)  BL shoulders    Lung abscess  RLL found at [facility] 3/2021    HLD (hyperlipidemia)    Back pain  Chronic- mid and upper back    Hepatitis C  in remission    Thyroid nodule  Found at [facility] 3/2021    Shoulder arthritis  Multiple surgeries on left shoulder        FAMILY HISTORY:  Family history unknown        SOCIAL HISTORY:  Smoking: __ packs x ___ years  EtOH Use:  Marital Status:  Occupation:  Recent Travel:  Country of Birth:  Advance Directives:    Allergies    No Known Drug Allergies  shellfish (Unknown)    Intolerances        HOME MEDICATIONS:    REVIEW OF SYSTEMS:  CONSTITUTIONAL: No fever, weight loss, or fatigue  EYES: No eye pain, visual disturbances, or discharge  ENMT:  No difficulty hearing, tinnitus, vertigo; No sinus or throat pain  RESPIRATORY: No cough, wheezing, chills or hemoptysis; No shortness of breath  CARDIOVASCULAR: No chest pain, palpitations, dizziness, or leg swelling  GASTROINTESTINAL: No abdominal or epigastric pain. No nausea, vomiting, or hematemesis; No diarrhea or constipation. No melena or hematochezia.  GENITOURINARY: No dysuria, frequency, hematuria, or incontinence  NEUROLOGICAL: No headaches, loss of strength, numbness, or tremors  SKIN: No itching, burning, rashes, or lesions   LYMPH NODES: No enlarged glands  ENDOCRINE: No heat or cold intolerance; No polydipsia or polyuria  MUSCULOSKELETAL: No joint pain or swelling;   PSYCHIATRIC: Denies depression, anxiety  HEME/LYMPH: No easy bruising, or bleeding gums  ALLERGY AND IMMUNOLOGIC: No hives or eczema    OBJECTIVE:  ICU Vital Signs Last 24 Hrs  T(C): 36.4 (06 Apr 2021 08:46), Max: 37.1 (05 Apr 2021 20:03)  T(F): 97.6 (06 Apr 2021 08:46), Max: 98.7 (05 Apr 2021 20:03)  HR: 68 (06 Apr 2021 08:46) (68 - 81)  BP: 108/63 (06 Apr 2021 08:46) (108/62 - 127/83)  BP(mean): --  ABP: --  ABP(mean): --  RR: 18 (06 Apr 2021 08:46) (18 - 18)  SpO2: 97% (06 Apr 2021 08:46) (93% - 99%)        04-05 @ 07:01  -  04-06 @ 07:00  --------------------------------------------------------  IN: 200 mL / OUT: 1550 mL / NET: -1350 mL      CAPILLARY BLOOD GLUCOSE      POCT Blood Glucose.: 237 mg/dL (06 Apr 2021 12:03)      PHYSICAL EXAM:  PHYSICAL EXAM:    Constitutional: NAD. well-developed; well-groomed; well-nourished;  HEENT: AT/NC, PERRLA; EOMI, MMM, no oropharyngeal lesions, no erythema, no exudates, Supple neck; normal thyroid gland, no cervical lymphadenopathy  Respiratory: CTAB. equal aeration bilaterally. no wheezing, no crackes, no rhonchi. no increase in WOB  Cardiovascular: RRR, no M/R/G. 2+ distal pulses. Cap refill < 2 seconds. no JVD  Gastrointestinal: soft; NT/ND, +BS, no rebounding tenderness / guarding / HSM / mass / ascites.  Genitourinary: not examined.  Extremities: no clubbing; no cyanosis; no pedal edema, non-tender to palpation, DP and Radial pulses intact.  Skin: warm and dry; color normal: no rash: no ulcers  Neurological: A&Ox 3; responds to pain; responds to verbal commands; epicritic and protopathic sensation intact: CN nerves grossly intact.   MSK/Back: no deformities. Active and passive ROM intact; strength intact, no CVA tenderness, No joint tenderness, swelling, erythema  Psychiatric: normal mood/affect. Denies SI/HI    HOSPITAL MEDICATIONS:  MEDICATIONS  (STANDING):  atorvastatin 40 milliGRAM(s) Oral at bedtime  cefTRIAXone   IVPB 1000 milliGRAM(s) IV Intermittent every 24 hours  dextrose 50% Injectable 25 Gram(s) IV Push once  heparin   Injectable 5000 Unit(s) SubCutaneous every 8 hours  insulin glargine Injectable (LANTUS) 40 Unit(s) SubCutaneous at bedtime  insulin lispro (ADMELOG) corrective regimen sliding scale   SubCutaneous three times a day before meals  insulin lispro (ADMELOG) corrective regimen sliding scale   SubCutaneous at bedtime  insulin lispro Injectable (ADMELOG) 10 Unit(s) SubCutaneous three times a day before meals  metroNIDAZOLE  IVPB 500 milliGRAM(s) IV Intermittent every 8 hours  pantoprazole    Tablet 40 milliGRAM(s) Oral before breakfast  sertraline 50 milliGRAM(s) Oral daily  tamsulosin 0.4 milliGRAM(s) Oral at bedtime    MEDICATIONS  (PRN):  acetaminophen   Tablet .. 650 milliGRAM(s) Oral every 6 hours PRN Mild Pain (1 - 3)  benzonatate 100 milliGRAM(s) Oral every 8 hours PRN Cough  oxyCODONE    IR 5 milliGRAM(s) Oral every 6 hours PRN Severe Pain (7 - 10)  traMADol 50 milliGRAM(s) Oral every 6 hours PRN Moderate Pain (4 - 6)  traZODone 150 milliGRAM(s) Oral at bedtime PRN insomnia      LABS:                        10.4   7.19  )-----------( 417      ( 06 Apr 2021 07:08 )             32.5     04-06    134<L>  |  102  |  13  ----------------------------<  174<H>  4.1   |  18<L>  |  0.86    Ca    9.3      06 Apr 2021 07:08  Phos  4.8     04-05  Mg     1.8     04-06      PT/INR - ( 05 Apr 2021 07:40 )   PT: 13.0 sec;   INR: 1.14 ratio         PTT - ( 05 Apr 2021 07:40 )  PTT:39.7 sec          MICROBIOLOGY:     RADIOLOGY:  [ ] Reviewed and interpreted by me    EKG: Consult Question: Medical optimimzation    Brief HPI:  60M w/ PMHx of COVID-Inocente in May 2020, who was admitted to the [facility] ICU on 3/26/21 with DKA and HARRISON.  He was started on an insulin drip and was given antibiotics.  His blood glucose improved but he was considered to be a newly diagnosed diabetic as his A1c was also newly elevated.  During his initial work up, he was found to have a RLL lung mass-like lesion measuring 4.5 cm on a chest CT scan and a dilated extrahepatic CBD with cholelithiasis and pneumobilia.  A repeat CT scan revealed enlargement of the RLL lesion with air fluid levels.  A bronchoscopy with BAL was performed on 4/1/21 and cultures revealed Streptococcus parasanguinis. On 4/2/2012 a HIDA scan was negative for acute cholecystitis.  During his admission, he had multiple issues including worsening of his chronic back pain and headache.  However, his blood glucose was controlled with basal insulin and pre-meal insulin and he was getting ready for discharge on PO Augemntin as recommended by ID when he developed mild hemoptysis.  This was originally thought to be traumatic in nature until it worsened.  A CT Angio revealed  a small focus of contrast extravasation within the cavitary lesion, which was possibly a small pseudoaneurysm or an active extravasation with the history of hemoptysis.  CT Surgery at [facility] was contacted and the pt was transferred to the [facility] CTICU for observation and possible intervention.    Patient was transferred to [facility]. Dentistry was consulted and is planning for dental extraction on 4/7. Endocrine is consulted by primary team for ongoing diabetes management. Patient continued to have hemoptysis yet Hct has been stable. 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     today, he is AOx3 yet in dysthymic mood, not cooperative for interview as much. He reports on going intermittent chest pain that has been there since admission with hemoptysis. He denies any SOB or palpitation, visual disturbance, or lightheadedness. He had bronchoscopy under general anesthesia at the outside hospital without issue. No FHx of adverse reaction to anesthesia. He is ambulating fine at baseline and had no trouble moving around heavy objects. Denies cardiac or pulmonary history though he is current smoker. He has been on Aspirin but not on any Anticoagulations at home.           PAST MEDICAL & SURGICAL HISTORY:  Type 2 diabetes mellitus without complication, without long-term current use of insulin  Newly diagnosed at [facility] 3/2021    Benign hypertension    2019 novel coronavirus disease (COVID-19)  5/2020    BPH associated with nocturia    Insomnia    Cholelithiasis  Discovered at [facility] 3/2021    Diverticulosis  Discovered at [facility] 3/2021    DKA (diabetic ketoacidosis)  [facility] 3/2021    OA (osteoarthritis)  BL shoulders    Lung abscess  RLL found at [facility] 3/2021    HLD (hyperlipidemia)    Back pain  Chronic- mid and upper back    Hepatitis C  in remission    Thyroid nodule  Found at [facility] 3/2021    Shoulder arthritis  Multiple surgeries on left shoulder        FAMILY HISTORY:  Family history unknown        SOCIAL HISTORY:   Single; unemployed ; lives in a rented room; admits to having been a smoker until his admission at [facility]; denies EtOH, or recreational drugs      Allergies    No Known Drug Allergies  shellfish (Unknown)    Intolerances        HOME MEDICATIONS:    REVIEW OF SYSTEMS:  CONSTITUTIONAL: No fever,   EYES: No eye pain, visual disturbances, or discharge  ENMT:  No difficulty hearing, tinnitus, vertigo  RESPIRATORY: intermittent cough with hemoptysis. no SOB.  CARDIOVASCULAR: + chest pain, no palpitations, dizziness, or leg swelling  GASTROINTESTINAL: + abdominal pain. No nausea, vomiting, or hematemesis; No diarrhea or constipation. No melena or hematochezia.  GENITOURINARY: No dysuria, frequency, hematuria, or incontinence  NEUROLOGICAL: No headaches, loss of strength, numbness, or tremors  PSYCHIATRIC: frustrated.    OBJECTIVE:  ICU Vital Signs Last 24 Hrs  T(C): 36.4 (06 Apr 2021 08:46), Max: 37.1 (05 Apr 2021 20:03)  T(F): 97.6 (06 Apr 2021 08:46), Max: 98.7 (05 Apr 2021 20:03)  HR: 68 (06 Apr 2021 08:46) (68 - 81)  BP: 108/63 (06 Apr 2021 08:46) (108/62 - 127/83)  BP(mean): --  ABP: --  ABP(mean): --  RR: 18 (06 Apr 2021 08:46) (18 - 18)  SpO2: 97% (06 Apr 2021 08:46) (93% - 99%)        04-05 @ 07:01  -  04-06 @ 07:00  --------------------------------------------------------  IN: 200 mL / OUT: 1550 mL / NET: -1350 mL      CAPILLARY BLOOD GLUCOSE      POCT Blood Glucose.: 237 mg/dL (06 Apr 2021 12:03)      PHYSICAL EXAM:    Constitutional: NAD. sitting on chair on room air;  HEENT: AT/NC, PERRLA; EOMI, MMM, supple neck.  Respiratory: CTAB antreiorly. equal aeration bilaterally. no wheezing, no increase in WOB  Cardiovascular: RRR, S1, S2.  Gastrointestinal: soft; NT/ND, +BS, peripheral pulses intact.  Genitourinary: not examined.  Neurological: A&Ox 3; responds to pain; responds to verbal commands;    Psychiatric: depressed mood. frustrated.    HOSPITAL MEDICATIONS:  MEDICATIONS  (STANDING):  atorvastatin 40 milliGRAM(s) Oral at bedtime  cefTRIAXone   IVPB 1000 milliGRAM(s) IV Intermittent every 24 hours  dextrose 50% Injectable 25 Gram(s) IV Push once  heparin   Injectable 5000 Unit(s) SubCutaneous every 8 hours  insulin glargine Injectable (LANTUS) 40 Unit(s) SubCutaneous at bedtime  insulin lispro (ADMELOG) corrective regimen sliding scale   SubCutaneous three times a day before meals  insulin lispro (ADMELOG) corrective regimen sliding scale   SubCutaneous at bedtime  insulin lispro Injectable (ADMELOG) 10 Unit(s) SubCutaneous three times a day before meals  metroNIDAZOLE  IVPB 500 milliGRAM(s) IV Intermittent every 8 hours  pantoprazole    Tablet 40 milliGRAM(s) Oral before breakfast  sertraline 50 milliGRAM(s) Oral daily  tamsulosin 0.4 milliGRAM(s) Oral at bedtime    MEDICATIONS  (PRN):  acetaminophen   Tablet .. 650 milliGRAM(s) Oral every 6 hours PRN Mild Pain (1 - 3)  benzonatate 100 milliGRAM(s) Oral every 8 hours PRN Cough  oxyCODONE    IR 5 milliGRAM(s) Oral every 6 hours PRN Severe Pain (7 - 10)  traMADol 50 milliGRAM(s) Oral every 6 hours PRN Moderate Pain (4 - 6)  traZODone 150 milliGRAM(s) Oral at bedtime PRN insomnia      LABS:                        10.4   7.19  )-----------( 417      ( 06 Apr 2021 07:08 )             32.5     04-06    134<L>  |  102  |  13  ----------------------------<  174<H>  4.1   |  18<L>  |  0.86    Ca    9.3      06 Apr 2021 07:08  Phos  4.8     04-05  Mg     1.8     04-06      PT/INR - ( 05 Apr 2021 07:40 )   PT: 13.0 sec;   INR: 1.14 ratio         PTT - ( 05 Apr 2021 07:40 )  PTT:39.7 sec

## 2021-04-06 NOTE — DISCHARGE NOTE PROVIDER - NSDCFUSCHEDAPPT_GEN_ALL_CORE_FT
LEIF GOODWIN ; 04/15/2021 ; [facility] LEIF GOODWIN ; 04/12/2021 ; [facility] PreAdmits  LEIF GOODWIN ; 04/15/2021 ; NPP OrthoSurg [facility]

## 2021-04-06 NOTE — PROVIDER CONTACT NOTE (OTHER) - REASON
Patient refused midnight vital sand telemetry overnight
pt refusing all medications and care. Stating "I just want to die" "shoot me"
Patient with one episode of hemoptysis

## 2021-04-06 NOTE — PROGRESS NOTE ADULT - ASSESSMENT
60y male with hx DM presented for eval of weakness, N/V, abd pain, chest pain. Patient reports several days of symptoms. He had cough for about 2 weeks, no fevers. CT Chest with mass like structure to RLL and CT Abd/Pel with CBD dilation and air in biliary tree. He was started on IV abx. Concern raised possible lung abscess. Pt initially denied any dental problems, he now admits to loose tooth.  He had a CT scan of chest in December 2020 with no rt sided infiltrates, therefore this is new c/w December 2020.  He did not have any hemoptysis but does report atypical chest/abdominal pain.  PC level is less than .08, his CRP is 112 and esr is over 100.  The rt flank/RUQ pain is hard to correlate with pneumonia.He does not seem to have pleural involvement (to my review)  He received 2 grams of azithro, stopped yesterday  Legionella urine antigen and MRSA nasal screen are negative, sputum culture with MURF  Repeat CT scan now with development of A/F level. Additional anaerobic coverage added 3/30 with metronidazole  Pt was getting ready for discharge on PO Augmentin, but developed mild hemoptysis. CT Angio revealed  a small focus of contrast extravasation within the cavitary lesion, which was possibly a small pseudoaneurysm or an active extravasation with the history of hemoptysis.  Hence, pt was transferred to the [facility] CTICU for observation and possible intervention. Dentistry was consulted and is planning for dental extraction on 4/7. Patient continued to have hemoptysis yet Hct has been stable. 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 Today, he reports on going intermittent chest pain that has been there since admission with hemoptysis.      PLAN:    continue CTX and metronidazole day 11 now  Appreciate pulmonary input, s/p bronch at , no endobronchial lesions  Pt will likely need extended course of antibiotics, either oral or IV, however will look to pulmonary and ? Thoracic on guidance as well.

## 2021-04-06 NOTE — DISCHARGE NOTE PROVIDER - HOSPITAL COURSE
60M w/ PMHx of COVID-19 in May 2020, who was admitted to the [facility] ICU on 3/26/21 with DKA and HARRISON.  He was started on an insulin drip and was given antibiotics.  His blood glucose improved but he was considered to be a newly diagnosed diabetic as his A1c was also newly elevated.  During his initial work up, he was found to have a RLL lung mass-like lesion measuring 4.5 cm on a chest CT scan and a dilated extrahepatic CBD with cholelithiasis and pneumobilia.  A repeat CT scan revealed enlargement of the RLL lesion with air fluid levels.  A bronchoscopy with BAL was performed on 4/1/21 and cultures revealed Streptococcus parasanguinis. On 4/2/2012 a HIDA scan was negative for acute cholecystitis.  During his admission, he had multiple issues including worsening of his chronic back pain and headache.  However, his blood glucose was controlled with basal insulin and pre-meal insulin and he was getting ready for discharge on PO Augemntin as recommended by ID when he developed mild hemoptysis.  This was originally thought to be traumatic in nature until it worsened.  A CT Angio revealed  a small focus of contrast extravasation within the cavitary lesion, which was possibly a small pseudoaneurysm or an active extravasation with the history of hemoptysis.  CT Surgery at [facility] was contacted and the pt was transferred to the [facility]. Dentistry was consulted and is planning for dental extraction on 4/7. Endocrine is consulted by primary team for ongoing diabetes management. Patient continued to have hemoptysis yet Hct has been stable. 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  Patient to be discharged home with Augmentin for five weeks.       60 M w/ PMHx of COVID-19 in May 2020, who was admitted to the [facility] ICU on 3/26/21 with DKA and HARRISON.  He was started on an insulin drip and was given antibiotics.  His blood glucose improved but he was considered to be a newly diagnosed diabetic as his A1c was also newly elevated.  During his initial work up, he was found to have a RLL lung mass-like lesion measuring 4.5 cm on a chest CT scan and a dilated extrahepatic CBD with cholelithiasis and pneumobilia.  A repeat CT scan revealed enlargement of the RLL lesion with air fluid levels.  A bronchoscopy with BAL was performed on 4/1/21 and cultures revealed Streptococcus parasanguinis. On 4/2/2012 a HIDA scan was negative for acute cholecystitis.  ID following, s/p CTX and Metronidazole while in patient. Upon discharge, IV regimen will switch to PO Ceftin and Flagyl until May 3rd.        During his admission, he had multiple issues including worsening of his chronic back pain and headache.  However, his blood glucose was controlled with basal insulin and pre-meal insulin and he was getting ready for discharge on PO Augemntin as recommended by ID when he developed mild hemoptysis.  This was originally thought to be traumatic in nature until it worsened.  A CT Angio revealed  a small focus of contrast extravasation within the cavitary lesion, which was possibly a small pseudoaneurysm or an active extravasation with the history of hemoptysis.  CT Surgery at [facility] was contacted and the pt was transferred to the [facility]. Dentistry was consulted and is planning for dental extraction on 4/7. Endocrine is consulted by primary team for ongoing diabetes management. Patient continued to have hemoptysis yet Hct has been stable. 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  Hemoptysis resolved for now.         Patient to be discharged to [facility] today for safety and stabilization. He will continue on PO Flagyl and Ceftin for 3 more weeks (through May 3rd).

## 2021-04-06 NOTE — DISCHARGE NOTE PROVIDER - NSDCMRMEDTOKEN_GEN_ALL_CORE_FT
acetaminophen 325 mg oral tablet: 2 tab(s) orally every 6 hours, As needed, Temp greater or equal to 38C (100.4F), Mild Pain (1 - 3)  atorvastatin 40 mg oral tablet: 1 tab(s) orally once a day (at bedtime)  benzonatate 100 mg oral capsule: 2 cap(s) orally 3 times a day  cyclobenzaprine 5 mg oral tablet: 1 tab(s) orally 2 times a day, As needed, Muscle Spasm  hydrocodone-homatropine 5 mg-1.5 mg/5 mL oral syrup: 5 milliliter(s) orally every 6 hours  insulin glargine: 40 unit(s) injectable once a day (at bedtime)  insulin lispro 100 units/mL injectable solution: 10 unit(s) injectable 3 times a day (before meals)  insulin lispro 100 units/mL injectable solution:  injectable   insulin lispro 100 units/mL injectable solution:  injectable   pantoprazole 40 mg oral delayed release tablet: 1 tab(s) orally once a day (before a meal)  sertraline 50 mg oral tablet: 3 tab(s) orally once a day  tamsulosin 0.4 mg oral capsule: 1 cap(s) orally once a day (at bedtime)  traMADol 50 mg oral tablet: 1 tab(s) orally every 8 hours, As needed, Moderate Pain (4 - 6)  traZODone 150 mg oral tablet: 1 tab(s) orally once a day (at bedtime)   acetaminophen 325 mg oral tablet: 2 tab(s) orally every 6 hours, As needed, Temp greater or equal to 38C (100.4F), Mild Pain (1 - 3)  atorvastatin 40 mg oral tablet: 1 tab(s) orally once a day (at bedtime)  benzonatate 100 mg oral capsule: 2 cap(s) orally 3 times a day  Ceftin 500 mg oral tablet: 1 tab(s) orally every 12 hours  cyclobenzaprine 5 mg oral tablet: 1 tab(s) orally 2 times a day, As needed, Muscle Spasm  gabapentin 100 mg oral capsule: 2 cap(s) orally every 8 hours  hydrocodone-homatropine 5 mg-1.5 mg/5 mL oral syrup: 5 milliliter(s) orally every 6 hours  insulin glargine: 30 unit(s) injectable once a day (at bedtime)  insulin lispro 100 units/mL injectable solution: 10 unit(s) injectable 3 times a day (before meals)  metroNIDAZOLE 500 mg oral tablet: 1 tab(s) orally 3 times a day  OLANZapine 10 mg intramuscular injection: 2.5 milligram(s) intramuscular every 6 hours, As needed, Severe Agitation  pantoprazole 40 mg oral delayed release tablet: 1 tab(s) orally once a day (before a meal)  QUEtiapine 25 mg oral tablet: 1 tab(s) orally every 6 hours, As needed, agitation  sertraline 50 mg oral tablet: 3 tab(s) orally once a day  tamsulosin 0.4 mg oral capsule: 1 cap(s) orally once a day (at bedtime)  traMADol 50 mg oral tablet: 1 tab(s) orally every 8 hours, As needed, Moderate Pain (4 - 6)  traZODone 150 mg oral tablet: 1 tab(s) orally once a day (at bedtime)

## 2021-04-06 NOTE — PROVIDER CONTACT NOTE (OTHER) - ACTION/TREATMENT ORDERED:
PA assessed pt at bedside, asked pt directly if he had any suicidal ideation or plan and pt denied. Will follow up with attending and re assess pt
No new orders at this time. Continue to educate patient on importance of telemetry. Safety measures in place. Will monitor.
No new orders at this time. patient encouraged to save if hemoptysis occurs again. Comfort care provided. Safety measures in place. Will monitor.

## 2021-04-06 NOTE — DISCHARGE NOTE PROVIDER - NSDCCPCAREPLAN_GEN_ALL_CORE_FT
PRINCIPAL DISCHARGE DIAGNOSIS  Diagnosis: Hemoptysis  Assessment and Plan of Treatment: Hemoptysis       PRINCIPAL DISCHARGE DIAGNOSIS  Diagnosis: Abscess of lower lobe of right lung with pneumonia  Assessment and Plan of Treatment: You were noted to have a Right lower Lung abscess during your hospital stay. You were seen and evaluated by the ID team. You were started on IV antibiotics. You have been medically stable to be discharged to [facility] on Oral Antibiotics until May 3rd.      SECONDARY DISCHARGE DIAGNOSES  Diagnosis: Depression  Assessment and Plan of Treatment: You were seen and evaluated by the Psychiatry, and has determined that you need in patient further psychiatric evaluation. You have been accepted to [facility]    Diagnosis: Type 2 diabetes mellitus without complication, without long-term current use of insulin  Assessment and Plan of Treatment: You were seen and evaluated by Endocrine service for your uncontrolled diabetes  They have recommended to continue with Lantus 30 units at bedtime, and 10 units of Admelog (Insulin Lispro) before meals 3 times daily.  Please monitor your Finger stick before each meals, and adjust medications as needed.    Diagnosis: Hemoptysis  Assessment and Plan of Treatment: You were having some episodes of bleeding from your nose. However, no acute intervention was indicated. It has been stable for now.    Diagnosis: Essential hypertension  Assessment and Plan of Treatment: Please continue with your home medications  Please check your Blood pressure routinely  Low salt diet recommended    Diagnosis: Hyperlipidemia, unspecified hyperlipidemia type  Assessment and Plan of Treatment: Please continue with your home medications  Low cholesterol diet recommended

## 2021-04-06 NOTE — DISCHARGE NOTE PROVIDER - CARE PROVIDER_API CALL
Adis Loomis)  Thoracic Surgery  [phone] [place] [address]  Phone: [phone]  Fax: [phone]  Follow Up Time:

## 2021-04-06 NOTE — PROVIDER CONTACT NOTE (OTHER) - ASSESSMENT
Pt is very frustrated, refusing all care including medications all expect IVP pain medications. Refusing IV antibiotics, insulin and PO medications. Denies having any plan to commit suicide or any suicidal ideation when asked directly.
patient A&Ox4, VS stable. Assessment as per flowsheet. Patient refused midnight vital sand telemetry overnight. patient states he does not want to be disturbed tonight.
Patient A&Ox4, VS stable. Patient with one episode of hemoptysis ~50cc dark red blood. Noted to be diluted from water in cup. patient reports coughing and then hemoptysis occurred.

## 2021-04-07 DIAGNOSIS — I10 ESSENTIAL (PRIMARY) HYPERTENSION: ICD-10-CM

## 2021-04-07 DIAGNOSIS — E78.5 HYPERLIPIDEMIA, UNSPECIFIED: ICD-10-CM

## 2021-04-07 DIAGNOSIS — E11.65 TYPE 2 DIABETES MELLITUS WITH HYPERGLYCEMIA: ICD-10-CM

## 2021-04-07 LAB
-  FLUCONAZOLE: SIGNIFICANT CHANGE UP
-  INTERPRETATION: SIGNIFICANT CHANGE UP
ALBUMIN SERPL ELPH-MCNC: 3.3 G/DL — SIGNIFICANT CHANGE UP (ref 3.3–5)
ALP SERPL-CCNC: 65 U/L — SIGNIFICANT CHANGE UP (ref 40–120)
ALT FLD-CCNC: 13 U/L — SIGNIFICANT CHANGE UP (ref 4–41)
ANION GAP SERPL CALC-SCNC: 12 MMOL/L — SIGNIFICANT CHANGE UP (ref 7–14)
AST SERPL-CCNC: 20 U/L — SIGNIFICANT CHANGE UP (ref 4–40)
BILIRUB SERPL-MCNC: 0.3 MG/DL — SIGNIFICANT CHANGE UP (ref 0.2–1.2)
BUN SERPL-MCNC: 10 MG/DL — SIGNIFICANT CHANGE UP (ref 7–23)
CALCIUM SERPL-MCNC: 9.5 MG/DL — SIGNIFICANT CHANGE UP (ref 8.4–10.5)
CHLORIDE SERPL-SCNC: 105 MMOL/L — SIGNIFICANT CHANGE UP (ref 98–107)
CO2 SERPL-SCNC: 20 MMOL/L — LOW (ref 22–31)
CREAT SERPL-MCNC: 0.72 MG/DL — SIGNIFICANT CHANGE UP (ref 0.5–1.3)
GLUCOSE BLDC GLUCOMTR-MCNC: 157 MG/DL — HIGH (ref 70–99)
GLUCOSE BLDC GLUCOMTR-MCNC: 166 MG/DL — HIGH (ref 70–99)
GLUCOSE BLDC GLUCOMTR-MCNC: 187 MG/DL — HIGH (ref 70–99)
GLUCOSE BLDC GLUCOMTR-MCNC: 193 MG/DL — HIGH (ref 70–99)
GLUCOSE SERPL-MCNC: 162 MG/DL — HIGH (ref 70–99)
HCT VFR BLD CALC: 32.4 % — LOW (ref 39–50)
HGB BLD-MCNC: 10.5 G/DL — LOW (ref 13–17)
MCHC RBC-ENTMCNC: 31.5 PG — SIGNIFICANT CHANGE UP (ref 27–34)
MCHC RBC-ENTMCNC: 32.4 GM/DL — SIGNIFICANT CHANGE UP (ref 32–36)
MCV RBC AUTO: 97.3 FL — SIGNIFICANT CHANGE UP (ref 80–100)
METHOD TYPE: SIGNIFICANT CHANGE UP
NRBC # BLD: 0 /100 WBCS — SIGNIFICANT CHANGE UP
NRBC # FLD: 0 K/UL — SIGNIFICANT CHANGE UP
PLATELET # BLD AUTO: 477 K/UL — HIGH (ref 150–400)
POTASSIUM SERPL-MCNC: 3.7 MMOL/L — SIGNIFICANT CHANGE UP (ref 3.5–5.3)
POTASSIUM SERPL-SCNC: 3.7 MMOL/L — SIGNIFICANT CHANGE UP (ref 3.5–5.3)
PROT SERPL-MCNC: 7.4 G/DL — SIGNIFICANT CHANGE UP (ref 6–8.3)
RBC # BLD: 3.33 M/UL — LOW (ref 4.2–5.8)
RBC # FLD: 12.8 % — SIGNIFICANT CHANGE UP (ref 10.3–14.5)
SODIUM SERPL-SCNC: 137 MMOL/L — SIGNIFICANT CHANGE UP (ref 135–145)
WBC # BLD: 9.78 K/UL — SIGNIFICANT CHANGE UP (ref 3.8–10.5)
WBC # FLD AUTO: 9.78 K/UL — SIGNIFICANT CHANGE UP (ref 3.8–10.5)

## 2021-04-07 PROCEDURE — 71045 X-RAY EXAM CHEST 1 VIEW: CPT | Mod: 26

## 2021-04-07 PROCEDURE — 99233 SBSQ HOSP IP/OBS HIGH 50: CPT

## 2021-04-07 RX ORDER — HYDROMORPHONE HYDROCHLORIDE 2 MG/ML
0.5 INJECTION INTRAMUSCULAR; INTRAVENOUS; SUBCUTANEOUS ONCE
Refills: 0 | Status: DISCONTINUED | OUTPATIENT
Start: 2021-04-07 | End: 2021-04-07

## 2021-04-07 RX ORDER — SERTRALINE 25 MG/1
150 TABLET, FILM COATED ORAL DAILY
Refills: 0 | Status: DISCONTINUED | OUTPATIENT
Start: 2021-04-07 | End: 2021-04-12

## 2021-04-07 RX ORDER — HYDROMORPHONE HYDROCHLORIDE 2 MG/ML
1 INJECTION INTRAMUSCULAR; INTRAVENOUS; SUBCUTANEOUS ONCE
Refills: 0 | Status: DISCONTINUED | OUTPATIENT
Start: 2021-04-07 | End: 2021-04-07

## 2021-04-07 RX ORDER — GABAPENTIN 400 MG/1
200 CAPSULE ORAL EVERY 8 HOURS
Refills: 0 | Status: DISCONTINUED | OUTPATIENT
Start: 2021-04-07 | End: 2021-04-12

## 2021-04-07 RX ORDER — ALPRAZOLAM 0.25 MG
0.25 TABLET ORAL ONCE
Refills: 0 | Status: DISCONTINUED | OUTPATIENT
Start: 2021-04-07 | End: 2021-04-07

## 2021-04-07 RX ORDER — INSULIN LISPRO 100/ML
12 VIAL (ML) SUBCUTANEOUS
Refills: 0 | Status: DISCONTINUED | OUTPATIENT
Start: 2021-04-07 | End: 2021-04-09

## 2021-04-07 RX ORDER — INSULIN GLARGINE 100 [IU]/ML
36 INJECTION, SOLUTION SUBCUTANEOUS AT BEDTIME
Refills: 0 | Status: DISCONTINUED | OUTPATIENT
Start: 2021-04-07 | End: 2021-04-09

## 2021-04-07 RX ORDER — TRAZODONE HCL 50 MG
150 TABLET ORAL AT BEDTIME
Refills: 0 | Status: DISCONTINUED | OUTPATIENT
Start: 2021-04-07 | End: 2021-04-12

## 2021-04-07 RX ORDER — HYDROMORPHONE HYDROCHLORIDE 2 MG/ML
2 INJECTION INTRAMUSCULAR; INTRAVENOUS; SUBCUTANEOUS EVERY 6 HOURS
Refills: 0 | Status: DISCONTINUED | OUTPATIENT
Start: 2021-04-07 | End: 2021-04-09

## 2021-04-07 RX ADMIN — HYDROMORPHONE HYDROCHLORIDE 2 MILLIGRAM(S): 2 INJECTION INTRAMUSCULAR; INTRAVENOUS; SUBCUTANEOUS at 17:15

## 2021-04-07 RX ADMIN — Medication 100 MILLIGRAM(S): at 21:12

## 2021-04-07 RX ADMIN — HYDROMORPHONE HYDROCHLORIDE 0.5 MILLIGRAM(S): 2 INJECTION INTRAMUSCULAR; INTRAVENOUS; SUBCUTANEOUS at 22:02

## 2021-04-07 RX ADMIN — HYDROMORPHONE HYDROCHLORIDE 0.5 MILLIGRAM(S): 2 INJECTION INTRAMUSCULAR; INTRAVENOUS; SUBCUTANEOUS at 06:13

## 2021-04-07 RX ADMIN — Medication 100 MILLIGRAM(S): at 23:36

## 2021-04-07 RX ADMIN — HYDROMORPHONE HYDROCHLORIDE 1 MILLIGRAM(S): 2 INJECTION INTRAMUSCULAR; INTRAVENOUS; SUBCUTANEOUS at 01:30

## 2021-04-07 RX ADMIN — HYDROMORPHONE HYDROCHLORIDE 0.5 MILLIGRAM(S): 2 INJECTION INTRAMUSCULAR; INTRAVENOUS; SUBCUTANEOUS at 06:28

## 2021-04-07 RX ADMIN — ATORVASTATIN CALCIUM 40 MILLIGRAM(S): 80 TABLET, FILM COATED ORAL at 21:11

## 2021-04-07 RX ADMIN — HYDROMORPHONE HYDROCHLORIDE 1 MILLIGRAM(S): 2 INJECTION INTRAMUSCULAR; INTRAVENOUS; SUBCUTANEOUS at 01:06

## 2021-04-07 RX ADMIN — Medication 0.25 MILLIGRAM(S): at 18:50

## 2021-04-07 RX ADMIN — HYDROMORPHONE HYDROCHLORIDE 0.5 MILLIGRAM(S): 2 INJECTION INTRAMUSCULAR; INTRAVENOUS; SUBCUTANEOUS at 12:35

## 2021-04-07 RX ADMIN — INSULIN GLARGINE 36 UNIT(S): 100 INJECTION, SOLUTION SUBCUTANEOUS at 22:01

## 2021-04-07 RX ADMIN — HEPARIN SODIUM 5000 UNIT(S): 5000 INJECTION INTRAVENOUS; SUBCUTANEOUS at 21:11

## 2021-04-07 RX ADMIN — TAMSULOSIN HYDROCHLORIDE 0.4 MILLIGRAM(S): 0.4 CAPSULE ORAL at 21:11

## 2021-04-07 RX ADMIN — SERTRALINE 150 MILLIGRAM(S): 25 TABLET, FILM COATED ORAL at 17:59

## 2021-04-07 RX ADMIN — HYDROMORPHONE HYDROCHLORIDE 0.5 MILLIGRAM(S): 2 INJECTION INTRAMUSCULAR; INTRAVENOUS; SUBCUTANEOUS at 12:16

## 2021-04-07 RX ADMIN — HYDROMORPHONE HYDROCHLORIDE 2 MILLIGRAM(S): 2 INJECTION INTRAMUSCULAR; INTRAVENOUS; SUBCUTANEOUS at 23:52

## 2021-04-07 RX ADMIN — CEFTRIAXONE 100 MILLIGRAM(S): 500 INJECTION, POWDER, FOR SOLUTION INTRAMUSCULAR; INTRAVENOUS at 17:59

## 2021-04-07 RX ADMIN — Medication 150 MILLIGRAM(S): at 22:01

## 2021-04-07 RX ADMIN — HYDROMORPHONE HYDROCHLORIDE 2 MILLIGRAM(S): 2 INJECTION INTRAMUSCULAR; INTRAVENOUS; SUBCUTANEOUS at 17:47

## 2021-04-07 RX ADMIN — GABAPENTIN 200 MILLIGRAM(S): 400 CAPSULE ORAL at 17:59

## 2021-04-07 RX ADMIN — Medication 100 MILLIGRAM(S): at 12:45

## 2021-04-07 RX ADMIN — HYDROMORPHONE HYDROCHLORIDE 0.5 MILLIGRAM(S): 2 INJECTION INTRAMUSCULAR; INTRAVENOUS; SUBCUTANEOUS at 21:11

## 2021-04-07 NOTE — BH CONSULTATION LIAISON ASSESSMENT NOTE - HPI (INCLUDE ILLNESS QUALITY, SEVERITY, DURATION, TIMING, CONTEXT, MODIFYING FACTORS, ASSOCIATED SIGNS AND SYMPTOMS)
59yo unemployed, male domiciled along with PMHx COVID-19 5/2020 was admitted to the [facility] ICU on 3/26/21 with DKA after presenting to the ER c/o weakness, N/V, abdominal pain and chest pain, upon discharge from [facility] mild hemoptysis, CT revealed small focus of contrast extravasation within the cavitary lesion, CT Surgery at [facility] contacted and pt was transferred to the [facility] CTICU for observation and possible intervention.  He states that he has been spitting up small amounts of dark blood.  He continues to c/o headaches, diarrhea, and generalized abdominal pain, PMHx also includes Hepatitis C, BPH, HLD, HTN, DM II, OA, ETOH abuse(sober 1 yr), hx Opiate dependence ([id number]), PPHx depression, anxiety, SI, one prior inpatient admission [facility] for depression, SI, and etoh abuse 9/3-10/16/2020.    ISTOP: Reference#[id number] prescribed Ativan 0.5mg 10/20/2020    Chart reviewed.  psychiatry consulted for depression and agitation. Staff reports patient is easily frustrated and agitated, non violent, drug seeking.    Patient seen, sitting upright in bedside chair, irritable, multiple complaints including c/o headache, glasses insufficient to see properly, having to have teeth extracted and having to f/u with a dentist (c/o cost and transportation issue as he does not have a car), he reports his mood is "terrible," he response to question regarding SI, he states, "I think about it all the time," he denies plan or intent. Patient reports multiple stressors causing his negative feelings such as financial, being unemployed and feeling no one would hire a wheeler during covid, behind on his rent and having to find a place to live, current diagnosis of diabetes causing him to have to go to "another doctor" as well as being "bored."     Patient reports he has no energy and "I don't want to get out of bed," he denies drinking alcohol and or substance use, he denies ah/vh, he reports sleep disturbance which he takes Trazodone 150mg as well as Zoloft 150mg daily for anxiety/depression. Patient reports he is f/b "Dr. PRITCHETT" monthly at the [facility] ([phone]), also f/b therapist Christina Hernandez every Thursday.  61yo unemployed, male domiciled along with PMHx COVID-19 5/2020 was admitted to the [facility] ICU on 3/26/21 with DKA after presenting to the ER c/o weakness, N/V, abdominal pain and chest pain, upon discharge from [facility] mild hemoptysis, CT revealed small focus of contrast extravasation within the cavitary lesion, CT Surgery at [facility] contacted and pt was transferred to the [facility] CTICU for observation and possible intervention.  He states that he has been spitting up small amounts of dark blood.  He continues to c/o headaches, diarrhea, and generalized abdominal pain, PMHx also includes Hepatitis C, BPH, HLD, HTN, DM II, OA, ETOH abuse (sober 1 yr), hx Opiate dependence ([id number]), PPHx depression, anxiety, SI, one prior inpatient admission [facility] for depression, SI, and etoh abuse 9/3-10/16/2020.    ISTOP: Reference#[id number] prescribed Ativan 0.5mg 10/20/2020    Chart reviewed.  psychiatry consulted for depression and agitation. Staff reports patient is easily frustrated and agitated, non violent, drug seeking.    Patient seen, sitting upright in bedside chair, irritable, multiple complaints including c/o headache, glasses insufficient to see properly, having to have teeth extracted and having to f/u with a dentist (c/o cost and transportation issue as he does not have a car), he reports his mood is "terrible," he response to question regarding SI, he states, "I think about it all the time," he denies plan or intent. Patient reports multiple stressors causing his negative feelings such as financial, being unemployed and feeling no one would hire a wheeler during covid, behind on his rent and having to find a place to live, current diagnosis of diabetes causing him to have to go to "another doctor" as well as being "bored."     Patient reports he has no energy and "I don't want to get out of bed," he denies drinking alcohol and or substance use, he denies ah/vh, he reports sleep disturbance which he takes Trazodone 150mg as well as Zoloft 150mg daily for anxiety/depression. Patient reports he is f/b "Dr. PRITCHETT" monthly at the [facility] ([phone]), also f/b therapist Christina Hernandez every Thursday.     Patient gave permission to Dr. Crouch to speak with sister-in-law, Catrina Guido. Sister-in-law reports safety concerns. She states "he is incapable of taking care of himself," he makes statements: "See you on the other side, I'm done with this," "His state of mind is worse," "He's miserable, can't hold a job, there is work available, he can't work due to his mental state" "He keeps telling us he will take his life," "I'm concerned he will take his life," Sister-in-law feels patient should have involuntary psychiatric admission, she denies hx sa.

## 2021-04-07 NOTE — ADVANCED PRACTICE NURSE CONSULT - RECOMMEDATIONS
RN to follow up with diabetes educator later today to see if pt receptiveness for meeting with the diabetes changes. Possible discharge today. Patient to follow up with Nancy Johnson NP at [facility].

## 2021-04-07 NOTE — CONSULT NOTE ADULT - CONSULT REASON
new onset DM with DKA
Dental pain, mobile teeth
Hemoptysis
Medical optimization for Dental extraction.
chronic neck pain

## 2021-04-07 NOTE — BH CONSULTATION LIAISON ASSESSMENT NOTE - NSBHCHARTREVIEWLAB_PSY_A_CORE FT
CBC Full  -  ( 07 Apr 2021 07:32 )  WBC Count : 9.78 K/uL  RBC Count : 3.33 M/uL  Hemoglobin : 10.5 g/dL  Hematocrit : 32.4 %  Platelet Count - Automated : 477 K/uL  Mean Cell Volume : 97.3 fL  Mean Cell Hemoglobin : 31.5 pg  Mean Cell Hemoglobin Concentration : 32.4 gm/dL  Auto Neutrophil # : x  Auto Lymphocyte # : x  Auto Monocyte # : x  Auto Eosinophil # : x  Auto Basophil # : x  Auto Neutrophil % : x  Auto Lymphocyte % : x  Auto Monocyte % : x  Auto Eosinophil % : x  Auto Basophil % : x  04-07    137  |  105  |  10  ----------------------------<  162<H>  3.7   |  20<L>  |  0.72    Ca    9.5      07 Apr 2021 07:32  Mg     1.8     04-06    TPro  7.4  /  Alb  3.3  /  TBili  0.3  /  DBili  x   /  AST  20  /  ALT  13  /  AlkPhos  65  04-07

## 2021-04-07 NOTE — BH CONSULTATION LIAISON ASSESSMENT NOTE - CURRENT MEDICATION
MEDICATIONS  (STANDING):  atorvastatin 40 milliGRAM(s) Oral at bedtime  cefTRIAXone   IVPB 1000 milliGRAM(s) IV Intermittent every 24 hours  dextrose 50% Injectable 25 Gram(s) IV Push once  heparin   Injectable 5000 Unit(s) SubCutaneous every 8 hours  insulin glargine Injectable (LANTUS) 36 Unit(s) SubCutaneous at bedtime  insulin lispro (ADMELOG) corrective regimen sliding scale   SubCutaneous three times a day before meals  insulin lispro (ADMELOG) corrective regimen sliding scale   SubCutaneous at bedtime  insulin lispro Injectable (ADMELOG) 12 Unit(s) SubCutaneous three times a day before meals  metroNIDAZOLE  IVPB 500 milliGRAM(s) IV Intermittent every 8 hours  pantoprazole    Tablet 40 milliGRAM(s) Oral before breakfast  sertraline 50 milliGRAM(s) Oral daily  tamsulosin 0.4 milliGRAM(s) Oral at bedtime    MEDICATIONS  (PRN):  acetaminophen   Tablet .. 650 milliGRAM(s) Oral every 6 hours PRN Mild Pain (1 - 3)  benzonatate 100 milliGRAM(s) Oral every 8 hours PRN Cough  oxyCODONE    IR 5 milliGRAM(s) Oral every 6 hours PRN Severe Pain (7 - 10)  traMADol 50 milliGRAM(s) Oral every 6 hours PRN Moderate Pain (4 - 6)  traZODone 150 milliGRAM(s) Oral at bedtime PRN insomnia

## 2021-04-07 NOTE — CONSULT NOTE ADULT - ASSESSMENT
60 year old male who had COVID-19 in May 2020, the admitted to [facility] ICU on 3/26/21 with DKA after presenting to the ER c/o weakness, N/V, abdominal pain and chest pain and being found to have very elevated blood glucose levels, BUN, and Creatinine, then transferred to [facility] for management of hemoptysis and lung abscess. Consult called for management of new severely uncontrolled DM2 with DKA on admission. A1c >15.5%. Extremely high risk patient with high level decision making. BG goal 100-180 mg/dL.

## 2021-04-07 NOTE — BH CONSULTATION LIAISON ASSESSMENT NOTE - NSBHCHARTREVIEWINVESTIGATE_PSY_A_CORE FT
Ventricular Rate 72 BPM    Atrial Rate 72 BPM    P-R Interval 138 ms    QRS Duration 104 ms    Q-T Interval 418 ms    QTC Calculation(Bazett) 457 ms    P Axis 24 degrees    R Axis 3 degrees    T Axis -44 degrees

## 2021-04-07 NOTE — BH CONSULTATION LIAISON ASSESSMENT NOTE - NSBHCHARTREVIEWVS_PSY_A_CORE FT
Vital Signs Last 24 Hrs  T(C): 36.7 (07 Apr 2021 13:10), Max: 37.3 (06 Apr 2021 16:49)  T(F): 98.1 (07 Apr 2021 13:10), Max: 99.1 (06 Apr 2021 16:49)  HR: 77 (07 Apr 2021 13:10) (70 - 85)  BP: 125/81 (07 Apr 2021 13:10) (118/76 - 127/81)  BP(mean): --  RR: 18 (07 Apr 2021 13:10) (18 - 18)  SpO2: 98% (07 Apr 2021 13:10) (92% - 98%)

## 2021-04-07 NOTE — PROGRESS NOTE ADULT - SUBJECTIVE AND OBJECTIVE BOX
CC: f/u for lung abscess    Patient reports hemoptysis. He is upset and frustrated. He is not cooperative with exam or interviewer  He had dental extraction    REVIEW OF SYSTEMS:  All other review of systems negative (Comprehensive ROS)    Antimicrobials Day #  :day 12  cefTRIAXone   IVPB 1000 milliGRAM(s) IV Intermittent every 24 hours  metroNIDAZOLE  IVPB 500 milliGRAM(s) IV Intermittent every 8 hours      Other Medications Reviewed    Vital Signs Last 24 Hrs  T(C): 36.8 (07 Apr 2021 16:49), Max: 37.2 (06 Apr 2021 22:07)  T(F): 98.2 (07 Apr 2021 16:49), Max: 99 (06 Apr 2021 22:07)  HR: 80 (07 Apr 2021 16:49) (70 - 85)  BP: 127/79 (07 Apr 2021 16:49) (118/80 - 127/81)  BP(mean): --  RR: 18 (07 Apr 2021 16:49) (18 - 18)  SpO2: 96% (07 Apr 2021 16:49) (92% - 98%)    PHYSICAL EXAM:  General: alert, no acute distress  Eyes:  anicteric, no conjunctival injection, no discharge  Oropharynx: no lesions or injection 	  Neck: supple, without adenopathy  Lungs: clear to auscultation  Heart: regular rate and rhythm; no murmur, rubs or gallops  Abdomen: soft, nondistended  Skin: no lesions  Extremities: no clubbing, cyanosis, or edema  Neurologic: alert, oriented, moves all extremities    LAB RESULTS:                                     10.5   9.78  )-----------( 477      ( 07 Apr 2021 07:32 )             32.4   04-07    137  |  105  |  10  ----------------------------<  162<H>  3.7   |  20<L>  |  0.72    Ca    9.5      07 Apr 2021 07:32  Mg     1.8     04-06    TPro  7.4  /  Alb  3.3  /  TBili  0.3  /  DBili  x   /  AST  20  /  ALT  13  /  AlkPhos  65  04-07                MICROBIOLOGY:  RECENT CULTURES:    Culture - Body Fluid with Gram Stain (04.01.21 @ 12:30)   Gram Stain:   Moderate polymorphonuclear leukocytes per low power field   No organisms seen   - Ampicillin/Sulbactam: R <=8/4   - Cefazolin: R <=4   - Clindamycin: S <=0.25   - Erythromycin: R >4   - Gentamicin: S <=1 Should not be used as monotherapy   - Oxacillin: R >2   - Penicillin: R 8   - RIF- Rifampin: S <=1 Should not be used as monotherapy   - Tetra/Doxy: R >8   - Trimethoprim/Sulfamethoxazole: S <=0.5/9.5   - Vancomycin: S 2   Specimen Source: .Body Fluid RIGHT LUNG   Culture Results:   Rare Candida albicans   Growth in fluid media only Streptococcus parasanguinis   Growth in fluid media only Staphylococcus epidermidis   Organism Identification: Staphylococcus epidermidis   Organism: Staphylococcus epidermidis   Method Type: JILLIAN   03-28 @ 22:10 .Sputum Sputum     Normal Respiratory Princess present    Rare polymorphonuclear leukocytes per low power field  Rare Squamous epithelial cells per low power field  Rare Gram Negative Rods per oil power field  Rare Gram Variable Cocci per oil power field    03-28 @ 14:50 .Blood Blood-Peripheral     No growth to date.          RADIOLOGY REVIEWED:    < from: CT Angio Chest w/ IV Cont (04.04.21 @ 15:21) >  When compared with March 30, 2021:    Small focus of contrast extravasation within the right lower lobe cavitary lesion, best seen on series 4 image 339, which may reflect a small pseudoaneurysm or bleeding given the history of hemoptysis.    < end of copied text >  < from: Xray Chest 1 View- PORTABLE-Routine (04.05.21 @ 07:51) >      IMPRESSION:  Follow-up study with resolving right basilar haziness now with subsegmental atelectasis.    < end of copied text >  < from: NM Hepatobiliary Imaging (04.02.21 @ 10:26) >    IMPRESSION: Normal hepatobiliary scan.    No scan evidence of acute cholecystitis.    < end of copied text >

## 2021-04-07 NOTE — BH CONSULTATION LIAISON ASSESSMENT NOTE - SUMMARY
59yo unemployed, male domiciled along with PMHx COVID-19 5/2020 was admitted to the [facility] ICU on 3/26/21 with DKA after presenting to the ER c/o weakness, N/V, abdominal pain and chest pain, upon discharge from [facility] mild hemoptysis, CT revealed small focus of contrast extravasation within the cavitary lesion, CT Surgery at [facility] contacted and pt was transferred to the [facility] CTICU for observation and possible intervention.  He states that he has been spitting up small amounts of dark blood.  He continues to c/o headaches, diarrhea, and generalized abdominal pain, PMHx also includes Hepatitis C, BPH, HLD, HTN, DM II, OA, ETOH abuse(sober 1 yr), hx Opiate dependence ([id number]), PPHx depression, anxiety, SI, one prior inpatient admission [facility] for depression, SI, and etoh abuse 9/3-10/16/2020.    Patient seen, sitting upright in bedside chair, irritable, multiple complaints including c/o headache, glasses insufficient to see properly, having to have teeth extracted and having to f/u with a dentist (c/o cost and transportation issue as he does not have a car), he reports his mood is "terrible," he response to question regarding SI, he states, "I think about it all the time," he denies plan or intent. Patient reports multiple stressors causing his negative feelings such as financial, being unemployed and feeling no one would hire a wheeler during covid, behind on his rent and having to find a place to live, current diagnosis of diabetes causing him to have to go to "another doctor" as well as being "bored."     Patient reports he has no energy and "I don't want to get out of bed," he denies drinking alcohol and or substance use, he denies ah/vh, he reports sleep disturbance which he takes Trazodone 150mg as well as Zoloft 150mg daily for anxiety/depression. Patient reports he is f/b "Dr. PRITCHETT" monthly at the [facility] ([phone]), also f/b therapist Christina Hernandez every Thursday.    PLAN:  -DEFER obs status to primary team, if any changes in behavior adjust accordingly  -INCREASE Zoloft to 150mg daily, monitor platelets, Na level  -CHANGE Trazodone to standing 150mg hs 61yo unemployed, male domiciled along with PMHx COVID-19 5/2020 was admitted to the [facility] ICU on 3/26/21 with DKA after presenting to the ER c/o weakness, N/V, abdominal pain and chest pain, upon discharge from [facility] mild hemoptysis, CT revealed small focus of contrast extravasation within the cavitary lesion, CT Surgery at [facility] contacted and pt was transferred to the [facility] CTICU for observation and possible intervention.  He states that he has been spitting up small amounts of dark blood.  He continues to c/o headaches, diarrhea, and generalized abdominal pain, PMHx also includes Hepatitis C, BPH, HLD, HTN, DM II, OA, ETOH abuse(sober 1 yr), hx Opiate dependence ([id number]), PPHx depression, anxiety, SI, one prior inpatient admission [facility] for depression, SI, and etoh abuse 9/3-10/16/2020.    Patient seen, sitting upright in bedside chair, irritable, multiple complaints including c/o headache, glasses insufficient to see properly, having to have teeth extracted and having to f/u with a dentist (c/o cost and transportation issue as he does not have a car), he reports his mood is "terrible," he response to question regarding SI, he states, "I think about it all the time," he denies plan or intent. Patient reports multiple stressors causing his negative feelings such as financial, being unemployed and feeling no one would hire a wheeler during covid, behind on his rent and having to find a place to live, current diagnosis of diabetes causing him to have to go to "another doctor" as well as being "bored."     Patient reports he has no energy and "I don't want to get out of bed," he denies drinking alcohol and or substance use, he denies ah/vh, he reports sleep disturbance which he takes Trazodone 150mg as well as Zoloft 150mg daily for anxiety/depression. Patient reports he is f/b "Dr. PRITCHETT" [place] [facility] ([phone]), also f/b therapist Christina Hernandez every Thursday.    PLAN:  -DEFER obs status to primary team, if any changes in behavior adjust accordingly  -INCREASE Zoloft to 150mg daily, monitor platelets, Na level  -CHANGE Trazodone to standing 150mg hs  -NOT PSYCHIATRICALLY CLEARED YET- needs reassessment tomorrow 59yo unemployed, male domiciled along with PMHx COVID-19 5/2020 was admitted to the [facility] ICU on 3/26/21 with DKA after presenting to the ER c/o weakness, N/V, abdominal pain and chest pain, upon discharge from [facility] mild hemoptysis, CT revealed small focus of contrast extravasation within the cavitary lesion, CT Surgery at [facility] contacted and pt was transferred to the [facility] CTICU for observation and possible intervention.  He states that he has been spitting up small amounts of dark blood.  He continues to c/o headaches, diarrhea, and generalized abdominal pain, PMHx also includes Hepatitis C, BPH, HLD, HTN, DM II, OA, ETOH abuse(sober 1 yr), hx Opiate dependence ([id number]), PPHx depression, anxiety, SI, one prior inpatient admission [facility] for depression, SI, and etoh abuse 9/3-10/16/2020.    Patient seen, sitting upright in bedside chair, irritable, multiple complaints including c/o headache, glasses insufficient to see properly, having to have teeth extracted and having to f/u with a dentist (c/o cost and transportation issue as he does not have a car), he reports his mood is "terrible," he response to question regarding SI, he states, "I think about it all the time," he denies plan or intent. Patient reports multiple stressors causing his negative feelings such as financial, being unemployed and feeling no one would hire a wheeler during covid, behind on his rent and having to find a place to live, current diagnosis of diabetes causing him to have to go to "another doctor" as well as being "bored."     Patient reports he has no energy and "I don't want to get out of bed," he denies drinking alcohol and or substance use, he denies ah/vh, he reports sleep disturbance which he takes Trazodone 150mg as well as Zoloft 150mg daily for anxiety/depression. Patient reports he is f/b "Dr. PRITCHETT" monthly at the [facility] ([phone]), also f/b therapist Christina Hernandez every Thursday.    Patient gave permission to Dr. Crouch to speak with sister-in-law, Catrina Guido. Sister-in-law reports safety concerns. She states "he is incapable of taking care of himself," he makes statements: "See you on the other side, I'm done with this," "His state of mind is worse," "He's miserable, can't hold a job, there is work available, he can't work due to his mental state" "He keeps telling us he will take his life," "I'm concerned he will take his life," Sister-in-law feels patient should have involuntary psychiatric admission, she denies hx sa.    PLAN:  -1:1 Constant Observation-+SI  -INCREASE Zoloft to 150mg daily, monitor platelets, Na level  -CHANGE Trazodone to standing 150mg hs  -NOT PSYCHIATRICALLY CLEARED YET- needs reassessment tomorrow

## 2021-04-07 NOTE — CONSULT NOTE ADULT - PROBLEM SELECTOR RECOMMENDATION 9
- A1c > 15.5%, unclear time line of prior steroid use  - He has been refusing insulin here and is still not amenable to accepting it  - Unable to address diabetes care at this time. Discussed with primary team, patient would benefit from psychiatry consultation if he is amenable. Will not be able to address his medical issues unless mood/social issues are addressed.  - once diet has been resumed, suggest the following regimen based on limited data at this time: Lantus 36 units qhs, Admelog 12 units before meals, moderate correction scale qac and qhs  - consistent carb diet  - check FS qac and qhs qhs   - defer RD consult at this time given that patient does not want to discuss diabetes care   - will attempt to speak with patient again tmrw  - for discharge: c-peptide is adequate and Ab negative. Ideally would be discharged home on basal bolus versus basal insulin + oral agents, but pt does not want to address care at this time. If he leaves today, can provide him with a script for Metformin 500 mg BID at the very least but doubt this will be sufficient.     Endocrine faculty practice:  [address], [address], [facility]  [phone]

## 2021-04-07 NOTE — PROGRESS NOTE ADULT - ASSESSMENT
60y male with hx DM presented for eval of weakness, N/V, abd pain, chest pain. Patient reports several days of symptoms. He had cough for about 2 weeks, no fevers. CT Chest with mass like structure to RLL and CT Abd/Pel with CBD dilation and air in biliary tree. He was started on IV abx. Concern raised possible lung abscess. Pt initially denied any dental problems, he now admits to loose tooth.  He had a CT scan of chest in December 2020 with no rt sided infiltrates, therefore this is new c/w December 2020.  He did not have any hemoptysis but does report atypical chest/abdominal pain.  PC level is less than .08, his CRP is 112 and esr is over 100.  The rt flank/RUQ pain is hard to correlate with pneumonia.He does not seem to have pleural involvement (to my review)  He received 2 grams of azithro, stopped yesterday  Legionella urine antigen and MRSA nasal screen are negative, sputum culture with MURF  Repeat CT scan now with development of A/F level. Additional anaerobic coverage added 3/30 with metronidazole  Pt was getting ready for discharge on PO Augmentin, but developed mild hemoptysis. CT Angio revealed  a small focus of contrast extravasation within the cavitary lesion, which was possibly a small pseudoaneurysm or an active extravasation with the history of hemoptysis.  Hence, pt was transferred to the [facility] CTICU for observation and possible intervention. Dentistry was consulted and is planning for dental extraction on 4/7. Patient continued to have hemoptysis yet Hct has been stable. 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

## 2021-04-07 NOTE — CONSULT NOTE ADULT - PROBLEM SELECTOR RECOMMENDATION 3
- c/w statin     Above plan conveyed to SHAHEED Rojo.    Sheridan Clarke MD   Pager # [phone]  On evenings and weekends, please call the office at [phone] or page endocrine fellow on call. Please note that this patient may be followed by different provider tomorrow. If no answer, contact the office.

## 2021-04-07 NOTE — CONSULT NOTE ADULT - SUBJECTIVE AND OBJECTIVE BOX
HPI:  Patient is a 60 year old male who had COVID-19 in May 2020, the admitted to [facility] ICU on 3/26/21 with DKA after presenting to the ER c/o weakness, N/V, abdominal pain and chest pain and being found to have very elevated blood glucose levels, BUN, and Creatinine, then transferred to [facility] for management of hemoptysis and lung abscess. Consult called for management of new severely uncontrolled DM2 with DKA on admission. Patient seen at bedside this morning. Difficult to engage patient in history as he was not interested in his medical care. He has been refusing insulin and per primary team, he has been refusing his antibiotics as well. Patients stated that he wanted "to be done with all of this". He is unable to provide a reason for refusing his insulin and other medications. 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 He lives in a room by himself and states that he doesn't have enough space. He has a sister nearby but reports that he hasn't seen his family.     He denies prior history of DM. Reports prior history of prediabetes. Has never taken medications for DM. Has never checked BG at home. Has never had laser or injections to the eyes. States that his vision his vision is blurry. He is NPO today. Reports prior history of steroid use for gout, think he was on it for maybe 2 months, possibly in January 2021.     When asked if he would be willing to speak with me again tmrw regarding his diabetes care, he states "you can come tomorrow, I don't care".    Bg on admission was in the 600's with AG of 18, bicarb 16, moderate serum acetone. Islet cell, DILIA and insulin Ab were negative. C-peptide was 2.0 on 3/29/2021 with BG in 200's.       PAST MEDICAL & SURGICAL HISTORY:  Type 2 diabetes mellitus without complication, without long-term current use of insulin  Newly diagnosed at [facility] 3/2021    Benign hypertension    2019 novel coronavirus disease (COVID-19)  5/2020    BPH associated with nocturia    Insomnia    Cholelithiasis  Discovered at [facility] 3/2021    Diverticulosis  Discovered at [facility] 3/2021    DKA (diabetic ketoacidosis)  [facility] 3/2021    OA (osteoarthritis)  BL shoulders    Lung abscess  RLL found at [facility] 3/2021    HLD (hyperlipidemia)    Back pain  Chronic- mid and upper back    Hepatitis C  in remission    Thyroid nodule  Found at [facility] 3/2021    Shoulder arthritis  Multiple surgeries on left shoulder      FAMILY HISTORY:  nephew has "Juvenile Diabetes"      Social History:  lives alone  unemployed  has a sister     Outpatient Medications:  no medications for DM    MEDICATIONS  (STANDING):  atorvastatin 40 milliGRAM(s) Oral at bedtime  cefTRIAXone   IVPB 1000 milliGRAM(s) IV Intermittent every 24 hours  dextrose 50% Injectable 25 Gram(s) IV Push once  heparin   Injectable 5000 Unit(s) SubCutaneous every 8 hours  insulin glargine Injectable (LANTUS) 40 Unit(s) SubCutaneous at bedtime  insulin lispro (ADMELOG) corrective regimen sliding scale   SubCutaneous three times a day before meals  insulin lispro (ADMELOG) corrective regimen sliding scale   SubCutaneous at bedtime  insulin lispro Injectable (ADMELOG) 10 Unit(s) SubCutaneous three times a day before meals  metroNIDAZOLE  IVPB 500 milliGRAM(s) IV Intermittent every 8 hours  pantoprazole    Tablet 40 milliGRAM(s) Oral before breakfast  sertraline 50 milliGRAM(s) Oral daily  tamsulosin 0.4 milliGRAM(s) Oral at bedtime    MEDICATIONS  (PRN):  acetaminophen   Tablet .. 650 milliGRAM(s) Oral every 6 hours PRN Mild Pain (1 - 3)  benzonatate 100 milliGRAM(s) Oral every 8 hours PRN Cough  oxyCODONE    IR 5 milliGRAM(s) Oral every 6 hours PRN Severe Pain (7 - 10)  traMADol 50 milliGRAM(s) Oral every 6 hours PRN Moderate Pain (4 - 6)  traZODone 150 milliGRAM(s) Oral at bedtime PRN insomnia      Allergies  No Known Drug Allergies  shellfish (Unknown)    Review of Systems:  Constitutional: No fever  Eyes: + blurry vision  Neuro: No tremors  HEENT: No pain  Cardiovascular: No chest pain, palpitations  Respiratory: No SOB, no cough  GI: No nausea, vomiting, abdominal pain  : No dysuria  Skin: no rash  Endocrine: no polyuria, polydipsia    ALL OTHER SYSTEMS REVIEWED AND NEGATIVE    PHYSICAL EXAM:  VITALS: T(C): 36.9 (04-07-21 @ 09:30)  T(F): 98.4 (04-07-21 @ 09:30), Max: 99.1 (04-06-21 @ 16:49)  HR: 71 (04-07-21 @ 09:30) (70 - 85)  BP: 119/83 (04-07-21 @ 09:30) (118/76 - 127/81)  RR:  (18 - 18)  SpO2:  (92% - 98%)  Wt(kg): --  GENERAL: NAD, well-developed  EYES: No proptosis, anicteric  HEENT:  Atraumatic, Normocephalic  THYROID: Normal size, no palpable nodules  RESPIRATORY: Clear to auscultation bilaterally; No rales, rhonchi, wheezing  CARDIOVASCULAR: Regular rate and rhythm; No murmurs; no peripheral edema  GI: Soft, nontender, non distended, normal bowel sounds  SKIN: Dry, intact, No rashes or lesions  MUSCULOSKELETAL: Full range of motion, normal strength  PSYCH: Alert and oriented x 3, dysthymic mood    POCT Blood Glucose.: 187 mg/dL (04-07-21 @ 08:29)  POCT Blood Glucose.: 193 mg/dL (04-06-21 @ 22:00)  POCT Blood Glucose.: 226 mg/dL (04-06-21 @ 17:17)  POCT Blood Glucose.: 237 mg/dL (04-06-21 @ 12:03)  POCT Blood Glucose.: 185 mg/dL (04-06-21 @ 08:08)  POCT Blood Glucose.: 205 mg/dL (04-05-21 @ 22:55)  POCT Blood Glucose.: 234 mg/dL (04-05-21 @ 17:47)  POCT Blood Glucose.: 206 mg/dL (04-05-21 @ 12:11)  POCT Blood Glucose.: 139 mg/dL (04-05-21 @ 05:54)  POCT Blood Glucose.: 107 mg/dL (04-04-21 @ 22:53)  POCT Blood Glucose.: 204 mg/dL (04-04-21 @ 19:08)  POCT Blood Glucose.: 219 mg/dL (04-04-21 @ 17:01)  POCT Blood Glucose.: 158 mg/dL (04-04-21 @ 12:05)                          10.5   9.78  )-----------( 477      ( 07 Apr 2021 07:32 )             32.4       04-07    137  |  105  |  10  ----------------------------<  162<H>  3.7   |  20<L>  |  0.72    EGFR if : 118  EGFR if non : 102    Ca    9.5      04-07  Mg     1.8     04-06  Phos  4.8     04-05    TPro  7.4  /  Alb  3.3  /  TBili  0.3  /  DBili  x   /  AST  20  /  ALT  13  /  AlkPhos  65  04-07      A1c >15.5%             HPI:  Patient is a 60 year old male who had COVID-19 in May 2020, the admitted to [facility] ICU on 3/26/21 with DKA after presenting to the ER c/o weakness, N/V, abdominal pain and chest pain and being found to have very elevated blood glucose levels, BUN, and Creatinine, then transferred to [facility] for management of hemoptysis and lung abscess. Consult called for management of new severely uncontrolled DM2 with DKA on admission. Patient seen at bedside this morning. Difficult to engage patient in history as he was not interested in his medical care. He has been refusing insulin and per primary team, he has been refusing his antibiotics as well. Patients stated that he wanted "to be done with all of this". He is unable to provide a reason for refusing his insulin and other medications. 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 He lives in a room by himself and states that he doesn't have enough space. He has a sister nearby but reports that he hasn't seen his family.     He denies prior history of DM. Reports prior history of prediabetes. Has never taken medications for DM. Has never checked BG at home. Has never had laser or injections to the eyes. States that his vision his vision is blurry. He is NPO today. Reports prior history of steroid use for gout, think he was on it for maybe 2 months, possibly in January 2021.     When asked if he would be willing to speak with me again tmrw regarding his diabetes care, he states "you can come tomorrow, I don't care".    Bg on admission was in the 600's with AG of 18, bicarb 16, moderate serum acetone. Islet cell, DILIA and insulin Ab were negative. C-peptide was 2.0 on 3/29/2021 with BG in 200's.       PAST MEDICAL & SURGICAL HISTORY:  Type 2 diabetes mellitus without complication, without long-term current use of insulin  Newly diagnosed at [facility] 3/2021    Benign hypertension    2019 novel coronavirus disease (COVID-19)  5/2020    BPH associated with nocturia    Insomnia    Cholelithiasis  Discovered at [facility] 3/2021    Diverticulosis  Discovered at [facility] 3/2021    DKA (diabetic ketoacidosis)  [facility] 3/2021    OA (osteoarthritis)  BL shoulders    Lung abscess  RLL found at [facility] 3/2021    HLD (hyperlipidemia)    Back pain  Chronic- mid and upper back    Hepatitis C  in remission    Thyroid nodule  Found at [facility] 3/2021    Shoulder arthritis  Multiple surgeries on left shoulder      FAMILY HISTORY:  nephew has "Juvenile Diabetes"      Social History:  lives alone  unemployed  has a sister     Outpatient Medications:  no medications for DM    MEDICATIONS  (STANDING):  atorvastatin 40 milliGRAM(s) Oral at bedtime  cefTRIAXone   IVPB 1000 milliGRAM(s) IV Intermittent every 24 hours  dextrose 50% Injectable 25 Gram(s) IV Push once  heparin   Injectable 5000 Unit(s) SubCutaneous every 8 hours  insulin glargine Injectable (LANTUS) 40 Unit(s) SubCutaneous at bedtime  insulin lispro (ADMELOG) corrective regimen sliding scale   SubCutaneous three times a day before meals  insulin lispro (ADMELOG) corrective regimen sliding scale   SubCutaneous at bedtime  insulin lispro Injectable (ADMELOG) 10 Unit(s) SubCutaneous three times a day before meals  metroNIDAZOLE  IVPB 500 milliGRAM(s) IV Intermittent every 8 hours  pantoprazole    Tablet 40 milliGRAM(s) Oral before breakfast  sertraline 50 milliGRAM(s) Oral daily  tamsulosin 0.4 milliGRAM(s) Oral at bedtime    MEDICATIONS  (PRN):  acetaminophen   Tablet .. 650 milliGRAM(s) Oral every 6 hours PRN Mild Pain (1 - 3)  benzonatate 100 milliGRAM(s) Oral every 8 hours PRN Cough  oxyCODONE    IR 5 milliGRAM(s) Oral every 6 hours PRN Severe Pain (7 - 10)  traMADol 50 milliGRAM(s) Oral every 6 hours PRN Moderate Pain (4 - 6)  traZODone 150 milliGRAM(s) Oral at bedtime PRN insomnia      Allergies  No Known Drug Allergies  shellfish (Unknown)    Review of Systems:  Constitutional: No fever  Eyes: + blurry vision  Neuro: No tremors  HEENT: No pain  Cardiovascular: No chest pain, palpitations  Respiratory: No SOB, no cough  GI: No nausea, vomiting, abdominal pain  : No dysuria  Skin: no rash  Endocrine: no polyuria, polydipsia    ALL OTHER SYSTEMS REVIEWED AND NEGATIVE    PHYSICAL EXAM:  VITALS: T(C): 36.9 (04-07-21 @ 09:30)  T(F): 98.4 (04-07-21 @ 09:30), Max: 99.1 (04-06-21 @ 16:49)  HR: 71 (04-07-21 @ 09:30) (70 - 85)  BP: 119/83 (04-07-21 @ 09:30) (118/76 - 127/81)  RR:  (18 - 18)  SpO2:  (92% - 98%)  Wt(kg): --  GENERAL: NAD, well-developed  EYES: No proptosis, anicteric  HEENT:  Atraumatic, Normocephalic  THYROID: Normal size, no palpable nodules  RESPIRATORY: Clear to auscultation bilaterally; No rales, rhonchi, wheezing  CARDIOVASCULAR: Regular rate and rhythm; No murmurs; no peripheral edema  GI: Soft, nontender, non distended, normal bowel sounds  SKIN: Dry, intact, No rashes or lesions  MUSCULOSKELETAL: Full range of motion, normal strength  PSYCH: Alert and oriented x 3, dysthymic mood    POCT Blood Glucose.: 187 mg/dL (04-07-21 @ 08:29)  POCT Blood Glucose.: 193 mg/dL (04-06-21 @ 22:00)  POCT Blood Glucose.: 226 mg/dL (04-06-21 @ 17:17)  POCT Blood Glucose.: 237 mg/dL (04-06-21 @ 12:03)  POCT Blood Glucose.: 185 mg/dL (04-06-21 @ 08:08) A 10   POCT Blood Glucose.: 205 mg/dL (04-05-21 @ 22:55) L 40   POCT Blood Glucose.: 234 mg/dL (04-05-21 @ 17:47)  POCT Blood Glucose.: 206 mg/dL (04-05-21 @ 12:11)  POCT Blood Glucose.: 139 mg/dL (04-05-21 @ 05:54)  POCT Blood Glucose.: 107 mg/dL (04-04-21 @ 22:53)  POCT Blood Glucose.: 204 mg/dL (04-04-21 @ 19:08)  POCT Blood Glucose.: 219 mg/dL (04-04-21 @ 17:01)  POCT Blood Glucose.: 158 mg/dL (04-04-21 @ 12:05)                          10.5   9.78  )-----------( 477      ( 07 Apr 2021 07:32 )             32.4       04-07    137  |  105  |  10  ----------------------------<  162<H>  3.7   |  20<L>  |  0.72    EGFR if : 118  EGFR if non : 102    Ca    9.5      04-07  Mg     1.8     04-06  Phos  4.8     04-05    TPro  7.4  /  Alb  3.3  /  TBili  0.3  /  DBili  x   /  AST  20  /  ALT  13  /  AlkPhos  65  04-07      A1c >15.5%

## 2021-04-07 NOTE — PROGRESS NOTE ADULT - ASSESSMENT
Assessment  60y Male  w/ PAST MEDICAL & SURGICAL HISTORY:  Type 2 diabetes mellitus without complication, without long-term current use of insulin  Newly diagnosed at [facility] 3/2021    Benign hypertension    2019 novel coronavirus disease (COVID-19)  5/2020    BPH associated with nocturia    Insomnia    Cholelithiasis  Discovered at [facility] 3/2021    Diverticulosis  Discovered at [facility] 3/2021    DKA (diabetic ketoacidosis)  [facility] 3/2021    OA (osteoarthritis)  BL shoulders    Lung abscess  RLL found at [facility] 3/2021    HLD (hyperlipidemia)    Back pain  Chronic- mid and upper back    Hepatitis C  in remission    Thyroid nodule  Found at [facility] 3/2021    Shoulder arthritis  Multiple surgeries on left shoulder    admitted with complaints of Patient is a 60y old  Male who presents with a chief complaint of Hemoptysis (07 Apr 2021 11:08)  .  On (Date), patient underwent . Postoperative course/issues:    PLAN  Neuro: Pain management consulted, pt request for IV meds, will transition to Orals today  Pulm: Encourage coughing, deep breathing and use of incentive spirometry.  Cardio: Monitor telemetry/alarms- pt refusing to wear  GI: Tolerating diet. -NPO for procedure  Renal: monitor urine output, supplement electrolytes as needed  Vasc: Heparin SC/SCDs for DVT prophylaxis  Heme: Stable H/H. continue to monitor hemoptysis   ID: on ceftriaxone and flagyl dispo switch to oral augment for at least 2-3 weeks   Therapy: OOB/ambulate  Disposition: Aim to D/C to home, patient states he has no food or ride home,  involved   Discussed with Cardiothoracic Team at AM rounds. Assessment  60y Male  w/ PAST MEDICAL & SURGICAL HISTORY:  Type 2 diabetes mellitus without complication, without long-term current use of insulin  Newly diagnosed at [facility] 3/2021    Benign hypertension    2019 novel coronavirus disease (COVID-19)  5/2020    BPH associated with nocturia    Insomnia    Cholelithiasis  Discovered at [facility] 3/2021    Diverticulosis  Discovered at [facility] 3/2021    DKA (diabetic ketoacidosis)  [facility] 3/2021    OA (osteoarthritis)  BL shoulders    Lung abscess  RLL found at [facility] 3/2021    HLD (hyperlipidemia)    Back pain  Chronic- mid and upper back    Hepatitis C  in remission    Thyroid nodule  Found at [facility] 3/2021    Shoulder arthritis  Multiple surgeries on left shoulder    admitted with complaints of Patient is a 60y old  Male who presents with a chief complaint of Hemoptysis (07 Apr 2021 11:08)  .  On (Date), patient underwent . Postoperative course/issues:    PLAN  Neuro: Pain management consulted, pt request for IV meds, will transition to Orals today  Pulm: Encourage coughing, deep breathing and use of incentive spirometry.  Cardio: Monitor telemetry/alarms- pt refusing to wear  GI: Tolerating diet. -NPO for teeth extraction today with Dental, cleared by medicine   Renal: monitor urine output, supplement electrolytes as needed  Vasc: Heparin SC/SCDs for DVT prophylaxis  Heme: Stable H/H. continue to monitor hemoptysis   ID: on ceftriaxone and flagyl dispo switch to oral augment for at least 2-3 weeks   Endo: Endo consult, appreciate recs, they will see patient tomorrow  Psych: consulted psych today, has hx of SI/ETOH abuse 12/20 was previously admitted to [facility], Psych will see patient today  Therapy: OOB/ambulate  Disposition: Aim to D/C to home, patient states he has no food or ride home,  involved   Discussed with Cardiothoracic Team at AM rounds.

## 2021-04-07 NOTE — BH CONSULTATION LIAISON ASSESSMENT NOTE - RISK ASSESSMENT
Risk Factors: age, gender, unemployed, hx depression, anxiety, si, hx polysubstance abuse, hx inpatient psy admission, financial stressors  Protective Factors: supportive family, engaged in treatment, positive therapeutic relationship, medication compliance, help seeking

## 2021-04-07 NOTE — PROGRESS NOTE ADULT - SUBJECTIVE AND OBJECTIVE BOX
Subjective: Patient seen this am, not giving any eye contact. States "I don't care" when discussing today's plan with him. Refusing some of his medications last night, refusing to wear tele. Small amount of dark blood in cup next to his bed    Vital Signs:  Vital Signs Last 24 Hrs  T(C): 36.9 (04-07-21 @ 09:30), Max: 37.3 (04-06-21 @ 16:49)  T(F): 98.4 (04-07-21 @ 09:30), Max: 99.1 (04-06-21 @ 16:49)  HR: 71 (04-07-21 @ 09:30) (70 - 85)  BP: 119/83 (04-07-21 @ 09:30) (118/76 - 127/81)  RR: 18 (04-07-21 @ 09:30) (18 - 18)  SpO2: 96% (04-07-21 @ 09:30) (92% - 98%) on (O2)    Telemetry/Alarms:    Relevant labs, radiology and Medications reviewed                        10.5   9.78  )-----------( 477      ( 07 Apr 2021 07:32 )             32.4     04-07    137  |  105  |  10  ----------------------------<  162<H>  3.7   |  20<L>  |  0.72    Ca    9.5      07 Apr 2021 07:32  Mg     1.8     04-06    TPro  7.4  /  Alb  3.3  /  TBili  0.3  /  DBili  x   /  AST  20  /  ALT  13  /  AlkPhos  65  04-07      MEDICATIONS  (STANDING):  atorvastatin 40 milliGRAM(s) Oral at bedtime  cefTRIAXone   IVPB 1000 milliGRAM(s) IV Intermittent every 24 hours  dextrose 50% Injectable 25 Gram(s) IV Push once  heparin   Injectable 5000 Unit(s) SubCutaneous every 8 hours  HYDROmorphone  Injectable 0.5 milliGRAM(s) IV Push once  insulin glargine Injectable (LANTUS) 40 Unit(s) SubCutaneous at bedtime  insulin lispro (ADMELOG) corrective regimen sliding scale   SubCutaneous three times a day before meals  insulin lispro (ADMELOG) corrective regimen sliding scale   SubCutaneous at bedtime  insulin lispro Injectable (ADMELOG) 10 Unit(s) SubCutaneous three times a day before meals  metroNIDAZOLE  IVPB 500 milliGRAM(s) IV Intermittent every 8 hours  pantoprazole    Tablet 40 milliGRAM(s) Oral before breakfast  sertraline 50 milliGRAM(s) Oral daily  tamsulosin 0.4 milliGRAM(s) Oral at bedtime    MEDICATIONS  (PRN):  acetaminophen   Tablet .. 650 milliGRAM(s) Oral every 6 hours PRN Mild Pain (1 - 3)  benzonatate 100 milliGRAM(s) Oral every 8 hours PRN Cough  oxyCODONE    IR 5 milliGRAM(s) Oral every 6 hours PRN Severe Pain (7 - 10)  traMADol 50 milliGRAM(s) Oral every 6 hours PRN Moderate Pain (4 - 6)  traZODone 150 milliGRAM(s) Oral at bedtime PRN insomnia      Physical exam    I&O's Summary    06 Apr 2021 07:01  -  07 Apr 2021 07:00  --------------------------------------------------------  IN: 0 mL / OUT: 600 mL / NET: -600 mL

## 2021-04-07 NOTE — CONSULT NOTE ADULT - SUBJECTIVE AND OBJECTIVE BOX
Chief Complaint:    HPI:  This 60 year old male who had had COVID-19 in May 2020 was admitted to the [facility] ICU on 3/26/21 with DKA after presenting to the ER c/o weakness, N/V, abdominal pain and chest pain and being found to have very elevated blood glucose levels, BUN, and Creatinine.  He was started on an insulin drip and was given antibiotics.  His blood glucose improved but he was considered to be a newly diagnosed diabetic as his A1c was also newly elevated.  During his initial work up, he was found to have a RLL lung mass-like lesion measuring 4.5 cm on a chest CT scan and a dilated extrahepatic CBD with cholelithiasis and pneumobilia.  A repeat CT scan revealed enlargement of the RLL lesion with air fluid levels.  A bronchoscopy with BAL was performed on 4/1/21 and cultures revealed Streptococcus parasanguinis. On 4/2/2012 a HIDA scan was negative for acute cholecystitis.  During his admission, he had multiple issues including worsening of his chronic back pain and headache.  However, his blood glucose was controlled with basal insulin and pre-meal insulin and he was getting ready for discharge on PO Augemntin as recommended by ID when he developed mild hemoptysis.  This was originally thought to be traumatic in nature until it worsened.  A CT Angio revealed  a small focus of contrast extravasation within the cavitary lesion, which was possibly a small pseudoaneurysm or an active extravasation with the history of hemoptysis.  CT Surgery at [facility] was contacted and the pt was transferred to the [facility] CTICU for observation and possible intervention.  He states that he has been spitting up small amounts of dark blood.  He continues to c/o headaches, diarrhea, and generalized abdominal pain.  He denies dyspnea or SOB.   (04 Apr 2021 20:48)      PAST MEDICAL & SURGICAL HISTORY:  Type 2 diabetes mellitus without complication, without long-term current use of insulin  Newly diagnosed at [facility] 3/2021    Benign hypertension    2019 novel coronavirus disease (COVID-19)  5/2020    BPH associated with nocturia    Insomnia    Cholelithiasis  Discovered at [facility] 3/2021    Diverticulosis  Discovered at [facility] 3/2021    DKA (diabetic ketoacidosis)  [facility] 3/2021    OA (osteoarthritis)  BL shoulders    Lung abscess  RLL found at [facility] 3/2021    HLD (hyperlipidemia)    Back pain  Chronic- mid and upper back    Hepatitis C  in remission    Thyroid nodule  Found at [facility] 3/2021    Shoulder arthritis  Multiple surgeries on left shoulder        FAMILY HISTORY:  Family history unknown        SOCIAL HISTORY:  [ ] Denies Smoking, Alcohol, or Drug Use    Allergies    No Known Drug Allergies  shellfish (Unknown)    Intolerances        PAIN MEDICATIONS:  acetaminophen   Tablet .. 650 milliGRAM(s) Oral every 6 hours PRN  HYDROmorphone  Injectable 0.5 milliGRAM(s) IV Push once  oxyCODONE    IR 5 milliGRAM(s) Oral every 6 hours PRN  sertraline 50 milliGRAM(s) Oral daily  traMADol 50 milliGRAM(s) Oral every 6 hours PRN  traZODone 150 milliGRAM(s) Oral at bedtime PRN      Heme:  heparin   Injectable 5000 Unit(s) SubCutaneous every 8 hours    Antibiotics:  cefTRIAXone   IVPB 1000 milliGRAM(s) IV Intermittent every 24 hours  metroNIDAZOLE  IVPB 500 milliGRAM(s) IV Intermittent every 8 hours    Cardiovascular:  tamsulosin 0.4 milliGRAM(s) Oral at bedtime    GI:  pantoprazole    Tablet 40 milliGRAM(s) Oral before breakfast    Endocrine:  atorvastatin 40 milliGRAM(s) Oral at bedtime  dextrose 50% Injectable 25 Gram(s) IV Push once  insulin glargine Injectable (LANTUS) 36 Unit(s) SubCutaneous at bedtime  insulin lispro (ADMELOG) corrective regimen sliding scale   SubCutaneous three times a day before meals  insulin lispro (ADMELOG) corrective regimen sliding scale   SubCutaneous at bedtime  insulin lispro Injectable (ADMELOG) 12 Unit(s) SubCutaneous three times a day before meals    All Other Medications:      REVIEW OF SYSTEMS:    CONSTITUTIONAL: No fever, weight loss, or fatigue  EYES: No eye pain, visual disturbances, or discharge  ENMT:  No difficulty hearing, tinnitus, vertigo; No sinus or throat pain  NECK: No pain or stiffness  BREASTS: No pain, masses, or nipple discharge  RESPIRATORY: No cough, wheezing, chills or hemoptysis; No shortness of breath  CARDIOVASCULAR: No chest pain, palpitations, dizziness, or leg swelling  GASTROINTESTINAL: No abdominal or epigastric pain. No nausea, vomiting, or hematemesis; No diarrhea or constipation. No melena or hematochezia.  GENITOURINARY: No dysuria, frequency, hematuria, or incontinence  NEUROLOGICAL: No headaches, memory loss, loss of strength, numbness, or tremors  SKIN: No itching, burning, rashes, or lesions   LYMPH NODES: No enlarged glands  ENDOCRINE: No heat or cold intolerance; No hair loss  MUSCULOSKELETAL: No joint pain or swelling; No muscle, back, or extremity pain  PSYCHIATRIC: No depression, anxiety, mood swings, or difficulty sleeping  HEME/LYMPH: No easy bruising, or bleeding gums  ALLERY AND IMMUNOLOGIC: No hives or eczema      Vital Signs Last 24 Hrs  T(C): 36.9 (07 Apr 2021 09:30), Max: 37.3 (06 Apr 2021 16:49)  T(F): 98.4 (07 Apr 2021 09:30), Max: 99.1 (06 Apr 2021 16:49)  HR: 71 (07 Apr 2021 09:30) (70 - 85)  BP: 119/83 (07 Apr 2021 09:30) (118/76 - 127/81)  BP(mean): --  RR: 18 (07 Apr 2021 09:30) (18 - 18)  SpO2: 96% (07 Apr 2021 09:30) (92% - 98%)    PAIN SCORE:         SCALE USED: (1-10 VNRS)             PHYSICAL EXAM:    GENERAL: NAD, well-groomed, well-developed  HEAD:  Atraumatic, Normocephalic  EYES: EOMI, PERRLA, conjunctiva and sclera clear  ENMT: No tonsillar erythema, exudates, or enlargement; Moist mucous membranes, Good dentition, No lesions  NECK: Supple, No JVD, Normal thyroid  NERVOUS SYSTEM:  Alert & Oriented X3, Good concentration; Motor Strength 5/5 B/L upper and lower extremities; DTRs 2+ intact and symmetric  CHEST/LUNG: Clear to percussion bilaterally; No rales, rhonchi, wheezing, or rubs  HEART: Regular rate and rhythm; No murmurs, rubs, or gallops  ABDOMEN: Soft, Nontender, Nondistended; Bowel sounds present  EXTREMITIES:  2+ Peripheral Pulses, No clubbing, cyanosis, or edema  LYMPH: No lymphadenopathy noted  SKIN: No rashes or lesions        LABS:                          10.5   9.78  )-----------( 477      ( 07 Apr 2021 07:32 )             32.4     04-07    137  |  105  |  10  ----------------------------<  162<H>  3.7   |  20<L>  |  0.72    Ca    9.5      07 Apr 2021 07:32  Mg     1.8     04-06    TPro  7.4  /  Alb  3.3  /  TBili  0.3  /  DBili  x   /  AST  20  /  ALT  13  /  AlkPhos  65  04-07        Comments: Pt. complaining of pain 8/10 in back of neck describes pain as sharp, shooting and shift at times. Also states the pain is the worst when waking up in the morning and causes headaches in the back of head. States only IV dilaudid has helped with pain so far. A&Ox3, NAD, sitting in bed answers all questions appropriately and maintains eye contact.         RECOMMENDATIONS  1. Add 2mg oral dilaudid q6hrs PRN for severe pain   2. D/C oxycodone and tramodol   3. Add gabapentin 200mg q8hrs standing  4. Add tizanidine 2mg q6hrs PRN for muscle spasms  5. Psych consult for ineffective coping with new DM dx.  6. Consider adding lidoderm patch to affected area.     [X ]  [place]  Reviewed and Copied to Chart

## 2021-04-07 NOTE — BH CONSULTATION LIAISON ASSESSMENT NOTE - CASE SUMMARY
Chart reviewed. I agree with EMILE Parada's history, mental status exam and A/P with below additions. I personally saw and examined the patient this afternoon. He is back from having multiple teeth pulled. He states that he is depressed, does not want to do anything and thinks about suicide with no real intent or plan. Offered him psychiatric admission- however he declined. No AH/VH elicited. Affect is dysthymic. Thought process is perseverative.  Patient appears depressed- right now does not want inpatient psych. Unclear if he criteria at this moment for involuntary psychiatric admission. Warrants further psychiatric follow up prior to discharge. Cannot leave for home until psychiatrically cleared. Will follow with team. Otherwise plan per above.

## 2021-04-07 NOTE — ADVANCED PRACTICE NURSE CONSULT - REASON FOR CONSULT
This 60 year old male who had had COVID-19 in May 2020 was admitted to the [facility] ICU on 3/26/21 with DKA after presenting to the ER c/o weakness, N/V, abdominal pain and chest pain and being found to have very elevated blood glucose levels, BUN, and Creatinine.  He was started on an insulin drip and was given antibiotics.  His blood glucose improved but he was considered to be a newly diagnosed diabetic as his A1c was also newly elevated.  During his initial work up, he was found to have a RLL lung mass-like lesion measuring 4.5 cm on a chest CT scan and a dilated extrahepatic CBD with cholelithiasis and pneumobilia.  A repeat CT scan revealed enlargement of the RLL lesion with air fluid levels.  A bronchoscopy with BAL was performed on 4/1/21 and cultures revealed Streptococcus parasanguinis. On 4/2/2012 a HIDA scan was negative for acute cholecystitis.  During his admission, he had multiple issues including worsening of his chronic back pain and headache.  However, his blood glucose was controlled with basal insulin and pre-meal insulin and he was getting ready for discharge on PO Augemntin as recommended by ID when he developed mild hemoptysis.  This was originally thought to be traumatic in nature until it worsened.  A CT Angio revealed  a small focus of contrast extravasation within the cavitary lesion, which was possibly a small pseudoaneurysm or an active extravasation with the history of hemoptysis.  CT Surgery at [facility] was contacted and the pt was transferred to the [facility] CTICU for observation and possible intervention.  He states that he has been spitting up small amounts of dark blood.  He continues to c/o headaches, diarrhea, and generalized abdominal pain.  He denies dyspnea or SOB.

## 2021-04-07 NOTE — ADVANCED PRACTICE NURSE CONSULT - ASSESSMENT
Patient with newly diagnosed type 2 diabetes. Patient had DILIA drawn and results came back negative. Patient lives in a room with not stove. Prior Authorization paper work filled out so pt will be able to receive 3meals /day 7 days of the week. Patient was refusing insulin yesterday. Attempted to provide diabetes education/ reinforcement but pt refused saying it was too much. Spoke with Diabetes educators at [facility] and education was provided prior to transfer to [facility]. Patient was able to demonstrate at [facility] proper insulin injection technique and use of the insulin pen.  Case discussed with primary RN at [facility] and will continue to attempt to have pt teach back diabetes self-management education prior to discharge. Case management to follow up on prior authorization for meals. Patient scheduled for tooth extraction today.

## 2021-04-08 DIAGNOSIS — Z29.9 ENCOUNTER FOR PROPHYLACTIC MEASURES, UNSPECIFIED: ICD-10-CM

## 2021-04-08 DIAGNOSIS — F32.9 MAJOR DEPRESSIVE DISORDER, SINGLE EPISODE, UNSPECIFIED: ICD-10-CM

## 2021-04-08 LAB
ANION GAP SERPL CALC-SCNC: 12 MMOL/L — SIGNIFICANT CHANGE UP (ref 7–14)
BUN SERPL-MCNC: 7 MG/DL — SIGNIFICANT CHANGE UP (ref 7–23)
CALCIUM SERPL-MCNC: 9.4 MG/DL — SIGNIFICANT CHANGE UP (ref 8.4–10.5)
CHLORIDE SERPL-SCNC: 103 MMOL/L — SIGNIFICANT CHANGE UP (ref 98–107)
CO2 SERPL-SCNC: 22 MMOL/L — SIGNIFICANT CHANGE UP (ref 22–31)
CREAT SERPL-MCNC: 0.64 MG/DL — SIGNIFICANT CHANGE UP (ref 0.5–1.3)
GLUCOSE BLDC GLUCOMTR-MCNC: 104 MG/DL — HIGH (ref 70–99)
GLUCOSE BLDC GLUCOMTR-MCNC: 140 MG/DL — HIGH (ref 70–99)
GLUCOSE BLDC GLUCOMTR-MCNC: 148 MG/DL — HIGH (ref 70–99)
GLUCOSE BLDC GLUCOMTR-MCNC: 187 MG/DL — HIGH (ref 70–99)
GLUCOSE SERPL-MCNC: 131 MG/DL — HIGH (ref 70–99)
HCT VFR BLD CALC: 32.9 % — LOW (ref 39–50)
HGB BLD-MCNC: 10.7 G/DL — LOW (ref 13–17)
MCHC RBC-ENTMCNC: 31.6 PG — SIGNIFICANT CHANGE UP (ref 27–34)
MCHC RBC-ENTMCNC: 32.5 GM/DL — SIGNIFICANT CHANGE UP (ref 32–36)
MCV RBC AUTO: 97.1 FL — SIGNIFICANT CHANGE UP (ref 80–100)
NRBC # BLD: 0 /100 WBCS — SIGNIFICANT CHANGE UP
NRBC # FLD: 0 K/UL — SIGNIFICANT CHANGE UP
PLATELET # BLD AUTO: 498 K/UL — HIGH (ref 150–400)
POTASSIUM SERPL-MCNC: 3.5 MMOL/L — SIGNIFICANT CHANGE UP (ref 3.5–5.3)
POTASSIUM SERPL-SCNC: 3.5 MMOL/L — SIGNIFICANT CHANGE UP (ref 3.5–5.3)
RBC # BLD: 3.39 M/UL — LOW (ref 4.2–5.8)
RBC # FLD: 13 % — SIGNIFICANT CHANGE UP (ref 10.3–14.5)
SODIUM SERPL-SCNC: 137 MMOL/L — SIGNIFICANT CHANGE UP (ref 135–145)
WBC # BLD: 7.97 K/UL — SIGNIFICANT CHANGE UP (ref 3.8–10.5)
WBC # FLD AUTO: 7.97 K/UL — SIGNIFICANT CHANGE UP (ref 3.8–10.5)

## 2021-04-08 PROCEDURE — 99233 SBSQ HOSP IP/OBS HIGH 50: CPT

## 2021-04-08 PROCEDURE — 99232 SBSQ HOSP IP/OBS MODERATE 35: CPT

## 2021-04-08 RX ORDER — OLANZAPINE 15 MG/1
2.5 TABLET, FILM COATED ORAL EVERY 6 HOURS
Refills: 0 | Status: DISCONTINUED | OUTPATIENT
Start: 2021-04-08 | End: 2021-04-12

## 2021-04-08 RX ORDER — QUETIAPINE FUMARATE 200 MG/1
25 TABLET, FILM COATED ORAL ONCE
Refills: 0 | Status: COMPLETED | OUTPATIENT
Start: 2021-04-08 | End: 2021-04-08

## 2021-04-08 RX ORDER — OLANZAPINE 15 MG/1
2.5 TABLET, FILM COATED ORAL EVERY 6 HOURS
Refills: 0 | Status: DISCONTINUED | OUTPATIENT
Start: 2021-04-08 | End: 2021-04-08

## 2021-04-08 RX ADMIN — GABAPENTIN 200 MILLIGRAM(S): 400 CAPSULE ORAL at 22:01

## 2021-04-08 RX ADMIN — Medication 650 MILLIGRAM(S): at 13:30

## 2021-04-08 RX ADMIN — SERTRALINE 150 MILLIGRAM(S): 25 TABLET, FILM COATED ORAL at 12:30

## 2021-04-08 RX ADMIN — HYDROMORPHONE HYDROCHLORIDE 2 MILLIGRAM(S): 2 INJECTION INTRAMUSCULAR; INTRAVENOUS; SUBCUTANEOUS at 13:30

## 2021-04-08 RX ADMIN — HYDROMORPHONE HYDROCHLORIDE 2 MILLIGRAM(S): 2 INJECTION INTRAMUSCULAR; INTRAVENOUS; SUBCUTANEOUS at 00:39

## 2021-04-08 RX ADMIN — ATORVASTATIN CALCIUM 40 MILLIGRAM(S): 80 TABLET, FILM COATED ORAL at 22:01

## 2021-04-08 RX ADMIN — Medication 100 MILLIGRAM(S): at 05:45

## 2021-04-08 RX ADMIN — OLANZAPINE 2.5 MILLIGRAM(S): 15 TABLET, FILM COATED ORAL at 14:45

## 2021-04-08 RX ADMIN — Medication 650 MILLIGRAM(S): at 21:00

## 2021-04-08 RX ADMIN — GABAPENTIN 200 MILLIGRAM(S): 400 CAPSULE ORAL at 12:25

## 2021-04-08 RX ADMIN — TAMSULOSIN HYDROCHLORIDE 0.4 MILLIGRAM(S): 0.4 CAPSULE ORAL at 22:02

## 2021-04-08 RX ADMIN — GABAPENTIN 200 MILLIGRAM(S): 400 CAPSULE ORAL at 05:42

## 2021-04-08 RX ADMIN — HEPARIN SODIUM 5000 UNIT(S): 5000 INJECTION INTRAVENOUS; SUBCUTANEOUS at 05:42

## 2021-04-08 RX ADMIN — Medication 650 MILLIGRAM(S): at 20:55

## 2021-04-08 RX ADMIN — Medication 650 MILLIGRAM(S): at 12:24

## 2021-04-08 RX ADMIN — Medication 150 MILLIGRAM(S): at 22:02

## 2021-04-08 RX ADMIN — CEFTRIAXONE 100 MILLIGRAM(S): 500 INJECTION, POWDER, FOR SOLUTION INTRAMUSCULAR; INTRAVENOUS at 17:31

## 2021-04-08 RX ADMIN — PANTOPRAZOLE SODIUM 40 MILLIGRAM(S): 20 TABLET, DELAYED RELEASE ORAL at 05:42

## 2021-04-08 RX ADMIN — Medication 650 MILLIGRAM(S): at 22:30

## 2021-04-08 RX ADMIN — INSULIN GLARGINE 36 UNIT(S): 100 INJECTION, SOLUTION SUBCUTANEOUS at 22:00

## 2021-04-08 RX ADMIN — OLANZAPINE 2.5 MILLIGRAM(S): 15 TABLET, FILM COATED ORAL at 20:48

## 2021-04-08 RX ADMIN — HYDROMORPHONE HYDROCHLORIDE 2 MILLIGRAM(S): 2 INJECTION INTRAMUSCULAR; INTRAVENOUS; SUBCUTANEOUS at 06:19

## 2021-04-08 RX ADMIN — HYDROMORPHONE HYDROCHLORIDE 2 MILLIGRAM(S): 2 INJECTION INTRAMUSCULAR; INTRAVENOUS; SUBCUTANEOUS at 12:24

## 2021-04-08 RX ADMIN — HYDROMORPHONE HYDROCHLORIDE 2 MILLIGRAM(S): 2 INJECTION INTRAMUSCULAR; INTRAVENOUS; SUBCUTANEOUS at 05:42

## 2021-04-08 RX ADMIN — Medication 100 MILLIGRAM(S): at 13:27

## 2021-04-08 RX ADMIN — HYDROMORPHONE HYDROCHLORIDE 2 MILLIGRAM(S): 2 INJECTION INTRAMUSCULAR; INTRAVENOUS; SUBCUTANEOUS at 17:31

## 2021-04-08 RX ADMIN — Medication 100 MILLIGRAM(S): at 22:00

## 2021-04-08 NOTE — PROGRESS NOTE ADULT - SUBJECTIVE AND OBJECTIVE BOX
Subjective: 61 y/o male seen at bedside. Today he does not c/o severe pain. He is a little frustrated with everything going on but pain is controlled. He is one day post dental extraction. No issues with aggression today, currently on constant observation. No events overnight. No hemoptysis over the last 48hrs    Vital Signs:  Vital Signs Last 24 Hrs  T(C): 36.9 (04-08-21 @ 09:23), Max: 37.2 (04-07-21 @ 20:07)  T(F): 98.5 (04-08-21 @ 09:23), Max: 98.9 (04-07-21 @ 20:07)  HR: 74 (04-08-21 @ 09:23) (73 - 85)  BP: 123/86 (04-08-21 @ 09:23) (105/75 - 140/93)  RR: 16 (04-08-21 @ 09:23) (16 - 18)  SpO2: 96% (04-08-21 @ 09:23) (96% - 99%) on (O2)    Pertinent Physical Exam:  Telemetry/Alarms: NSR  General: WN/WD NAD  Neurology: Awake, nonfocal  Neck: Neck supple, trachea midline, No JVD,   Respiratory: CTA B/L. No hemoptysis  CV: RR  Skin: No Rashes, Hematoma, Ecchymosis  Psych: Oriented x 3      I&O's Summary      Relevant labs, radiology and Medications reviewed                        10.7   7.97  )-----------( 498      ( 08 Apr 2021 06:35 )             32.9     04-08    137  |  103  |  7   ----------------------------<  131<H>  3.5   |  22  |  0.64    Ca    9.4      08 Apr 2021 06:35    TPro  7.4  /  Alb  3.3  /  TBili  0.3  /  DBili  x   /  AST  20  /  ALT  13  /  AlkPhos  65  04-07      MEDICATIONS  (STANDING):  atorvastatin 40 milliGRAM(s) Oral at bedtime  cefTRIAXone   IVPB 1000 milliGRAM(s) IV Intermittent every 24 hours  dextrose 50% Injectable 25 Gram(s) IV Push once  gabapentin 200 milliGRAM(s) Oral every 8 hours  heparin   Injectable 5000 Unit(s) SubCutaneous every 8 hours  insulin glargine Injectable (LANTUS) 36 Unit(s) SubCutaneous at bedtime  insulin lispro (ADMELOG) corrective regimen sliding scale   SubCutaneous three times a day before meals  insulin lispro (ADMELOG) corrective regimen sliding scale   SubCutaneous at bedtime  insulin lispro Injectable (ADMELOG) 12 Unit(s) SubCutaneous three times a day before meals  metroNIDAZOLE  IVPB 500 milliGRAM(s) IV Intermittent every 8 hours  pantoprazole    Tablet 40 milliGRAM(s) Oral before breakfast  sertraline 150 milliGRAM(s) Oral daily  tamsulosin 0.4 milliGRAM(s) Oral at bedtime  traZODone 150 milliGRAM(s) Oral at bedtime    MEDICATIONS  (PRN):  acetaminophen   Tablet .. 650 milliGRAM(s) Oral every 6 hours PRN Mild Pain (1 - 3)  benzonatate 100 milliGRAM(s) Oral every 8 hours PRN Cough  HYDROmorphone   Tablet 2 milliGRAM(s) Oral every 6 hours PRN Severe Pain (7 - 10)      Assessment  60y Male  w/ PAST MEDICAL & SURGICAL HISTORY:  Type 2 diabetes mellitus without complication, without long-term current use of insulin  Newly diagnosed at [facility] 3/2021      4/4 Transferred from  hospital wit hemoptysis and RLL lung abscess.   4/5 Cnt with care from . Dental consulted.   4/6 1x episode of hemoptysis. HCT stable. COVID neg. Dental clearance and procedure scheduled for tomorrow. Endocrine consulted, needs teaching and set up for home insulin (new and process started at ). Interventional Radiology will s/o and will recall if intervention needed.  4/7 Psych called for aggression and agitation. Concerns of suicidal comments. Refusing meds. Placed on CO  4/8 Psych re-assessed, some element of adjustment disorder on top of his hx of depression and anxiety. Will re-evaluate in AM and clear based on assessment.     PLAN  Neuro: Pain management per Pain team, appreciate recs. Improved pain today  Psych: On constant observation for suicidal comments. Not cleared for discharge but psych will re-evaluate tomorrow and will clear for discharge as able based on findings.   Pulm: Encourage coughing, deep breathing and use of incentive spirometry. Denies hemoptysis for last 24 hrs. No plan for surgical intervention or IR intervention as symptoms improved. On IV abx for lung abcess  Cardio: Monitor telemetry/alarms  GI: Tolerating diet. Continue stool softeners.  Renal: monitor urine output, supplement electrolytes as needed  Vasc: Heparin SC/SCDs for DVT prophylaxis  Heme: Stable H/H.   ID: Cont rocephin and flagyl. Can transition to orals once discharged for 2-3 weeks more.  Dental: s/p extractions, Pain improved after extractions. followed by team.  Endo: A1C >15, known Hx of DM. Poorly controlled.  Yesterday he refused insulin and evaluation by both DM educator and Endocrine. Patient was able to demonstrate at [place] proper insulin injection technique and use of the insulin pen. Case discussed with primary RN at [facility] and will continue to attempt to have pt teach back diabetes self-management education prior to discharge.   Social: Patient lives in a room with not stove. Prior Authorization paper work filled out so pt will be able to receive 3meals /day 7 days of the week  Therapy: OOB/ambulate  Disposition: Request consult for Medicine transfer. Cont with DM care and social work issues. Pending psych clearance based on tomorrow's assessment. For now on 1:1 CO. Monitor for hemoptysis.   Discussed with Cardiothoracic Team at AM rounds.   Subjective: 59 y/o male seen at bedside. Today he does not c/o severe pain. He is a little frustrated with everything going on but pain is controlled. He is one day post dental extraction. No issues with aggression today, currently on constant observation. No events overnight. No hemoptysis over the last 48hrs    Vital Signs:  Vital Signs Last 24 Hrs  T(C): 36.9 (04-08-21 @ 09:23), Max: 37.2 (04-07-21 @ 20:07)  T(F): 98.5 (04-08-21 @ 09:23), Max: 98.9 (04-07-21 @ 20:07)  HR: 74 (04-08-21 @ 09:23) (73 - 85)  BP: 123/86 (04-08-21 @ 09:23) (105/75 - 140/93)  RR: 16 (04-08-21 @ 09:23) (16 - 18)  SpO2: 96% (04-08-21 @ 09:23) (96% - 99%) on (O2)    Pertinent Physical Exam:  Telemetry/Alarms: NSR  General: WN/WD NAD  Neurology: Awake, nonfocal  Neck: Neck supple, trachea midline, No JVD,   Respiratory: CTA B/L. No hemoptysis  CV: RR  Skin: No Rashes, Hematoma, Ecchymosis  Psych: Oriented x 3      I&O's Summary      Relevant labs, radiology and Medications reviewed                        10.7   7.97  )-----------( 498      ( 08 Apr 2021 06:35 )             32.9     04-08    137  |  103  |  7   ----------------------------<  131<H>  3.5   |  22  |  0.64    Ca    9.4      08 Apr 2021 06:35    TPro  7.4  /  Alb  3.3  /  TBili  0.3  /  DBili  x   /  AST  20  /  ALT  13  /  AlkPhos  65  04-07      MEDICATIONS  (STANDING):  atorvastatin 40 milliGRAM(s) Oral at bedtime  cefTRIAXone   IVPB 1000 milliGRAM(s) IV Intermittent every 24 hours  dextrose 50% Injectable 25 Gram(s) IV Push once  gabapentin 200 milliGRAM(s) Oral every 8 hours  heparin   Injectable 5000 Unit(s) SubCutaneous every 8 hours  insulin glargine Injectable (LANTUS) 36 Unit(s) SubCutaneous at bedtime  insulin lispro (ADMELOG) corrective regimen sliding scale   SubCutaneous three times a day before meals  insulin lispro (ADMELOG) corrective regimen sliding scale   SubCutaneous at bedtime  insulin lispro Injectable (ADMELOG) 12 Unit(s) SubCutaneous three times a day before meals  metroNIDAZOLE  IVPB 500 milliGRAM(s) IV Intermittent every 8 hours  pantoprazole    Tablet 40 milliGRAM(s) Oral before breakfast  sertraline 150 milliGRAM(s) Oral daily  tamsulosin 0.4 milliGRAM(s) Oral at bedtime  traZODone 150 milliGRAM(s) Oral at bedtime    MEDICATIONS  (PRN):  acetaminophen   Tablet .. 650 milliGRAM(s) Oral every 6 hours PRN Mild Pain (1 - 3)  benzonatate 100 milliGRAM(s) Oral every 8 hours PRN Cough  HYDROmorphone   Tablet 2 milliGRAM(s) Oral every 6 hours PRN Severe Pain (7 - 10)      Assessment  60y Male  w/ PAST MEDICAL & SURGICAL HISTORY:  Type 2 diabetes mellitus without complication, without long-term current use of insulin  Newly diagnosed at [facility] 3/2021      4/4 Transferred from  hospital wit hemoptysis and RLL lung abscess.   4/5 Cnt with care from . Dental consulted.   4/6 1x episode of hemoptysis. HCT stable. COVID neg. Dental clearance and procedure scheduled for tomorrow. Endocrine consulted, needs teaching and set up for home insulin (new and process started at ). Interventional Radiology will s/o and will recall if intervention needed.  4/7 Psych called for aggression and agitation. Concerns of suicidal comments. Refusing meds. Placed on CO  4/8 Psych re-assessed, some element of adjustment disorder on top of his hx of depression and anxiety. Will re-evaluate in AM and clear based on assessment.     PLAN  Neuro: Pain management per Pain team, appreciate recs. Improved pain today  Psych: On constant observation for suicidal comments. Continue 1:1 Constant Observation for SI statements, if patient denies SI on 4/9 will consider discontinuing  Pulm: Encourage coughing, deep breathing and use of incentive spirometry. Denies hemoptysis for last 24 hrs. No plan for surgical intervention or IR intervention as symptoms improved. On IV abx for lung abcess  Cardio: Monitor telemetry/alarms  GI: Tolerating diet. Continue stool softeners.  Renal: monitor urine output, supplement electrolytes as needed  Vasc: Heparin SC/SCDs for DVT prophylaxis  Heme: Stable H/H.   ID: Cont rocephin and flagyl. Can transition to orals once discharged for 2-3 weeks more.  Dental: s/p extractions, Pain improved after extractions. followed by team.  Endo: A1C >15, known Hx of DM. Poorly controlled.  Yesterday he refused insulin and evaluation by both DM educator and Endocrine. Patient was able to demonstrate at [place] proper insulin injection technique and use of the insulin pen. Case discussed with primary RN at [facility] and will continue to attempt to have pt teach back diabetes self-management education prior to discharge.   Social: Patient lives in a room with not stove. Prior Authorization paper work filled out so pt will be able to receive 3meals /day 7 days of the week  Therapy: OOB/ambulate  Disposition: Request consult for Medicine transfer. Cont with DM care and social work issues. Pending psych clearance based on tomorrow's assessment. For now on 1:1 CO. Monitor for hemoptysis.   Discussed with Cardiothoracic Team at AM rounds.

## 2021-04-08 NOTE — PROGRESS NOTE ADULT - PROBLEM SELECTOR PLAN 2
Seen by IR on 4/5 for hemoptysis with right lung pulm artery vs. bronchial artery pseudoaneurysm--> no intervention planned  -No episodes of hemoptysis in last 24 hours   -Encourage coughing, deep breathing and use of incentive spirometry. 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

## 2021-04-08 NOTE — PROGRESS NOTE ADULT - PROBLEM SELECTOR PLAN 1
Internal derangement of knee  04/13/2018  right  Active  Slick Escobedo - A1c > 15.5%, unclear time line of prior steroid use  - accepted basal insulin last night  - c/w Lantus 36 units qhs and Admelog 12 units TID (hold if NPO)  - moderate correction scale qac and qhs  - consistent carb diet   - check FS qac and qhs qhs   - defer RD consult at this time   - will attempt to speak with patient again tmrw  - for discharge: c-peptide is adequate and Ab negative. Ideally would be discharged home on basal bolus versus basal insulin + oral agents, but pt did not want to address care previously and unable to address diabetes care today. Discharge recommendations to be determined.     Endocrine faculty practice:  [address], [address], [facility]  [phone].

## 2021-04-08 NOTE — PROGRESS NOTE ADULT - SUBJECTIVE AND OBJECTIVE BOX
CHIEF COMPLAINT: f/u     SUBJECTIVE / OVERNIGHT EVENTS: Patient seen and examined. No acute events overnight. Pain well controlled and patient without any complaints.    MEDICATIONS  (STANDING):  atorvastatin 40 milliGRAM(s) Oral at bedtime  cefTRIAXone   IVPB 1000 milliGRAM(s) IV Intermittent every 24 hours  dextrose 50% Injectable 25 Gram(s) IV Push once  gabapentin 200 milliGRAM(s) Oral every 8 hours  heparin   Injectable 5000 Unit(s) SubCutaneous every 8 hours  insulin glargine Injectable (LANTUS) 36 Unit(s) SubCutaneous at bedtime  insulin lispro (ADMELOG) corrective regimen sliding scale   SubCutaneous three times a day before meals  insulin lispro (ADMELOG) corrective regimen sliding scale   SubCutaneous at bedtime  insulin lispro Injectable (ADMELOG) 12 Unit(s) SubCutaneous three times a day before meals  metroNIDAZOLE  IVPB 500 milliGRAM(s) IV Intermittent every 8 hours  pantoprazole    Tablet 40 milliGRAM(s) Oral before breakfast  QUEtiapine 25 milliGRAM(s) Oral once  sertraline 150 milliGRAM(s) Oral daily  tamsulosin 0.4 milliGRAM(s) Oral at bedtime  traZODone 150 milliGRAM(s) Oral at bedtime    MEDICATIONS  (PRN):  acetaminophen   Tablet .. 650 milliGRAM(s) Oral every 6 hours PRN Mild Pain (1 - 3)  benzonatate 100 milliGRAM(s) Oral every 8 hours PRN Cough  HYDROmorphone   Tablet 2 milliGRAM(s) Oral every 6 hours PRN Severe Pain (7 - 10)      VITALS:  T(F): 98.5 (04-08-21 @ 09:23), Max: 98.9 (04-07-21 @ 20:07)  HR: 74 (04-08-21 @ 09:23) (73 - 85)  BP: 123/86 (04-08-21 @ 09:23) (105/75 - 140/93)  RR: 16 (04-08-21 @ 09:23) (16 - 18)  SpO2: 96% (04-08-21 @ 09:23)  Wt(kg): --      CAPILLARY BLOOD GLUCOSE      PHYSICAL EXAM:  GENERAL: NAD, well-developed  HEAD:  Atraumatic, Normocephalic  EYES: EOMI, PERRLA, conjunctiva and sclera clear  NECK: Supple, No JVD  CHEST/LUNG: Clear to auscultation bilaterally; No wheeze  HEART: Regular rate and rhythm; No murmurs, rubs, or gallops  ABDOMEN: Soft, Nontender, Nondistended; Bowel sounds present  EXTREMITIES:  2+ Peripheral Pulses, No clubbing, cyanosis, or edema  PSYCH: AAOx3  NEUROLOGY: non-focal  SKIN: No rashes or lesions    LABS:              10.7                 137  | 22   | 7            7.97  >-----------< 498     ------------------------< 131                   32.9                 3.5  | 103  | 0.64                                         Ca 9.4   Mg x     Ph x           TPro  7.4  /  Alb  3.3      TBili  0.3  /  DBili  x         AST  20  /  ALT  13            AlkPhos  65                    MICROBIOLOGY:        RADIOLOGY & ADDITIONAL TESTS:  Imaging Personally Reviewed: [ ] Yes    [ ] Consultant(s) Notes Reviewed:  [x] Care Discussed with Consultants/Other Providers: Urology PA - discussed   CHIEF COMPLAINT: f/u     SUBJECTIVE / OVERNIGHT EVENTS: Patient seen at bedside, tangential and very frustrated d/t being given chicken and solid food over past day, however pt reports since having teeth pulled, he can only have soft food. Also frustrated with a previous male nurse who left a syringe at bedside per pt.   Also reports severe headache, states he's not getting any medication for it.     MEDICATIONS  (STANDING):  atorvastatin 40 milliGRAM(s) Oral at bedtime  cefTRIAXone   IVPB 1000 milliGRAM(s) IV Intermittent every 24 hours  dextrose 50% Injectable 25 Gram(s) IV Push once  gabapentin 200 milliGRAM(s) Oral every 8 hours  heparin   Injectable 5000 Unit(s) SubCutaneous every 8 hours  insulin glargine Injectable (LANTUS) 36 Unit(s) SubCutaneous at bedtime  insulin lispro (ADMELOG) corrective regimen sliding scale   SubCutaneous three times a day before meals  insulin lispro (ADMELOG) corrective regimen sliding scale   SubCutaneous at bedtime  insulin lispro Injectable (ADMELOG) 12 Unit(s) SubCutaneous three times a day before meals  metroNIDAZOLE  IVPB 500 milliGRAM(s) IV Intermittent every 8 hours  pantoprazole    Tablet 40 milliGRAM(s) Oral before breakfast  QUEtiapine 25 milliGRAM(s) Oral once  sertraline 150 milliGRAM(s) Oral daily  tamsulosin 0.4 milliGRAM(s) Oral at bedtime  traZODone 150 milliGRAM(s) Oral at bedtime    MEDICATIONS  (PRN):  acetaminophen   Tablet .. 650 milliGRAM(s) Oral every 6 hours PRN Mild Pain (1 - 3)  benzonatate 100 milliGRAM(s) Oral every 8 hours PRN Cough  HYDROmorphone   Tablet 2 milliGRAM(s) Oral every 6 hours PRN Severe Pain (7 - 10)      VITALS:  T(F): 98.5 (04-08-21 @ 09:23), Max: 98.9 (04-07-21 @ 20:07)  HR: 74 (04-08-21 @ 09:23) (73 - 85)  BP: 123/86 (04-08-21 @ 09:23) (105/75 - 140/93)  RR: 16 (04-08-21 @ 09:23) (16 - 18)  SpO2: 96% (04-08-21 @ 09:23)  Wt(kg): --      CAPILLARY BLOOD GLUCOSE      PHYSICAL EXAM:  limited d/t agitation   GENERAL: distressed and agitated   HEAD:  Atraumatic, Normocephalic  EYES: EOMI, conjunctiva and sclera clear  NECK: Supple, No JVD  CHEST/LUNG: refused  HEART: refused   PSYCH: AAOx3  NEUROLOGY: appears non-focal    LABS:              10.7                 137  | 22   | 7            7.97  >-----------< 498     ------------------------< 131                   32.9                 3.5  | 103  | 0.64                                         Ca 9.4   Mg x     Ph x           TPro  7.4  /  Alb  3.3      TBili  0.3  /  DBili  x         AST  20  /  ALT  13            AlkPhos  65                    MICROBIOLOGY:        RADIOLOGY & ADDITIONAL TESTS:  Imaging Personally Reviewed: [ ] Yes    [ ] Consultant(s) Notes Reviewed:  [x] Care Discussed with Consultants/Other Providers: CTS

## 2021-04-08 NOTE — PROGRESS NOTE ADULT - ASSESSMENT
60 year old male who had COVID-19 in May 2020, the admitted to [facility] ICU on 3/26/21 with DKA after presenting to the ER c/o weakness, N/V, abdominal pain and chest pain and being found to have very elevated blood glucose levels, BUN, and Creatinine, then transferred to [facility] for management of hemoptysis and lung abscess. Consult called for management of new severely uncontrolled DM2 with DKA on admission. A1c >15.5%. BG goal 100-180 mg/dL.

## 2021-04-08 NOTE — BH CONSULTATION LIAISON PROGRESS NOTE - OTHER
Guarded at times (eg. when asked to discuss his legal history) frustrated about medical issues limited

## 2021-04-08 NOTE — PROGRESS NOTE ADULT - SUBJECTIVE AND OBJECTIVE BOX
Chief Complaint: f/u DM2    History:  Patient seen at bedside this morning. He accepted Lantus last night and BG this AM at goal. Not eating and not receiving premeal Admelog. His breakfast tray was untouched. He is now on 1:1, per nursing staff and documentation, he admitted to suicidal ideation. Attempted to arouse patient this morning but he did not wake up.    MEDICATIONS  (STANDING):  atorvastatin 40 milliGRAM(s) Oral at bedtime  cefTRIAXone   IVPB 1000 milliGRAM(s) IV Intermittent every 24 hours  dextrose 50% Injectable 25 Gram(s) IV Push once  gabapentin 200 milliGRAM(s) Oral every 8 hours  heparin   Injectable 5000 Unit(s) SubCutaneous every 8 hours  insulin glargine Injectable (LANTUS) 36 Unit(s) SubCutaneous at bedtime  insulin lispro (ADMELOG) corrective regimen sliding scale   SubCutaneous three times a day before meals  insulin lispro (ADMELOG) corrective regimen sliding scale   SubCutaneous at bedtime  insulin lispro Injectable (ADMELOG) 12 Unit(s) SubCutaneous three times a day before meals  metroNIDAZOLE  IVPB 500 milliGRAM(s) IV Intermittent every 8 hours  pantoprazole    Tablet 40 milliGRAM(s) Oral before breakfast  QUEtiapine 25 milliGRAM(s) Oral once  sertraline 150 milliGRAM(s) Oral daily  tamsulosin 0.4 milliGRAM(s) Oral at bedtime  traZODone 150 milliGRAM(s) Oral at bedtime    MEDICATIONS  (PRN):  acetaminophen   Tablet .. 650 milliGRAM(s) Oral every 6 hours PRN Mild Pain (1 - 3)  benzonatate 100 milliGRAM(s) Oral every 8 hours PRN Cough  HYDROmorphone   Tablet 2 milliGRAM(s) Oral every 6 hours PRN Severe Pain (7 - 10)      Allergies  No Known Drug Allergies  shellfish (Unknown)    Review of Systems:  UNABLE TO OBTAIN    PHYSICAL EXAM:  VITALS: T(C): 36.9 (04-08-21 @ 09:23)  T(F): 98.5 (04-08-21 @ 09:23), Max: 98.9 (04-07-21 @ 20:07)  HR: 74 (04-08-21 @ 09:23) (73 - 85)  BP: 123/86 (04-08-21 @ 09:23) (105/75 - 140/93)  RR:  (16 - 18)  SpO2:  (96% - 99%)  Wt(kg): --  GENERAL: NAD, well-developed  EYES: No proptosis, anicteric  HEENT:  Atraumatic, Normocephalic   RESPIRATORY: + air movement bilaterally, no respiratory distress     POCT Blood Glucose.: 148 mg/dL (04-08-21 @ 12:36)  POCT Blood Glucose.: 140 mg/dL (04-08-21 @ 09:21)  POCT Blood Glucose.: 166 mg/dL (04-07-21 @ 21:12)  POCT Blood Glucose.: 157 mg/dL (04-07-21 @ 17:27)  POCT Blood Glucose.: 193 mg/dL (04-07-21 @ 12:31)  POCT Blood Glucose.: 187 mg/dL (04-07-21 @ 08:29)  POCT Blood Glucose.: 193 mg/dL (04-06-21 @ 22:00)  POCT Blood Glucose.: 226 mg/dL (04-06-21 @ 17:17)  POCT Blood Glucose.: 237 mg/dL (04-06-21 @ 12:03)  POCT Blood Glucose.: 185 mg/dL (04-06-21 @ 08:08)  POCT Blood Glucose.: 205 mg/dL (04-05-21 @ 22:55)  POCT Blood Glucose.: 234 mg/dL (04-05-21 @ 17:47)      04-08    137  |  103  |  7   ----------------------------<  131<H>  3.5   |  22  |  0.64    EGFR if : 124  EGFR if non : 107    Ca    9.4      04-08  Mg     1.8     04-06    TPro  7.4  /  Alb  3.3  /  TBili  0.3  /  DBili  x   /  AST  20  /  ALT  13  /  AlkPhos  65  04-07

## 2021-04-08 NOTE — BH CONSULTATION LIAISON PROGRESS NOTE - NSBHASSESSMENTFT_PSY_ALL_CORE
59yo unemployed, male domiciled along with PMHx COVID-19 5/2020 was admitted to the [facility] ICU on 3/26/21 with DKA after presenting to the ER c/o weakness, N/V, abdominal pain and chest pain, upon discharge from [facility] mild hemoptysis, CT revealed small focus of contrast extravasation within the cavitary lesion, CT Surgery at [facility] contacted and pt was transferred to the [facility] CTICU for observation and possible intervention.  He states that he has been spitting up small amounts of dark blood.  He continues to c/o headaches, diarrhea, and generalized abdominal pain, PMHx also includes Hepatitis C, BPH, HLD, HTN, DM II, OA, ETOH abuse(sober 1 yr), hx Opiate dependence ([id number]), PPHx depression, anxiety, SI, one prior inpatient admission [facility] for depression, SI, and etoh abuse 9/3-10/16/2020.    On evaluation today, patient presents as dysphoric, irritable, remains with multiple complaints including headache, back pain, dissatisfaction with food. Patient acknowledges previous vague SI statements made during this admission and while at home prior to admission, however adamantly denies active and passive SI/HI/I/P on assessment today. Furthermore, denies any history of SI/SA in the past. Patient with known diagnosis of mood disorder, consider as well some features of personality disorder as patient demonstrates poor coping skills, easy frustration. Suspect that patient's previous vague SI statements were made in the context of frustration, will continue to monitor and continue CO at least until 4/9.    PLAN:  -Continue 1:1 Constant Observation for SI statements, if patient denies SI on 4/9 will consider discontinuing  -Continue Zoloft 150mg daily, monitor platelets, Na level  -Continue Trazodone 150mg qHS  -PRN- may use Seroquel 25mg PO q6hrs for agitation if qtc < 500  -Will continue to follow 61yo unemployed, male domiciled along with PMHx COVID-19 5/2020 was admitted to the [facility] ICU on 3/26/21 with DKA after presenting to the ER c/o weakness, N/V, abdominal pain and chest pain, upon discharge from [facility] mild hemoptysis, CT revealed small focus of contrast extravasation within the cavitary lesion, CT Surgery at [facility] contacted and pt was transferred to the [facility] CTICU for observation and possible intervention.  He states that he has been spitting up small amounts of dark blood.  He continues to c/o headaches, diarrhea, and generalized abdominal pain, PMHx also includes Hepatitis C, BPH, HLD, HTN, DM II, OA, ETOH abuse(sober 1 yr), hx Opiate dependence ([id number]), PPHx depression, anxiety, SI, one prior inpatient admission [facility] for depression, SI, and etoh abuse 9/3-10/16/2020.    On evaluation today, patient presents as dysphoric, irritable, remains with multiple complaints including headache, back pain, dissatisfaction with food. Patient acknowledges previous vague  SI statements made during this admission and while at home prior to admission, however adamantly denies active and passive SI/HI/I/P on assessment today. Furthermore, denies any history of SI/SA in the past. Patient with known diagnosis of mood disorder, consider as well some features of possible personality disorder as patient demonstrates poor coping skills, easy frustration. Suspect that patient's previous vague SI statements were made in the context of frustration, will continue to monitor and continue CO at least until 4/9.    As per pt. he follow up with Dr. PRITCHETT" monthly at the [facility] ([phone]), also follow up with the  therapist Christina Hernandez every Thursday.     PLAN:  -Continue 1:1 Constant Observation for SI statements, if patient denies SI on 4/9 will consider discontinuing  -Continue Zoloft 150mg daily, monitor platelets, Na level  -Continue Trazodone 150mg qHS  -PRN- may use Seroquel 25mg PO q6hrs for agitation if qtc < 500  -Will continue to follow  - Case d/w team in person. 59yo unemployed, male domiciled along with PMHx COVID-19 5/2020 was admitted to the [facility] ICU on 3/26/21 with DKA after presenting to the ER c/o weakness, N/V, abdominal pain and chest pain, upon discharge from [facility] mild hemoptysis, CT revealed small focus of contrast extravasation within the cavitary lesion, CT Surgery at [facility] contacted and pt was transferred to the [facility] CTICU for observation and possible intervention.  He states that he has been spitting up small amounts of dark blood.  He continues to c/o headaches, diarrhea, and generalized abdominal pain, PMHx also includes Hepatitis C, BPH, HLD, HTN, DM II, OA, ETOH abuse(sober 1 yr), hx Opiate dependence ([id number]), PPHx depression, anxiety, SI, one prior inpatient admission [facility] for depression, SI, and etoh abuse 9/3-10/16/2020.    On evaluation today, patient presents as dysphoric, irritable, remains with multiple complaints including headache, back pain, dissatisfaction with food. Patient acknowledges previous vague  SI statements made during this admission and while at home prior to admission, however adamantly denies active and passive SI/HI/I/P on assessment today. Furthermore, denies any history of SI/SA in the past. Patient with known diagnosis of mood disorder, consider as well some features of possible personality disorder as patient demonstrates poor coping skills, easy frustration. Suspect that patient's previous vague SI statements were made in the context of frustration, will continue to monitor and continue CO at least until 4/9.    As per pt. he follow up with Dr. PRITCHETT" monthly at the [facility] ([phone]), also follow up with the  therapist Christina Hernandez every Thursday.     PLAN:  -Continue 1:1 Constant Observation for SI statements, if patient denies SI on 4/9 will consider discontinuing  -Continue Zoloft 150mg daily, monitor platelets, Na level  -Continue Trazodone 150mg qHS  -PRN- may use Seroquel 25mg PO q6hrs for agitation if qtc < 500  -PRN- May use Zyprexa 2.5mg IM q6h prn for SEVERE agitation if qtc <500.   -Will continue to follow  - Case d/w team in person. 61yo unemployed, male domiciled along with PMHx COVID-19 5/2020 was admitted to the [facility] ICU on 3/26/21 with DKA after presenting to the ER c/o weakness, N/V, abdominal pain and chest pain, upon discharge from [facility] mild hemoptysis, CT revealed small focus of contrast extravasation within the cavitary lesion, CT Surgery at [facility] contacted and pt was transferred to the [facility] CTICU for observation and possible intervention.  He states that he has been spitting up small amounts of dark blood.  He continues to c/o headaches, diarrhea, and generalized abdominal pain, PMHx also includes Hepatitis C, BPH, HLD, HTN, DM II, OA, ETOH abuse(sober 1 yr), hx Opiate dependence ([id number]), PPHx depression, anxiety, SI, one prior inpatient admission [facility] for depression, SI, and etoh abuse 9/3-10/16/2020.    On evaluation today, patient presents as dysphoric, irritable, remains with multiple complaints including headache, back pain, dissatisfaction with food. Patient acknowledges previous vague  SI statements made during this admission and while at home prior to admission, however adamantly denies active and passive SI/HI/I/P on assessment today. Furthermore, denies any history of SI/SA in the past. Patient with known diagnosis of mood disorder, consider as well some features of possible personality disorder as patient demonstrates poor coping skills, easy frustration. Suspect that patient's previous vague SI statements were made in the context of frustration, will continue to monitor and continue CO at least until 4/9.    As per pt. he follow up with Dr. PRITCHETT" monthly at the [facility] ([phone]), also follow up with the  therapist Christina Hernandez every Thursday.     Received a call later by RAMON Julian that pt. is extremely agitated and at this point cannot take PO Seroquel due to agitation. Recommend Zyprexa IM PRN as below for severe agitation if qtc <500.     PLAN:  -Continue 1:1 Constant Observation for SI statements, if patient denies SI on 4/9 will consider discontinuing  -Continue Zoloft 150mg daily, monitor platelets, Na level  -Continue Trazodone 150mg qHS  -PRN- may use Seroquel 25mg PO q6hrs for agitation if qtc < 500  -PRN- May use Zyprexa 2.5mg IM q6h prn ONLY for SEVERE agitation if qtc <500, once all diversional activities are exhausted.    -Will continue to follow  - Case d/w team in person.    Do not give IM Zyprexa within 4hrs of  IM/IV Ativan due to risk of excessive sedation and respiratory depression.

## 2021-04-08 NOTE — BH CONSULTATION LIAISON PROGRESS NOTE - NSBHFUPINTERVALHXFT_PSY_A_CORE
Patient seen in f/u for depression/anxiety/agitation. No acute events overnight, no PRN medication required or received. This morning, patient is found reclining comfortably in bed. Noted to have blanket covering his head. Patient responds to his name and uncovers his head at writer's request, however keeps eyes closed for majority of interview. Patient states that his mood is "medium" today, notes that he was upset yesterday for several reasons including "because they brought me seafood 3 times and I'm allergic to it and because they tried to bring me chicken and I couldn't chew it." Patient says this morning he his back hurts and he has a headache, says "being here is worse than senior care." Patient relates that he has a psychiatrist (Dr. PRITCHETT in [place]) and a therapist, sees them regularly for treatment of depression and anxiety, takes sertraline and trazodone. 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 Patient states he spent an unknown amount of time in custodial for unclear reasons > 20yrs ago, also was charged with a DWI in the past. Denies access to guns at home. Patient denies active and passive SI/HI/I/P today. Denies any history of SI/SA. Patient acknowledges that he did make some vague suicidal statements during this admission and has made similar comments to his sister in law at home. Patient explains that he has never had intent or plan to hurt himself or end his life, says "I get mad about things and that's when I say it." Patient is adamant that he has no plan or intent to end his life. Patient seen in f/u for depression/anxiety/agitation. No acute events overnight, no PRN medication required or received. This morning, patient is found reclining comfortably in bed. Noted to have blanket covering his head. Patient responds to his name and uncovers his head at writer's request, however keeps eyes closed for majority of interview. Patient states that his mood is "medium" today, notes that he was upset yesterday for several reasons including "because they brought me seafood 3 times and I'm allergic to it and because they tried to bring me chicken and I couldn't chew it." Patient says this morning he his back hurts and he has a headache, says "being here is worse than shelter." Patient relates that he has a psychiatrist (Dr. PRITCHETT in [place]) and a therapist, sees them regularly for treatment of depression and anxiety, takes sertraline and trazodone. 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 Patient states he spent an unknown amount of time in longterm for unclear reasons > 20yrs ago, also was charged with a DWI in the past. Denies access to guns at home. Patient denies active and passive SI/HI/I/P today. Denies any history of SI/SA. Patient acknowledges that he did make some vague passive suicidal statements during this admission and has made similar comments to his sister in law at home. Patient explains that he has never had intent or plan to hurt himself or end his life, says "I get mad about things and that's when I say it." Patient is adamant that he has no plan or intent to end his life.

## 2021-04-08 NOTE — PROGRESS NOTE ADULT - PROBLEM SELECTOR PLAN 1
Transferred from [facility] on 4/4, w/hemoptysis and RLL lung abscess  -Cx w/strep parasanguinis  -C/w Ceftriaxone and Metronidazole, can transition to PO for DC for 2-3 weeks Transferred from [facility] on 4/4, w/hemoptysis and RLL lung abscess  -Cx w/strep parasanguinis  -C/w Ceftriaxone and Metronidazole, can transition to oral Ceftin and flagyl for 2-3 weeks upon DC per ID     #Dental extraction 4/7: OTC pain management, outpt dental followup upon dc

## 2021-04-08 NOTE — PROGRESS NOTE ADULT - ASSESSMENT
60M w/HTN, ETOH abuse, GERD, HLD, BPH, DMII, COVID May 2020, admitted to [facility] ICU on 3/26/21 w/DKA, RLL lung mass-like lesion measuring 4.5 cm on a chest CT scan and a dilated extrahepatic CBD with cholelithiasis and pneumobilia.  A repeat CT scan revealed enlargement of the RLL lesion with air fluid levels. Bronchoscopy w/BAL on 4/1 --> cultures w/Streptococcus parasanguinis. C/b hemoptysis w/CT revealing small focus of contrast extravasation within the cavitary lesion.  Pt transferred to [facility] CTICU on 4/4 for management of hemoptysis and lung abscess.   Seen by dental at [facility], s/p teeth extractions on 4/7.

## 2021-04-08 NOTE — PROGRESS NOTE ADULT - ASSESSMENT
60M HTN etoh abuse GERD HLD BPH hx DM (not on meds) with right upper back and shoulder pain, recurring for several months    Plan:  1. Ankle pain: Uric caid is not elevated, but seems to have had some improvement with colchicine and naproxen.  Inflammatory markers are positive. X-ray showed soft tissue swelling and  no acute pathology. Likely flare of gout, improving on Colchicine  and Naprosyn for 5 days     - Cont. Colchicine  and Naprosyn for 3 more days   - consider rheum consult     2. Macrocytic anemia: B12 low normal, unclear etiology.  B12 and Folate level WNL. Pt will need outpatient hematology f/u     3. Right sided back pain: EKG normal no suspicion of cardiac source. Trial of lidoderm on upper right back, tylenol prn. Avoid opioids. Outpatient ortho follow up    4. HTN: Continus losartan, metoprolol    5. Reported Hx DM, no DM now, HbA1c 5.5%    6. GERD: Continue protonix    7. HLD: Statin discontinued by primary team due to reported myalgias    8. Depression: Management per primary team     Induction

## 2021-04-09 LAB
ALBUMIN SERPL ELPH-MCNC: 3.5 G/DL — SIGNIFICANT CHANGE UP (ref 3.3–5)
ALP SERPL-CCNC: 71 U/L — SIGNIFICANT CHANGE UP (ref 40–120)
ALT FLD-CCNC: 11 U/L — SIGNIFICANT CHANGE UP (ref 4–41)
ANION GAP SERPL CALC-SCNC: 12 MMOL/L — SIGNIFICANT CHANGE UP (ref 7–14)
AST SERPL-CCNC: 23 U/L — SIGNIFICANT CHANGE UP (ref 4–40)
BILIRUB SERPL-MCNC: 0.3 MG/DL — SIGNIFICANT CHANGE UP (ref 0.2–1.2)
BUN SERPL-MCNC: 6 MG/DL — LOW (ref 7–23)
CALCIUM SERPL-MCNC: 9.7 MG/DL — SIGNIFICANT CHANGE UP (ref 8.4–10.5)
CHLORIDE SERPL-SCNC: 105 MMOL/L — SIGNIFICANT CHANGE UP (ref 98–107)
CO2 SERPL-SCNC: 23 MMOL/L — SIGNIFICANT CHANGE UP (ref 22–31)
CREAT SERPL-MCNC: 0.72 MG/DL — SIGNIFICANT CHANGE UP (ref 0.5–1.3)
CULTURE RESULTS: SIGNIFICANT CHANGE UP
GLUCOSE BLDC GLUCOMTR-MCNC: 150 MG/DL — HIGH (ref 70–99)
GLUCOSE BLDC GLUCOMTR-MCNC: 177 MG/DL — HIGH (ref 70–99)
GLUCOSE BLDC GLUCOMTR-MCNC: 195 MG/DL — HIGH (ref 70–99)
GLUCOSE BLDC GLUCOMTR-MCNC: 213 MG/DL — HIGH (ref 70–99)
GLUCOSE BLDC GLUCOMTR-MCNC: 60 MG/DL — LOW (ref 70–99)
GLUCOSE SERPL-MCNC: 88 MG/DL — SIGNIFICANT CHANGE UP (ref 70–99)
HCT VFR BLD CALC: 34.5 % — LOW (ref 39–50)
HGB BLD-MCNC: 11.5 G/DL — LOW (ref 13–17)
MCHC RBC-ENTMCNC: 31.9 PG — SIGNIFICANT CHANGE UP (ref 27–34)
MCHC RBC-ENTMCNC: 33.3 GM/DL — SIGNIFICANT CHANGE UP (ref 32–36)
MCV RBC AUTO: 95.6 FL — SIGNIFICANT CHANGE UP (ref 80–100)
NRBC # BLD: 0 /100 WBCS — SIGNIFICANT CHANGE UP
NRBC # FLD: 0 K/UL — SIGNIFICANT CHANGE UP
ORGANISM # SPEC MICROSCOPIC CNT: SIGNIFICANT CHANGE UP
PLATELET # BLD AUTO: 510 K/UL — HIGH (ref 150–400)
POTASSIUM SERPL-MCNC: 3.4 MMOL/L — LOW (ref 3.5–5.3)
POTASSIUM SERPL-SCNC: 3.4 MMOL/L — LOW (ref 3.5–5.3)
PROT SERPL-MCNC: 7.7 G/DL — SIGNIFICANT CHANGE UP (ref 6–8.3)
RBC # BLD: 3.61 M/UL — LOW (ref 4.2–5.8)
RBC # FLD: 13.1 % — SIGNIFICANT CHANGE UP (ref 10.3–14.5)
SODIUM SERPL-SCNC: 140 MMOL/L — SIGNIFICANT CHANGE UP (ref 135–145)
SPECIMEN SOURCE: SIGNIFICANT CHANGE UP
WBC # BLD: 8.32 K/UL — SIGNIFICANT CHANGE UP (ref 3.8–10.5)
WBC # FLD AUTO: 8.32 K/UL — SIGNIFICANT CHANGE UP (ref 3.8–10.5)

## 2021-04-09 PROCEDURE — 99232 SBSQ HOSP IP/OBS MODERATE 35: CPT

## 2021-04-09 PROCEDURE — 99233 SBSQ HOSP IP/OBS HIGH 50: CPT

## 2021-04-09 PROCEDURE — 93010 ELECTROCARDIOGRAM REPORT: CPT

## 2021-04-09 RX ORDER — INSULIN LISPRO 100/ML
10 VIAL (ML) SUBCUTANEOUS
Refills: 0 | Status: DISCONTINUED | OUTPATIENT
Start: 2021-04-10 | End: 2021-04-12

## 2021-04-09 RX ORDER — INSULIN LISPRO 100/ML
10 VIAL (ML) SUBCUTANEOUS
Refills: 0 | Status: DISCONTINUED | OUTPATIENT
Start: 2021-04-09 | End: 2021-04-09

## 2021-04-09 RX ORDER — INSULIN GLARGINE 100 [IU]/ML
30 INJECTION, SOLUTION SUBCUTANEOUS AT BEDTIME
Refills: 0 | Status: DISCONTINUED | OUTPATIENT
Start: 2021-04-09 | End: 2021-04-12

## 2021-04-09 RX ORDER — ENOXAPARIN SODIUM 100 MG/ML
40 INJECTION SUBCUTANEOUS DAILY
Refills: 0 | Status: DISCONTINUED | OUTPATIENT
Start: 2021-04-09 | End: 2021-04-12

## 2021-04-09 RX ORDER — DEXTROSE 50 % IN WATER 50 %
25 SYRINGE (ML) INTRAVENOUS ONCE
Refills: 0 | Status: COMPLETED | OUTPATIENT
Start: 2021-04-09 | End: 2021-04-09

## 2021-04-09 RX ORDER — INSULIN LISPRO 100/ML
10 VIAL (ML) SUBCUTANEOUS
Refills: 0 | Status: DISCONTINUED | OUTPATIENT
Start: 2021-04-09 | End: 2021-04-12

## 2021-04-09 RX ORDER — POTASSIUM CHLORIDE 20 MEQ
40 PACKET (EA) ORAL EVERY 4 HOURS
Refills: 0 | Status: COMPLETED | OUTPATIENT
Start: 2021-04-09 | End: 2021-04-09

## 2021-04-09 RX ORDER — HYDROMORPHONE HYDROCHLORIDE 2 MG/ML
2 INJECTION INTRAMUSCULAR; INTRAVENOUS; SUBCUTANEOUS EVERY 4 HOURS
Refills: 0 | Status: DISCONTINUED | OUTPATIENT
Start: 2021-04-09 | End: 2021-04-12

## 2021-04-09 RX ADMIN — Medication 650 MILLIGRAM(S): at 22:30

## 2021-04-09 RX ADMIN — GABAPENTIN 200 MILLIGRAM(S): 400 CAPSULE ORAL at 12:52

## 2021-04-09 RX ADMIN — GABAPENTIN 200 MILLIGRAM(S): 400 CAPSULE ORAL at 06:07

## 2021-04-09 RX ADMIN — Medication 100 MILLIGRAM(S): at 12:54

## 2021-04-09 RX ADMIN — Medication 25 GRAM(S): at 12:55

## 2021-04-09 RX ADMIN — Medication 40 MILLIEQUIVALENT(S): at 18:20

## 2021-04-09 RX ADMIN — HYDROMORPHONE HYDROCHLORIDE 2 MILLIGRAM(S): 2 INJECTION INTRAMUSCULAR; INTRAVENOUS; SUBCUTANEOUS at 23:15

## 2021-04-09 RX ADMIN — CEFTRIAXONE 100 MILLIGRAM(S): 500 INJECTION, POWDER, FOR SOLUTION INTRAMUSCULAR; INTRAVENOUS at 18:07

## 2021-04-09 RX ADMIN — Medication 150 MILLIGRAM(S): at 21:43

## 2021-04-09 RX ADMIN — HYDROMORPHONE HYDROCHLORIDE 2 MILLIGRAM(S): 2 INJECTION INTRAMUSCULAR; INTRAVENOUS; SUBCUTANEOUS at 02:25

## 2021-04-09 RX ADMIN — PANTOPRAZOLE SODIUM 40 MILLIGRAM(S): 20 TABLET, DELAYED RELEASE ORAL at 06:07

## 2021-04-09 RX ADMIN — HYDROMORPHONE HYDROCHLORIDE 2 MILLIGRAM(S): 2 INJECTION INTRAMUSCULAR; INTRAVENOUS; SUBCUTANEOUS at 18:21

## 2021-04-09 RX ADMIN — OLANZAPINE 2.5 MILLIGRAM(S): 15 TABLET, FILM COATED ORAL at 12:54

## 2021-04-09 RX ADMIN — Medication 40 MILLIEQUIVALENT(S): at 12:55

## 2021-04-09 RX ADMIN — INSULIN GLARGINE 30 UNIT(S): 100 INJECTION, SOLUTION SUBCUTANEOUS at 21:40

## 2021-04-09 RX ADMIN — Medication 650 MILLIGRAM(S): at 12:30

## 2021-04-09 RX ADMIN — HYDROMORPHONE HYDROCHLORIDE 2 MILLIGRAM(S): 2 INJECTION INTRAMUSCULAR; INTRAVENOUS; SUBCUTANEOUS at 12:30

## 2021-04-09 RX ADMIN — HEPARIN SODIUM 5000 UNIT(S): 5000 INJECTION INTRAVENOUS; SUBCUTANEOUS at 06:06

## 2021-04-09 RX ADMIN — HYDROMORPHONE HYDROCHLORIDE 2 MILLIGRAM(S): 2 INJECTION INTRAMUSCULAR; INTRAVENOUS; SUBCUTANEOUS at 12:53

## 2021-04-09 RX ADMIN — HEPARIN SODIUM 5000 UNIT(S): 5000 INJECTION INTRAVENOUS; SUBCUTANEOUS at 12:54

## 2021-04-09 RX ADMIN — HYDROMORPHONE HYDROCHLORIDE 2 MILLIGRAM(S): 2 INJECTION INTRAMUSCULAR; INTRAVENOUS; SUBCUTANEOUS at 07:59

## 2021-04-09 RX ADMIN — ATORVASTATIN CALCIUM 40 MILLIGRAM(S): 80 TABLET, FILM COATED ORAL at 21:43

## 2021-04-09 RX ADMIN — HYDROMORPHONE HYDROCHLORIDE 2 MILLIGRAM(S): 2 INJECTION INTRAMUSCULAR; INTRAVENOUS; SUBCUTANEOUS at 08:00

## 2021-04-09 RX ADMIN — Medication 100 MILLIGRAM(S): at 21:40

## 2021-04-09 RX ADMIN — GABAPENTIN 200 MILLIGRAM(S): 400 CAPSULE ORAL at 21:43

## 2021-04-09 RX ADMIN — HYDROMORPHONE HYDROCHLORIDE 2 MILLIGRAM(S): 2 INJECTION INTRAMUSCULAR; INTRAVENOUS; SUBCUTANEOUS at 22:45

## 2021-04-09 RX ADMIN — TAMSULOSIN HYDROCHLORIDE 0.4 MILLIGRAM(S): 0.4 CAPSULE ORAL at 21:43

## 2021-04-09 RX ADMIN — Medication 650 MILLIGRAM(S): at 12:53

## 2021-04-09 RX ADMIN — Medication 100 MILLIGRAM(S): at 06:07

## 2021-04-09 RX ADMIN — Medication 1 MILLIGRAM(S): at 17:00

## 2021-04-09 RX ADMIN — Medication 650 MILLIGRAM(S): at 21:47

## 2021-04-09 RX ADMIN — SERTRALINE 150 MILLIGRAM(S): 25 TABLET, FILM COATED ORAL at 12:53

## 2021-04-09 RX ADMIN — ENOXAPARIN SODIUM 40 MILLIGRAM(S): 100 INJECTION SUBCUTANEOUS at 18:08

## 2021-04-09 RX ADMIN — OLANZAPINE 2.5 MILLIGRAM(S): 15 TABLET, FILM COATED ORAL at 18:20

## 2021-04-09 RX ADMIN — Medication 12 UNIT(S): at 08:53

## 2021-04-09 RX ADMIN — OLANZAPINE 2.5 MILLIGRAM(S): 15 TABLET, FILM COATED ORAL at 06:36

## 2021-04-09 RX ADMIN — HYDROMORPHONE HYDROCHLORIDE 2 MILLIGRAM(S): 2 INJECTION INTRAMUSCULAR; INTRAVENOUS; SUBCUTANEOUS at 03:10

## 2021-04-09 NOTE — PROGRESS NOTE ADULT - PROBLEM SELECTOR PLAN 2
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  -H/H stable

## 2021-04-09 NOTE — BH CONSULTATION LIAISON PROGRESS NOTE - OTHER
Guarded at times (eg. when asked to discuss his legal history) frustrated about medical issues limited Guarded at times (eg. when asked to discuss SI/HI) recent frustration about medical issues

## 2021-04-09 NOTE — BH CONSULTATION LIAISON PROGRESS NOTE - NSBHASSESSMENTFT_PSY_ALL_CORE
59yo unemployed, male domiciled along with PMHx COVID-19 5/2020 was admitted to the [facility] ICU on 3/26/21 with DKA after presenting to the ER c/o weakness, N/V, abdominal pain and chest pain, upon discharge from [facility] mild hemoptysis, CT revealed small focus of contrast extravasation within the cavitary lesion, CT Surgery at [facility] contacted and pt was transferred to the [facility] CTICU for observation and possible intervention.  He states that he has been spitting up small amounts of dark blood.  He continues to c/o headaches, diarrhea, and generalized abdominal pain, PMHx also includes Hepatitis C, BPH, HLD, HTN, DM II, OA, ETOH abuse(sober 1 yr), hx Opiate dependence ([id number]), PPHx depression, anxiety, SI, one prior inpatient admission [facility] for depression, SI, and etoh abuse 9/3-10/16/2020.    On evaluation today, patient presents as dysphoric, irritable, remains with multiple complaints including headache, back pain, dissatisfaction with food. Patient acknowledges previous vague  SI statements made during this admission and while at home prior to admission, however adamantly denies active and passive SI/HI/I/P on assessment today. Furthermore, denies any history of SI/SA in the past. Patient with known diagnosis of mood disorder, consider as well some features of possible personality disorder as patient demonstrates poor coping skills, easy frustration. Suspect that patient's previous vague SI statements were made in the context of frustration, will continue to monitor and continue CO at least until 4/9.    As per pt. he follow up with Dr. PRITCHETT" monthly at the [facility] ([phone]), also follow up with the  therapist Christina Hernandez every Thursday.     Received a call later by RAMON Julian that pt. is extremely agitated and at this point cannot take PO Seroquel due to agitation. Recommend Zyprexa IM PRN as below for severe agitation if qtc <500.     PLAN:  -Continue 1:1 Constant Observation for SI statements, if patient denies SI on 4/9 will consider discontinuing  -Continue Zoloft 150mg daily, monitor platelets, Na level  -Continue Trazodone 150mg qHS  -PRN- may use Seroquel 25mg PO q6hrs for agitation if qtc < 500  -PRN- May use Zyprexa 2.5mg IM q6h prn ONLY for SEVERE agitation if qtc <500, once all diversional activities are exhausted.    -Will continue to follow  - Case d/w team in person.    Do not give IM Zyprexa within 4hrs of  IM/IV Ativan due to risk of excessive sedation and respiratory depression.  61yo unemployed, male domiciled along with PMHx COVID-19 5/2020 was admitted to the [facility] ICU on 3/26/21 with DKA after presenting to the ER c/o weakness, N/V, abdominal pain and chest pain, upon discharge from [facility] mild hemoptysis, CT revealed small focus of contrast extravasation within the cavitary lesion, CT Surgery at [facility] contacted and pt was transferred to the [facility] CTICU for observation and possible intervention.  He states that he has been spitting up small amounts of dark blood.  He continues to c/o headaches, diarrhea, and generalized abdominal pain, PMHx also includes Hepatitis C, BPH, HLD, HTN, DM II, OA, ETOH abuse(sober 1 yr), hx Opiate dependence ([id number]), PPHx depression, anxiety, SI, one prior inpatient admission [facility] for depression, SI, and etoh abuse 9/3-10/16/2020.    On evaluation today, patient with limited engagement in interview, presents with dysphoric/irritable affect, non-congruent with stated mood ("fine"). Patient is guarded, declining to discuss his mood, become even more reluctant to speak when asked about SI/HI. Patient additionally with poor frustration tolerance, becoming irritable several times in the last 24 hrs and requiring multiple doses of IM PRN medication.    NOTE INCOMPLETE- PLEASE AWAIT FINAL RECS    On evaluation today, patient presents as dysphoric, irritable, remains with multiple complaints including headache, back pain, dissatisfaction with food. Patient acknowledges previous vague  SI statements made during this admission and while at home prior to admission, however adamantly denies active and passive SI/HI/I/P on assessment today. Furthermore, denies any history of SI/SA in the past. Patient with known diagnosis of mood disorder, consider as well some features of possible personality disorder as patient demonstrates poor coping skills, easy frustration. Suspect that patient's previous vague SI statements were made in the context of frustration, will continue to monitor and continue CO at least until 4/9.    As per pt. he follow up with Dr. PRITCHETT" monthly at the [facility] ([phone]), also follow up with the  therapist Christina Hernandez every Thursday.     Received a call later by RAMON Julian that pt. is extremely agitated and at this point cannot take PO Seroquel due to agitation. Recommend Zyprexa IM PRN as below for severe agitation if qtc <500.     PLAN:  -Continue 1:1 Constant Observation for SI statements, if patient denies SI on 4/9 will consider discontinuing  -Continue Zoloft 150mg daily, monitor platelets, Na level  -Continue Trazodone 150mg qHS  -PRN- may use Seroquel 25mg PO q6hrs for agitation if qtc < 500  -PRN- May use Zyprexa 2.5mg IM q6h prn ONLY for SEVERE agitation if qtc <500, once all diversional activities are exhausted.    -Will continue to follow  - Case d/w team in person.    Do not give IM Zyprexa within 4hrs of  IM/IV Ativan due to risk of excessive sedation and respiratory depression.  61yo unemployed, male domiciled alone with PMHx COVID-19 5/2020 was admitted to the [facility] ICU on 3/26/21 with DKA after presenting to the ER c/o weakness, N/V, abdominal pain and chest pain, upon discharge from [facility] mild hemoptysis, CT revealed small focus of contrast extravasation within the cavitary lesion, CT Surgery at [facility] contacted and pt was transferred to the [facility] CTICU for observation and possible intervention.  He states that he has been spitting up small amounts of dark blood. He continues to c/o headaches, diarrhea, and generalized abdominal pain, PMHx also includes Hepatitis C, BPH, HLD, HTN, DM II, OA, ETOH abuse(sober 1 yr), hx Opiate dependence ([id number]), PPHx depression, anxiety, SI, one prior inpatient admission [facility] for depression, SI, and etoh abuse 9/3-10/16/2020.    On evaluation today, patient with limited engagement in interview, presents with dysphoric/irritable affect, incongruent with stated mood ("fine"). Patient is guarded, declining to discuss his mood, become even more reluctant to speak when asked about SI/HI, though was noted to make several vague SI statements earlier in this admission. Patient additionally with poor frustration tolerance, becoming irritable/agitated several times in the last 24 hrs and requiring multiple doses of IM PRN medication. Patient with known diagnosis of mood disorder, consider as well some features of possible personality disorder as patient demonstrates poor coping skills, easy frustration. Current presentation is especially concerning given poor relatedness, guarding, unwilling to engage re: suicidality or safety planning. Recommend continuing CO, patient will require inpatient psychiatric admission for safety and stabilization pending medical optimization.    PLAN:  -Continue 1:1 Constant Observation for SI statements  -Continue Zoloft 150mg daily, monitor platelets, Na level  -Continue Trazodone 150mg qHS  -PRN- may use Seroquel 25mg PO q6hrs for agitation if qtc < 500  -PRN- May use Zyprexa 2.5mg IM q6h prn ONLY for SEVERE agitation if qtc <500, once all diversional activities are exhausted.    -Will continue to follow  -Case d/w hospitalist Dr. Munoz  -Patient will require inpatient psychiatric admission for safety and stabilization- 2PC legals completed and placed in chart    Do not give IM Zyprexa within 4hrs of IM/IV Ativan due to risk of excessive sedation and respiratory depression.  61yo unemployed, male domiciled alone with PMHx COVID-19 5/2020 was admitted to the [facility] ICU on 3/26/21 with DKA after presenting to the ER c/o weakness, N/V, abdominal pain and chest pain, upon discharge from [facility] mild hemoptysis, CT revealed small focus of contrast extravasation within the cavitary lesion, CT Surgery at [facility] contacted and pt was transferred to the [facility] CTICU for observation and possible intervention.  He states that he has been spitting up small amounts of dark blood. He continues to c/o headaches, diarrhea, and generalized abdominal pain, PMHx also includes Hepatitis C, BPH, HLD, HTN, DM II, OA, ETOH abuse(sober 1 yr), hx Opiate dependence ([id number]), PPHx depression, anxiety, SI, one prior inpatient admission [facility] for depression, SI, and etoh abuse 9/3-10/16/2020.    On evaluation today, patient with limited engagement in interview, presents with dysphoric/irritable affect, incongruent with stated mood ("fine"). Patient is guarded, declining to discuss his mood, become even more reluctant to speak when asked about SI/HI, though was noted to make several vague SI statements earlier in this admission. Patient additionally with poor frustration tolerance, becoming irritable/agitated several times in the last 24 hrs and requiring multiple doses of IM PRN medication. Patient with known diagnosis of mood disorder, consider as well some features of possible personality disorder as patient demonstrates poor coping skills, easy frustration. Current presentation is especially concerning given poor relatedness, guarding, vague SI statements made earlier in this admission, extremely concerning collateral (see above),  unwilling to engage re: suicidality or safety planning. Recommend continuing CO, patient will require inpatient psychiatric admission for safety and stabilization pending medical optimization.    PLAN:  -Continue 1:1 Constant Observation for SI statements  -Continue Zoloft 150mg daily, monitor platelets, Na level  -Continue Trazodone 150mg qHS  -PRN- may use Seroquel 25mg PO q6hrs for agitation if qtc < 500  -PRN- May use Zyprexa 2.5mg IM q6h prn ONLY for SEVERE agitation if qtc <500, once all diversional activities are exhausted.    -Will continue to follow  -Case d/w hospitalist Dr. Munoz  -Patient will require inpatient psychiatric admission for safety and stabilization- 2PC legals completed and placed in chart    Do not give IM Zyprexa within 4hrs of IM/IV Ativan due to risk of excessive sedation and respiratory depression.

## 2021-04-09 NOTE — PROGRESS NOTE ADULT - SUBJECTIVE AND OBJECTIVE BOX
CC: f/u for lung abscess    Patient reports": persistent RT flank and left neck pain.No additional hemoptysis    REVIEW OF SYSTEMS:  All other review of systems negative (Comprehensive ROS)    Antimicrobials Day #  :day 14  cefTRIAXone   IVPB 1000 milliGRAM(s) IV Intermittent every 24 hours  metroNIDAZOLE  IVPB 500 milliGRAM(s) IV Intermittent every 8 hours    Other Medications Reviewed    T(F): 99 (04-09-21 @ 13:30), Max: 99 (04-09-21 @ 13:30)  HR: 61 (04-09-21 @ 13:30)  BP: 124/79 (04-09-21 @ 13:30)  RR: 18 (04-09-21 @ 13:30)  SpO2: 100% (04-09-21 @ 13:30)  Wt(kg): --    PHYSICAL EXAM:  General: alert, no acute distress  Eyes:  anicteric, no conjunctival injection, no discharge  Oropharynx: no lesions or injection , poor dentition	  Neck: supple, without adenopathy  Lungs: clear to auscultation  Heart: regular rate and rhythm; no murmur, rubs or gallops  Abdomen: soft, nondistended, nontender, without mass or organomegaly.Vague RUQ pain  Skin: no lesions  Extremities: no clubbing, cyanosis, or edema  Neurologic: alert, oriented, moves all extremities    LAB RESULTS:                        11.5   8.32  )-----------( 510      ( 09 Apr 2021 07:07 )             34.5     04-09    140  |  105  |  6<L>  ----------------------------<  88  3.4<L>   |  23  |  0.72    Ca    9.7      09 Apr 2021 07:07    TPro  7.7  /  Alb  3.5  /  TBili  0.3  /  DBili  x   /  AST  23  /  ALT  11  /  AlkPhos  71  04-09    LIVER FUNCTIONS - ( 09 Apr 2021 07:07 )  Alb: 3.5 g/dL / Pro: 7.7 g/dL / ALK PHOS: 71 U/L / ALT: 11 U/L / AST: 23 U/L / GGT: x             MICROBIOLOGY:  RECENT CULTURES:      RADIOLOGY REVIEWED:

## 2021-04-09 NOTE — PROGRESS NOTE ADULT - SUBJECTIVE AND OBJECTIVE BOX
Molly Munoz  [facility]  Pager #[phone]    Patient is a 60y old  Male who presents with a chief complaint of Hemoptysis (09 Apr 2021 12:55)      SUBJECTIVE / OVERNIGHT EVENTS: Patient seen and examined at bedside. Patient reports feeling pain in his back and his head. Reports that he hasn't eaten anything in 3 days. Appears very frustrated, interview limited.     ADDITIONAL REVIEW OF SYSTEMS:    MEDICATIONS  (STANDING):  atorvastatin 40 milliGRAM(s) Oral at bedtime  cefTRIAXone   IVPB 1000 milliGRAM(s) IV Intermittent every 24 hours  dextrose 50% Injectable 25 Gram(s) IV Push once  gabapentin 200 milliGRAM(s) Oral every 8 hours  heparin   Injectable 5000 Unit(s) SubCutaneous every 8 hours  insulin glargine Injectable (LANTUS) 30 Unit(s) SubCutaneous at bedtime  insulin lispro (ADMELOG) corrective regimen sliding scale   SubCutaneous three times a day before meals  insulin lispro (ADMELOG) corrective regimen sliding scale   SubCutaneous at bedtime  insulin lispro Injectable (ADMELOG) 10 Unit(s) SubCutaneous before dinner  metroNIDAZOLE  IVPB 500 milliGRAM(s) IV Intermittent every 8 hours  pantoprazole    Tablet 40 milliGRAM(s) Oral before breakfast  potassium chloride   Powder 40 milliEquivalent(s) Oral every 4 hours  sertraline 150 milliGRAM(s) Oral daily  tamsulosin 0.4 milliGRAM(s) Oral at bedtime  traZODone 150 milliGRAM(s) Oral at bedtime    MEDICATIONS  (PRN):  acetaminophen   Tablet .. 650 milliGRAM(s) Oral every 6 hours PRN Mild Pain (1 - 3)  benzonatate 100 milliGRAM(s) Oral every 8 hours PRN Cough  HYDROmorphone   Tablet 2 milliGRAM(s) Oral every 4 hours PRN Severe Pain (7 - 10)  OLANZapine Injectable 2.5 milliGRAM(s) IntraMuscular every 6 hours PRN Severe Agitation      CAPILLARY BLOOD GLUCOSE      POCT Blood Glucose.: 195 mg/dL (09 Apr 2021 13:26)  POCT Blood Glucose.: 60 mg/dL (09 Apr 2021 12:28)  POCT Blood Glucose.: 150 mg/dL (09 Apr 2021 08:21)  POCT Blood Glucose.: 187 mg/dL (08 Apr 2021 20:56)  POCT Blood Glucose.: 104 mg/dL (08 Apr 2021 17:26)    I&O's Summary    08 Apr 2021 07:01  -  09 Apr 2021 07:00  --------------------------------------------------------  IN: 360 mL / OUT: 0 mL / NET: 360 mL        PHYSICAL EXAM:    Vital Signs Last 24 Hrs  T(C): 37.2 (09 Apr 2021 13:30), Max: 37.2 (09 Apr 2021 13:30)  T(F): 99 (09 Apr 2021 13:30), Max: 99 (09 Apr 2021 13:30)  HR: 61 (09 Apr 2021 13:30) (61 - 91)  BP: 124/79 (09 Apr 2021 13:30) (107/80 - 124/87)  BP(mean): --  RR: 18 (09 Apr 2021 13:30) (17 - 18)  SpO2: 100% (09 Apr 2021 13:30) (92% - 100%)    CONSTITUTIONAL: NAD, well-developed  EYES: Conjunctiva and sclera clear  RESPIRATORY: Normal respiratory effort; lungs are clear to auscultation bilaterally  CARDIOVASCULAR: Regular rate and rhythm, normal S1 and S2, no murmur/rub/gallop; No lower extremity edema  ABDOMEN: Nontender to palpation, normoactive bowel sounds  MUSCULOSKELETAL: No clubbing or cyanosis of digits  PSYCH: A+O to person, place, and time  NEUROLOGY: No focal deficits    LABS:                        11.5   8.32  )-----------( 510      ( 09 Apr 2021 07:07 )             34.5     04-09    140  |  105  |  6<L>  ----------------------------<  88  3.4<L>   |  23  |  0.72    Ca    9.7      09 Apr 2021 07:07    TPro  7.7  /  Alb  3.5  /  TBili  0.3  /  DBili  x   /  AST  23  /  ALT  11  /  AlkPhos  71  04-09      RADIOLOGY & ADDITIONAL TESTS: No new imaging  Results Reviewed: Yes  Imaging Personally Reviewed:  Electrocardiogram Personally Reviewed:    COORDINATION OF CARE:  Care Discussed with Consultants/Other Providers [Y/N]:  Prior or Outpatient Records Reviewed [Y/N]:

## 2021-04-09 NOTE — BH CONSULTATION LIAISON PROGRESS NOTE - NSBHCONSULTRECOMMENDOTHER_PSY_A_CORE FT
Patient will require inpatient psychiatric admission for safety and stabilization- 2PC legals completed and placed in chart

## 2021-04-09 NOTE — PROGRESS NOTE ADULT - SUBJECTIVE AND OBJECTIVE BOX
Chief Complaint: f/u DM2    History:  Patient seen at bedside this morning. Accepted Lantus the last two nights. He was given Admelog 12 units for breakfast today but stated that he didn't eat anything for breakfast - reported that "I haven't eaten anything in 3 days". He then developed hypoglycemia at lunchtime today. Serum BG was 88 this AM.    He did not want to talk about diabetes care this morning and asked me to leave.     MEDICATIONS  (STANDING):  atorvastatin 40 milliGRAM(s) Oral at bedtime  cefTRIAXone   IVPB 1000 milliGRAM(s) IV Intermittent every 24 hours  dextrose 50% Injectable 25 Gram(s) IV Push once  dextrose 50% Injectable 25 Gram(s) IV Push once  gabapentin 200 milliGRAM(s) Oral every 8 hours  heparin   Injectable 5000 Unit(s) SubCutaneous every 8 hours  insulin glargine Injectable (LANTUS) 30 Unit(s) SubCutaneous at bedtime  insulin lispro (ADMELOG) corrective regimen sliding scale   SubCutaneous three times a day before meals  insulin lispro (ADMELOG) corrective regimen sliding scale   SubCutaneous at bedtime  insulin lispro Injectable (ADMELOG) 10 Unit(s) SubCutaneous before dinner  metroNIDAZOLE  IVPB 500 milliGRAM(s) IV Intermittent every 8 hours  pantoprazole    Tablet 40 milliGRAM(s) Oral before breakfast  potassium chloride   Powder 40 milliEquivalent(s) Oral every 4 hours  sertraline 150 milliGRAM(s) Oral daily  tamsulosin 0.4 milliGRAM(s) Oral at bedtime  traZODone 150 milliGRAM(s) Oral at bedtime    MEDICATIONS  (PRN):  acetaminophen   Tablet .. 650 milliGRAM(s) Oral every 6 hours PRN Mild Pain (1 - 3)  benzonatate 100 milliGRAM(s) Oral every 8 hours PRN Cough  HYDROmorphone   Tablet 2 milliGRAM(s) Oral every 4 hours PRN Severe Pain (7 - 10)  OLANZapine Injectable 2.5 milliGRAM(s) IntraMuscular every 6 hours PRN Severe Agitation      Allergies  No Known Drug Allergies  shellfish (Unknown)    Review of Systems:  ALL OTHER SYSTEMS REVIEWED AND NEGATIVE    PHYSICAL EXAM:  VITALS: T(C): 36.7 (04-09-21 @ 06:05)  T(F): 98 (04-09-21 @ 06:05), Max: 98.3 (04-08-21 @ 17:40)  HR: 91 (04-09-21 @ 06:05) (70 - 91)  BP: 107/80 (04-09-21 @ 06:05) (107/80 - 124/87)  RR:  (17 - 18)  SpO2:  (92% - 98%)  Wt(kg): --  GENERAL: NAD, well-developed  EYES: No proptosis, anicteric  HEENT:  Atraumatic, Normocephalic  RESPIRATORY: + air movement bilaterally, no respiratory distress   PSYCH: angry affect    POCT Blood Glucose.: 60 mg/dL (04-09-21 @ 12:28)  POCT Blood Glucose.: 150 mg/dL (04-09-21 @ 08:21)  POCT Blood Glucose.: 187 mg/dL (04-08-21 @ 20:56)  POCT Blood Glucose.: 104 mg/dL (04-08-21 @ 17:26)  POCT Blood Glucose.: 148 mg/dL (04-08-21 @ 12:36)  POCT Blood Glucose.: 140 mg/dL (04-08-21 @ 09:21)  POCT Blood Glucose.: 166 mg/dL (04-07-21 @ 21:12)  POCT Blood Glucose.: 157 mg/dL (04-07-21 @ 17:27)  POCT Blood Glucose.: 193 mg/dL (04-07-21 @ 12:31)  POCT Blood Glucose.: 187 mg/dL (04-07-21 @ 08:29)  POCT Blood Glucose.: 193 mg/dL (04-06-21 @ 22:00)  POCT Blood Glucose.: 226 mg/dL (04-06-21 @ 17:17)      04-09    140  |  105  |  6<L>  ----------------------------<  88  3.4<L>   |  23  |  0.72    EGFR if : 118  EGFR if non : 102    Ca    9.7      04-09    TPro  7.7  /  Alb  3.5  /  TBili  0.3  /  DBili  x   /  AST  23  /  ALT  11  /  AlkPhos  71  04-09

## 2021-04-09 NOTE — PROGRESS NOTE ADULT - PROBLEM SELECTOR PLAN 1
Transferred from [facility] on 4/4, w/hemoptysis and RLL lung abscess  -Cx w/strep parasanguinis  -C/w Ceftriaxone and Metronidazole, can transition to oral Ceftin and flagyl for 2-3 weeks upon DC per ID   -S/p tooth extractions on 4/7

## 2021-04-09 NOTE — BH CONSULTATION LIAISON PROGRESS NOTE - NSBHFUPINTERVALHXFT_PSY_A_CORE
Patient seen today on follow-up for depression and anxiety. Patient found sitting upright in chair with hat and glasses on, breakfast untouched on tray. Patient acknowledges psychiatric team but is withdrawn, covering his face with his hands and blanket. Patient states that he "is fine" with flat affect. States that he has not eaten in 3 days but is vague as to the reason. Would not comment on the quality of his sleep or his mood, insisting flatly "I don't want to talk." Patient repeatedly coaxed to communicate with psychiatry team so that we may assist him but patient refuses, becoming visibly irritable. Unable to assess SI/HI, AH/VH due to patient unwillingness to cooperate.  Patient seen today in follow-up for depression and anxiety. Patient with intermittent episodes of behavioral dysregulation and agitation, requiring olanzapine 2.5mg IM x 3 doses over the last 24 hrs. Patient found sitting upright in chair with baseball cap and glasses on, breakfast untouched on tray apart from open bag of pretzels. Patient acknowledges team but appears withdrawn, covering his face with his hands and blanket. Patient states that he is "fine." States that he has not eaten in 3 days but is vague as to the reason even when asked by writer about what can be done to improve his eating in the hospital. Patient did not comment on the quality of his sleep or his mood, insisting "I don't want to talk." Patient was asked about SI/HI, long pause before responding, only replies "I don't want to talk." As such, unable to assess SI/HI, AH/VH due to patient's limited engagement with interview today.  Patient seen today in follow-up for depression and anxiety. Patient with intermittent episodes of behavioral dysregulation and agitation, requiring olanzapine 2.5mg IM x 3 doses over the last 24 hrs. Patient found sitting upright in chair with baseball cap and glasses on, breakfast untouched on tray apart from open bag of pretzels. Patient acknowledges team but appears withdrawn, making poor eye contact, seems depressed, covering his face with his hands and blanket. Patient states that he is "fine." States that he has not eaten in 3 days but is vague as to the reason even when asked by writer about what can be done to improve his eating in the hospital. Patient did not comment on the quality of his sleep or his mood, insisting "I don't want to talk." Patient was asked about SI/HI, long pause before responding, only replies "I don't want to talk." As such, unable to assess SI/HI, AH/VH due to patient's limited engagement with interview today.       Collateral obtained from  psychiatry on 4/7;   Patient gave permission to Dr. Crouch to speak with sister-in-law, Catrina Guido. Sister-in-law reports safety concerns. She states "he is incapable of taking care of himself," he makes statements: "See you on the other side, I'm done with this," "His state of mind is worse," "He's miserable, can't hold a job, there is work available, he can't work due to his mental state" "He keeps telling us he will take his life," "I'm concerned he will take his life," Sister-in-law feels patient should have involuntary psychiatric admission, she denies hx sa.

## 2021-04-09 NOTE — PROGRESS NOTE ADULT - ASSESSMENT
60y male with hx DM presented for eval of weakness, N/V, abd pain, chest pain. Patient reports several days of symptoms. He had cough for about 2 weeks, no fevers. CT Chest with mass like structure to RLL and CT Abd/Pel with CBD dilation and air in biliary tree. He was started on IV abx. Concern raised possible lung abscess. Pt initially denied any dental problems, he now admits to loose tooth.  He had a CT scan of chest in December 2020 with no rt sided infiltrates, therefore this is new c/w December 2020.  He did not have any hemoptysis but does report atypical chest/abdominal pain.  PC level is less than .08, his CRP is 112 and esr is over 100.  The rt flank/RUQ pain is hard to correlate with pneumonia.He does not seem to have pleural involvement (to my review)  He received 2 grams of azithro, stopped yesterday  Legionella urine antigen and MRSA nasal screen are negative, sputum culture with MURF  Repeat CT scan now with development of A/F level. Additional anaerobic coverage added 3/30 with metronidazole  Pt was getting ready for discharge on PO Augmentin, but developed mild hemoptysis. CT Angio revealed  a small focus of contrast extravasation within the cavitary lesion, which was possibly a small pseudoaneurysm or an active extravasation with the history of hemoptysis.  Hence, pt was transferred to the [facility] CTICU for observation and possible intervention. Dentistry was consulted and performed  dental extraction on 4/7. Patient continued to have hemoptysis yet Hct has been stable. 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

## 2021-04-09 NOTE — PROGRESS NOTE ADULT - PROBLEM SELECTOR PLAN 1
- A1c > 15.5%, unclear time line of prior steroid use  - accepted basal insulin last two nights, had hypoglycemia at lunchtime today after receiving Admelog for breakfast and not eating   - decrease Lantus to 30 units qhs, decrease Admelog to 10 units (administer only as patient starts to eat, hold if NPO)  - moderate correction scale qac and qhs  - consistent carb diet   - check FS qac and qhs qhs   - defer RD consult at this time   - patient declining to discuss diabetes care.   - for discharge: c-peptide is adequate and Ab negative. Ideally would be discharged home on either basal bolus versus basal insulin + oral agents, but pt unwilling to discuss care. Spoke with Dr. Munoz, hospitalist today, [facility] Discharge recommendations to be determined.     Endocrine faculty practice:  [address], [address], [place]  [phone].

## 2021-04-09 NOTE — PROGRESS NOTE ADULT - ASSESSMENT
60 year old male who had COVID-19 in May 2020, the admitted to [facility] ICU on 3/26/21 with DKA after presenting to the ER c/o weakness, N/V, abdominal pain and chest pain and being found to have very elevated blood glucose levels, BUN, and Creatinine, then transferred to [facility] for management of hemoptysis and lung abscess. Consult called for management of new severely uncontrolled DM2 with DKA on admission. A1c >15.5%. BG goal 100-180 mg/dL.

## 2021-04-09 NOTE — PROGRESS NOTE ADULT - ASSESSMENT
60M w/HTN, ETOH abuse, GERD, HLD, BPH, DMII, COVID May 2020, admitted to [facility] ICU on 3/26/21 w/DKA, RLL lung mass-like lesion measuring 4.5 cm on a chest CT scan and a dilated extrahepatic CBD with cholelithiasis and pneumobilia.  A repeat CT scan revealed enlargement of the RLL lesion with air fluid levels. Bronchoscopy w/BAL on 4/1 --> cultures w/Streptococcus parasanguinis. C/b hemoptysis w/CT revealing small focus of contrast extravasation within the cavitary lesion. Pt transferred to [facility] CTICU on 4/4 for management of hemoptysis and lung abscess. S/p teeth extractions on 4/7.

## 2021-04-10 LAB
ANION GAP SERPL CALC-SCNC: 13 MMOL/L — SIGNIFICANT CHANGE UP (ref 7–14)
BUN SERPL-MCNC: 7 MG/DL — SIGNIFICANT CHANGE UP (ref 7–23)
CALCIUM SERPL-MCNC: 9.7 MG/DL — SIGNIFICANT CHANGE UP (ref 8.4–10.5)
CHLORIDE SERPL-SCNC: 106 MMOL/L — SIGNIFICANT CHANGE UP (ref 98–107)
CO2 SERPL-SCNC: 21 MMOL/L — LOW (ref 22–31)
CREAT SERPL-MCNC: 0.72 MG/DL — SIGNIFICANT CHANGE UP (ref 0.5–1.3)
GLUCOSE BLDC GLUCOMTR-MCNC: 109 MG/DL — HIGH (ref 70–99)
GLUCOSE BLDC GLUCOMTR-MCNC: 128 MG/DL — HIGH (ref 70–99)
GLUCOSE BLDC GLUCOMTR-MCNC: 208 MG/DL — HIGH (ref 70–99)
GLUCOSE BLDC GLUCOMTR-MCNC: 211 MG/DL — HIGH (ref 70–99)
GLUCOSE SERPL-MCNC: 137 MG/DL — HIGH (ref 70–99)
HCT VFR BLD CALC: 33.2 % — LOW (ref 39–50)
HGB BLD-MCNC: 10.5 G/DL — LOW (ref 13–17)
MAGNESIUM SERPL-MCNC: 1.6 MG/DL — SIGNIFICANT CHANGE UP (ref 1.6–2.6)
MCHC RBC-ENTMCNC: 31.4 PG — SIGNIFICANT CHANGE UP (ref 27–34)
MCHC RBC-ENTMCNC: 31.6 GM/DL — LOW (ref 32–36)
MCV RBC AUTO: 99.4 FL — SIGNIFICANT CHANGE UP (ref 80–100)
NRBC # BLD: 0 /100 WBCS — SIGNIFICANT CHANGE UP
NRBC # FLD: 0 K/UL — SIGNIFICANT CHANGE UP
PHOSPHATE SERPL-MCNC: 4.6 MG/DL — HIGH (ref 2.5–4.5)
PLATELET # BLD AUTO: 496 K/UL — HIGH (ref 150–400)
POTASSIUM SERPL-MCNC: 4.2 MMOL/L — SIGNIFICANT CHANGE UP (ref 3.5–5.3)
POTASSIUM SERPL-SCNC: 4.2 MMOL/L — SIGNIFICANT CHANGE UP (ref 3.5–5.3)
RBC # BLD: 3.34 M/UL — LOW (ref 4.2–5.8)
RBC # FLD: 13.5 % — SIGNIFICANT CHANGE UP (ref 10.3–14.5)
SODIUM SERPL-SCNC: 140 MMOL/L — SIGNIFICANT CHANGE UP (ref 135–145)
WBC # BLD: 8.71 K/UL — SIGNIFICANT CHANGE UP (ref 3.8–10.5)
WBC # FLD AUTO: 8.71 K/UL — SIGNIFICANT CHANGE UP (ref 3.8–10.5)

## 2021-04-10 PROCEDURE — 93010 ELECTROCARDIOGRAM REPORT: CPT

## 2021-04-10 PROCEDURE — 99233 SBSQ HOSP IP/OBS HIGH 50: CPT

## 2021-04-10 RX ORDER — OLANZAPINE 15 MG/1
2.5 TABLET, FILM COATED ORAL ONCE
Refills: 0 | Status: COMPLETED | OUTPATIENT
Start: 2021-04-10 | End: 2021-04-10

## 2021-04-10 RX ORDER — QUETIAPINE FUMARATE 200 MG/1
25 TABLET, FILM COATED ORAL EVERY 6 HOURS
Refills: 0 | Status: DISCONTINUED | OUTPATIENT
Start: 2021-04-10 | End: 2021-04-12

## 2021-04-10 RX ADMIN — HYDROMORPHONE HYDROCHLORIDE 2 MILLIGRAM(S): 2 INJECTION INTRAMUSCULAR; INTRAVENOUS; SUBCUTANEOUS at 06:00

## 2021-04-10 RX ADMIN — OLANZAPINE 2.5 MILLIGRAM(S): 15 TABLET, FILM COATED ORAL at 09:39

## 2021-04-10 RX ADMIN — HYDROMORPHONE HYDROCHLORIDE 2 MILLIGRAM(S): 2 INJECTION INTRAMUSCULAR; INTRAVENOUS; SUBCUTANEOUS at 17:00

## 2021-04-10 RX ADMIN — HYDROMORPHONE HYDROCHLORIDE 2 MILLIGRAM(S): 2 INJECTION INTRAMUSCULAR; INTRAVENOUS; SUBCUTANEOUS at 23:30

## 2021-04-10 RX ADMIN — Medication 10 UNIT(S): at 13:13

## 2021-04-10 RX ADMIN — Medication 100 MILLIGRAM(S): at 23:00

## 2021-04-10 RX ADMIN — HYDROMORPHONE HYDROCHLORIDE 2 MILLIGRAM(S): 2 INJECTION INTRAMUSCULAR; INTRAVENOUS; SUBCUTANEOUS at 17:59

## 2021-04-10 RX ADMIN — OLANZAPINE 2.5 MILLIGRAM(S): 15 TABLET, FILM COATED ORAL at 12:50

## 2021-04-10 RX ADMIN — HYDROMORPHONE HYDROCHLORIDE 2 MILLIGRAM(S): 2 INJECTION INTRAMUSCULAR; INTRAVENOUS; SUBCUTANEOUS at 11:21

## 2021-04-10 RX ADMIN — HYDROMORPHONE HYDROCHLORIDE 2 MILLIGRAM(S): 2 INJECTION INTRAMUSCULAR; INTRAVENOUS; SUBCUTANEOUS at 23:00

## 2021-04-10 RX ADMIN — Medication 150 MILLIGRAM(S): at 23:00

## 2021-04-10 RX ADMIN — OLANZAPINE 2.5 MILLIGRAM(S): 15 TABLET, FILM COATED ORAL at 23:00

## 2021-04-10 RX ADMIN — HYDROMORPHONE HYDROCHLORIDE 2 MILLIGRAM(S): 2 INJECTION INTRAMUSCULAR; INTRAVENOUS; SUBCUTANEOUS at 06:57

## 2021-04-10 RX ADMIN — ATORVASTATIN CALCIUM 40 MILLIGRAM(S): 80 TABLET, FILM COATED ORAL at 23:00

## 2021-04-10 RX ADMIN — OLANZAPINE 2.5 MILLIGRAM(S): 15 TABLET, FILM COATED ORAL at 00:09

## 2021-04-10 RX ADMIN — Medication 4: at 12:42

## 2021-04-10 RX ADMIN — GABAPENTIN 200 MILLIGRAM(S): 400 CAPSULE ORAL at 06:57

## 2021-04-10 RX ADMIN — ENOXAPARIN SODIUM 40 MILLIGRAM(S): 100 INJECTION SUBCUTANEOUS at 11:21

## 2021-04-10 RX ADMIN — Medication 10 UNIT(S): at 08:53

## 2021-04-10 RX ADMIN — TAMSULOSIN HYDROCHLORIDE 0.4 MILLIGRAM(S): 0.4 CAPSULE ORAL at 23:00

## 2021-04-10 RX ADMIN — Medication 100 MILLIGRAM(S): at 12:42

## 2021-04-10 RX ADMIN — Medication 100 MILLIGRAM(S): at 06:00

## 2021-04-10 RX ADMIN — GABAPENTIN 200 MILLIGRAM(S): 400 CAPSULE ORAL at 12:41

## 2021-04-10 RX ADMIN — PANTOPRAZOLE SODIUM 40 MILLIGRAM(S): 20 TABLET, DELAYED RELEASE ORAL at 06:57

## 2021-04-10 RX ADMIN — HYDROMORPHONE HYDROCHLORIDE 2 MILLIGRAM(S): 2 INJECTION INTRAMUSCULAR; INTRAVENOUS; SUBCUTANEOUS at 12:00

## 2021-04-10 RX ADMIN — QUETIAPINE FUMARATE 25 MILLIGRAM(S): 200 TABLET, FILM COATED ORAL at 23:00

## 2021-04-10 RX ADMIN — GABAPENTIN 200 MILLIGRAM(S): 400 CAPSULE ORAL at 23:00

## 2021-04-10 RX ADMIN — Medication 100 MILLIGRAM(S): at 12:39

## 2021-04-10 NOTE — BH CONSULTATION LIAISON PROGRESS NOTE - NSBHASSESSMENTFT_PSY_ALL_CORE
61yo unemployed, male domiciled alone with PMHx COVID-19 5/2020 was admitted to the [facility] ICU on 3/26/21 with DKA after presenting to the ER c/o weakness, N/V, abdominal pain and chest pain, upon discharge from [facility] mild hemoptysis, CT revealed small focus of contrast extravasation within the cavitary lesion, CT Surgery at [facility] contacted and pt was transferred to the [facility] CTICU for observation and possible intervention.  He states that he has been spitting up small amounts of dark blood. He continues to c/o headaches, diarrhea, and generalized abdominal pain, PMHx also includes Hepatitis C, BPH, HLD, HTN, DM II, OA, ETOH abuse(sober 1 yr), hx Opiate dependence ([id number]), PPHx depression, anxiety, SI, one prior inpatient admission [facility] for depression, SI, and etoh abuse 9/3-10/16/2020.    On evaluation today, patient with limited engagement in interview, presents with dysphoric/irritable affect, incongruent with stated mood ("fine"). Patient is guarded, declining to discuss his mood, become even more reluctant to speak when asked about SI/HI, though was noted to make several vague SI statements earlier in this admission. Patient additionally with poor frustration tolerance, becoming irritable/agitated several times in the last 24 hrs and requiring multiple doses of IM PRN medication. Patient with known diagnosis of mood disorder, consider as well some features of possible personality disorder as patient demonstrates poor coping skills, easy frustration. Current presentation is especially concerning given poor relatedness, guarding, vague SI statements made earlier in this admission, extremely concerning collateral (see above),  unwilling to engage re: suicidality or safety planning. Recommend continuing CO, patient will require inpatient psychiatric admission for safety and stabilization pending medical optimization.    4/10: Patient defensive and hostile toward interviewer, unhappy about being in the hospital, required PRN Zyprexa x3 overnight, denying SI/HI at this time with irritability about having a "" at bedside. Unwilling to engage in meaningful conversation about suicidality, will continue CO 1:1 at this time.    PLAN:  -Continue 1:1 Constant Observation for SI statements  -Continue Zoloft 150mg daily, monitor platelets, Na level  -Continue Trazodone 150mg qHS  -PRN- may use Seroquel 25mg PO q6hrs for agitation if qtc < 500  -PRN- May use Zyprexa 2.5mg IM q6h prn ONLY for SEVERE agitation if qtc <500, once all diversional activities are exhausted.    -Will continue to follow  -Case d/w hospitalist Dr. Munoz  -Patient will require inpatient psychiatric admission for safety and stabilization- 2PC legals completed and placed in chart    Do not give IM Zyprexa within 4hrs of IM/IV Ativan due to risk of excessive sedation and respiratory depression.  61yo unemployed, male domiciled alone with PMHx COVID-19 5/2020 was admitted to the [facility] ICU on 3/26/21 with DKA after presenting to the ER c/o weakness, N/V, abdominal pain and chest pain, upon discharge from [facility] mild hemoptysis, CT revealed small focus of contrast extravasation within the cavitary lesion, CT Surgery at [facility] contacted and pt was transferred to the [facility] CTICU for observation and possible intervention.  He states that he has been spitting up small amounts of dark blood. He continues to c/o headaches, diarrhea, and generalized abdominal pain, PMHx also includes Hepatitis C, BPH, HLD, HTN, DM II, OA, ETOH abuse(sober 1 yr), hx Opiate dependence ([id number]), PPHx depression, anxiety, SI, one prior inpatient admission [facility] for depression, SI, and etoh abuse 9/3-10/16/2020.    On evaluation today, patient with limited engagement in interview, presents with dysphoric/irritable affect, incongruent with stated mood ("fine"). Patient is guarded, declining to discuss his mood, become even more reluctant to speak when asked about SI/HI, though was noted to make several vague SI statements earlier in this admission. Patient additionally with poor frustration tolerance, becoming irritable/agitated several times in the last 24 hrs and requiring multiple doses of IM PRN medication. Patient with known diagnosis of mood disorder, consider as well some features of possible personality disorder as patient demonstrates poor coping skills, easy frustration. Current presentation is especially concerning given poor relatedness, guarding, vague SI statements made earlier in this admission, extremely concerning collateral (see above),  unwilling to engage re: suicidality or safety planning. Recommend continuing CO, patient will require inpatient psychiatric admission for safety and stabilization pending medical optimization.    4/10: Patient defensive and hostile toward interviewer, unhappy about being in the hospital, required PRN Zyprexa x3 overnight, denying SI/HI at this time with irritability about having a "" at bedside. Unwilling to engage in meaningful conversation about suicidality, will continue CO 1:1 at this time.    PLAN:  -Continue 1:1 Constant Observation for SI statements  -Continue Zoloft 150mg daily, monitor platelets, Na level  -Continue Trazodone 150mg qHS  -PRN- may use Seroquel 25mg PO q6hrs for agitation if qtc < 500  -PRN- May use Zyprexa 2.5mg IM q6h prn ONLY for SEVERE agitation if qtc <500, once all diversional activities are exhausted.    -Will continue to follow  -Patient will require inpatient psychiatric admission for safety and stabilization- 2PC legals completed and placed in chart    Do not give IM Zyprexa within 4hrs of IM/IV Ativan due to risk of excessive sedation and respiratory depression.

## 2021-04-10 NOTE — PROGRESS NOTE ADULT - SUBJECTIVE AND OBJECTIVE BOX
Patient is a 60y old  Male who presents with a chief complaint of Hemoptysis (09 Apr 2021 16:17)      SUBJECTIVE / OVERNIGHT EVENTS: Pt states he is angry and declines to elaborate.    MEDICATIONS  (STANDING):  atorvastatin 40 milliGRAM(s) Oral at bedtime  dextrose 50% Injectable 25 Gram(s) IV Push once  enoxaparin Injectable 40 milliGRAM(s) SubCutaneous daily  gabapentin 200 milliGRAM(s) Oral every 8 hours  insulin glargine Injectable (LANTUS) 30 Unit(s) SubCutaneous at bedtime  insulin lispro (ADMELOG) corrective regimen sliding scale   SubCutaneous three times a day before meals  insulin lispro (ADMELOG) corrective regimen sliding scale   SubCutaneous at bedtime  insulin lispro Injectable (ADMELOG) 10 Unit(s) SubCutaneous before breakfast  insulin lispro Injectable (ADMELOG) 10 Unit(s) SubCutaneous before lunch  insulin lispro Injectable (ADMELOG) 10 Unit(s) SubCutaneous before dinner  metroNIDAZOLE  IVPB 500 milliGRAM(s) IV Intermittent every 8 hours  pantoprazole    Tablet 40 milliGRAM(s) Oral before breakfast  sertraline 150 milliGRAM(s) Oral daily  tamsulosin 0.4 milliGRAM(s) Oral at bedtime  traZODone 150 milliGRAM(s) Oral at bedtime    MEDICATIONS  (PRN):  acetaminophen   Tablet .. 650 milliGRAM(s) Oral every 6 hours PRN Mild Pain (1 - 3)  benzonatate 100 milliGRAM(s) Oral every 8 hours PRN Cough  HYDROmorphone   Tablet 2 milliGRAM(s) Oral every 4 hours PRN Severe Pain (7 - 10)  OLANZapine Injectable 2.5 milliGRAM(s) IntraMuscular every 6 hours PRN Severe Agitation      CAPILLARY BLOOD GLUCOSE      POCT Blood Glucose.: 128 mg/dL (10 Apr 2021 08:38)  POCT Blood Glucose.: 213 mg/dL (09 Apr 2021 21:31)  POCT Blood Glucose.: 177 mg/dL (09 Apr 2021 16:59)  POCT Blood Glucose.: 195 mg/dL (09 Apr 2021 13:26)  POCT Blood Glucose.: 60 mg/dL (09 Apr 2021 12:28)    I&O's Summary      PHYSICAL EXAM:  Vital Signs Last 24 Hrs  T(C): 37.2 (10 Apr 2021 08:04), Max: 37.2 (09 Apr 2021 13:30)  T(F): 98.9 (10 Apr 2021 08:04), Max: 99 (09 Apr 2021 13:30)  HR: 72 (10 Apr 2021 11:26) (61 - 73)  BP: 131/92 (10 Apr 2021 11:26) (109/70 - 131/92)  BP(mean): --  RR: 18 (10 Apr 2021 11:26) (18 - 18)  SpO2: 100% (10 Apr 2021 11:26) (94% - 100%)  CONSTITUTIONAL: NAD, well-developed, well-groomed  EYES: PERRLA; conjunctiva and sclera clear  ENMT: Moist oral mucosa, no pharyngeal injection or exudates; normal dentition  NECK: Supple, no palpable masses; no thyromegaly  CARDIOVASCULAR: Regular rate and rhythm, normal S1 and S2, no murmur/rub/gallop; No lower extremity edema; Peripheral pulses are 2+ bilaterally  ABDOMEN: Nontender to palpation, normoactive bowel sounds, no rebound/guarding; No hepatosplenomegaly  MUSCULOSKELETAL:  Normal gait; no clubbing or cyanosis of digits; no joint swelling or tenderness to palpation    LABS:                        10.5   8.71  )-----------( 496      ( 10 Apr 2021 07:18 )             33.2     04-10    140  |  106  |  7   ----------------------------<  137<H>  4.2   |  21<L>  |  0.72    Ca    9.7      10 Apr 2021 07:18  Phos  4.6     04-10  Mg     1.6     04-10    TPro  7.7  /  Alb  3.5  /  TBili  0.3  /  DBili  x   /  AST  23  /  ALT  11  /  AlkPhos  71  04-09                RADIOLOGY & ADDITIONAL TESTS:  Results Reviewed:   Imaging Personally Reviewed:  Electrocardiogram Personally Reviewed:    COORDINATION OF CARE:  Care Discussed with Consultants/Other Providers [Y/N]:  Prior or Outpatient Records Reviewed [Y/N]:

## 2021-04-10 NOTE — PROGRESS NOTE ADULT - ASSESSMENT
60M w/HTN, ETOH abuse, GERD, HLD, BPH, DMII, COVID May 2020, admitted to [facility] ICU on 3/26/21 w/DKA, RLL lung mass-like lesion measuring 4.5 cm on a chest CT scan and a dilated extrahepatic CBD with cholelithiasis and pneumobilia.  A repeat CT scan revealed enlargement of the RLL lesion with air fluid levels. Bronchoscopy w/BAL on 4/1 --> cultures w/Streptococcus parasanguinis. C/b hemoptysis w/CT revealing small focus of contrast extravasation within the cavitary lesion. Pt transferred to [facility] CTICU on 4/4 for management of hemoptysis and lung abscess. S/p teeth extractions on 4/7.

## 2021-04-10 NOTE — PROGRESS NOTE ADULT - PROBLEM SELECTOR PLAN 1
Transferred from [facility] on 4/4, w/hemoptysis and RLL lung abscess  -Cx w/strep parasanguinis  -C/w Ceftriaxone and Metronidazole, can transition to oral Ceftin and flagyl for 2-3 weeks upon DC per ID   -S/p tooth extractions on 4/7

## 2021-04-10 NOTE — ED BEHAVIORAL HEALTH NOTE - BEHAVIORAL HEALTH NOTE
Pt pending transfer to [facility]. Writer spoke with covering provider at [facility] #[id number] who was informed of requirement of -COVID result within 48 hours for [facility] admission. Last COVID result in chart negative on 4/6/21. Pt will require updated result prior to transfer. SW available for follow up as needed.

## 2021-04-10 NOTE — BH CONSULTATION LIAISON PROGRESS NOTE - NSBHFUPINTERVALHXFT_PSY_A_CORE
Patient seen today in follow-up for depression and anxiety. Patient with intermittent episodes of behavioral dysregulation and agitation, requiring olanzapine 2.5mg IM x 3 doses over the last 24 hrs. Patient found sitting upright in chair with glasses on, breakfast untouched on tray, and nurse brings patient multiple puddings for him to eat something. Patient is defensive and irritable, with poor eye contact, seems depressed, frequently covering his face with his hands and blanket. Patient states that he has pain all over when asked how he feels. He says that he feels worse every day he is in the hospital, and resents having a "" at bedside. Patient is hostile toward interviewer, denies SI/HI.    Collateral obtained from  psychiatry on 4/7;   Patient gave permission to Dr. Crouch to speak with sister-in-law, Catrina Guido. Sister-in-law reports safety concerns. She states "he is incapable of taking care of himself," he makes statements: "See you on the other side, I'm done with this," "His state of mind is worse," "He's miserable, can't hold a job, there is work available, he can't work due to his mental state" "He keeps telling us he will take his life," "I'm concerned he will take his life," Sister-in-law feels patient should have involuntary psychiatric admission, she denies hx sa.

## 2021-04-11 LAB
ANION GAP SERPL CALC-SCNC: 14 MMOL/L — SIGNIFICANT CHANGE UP (ref 7–14)
BUN SERPL-MCNC: 5 MG/DL — LOW (ref 7–23)
CALCIUM SERPL-MCNC: 9.8 MG/DL — SIGNIFICANT CHANGE UP (ref 8.4–10.5)
CHLORIDE SERPL-SCNC: 105 MMOL/L — SIGNIFICANT CHANGE UP (ref 98–107)
CO2 SERPL-SCNC: 22 MMOL/L — SIGNIFICANT CHANGE UP (ref 22–31)
CREAT SERPL-MCNC: 0.64 MG/DL — SIGNIFICANT CHANGE UP (ref 0.5–1.3)
GLUCOSE BLDC GLUCOMTR-MCNC: 142 MG/DL — HIGH (ref 70–99)
GLUCOSE BLDC GLUCOMTR-MCNC: 148 MG/DL — HIGH (ref 70–99)
GLUCOSE BLDC GLUCOMTR-MCNC: 171 MG/DL — HIGH (ref 70–99)
GLUCOSE BLDC GLUCOMTR-MCNC: 256 MG/DL — HIGH (ref 70–99)
GLUCOSE SERPL-MCNC: 158 MG/DL — HIGH (ref 70–99)
HCT VFR BLD CALC: 35.8 % — LOW (ref 39–50)
HGB BLD-MCNC: 11.7 G/DL — LOW (ref 13–17)
MAGNESIUM SERPL-MCNC: 1.4 MG/DL — LOW (ref 1.6–2.6)
MCHC RBC-ENTMCNC: 31.4 PG — SIGNIFICANT CHANGE UP (ref 27–34)
MCHC RBC-ENTMCNC: 32.7 GM/DL — SIGNIFICANT CHANGE UP (ref 32–36)
MCV RBC AUTO: 96 FL — SIGNIFICANT CHANGE UP (ref 80–100)
NRBC # BLD: 0 /100 WBCS — SIGNIFICANT CHANGE UP
NRBC # FLD: 0 K/UL — SIGNIFICANT CHANGE UP
PHOSPHATE SERPL-MCNC: 4.1 MG/DL — SIGNIFICANT CHANGE UP (ref 2.5–4.5)
PLATELET # BLD AUTO: 502 K/UL — HIGH (ref 150–400)
POTASSIUM SERPL-MCNC: 3.6 MMOL/L — SIGNIFICANT CHANGE UP (ref 3.5–5.3)
POTASSIUM SERPL-SCNC: 3.6 MMOL/L — SIGNIFICANT CHANGE UP (ref 3.5–5.3)
RBC # BLD: 3.73 M/UL — LOW (ref 4.2–5.8)
RBC # FLD: 13.3 % — SIGNIFICANT CHANGE UP (ref 10.3–14.5)
SARS-COV-2 RNA SPEC QL NAA+PROBE: SIGNIFICANT CHANGE UP
SODIUM SERPL-SCNC: 141 MMOL/L — SIGNIFICANT CHANGE UP (ref 135–145)
WBC # BLD: 8.05 K/UL — SIGNIFICANT CHANGE UP (ref 3.8–10.5)
WBC # FLD AUTO: 8.05 K/UL — SIGNIFICANT CHANGE UP (ref 3.8–10.5)
ZINC TRANSPORTER 8 AB, RESULT: <15 U/ML — SIGNIFICANT CHANGE UP

## 2021-04-11 PROCEDURE — 99233 SBSQ HOSP IP/OBS HIGH 50: CPT

## 2021-04-11 PROCEDURE — 93010 ELECTROCARDIOGRAM REPORT: CPT

## 2021-04-11 RX ORDER — CYCLOBENZAPRINE HYDROCHLORIDE 10 MG/1
5 TABLET, FILM COATED ORAL THREE TIMES A DAY
Refills: 0 | Status: DISCONTINUED | OUTPATIENT
Start: 2021-04-11 | End: 2021-04-12

## 2021-04-11 RX ADMIN — SERTRALINE 150 MILLIGRAM(S): 25 TABLET, FILM COATED ORAL at 12:28

## 2021-04-11 RX ADMIN — Medication 10 UNIT(S): at 08:57

## 2021-04-11 RX ADMIN — GABAPENTIN 200 MILLIGRAM(S): 400 CAPSULE ORAL at 05:41

## 2021-04-11 RX ADMIN — Medication 10 UNIT(S): at 12:27

## 2021-04-11 RX ADMIN — HYDROMORPHONE HYDROCHLORIDE 2 MILLIGRAM(S): 2 INJECTION INTRAMUSCULAR; INTRAVENOUS; SUBCUTANEOUS at 19:31

## 2021-04-11 RX ADMIN — QUETIAPINE FUMARATE 25 MILLIGRAM(S): 200 TABLET, FILM COATED ORAL at 08:54

## 2021-04-11 RX ADMIN — INSULIN GLARGINE 30 UNIT(S): 100 INJECTION, SOLUTION SUBCUTANEOUS at 21:39

## 2021-04-11 RX ADMIN — GABAPENTIN 200 MILLIGRAM(S): 400 CAPSULE ORAL at 12:28

## 2021-04-11 RX ADMIN — HYDROMORPHONE HYDROCHLORIDE 2 MILLIGRAM(S): 2 INJECTION INTRAMUSCULAR; INTRAVENOUS; SUBCUTANEOUS at 14:58

## 2021-04-11 RX ADMIN — Medication 100 MILLIGRAM(S): at 12:28

## 2021-04-11 RX ADMIN — HYDROMORPHONE HYDROCHLORIDE 2 MILLIGRAM(S): 2 INJECTION INTRAMUSCULAR; INTRAVENOUS; SUBCUTANEOUS at 13:49

## 2021-04-11 RX ADMIN — ENOXAPARIN SODIUM 40 MILLIGRAM(S): 100 INJECTION SUBCUTANEOUS at 12:28

## 2021-04-11 RX ADMIN — HYDROMORPHONE HYDROCHLORIDE 2 MILLIGRAM(S): 2 INJECTION INTRAMUSCULAR; INTRAVENOUS; SUBCUTANEOUS at 22:50

## 2021-04-11 RX ADMIN — QUETIAPINE FUMARATE 25 MILLIGRAM(S): 200 TABLET, FILM COATED ORAL at 15:03

## 2021-04-11 RX ADMIN — CYCLOBENZAPRINE HYDROCHLORIDE 5 MILLIGRAM(S): 10 TABLET, FILM COATED ORAL at 15:14

## 2021-04-11 RX ADMIN — CYCLOBENZAPRINE HYDROCHLORIDE 5 MILLIGRAM(S): 10 TABLET, FILM COATED ORAL at 10:47

## 2021-04-11 RX ADMIN — Medication 100 MILLIGRAM(S): at 05:41

## 2021-04-11 RX ADMIN — Medication 150 MILLIGRAM(S): at 22:50

## 2021-04-11 RX ADMIN — Medication 100 MILLIGRAM(S): at 22:30

## 2021-04-11 RX ADMIN — HYDROMORPHONE HYDROCHLORIDE 2 MILLIGRAM(S): 2 INJECTION INTRAMUSCULAR; INTRAVENOUS; SUBCUTANEOUS at 08:54

## 2021-04-11 RX ADMIN — OLANZAPINE 2.5 MILLIGRAM(S): 15 TABLET, FILM COATED ORAL at 10:30

## 2021-04-11 RX ADMIN — GABAPENTIN 200 MILLIGRAM(S): 400 CAPSULE ORAL at 22:49

## 2021-04-11 RX ADMIN — ATORVASTATIN CALCIUM 40 MILLIGRAM(S): 80 TABLET, FILM COATED ORAL at 22:49

## 2021-04-11 RX ADMIN — HYDROMORPHONE HYDROCHLORIDE 2 MILLIGRAM(S): 2 INJECTION INTRAMUSCULAR; INTRAVENOUS; SUBCUTANEOUS at 18:31

## 2021-04-11 RX ADMIN — PANTOPRAZOLE SODIUM 40 MILLIGRAM(S): 20 TABLET, DELAYED RELEASE ORAL at 05:42

## 2021-04-11 RX ADMIN — HYDROMORPHONE HYDROCHLORIDE 2 MILLIGRAM(S): 2 INJECTION INTRAMUSCULAR; INTRAVENOUS; SUBCUTANEOUS at 23:30

## 2021-04-11 RX ADMIN — OLANZAPINE 2.5 MILLIGRAM(S): 15 TABLET, FILM COATED ORAL at 18:35

## 2021-04-11 RX ADMIN — QUETIAPINE FUMARATE 25 MILLIGRAM(S): 200 TABLET, FILM COATED ORAL at 22:50

## 2021-04-11 RX ADMIN — HYDROMORPHONE HYDROCHLORIDE 2 MILLIGRAM(S): 2 INJECTION INTRAMUSCULAR; INTRAVENOUS; SUBCUTANEOUS at 18:35

## 2021-04-11 RX ADMIN — Medication 10 UNIT(S): at 18:31

## 2021-04-11 RX ADMIN — HYDROMORPHONE HYDROCHLORIDE 2 MILLIGRAM(S): 2 INJECTION INTRAMUSCULAR; INTRAVENOUS; SUBCUTANEOUS at 09:35

## 2021-04-11 RX ADMIN — Medication 6: at 12:27

## 2021-04-11 RX ADMIN — TAMSULOSIN HYDROCHLORIDE 0.4 MILLIGRAM(S): 0.4 CAPSULE ORAL at 22:50

## 2021-04-11 NOTE — PROGRESS NOTE ADULT - PROBLEM SELECTOR PLAN 1
Transferred from [facility] on 4/4, w/hemoptysis and RLL lung abscess  -Cx w/strep parasanguinis  -C/w Ceftriaxone and Metronidazole, can transition to oral Ceftin and flagyl for 2-3 weeks upon DC per ID   -S/p tooth extractions on 4/7

## 2021-04-11 NOTE — PROGRESS NOTE ADULT - ASSESSMENT
60M w/HTN, ETOH abuse, GERD, HLD, BPH, DMII, COVID May 2020, admitted to [facility] ICU on 3/26/21 w/DKA, RLL lung mass-like lesion measuring 4.5 cm on a chest CT scan and a dilated extrahepatic CBD with cholelithiasis and pneumobilia.  A repeat CT scan revealed enlargement of the RLL lesion with air fluid levels. Bronchoscopy w/BAL on 4/1 --> cultures w/Streptococcus parasanguinis. C/b hemoptysis w/CT revealing small focus of contrast extravasation within the cavitary lesion. Pt transferred to [facility] CTICU on 4/4 for management of hemoptysis and lung abscess. S/p teeth extractions on 4/7.

## 2021-04-11 NOTE — PROGRESS NOTE ADULT - SUBJECTIVE AND OBJECTIVE BOX
Patient is a 60y old  Male who presents with a chief complaint of Hemoptysis (10 Apr 2021 11:57)      SUBJECTIVE / OVERNIGHT EVENTS: Pt states he feels the same as he has been recently, angry, with chronic shoulder and neck pain.    MEDICATIONS  (STANDING):  atorvastatin 40 milliGRAM(s) Oral at bedtime  dextrose 50% Injectable 25 Gram(s) IV Push once  enoxaparin Injectable 40 milliGRAM(s) SubCutaneous daily  gabapentin 200 milliGRAM(s) Oral every 8 hours  insulin glargine Injectable (LANTUS) 30 Unit(s) SubCutaneous at bedtime  insulin lispro (ADMELOG) corrective regimen sliding scale   SubCutaneous three times a day before meals  insulin lispro (ADMELOG) corrective regimen sliding scale   SubCutaneous at bedtime  insulin lispro Injectable (ADMELOG) 10 Unit(s) SubCutaneous before breakfast  insulin lispro Injectable (ADMELOG) 10 Unit(s) SubCutaneous before lunch  insulin lispro Injectable (ADMELOG) 10 Unit(s) SubCutaneous before dinner  metroNIDAZOLE  IVPB 500 milliGRAM(s) IV Intermittent every 8 hours  pantoprazole    Tablet 40 milliGRAM(s) Oral before breakfast  sertraline 150 milliGRAM(s) Oral daily  tamsulosin 0.4 milliGRAM(s) Oral at bedtime  traZODone 150 milliGRAM(s) Oral at bedtime    MEDICATIONS  (PRN):  acetaminophen   Tablet .. 650 milliGRAM(s) Oral every 6 hours PRN Mild Pain (1 - 3)  benzonatate 100 milliGRAM(s) Oral every 8 hours PRN Cough  cyclobenzaprine 5 milliGRAM(s) Oral three times a day PRN Muscle Spasm  HYDROmorphone   Tablet 2 milliGRAM(s) Oral every 4 hours PRN Severe Pain (7 - 10)  OLANZapine Injectable 2.5 milliGRAM(s) IntraMuscular every 6 hours PRN Severe Agitation  QUEtiapine 25 milliGRAM(s) Oral every 6 hours PRN agitation      CAPILLARY BLOOD GLUCOSE      POCT Blood Glucose.: 256 mg/dL (11 Apr 2021 12:08)  POCT Blood Glucose.: 148 mg/dL (11 Apr 2021 08:19)  POCT Blood Glucose.: 211 mg/dL (10 Apr 2021 22:57)  POCT Blood Glucose.: 109 mg/dL (10 Apr 2021 17:17)    I&O's Summary      PHYSICAL EXAM:  Vital Signs Last 24 Hrs  T(C): 36.4 (11 Apr 2021 12:16), Max: 37.1 (10 Apr 2021 19:58)  T(F): 97.6 (11 Apr 2021 12:16), Max: 98.7 (10 Apr 2021 19:58)  HR: 68 (11 Apr 2021 12:16) (68 - 88)  BP: 125/80 (11 Apr 2021 12:16) (112/70 - 139/88)  BP(mean): --  RR: 18 (11 Apr 2021 12:16) (16 - 18)  SpO2: 97% (11 Apr 2021 12:16) (96% - 99%)  CONSTITUTIONAL: NAD, well-developed, well-groomed  EYES: PERRLA; conjunctiva and sclera clear  ENMT: Moist oral mucosa, no pharyngeal injection or exudates; normal dentition  RESPIRATORY: Normal respiratory effort; lungs are clear to auscultation bilaterally  CARDIOVASCULAR: Regular rate and rhythm, normal S1 and S2, no murmur/rub/gallop; No lower extremity edema; Peripheral pulses are 2+ bilaterally  ABDOMEN: Nontender to palpation, normoactive bowel sounds, no rebound/guarding; No hepatosplenomegaly      LABS:                        11.7   8.05  )-----------( 502      ( 11 Apr 2021 06:56 )             35.8     04-11    141  |  105  |  5<L>  ----------------------------<  158<H>  3.6   |  22  |  0.64    Ca    9.8      11 Apr 2021 06:56  Phos  4.1     04-11  Mg     1.4     04-11                  RADIOLOGY & ADDITIONAL TESTS:  Results Reviewed:   Imaging Personally Reviewed:  Electrocardiogram Personally Reviewed:    COORDINATION OF CARE:  Care Discussed with Consultants/Other Providers [Y/N]:  Prior or Outpatient Records Reviewed [Y/N]:

## 2021-04-11 NOTE — DIETITIAN INITIAL EVALUATION ADULT. - PERTINENT LABORATORY DATA
04-11 Na141 mmol/L Glu 158 mg/dL<H> K+ 3.6 mmol/L Cr  0.64 mg/dL BUN 5 mg/dL<L> 04-11 Phos 4.1 mg/dL 04-09 Alb 3.5 g/dL    HbA1c: 15.5% (3/28/21)

## 2021-04-11 NOTE — DIETITIAN INITIAL EVALUATION ADULT. - ADD RECOMMEND
- Monitor weights, labs, BM's, skin integrity, p.o. intake.  - Encourage po intake as able  - Provide nutrition education reinforcement as able

## 2021-04-11 NOTE — BH CONSULTATION LIAISON PROGRESS NOTE - NSBHASSESSMENTFT_PSY_ALL_CORE
61yo unemployed, male domiciled alone with PMHx COVID-19 5/2020 was admitted to the [facility] ICU on 3/26/21 with DKA after presenting to the ER c/o weakness, N/V, abdominal pain and chest pain, upon discharge from [facility] mild hemoptysis, CT revealed small focus of contrast extravasation within the cavitary lesion, CT Surgery at [facility] contacted and pt was transferred to the [facility] CTICU for observation and possible intervention.  He states that he has been spitting up small amounts of dark blood. He continues to c/o headaches, diarrhea, and generalized abdominal pain, PMHx also includes Hepatitis C, BPH, HLD, HTN, DM II, OA, ETOH abuse(sober 1 yr), hx Opiate dependence ([id number]), PPHx depression, anxiety, SI, one prior inpatient admission [facility] for depression, SI, and etoh abuse 9/3-10/16/2020.    On evaluation today, patient with limited engagement in interview, presents with dysphoric/irritable affect, incongruent with stated mood ("fine"). Patient is guarded, declining to discuss his mood, become even more reluctant to speak when asked about SI/HI, though was noted to make several vague SI statements earlier in this admission. Patient additionally with poor frustration tolerance, becoming irritable/agitated several times in the last 24 hrs and requiring multiple doses of IM PRN medication. Patient with known diagnosis of mood disorder, consider as well some features of possible personality disorder as patient demonstrates poor coping skills, easy frustration. Current presentation is especially concerning given poor relatedness, guarding, vague SI statements made earlier in this admission, extremely concerning collateral (see above),  unwilling to engage re: suicidality or safety planning. Recommend continuing CO, patient will require inpatient psychiatric admission for safety and stabilization pending medical optimization.    4/10: Patient defensive and hostile toward interviewer, unhappy about being in the hospital, required PRN Zyprexa x3 overnight, denying SI/HI at this time with irritability about having a "" at bedside. Unwilling to engage in meaningful conversation about suicidality, will continue CO 1:1 at this time.  4/11: Pt calmer today on interview. Required PRNs overnight for agitation. Pt continues ask why he needs a "." Denies S/H/I/I/P for now.     PLAN:  -Continue 1:1 Constant Observation for SI statements  -Continue Zoloft 150mg daily, monitor platelets, Na level  -Continue Trazodone 150mg qHS  -PRN- may use Seroquel 25mg PO q6hrs for agitation if qtc < 500  -PRN- May use Zyprexa 2.5mg IM q6h prn ONLY for SEVERE agitation if qtc <500, once all diversional activities are exhausted.    -Will continue to follow  -Patient will require inpatient psychiatric admission for safety and stabilization- 2PC legals completed and placed in chart    Do not give IM Zyprexa within 4hrs of IM/IV Ativan due to risk of excessive sedation and respiratory depression.  59yo unemployed, male domiciled alone with PMHx COVID-19 5/2020 was admitted to the [facility] ICU on 3/26/21 with DKA after presenting to the ER c/o weakness, N/V, abdominal pain and chest pain, upon discharge from [facility] mild hemoptysis, CT revealed small focus of contrast extravasation within the cavitary lesion, CT Surgery at [facility] contacted and pt was transferred to the [facility] CTICU for observation and possible intervention.  He states that he has been spitting up small amounts of dark blood. He continues to c/o headaches, diarrhea, and generalized abdominal pain, PMHx also includes Hepatitis C, BPH, HLD, HTN, DM II, OA, ETOH abuse(sober 1 yr), hx Opiate dependence ([id number]), PPHx depression, anxiety, SI, one prior inpatient admission [facility] for depression, SI, and etoh abuse 9/3-10/16/2020.    On evaluation today, patient with limited engagement in interview, presents with dysphoric/irritable affect, incongruent with stated mood ("fine"). Patient is guarded, declining to discuss his mood, become even more reluctant to speak when asked about SI/HI, though was noted to make several vague SI statements earlier in this admission. Patient additionally with poor frustration tolerance, becoming irritable/agitated several times in the last 24 hrs and requiring multiple doses of IM PRN medication. Patient with known diagnosis of mood disorder, consider as well some features of possible personality disorder as patient demonstrates poor coping skills, easy frustration. Current presentation is especially concerning given poor relatedness, guarding, vague SI statements made earlier in this admission, extremely concerning collateral (see above),  unwilling to engage re: suicidality or safety planning. Recommend continuing CO, patient will require inpatient psychiatric admission for safety and stabilization pending medical optimization.  k    4/10: Patient defensive and hostile toward interviewer, unhappy about being in the hospital, required PRN Zyprexa x3 overnight, denying SI/HI at this time with irritability about having a "" at bedside. Unwilling to engage in meaningful conversation about suicidality, will continue CO 1:1 at this time.  4/11: Pt calmer today on interview. Required PRNs overnight for agitation. Pt continues ask why he needs a "." Denies S/H/I/I/P for now.     PLAN:  -Continue 1:1 Constant Observation for SI statements  -Continue Zoloft 150mg daily, monitor platelets, Na level  -Continue Trazodone 150mg qHS  -PRN- may use Seroquel 25mg PO q6hrs for agitation if qtc < 500  -PRN- May use Zyprexa 2.5mg IM q6h prn ONLY for SEVERE agitation if qtc <500, once all diversional activities are exhausted.    -Will continue to follow  -Patient will require inpatient psychiatric admission for safety and stabilization- 2PC legals completed and placed in chart    Do not give IM Zyprexa within 4hrs of IM/IV Ativan due to risk of excessive sedation and respiratory depression.

## 2021-04-11 NOTE — DIETITIAN INITIAL EVALUATION ADULT. - PERTINENT MEDS FT
atorvastatin  dextrose 50% Injectable  gabapentin  insulin glargine Injectable (LANTUS)  insulin lispro (ADMELOG) corrective regimen sliding scale  insulin lispro (ADMELOG) corrective regimen sliding scale  insulin lispro Injectable (ADMELOG)  insulin lispro Injectable (ADMELOG)  insulin lispro Injectable (ADMELOG)  pantoprazole    Tablet  sertraline  tamsulosin  traZODone

## 2021-04-11 NOTE — DIETITIAN INITIAL EVALUATION ADULT. - REASON FOR ADMISSION
60y M transferred from [facility]. He had cough for about 2 weeks, no fevers. CT Chest with mass like structure to RLL  ID was consulted and pt started IV antibiotics, he was about to transition to PO augmentin but discharged postphoned when pt developed "hemoptysis

## 2021-04-11 NOTE — DIETITIAN INITIAL EVALUATION ADULT. - ORAL INTAKE PTA/DIET HISTORY
Met patient at bedside. Patient states that he does not following any particular dietary guidelines PTA, and does not have knowledge of specific starch containing foods. Pt reports that he typically consumes fast foods, such as [employer].

## 2021-04-11 NOTE — DIETITIAN INITIAL EVALUATION ADULT. - OTHER INFO
Patient known to Nutrition Services, recent admission 3/28/2021.   In-house patient with fair appetite, pt reports feeling stressed out due to personal situation. Patient endorses difficulty chewing solid meats; patient missing upper front teeth. Currently on Soft Consistent Carb Controlled Diet, patient states that he is able to tolerate these foods.     Previous admit weight (3/28/2021): 207 lbs  Admit weight (4/4/21): 206 lbs  UBW per pt report: 220 lbs  Weight change: 6.8% (14 lbs) weight loss x 1 month - clinically significant    RD discussed carbohydrate sources, reviewed general teachings on consistent carbohydrate recommendations +written materials, but patient will need further review. RD recommended pt to follow up with outpatient RD/CDE for ongoing support. Patient known to Nutrition Services, recent admission 3/28/2021.   In-house patient with fair appetite, pt reports feeling overwhelmed/stressed out due to personal situation. Patient endorses difficulty chewing solid meats; patient missing upper front teeth. Currently on Soft Consistent Carb Controlled Diet, patient states that he is able to tolerate these foods.     Previous admit weight (3/28/2021): 207 lbs  Admit weight (4/4/21): 206 lbs  UBW per pt report: 220 lbs  Weight change: 6.8% (14 lbs) weight loss x 1 month - clinically significant    RD discussed carbohydrate sources, reviewed general teachings on consistent carbohydrate recommendations +written materials, but patient will need further review. RD recommended pt to follow up with outpatient RD/CDE for ongoing support.

## 2021-04-11 NOTE — CHART NOTE - NSCHARTNOTEFT_GEN_A_CORE
[facility] psychiat[facility] received telephone call from primary team, RAMON Ryan. Provider reports patient c/o lateral/posterior neck pain/stiffness, concern for EPS. Patient seen, patient irritable, moving neck freely side to side, denies complaint is stiffness, c/o neck pain radiating up back of head, reports has had same pain for "months," reports looking up at the television exacerbates his pain, asked patient what has relieved his pain in the past, he states, "Nothing helps the pain." Encouraged patient to sit in chair to watch television. Patient given Flexeril 5mg x2, Dilaudid 2mg X2 and heat pads for c/o pain per primary team.     Vital Signs Last 24 Hrs  T(C): 36.4 (11 Apr 2021 12:16), Max: 37.1 (10 Apr 2021 19:58)  T(F): 97.6 (11 Apr 2021 12:16), Max: 98.7 (10 Apr 2021 19:58)  HR: 68 (11 Apr 2021 12:16) (68 - 88)  BP: 125/80 (11 Apr 2021 12:16) (112/70 - 139/88)  BP(mean): --  RR: 18 (11 Apr 2021 12:16) (16 - 18)  SpO2: 97% (11 Apr 2021 12:16) (97% - 99%)    Any concerns or questions, call [facility] psychiatry at #[phone]. [facility] attending to continue to follow patient.
COVID swab sent to lab.
Patient is cleared for transport for dental treatment including possible extractions, and treatment under local anesthesia with epinephrine
Writer contacted by primary nurse. Per RN patient is escalating after being informed by the medical attending that will not be discharged home. Case discussed with psychiatry Dr Lackey. Patient last received zyprex at 9a. Instructed to give additional Zyprexa 2.5 IM now. Additional instructions received to add Seroquel 25 mg Q 6 hours PRN for agitation. Plan discussed with primary nurse.
[facility] agitation, depression, social issues. Recommend 1:1 for SI. Constant observation order placed.

## 2021-04-11 NOTE — BH CONSULTATION LIAISON PROGRESS NOTE - NSBHFUPINTERVALHXFT_PSY_A_CORE
Chart reviewed. Pt required Seroquel PRN x2 and Zyprexa PRN x2. Pt states that he's feeling better now. He denies feeling unsafe in the hospital. Denies depressed mood. Denies AH, VH or paranoia. No S/H/I/I/P. Per pt's 1:1, pt has been in good behavioral control.

## 2021-04-12 ENCOUNTER — INPATIENT (INPATIENT)
Facility: HOSPITAL | Age: 61
LOS: 6 days | Discharge: HOME | End: 2021-04-19
Attending: PSYCHIATRY & NEUROLOGY | Admitting: PSYCHIATRY & NEUROLOGY
Payer: MEDICAID

## 2021-04-12 ENCOUNTER — TRANSCRIPTION ENCOUNTER (OUTPATIENT)
Age: 61
End: 2021-04-12

## 2021-04-12 VITALS
RESPIRATION RATE: 18 BRPM | HEART RATE: 92 BPM | DIASTOLIC BLOOD PRESSURE: 78 MMHG | TEMPERATURE: 99 F | OXYGEN SATURATION: 97 % | SYSTOLIC BLOOD PRESSURE: 120 MMHG

## 2021-04-12 VITALS — RESPIRATION RATE: 18 BRPM | HEIGHT: 69 IN | TEMPERATURE: 96 F | OXYGEN SATURATION: 99 % | WEIGHT: 200.62 LBS

## 2021-04-12 DIAGNOSIS — M19.019 PRIMARY OSTEOARTHRITIS, UNSPECIFIED SHOULDER: Chronic | ICD-10-CM

## 2021-04-12 DIAGNOSIS — F33.9 MAJOR DEPRESSIVE DISORDER, RECURRENT, UNSPECIFIED: ICD-10-CM

## 2021-04-12 PROBLEM — B19.20 UNSPECIFIED VIRAL HEPATITIS C WITHOUT HEPATIC COMA: Chronic | Status: ACTIVE | Noted: 2021-04-04

## 2021-04-12 PROBLEM — M19.90 UNSPECIFIED OSTEOARTHRITIS, UNSPECIFIED SITE: Chronic | Status: ACTIVE | Noted: 2021-04-04

## 2021-04-12 PROBLEM — E11.9 TYPE 2 DIABETES MELLITUS WITHOUT COMPLICATIONS: Chronic | Status: ACTIVE | Noted: 2018-10-30

## 2021-04-12 PROBLEM — E78.5 HYPERLIPIDEMIA, UNSPECIFIED: Chronic | Status: ACTIVE | Noted: 2021-04-04

## 2021-04-12 PROBLEM — K80.20 CALCULUS OF GALLBLADDER WITHOUT CHOLECYSTITIS WITHOUT OBSTRUCTION: Chronic | Status: ACTIVE | Noted: 2021-04-04

## 2021-04-12 PROBLEM — M54.9 DORSALGIA, UNSPECIFIED: Chronic | Status: ACTIVE | Noted: 2021-04-04

## 2021-04-12 PROBLEM — E04.1 NONTOXIC SINGLE THYROID NODULE: Chronic | Status: ACTIVE | Noted: 2021-04-04

## 2021-04-12 PROBLEM — J85.2 ABSCESS OF LUNG WITHOUT PNEUMONIA: Chronic | Status: ACTIVE | Noted: 2021-04-04

## 2021-04-12 PROBLEM — G47.00 INSOMNIA, UNSPECIFIED: Chronic | Status: ACTIVE | Noted: 2021-04-04

## 2021-04-12 PROBLEM — K57.90 DIVERTICULOSIS OF INTESTINE, PART UNSPECIFIED, WITHOUT PERFORATION OR ABSCESS WITHOUT BLEEDING: Chronic | Status: ACTIVE | Noted: 2021-04-04

## 2021-04-12 PROBLEM — N40.1 BENIGN PROSTATIC HYPERPLASIA WITH LOWER URINARY TRACT SYMPTOMS: Chronic | Status: ACTIVE | Noted: 2021-04-04

## 2021-04-12 LAB
ANION GAP SERPL CALC-SCNC: 13 MMOL/L — SIGNIFICANT CHANGE UP (ref 7–14)
BUN SERPL-MCNC: 8 MG/DL — SIGNIFICANT CHANGE UP (ref 7–23)
CALCIUM SERPL-MCNC: 9.9 MG/DL — SIGNIFICANT CHANGE UP (ref 8.4–10.5)
CHLORIDE SERPL-SCNC: 101 MMOL/L — SIGNIFICANT CHANGE UP (ref 98–107)
CO2 SERPL-SCNC: 20 MMOL/L — LOW (ref 22–31)
CREAT SERPL-MCNC: 0.75 MG/DL — SIGNIFICANT CHANGE UP (ref 0.5–1.3)
GLUCOSE BLDC GLUCOMTR-MCNC: 146 MG/DL — HIGH (ref 70–99)
GLUCOSE BLDC GLUCOMTR-MCNC: 176 MG/DL — HIGH (ref 70–99)
GLUCOSE SERPL-MCNC: 186 MG/DL — HIGH (ref 70–99)
HCT VFR BLD CALC: 36.1 % — LOW (ref 39–50)
HGB BLD-MCNC: 11.8 G/DL — LOW (ref 13–17)
MAGNESIUM SERPL-MCNC: 1.3 MG/DL — LOW (ref 1.6–2.6)
MCHC RBC-ENTMCNC: 31.9 PG — SIGNIFICANT CHANGE UP (ref 27–34)
MCHC RBC-ENTMCNC: 32.7 GM/DL — SIGNIFICANT CHANGE UP (ref 32–36)
MCV RBC AUTO: 97.6 FL — SIGNIFICANT CHANGE UP (ref 80–100)
NRBC # BLD: 0 /100 WBCS — SIGNIFICANT CHANGE UP
NRBC # FLD: 0 K/UL — SIGNIFICANT CHANGE UP
PHOSPHATE SERPL-MCNC: 3.4 MG/DL — SIGNIFICANT CHANGE UP (ref 2.5–4.5)
PLATELET # BLD AUTO: 530 K/UL — HIGH (ref 150–400)
POTASSIUM SERPL-MCNC: 3.8 MMOL/L — SIGNIFICANT CHANGE UP (ref 3.5–5.3)
POTASSIUM SERPL-SCNC: 3.8 MMOL/L — SIGNIFICANT CHANGE UP (ref 3.5–5.3)
RBC # BLD: 3.7 M/UL — LOW (ref 4.2–5.8)
RBC # FLD: 13.4 % — SIGNIFICANT CHANGE UP (ref 10.3–14.5)
SODIUM SERPL-SCNC: 134 MMOL/L — LOW (ref 135–145)
WBC # BLD: 10.24 K/UL — SIGNIFICANT CHANGE UP (ref 3.8–10.5)
WBC # FLD AUTO: 10.24 K/UL — SIGNIFICANT CHANGE UP (ref 3.8–10.5)

## 2021-04-12 PROCEDURE — 99239 HOSP IP/OBS DSCHRG MGMT >30: CPT

## 2021-04-12 PROCEDURE — 99222 1ST HOSP IP/OBS MODERATE 55: CPT

## 2021-04-12 PROCEDURE — 99232 SBSQ HOSP IP/OBS MODERATE 35: CPT

## 2021-04-12 PROCEDURE — 99233 SBSQ HOSP IP/OBS HIGH 50: CPT

## 2021-04-12 RX ORDER — QUETIAPINE FUMARATE 200 MG/1
1 TABLET, FILM COATED ORAL
Qty: 0 | Refills: 0 | DISCHARGE
Start: 2021-04-12

## 2021-04-12 RX ORDER — OLANZAPINE 15 MG/1
2.5 TABLET, FILM COATED ORAL
Qty: 0 | Refills: 0 | DISCHARGE
Start: 2021-04-12

## 2021-04-12 RX ORDER — HALOPERIDOL DECANOATE 100 MG/ML
2 INJECTION INTRAMUSCULAR EVERY 6 HOURS
Refills: 0 | Status: DISCONTINUED | OUTPATIENT
Start: 2021-04-12 | End: 2021-04-19

## 2021-04-12 RX ORDER — TAMSULOSIN HYDROCHLORIDE 0.4 MG/1
0.4 CAPSULE ORAL AT BEDTIME
Refills: 0 | Status: DISCONTINUED | OUTPATIENT
Start: 2021-04-12 | End: 2021-04-19

## 2021-04-12 RX ORDER — INSULIN LISPRO 100/ML
VIAL (ML) SUBCUTANEOUS
Refills: 0 | Status: DISCONTINUED | OUTPATIENT
Start: 2021-04-12 | End: 2021-04-19

## 2021-04-12 RX ORDER — TRAMADOL HYDROCHLORIDE 50 MG/1
25 TABLET ORAL ONCE
Refills: 0 | Status: DISCONTINUED | OUTPATIENT
Start: 2021-04-12 | End: 2021-04-12

## 2021-04-12 RX ORDER — INSULIN GLARGINE 100 [IU]/ML
30 INJECTION, SOLUTION SUBCUTANEOUS AT BEDTIME
Refills: 0 | Status: DISCONTINUED | OUTPATIENT
Start: 2021-04-12 | End: 2021-04-19

## 2021-04-12 RX ORDER — MAGNESIUM OXIDE 400 MG ORAL TABLET 241.3 MG
400 TABLET ORAL
Refills: 0 | Status: DISCONTINUED | OUTPATIENT
Start: 2021-04-12 | End: 2021-04-12

## 2021-04-12 RX ORDER — ACETAMINOPHEN 500 MG
650 TABLET ORAL EVERY 6 HOURS
Refills: 0 | Status: DISCONTINUED | OUTPATIENT
Start: 2021-04-12 | End: 2021-04-19

## 2021-04-12 RX ORDER — INSULIN LISPRO 100/ML
VIAL (ML) SUBCUTANEOUS AT BEDTIME
Refills: 0 | Status: DISCONTINUED | OUTPATIENT
Start: 2021-04-12 | End: 2021-04-19

## 2021-04-12 RX ORDER — MAGNESIUM SULFATE 500 MG/ML
2 VIAL (ML) INJECTION ONCE
Refills: 0 | Status: COMPLETED | OUTPATIENT
Start: 2021-04-12 | End: 2021-04-12

## 2021-04-12 RX ORDER — OLANZAPINE 15 MG/1
2.5 TABLET, FILM COATED ORAL ONCE
Refills: 0 | Status: COMPLETED | OUTPATIENT
Start: 2021-04-12 | End: 2021-04-12

## 2021-04-12 RX ORDER — GABAPENTIN 400 MG/1
2 CAPSULE ORAL
Qty: 0 | Refills: 0 | DISCHARGE
Start: 2021-04-12

## 2021-04-12 RX ORDER — GLUCAGON INJECTION, SOLUTION 0.5 MG/.1ML
1 INJECTION, SOLUTION SUBCUTANEOUS ONCE
Refills: 0 | Status: DISCONTINUED | OUTPATIENT
Start: 2021-04-12 | End: 2021-04-19

## 2021-04-12 RX ORDER — DEXTROSE 50 % IN WATER 50 %
15 SYRINGE (ML) INTRAVENOUS ONCE
Refills: 0 | Status: DISCONTINUED | OUTPATIENT
Start: 2021-04-12 | End: 2021-04-19

## 2021-04-12 RX ORDER — ATORVASTATIN CALCIUM 80 MG/1
40 TABLET, FILM COATED ORAL AT BEDTIME
Refills: 0 | Status: DISCONTINUED | OUTPATIENT
Start: 2021-04-12 | End: 2021-04-19

## 2021-04-12 RX ORDER — CEFTRIAXONE 500 MG/1
1000 INJECTION, POWDER, FOR SOLUTION INTRAMUSCULAR; INTRAVENOUS EVERY 24 HOURS
Refills: 0 | Status: DISCONTINUED | OUTPATIENT
Start: 2021-04-12 | End: 2021-04-12

## 2021-04-12 RX ORDER — METRONIDAZOLE 500 MG
500 TABLET ORAL THREE TIMES A DAY
Refills: 0 | Status: DISCONTINUED | OUTPATIENT
Start: 2021-04-12 | End: 2021-04-19

## 2021-04-12 RX ORDER — TRAZODONE HCL 50 MG
150 TABLET ORAL AT BEDTIME
Refills: 0 | Status: DISCONTINUED | OUTPATIENT
Start: 2021-04-12 | End: 2021-04-19

## 2021-04-12 RX ORDER — SERTRALINE 25 MG/1
200 TABLET, FILM COATED ORAL DAILY
Refills: 0 | Status: DISCONTINUED | OUTPATIENT
Start: 2021-04-13 | End: 2021-04-19

## 2021-04-12 RX ORDER — KETOROLAC TROMETHAMINE 30 MG/ML
10 SYRINGE (ML) INJECTION ONCE
Refills: 0 | Status: DISCONTINUED | OUTPATIENT
Start: 2021-04-12 | End: 2021-04-12

## 2021-04-12 RX ORDER — INSULIN GLARGINE 100 [IU]/ML
30 INJECTION, SOLUTION SUBCUTANEOUS
Qty: 0 | Refills: 0 | DISCHARGE
Start: 2021-04-12

## 2021-04-12 RX ORDER — LIDOCAINE 4 G/100G
1 CREAM TOPICAL ONCE
Refills: 0 | Status: COMPLETED | OUTPATIENT
Start: 2021-04-12 | End: 2021-04-12

## 2021-04-12 RX ORDER — HALOPERIDOL DECANOATE 100 MG/ML
2 INJECTION INTRAMUSCULAR ONCE
Refills: 0 | Status: DISCONTINUED | OUTPATIENT
Start: 2021-04-12 | End: 2021-04-19

## 2021-04-12 RX ORDER — INSULIN LISPRO 100/ML
10 VIAL (ML) SUBCUTANEOUS
Refills: 0 | Status: DISCONTINUED | OUTPATIENT
Start: 2021-04-12 | End: 2021-04-19

## 2021-04-12 RX ORDER — CEFUROXIME AXETIL 250 MG
500 TABLET ORAL EVERY 12 HOURS
Refills: 0 | Status: DISCONTINUED | OUTPATIENT
Start: 2021-04-12 | End: 2021-04-19

## 2021-04-12 RX ADMIN — PANTOPRAZOLE SODIUM 40 MILLIGRAM(S): 20 TABLET, DELAYED RELEASE ORAL at 07:41

## 2021-04-12 RX ADMIN — SERTRALINE 150 MILLIGRAM(S): 25 TABLET, FILM COATED ORAL at 12:23

## 2021-04-12 RX ADMIN — QUETIAPINE FUMARATE 25 MILLIGRAM(S): 200 TABLET, FILM COATED ORAL at 07:59

## 2021-04-12 RX ADMIN — OLANZAPINE 2.5 MILLIGRAM(S): 15 TABLET, FILM COATED ORAL at 01:17

## 2021-04-12 RX ADMIN — GABAPENTIN 200 MILLIGRAM(S): 400 CAPSULE ORAL at 11:58

## 2021-04-12 RX ADMIN — HYDROMORPHONE HYDROCHLORIDE 2 MILLIGRAM(S): 2 INJECTION INTRAMUSCULAR; INTRAVENOUS; SUBCUTANEOUS at 16:21

## 2021-04-12 RX ADMIN — INSULIN GLARGINE 30 UNIT(S): 100 INJECTION, SOLUTION SUBCUTANEOUS at 21:10

## 2021-04-12 RX ADMIN — GABAPENTIN 200 MILLIGRAM(S): 400 CAPSULE ORAL at 07:42

## 2021-04-12 RX ADMIN — TRAMADOL HYDROCHLORIDE 25 MILLIGRAM(S): 50 TABLET ORAL at 21:48

## 2021-04-12 RX ADMIN — ENOXAPARIN SODIUM 40 MILLIGRAM(S): 100 INJECTION SUBCUTANEOUS at 12:22

## 2021-04-12 RX ADMIN — Medication 4: at 12:07

## 2021-04-12 RX ADMIN — CYCLOBENZAPRINE HYDROCHLORIDE 5 MILLIGRAM(S): 10 TABLET, FILM COATED ORAL at 07:59

## 2021-04-12 RX ADMIN — HYDROMORPHONE HYDROCHLORIDE 2 MILLIGRAM(S): 2 INJECTION INTRAMUSCULAR; INTRAVENOUS; SUBCUTANEOUS at 07:59

## 2021-04-12 RX ADMIN — HYDROMORPHONE HYDROCHLORIDE 2 MILLIGRAM(S): 2 INJECTION INTRAMUSCULAR; INTRAVENOUS; SUBCUTANEOUS at 12:08

## 2021-04-12 RX ADMIN — TAMSULOSIN HYDROCHLORIDE 0.4 MILLIGRAM(S): 0.4 CAPSULE ORAL at 20:35

## 2021-04-12 RX ADMIN — HYDROMORPHONE HYDROCHLORIDE 2 MILLIGRAM(S): 2 INJECTION INTRAMUSCULAR; INTRAVENOUS; SUBCUTANEOUS at 17:12

## 2021-04-12 RX ADMIN — HYDROMORPHONE HYDROCHLORIDE 2 MILLIGRAM(S): 2 INJECTION INTRAMUSCULAR; INTRAVENOUS; SUBCUTANEOUS at 12:31

## 2021-04-12 RX ADMIN — Medication 10 UNIT(S): at 12:07

## 2021-04-12 RX ADMIN — Medication 50 GRAM(S): at 12:22

## 2021-04-12 RX ADMIN — CEFTRIAXONE 100 MILLIGRAM(S): 500 INJECTION, POWDER, FOR SOLUTION INTRAMUSCULAR; INTRAVENOUS at 12:22

## 2021-04-12 RX ADMIN — ATORVASTATIN CALCIUM 40 MILLIGRAM(S): 80 TABLET, FILM COATED ORAL at 20:35

## 2021-04-12 RX ADMIN — HYDROMORPHONE HYDROCHLORIDE 2 MILLIGRAM(S): 2 INJECTION INTRAMUSCULAR; INTRAVENOUS; SUBCUTANEOUS at 07:30

## 2021-04-12 RX ADMIN — Medication 100 MILLIGRAM(S): at 06:00

## 2021-04-12 RX ADMIN — LIDOCAINE 1 PATCH: 4 CREAM TOPICAL at 13:32

## 2021-04-12 RX ADMIN — MAGNESIUM OXIDE 400 MG ORAL TABLET 400 MILLIGRAM(S): 241.3 TABLET ORAL at 10:22

## 2021-04-12 RX ADMIN — OLANZAPINE 2.5 MILLIGRAM(S): 15 TABLET, FILM COATED ORAL at 10:07

## 2021-04-12 RX ADMIN — Medication 500 MILLIGRAM(S): at 20:34

## 2021-04-12 RX ADMIN — Medication 500 MILLIGRAM(S): at 20:35

## 2021-04-12 RX ADMIN — Medication 150 MILLIGRAM(S): at 20:34

## 2021-04-12 NOTE — PROGRESS NOTE ADULT - PROVIDER SPECIALTY LIST ADULT
Critical Care
Thoracic Surgery
Critical Care
Endocrinology
Infectious Disease
Dental
Dental
Infectious Disease
Thoracic Surgery
Hospitalist
Infectious Disease
Thoracic Surgery
Infectious Disease
Hospitalist
Hospitalist
Endocrinology
Hospitalist
Hospitalist
Endocrinology

## 2021-04-12 NOTE — DISCHARGE NOTE NURSING/CASE MANAGEMENT/SOCIAL WORK - NSDCPNPNLCN_GEN_ALL_CORE
"Sudden onset of coughing noted blood   At first \"a lot of blood\" now flecks of blood\"  No uri symptoms today   Now feeling slightly sob  Has had a little diarrhea this am  No other symptoms  Here for eval  " Neck

## 2021-04-12 NOTE — BH CONSULTATION LIAISON PROGRESS NOTE - NSBHCHARTREVIEWVS_PSY_A_CORE FT
Vital Signs Last 24 Hrs  T(C): 37.2 (10 Apr 2021 08:04), Max: 37.2 (09 Apr 2021 13:30)  T(F): 98.9 (10 Apr 2021 08:04), Max: 99 (09 Apr 2021 13:30)  HR: 72 (10 Apr 2021 11:26) (61 - 73)  BP: 131/92 (10 Apr 2021 11:26) (109/70 - 131/92)  BP(mean): --  RR: 18 (10 Apr 2021 11:26) (18 - 18)  SpO2: 100% (10 Apr 2021 11:26) (94% - 100%)
Vital Signs Last 24 Hrs  T(C): 37.3 (12 Apr 2021 06:15), Max: 37.3 (12 Apr 2021 06:15)  T(F): 99.1 (12 Apr 2021 06:15), Max: 99.1 (12 Apr 2021 06:15)  HR: 89 (12 Apr 2021 06:15) (80 - 89)  BP: 113/71 (12 Apr 2021 06:15) (113/71 - 144/69)  BP(mean): --  RR: 18 (12 Apr 2021 06:15) (18 - 18)  SpO2: 97% (12 Apr 2021 06:15) (97% - 99%)
Vital Signs Last 24 Hrs  T(C): 36.9 (08 Apr 2021 09:23), Max: 37.2 (07 Apr 2021 20:07)  T(F): 98.5 (08 Apr 2021 09:23), Max: 98.9 (07 Apr 2021 20:07)  HR: 74 (08 Apr 2021 09:23) (73 - 85)  BP: 123/86 (08 Apr 2021 09:23) (105/75 - 140/93)  BP(mean): --  RR: 16 (08 Apr 2021 09:23) (16 - 18)  SpO2: 96% (08 Apr 2021 09:23) (96% - 99%)
Vital Signs Last 24 Hrs  T(C): 36.4 (11 Apr 2021 12:16), Max: 37.1 (10 Apr 2021 19:58)  T(F): 97.6 (11 Apr 2021 12:16), Max: 98.7 (10 Apr 2021 19:58)  HR: 68 (11 Apr 2021 12:16) (68 - 88)  BP: 125/80 (11 Apr 2021 12:16) (112/70 - 139/88)  BP(mean): --  RR: 18 (11 Apr 2021 12:16) (16 - 18)  SpO2: 97% (11 Apr 2021 12:16) (96% - 99%)
Vital Signs Last 24 Hrs  T(C): 36.7 (09 Apr 2021 06:05), Max: 36.8 (08 Apr 2021 17:40)  T(F): 98 (09 Apr 2021 06:05), Max: 98.3 (08 Apr 2021 17:40)  HR: 91 (09 Apr 2021 06:05) (70 - 91)  BP: 107/80 (09 Apr 2021 06:05) (107/80 - 124/87)  BP(mean): --  RR: 18 (09 Apr 2021 06:05) (17 - 18)  SpO2: 92% (09 Apr 2021 06:05) (92% - 98%)

## 2021-04-12 NOTE — BH CONSULTATION LIAISON PROGRESS NOTE - NSBHFUPINTERVALCCFT_PSY_A_CORE
"I'm mad because I'm staring at a wall."
"I'm fine"
"They took every f***ing thing."
"Being here is worse than [facility]."
"I don't want to talk."

## 2021-04-12 NOTE — PROGRESS NOTE ADULT - PROBLEM SELECTOR PLAN 2
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  -H/H stable

## 2021-04-12 NOTE — PROGRESS NOTE ADULT - PROBLEM SELECTOR PLAN 3
- c/w statin
DKA on admission, resolved   Endocrine following  C/w Lantus 30 units qhs, Admelog 10u qac, moderate correction scale qac and qhs  Hypoglycemia noted today after pt received Admelog for breakfast but did not eat  Per notes, pt was able to demonstrate at [place] proper insulin injection technique and use of the insulin pen, however patient refusing to participate with insulin teaching here
DKA on admission, resolved   Endocrine following  C/w Lantus 30 units qhs, Admelog 10u qac, moderate correction scale qac and qhs  Hypoglycemia noted today after pt received Admelog for breakfast but did not eat  Per notes, pt was able to demonstrate at [place] proper insulin injection technique and use of the insulin pen, however patient refusing to participate with insulin teaching here
- DKA on admission, resolved   - Endocrine following  - C/w Lantus 30 units qhs, Admelog 10u qac, moderate correction scale qac and qhs  - Per notes, pt was able to demonstrate at [place] proper insulin injection technique and use of the insulin pen, however patient refusing to participate with insulin teaching here
- c/w statin
- c/w statin
DKA on admission, resolved   Seen by endocrine 4/7, however pt refusing insulin  C/w Lantus 36 units qhs, Admelog 12 units before meals, moderate correction scale qac and qhs  Per notes, pt was able to demonstrate at [place] proper insulin injection technique and use of the insulin pen
DKA on admission, resolved   Endocrine following  C/w Lantus 30 units qhs, Admelog 10u qac, moderate correction scale qac and qhs  Hypoglycemia noted today after pt received Admelog for breakfast but did not eat  Per notes, pt was able to demonstrate at [place] proper insulin injection technique and use of the insulin pen, however patient refusing to participate with insulin teaching here

## 2021-04-12 NOTE — PROGRESS NOTE ADULT - PROBLEM SELECTOR PLAN 6
DVT ppx: HSQ  Diet: Consistent carb  Dispo: [facility]  DC time: 31 minutes DVT ppx: HSQ  Diet: Consistent carb  Dispo: [facility] today, signout provided to [facility] Dr. [place]   DC time: 31 minutes

## 2021-04-12 NOTE — PROGRESS NOTE ADULT - SUBJECTIVE AND OBJECTIVE BOX
CC: f/u for lung abscess    events noted. awaiting transfer to [facility] rehab    REVIEW OF SYSTEMS:  All other review of systems negative (Comprehensive ROS)    Antimicrobials Day #  :day 17  cefTRIAXone   IVPB 1000 milliGRAM(s) IV Intermittent every 24 hours  metroNIDAZOLE  IVPB 500 milliGRAM(s) IV Intermittent every 8 hours      Other Medications Reviewed    Vital Signs Last 24 Hrs  T(C): 37.3 (12 Apr 2021 06:15), Max: 37.3 (12 Apr 2021 06:15)  T(F): 99.1 (12 Apr 2021 06:15), Max: 99.1 (12 Apr 2021 06:15)  HR: 89 (12 Apr 2021 06:15) (68 - 89)  BP: 113/71 (12 Apr 2021 06:15) (113/71 - 144/69)  BP(mean): --  RR: 18 (12 Apr 2021 06:15) (18 - 18)  SpO2: 97% (12 Apr 2021 06:15) (97% - 99%)    PHYSICAL EXAM:  General: alert, no acute distress  Eyes:  anicteric, no conjunctival injection, no discharge  Oropharynx: no lesions or injection , poor dentition	  Neck: supple, without adenopathy  Lungs: clear to auscultation  Heart: regular rate and rhythm; no murmur, rubs or gallops  Abdomen: soft, nondistended, nontender, without mass or organomegaly.   Skin: no lesions  Extremities: no clubbing, cyanosis, or edema  Neurologic: alert, oriented, moves all extremities    LAB RESULTS:                          11.8   10.24 )-----------( 530      ( 12 Apr 2021 07:21 )             36.1   04-12    134<L>  |  101  |  8   ----------------------------<  186<H>  3.8   |  20<L>  |  0.75    Ca    9.9      12 Apr 2021 07:21  Phos  3.4     04-12  Mg     1.3     04-12          MICROBIOLOGY:  RECENT CULTURES:    Culture - Body Fluid with Gram Stain (04.01.21 @ 12:30)   - Fluconazole: S 0.5 Fluconazole = Data established for mucosal disease, and is generally applicable for invasive disease. Susceptibility is dependent on achieving the maximal possible blood level. Doses of 400 MG/day or more may be required in adults with normalrenalfunction and body habitus.   - Interpretation: See note This test method is not approved by the FDA and is for research use only. The performance characteristics of this assay may have been determined by [employer] and [facility], [place], [place] N/I - No interpretation available SDD – Sensitive Dose Dependent   Gram Stain:   Moderate polymorphonuclear leukocytes per low power field   No organisms seen   - Ampicillin/Sulbactam: R <=8/4   - Cefazolin: R <=4   - Clindamycin: S <=0.25   - Erythromycin: R >4   - Gentamicin: S <=1 Should not be used as monotherapy   - Oxacillin: R >2   - Penicillin: R 8   - RIF- Rifampin: S <=1 Should not be used as monotherapy   - Tetra/Doxy: R >8   - Trimethoprim/Sulfamethoxazole: S <=0.5/9.5   - Vancomycin: S 2   Specimen Source: .Body Fluid RIGHT LUNG   Culture Results:   Rare Candida albicans   Growth in fluid media only Streptococcus parasanguinis "Susceptibilities   not performed"   Growth in fluid media only Staphylococcus epidermidis   Organism Identification: Paula albicans   RADIOLOGY REVIEWED:  ra< from: Xray Chest 1 View- PORTABLE-Routine (Xray Chest 1 View- PORTABLE-Routine in AM.) (04.07.21 @ 07:26) >  IMPRESSION:  Partial basilar clearing.    Thank you for the courtesy of this referral.    < end of copied text >

## 2021-04-12 NOTE — BH CONSULTATION LIAISON PROGRESS NOTE - CASE SUMMARY
No
Chart reviewed, pt. seen and evaluated with Dr. Cristina, I agree with above assessment and plan, pt. required multiple PRNs for agitation, pt. minimally engaging, significantly minimizing his sxs,  guarded, evasive, uncooperative, appears withdrawn, depressed, making poor eye contact. Patient did not answers most of the questions, stating " I don't want to talk." Due to concerns for acute psychiatric decompensation, pt. will need an inpatient psychiatric admission for safety and further treatment and stabilization, plan as above, case d/w Dr. Munoz, will follow
Chart reviewed, pt. seen and evaluated with Dr. Cristina, I agree with above assessment and plan, pt. grossly oriented,  irritable, expressed extreme frustration about ongoing medical issues, patient acknowledges previous vague SI statements made during this admission, however currently he adamantly denies active and passive SI/I/P, denies HI/I/P , denies acute psychotic sxs. Plan as above, case d/w team in person, will follow
Chart reviewed, pt. seen and evaluated with Dr. Cristina, I agree with above assessment and plan, pt. uncooperative, refusing to engage in interview, refusing to answer questions, yelling, screaming, irritable, guarded, evasive, significantly minimizing his sxs, due to concerns for acute psychiatric decompensation, pt. will need an inpatient psychiatric admission for safety and further treatment and stabilization, plan as above, will follow
Chart reviewed, pt. seen and evaluated with Dr. Cristina, I agree with above assessment and plan, pt. required multiple PRNs for agitation, pt. minimally engaging, significantly minimizing his sxs,  guarded, evasive, uncooperative, appears withdrawn, depressed, making poor eye contact. Patient did not answers most of the questions, stating " I don't want to talk." Due to concerns for acute psychiatric decompensation, pt. will need an inpatient psychiatric admission for safety and further treatment and stabilization, plan as above, case d/w Dr. Munoz, will follow

## 2021-04-12 NOTE — BH INPATIENT PSYCHIATRY ASSESSMENT NOTE - NSBHCHARTREVIEWLAB_PSY_A_CORE FT
LABS:  cret                        11.8   10.24 )-----------( 530      ( 12 Apr 2021 07:21 )             36.1     04-12    134<L>  |  101  |  8   ----------------------------<  186<H>  3.8   |  20<L>  |  0.75    Ca    9.9      12 Apr 2021 07:21  Phos  3.4     04-12  Mg     1.3     04-12

## 2021-04-12 NOTE — BH CONSULTATION LIAISON PROGRESS NOTE - NSBHMSESPABN_PSY_A_CORE
Loud volume/Decreased productivity
Soft volume/Decreased productivity/Increased latency
Soft volume/Decreased productivity
Soft volume/Slowed rate/Decreased productivity/Increased latency

## 2021-04-12 NOTE — PROGRESS NOTE ADULT - PROBLEM SELECTOR PLAN 1
- A1c > 15.5%, unclear time line of prior steroid use  - intermittently refusing insulin, which can explain some of the hyperglycemia  - c/w Lantus 30 units qhs, Admelog 10 units before meals (administer only as patient starts to eat, hold if NPO)  - moderate correction scale qac and qhs  - consistent carb diet   - check FS qac and qhs qhs   - defer RD consult at this time   - patient declining to discuss diabetes care.   - for discharge: c-peptide is adequate and Ab negative. Ideally, he would be discharged home on either basal bolus versus basal insulin + oral agents (such as Lantus and Metformin), but pt unwilling to discuss DM care. Spoke with Dr. Wisdom, hospitalist today, patient to be transferred to [facility] on insulin. Depending on hospital course at [facility], could either discharge on basal insulin + Metformin or may need to discharge on multiple oral agents (such as Metformin and Januvia, etc).     Given that patient is being transferred to [facility] and patient is unwilling to discuss DM care, will sign off at this time. Please contact endocrine service with any questions.    Endocrine faculty practice:  [address], [address], [place]  [phone].

## 2021-04-12 NOTE — PROGRESS NOTE ADULT - PROBLEM SELECTOR PROBLEM 2
Essential hypertension
Hemoptysis
Essential hypertension
Essential hypertension
Hemoptysis

## 2021-04-12 NOTE — BH CONSULTATION LIAISON PROGRESS NOTE - OTHER
recent frustration about medical issues recent frustration about medical issues  made vague SI statements made earlier in this admission, now refusing to talk

## 2021-04-12 NOTE — BH INPATIENT PSYCHIATRY ASSESSMENT NOTE - RISK ASSESSMENT
Risk Factors: age, gender, unemployed, hx depression, anxiety, si, hx polysubstance abuse, hx inpatient psy admission, financial stressors, limited insight and judgement on condition  Protective Factors: supportive family, engaged in treatment, positive therapeutic relationship, medication compliance, help seeking  Patient was minimizing SI and depressive Sx, agitated requiring multiple PRNs, with limited I/J and unable to safety plan prior to discharge from medical service, would benefit from inpatient psychiatric hospitalization for sx stabilization and safety.

## 2021-04-12 NOTE — BH PATIENT PROFILE - STATED REASON FOR ADMISSION
Pt stated, "I was dizzy and ended up with pneumonia and have had terrible neck pain.  I was staring at the wall for days in the other hospital and so I told the nurse I would kill myself but it wasn't real. I was frustrated."

## 2021-04-12 NOTE — BH CONSULTATION LIAISON PROGRESS NOTE - NSICDXBHPRIMARYDX_PSY_ALL_CORE
Adjustment disorder with depressed mood   F43.21  

## 2021-04-12 NOTE — BH INPATIENT PSYCHIATRY ASSESSMENT NOTE - NSBHASSESSSUMMFT_PSY_ALL_CORE
Patient is a 59yo male, domiciled, unemployed, PPH depression, anxiety, one prior inpatient admission at [facility] (9/3-10/16/2020 for depression, SI, alcohol use), denies prior SA, PMHx COVID-19 5/2020, admitted to the [facility] ICU on 3/26/21 with DKA, then found with RLL lung mass-like lesion measuring 4.5 cm, bronchoscopy w/BAL on 4/1 grew +cultures w/Streptococcus parasanguinis, transferred to [facility] CTICU on 4/4 for management of hemoptysis and lung abscess, s/p teeth extractions on 4/7. PMHx also includes Hepatitis C, BPH, HLD, HTN, uncontrolled DM II, OA, ETOH use (sober 1 yr), hx Opiate dependence ([id number])Pt was medically cleared and transferred to [facility] for safety and sx stabilization given Pt's recent vague SI, agitation, and inability to safety plan. Reviewed notes from [facility] Psychiatry CL team.     Working diagnosis is acute exacerbation of depression in setting of acute medical condition and psychosocial stressors (eg finances, paying rent), vs adjustment disorder. Pt follows up with an outpatient psychiatrist at the [facility] ([phone]) - defer to primary team for collateral info.    Currently, Pt appears irritable, is superficially cooperative on interview and minimizing his Sx as observed at [facility]. For example, he insists that he made vague suicidal statements out of frustration. Currently denies passive and active SIIP/HIIP, contracts for safety appropriately. Pt states he is adherent with medications Zoloft 150mg and trazodone 150mg at home, and is open for medication optimization. Continued inpatient psychiatric hospitalization required for assessment, safety and symptom stabilization.    Plan:  1. Legal: Admitted on 9.27 involuntary  2. Safety: No reported SI/SIB/HI/VI currently on unit; continue routine observation  3. Psychiatric:   -Continue home medications:   4. Therapy: Group & milieu therapy as tolerated  5. Medical:   No acute medical needs identified. No CPAP  6. Collateral: defer to primary team  7. Disposition: when stable   Patient is a 61yo male, domiciled, unemployed, PPH depression, anxiety, one prior inpatient admission at [facility] (9/3-10/16/2020 for depression, SI, alcohol use), denies prior SA, PMHx COVID-19 5/2020, admitted to the [facility] ICU on 3/26/21 with DKA, then found with RLL lung mass-like lesion measuring 4.5 cm, bronchoscopy w/BAL on 4/1 grew +cultures w/Streptococcus parasanguinis, transferred to [facility] CTICU on 4/4 for management of hemoptysis and lung abscess, s/p teeth extractions on 4/7. PMHx also includes Hepatitis C, BPH, HLD, HTN, uncontrolled DM II, OA, ETOH use (sober 1 yr), hx Opiate dependence ([id number])Pt was medically cleared and transferred to [facility] for safety and sx stabilization given Pt's recent vague SI, agitation, and inability to safety plan. Reviewed notes from [facility] Psychiatry CL team.     Working diagnosis is acute exacerbation of depression in setting of acute medical condition and psychosocial stressors (eg finances, paying rent), vs adjustment disorder. Pt follows up with an outpatient psychiatrist at the [facility] ([phone]) - defer to primary team for collateral info.    Currently, Pt appears irritable, is superficially cooperative on interview and minimizing his Sx as observed at [facility]. For example, he insists that he made vague suicidal statements out of frustration. Currently denies passive and active SIIP/HIIP, contracts for safety appropriately. Pt states he is adherent with medications Zoloft 150mg and trazodone 150mg at home, and is open for medication optimization. Continued inpatient psychiatric hospitalization required for assessment, safety and symptom stabilization.    Plan:  1. Legal: Admitted on 9.27 involuntary  2. Safety: No reported SI/SIB/HI/VI currently on unit; continue routine observation  3. Psychiatric:   -increase Zoloft 150mg to 200mg qd   -PRN agitation: Haldol 2mg q6 PO/IM -- preferred over Zyprexa given Pt's uncontrolled DM  4. Therapy: Group & milieu therapy as tolerated  5. Medical: No CPAP  -Continue 3-week ABx course for lung abscess: cefuroxime 500mg q12hr and metronidazole 500mg tid  -DM: Ademlog 10U tid before meals, Lantus 30U qhs, moderate ISS   -continue home meds: atorvastatin 40mg qhs  6. Collateral: defer to primary team  7. Disposition: when stable   Patient is a 61yo male, domiciled, unemployed, PPH depression, anxiety, one prior inpatient admission at [facility] (9/3-10/16/2020 for depression, SI, alcohol use), denies prior SA, PMHx COVID-19 5/2020, admitted to the [facility] ICU on 3/26/21 with DKA, then found with RLL lung mass-like lesion measuring 4.5 cm, bronchoscopy w/BAL on 4/1 grew +cultures w/Streptococcus parasanguinis, transferred to [facility] CTICU on 4/4 for management of hemoptysis and lung abscess, s/p teeth extractions on 4/7. PMHx also includes Hepatitis C, BPH, HLD, HTN, uncontrolled DM II, OA, ETOH use (sober 1 yr), hx Opiate dependence ([id number])Pt was medically cleared and transferred to [facility] for safety and sx stabilization given Pt's recent vague SI, agitation, and inability to safety plan. Reviewed notes from [facility] Psychiatry CL team.     Working diagnosis is acute exacerbation of depression in setting of acute medical condition and psychosocial stressors (eg finances, paying rent), vs adjustment disorder. Pt follows up with an outpatient psychiatrist at the [facility] ([phone]) - defer to primary team for collateral info.    Currently, Pt appears irritable, is superficially cooperative on interview and minimizing his Sx as observed at [facility]. For example, he insists that he made vague suicidal statements out of frustration. Currently denies passive and active SIIP/HIIP, contracts for safety appropriately. Pt states he is adherent with medications Zoloft 150mg and trazodone 150mg at home, and is open for medication optimization. Continued inpatient psychiatric hospitalization required for assessment, safety and symptom stabilization.    Plan:  1. Legal: Admitted on 9.27 involuntary  2. Safety: No reported SI/SIB/HI/VI currently on unit; continue routine observation  3. Psychiatric:   -increase Zoloft 150mg to 200mg qd   -continue trazodone 150mg qhs for insomnia   -PRN agitation: Haldol 2mg q6 PO/IM -- preferred over Zyprexa given Pt's uncontrolled DM  -PRN pain Tylenol -- avoid NSAID given recent HARRISON  4. Therapy: Group & milieu therapy as tolerated  5. Medical: No CPAP  -Continue 3-week ABx course for lung abscess: cefuroxime 500mg q12hr and metronidazole 500mg tid  -DM: Ademlog 10U tid before meals, Lantus 30U qhs, moderate ISS   -continue home meds: atorvastatin 40mg qhs, tamsulosin 0.4mg qhs  -Carb Consistent, soft diet - no seafood  6. Collateral: defer to primary team  7. Disposition: when stable

## 2021-04-12 NOTE — PROGRESS NOTE ADULT - SUBJECTIVE AND OBJECTIVE BOX
[facility] Division of Hospital Medicine  Reji Wisdom DO  Pager (ADITYA, 2O-5P): [phone]    Patient is a 60y old  Male who presents with a chief complaint of Hemoptysis (12 Apr 2021 10:20)    SUBJECTIVE / OVERNIGHT EVENTS: Pt remains upset, irritable, irate that he was given only 1 pancake for breakfast.  Denies further hemoptysis, denies CP/SOB.  Remains afebrile, "I'm sick of people sitting in a room with me and I'm sick of staring at a wall all day!"     MEDICATIONS  (STANDING):  atorvastatin 40 milliGRAM(s) Oral at bedtime  cefTRIAXone   IVPB 1000 milliGRAM(s) IV Intermittent every 24 hours  dextrose 50% Injectable 25 Gram(s) IV Push once  enoxaparin Injectable 40 milliGRAM(s) SubCutaneous daily  gabapentin 200 milliGRAM(s) Oral every 8 hours  insulin glargine Injectable (LANTUS) 30 Unit(s) SubCutaneous at bedtime  insulin lispro (ADMELOG) corrective regimen sliding scale   SubCutaneous three times a day before meals  insulin lispro (ADMELOG) corrective regimen sliding scale   SubCutaneous at bedtime  insulin lispro Injectable (ADMELOG) 10 Unit(s) SubCutaneous before breakfast  insulin lispro Injectable (ADMELOG) 10 Unit(s) SubCutaneous before lunch  insulin lispro Injectable (ADMELOG) 10 Unit(s) SubCutaneous before dinner  magnesium oxide 400 milliGRAM(s) Oral three times a day with meals  magnesium sulfate  IVPB 2 Gram(s) IV Intermittent once  metroNIDAZOLE  IVPB 500 milliGRAM(s) IV Intermittent every 8 hours  pantoprazole    Tablet 40 milliGRAM(s) Oral before breakfast  sertraline 150 milliGRAM(s) Oral daily  tamsulosin 0.4 milliGRAM(s) Oral at bedtime  traZODone 150 milliGRAM(s) Oral at bedtime    MEDICATIONS  (PRN):  acetaminophen   Tablet .. 650 milliGRAM(s) Oral every 6 hours PRN Mild Pain (1 - 3)  benzonatate 100 milliGRAM(s) Oral every 8 hours PRN Cough  cyclobenzaprine 5 milliGRAM(s) Oral three times a day PRN Muscle Spasm  HYDROmorphone   Tablet 2 milliGRAM(s) Oral every 4 hours PRN Severe Pain (7 - 10)  OLANZapine Injectable 2.5 milliGRAM(s) IntraMuscular every 6 hours PRN Severe Agitation  QUEtiapine 25 milliGRAM(s) Oral every 6 hours PRN agitation      CAPILLARY BLOOD GLUCOSE      POCT Blood Glucose.: 241 mg/dL (12 Apr 2021 11:48)  POCT Blood Glucose.: 176 mg/dL (12 Apr 2021 08:47)  POCT Blood Glucose.: 171 mg/dL (11 Apr 2021 21:20)  POCT Blood Glucose.: 142 mg/dL (11 Apr 2021 17:37)    I&O's Summary      PHYSICAL EXAM:  Vital Signs Last 24 Hrs  T(C): 37.3 (12 Apr 2021 06:15), Max: 37.3 (12 Apr 2021 06:15)  T(F): 99.1 (12 Apr 2021 06:15), Max: 99.1 (12 Apr 2021 06:15)  HR: 89 (12 Apr 2021 06:15) (80 - 89)  BP: 113/71 (12 Apr 2021 06:15) (113/71 - 144/69)  BP(mean): --  RR: 18 (12 Apr 2021 06:15) (18 - 18)  SpO2: 97% (12 Apr 2021 06:15) (97% - 99%)    CONSTITUTIONAL: NAD, well-developed, well-groomed  EYES: PERRLA; conjunctiva and sclera clear  RESPIRATORY: Normal respiratory effort; lungs are clear to auscultation bilaterally  CARDIOVASCULAR: Regular rate and rhythm, normal S1 and S2, no murmur/rub/gallop; No lower extremity edema  ABDOMEN: Nontender to palpation, normoactive bowel sounds, no rebound/guarding  MUSCLOSKELETAL:  Normal gait; no clubbing or cyanosis of digits; no joint swelling or tenderness to palpation  PSYCH: A+O to person, place; labile mood   NEUROLOGY: CN 2-12 are intact and symmetric; no gross sensory deficits; no motor deficits    SKIN: No rashes; no palpable lesions    LABS:                        11.8   10.24 )-----------( 530      ( 12 Apr 2021 07:21 )             36.1     04-12    134<L>  |  101  |  8   ----------------------------<  186<H>  3.8   |  20<L>  |  0.75    Ca    9.9      12 Apr 2021 07:21  Phos  3.4     04-12  Mg     1.3     04-12      RADIOLOGY & ADDITIONAL TESTS:  Results Reviewed:   < from: CT Angio Chest w/ IV Cont (04.04.21 @ 15:21) >  IMPRESSION:      When compared with March 30, 2021:    Small focus of contrast extravasation within the right lower lobe cavitary lesion, best seen on series 4 image 339, which may reflect a small pseudoaneurysm or bleeding given the history of hemoptysis.    Interval decrease in size of cavitary component of right lower lobe consolidation seen on previous exam. New small focus of consolidation in the right lower lobe medially which may reflect an additional area of infection.    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      < end of copied text >

## 2021-04-12 NOTE — BH INPATIENT PSYCHIATRY ASSESSMENT NOTE - NSBHMETABOLIC_PSY_ALL_CORE_FT
BMI: BMI (kg/m2): 30 (04-04-21 @ 19:02)  HbA1c: A1C with Estimated Average Glucose Result: >15.5 % (03-27-21 @ 05:00)    Glucose: POCT Blood Glucose.: 241 mg/dL (04-12-21 @ 11:48)    BP: --  Lipid Panel: Date/Time: 10-07-20 @ 08:06  Cholesterol, Serum: 109  Direct LDL: 45  HDL Cholesterol, Serum: 56  Total Cholesterol/HDL Ration Measurement: --  Triglycerides, Serum: 88

## 2021-04-12 NOTE — BH INPATIENT PSYCHIATRY ASSESSMENT NOTE - CASE SUMMARY
Pt with depression, irritability, multiple recent medical problems, will be admitted to reduce overall risk of harm to self. Denies active SI at this time.

## 2021-04-12 NOTE — PROGRESS NOTE ADULT - ASSESSMENT
60 year old male who had COVID-19 in May 2020, the admitted to [facility] ICU on 3/26/21 with DKA after presenting to the ER c/o weakness, N/V, abdominal pain and chest pain and being found to have very elevated blood glucose levels, BUN, and Creatinine, then transferred to [facility] for management of hemoptysis and lung abscess. Consult called for management of new severely uncontrolled DM2 with DKA on admission. A1c >15.5%. BG goal 100-180 mg/dL.

## 2021-04-12 NOTE — BH INPATIENT PSYCHIATRY ASSESSMENT NOTE - NSBHCHARTREVIEWINVESTIGATE_PSY_A_CORE FT
< from: 12 Lead ECG (03.26.21 @ 13:22) >      Ventricular Rate 72 BPM    Atrial Rate 72 BPM    P-R Interval 138 ms    QRS Duration 104 ms    Q-T Interval 418 ms    QTC Calculation(Bazett) 457 ms    P Axis 24 degrees    R Axis 3 degrees    T Axis -44 degrees    Diagnosis Line Sinus rhythm with marked sinus arrhythmia  Nonspecific T wave abnormality  Abnormal ECG  When compared with ECG of 14-JAN-2020 16:08,  Non-specific change in ST segment in Inferior leads  Inverted T waves have replaced nonspecific T wave abnormality in Inferior leads  Nonspecific T wave abnormality now evident in Lateral leads  Confirmed by MANSI MENENDEZ, JEREMY MANZO ([id number]) on 3/27/2021 8:50:04 AM    < end of copied text >

## 2021-04-12 NOTE — PROGRESS NOTE ADULT - ASSESSMENT
60y male with hx DM presented for eval of weakness, N/V, abd pain, chest pain. Patient reports several days of symptoms. He had cough for about 2 weeks, no fevers. CT Chest with mass like structure to RLL and CT Abd/Pel with CBD dilation and air in biliary tree. He was started on IV abx. Concern raised possible lung abscess. Pt initially denied any dental problems, he now admits to loose tooth.  He had a CT scan of chest in December 2020 with no rt sided infiltrates, therefore this is new c/w December 2020.  He did not have any hemoptysis but does report atypical chest/abdominal pain.  PC level is less than .08, his CRP is 112 and esr is over 100.  The rt flank/RUQ pain is hard to correlate with pneumonia.He does not seem to have pleural involvement  He received 2 grams of azithro, now stopped  Legionella urine antigen and MRSA nasal screen are negative, sputum culture with MURF  Repeat CT scan now with development of A/F level. Additional anaerobic coverage added 3/30 with metronidazole  Pt was getting ready for discharge on PO Augmentin, but developed mild hemoptysis. CT Angio revealed  a small focus of contrast extravasation within the cavitary lesion, which was possibly a small pseudoaneurysm or an active extravasation with the history of hemoptysis.    Hence, pt was transferred to the [facility] CTICU for observation and possible intervention. Dentistry was consulted and s/p dental extraction on 4/7.   Patient continued to have hemoptysis yet Hct has been stable.   IR was consulted for hemoptysis with right lung pulm artery vs. bronchial artery pseudoaneurysm, yet after discussion with primary team, no intervention was done  He has had no further hemoptysis  His pain has been difficult to correlate with pulmonary process  HIDA was normal at [facility]  PLAN:  continue CTX and metronidazole day 17 now  Appreciate thoracic surgery input, s/p bronch at , no endobronchial lesions  stepdown to oral Ceftin and flagyl for another 3 weeks, when ready for discharge  Awaiting transfer to psych rehab

## 2021-04-12 NOTE — PROGRESS NOTE ADULT - NUTRITIONAL ASSESSMENT
This patient has been assessed with a concern for Malnutrition and has been determined to have a diagnosis/diagnoses of Severe protein-calorie malnutrition.    This patient is being managed with:   Diet Soft-  Consistent Carbohydrate {No Snacks} (CSTCHO)  Entered: Apr 7 2021  4:42PM

## 2021-04-12 NOTE — PROGRESS NOTE ADULT - ASSESSMENT
60M w/ HTN, ETOH abuse, GERD, HLD, BPH, DMII, COVID May 2020, admitted to [facility] ICU on 3/26/21 w/DKA, RLL lung mass-like lesion measuring 4.5 cm on a chest CT scan and a dilated extrahepatic CBD with cholelithiasis and pneumobilia.  A repeat CT scan revealed enlargement of the RLL lesion with air fluid levels. Bronchoscopy w/BAL on 4/1 --> cultures w/Streptococcus parasanguinis. C/b hemoptysis w/CT revealing small focus of contrast extravasation within the cavitary lesion. Pt transferred to [facility] CTICU on 4/4 for management of hemoptysis and lung abscess. S/p teeth extractions on 4/7.

## 2021-04-12 NOTE — BH INPATIENT PSYCHIATRY ASSESSMENT NOTE - NSBHCHARTREVIEWVS_PSY_A_CORE FT
Vital Signs Last 24 Hrs  T(C): 37 (12 Apr 2021 12:00), Max: 37.3 (12 Apr 2021 06:15)  T(F): 98.6 (12 Apr 2021 12:00), Max: 99.1 (12 Apr 2021 06:15)  HR: 92 (12 Apr 2021 12:00) (80 - 92)  BP: 120/78 (12 Apr 2021 12:00) (113/71 - 144/69)  BP(mean): --  RR: 18 (12 Apr 2021 12:00) (18 - 18)  SpO2: 97% (12 Apr 2021 12:00) (97% - 99%)

## 2021-04-12 NOTE — BH INPATIENT PSYCHIATRY ASSESSMENT NOTE - HPI (INCLUDE ILLNESS QUALITY, SEVERITY, DURATION, TIMING, CONTEXT, MODIFYING FACTORS, ASSOCIATED SIGNS AND SYMPTOMS)
Patient is a 61yo male, domiciled, unemployed, PPH depression, anxiety, one prior inpatient admission at [facility] (9/3-10/16/2020 for depression, SI, alcohol use), denies prior SA, PMHx COVID-19 5/2020, admitted to the [facility] ICU on 3/26/21 with DKA, then found with RLL lung mass-like lesion measuring 4.5 cm, bronchoscopy w/BAL on 4/1 grew +cultures w/Streptococcus parasanguinis, transferred to [facility] CTICU on 4/4 for management of hemoptysis and lung abscess, s/p teeth extractions on 4/7. PMHx also includes Hepatitis C, BPH, HLD, HTN, uncontrolled DM II, OA, ETOH use (sober 1 yr), hx Opiate dependence ([id number])Pt was medically cleared and transferred to [facility] for safety and sx stabilization given Pt's recent vague SI, agitation, and inability to safety plan. Reviewed notes from [facility] Psychiatry [facility] team.     On the unit, Pt appears irritable, and insists that he made SI statements out of frustration. He reports poor mood in the setting of acute medical problems, including neck pain and teeth extraction. He expresses his frustration with current admission to psychiatric hospital, concerned that he will lose his apartment in [place] as he has not paid rent since his hospital course at [facility] in late March. Reports he follows up with an outpatient psychiatrist at the [facility] ([phone]). Pt states he is adherent with medications Zoloft 150mg and trazodone 150mg at home. He denies current SIIP and HIIP.

## 2021-04-12 NOTE — BH INPATIENT PSYCHIATRY ASSESSMENT NOTE - NSICDXPASTMEDICALHX_GEN_ALL_CORE_FT
PAST MEDICAL HISTORY:  2019 novel coronavirus disease (COVID-19) 5/2020    Back pain Chronic- mid and upper back    Benign hypertension     BPH associated with nocturia     Cholelithiasis Discovered at [facility] 3/2021    Diverticulosis Discovered at [facility] 3/2021    DKA (diabetic ketoacidosis) [facility] 3/2021    Hepatitis C in remission    HLD (hyperlipidemia)     Insomnia     Lung abscess RLL found at [facility] 3/2021    OA (osteoarthritis) BL shoulders    Thyroid nodule Found at [facility] 3/2021    Type 2 diabetes mellitus without complication, without long-term current use of insulin Newly diagnosed at [facility] 3/2021

## 2021-04-12 NOTE — BH PATIENT PROFILE - NSSBIRTOFTENALCOHOL_GEN_A_CORE

## 2021-04-12 NOTE — PROGRESS NOTE ADULT - PROBLEM SELECTOR PROBLEM 3
Hyperlipidemia, unspecified hyperlipidemia type
Uncontrolled type 2 diabetes mellitus with hyperglycemia
Uncontrolled type 2 diabetes mellitus with hyperglycemia
Hyperlipidemia, unspecified hyperlipidemia type
Hyperlipidemia, unspecified hyperlipidemia type
Uncontrolled type 2 diabetes mellitus with hyperglycemia

## 2021-04-12 NOTE — PROGRESS NOTE ADULT - PROBLEM SELECTOR PROBLEM 1
Uncontrolled type 2 diabetes mellitus with hyperglycemia
Abscess of lower lobe of right lung with pneumonia
Uncontrolled type 2 diabetes mellitus with hyperglycemia
Abscess of lower lobe of right lung with pneumonia
Uncontrolled type 2 diabetes mellitus with hyperglycemia
Abscess of lower lobe of right lung with pneumonia

## 2021-04-12 NOTE — PROGRESS NOTE ADULT - SUBJECTIVE AND OBJECTIVE BOX
Chief Complaint: f/u DM2    History:  Patient seen at bedside this afternoon. Majority of history obtained from patient's nurse and PCA at bedside as patient was not fully participating in interview. Asked him how he was feeling, he continued to complain of pain, particularly in the neck, and asked that he be given papers to be released from the hospital so that he can see another doctor.    Per nursing staff, he has been intermittently refusing insulin and staff has been unable to teach him how to inject insulin.    Per PCA at bedside, he did eat his breakfast this morning, unclear if he had his lunch.    MEDICATIONS  (STANDING):  atorvastatin 40 milliGRAM(s) Oral at bedtime  cefTRIAXone   IVPB 1000 milliGRAM(s) IV Intermittent every 24 hours  dextrose 50% Injectable 25 Gram(s) IV Push once  enoxaparin Injectable 40 milliGRAM(s) SubCutaneous daily  gabapentin 200 milliGRAM(s) Oral every 8 hours  insulin glargine Injectable (LANTUS) 30 Unit(s) SubCutaneous at bedtime  insulin lispro (ADMELOG) corrective regimen sliding scale   SubCutaneous three times a day before meals  insulin lispro (ADMELOG) corrective regimen sliding scale   SubCutaneous at bedtime  insulin lispro Injectable (ADMELOG) 10 Unit(s) SubCutaneous before dinner  insulin lispro Injectable (ADMELOG) 10 Unit(s) SubCutaneous before breakfast  insulin lispro Injectable (ADMELOG) 10 Unit(s) SubCutaneous before lunch  magnesium oxide 400 milliGRAM(s) Oral three times a day with meals  metroNIDAZOLE  IVPB 500 milliGRAM(s) IV Intermittent every 8 hours  pantoprazole    Tablet 40 milliGRAM(s) Oral before breakfast  sertraline 150 milliGRAM(s) Oral daily  tamsulosin 0.4 milliGRAM(s) Oral at bedtime  traZODone 150 milliGRAM(s) Oral at bedtime    MEDICATIONS  (PRN):  acetaminophen   Tablet .. 650 milliGRAM(s) Oral every 6 hours PRN Mild Pain (1 - 3)  benzonatate 100 milliGRAM(s) Oral every 8 hours PRN Cough  cyclobenzaprine 5 milliGRAM(s) Oral three times a day PRN Muscle Spasm  HYDROmorphone   Tablet 2 milliGRAM(s) Oral every 4 hours PRN Severe Pain (7 - 10)  OLANZapine Injectable 2.5 milliGRAM(s) IntraMuscular every 6 hours PRN Severe Agitation  QUEtiapine 25 milliGRAM(s) Oral every 6 hours PRN agitation      Allergies  No Known Drug Allergies  shellfish (Unknown)    Review of Systems:  UNABLE TO OBTAIN full ROS as patient not participating in interview    PHYSICAL EXAM:  VITALS: T(C): 37 (04-12-21 @ 12:00)  T(F): 98.6 (04-12-21 @ 12:00), Max: 99.1 (04-12-21 @ 06:15)  HR: 92 (04-12-21 @ 12:00) (80 - 92)  BP: 120/78 (04-12-21 @ 12:00) (113/71 - 144/69)  RR:  (18 - 18)  SpO2:  (97% - 99%)  Wt(kg): --  GENERAL: agitated, well-developed  EYES: No proptosis, anicteric  HEENT:  Atraumatic, Normocephalic  RESPIRATORY: + air movement bilaterally, no respiratory distress   PSYCH: angry affect     POCT Blood Glucose.: 241 mg/dL (04-12-21 @ 11:48) A 10, A 4  POCT Blood Glucose.: 176 mg/dL (04-12-21 @ 08:47) no admelog   POCT Blood Glucose.: 171 mg/dL (04-11-21 @ 21:20) L 30   POCT Blood Glucose.: 142 mg/dL (04-11-21 @ 17:37) A 10   POCT Blood Glucose.: 256 mg/dL (04-11-21 @ 12:08) A 10, A 6  POCT Blood Glucose.: 148 mg/dL (04-11-21 @ 08:19) A 10  POCT Blood Glucose.: 211 mg/dL (04-10-21 @ 22:57)  POCT Blood Glucose.: 109 mg/dL (04-10-21 @ 17:17)  POCT Blood Glucose.: 208 mg/dL (04-10-21 @ 12:19)  POCT Blood Glucose.: 128 mg/dL (04-10-21 @ 08:38)  POCT Blood Glucose.: 213 mg/dL (04-09-21 @ 21:31)  POCT Blood Glucose.: 177 mg/dL (04-09-21 @ 16:59)      04-12    134<L>  |  101  |  8   ----------------------------<  186<H>  3.8   |  20<L>  |  0.75    EGFR if : 116  EGFR if non : 100    Ca    9.9      04-12  Mg     1.3     04-12  Phos  3.4     04-12

## 2021-04-12 NOTE — BH PATIENT PROFILE - NSICDXPASTMEDICALHX_GEN_ALL_CORE_FT
PAST MEDICAL HISTORY:  2019 novel coronavirus disease (COVID-19) 5/2020    Back pain Chronic- mid and upper back    Benign hypertension     BPH associated with nocturia     Cholelithiasis Discovered at [facility] 3/2021    Diverticulosis Discovered at [facility] 3/2021    DKA (diabetic ketoacidosis) [facility] 3/2021    Hepatitis C in remission    HLD (hyperlipidemia)     Insomnia     Lung abscess RLL found at [facility] 3/2021    OA (osteoarthritis) BL shoulders    Thyroid nodule Found at [facility] 3/2021    Type 2 diabetes mellitus without complication, without long-term current use of insulin Newly diagnosed at [facility] 3/2021

## 2021-04-12 NOTE — BH PATIENT PROFILE - NSPROSPHOSPCHAPLAINYN_GEN_A_NUR
DEXA SCAN: 10/16/2017 



CLINICAL INDICATION: Postmenopausal.



TECHNIQUE: Dual energy x-ray absorptiometry (DXA) was performed on a Vortal 
system. Regions measured are the AP spine, femoral neck, and, if needed, 
forearm.



COMPARISON: None. In accordance with the International Society for Clinical 
Densitometry (ISCD) guidelines, data from previous exams may be reanalyzed 
using current recommendations and techniques. This is done to allow a more 
accurate basis for comparison with the current study.



FINDINGS

The data for the lumbar spine is as follows:







REGION BMD (g/cm/cm) T-SCORE Z-SCORE

 

L1 0.917 -1.8 0.0

 

L2 1.025 -1.5 0.3

 

L3 1.157 -0.4 1.4

 

L4 1.134 -0.6 1.2

 

TOTAL 1.073 -0.9 0.9



NOTE: All evaluable vertebrae are used for classification.





The data for the hip is as follows:







REGION BMD (g/cm/cm) T-SCORE Z-SCORE

 

Neck 0.720 -2.3 0.0

 

TOTAL 0.706 -2.4 -0.2



NOTE: The femoral neck or total proximal femur, whichever is lowest, is used 
for classification.





IMPRESSION: THE WHO CLASSIFICATION BASED ON THE INTERNATIONAL REFERENCE 
STANDARD IS OSTEOPENIA. THE FRACTURE RISK IS INCREASED.



RECOMMENDATION: Patients with diagnosis of osteoporosis or osteopenia should 
have regular bone mineral density assessment. For those eligible for Medicare, 
routine testing is allowed once every 2 years. Testing frequency can be 
increased for patients who have rapidly progressing disease or for those who 
are receiving medical therapy to restore bone mass. 



COMMENT: World Health Organization (WHO) definitions for osteoporosis and 
osteopenia:

NORMAL BMD: T-score at -1.0 or higher, fracture risk is low.

OSTEOPENIA BMD: T-score between -1.0 and -2.5, fracture risk is increased.

OSTEOPOROSIS BMD: T-score at -2.5 or lower, fracture risk high.



National Osteoporosis Foundation recommends:

1. Obtain adequate dietary calcium (at least 1200 mg per day) and vitamin D (400
-800 international units per day).

2. Participate, as appropriate, in regular weightbearing and muscle-
strengthening exercise. 

3. Avoid tobacco use and reduce alcohol and caffeine intake. 

4. For more detailed information see the website at www.NOF.org.
MTDD
no

## 2021-04-12 NOTE — PROGRESS NOTE ADULT - NSICDXPILOT_GEN_ALL_CORE
[place]

## 2021-04-12 NOTE — BH INPATIENT PSYCHIATRY ASSESSMENT NOTE - CURRENT MEDICATION
MEDICATIONS  (STANDING):  atorvastatin 40 milliGRAM(s) Oral at bedtime  cefuroxime   Tablet 500 milliGRAM(s) Oral every 12 hours  dextrose 40% Gel 15 Gram(s) Oral once  glucagon  Injectable 1 milliGRAM(s) IntraMuscular once  insulin glargine Injectable (LANTUS) 30 Unit(s) SubCutaneous at bedtime  insulin lispro (ADMELOG) corrective regimen sliding scale   SubCutaneous three times a day before meals  insulin lispro (ADMELOG) corrective regimen sliding scale   SubCutaneous at bedtime  insulin lispro Injectable (ADMELOG) 10 Unit(s) SubCutaneous three times a day before meals  metroNIDAZOLE    Tablet 500 milliGRAM(s) Oral three times a day  tamsulosin 0.4 milliGRAM(s) Oral at bedtime  traZODone 150 milliGRAM(s) Oral at bedtime    MEDICATIONS  (PRN):  acetaminophen   Tablet .. 650 milliGRAM(s) Oral every 6 hours PRN Temp greater or equal to 38C (100.4F), Mild Pain (1 - 3)  haloperidol     Tablet 2 milliGRAM(s) Oral every 6 hours PRN agitation  haloperidol    Injectable 2 milliGRAM(s) IntraMuscular once PRN severe agitation

## 2021-04-12 NOTE — BH CONSULTATION LIAISON PROGRESS NOTE - CURRENT MEDICATION
"  Preparing for Your Surgery  Getting started  A surgery nurse will call you to review your health history and instructions. They will give you an arrival time based on your scheduled surgery time.  Please be ready to share the following:    Your doctor's clinic name and phone number    Your medical, surgical and anesthesia history    A list of allergies and sensitivities    A list of medicines, including herbal treatments and over-the-counter drugs    Whether the patient has a legal guardian (ask how to send us the papers in advance)  If your child is having surgery, please ask for a copy of Preparing for Your Child's Surgery.    Preparing for surgery    Within 30 days of surgery: Have an exam at your family clinic (primary care clinic), or go to a pre-operative clinic. This exam is called a \"History and Physical,\" or H&P.    At your H&P exam, talk to your care team about all medicines you take. If you need to stop any medicines before surgery, ask when to start taking them again.  ? We do this for your safety. Many medicines can make you bleed too much during surgery. Some change how well surgery (anesthesia) drugs work.    Call your insurance company to see what it will and won't pay for. Ask if they need to pre-approve the surgery. (If no insurance, call [phone].)    Call your surgeon's clinic if there's any change in your health. This includes signs of a cold or flu (sore throat, runny nose, cough, rash, fever). It also includes a scrape or scratch near the surgery site.    If you have questions on the day of surgery, call your surgery center.  Eating and drinking guidelines  For your safety: Unless your surgeon tells you otherwise, follow the guidelines below.    Eat and drink as usual until 8 hours before surgery. After that, no food or milk.    Drink clear liquids until 2 hours before surgery. These are liquids you can see through, like water, Gatorade and Propel Water. You may also have black coffee "
and tea (no cream or milk).    Nothing by mouth within 2 hours of surgery. This includes gum, candy and breath mints.    Stop alcohol the midnight before surgery.    If your family clinic tells you to take medicine on the morning of surgery, it's okay to take it with a sip of water.  Preventing infection    Shower or bathe the night before and morning of your surgery. Follow the instructions your clinic gave you. (If no instructions, use regular soap.)    Don't shave or clip hair near your surgery site. This can lead to skin infection.    Don't smoke the morning of surgery. Smoking increases the risk of infection. You may chew nicotine gum up to 2 hours before surgery. A nicotine patch is okay.  ? Note: Some surgeries require you to completely quit smoking and nicotine. Check with your surgeon.    Your care team will make every effort to keep you safe from infection. We will:  ? Clean our hands often with soap and water (or an alcohol-based hand rub).  ? Clean the skin at your surgery site with a special soap that kills germs. We'll also remove hair from the site as needed.  ? Wear special hair covers, masks, gowns and gloves during surgery.  ? Give antibiotic medicine, if prescribed. Not all surgeries need antibiotics.  What to bring on the day of surgery    Photo ID and insurance card    Copy of your health care directive, if you have one    Glasses and hearing aides (bring cases)  ? You can't wear contacts during surgery    Inhaler and eye drops, if you use them (tell us about these when you arrive)    CPAP machine or breathing device, if you use them    A few personal items, if spending the night    If you have . . .  ? A pacemaker or ICD (cardiac defibrillator): Bring the ID card.  ? An implanted stimulator: Bring the remote control.  ? A legal guardian: Bring a copy of the certified (court-stamped) guardianship papers.  Please remove any jewelry, including body piercings. Leave jewelry and other valuables at 
home.  If you're going home the day of surgery  Important: If you don't follow the rules below, we must cancel your surgery.     Arrange for someone to drive you home after surgery. You may not drive, take a taxi or take public transportation by yourself (unless you'll have local anesthesia only).    Arrange for a responsible adult to stay with you overnight. If you don't, we may keep you in the hospital overnight, and you may need to pay the costs yourself.  Questions?   If you have any questions for your care team, list them here: _________________________________________________________________________________________________________________________________________________________________________________________________________________________________________________________________________________________________________________________  For informational purposes only. Not to replace the advice of your health care provider. Copyright   9117-5395 [facility]. All rights reserved. Clinically reviewed by Estefania Germain MD. SMARTworks [id number] - REV 07/19.    
MEDICATIONS  (STANDING):  atorvastatin 40 milliGRAM(s) Oral at bedtime  cefTRIAXone   IVPB 1000 milliGRAM(s) IV Intermittent every 24 hours  dextrose 50% Injectable 25 Gram(s) IV Push once  enoxaparin Injectable 40 milliGRAM(s) SubCutaneous daily  gabapentin 200 milliGRAM(s) Oral every 8 hours  insulin glargine Injectable (LANTUS) 30 Unit(s) SubCutaneous at bedtime  insulin lispro (ADMELOG) corrective regimen sliding scale   SubCutaneous three times a day before meals  insulin lispro (ADMELOG) corrective regimen sliding scale   SubCutaneous at bedtime  insulin lispro Injectable (ADMELOG) 10 Unit(s) SubCutaneous before breakfast  insulin lispro Injectable (ADMELOG) 10 Unit(s) SubCutaneous before lunch  insulin lispro Injectable (ADMELOG) 10 Unit(s) SubCutaneous before dinner  lidocaine   Patch 1 Patch Transdermal once  magnesium oxide 400 milliGRAM(s) Oral three times a day with meals  metroNIDAZOLE  IVPB 500 milliGRAM(s) IV Intermittent every 8 hours  pantoprazole    Tablet 40 milliGRAM(s) Oral before breakfast  sertraline 150 milliGRAM(s) Oral daily  tamsulosin 0.4 milliGRAM(s) Oral at bedtime  traZODone 150 milliGRAM(s) Oral at bedtime    MEDICATIONS  (PRN):  acetaminophen   Tablet .. 650 milliGRAM(s) Oral every 6 hours PRN Mild Pain (1 - 3)  benzonatate 100 milliGRAM(s) Oral every 8 hours PRN Cough  cyclobenzaprine 5 milliGRAM(s) Oral three times a day PRN Muscle Spasm  HYDROmorphone   Tablet 2 milliGRAM(s) Oral every 4 hours PRN Severe Pain (7 - 10)  OLANZapine Injectable 2.5 milliGRAM(s) IntraMuscular every 6 hours PRN Severe Agitation  QUEtiapine 25 milliGRAM(s) Oral every 6 hours PRN agitation  
MEDICATIONS  (STANDING):  atorvastatin 40 milliGRAM(s) Oral at bedtime  dextrose 50% Injectable 25 Gram(s) IV Push once  enoxaparin Injectable 40 milliGRAM(s) SubCutaneous daily  gabapentin 200 milliGRAM(s) Oral every 8 hours  insulin glargine Injectable (LANTUS) 30 Unit(s) SubCutaneous at bedtime  insulin lispro (ADMELOG) corrective regimen sliding scale   SubCutaneous three times a day before meals  insulin lispro (ADMELOG) corrective regimen sliding scale   SubCutaneous at bedtime  insulin lispro Injectable (ADMELOG) 10 Unit(s) SubCutaneous before breakfast  insulin lispro Injectable (ADMELOG) 10 Unit(s) SubCutaneous before lunch  insulin lispro Injectable (ADMELOG) 10 Unit(s) SubCutaneous before dinner  metroNIDAZOLE  IVPB 500 milliGRAM(s) IV Intermittent every 8 hours  pantoprazole    Tablet 40 milliGRAM(s) Oral before breakfast  sertraline 150 milliGRAM(s) Oral daily  tamsulosin 0.4 milliGRAM(s) Oral at bedtime  traZODone 150 milliGRAM(s) Oral at bedtime    MEDICATIONS  (PRN):  acetaminophen   Tablet .. 650 milliGRAM(s) Oral every 6 hours PRN Mild Pain (1 - 3)  benzonatate 100 milliGRAM(s) Oral every 8 hours PRN Cough  HYDROmorphone   Tablet 2 milliGRAM(s) Oral every 4 hours PRN Severe Pain (7 - 10)  OLANZapine Injectable 2.5 milliGRAM(s) IntraMuscular every 6 hours PRN Severe Agitation  
MEDICATIONS  (STANDING):  atorvastatin 40 milliGRAM(s) Oral at bedtime  dextrose 50% Injectable 25 Gram(s) IV Push once  enoxaparin Injectable 40 milliGRAM(s) SubCutaneous daily  gabapentin 200 milliGRAM(s) Oral every 8 hours  insulin glargine Injectable (LANTUS) 30 Unit(s) SubCutaneous at bedtime  insulin lispro (ADMELOG) corrective regimen sliding scale   SubCutaneous three times a day before meals  insulin lispro (ADMELOG) corrective regimen sliding scale   SubCutaneous at bedtime  insulin lispro Injectable (ADMELOG) 10 Unit(s) SubCutaneous before breakfast  insulin lispro Injectable (ADMELOG) 10 Unit(s) SubCutaneous before lunch  insulin lispro Injectable (ADMELOG) 10 Unit(s) SubCutaneous before dinner  metroNIDAZOLE  IVPB 500 milliGRAM(s) IV Intermittent every 8 hours  pantoprazole    Tablet 40 milliGRAM(s) Oral before breakfast  sertraline 150 milliGRAM(s) Oral daily  tamsulosin 0.4 milliGRAM(s) Oral at bedtime  traZODone 150 milliGRAM(s) Oral at bedtime    MEDICATIONS  (PRN):  acetaminophen   Tablet .. 650 milliGRAM(s) Oral every 6 hours PRN Mild Pain (1 - 3)  benzonatate 100 milliGRAM(s) Oral every 8 hours PRN Cough  cyclobenzaprine 5 milliGRAM(s) Oral three times a day PRN Muscle Spasm  HYDROmorphone   Tablet 2 milliGRAM(s) Oral every 4 hours PRN Severe Pain (7 - 10)  OLANZapine Injectable 2.5 milliGRAM(s) IntraMuscular every 6 hours PRN Severe Agitation  QUEtiapine 25 milliGRAM(s) Oral every 6 hours PRN agitation  
MEDICATIONS  (STANDING):  atorvastatin 40 milliGRAM(s) Oral at bedtime  cefTRIAXone   IVPB 1000 milliGRAM(s) IV Intermittent every 24 hours  dextrose 50% Injectable 25 Gram(s) IV Push once  gabapentin 200 milliGRAM(s) Oral every 8 hours  heparin   Injectable 5000 Unit(s) SubCutaneous every 8 hours  insulin glargine Injectable (LANTUS) 36 Unit(s) SubCutaneous at bedtime  insulin lispro (ADMELOG) corrective regimen sliding scale   SubCutaneous three times a day before meals  insulin lispro (ADMELOG) corrective regimen sliding scale   SubCutaneous at bedtime  insulin lispro Injectable (ADMELOG) 12 Unit(s) SubCutaneous three times a day before meals  metroNIDAZOLE  IVPB 500 milliGRAM(s) IV Intermittent every 8 hours  pantoprazole    Tablet 40 milliGRAM(s) Oral before breakfast  sertraline 150 milliGRAM(s) Oral daily  tamsulosin 0.4 milliGRAM(s) Oral at bedtime  traZODone 150 milliGRAM(s) Oral at bedtime    MEDICATIONS  (PRN):  acetaminophen   Tablet .. 650 milliGRAM(s) Oral every 6 hours PRN Mild Pain (1 - 3)  benzonatate 100 milliGRAM(s) Oral every 8 hours PRN Cough  HYDROmorphone   Tablet 2 milliGRAM(s) Oral every 6 hours PRN Severe Pain (7 - 10)  
MEDICATIONS  (STANDING):  atorvastatin 40 milliGRAM(s) Oral at bedtime  cefTRIAXone   IVPB 1000 milliGRAM(s) IV Intermittent every 24 hours  dextrose 50% Injectable 25 Gram(s) IV Push once  gabapentin 200 milliGRAM(s) Oral every 8 hours  heparin   Injectable 5000 Unit(s) SubCutaneous every 8 hours  insulin glargine Injectable (LANTUS) 36 Unit(s) SubCutaneous at bedtime  insulin lispro (ADMELOG) corrective regimen sliding scale   SubCutaneous three times a day before meals  insulin lispro (ADMELOG) corrective regimen sliding scale   SubCutaneous at bedtime  insulin lispro Injectable (ADMELOG) 12 Unit(s) SubCutaneous three times a day before meals  metroNIDAZOLE  IVPB 500 milliGRAM(s) IV Intermittent every 8 hours  pantoprazole    Tablet 40 milliGRAM(s) Oral before breakfast  potassium chloride   Powder 40 milliEquivalent(s) Oral every 4 hours  sertraline 150 milliGRAM(s) Oral daily  tamsulosin 0.4 milliGRAM(s) Oral at bedtime  traZODone 150 milliGRAM(s) Oral at bedtime    MEDICATIONS  (PRN):  acetaminophen   Tablet .. 650 milliGRAM(s) Oral every 6 hours PRN Mild Pain (1 - 3)  benzonatate 100 milliGRAM(s) Oral every 8 hours PRN Cough  HYDROmorphone   Tablet 2 milliGRAM(s) Oral every 6 hours PRN Severe Pain (7 - 10)  OLANZapine Injectable 2.5 milliGRAM(s) IntraMuscular every 6 hours PRN Severe Agitation

## 2021-04-12 NOTE — BH CONSULTATION LIAISON PROGRESS NOTE - NSBHCONSULTRECOMMENDOTHER_PSY_A_CORE FT
Patient will require inpatient psychiatric admission for safety and stabilization- 2PC legals completed and placed in chart Patient will require inpatient psychiatric admission for safety and stabilization- 2PC legals completed and placed in chart  Assigned to CHIKA L4- handoff provided to NP Solages

## 2021-04-12 NOTE — PROGRESS NOTE ADULT - REASON FOR ADMISSION
Hemoptysis

## 2021-04-12 NOTE — PROGRESS NOTE ADULT - PROBLEM SELECTOR PLAN 4
- Hx of depression, previously hospitalized at [facility] from 9/3-10/16/2020  Now with passive SI  -Psych following: c/w 1:1, plan to [facility] today   -C/w Zoloft 150mg daily, Trazodone 150mg qHS  -If needed, PRN Seroquel 25mg PO q6hrs for agitation (last qtc 472ms 4/11)
Hx of depression, previously hospitalized at [facility] from 9/3-10/16/2020  Now with passive SI  -Psych following: c/w 1:1  -C/w Zoloft 150mg daily, Trazodone 150mg qHS  -If needed, PRN Seroquel 25mg PO q6hrs for agitation if qtc < 500  -Plan for transfer to inpatient [facility]- no beds available 4/9
Hx of depression, previously hospitalized at [facility] from 9/3-10/16/2020  Now with passive SI  -Psych following: c/w 1:1  -C/w Zoloft 150mg daily, Trazodone 150mg qHS  -If needed, PRN Seroquel 25mg PO q6hrs for agitation if qtc < 500  -Plan for transfer to inpatient [facility]- no beds available 4/9
Hx of depression, previously hospitalized at [facility] [place] from 9/3-10/16/2020  Now with passive SI  -Psych following: CO for SI statements   -C/w Zoloft 150mg daily, monitor platelets, Na level  -C/w Trazodone 150mg qHS  -If needed, PRN Seroquel 25mg PO q6hrs for agitation if qtc < 500
Hx of depression, previously hospitalized at [facility] from 9/3-10/16/2020  Now with passive SI  -Psych following: c/w 1:1  -C/w Zoloft 150mg daily, Trazodone 150mg qHS  -If needed, PRN Seroquel 25mg PO q6hrs for agitation if qtc < 500  -Plan for transfer to inpatient [facility]- no beds available 4/9

## 2021-04-12 NOTE — BH PATIENT PROFILE - HOME MEDICATIONS
Ceftin 500 mg oral tablet , 1 tab(s) orally every 12 hours  metroNIDAZOLE 500 mg oral tablet , 1 tab(s) orally 3 times a day  QUEtiapine 25 mg oral tablet , 1 tab(s) orally every 6 hours, As needed, agitation  OLANZapine 10 mg intramuscular injection , 2.5 milligram(s) intramuscular every 6 hours, As needed, Severe Agitation  insulin glargine , 30 unit(s) injectable once a day (at bedtime)  gabapentin 100 mg oral capsule , 2 cap(s) orally every 8 hours  hydrocodone-homatropine 5 mg-1.5 mg/5 mL oral syrup , 5 milliliter(s) orally every 6 hours  pantoprazole 40 mg oral delayed release tablet , 1 tab(s) orally once a day (before a meal)  cyclobenzaprine 5 mg oral tablet , 1 tab(s) orally 2 times a day, As needed, Muscle Spasm  benzonatate 100 mg oral capsule , 2 cap(s) orally 3 times a day  atorvastatin 40 mg oral tablet , 1 tab(s) orally once a day (at bedtime)  insulin lispro 100 units/mL injectable solution , 10 unit(s) injectable 3 times a day (before meals)  traZODone 150 mg oral tablet , 1 tab(s) orally once a day (at bedtime)  sertraline 50 mg oral tablet , 3 tab(s) orally once a day  tamsulosin 0.4 mg oral capsule , 1 cap(s) orally once a day (at bedtime)  traMADol 50 mg oral tablet , 1 tab(s) orally every 8 hours, As needed, Moderate Pain (4 - 6)  acetaminophen 325 mg oral tablet , 2 tab(s) orally every 6 hours, As needed, Temp greater or equal to 38C (100.4F), Mild Pain (1 - 3)

## 2021-04-12 NOTE — BH CONSULTATION LIAISON PROGRESS NOTE - NSBHFUPINTERVALHXFT_PSY_A_CORE
Patient seen in follow up for depression and SI. Patient became increasingly irritable and agitated over the weekend, required olanzapine 2.5mg IM x 2 doses in the last 24 hrs. This morning, patient is found reclining in bed. When writer announces that she is from the psychiatry department, patient waves his hand and says "go away." Patient does answer few questions, however is noted to be irritable and oppositional throughout, states "I'm made because I'm staring at a wall and because someone is always watching me." Patient also repeats several times "the more I say, the longer I'll stay here." Patient declines to discuss his mood and SI. Patient laughs as he tells writer, "you think you're gonna send me to [facility], don't you?" Interview terminated as patient sits upright in bed and begins yelling at writer to "stop talking and leave me alone!" Patient seen in follow up for depression and SI. Patient became increasingly irritable and agitated over the weekend, required olanzapine 2.5mg IM x 2 doses in the last 24 hrs. This morning, patient is found reclining in bed. When writer announces that she is from the psychiatry department, patient waves his hand and says "go away." Patient does answer few questions, however is noted to be irritable, hostile and oppositional throughout, states "I'm mad because I'm staring at a wall and because someone is always watching me." Patient also repeats several times "the more I say, the longer I'll stay here." Patient declines to discuss his mood and SI, pt. remains angry, irritable, refusing to engage in interview. Interview terminated as patient sits upright in bed, became extremely uncooperative,  and begins screaming/yelling at writer to "stop talking and leave me alone!"    Collateral obtained from  psychiatry on 4/7;   Patient gave permission to Dr. Crouch to speak with sister-in-law, Catrina Guido. Sister-in-law reports safety concerns. She states "he is incapable of taking care of himself," he makes statements: "See you on the other side, I'm done with this," "His state of mind is worse," "He's miserable, can't hold a job, there is work available, he can't work due to his mental state" "He keeps telling us he will take his life," "I'm concerned he will take his life," Sister-in-law feels patient should have involuntary psychiatric admission, she denies hx sa.

## 2021-04-12 NOTE — BH CONSULTATION LIAISON PROGRESS NOTE - NSBHCHARTREVIEWLAB_PSY_A_CORE FT
10.7   7.97  )-----------( 498      ( 08 Apr 2021 06:35 )             32.9   04-08    137  |  103  |  7   ----------------------------<  131<H>  3.5   |  22  |  0.64    Ca    9.4      08 Apr 2021 06:35    TPro  7.4  /  Alb  3.3  /  TBili  0.3  /  DBili  x   /  AST  20  /  ALT  13  /  AlkPhos  65  04-07  
                      10.5   8.71  )-----------( 496      ( 10 Apr 2021 07:18 )             33.2     04-10    140  |  106  |  7   ----------------------------<  137<H>  4.2   |  21<L>  |  0.72    Ca    9.7      10 Apr 2021 07:18  Phos  4.6     04-10  Mg     1.6     04-10    TPro  7.7  /  Alb  3.5  /  TBili  0.3  /  DBili  x   /  AST  23  /  ALT  11  /  AlkPhos  71  04-09      CAPILLARY BLOOD GLUCOSE  POCT Blood Glucose.: 128 mg/dL (10 Apr 2021 08:38)  
                      11.8   10.24 )-----------( 530      ( 12 Apr 2021 07:21 )             36.1   04-12    134<L>  |  101  |  8   ----------------------------<  186<H>  3.8   |  20<L>  |  0.75    Ca    9.9      12 Apr 2021 07:21  Phos  3.4     04-12  Mg     1.3     04-12    
                      11.7   8.05  )-----------( 502      ( 11 Apr 2021 06:56 )             35.8   04-11    141  |  105  |  5<L>  ----------------------------<  158<H>  3.6   |  22  |  0.64    Ca    9.8      11 Apr 2021 06:56  Phos  4.1     04-11  Mg     1.4     04-11      
                      11.5   8.32  )-----------( 510      ( 09 Apr 2021 07:07 )             34.5   04-09    140  |  105  |  6<L>  ----------------------------<  88  3.4<L>   |  23  |  0.72    Ca    9.7      09 Apr 2021 07:07    TPro  7.7  /  Alb  3.5  /  TBili  0.3  /  DBili  x   /  AST  23  /  ALT  11  /  AlkPhos  71  04-09

## 2021-04-12 NOTE — BH CONSULTATION LIAISON PROGRESS NOTE - NSBHASSESSMENTFT_PSY_ALL_CORE
61yo unemployed, male domiciled alone with PMHx COVID-19 5/2020 was admitted to the [facility] ICU on 3/26/21 with DKA after presenting to the ER c/o weakness, N/V, abdominal pain and chest pain, upon discharge from [facility] mild hemoptysis, CT revealed small focus of contrast extravasation within the cavitary lesion, CT Surgery at [facility] contacted and pt was transferred to the [facility] CTICU for observation and possible intervention.  He states that he has been spitting up small amounts of dark blood. He continues to c/o headaches, diarrhea, and generalized abdominal pain, PMHx also includes Hepatitis C, BPH, HLD, HTN, DM II, OA, ETOH abuse (sober 1 yr), hx Opiate dependence ([id number]), PPHx depression, anxiety, SI, one prior inpatient admission [facility] for depression, SI, and etoh abuse 9/3-10/16/2020.    On evaluation today, patient with limited engagement in interview, presents with irritable/anxious affect, poor insight re: current mood symptoms and recent irritability. Patient is guarded, declining to discuss his mood, become even more reluctant to speak when asked about SI/HI, though was noted to make several vague SI statements earlier in this admission. Patient additionally with poor frustration tolerance, becoming irritable/agitated several times in the last 24 hrs and requiring multiple doses of IM PRN medication. Patient with known diagnosis of mood disorder, consider as well some features of possible personality disorder as patient demonstrates poor coping skills, easy frustration. Current presentation is especially concerning given poor relatedness, guarding, vague SI statements made earlier in this admission, extremely concerning collateral (see above), unwilling to engage re: suicidality or safety planning. Recommend continuing CO, patient will require inpatient psychiatric admission for safety and stabilization pending medical optimization.      4/10: Patient defensive and hostile toward interviewer, unhappy about being in the hospital, required PRN Zyprexa x3 overnight, denying SI/HI at this time with irritability about having a "" at bedside. Unwilling to engage in meaningful conversation about suicidality, will continue CO 1:1 at this time.  4/11: Pt calmer today on interview. Required PRNs overnight for agitation. Pt continues ask why he needs a "." Denies S/H/I/I/P for now.   4/12: patient irritable on interview today, remains with poor insight, easy frustration, poor coping skills, requiring IM PRNs x 2 yesterday, declining to discuss SI/HI     PLAN:  -Continue 1:1 Constant Observation for SI statements  -Continue Zoloft 150mg daily, monitor platelets, Na level  -Continue Trazodone 150mg qHS  -PRN- may use Seroquel 25mg PO q6hrs for agitation if qtc < 500  -PRN- May use Zyprexa 2.5mg IM q6h prn ONLY for SEVERE agitation if qtc <500, once all diversional activities are exhausted.    -Will continue to follow  -Patient will require inpatient psychiatric admission for safety and stabilization- 2PC legals completed and placed in chart    Do not give IM Zyprexa within 4hrs of IM/IV Ativan due to risk of excessive sedation and respiratory depression.  61yo unemployed, male domiciled alone with PMHx COVID-19 5/2020 was admitted to the [facility] ICU on 3/26/21 with DKA after presenting to the ER c/o weakness, N/V, abdominal pain and chest pain, upon discharge from [facility] mild hemoptysis, CT revealed small focus of contrast extravasation within the cavitary lesion, CT Surgery at [facility] contacted and pt was transferred to the [facility] CTICU for observation and possible intervention.  He states that he has been spitting up small amounts of dark blood. He continues to c/o headaches, diarrhea, and generalized abdominal pain, PMHx also includes Hepatitis C, BPH, HLD, HTN, DM II, OA, ETOH abuse (sober 1 yr), hx Opiate dependence ([id number]), PPHx depression, anxiety, SI, one prior inpatient admission [facility] for depression, SI, and etoh abuse 9/3-10/16/2020.    On evaluation today, patient with limited engagement in interview, presents with irritable/anxious affect, poor insight re: current mood symptoms and recent irritability. Patient is guarded, declining to discuss his mood, become even more reluctant to speak when asked about SI/HI, though was noted to make several vague SI statements earlier in this admission. Patient additionally with poor frustration tolerance, becoming irritable/agitated several times in the last 24 hrs and requiring multiple doses of IM PRN medication. Patient with known diagnosis of mood disorder, consider as well some features of possible personality disorder as patient demonstrates poor coping skills, easy frustration. Current presentation is especially concerning given poor relatedness, guarding, vague SI statements made earlier in this admission, extremely concerning collateral (see above), unwilling to engage re: suicidality or safety planning. Recommend continuing CO, patient will require inpatient psychiatric admission for safety and stabilization pending medical optimization.      4/10: Patient defensive and hostile toward interviewer, unhappy about being in the hospital, required PRN Zyprexa x3 overnight, denying SI/HI at this time with irritability about having a "" at bedside. Unwilling to engage in meaningful conversation about suicidality, will continue CO 1:1 at this time.  4/11: Pt calmer today on interview. Required PRNs overnight for agitation. Pt continues ask why he needs a "." Denies S/H/I/I/P for now.     4/12: patient irritable on interview today, guarded, evasive, remains with poor insight, easy frustration, poor coping skills, easily agitated, requiring IM PRNs x 2 yesterday for agitation, refusing to engage in interview, declining to discuss SI/HI. Current presentation is especially concerning given poor relatedness, ongoing agitation, guarding, vague SI statements made earlier in this admission, extremely concerning collateral (see above), unwilling to engage re: suicidality or safety planning. Recommend continuing CO, patient will require inpatient psychiatric admission for safety and stabilization pending medical optimization.      PLAN:  -Continue 1:1 Constant Observation for SI statements.   -Continue Zoloft 150mg daily, monitor platelets, Na level  -Continue Trazodone 150mg qHS  -PRN- may use Seroquel 25mg PO q6hrs for agitation if qtc < 500  -PRN- May use Zyprexa 2.5mg IM q6h prn ONLY for SEVERE agitation if qtc <500, once all diversional activities are exhausted.    -Will continue to follow  -Patient will require inpatient psychiatric admission for safety and stabilization- 2PC legals completed and placed in chart    Do not give IM Zyprexa within 4hrs of IM/IV Ativan due to risk of excessive sedation and respiratory depression.

## 2021-04-13 DIAGNOSIS — E11.65 TYPE 2 DIABETES MELLITUS WITH HYPERGLYCEMIA: ICD-10-CM

## 2021-04-13 DIAGNOSIS — R45.851 SUICIDAL IDEATIONS: ICD-10-CM

## 2021-04-13 DIAGNOSIS — J85.1 ABSCESS OF LUNG WITH PNEUMONIA: ICD-10-CM

## 2021-04-13 LAB
COVID-19 SPIKE DOMAIN AB INTERP: POSITIVE
COVID-19 SPIKE DOMAIN ANTIBODY RESULT: 6.01 U/ML — HIGH
GLUCOSE BLDC GLUCOMTR-MCNC: 180 MG/DL — HIGH (ref 70–99)
GLUCOSE BLDC GLUCOMTR-MCNC: 219 MG/DL — HIGH (ref 70–99)
GLUCOSE BLDC GLUCOMTR-MCNC: 296 MG/DL — HIGH (ref 70–99)
GLUCOSE BLDC GLUCOMTR-MCNC: 318 MG/DL — HIGH (ref 70–99)
SARS-COV-2 IGG+IGM SERPL QL IA: 6.01 U/ML — HIGH
SARS-COV-2 IGG+IGM SERPL QL IA: POSITIVE

## 2021-04-13 PROCEDURE — 99223 1ST HOSP IP/OBS HIGH 75: CPT

## 2021-04-13 PROCEDURE — 99232 SBSQ HOSP IP/OBS MODERATE 35: CPT

## 2021-04-13 RX ORDER — IBUPROFEN 200 MG
600 TABLET ORAL EVERY 6 HOURS
Refills: 0 | Status: DISCONTINUED | OUTPATIENT
Start: 2021-04-13 | End: 2021-04-19

## 2021-04-13 RX ADMIN — Medication 10 UNIT(S): at 16:54

## 2021-04-13 RX ADMIN — Medication 500 MILLIGRAM(S): at 21:48

## 2021-04-13 RX ADMIN — Medication 600 MILLIGRAM(S): at 11:03

## 2021-04-13 RX ADMIN — Medication 10 UNIT(S): at 11:38

## 2021-04-13 RX ADMIN — Medication 2: at 07:47

## 2021-04-13 RX ADMIN — Medication 2: at 20:11

## 2021-04-13 RX ADMIN — Medication 8: at 11:38

## 2021-04-13 RX ADMIN — TAMSULOSIN HYDROCHLORIDE 0.4 MILLIGRAM(S): 0.4 CAPSULE ORAL at 21:48

## 2021-04-13 RX ADMIN — SERTRALINE 200 MILLIGRAM(S): 25 TABLET, FILM COATED ORAL at 09:12

## 2021-04-13 RX ADMIN — Medication 4: at 16:54

## 2021-04-13 RX ADMIN — ATORVASTATIN CALCIUM 40 MILLIGRAM(S): 80 TABLET, FILM COATED ORAL at 21:48

## 2021-04-13 RX ADMIN — Medication 500 MILLIGRAM(S): at 09:12

## 2021-04-13 RX ADMIN — Medication 500 MILLIGRAM(S): at 12:55

## 2021-04-13 RX ADMIN — Medication 600 MILLIGRAM(S): at 21:58

## 2021-04-13 RX ADMIN — Medication 10 UNIT(S): at 07:47

## 2021-04-13 RX ADMIN — Medication 150 MILLIGRAM(S): at 21:48

## 2021-04-13 RX ADMIN — INSULIN GLARGINE 30 UNIT(S): 100 INJECTION, SOLUTION SUBCUTANEOUS at 21:59

## 2021-04-13 NOTE — BH INPATIENT PSYCHIATRY DISCHARGE NOTE - NSDCRECOMMENDMEDICALFT_PSY_ALL_CORE
Encouraged routine follow up for ongoing monitoring and medical management.  Recommend physical exam including EKG and metabolic screen with PCP on ongoing basis for preventative care and to maintain health and wellness.

## 2021-04-13 NOTE — BH INPATIENT PSYCHIATRY DISCHARGE NOTE - NSBHDCMEDICALFT_PSY_A_CORE
to be completed Patient is diagnosed with PPH: GERD, BPH,  High cholesterol. Patient was admitted to [facility] [place] with s/p abscess in lungs.   At the time of this treatment summary, the patient has not had any acute medical changes during his hospitalization. There have been no medical consultations. Patient was discharge with COVID 19 negative results. No cough, SOB, CP, or fever at time of discharge. Vitals WNL

## 2021-04-13 NOTE — PSYCHIATRIC REHAB INITIAL EVALUATION - NSBHEMPDEFICITS2FT_PSY_ALL_CORE
Pt states he was working before COVID 19, however has been unemployed since pandemic started approx one year prior to current admission.

## 2021-04-13 NOTE — BH INPATIENT PSYCHIATRY DISCHARGE NOTE - NSICDXPASTMEDICALHX_GEN_ALL_CORE_FT
PAST MEDICAL HISTORY:  2019 novel coronavirus disease (COVID-19) 5/2020    Back pain Chronic- mid and upper back    Benign hypertension     BPH associated with nocturia     Cholelithiasis Discovered at [facility] 3/2021    Diverticulosis Discovered at [facility] 3/2021    DKA (diabetic ketoacidosis) [facility] 3/2021    Hepatitis C in remission    HLD (hyperlipidemia)     Insomnia     Lung abscess RLL found at [facility] 3/2021    OA (osteoarthritis) BL shoulders    Thyroid nodule Found at [facility] 3/2021    Type 2 diabetes mellitus without complication, without long-term current use of insulin Newly diagnosed at [facility] 3/2021

## 2021-04-13 NOTE — CONSULT NOTE ADULT - ASSESSMENT
60M w/ HTN, ETOH abuse, GERD, HLD, BPH, uncontrolled T2DM, COVID May 2020, admitted to [facility] ICU on 3/26/21 w/DKA, RLL lung mass-like lesion measuring 4.5 cm on a chest CT scan and a dilated extrahepatic CBD with cholelithiasis and pneumobilia.  A repeat CT scan revealed enlargement of the RLL lesion with air fluid levels. Bronchoscopy w/BAL on 4/1 --> cultures w/Streptococcus parasanguinis. C/b hemoptysis w/CT revealing small focus of contrast extravasation within the cavitary lesion. Pt transferred to [facility] CTICU on 4/4 for management of hemoptysis and lung abscess. S/p teeth extractions on 4/7. Now transferred to [facility] for further w/u for SI and depression.

## 2021-04-13 NOTE — BH INPATIENT PSYCHIATRY DISCHARGE NOTE - NSDCMRMEDTOKEN_GEN_ALL_CORE_FT
acetaminophen 325 mg oral tablet: 2 tab(s) orally every 6 hours, As needed, Temp greater or equal to 38C (100.4F), Mild Pain (1 - 3)  atorvastatin 40 mg oral tablet: 1 tab(s) orally once a day (at bedtime)  benzonatate 100 mg oral capsule: 2 cap(s) orally 3 times a day  Ceftin 500 mg oral tablet: 1 tab(s) orally every 12 hours  cyclobenzaprine 5 mg oral tablet: 1 tab(s) orally 2 times a day, As needed, Muscle Spasm  gabapentin 100 mg oral capsule: 2 cap(s) orally every 8 hours  hydrocodone-homatropine 5 mg-1.5 mg/5 mL oral syrup: 5 milliliter(s) orally every 6 hours  insulin glargine: 30 unit(s) injectable once a day (at bedtime)  insulin lispro 100 units/mL injectable solution: 10 unit(s) injectable 3 times a day (before meals)  metroNIDAZOLE 500 mg oral tablet: 1 tab(s) orally 3 times a day  OLANZapine 10 mg intramuscular injection: 2.5 milligram(s) intramuscular every 6 hours, As needed, Severe Agitation  pantoprazole 40 mg oral delayed release tablet: 1 tab(s) orally once a day (before a meal)  QUEtiapine 25 mg oral tablet: 1 tab(s) orally every 6 hours, As needed, agitation  sertraline 50 mg oral tablet: 3 tab(s) orally once a day  tamsulosin 0.4 mg oral capsule: 1 cap(s) orally once a day (at bedtime)  traMADol 50 mg oral tablet: 1 tab(s) orally every 8 hours, As needed, Moderate Pain (4 - 6)  traZODone 150 mg oral tablet: 1 tab(s) orally once a day (at bedtime)   atorvastatin 40 mg oral tablet: 1 tab(s) orally once a day (at bedtime)  cefuroxime 500 mg oral tablet: 1 tab(s) orally every 12 hours  metroNIDAZOLE 500 mg oral tablet: 1 tab(s) orally 3 times a day  pantoprazole 40 mg oral delayed release tablet: 1 tab(s) orally once a day (before a meal) x 30 days   QUEtiapine 100 mg oral tablet: 1 tab(s) orally once a day (at bedtime)  sertraline 100 mg oral tablet: 2 tab(s) orally once a day  tamsulosin 0.4 mg oral capsule: 1 cap(s) orally once a day (at bedtime)  traZODone 150 mg oral tablet: 1 tab(s) orally once a day (at bedtime)

## 2021-04-13 NOTE — BH INPATIENT PSYCHIATRY DISCHARGE NOTE - REASON FOR ADMISSION
Patient is a 59yo male, PPH depression, anxiety, admitted for sx stabilization given Pt's recent vague SI, agitation, and inability to safety plan.

## 2021-04-13 NOTE — PSYCHIATRIC REHAB INITIAL EVALUATION - NSBHALCSUBTREAT_PSY_ALL_CORE
Pt reports currently being in outpatient treatment at [facility]./Outpatient clinic (specify)

## 2021-04-13 NOTE — PSYCHIATRIC REHAB INITIAL EVALUATION - NSBHPRRECOMMEND_PSY_ALL_CORE
Writer met with patient in order to orient patient to unit and briefly introduce patient to psychiatric rehabilitation staff and department function. Patient was provided with a copy of unit schedule. Patient is an unreliable historian, as patient is irritable and guarded. According to chart, patient is currently admitted after verbalizing SI and being unable to safety plan. Patient denies current SI. Patient and writer established a collaborative psychiatric rehabilitation goal. Writer encouraged patient to attend psychiatric rehabilitation groups and engage in treatment. Psychiatric rehabilitation staff will engage patient daily.

## 2021-04-13 NOTE — CONSULT NOTE ADULT - PROBLEM SELECTOR RECOMMENDATION 2
DKA on admission, resolved   - Endocrine followed during hospitalization, A1C>15  - C/w Lantus 30 units qhs, Admelog 10u qac, moderate correction scale qac and qhs  - Per notes, pt was able to demonstrate at [place] proper insulin injection technique and use of the insulin pen, however patient  was refusing to participate with insulin teaching at [facility] DKA on admission, resolved   - Endocrine followed during hospitalization, A1C>15  - C/w Lantus 30 units qhs, Admelog 10u qac, moderate correction scale qac and qhs  - Per notes, pt was able to demonstrate at [place] proper insulin injection technique and use of the insulin pen, however patient was refusing to participate with insulin teaching at [facility]

## 2021-04-13 NOTE — BH INPATIENT PSYCHIATRY DISCHARGE NOTE - NSBHSUICIDESTATUS_PSY_ALL_CORE
On safety assessment on day of discharge, patient has the following risk factors which are nonmodifiable which include: gender, age, chronic mental illness,   Modifiable risk factors: treatment non-adherence, impulsivity, inadequate social support. Currently there are no modifiable risk factors that could further be addressed in the inpatient hospital setting as patient denies any depressive sxs, is not suicidal with no intent or plan, has improvement in sleep and energy, medication compliant.   Protective factors include: denies SIIP, denies HIIP, future oriented, hopeful, willingness to try medication, not depressed, no access to weapons, engaged in treatment, stable residence, support of family, engages in work, close outpatient follow up appointment.   Patient is s at chronic higher risk than the general population for suicide because of risk factors as above, however, they can be mitigated by adjusting medications, medication compliance, and continued therapy.  At the time of discharge, patient not an acute danger to self or others.

## 2021-04-13 NOTE — BH INPATIENT PSYCHIATRY PROGRESS NOTE - NSBHASSESSSUMMFT_PSY_ALL_CORE
Patient is a 61yo M PMH DMII, COVID19 (5/2020) PPH depression, aniety, AUD (sober since 2020), opiate dependence(2012), prior [facility] hospitalization (9/3-10/16/2020) for depression, SI, alcohol use, who presents from [facility] following treatment of DKA, RLL abscess +Streptococcus parasanguinis cultures, and teeth extraction (4/7) after expressing suicidal ideation. The patient is to be observed here for assessment and symptom stabilization. Presentation is consistent with adjustment disorder or MDD exacerbation in setting of health and financial stress. Patient is a 61yo M PMH DMII, COVID19 (5/2020) PPH depression, aniety, AUD (sober since 2020), opiate dependence(2012), prior [facility] hospitalization (9/3-10/16/2020) for depression, SI, alcohol use, who presents from [facility] following treatment of DKA, RLL abscess +Streptococcus parasanguinis cultures, and teeth extraction (4/7) after expressing suicidal ideation. The patient is to be observed here for assessment and symptom stabilization. Presentation is consistent with adjustment disorder or MDD exacerbation in setting of health and financial stress.    Plan:  >Legal: 9.27  >Obs: Routine, no current SI. no need for CO, patient not expected to pose risk to self or others in controlled inpatient setting  >Psychiatric Meds: Continue outpatient medication regimen: Zoloft 200mg and Trazodone 150mg qhs. Observe for tolerability and efficacy.   >Labs: Admission labs reviewed, no acute findings. QTc:457ms  >Medical: No acute concerns. Follow up with Medical team as needed  >Diet: Diabetic   >Social: milieu/structured therapy  >Treatment Interventions: Groups and Individual Therapy/CBT, Motivational counseling for substance abuse related issues.   >Treatment goals: Establish initial treatment plan  >Dispo: Collateral and dispo planning pending further symptom and medication optimization

## 2021-04-13 NOTE — BH INPATIENT PSYCHIATRY PROGRESS NOTE - NSBHCONSBHPROVDETAILS_PSY_A_CORE  FT
Collateral from outpatient Paychiatrist and therapist (Christina Hernandez)  at the [facility] ([phone]),left VM with call back number.

## 2021-04-13 NOTE — BH INPATIENT PSYCHIATRY DISCHARGE NOTE - NSBHMETABOLIC_PSY_ALL_CORE_FT
BMI: BMI (kg/m2): 29.6 (04-12-21 @ 19:53)  HbA1c: A1C with Estimated Average Glucose Result: >15.5 % (03-27-21 @ 05:00)    Glucose: POCT Blood Glucose.: 180 mg/dL (04-13-21 @ 07:45)    BP: 122/79 (04-12-21 @ 21:29) (122/79 - 122/79)  Lipid Panel: Date/Time: 10-07-20 @ 08:06  Cholesterol, Serum: 109  Direct LDL: 45  HDL Cholesterol, Serum: 56  Total Cholesterol/HDL Ration Measurement: --  Triglycerides, Serum: 88

## 2021-04-13 NOTE — DIETITIAN INITIAL EVALUATION ADULT. - SIGNS/SYMPTOMS
elevated fingersticks, glucose and HgbA1c <50% nutrition needs, 8.63% weight loss/1 month and physical findings

## 2021-04-13 NOTE — BH INPATIENT PSYCHIATRY DISCHARGE NOTE - HPI (INCLUDE ILLNESS QUALITY, SEVERITY, DURATION, TIMING, CONTEXT, MODIFYING FACTORS, ASSOCIATED SIGNS AND SYMPTOMS)
Patient is a 61yo male, domiciled, unemployed, PPH depression, anxiety, one prior inpatient admission at [facility] (9/3-10/16/2020 for depression, SI, alcohol use), denies prior SA, PMHx COVID-19 5/2020, admitted to the [facility] ICU on 3/26/21 with DKA, then found with RLL lung mass-like lesion measuring 4.5 cm, bronchoscopy w/BAL on 4/1 grew +cultures w/Streptococcus parasanguinis, transferred to [facility] CTICU on 4/4 for management of hemoptysis and lung abscess, s/p teeth extractions on 4/7. PMHx also includes Hepatitis C, BPH, HLD, HTN, uncontrolled DM II, OA, ETOH use (sober 1 yr), hx Opiate dependence ([id number])Pt was medically cleared and transferred to [facility] for safety and sx stabilization given Pt's recent vague SI, agitation, and inability to safety plan. Reviewed notes from [facility] Psychiatry CL team.     On the unit, Pt appears irritable, and insists that he made SI statements out of frustration. He reports poor mood in the setting of acute medical problems, including neck pain and teeth extraction. He expresses his frustration with current admission to psychiatric hospital, concerned that he will lose his apartment in [place] as he has not paid rent since his hospital course at [facility] in late March. Reports he follows up with an outpatient psychiatrist at the [facility] ([phone]). Pt states he is adherent with medications Zoloft 150mg and trazodone 150mg at home. He denies current SIIP and HIIP.

## 2021-04-13 NOTE — DIETITIAN INITIAL EVALUATION ADULT. - OTHER INFO
Patient admitted to [place] ICU 3/26 with DKA transferred to [facility] CTICU 4/4 for management of hemoptysis and lung abscess, s/p teeth extractions on 4/7. Patient states his appetite " is sometimes good, sometimes bad". Allergic to shellfish. States has GI symptoms, did not elaborate, " I had this problem in the hospital". Had teeth extractions, requiring soft diet. Patient not interested in diabetic diet education at current time. States he does not have the written materials from prior hospitals. Patient provided written diabetic food list and meal planning guidelines. As per recent [facility] RDN assessments, patient received diet education on consistent carbohydrate diet.

## 2021-04-13 NOTE — BH INPATIENT PSYCHIATRY DISCHARGE NOTE - NSDCCPCAREPLAN_GEN_ALL_CORE_FT
PRINCIPAL DISCHARGE DIAGNOSIS  Diagnosis: Major depressive disorder  Assessment and Plan of Treatment:       SECONDARY DISCHARGE DIAGNOSES  Diagnosis: Anxiety disorder  Assessment and Plan of Treatment:     Diagnosis: Anxiety disorder  Assessment and Plan of Treatment:

## 2021-04-13 NOTE — BH INPATIENT PSYCHIATRY DISCHARGE NOTE - HOSPITAL COURSE
to be completed Date Admitted: 4/12//21    Date Discharged: 4/19/21  Patient was admitted to [facility] inpatient service on 9.27 legal status.  Patient admitted with a diagnosis of Major Depressive Disorder, and Anxiety.  Patient was admitted due to escalating anxiety, and making suicidal statements while on the medical floor.  On admission interview, patient presented irritable, hostile and frustrated at being in the hospital.  Patient stated he may have made suicidal statements out of frustration while on medical unit.  Patient reported that he did not like his care on medical floor and got angry. Patient reported history of psychiatric symptoms of depression with anxiety but denied any on the unit. Following a prolonged discussion regarding risks (including but not limited to EPS, weight gain, NMS, TD, metabolic syndrome) and benefits (attenuated psychosis, more organized thought process and behavior), patient agreed to treatment and was maintained on his outpatient medications: Sertraline, titrated to 20mg daily, Trazodone titrated to 150mg and Seroquel 100mg qhs. Patient had no reported or observed adverse effects, such as akathisia, tremor, EPS.  Patient seldomly attended groups or milieu therapy. Patient was provided with extensive psycho-education regarding importance of compliance with medications.  Depressive process has subsided.   Gradual reduction of his depressive symptoms, and anxiety was evident.  He denied any suicidal or homicidal ideations throughout hospitalization.  Patient denies substance abuse.  He has remained in good behavioral control and has not required emergent intramuscular medications or seclusion / restraints.   PROCEDURES AND TREATMENT:  >Individual psychotherapy/CBT modality and group therapy  >Milieu therapy and supportive therapy.  >Psychopharmacologic management.  >Motivational counseling.   >Diabetic Teaching  ADDITIONAL RISK FACTORS CONSIDERED AT TIME OF DISCHARGE:  Patient is visible on the unit and is calm, cooperative, pleasant, AAOX3 upon approach. He is scheduled for discharge, returning to his private residence. On suicide risk assessment at the time of discharge, patient is at elevated chronic risk due the following factors: history of psychiatric illness, history on noncompliance, recent inpatient psychiatric admission.  Protective factors include patient denying any anxiety, panic attacks, global insomnia, severe anhedonia or psychotic/manic symptoms. Patient has support social network of family and friends, and residential stability.  Patient denies any suicidal ideations, intent, or plan at the time of discharge.  Among other protective factors, patient is future-oriented, has strong social and family support, and has a strong sense for family responsibility.  Given the mitigation of aforementioned acute risk factors and considerable protective factors, patient's acute risk for self-harm has been minimized and is currently low.  There are no other acute risk factors that could be mitigated by further inpatient hospitalization.  Patient is not deemed to be an imminent danger to self or others at the time of discharge.  The patient has been instructed that should he develop thoughts of self-harm, suicidal thoughts, homicidal thoughts, aggression, agitation or any other distressing psychiatric symptoms, he should call 911 or go the emergency room. The patient has expressed understanding and is in agreement with the above plan.  Problem Pertinent Review of Symptoms/Associated Signs and Symptoms: No hallucinations, delusions, or other symptoms of psychotic process are reported. There are no other acute risk factors that could be mitigated by further inpatient hospitalization.  Patient is not deemed to be an imminent danger to self or others at the time of discharge. Patient's clinical improvement of symptoms led the treatment team to conclude that the patient can be safely discharged to lower level of care to his home and to his outpatient provider.  Psycho education was provided to the patient prior to discharge.  Patient received medication education at discharge. The patient was educated about the proper and safe use of medications as well as the risks and benefits of over and under dosing. The patient was told to go to the nearest emergency department or call his PMD or psychiatrist if he experienced any adverse side effects from any of the medications.  Encouraged patient to maintain medication compliance. Discussed pitfalls of treatment failure, and reinforced taking medication as directed, and processed discontinuation symptoms. Patient was informed of risk/benefit of medication, alternative treatment and no treatment.  Patient was educated about side effects of his medications, including EPS, TD.   Patient was provided with emergency phone numbers and were instructed to call 911 or go to the nearest ER for worsening of symptoms, dangerousness to self/others. Patient verbalized understanding.    Based on the above assessment, patient no longer warrants an inpatient psychiatric hospitalization.  Prognosis is guarded, given a history of inpatient psychiatric hospitalizations, history of chronic mental illness and history of noncompliance with medications. Future risk was minimized before discharge by treatment of acute mood symptoms, maximizing outpatient support, providing relevant patient education, discussing emergency procedures, and ensuring close follow-up. Although the patient remains at his chronic baseline risk of self-harm, such risk cannot be further ameliorated by continued inpatient treatment and the patient is therefore appropriate for discharge.  Discharge Pan:  -Patient given 4 weeks supply of medications. Risk, benefits and alternatives discussed with patient. Patient verbalized understanding and in accord with aftercare recommendations.   -Patient instructed to call 911 or go to nearest ER in case of emergency.   -Patient given Suicide Prevention Lifeline number 0-133-968-8674 and provided instructions on its use  -Patient will follow up with outpatient appointments.

## 2021-04-13 NOTE — BH INPATIENT PSYCHIATRY DISCHARGE NOTE - NSBHDCHANDOFFFT_PSY_ALL_CORE
Medication list and discharge summary provided to clinic via fax and this writer's contact information was provided for any further questions or concerns [phone].

## 2021-04-13 NOTE — CONSULT NOTE ADULT - SUBJECTIVE AND OBJECTIVE BOX
Darrel Guido is a 60 year old gentlemen with h/o HTN, ETOH abuse, GERD, HLD, BPH, severe uncontrolled DMII, COVID May 2020, admitted to [facility] ICU on 3/26/21 w/DKA, RLL lung mass-like lesion measuring 4.5 cm on a chest CT scan and a dilated extrahepatic CBD with cholelithiasis and pneumobilia.  A repeat CT scan revealed enlargement of the RLL lesion with air fluid levels. Bronchoscopy w/BAL on 4/1 --> cultures w/Streptococcus parasanguinis. C/b hemoptysis w/CT revealing small focus of contrast extravasation within the cavitary lesion. Pt transferred to [facility] CTICU on 4/4 for management of hemoptysis and lung abscess. S/p teeth extractions on 4/7. History obtained from chart.     PAST MEDICAL & SURGICAL HISTORY:  Type 2 diabetes mellitus without complication, without long-term current use of insulin  Newly diagnosed at [facility] 3/2021    Benign hypertension    2019 novel coronavirus disease (COVID-19)  5/2020    BPH associated with nocturia    Insomnia    Cholelithiasis  Discovered at [facility] 3/2021    Diverticulosis  Discovered at [facility] 3/2021    DKA (diabetic ketoacidosis)  [facility] 3/2021    OA (osteoarthritis)  BL shoulders    Lung abscess  RLL found at [facility] 3/2021    HLD (hyperlipidemia)    Back pain  Chronic- mid and upper back    Hepatitis C  in remission    Thyroid nodule  Found at [facility] 3/2021    Shoulder arthritis  Multiple surgeries on left shoulder        Review of Systems:   Patient does not contribute to the ROS 2/2 psychiatric condition, not as engaged    Allergies    No Known Drug Allergies  shellfish (Unknown)    Intolerances        Social History:     FAMILY HISTORY:  Family history unknown        MEDICATIONS  (STANDING):  atorvastatin 40 milliGRAM(s) Oral at bedtime  cefuroxime   Tablet 500 milliGRAM(s) Oral every 12 hours  dextrose 40% Gel 15 Gram(s) Oral once  glucagon  Injectable 1 milliGRAM(s) IntraMuscular once  insulin glargine Injectable (LANTUS) 30 Unit(s) SubCutaneous at bedtime  insulin lispro (ADMELOG) corrective regimen sliding scale   SubCutaneous three times a day before meals  insulin lispro (ADMELOG) corrective regimen sliding scale   SubCutaneous at bedtime  insulin lispro Injectable (ADMELOG) 10 Unit(s) SubCutaneous three times a day before meals  metroNIDAZOLE    Tablet 500 milliGRAM(s) Oral three times a day  sertraline 200 milliGRAM(s) Oral daily  tamsulosin 0.4 milliGRAM(s) Oral at bedtime  traZODone 150 milliGRAM(s) Oral at bedtime    MEDICATIONS  (PRN):  acetaminophen   Tablet .. 650 milliGRAM(s) Oral every 6 hours PRN Temp greater or equal to 38C (100.4F), Mild Pain (1 - 3)  haloperidol     Tablet 2 milliGRAM(s) Oral every 6 hours PRN agitation  haloperidol    Injectable 2 milliGRAM(s) IntraMuscular once PRN severe agitation  ibuprofen  Tablet. 600 milliGRAM(s) Oral every 6 hours PRN Moderate Pain (4 - 6)      Vital Signs Last 24 Hrs  T(C): 36.4 (13 Apr 2021 06:37), Max: 37 (12 Apr 2021 12:00)  T(F): 97.6 (13 Apr 2021 06:37), Max: 98.6 (12 Apr 2021 12:00)  HR: 92 (12 Apr 2021 12:00) (92 - 92)  BP: 122/79 (12 Apr 2021 21:29) (120/78 - 122/79)  BP(mean): --  RR: 18 (12 Apr 2021 21:29) (18 - 18)  SpO2: 99% (12 Apr 2021 21:29) (97% - 99%)  CAPILLARY BLOOD GLUCOSE      POCT Blood Glucose.: 180 mg/dL (13 Apr 2021 07:45)  POCT Blood Glucose.: 146 mg/dL (12 Apr 2021 20:29)  POCT Blood Glucose.: 241 mg/dL (12 Apr 2021 11:48)        PHYSICAL EXAM:  GENERAL: NAD, well-developed  HEAD:  Atraumatic, Normocephalic  EYES: EOMI, conjunctiva and sclera clear  NECK: Supple, No JVD  CHEST/LUNG: Clear to auscultation bilaterally; No wheeze  HEART: Regular rate and rhythm; No murmurs, rubs, or gallops  ABDOMEN: Soft, Nontender, Nondistended; Bowel sounds present  EXTREMITIES:  2+ Peripheral Pulses, No clubbing, cyanosis, or edema  NEUROLOGY: non-focal  SKIN: No rashes or lesions    LABS:                        11.8   10.24 )-----------( 530      ( 12 Apr 2021 07:21 )             36.1     04-12    134<L>  |  101  |  8   ----------------------------<  186<H>  3.8   |  20<L>  |  0.75    Ca    9.9      12 Apr 2021 07:21  Phos  3.4     04-12  Mg     1.3     04-12                EKG(personally reviewed):    RADIOLOGY & ADDITIONAL TESTS:    Imaging Personally Reviewed:    Consultant(s) Notes Reviewed:      Care Discussed with Consultants/Other Providers:   Darrel Guido is a 60 year old gentlemen with h/o HTN, ETOH abuse, GERD, HLD, BPH, severe uncontrolled DMII, COVID May 2020, admitted to [facility] ICU on 3/26/21 w/DKA, RLL lung mass-like lesion measuring 4.5 cm on a chest CT scan and a dilated extrahepatic CBD with cholelithiasis and pneumobilia.  A repeat CT scan revealed enlargement of the RLL lesion with air fluid levels. Bronchoscopy w/BAL on 4/1 --> cultures w/Streptococcus parasanguinis. C/b hemoptysis w/CT revealing small focus of contrast extravasation within the cavitary lesion. Pt transferred to [facility] CTICU on 4/4 for management of hemoptysis and lung abscess. S/p teeth extractions on 4/7. History obtained from chart. Patient not very engaged, fixated on his pains in his back, says tylenol is not enough. He denies shortness of breath, f/c/n/v.     PAST MEDICAL & SURGICAL HISTORY:  Type 2 diabetes mellitus without complication, without long-term current use of insulin  Newly diagnosed at [facility] 3/2021    Benign hypertension    2019 novel coronavirus disease (COVID-19)  5/2020    BPH associated with nocturia    Insomnia    Cholelithiasis  Discovered at [facility] 3/2021    Diverticulosis  Discovered at [facility] 3/2021    DKA (diabetic ketoacidosis)  [facility] 3/2021    OA (osteoarthritis)  BL shoulders    Lung abscess  RLL found at [facility] 3/2021    HLD (hyperlipidemia)    Back pain  Chronic- mid and upper back    Hepatitis C  in remission    Thyroid nodule  Found at [facility] 3/2021    Shoulder arthritis  Multiple surgeries on left shoulder        Review of Systems:   Patient does not contribute to the ROS 2/2 psychiatric condition, not as engaged    Allergies    No Known Drug Allergies  shellfish (Unknown)    Intolerances        Social History: Does not wish to disclose during my encounter, fixated on back and neck pains.     FAMILY HISTORY:  Family history unknown        MEDICATIONS  (STANDING):  atorvastatin 40 milliGRAM(s) Oral at bedtime  cefuroxime   Tablet 500 milliGRAM(s) Oral every 12 hours  dextrose 40% Gel 15 Gram(s) Oral once  glucagon  Injectable 1 milliGRAM(s) IntraMuscular once  insulin glargine Injectable (LANTUS) 30 Unit(s) SubCutaneous at bedtime  insulin lispro (ADMELOG) corrective regimen sliding scale   SubCutaneous three times a day before meals  insulin lispro (ADMELOG) corrective regimen sliding scale   SubCutaneous at bedtime  insulin lispro Injectable (ADMELOG) 10 Unit(s) SubCutaneous three times a day before meals  metroNIDAZOLE    Tablet 500 milliGRAM(s) Oral three times a day  sertraline 200 milliGRAM(s) Oral daily  tamsulosin 0.4 milliGRAM(s) Oral at bedtime  traZODone 150 milliGRAM(s) Oral at bedtime    MEDICATIONS  (PRN):  acetaminophen   Tablet .. 650 milliGRAM(s) Oral every 6 hours PRN Temp greater or equal to 38C (100.4F), Mild Pain (1 - 3)  haloperidol     Tablet 2 milliGRAM(s) Oral every 6 hours PRN agitation  haloperidol    Injectable 2 milliGRAM(s) IntraMuscular once PRN severe agitation  ibuprofen  Tablet. 600 milliGRAM(s) Oral every 6 hours PRN Moderate Pain (4 - 6)      Vital Signs Last 24 Hrs  T(C): 36.4 (13 Apr 2021 06:37), Max: 37 (12 Apr 2021 12:00)  T(F): 97.6 (13 Apr 2021 06:37), Max: 98.6 (12 Apr 2021 12:00)  HR: 92 (12 Apr 2021 12:00) (92 - 92)  BP: 122/79 (12 Apr 2021 21:29) (120/78 - 122/79)  BP(mean): --  RR: 18 (12 Apr 2021 21:29) (18 - 18)  SpO2: 99% (12 Apr 2021 21:29) (97% - 99%)  CAPILLARY BLOOD GLUCOSE      POCT Blood Glucose.: 180 mg/dL (13 Apr 2021 07:45)  POCT Blood Glucose.: 146 mg/dL (12 Apr 2021 20:29)  POCT Blood Glucose.: 241 mg/dL (12 Apr 2021 11:48)        PHYSICAL EXAM:  GENERAL: NAD, well-developed  HEAD:  Atraumatic, Normocephalic  EYES: EOMI, conjunctiva and sclera clear  NECK: Supple, No JVD  CHEST/LUNG: Clear to auscultation bilaterally; No wheeze  HEART: Regular rate and rhythm; No murmurs, rubs, or gallops  ABDOMEN: Soft, Nontender, Nondistended; Bowel sounds present  EXTREMITIES:  2+ Peripheral Pulses, No clubbing, cyanosis, or edema  NEUROLOGY: non-focal  SKIN: No rashes or lesions    LABS:                        11.8   10.24 )-----------( 530      ( 12 Apr 2021 07:21 )             36.1     04-12    134<L>  |  101  |  8   ----------------------------<  186<H>  3.8   |  20<L>  |  0.75    Ca    9.9      12 Apr 2021 07:21  Phos  3.4     04-12  Mg     1.3     04-12                EKG(personally reviewed):    RADIOLOGY & ADDITIONAL TESTS:    Imaging Personally Reviewed:    Consultant(s) Notes Reviewed:      Care Discussed with Consultants/Other Providers:

## 2021-04-13 NOTE — BH SOCIAL WORK INITIAL PSYCHOSOCIAL EVALUATION - OTHER PAST PSYCHIATRIC HISTORY (INCLUDE DETAILS REGARDING ONSET, COURSE OF ILLNESS, INPATIENT/OUTPATIENT TREATMENT)
Pt is a 61YO, single white man, domiciled in a private residence alone in [place], transferred from medicine following medical hospitalization for DKA and vague SI with no plan. PT was irritable upon approach, c/o pain, stating he feels better being here as he had a poor experience in [facility]. Pt denied SIHIIP, stating that he said he would kill himself out of frustration and that he was not suicidal in medicine. Pt reported that he has clinicians at [facility], provided verbal consent, declined consent for his sister and sister-in-law. Pt is with one other hospitalization in 2020 for depression and SI. Pt is with significant medical hx and is currently c/o losing his eyesight s/2 DM.   Pt reported that he is in remission of polysubstance d/o, hx of alcohol and opiate addiction.   [facility] Managed Medicaid: [id number]

## 2021-04-13 NOTE — BH INPATIENT PSYCHIATRY PROGRESS NOTE - NSBHFUPINTERVALHXFT_PSY_A_CORE
Chart, medications, and labs reviewed. Patient was discussed with treatment team. No significant events or behavioral issues. No PRNs for aggression. Patient was seen in room. Patient reports no ongoing suicidal ideation. Patient expresses frustration with old hospital and required stay here. Patient has some anxiety about losing housing situation because he hasn't payed rent due to his recent hospital stay. Patient was cooperative with interview. Patient has some insight into conditions (expresses some understanding of preceeding hospital medical course, understands why he is here). Compliant with medications, though refusing PRN tylenol despite reported chronic headache. No reported adverse effects. Medically, patient glu is somewhat controlled (146-241 last 24h). Patient was seen and evaluated, chart reviewed. Case discussed with nursing team.  Initial interview with patient, he is a 60 year old male with PPH of  Major Depressive Disorder, Adjustment Disorder. Patient is hospitalized with a primary problem of suicidal statements, SI. Patient admitted to [facility] on 9.27 legal status. I have reviewed the initial psychiatric assessment in the electronic medical record, including the history of present illness, past psychiatric history, family/social history (no pertinent changes), and exam, and have confirmed the salient findings dated 4/12/21.  As per chart review, transferring records indicated the following:  Patient is a 59yo male, domiciled, unemployed, PPH depression, anxiety, one prior inpatient admission at [facility] (9/3-10/16/2020 for depression, SI, alcohol use), denies prior SA, PMHx COVID-19 5/2020, admitted to the [facility] ICU on 3/26/21 with DKA, then found with RLL lung mass-like lesion measuring 4.5 cm, bronchoscopy w/BAL on 4/1 grew +cultures w/Streptococcus parasanguinis, transferred to [facility] CTICU on 4/4 for management of hemoptysis and lung abscess, s/p teeth extractions on 4/7. PMHx also includes Hepatitis C, BPH, HLD, HTN, uncontrolled DM II, OA, ETOH use (sober 1 yr), hx Opiate dependence (0647-6335). Pt was medically cleared and transferred to [facility] for safety and sx stabilization given Pt's recent vague SI, agitation, and inability to safety plan.   On unit:  Information Received From: Chart review and patient interview:  Chart, medications, and labs reviewed. Patient was discussed with treatment team. No significant events or behavioral issues. No PRNs for aggression. Patient was seen in room. Patient reports no ongoing suicidal ideation. Patient expresses frustration with old hospital and required stay here. Patient has some anxiety about losing housing situation because he hasn't payed rent due to his recent hospital stay. Patient was cooperative with interview. Patient has some insight into conditions (expresses some understanding of preceeding hospital medical course, understands why he is here). Compliant with medications, though refusing PRN tylenol despite reported chronic headache. No reported adverse effects. Medically, patient glu is somewhat controlled (146-241 last 24h).

## 2021-04-13 NOTE — BH INPATIENT PSYCHIATRY PROGRESS NOTE - NSCGIIMPROVESX_PSY_ALL_CORE
4 = No change - symptoms remain essentially unchanged 3 = Minimally improved - slightly better with little or no clinically meaningful reduction of symptoms.  Represents very little change in basic clinical status, level of care, or functional capacity.

## 2021-04-13 NOTE — BH INPATIENT PSYCHIATRY DISCHARGE NOTE - NSBHDCSIGEVENTSFT_PSY_A_CORE
He has remained in good behavioral control and has not required emergent intramuscular medications or seclusion / restraints

## 2021-04-13 NOTE — CONSULT NOTE ADULT - PROBLEM SELECTOR RECOMMENDATION 9
c/w Ceftriaxone and Metronidazole, will transition to oral Ceftin and flagyl for 3 more weeks through May 3rd  -S/p tooth extractions on 4/7  Hemoptysis resolved, hemoglobin stable

## 2021-04-14 LAB
GLUCOSE BLDC GLUCOMTR-MCNC: 124 MG/DL — HIGH (ref 70–99)
GLUCOSE BLDC GLUCOMTR-MCNC: 156 MG/DL — HIGH (ref 70–99)
GLUCOSE BLDC GLUCOMTR-MCNC: 227 MG/DL — HIGH (ref 70–99)
GLUCOSE BLDC GLUCOMTR-MCNC: 232 MG/DL — HIGH (ref 70–99)

## 2021-04-14 PROCEDURE — 99232 SBSQ HOSP IP/OBS MODERATE 35: CPT

## 2021-04-14 RX ORDER — QUETIAPINE FUMARATE 200 MG/1
50 TABLET, FILM COATED ORAL AT BEDTIME
Refills: 0 | Status: DISCONTINUED | OUTPATIENT
Start: 2021-04-14 | End: 2021-04-16

## 2021-04-14 RX ORDER — LANOLIN ALCOHOL/MO/W.PET/CERES
3 CREAM (GRAM) TOPICAL ONCE
Refills: 0 | Status: COMPLETED | OUTPATIENT
Start: 2021-04-14 | End: 2021-04-17

## 2021-04-14 RX ADMIN — Medication 10 UNIT(S): at 12:02

## 2021-04-14 RX ADMIN — Medication 10 UNIT(S): at 08:10

## 2021-04-14 RX ADMIN — Medication 500 MILLIGRAM(S): at 21:53

## 2021-04-14 RX ADMIN — Medication 500 MILLIGRAM(S): at 12:16

## 2021-04-14 RX ADMIN — Medication 150 MILLIGRAM(S): at 21:53

## 2021-04-14 RX ADMIN — QUETIAPINE FUMARATE 50 MILLIGRAM(S): 200 TABLET, FILM COATED ORAL at 21:53

## 2021-04-14 RX ADMIN — Medication 600 MILLIGRAM(S): at 21:54

## 2021-04-14 RX ADMIN — Medication 10 UNIT(S): at 16:58

## 2021-04-14 RX ADMIN — TAMSULOSIN HYDROCHLORIDE 0.4 MILLIGRAM(S): 0.4 CAPSULE ORAL at 21:54

## 2021-04-14 RX ADMIN — Medication 500 MILLIGRAM(S): at 08:34

## 2021-04-14 RX ADMIN — INSULIN GLARGINE 30 UNIT(S): 100 INJECTION, SOLUTION SUBCUTANEOUS at 21:54

## 2021-04-14 RX ADMIN — Medication 4: at 16:57

## 2021-04-14 RX ADMIN — SERTRALINE 200 MILLIGRAM(S): 25 TABLET, FILM COATED ORAL at 08:34

## 2021-04-14 RX ADMIN — Medication 600 MILLIGRAM(S): at 12:16

## 2021-04-14 RX ADMIN — ATORVASTATIN CALCIUM 40 MILLIGRAM(S): 80 TABLET, FILM COATED ORAL at 21:53

## 2021-04-14 RX ADMIN — Medication 2: at 12:02

## 2021-04-14 NOTE — BH INPATIENT PSYCHIATRY PROGRESS NOTE - NSBHASSESSSUMMFT_PSY_ALL_CORE
Patient is a 61yo M PMH DMII, COVID19 (5/2020) PPH depression, anxiety, AUD (sober since 2020), opiate dependence(2012), prior [facility] hospitalization (9/3-10/16/2020) for depression, SI, alcohol use, who presents from [facility] following treatment of DKA, RLL abscess +Streptococcus parasanguinis cultures, and teeth extraction (4/7) after expressing suicidal ideation. The patient is to be observed here for assessment and symptom stabilization. Presentation is consistent with adjustment disorder or MDD exacerbation in setting of health and financial stress.    Plan:  >Legal: 9.27  >Obs: Routine, no current SI. no need for CO, patient not expected to pose risk to self or others in controlled inpatient setting  >Psychiatric Meds: Continue outpatient medication regimen: Zoloft 200mg and Trazodone 150mg qhs. Observe for tolerability and efficacy.   >Labs: Admission labs reviewed, no acute findings. QTc:457ms  >Medical: No acute concerns. Follow up with Medical team as needed  >Diet: Diabetic   >Social: milieu/structured therapy  >Treatment Interventions: Groups and Individual Therapy/CBT, Motivational counseling for substance abuse related issues.   >Treatment goals: Establish initial treatment plan  >Dispo: Collateral and dispo planning pending further symptom and medication optimization

## 2021-04-14 NOTE — BH INPATIENT PSYCHIATRY PROGRESS NOTE - NSBHFUPINTERVALHXFT_PSY_A_CORE
Patient was seen and evaluated, chart reviewed. Case discussed with nursing team.  Initial interview with patient, he is a 60 year old male with PPH of  Major Depressive Disorder, Adjustment Disorder. Patient is hospitalized with a primary problem of suicidal statements, SI. Patient admitted to [facility] on 9.27 legal status. I have reviewed the initial psychiatric assessment in the electronic medical record, including the history of present illness, past psychiatric history, family/social history (no pertinent changes), and exam, and have confirmed the salient findings dated 4/12/21.  As per chart review, transferring records indicated the following:  Patient is a 59yo male, domiciled, unemployed, PPH depression, anxiety, one prior inpatient admission at [facility] (9/3-10/16/2020 for depression, SI, alcohol use), denies prior SA, PMHx COVID-19 5/2020, admitted to the [facility] ICU on 3/26/21 with DKA, then found with RLL lung mass-like lesion measuring 4.5 cm, bronchoscopy w/BAL on 4/1 grew +cultures w/Streptococcus parasanguinis, transferred to [facility] CTICU on 4/4 for management of hemoptysis and lung abscess, s/p teeth extractions on 4/7. PMHx also includes Hepatitis C, BPH, HLD, HTN, uncontrolled DM II, OA, ETOH use (sober 1 yr), hx Opiate dependence ([id number]). Pt was medically cleared and transferred to [facility] for safety and sx stabilization given Pt's recent vague SI, agitation, and inability to safety plan.   On unit:  Information Received From: Chart review and patient interview:  Chart, medications, and labs reviewed. Patient was discussed with treatment team. No significant events or behavioral issues. No PRNs for aggression. Patient was seen in room. Patient reports no ongoing suicidal ideation. Patient states mood is improved, though affect is still irritable. Patient repeats frustration with old hospital and required stay here, though he states his mood is improved here. Headache is resolved, though the patient is complaining of back/neck pain which started at [facility]. The pain does not improve with tylenol. Patient conitnues to express some anxiety about losing housing and belongings because he hasn't payed rent due to his recent hospital stay. Patient was superficially cooperative with interview. Additional concerns include poor sleep last night due to roommate and recent vision loss, for which he follows outpatient. Patient has some insight into conditions (expresses some understanding of preceeding hospital medical course, understands why he is here, poor understanding of DMII). Compliant with medications. No reported adverse effects. Medically, patient glu is not well controlled (124-318 last 24h).

## 2021-04-14 NOTE — BH INPATIENT PSYCHIATRY PROGRESS NOTE - NSBHCONSBHPROVDETAILS_PSY_A_CORE  FT
Collateral from outpatient Paychiatrist and therapist (Christina Hernandez)  at the [facility] ([phone]),left VM with call back number.  Collateral from outpatient Psychiatrist and therapist (Christina Hernandez)  at the [facility] ([phone]),left VM with call back number.

## 2021-04-15 ENCOUNTER — APPOINTMENT (OUTPATIENT)
Dept: ORTHOPEDIC SURGERY | Facility: CLINIC | Age: 61
End: 2021-04-15

## 2021-04-15 LAB
GLUCOSE BLDC GLUCOMTR-MCNC: 119 MG/DL — HIGH (ref 70–99)
GLUCOSE BLDC GLUCOMTR-MCNC: 139 MG/DL — HIGH (ref 70–99)
GLUCOSE BLDC GLUCOMTR-MCNC: 151 MG/DL — HIGH (ref 70–99)
GLUCOSE BLDC GLUCOMTR-MCNC: 175 MG/DL — HIGH (ref 70–99)

## 2021-04-15 PROCEDURE — 99232 SBSQ HOSP IP/OBS MODERATE 35: CPT

## 2021-04-15 RX ORDER — LANOLIN ALCOHOL/MO/W.PET/CERES
3 CREAM (GRAM) TOPICAL AT BEDTIME
Refills: 0 | Status: DISCONTINUED | OUTPATIENT
Start: 2021-04-15 | End: 2021-04-19

## 2021-04-15 RX ADMIN — Medication 10 UNIT(S): at 16:36

## 2021-04-15 RX ADMIN — Medication 500 MILLIGRAM(S): at 10:07

## 2021-04-15 RX ADMIN — ATORVASTATIN CALCIUM 40 MILLIGRAM(S): 80 TABLET, FILM COATED ORAL at 22:28

## 2021-04-15 RX ADMIN — Medication 2: at 08:16

## 2021-04-15 RX ADMIN — Medication 10 UNIT(S): at 11:46

## 2021-04-15 RX ADMIN — Medication 3 MILLIGRAM(S): at 22:29

## 2021-04-15 RX ADMIN — Medication 600 MILLIGRAM(S): at 17:35

## 2021-04-15 RX ADMIN — Medication 2: at 11:46

## 2021-04-15 RX ADMIN — Medication 600 MILLIGRAM(S): at 16:33

## 2021-04-15 RX ADMIN — Medication 500 MILLIGRAM(S): at 22:28

## 2021-04-15 RX ADMIN — Medication 30 MILLILITER(S): at 17:26

## 2021-04-15 RX ADMIN — TAMSULOSIN HYDROCHLORIDE 0.4 MILLIGRAM(S): 0.4 CAPSULE ORAL at 22:29

## 2021-04-15 RX ADMIN — QUETIAPINE FUMARATE 50 MILLIGRAM(S): 200 TABLET, FILM COATED ORAL at 22:29

## 2021-04-15 RX ADMIN — SERTRALINE 200 MILLIGRAM(S): 25 TABLET, FILM COATED ORAL at 10:07

## 2021-04-15 RX ADMIN — Medication 600 MILLIGRAM(S): at 23:31

## 2021-04-15 RX ADMIN — Medication 600 MILLIGRAM(S): at 10:13

## 2021-04-15 RX ADMIN — Medication 600 MILLIGRAM(S): at 22:28

## 2021-04-15 RX ADMIN — Medication 150 MILLIGRAM(S): at 22:29

## 2021-04-15 RX ADMIN — INSULIN GLARGINE 30 UNIT(S): 100 INJECTION, SOLUTION SUBCUTANEOUS at 22:33

## 2021-04-15 RX ADMIN — Medication 600 MILLIGRAM(S): at 11:15

## 2021-04-15 RX ADMIN — Medication 10 UNIT(S): at 08:17

## 2021-04-15 RX ADMIN — Medication 500 MILLIGRAM(S): at 12:32

## 2021-04-15 NOTE — BH INPATIENT PSYCHIATRY PROGRESS NOTE - NSBHFUPINTERVALHXFT_PSY_A_CORE
Patient was seen and evaluated, chart reviewed. Case discussed with nursing team.  Initial interview with patient, he is a 60 year old male with PPH of  Major Depressive Disorder, Adjustment Disorder. Patient is hospitalized with a primary problem of suicidal statements, SI. Patient admitted to [facility] on 9.27 legal status. I have reviewed the initial psychiatric assessment in the electronic medical record, including the history of present illness, past psychiatric history, family/social history (no pertinent changes), and exam, and have confirmed the salient findings dated 4/12/21.  As per chart review, transferring records indicated the following:  Patient is a 61yo male, domiciled, unemployed, PPH depression, anxiety, one prior inpatient admission at [facility] (9/3-10/16/2020 for depression, SI, alcohol use), denies prior SA, PMHx COVID-19 5/2020, admitted to the [facility] ICU on 3/26/21 with DKA, then found with RLL lung mass-like lesion measuring 4.5 cm, bronchoscopy w/BAL on 4/1 grew +cultures w/Streptococcus parasanguinis, transferred to [facility] CTICU on 4/4 for management of hemoptysis and lung abscess, s/p teeth extractions on 4/7. PMHx also includes Hepatitis C, BPH, HLD, HTN, uncontrolled DM II, OA, ETOH use (sober 1 yr), hx Opiate dependence ([id number]). Pt was medically cleared and transferred to [facility] for safety and sx stabilization given Pt's recent vague SI, agitation, and inability to safety plan.   On unit:  Information Received From: Chart review and patient interview:  Chart, medications, and labs reviewed. Patient was discussed with treatment team. No significant events or behavioral issues. No PRNs for aggression. Patient was seen in day room. Patient reports no ongoing suicidal ideation. Patient states mood is improved, though affect is still irritable. Patient repeats frustration with old hospital and required stay here, though he states his mood is improved here. Headache waxes and wanes and the patient is complaining of persisitent back/neck pain which he describes as "muscular" in quality and he gestures to the back of his neck to localize. The patient received ibuprofen for the pain which had limited effect, pain does not improve with tylenol. Patient conitnues to express some anxiety about losing housing and belongings because he hasn't payed rent due to his recent hospital stay. Patient was superficially cooperative with interview. Additional concerns include poor sleep last night due to roommate and recent vision loss, for which he follows outpatient. Patient has some insight into conditions (expresses some understanding of preceding hospital medical course, understands why he is here, poor understanding of DMII). Able to express reasons for living and eye contact somewhat improved, unwilling to vocalize a safety plan. Compliant with medications. No reported adverse effects. Medically, patient glu remains moderately (124-232 last 24h). Chart, medications, and labs reviewed. Patient was discussed with treatment team. No significant events or behavioral issues. No PRNs for aggression. Patient was seen in day room. Patient reports no ongoing suicidal ideation. Patient states mood is improved, though affect is still irritable. Patient repeats frustration with old hospital and required stay here, though he states his mood is improved here. Headache waxes and wanes and the patient is complaining of persisitent back/neck pain which he describes as "muscular" in quality and he gestures to the back of his neck to localize. The patient received ibuprofen for the pain which had limited effect, pain does not improve with tylenol. Patient conitnues to express some anxiety about losing housing and belongings because he hasn't payed rent due to his recent hospital stay. Patient was superficially cooperative with interview. Additional concerns include poor sleep last night due to roommate and recent vision loss, for which he follows outpatient. Patient has some insight into conditions (expresses some understanding of preceding hospital medical course, understands why he is here, poor understanding of DMII). Able to express reasons for living and eye contact somewhat improved, unwilling to vocalize a safety plan. Compliant with medications. No reported adverse effects. Medically, patient glu remains moderately (124-232 last 24h).

## 2021-04-15 NOTE — BH INPATIENT PSYCHIATRY PROGRESS NOTE - NSBHASSESSSUMMFT_PSY_ALL_CORE
Patient is a 59yo M PMH DMII, COVID19 (5/2020) PPH depression, anxiety, AUD (sober since 2020), opiate dependence(2012), prior [facility] hospitalization (9/3-10/16/2020) for depression, SI, alcohol use, who presents from [facility] following treatment of DKA, RLL abscess +Streptococcus parasanguinis cultures, and teeth extraction (4/7) after expressing suicidal ideation. The patient is to be observed here for assessment and symptom stabilization. Presentation is consistent with adjustment disorder or MDD exacerbation in setting of health and financial stress.    Plan:  >Legal: 9.27  >Obs: Routine, no current SI. no need for CO, patient not expected to pose risk to self or others in controlled inpatient setting  >Psychiatric Meds: Continue outpatient medication regimen: Zoloft 200mg and Trazodone 150mg qhs. Observe for tolerability and efficacy.   >Labs: Admission labs reviewed, no acute findings. QTc:457ms  >Medical: No acute concerns. Follow up with Medical team as needed  >Diet: Diabetic   >Social: milieu/structured therapy  >Treatment Interventions: Groups and Individual Therapy/CBT, Motivational counseling for substance abuse related issues.   >Treatment goals: Establish initial treatment plan  >Dispo: Collateral and dispo planning pending further symptom and medication optimization

## 2021-04-15 NOTE — BH INPATIENT PSYCHIATRY PROGRESS NOTE - NSBHCONSBHPROVDETAILS_PSY_A_CORE  FT
Collateral from outpatient Psychiatrist and therapist (Christina Hernandez)  at the [facility] ([phone]),left VM with call back number.

## 2021-04-16 LAB
GLUCOSE BLDC GLUCOMTR-MCNC: 140 MG/DL — HIGH (ref 70–99)
GLUCOSE BLDC GLUCOMTR-MCNC: 148 MG/DL — HIGH (ref 70–99)
GLUCOSE BLDC GLUCOMTR-MCNC: 170 MG/DL — HIGH (ref 70–99)

## 2021-04-16 PROCEDURE — 99232 SBSQ HOSP IP/OBS MODERATE 35: CPT

## 2021-04-16 RX ORDER — TRAZODONE HCL 50 MG
1 TABLET ORAL
Qty: 30 | Refills: 0
Start: 2021-04-16 | End: 2021-05-15

## 2021-04-16 RX ORDER — CEFUROXIME AXETIL 250 MG
1 TABLET ORAL
Qty: 60 | Refills: 0
Start: 2021-04-16 | End: 2021-05-15

## 2021-04-16 RX ORDER — SERTRALINE 25 MG/1
2 TABLET, FILM COATED ORAL
Qty: 60 | Refills: 0
Start: 2021-04-16 | End: 2021-05-15

## 2021-04-16 RX ORDER — TAMSULOSIN HYDROCHLORIDE 0.4 MG/1
1 CAPSULE ORAL
Qty: 30 | Refills: 0
Start: 2021-04-16 | End: 2021-05-15

## 2021-04-16 RX ORDER — CEFOXITIN 1 G/1
1 INJECTION, POWDER, FOR SOLUTION INTRAVENOUS
Qty: 0 | Refills: 0 | DISCHARGE
End: 2021-05-03

## 2021-04-16 RX ORDER — QUETIAPINE FUMARATE 200 MG/1
1 TABLET, FILM COATED ORAL
Qty: 30 | Refills: 0
Start: 2021-04-16 | End: 2021-05-15

## 2021-04-16 RX ORDER — QUETIAPINE FUMARATE 200 MG/1
100 TABLET, FILM COATED ORAL AT BEDTIME
Refills: 0 | Status: DISCONTINUED | OUTPATIENT
Start: 2021-04-16 | End: 2021-04-19

## 2021-04-16 RX ORDER — METRONIDAZOLE 500 MG
1 TABLET ORAL
Qty: 0 | Refills: 0 | DISCHARGE
End: 2021-05-03

## 2021-04-16 RX ORDER — ATORVASTATIN CALCIUM 80 MG/1
1 TABLET, FILM COATED ORAL
Qty: 30 | Refills: 0
Start: 2021-04-16 | End: 2021-05-15

## 2021-04-16 RX ORDER — METRONIDAZOLE 500 MG
1 TABLET ORAL
Qty: 90 | Refills: 0
Start: 2021-04-16 | End: 2021-05-15

## 2021-04-16 RX ORDER — PANTOPRAZOLE SODIUM 20 MG/1
1 TABLET, DELAYED RELEASE ORAL
Qty: 30 | Refills: 0
Start: 2021-04-16 | End: 2021-05-15

## 2021-04-16 RX ADMIN — Medication 500 MILLIGRAM(S): at 21:53

## 2021-04-16 RX ADMIN — ATORVASTATIN CALCIUM 40 MILLIGRAM(S): 80 TABLET, FILM COATED ORAL at 21:52

## 2021-04-16 RX ADMIN — Medication 10 UNIT(S): at 08:38

## 2021-04-16 RX ADMIN — Medication 150 MILLIGRAM(S): at 21:53

## 2021-04-16 RX ADMIN — Medication 500 MILLIGRAM(S): at 08:44

## 2021-04-16 RX ADMIN — Medication 500 MILLIGRAM(S): at 12:30

## 2021-04-16 RX ADMIN — TAMSULOSIN HYDROCHLORIDE 0.4 MILLIGRAM(S): 0.4 CAPSULE ORAL at 21:53

## 2021-04-16 RX ADMIN — SERTRALINE 200 MILLIGRAM(S): 25 TABLET, FILM COATED ORAL at 08:48

## 2021-04-16 RX ADMIN — INSULIN GLARGINE 30 UNIT(S): 100 INJECTION, SOLUTION SUBCUTANEOUS at 21:55

## 2021-04-16 RX ADMIN — Medication 600 MILLIGRAM(S): at 09:26

## 2021-04-16 RX ADMIN — Medication 10 UNIT(S): at 16:58

## 2021-04-16 RX ADMIN — Medication 500 MILLIGRAM(S): at 21:52

## 2021-04-16 RX ADMIN — QUETIAPINE FUMARATE 100 MILLIGRAM(S): 200 TABLET, FILM COATED ORAL at 21:52

## 2021-04-16 RX ADMIN — Medication 600 MILLIGRAM(S): at 08:44

## 2021-04-16 RX ADMIN — Medication 2: at 12:16

## 2021-04-16 RX ADMIN — Medication 10 UNIT(S): at 12:17

## 2021-04-16 RX ADMIN — Medication 3 MILLIGRAM(S): at 21:52

## 2021-04-16 NOTE — BH INPATIENT PSYCHIATRY PROGRESS NOTE - NSBHCONSBHPROVDETAILS_PSY_A_CORE  FT
Collateral from outpatient Psychiatrist and therapist (Christina Hernandez)  at the [facility] ([phone]),left VM with call back number.

## 2021-04-16 NOTE — BH INPATIENT PSYCHIATRY PROGRESS NOTE - NSBHASSESSSUMMFT_PSY_ALL_CORE
Patient is a 61yo M PMH DMII, COVID19 (5/2020) PPH depression, anxiety, AUD (sober since 2020), opiate dependence(2012), prior [facility] hospitalization (9/3-10/16/2020) for depression, SI, alcohol use, who presents from [facility] following treatment of DKA, RLL abscess +Streptococcus parasanguinis cultures, and teeth extraction (4/7) after expressing suicidal ideation. The patient is to be observed here for assessment and symptom stabilization. Presentation is consistent with adjustment disorder or MDD exacerbation in setting of health and financial stress.    Plan:  >Legal: 9.27  >Obs: Routine, no current SI. no need for CO, patient not expected to pose risk to self or others in controlled inpatient setting  >Psychiatric Meds: Continue outpatient medication regimen: Zoloft 200mg and Trazodone 150mg qhs. Observe for tolerability and efficacy.   >Labs: Admission labs reviewed, no acute findings. QTc:457ms  >Medical: No acute concerns. Follow up with Medical team as needed  >Diet: Diabetic   >Social: milieu/structured therapy  >Treatment Interventions: Groups and Individual Therapy/CBT, Motivational counseling for substance abuse related issues.   >Treatment goals: Establish initial treatment plan  >Dispo: Collateral and dispo planning pending further symptom and medication optimization     Plan:  >Legal: 9.27  >Obs: Routine,   >Psychiatric Meds: Continue outpatient medication regimen: Zoloft 200mg, Trazodone 150mg qhs, increase Seroquel 100mg. Observe for tolerability and efficacy.   >Labs: Admission labs reviewed, no acute findings. QTc:457ms  >Medical: No acute concerns. Follow up with Medical team as needed  >Diet: Diabetic   >Social: milieu/structured therapy  >Treatment Interventions: Groups and Individual Therapy/CBT, Motivational counseling for substance abuse related issues.   >Treatment goals: Establish initial treatment plan  >Dispo: [facility] on Monday 4/19

## 2021-04-16 NOTE — BH INPATIENT PSYCHIATRY PROGRESS NOTE - NSBHFUPINTERVALHXFT_PSY_A_CORE
Chart, medications, and labs reviewed. Patient was discussed with treatment team. No significant events or behavioral issues. No PRNs for aggression. Patient was seen in hallway. Patient reports no ongoing suicidal ideation. Patient states mood is improved, though affect is still irritable (patient is inpatient with other patients who interrupt interview). Patient repeats frustration with old hospital and required stay here, though he states his mood is improved here. The patient is complaining of persistent headache and back/neck pain which he describes as "muscular" in quality and he gestures to the back of his neck to localize. The patient received ibuprofen for the pain which had limited effect, pain does not improve with tylenol and he claims it improved with Dilaudid during his hospital stay. Patient continues to express some anxiety about losing housing and belongings because he hasn't payed rent due to his recent hospital stay. Patient was superficially cooperative with interview. Additional concerns include poor sleep last night and recent vision loss, for which he follows outpatient. Patient has some insight into conditions (expresses some understanding of preceding hospital medical course, understands why he is here, poor understanding of DMII). Able to express reasons for living and eye contact somewhat improved, unwilling to vocalize a safety plan. Compliant with medications. No reported adverse effects. Medically, patient glu control improved today (119-175 last 24h).

## 2021-04-17 LAB
GLUCOSE BLDC GLUCOMTR-MCNC: 135 MG/DL — HIGH (ref 70–99)
GLUCOSE BLDC GLUCOMTR-MCNC: 136 MG/DL — HIGH (ref 70–99)
GLUCOSE BLDC GLUCOMTR-MCNC: 141 MG/DL — HIGH (ref 70–99)
GLUCOSE BLDC GLUCOMTR-MCNC: 157 MG/DL — HIGH (ref 70–99)

## 2021-04-17 RX ORDER — INSULIN LISPRO 100/ML
12 VIAL (ML) SUBCUTANEOUS ONCE
Refills: 0 | Status: COMPLETED | OUTPATIENT
Start: 2021-04-17 | End: 2021-04-17

## 2021-04-17 RX ADMIN — Medication 10 UNIT(S): at 16:36

## 2021-04-17 RX ADMIN — Medication 600 MILLIGRAM(S): at 21:01

## 2021-04-17 RX ADMIN — Medication 150 MILLIGRAM(S): at 21:01

## 2021-04-17 RX ADMIN — Medication 12 UNIT(S): at 08:39

## 2021-04-17 RX ADMIN — Medication 500 MILLIGRAM(S): at 08:34

## 2021-04-17 RX ADMIN — INSULIN GLARGINE 30 UNIT(S): 100 INJECTION, SOLUTION SUBCUTANEOUS at 21:02

## 2021-04-17 RX ADMIN — Medication 3 MILLIGRAM(S): at 21:24

## 2021-04-17 RX ADMIN — QUETIAPINE FUMARATE 100 MILLIGRAM(S): 200 TABLET, FILM COATED ORAL at 21:01

## 2021-04-17 RX ADMIN — Medication 500 MILLIGRAM(S): at 14:19

## 2021-04-17 RX ADMIN — Medication 600 MILLIGRAM(S): at 08:39

## 2021-04-17 RX ADMIN — Medication 500 MILLIGRAM(S): at 21:01

## 2021-04-17 RX ADMIN — Medication 3 MILLIGRAM(S): at 21:01

## 2021-04-17 RX ADMIN — Medication 500 MILLIGRAM(S): at 21:00

## 2021-04-17 RX ADMIN — ATORVASTATIN CALCIUM 40 MILLIGRAM(S): 80 TABLET, FILM COATED ORAL at 21:00

## 2021-04-17 RX ADMIN — Medication 10 UNIT(S): at 11:53

## 2021-04-17 RX ADMIN — TAMSULOSIN HYDROCHLORIDE 0.4 MILLIGRAM(S): 0.4 CAPSULE ORAL at 21:01

## 2021-04-17 RX ADMIN — SERTRALINE 200 MILLIGRAM(S): 25 TABLET, FILM COATED ORAL at 08:34

## 2021-04-17 RX ADMIN — Medication 30 MILLILITER(S): at 14:19

## 2021-04-18 VITALS — TEMPERATURE: 98 F

## 2021-04-18 LAB
GLUCOSE BLDC GLUCOMTR-MCNC: 133 MG/DL — HIGH (ref 70–99)
GLUCOSE BLDC GLUCOMTR-MCNC: 140 MG/DL — HIGH (ref 70–99)
GLUCOSE BLDC GLUCOMTR-MCNC: 157 MG/DL — HIGH (ref 70–99)
GLUCOSE BLDC GLUCOMTR-MCNC: 225 MG/DL — HIGH (ref 70–99)

## 2021-04-18 RX ADMIN — Medication 10 UNIT(S): at 16:42

## 2021-04-18 RX ADMIN — Medication 500 MILLIGRAM(S): at 12:23

## 2021-04-18 RX ADMIN — Medication 600 MILLIGRAM(S): at 08:56

## 2021-04-18 RX ADMIN — Medication 10 UNIT(S): at 11:51

## 2021-04-18 RX ADMIN — Medication 3 MILLIGRAM(S): at 20:56

## 2021-04-18 RX ADMIN — TAMSULOSIN HYDROCHLORIDE 0.4 MILLIGRAM(S): 0.4 CAPSULE ORAL at 20:55

## 2021-04-18 RX ADMIN — Medication 500 MILLIGRAM(S): at 08:16

## 2021-04-18 RX ADMIN — QUETIAPINE FUMARATE 100 MILLIGRAM(S): 200 TABLET, FILM COATED ORAL at 20:55

## 2021-04-18 RX ADMIN — INSULIN GLARGINE 30 UNIT(S): 100 INJECTION, SOLUTION SUBCUTANEOUS at 21:20

## 2021-04-18 RX ADMIN — ATORVASTATIN CALCIUM 40 MILLIGRAM(S): 80 TABLET, FILM COATED ORAL at 20:56

## 2021-04-18 RX ADMIN — Medication 600 MILLIGRAM(S): at 20:57

## 2021-04-18 RX ADMIN — Medication 500 MILLIGRAM(S): at 20:56

## 2021-04-18 RX ADMIN — Medication 4: at 11:50

## 2021-04-18 RX ADMIN — Medication 30 MILLILITER(S): at 15:12

## 2021-04-18 RX ADMIN — Medication 150 MILLIGRAM(S): at 20:56

## 2021-04-18 RX ADMIN — Medication 600 MILLIGRAM(S): at 21:57

## 2021-04-18 RX ADMIN — SERTRALINE 200 MILLIGRAM(S): 25 TABLET, FILM COATED ORAL at 08:16

## 2021-04-18 RX ADMIN — Medication 2: at 08:00

## 2021-04-18 RX ADMIN — Medication 10 UNIT(S): at 08:00

## 2021-04-18 NOTE — BH DISCHARGE NOTE NURSING/SOCIAL WORK/PSYCH REHAB - NSDCPRGOAL_PSY_ALL_CORE
Pt met his goal. Pt stated go for a long walk in park/ beach and doing some work as his coping skills ot manage symptoms. Pt was observed to be less irritable, however, pt remains to be frustrated, anxious, guarded. Pt currently denies SI, HI, AH, and VH.    Pt has not attended any group despite psychiatric rehabilitation staff’s daily prompting. Pt was observed to be visible on the unit, watching TV in the day room. Pt continues to be self-isolative, and showed no interactions with others. Pt maintained fair ADLS.   Pt has participated in patient safety plan. Pt refused press ganey survey.

## 2021-04-18 NOTE — BH DISCHARGE NOTE NURSING/SOCIAL WORK/PSYCH REHAB - PATIENT PORTAL LINK FT
You can access the FollowMyHealth Patient Portal offered by [facility] by registering at the following website: [email or web address]. By joining [employer]’s FollowMyHealth portal, you will also be able to view your health information using other applications (apps) compatible with our system.

## 2021-04-18 NOTE — BH DISCHARGE NOTE NURSING/SOCIAL WORK/PSYCH REHAB - NSCDUDCCRISIS_PSY_A_CORE
[facility] Well  1 [phone]) [facility]-WELL ([phone])  Text "WELL" to 10280  Website: www.[email or web address]/.[employer] 1 (608) 471-MNSW (8757) Website: www.safehorizon.org/.National Suicide Prevention Lifeline 2 (394) 610-7166/.  [employer]  1 (364) LIFENET (923-3608)/.  [facility]’s [facility] Crisis [facility]  [phone] [place] [phone]   Hours:  Monday through Friday from 9 AM to 3 PM/.  [employer] - Veterans Crisis Line  3 [phone], Option 1

## 2021-04-18 NOTE — BH DISCHARGE NOTE NURSING/SOCIAL WORK/PSYCH REHAB - NSBHADVANCE_PSY_ALL_CORE_FT
An Advanced Directive is a type of written or verbal instruction about health care to be followed if a person becomes unable to make decisions regarding his or her medical or psychiatric treatment. A surrogate decision maker, also known as a health care proxy/agent, is a person who may act as a decision maker for an incapable person.  Patient was provided with information and declined to complete an Advance Directive/identify a Surrogate Decision Maker.

## 2021-04-19 LAB
GLUCOSE BLDC GLUCOMTR-MCNC: 139 MG/DL — HIGH (ref 70–99)
GLUCOSE BLDC GLUCOMTR-MCNC: 259 MG/DL — HIGH (ref 70–99)

## 2021-04-19 PROCEDURE — 99238 HOSP IP/OBS DSCHRG MGMT 30/<: CPT

## 2021-04-19 RX ORDER — ATORVASTATIN CALCIUM 80 MG/1
1 TABLET, FILM COATED ORAL
Qty: 30 | Refills: 0
Start: 2021-04-19 | End: 2021-05-18

## 2021-04-19 RX ORDER — SERTRALINE 25 MG/1
2 TABLET, FILM COATED ORAL
Qty: 60 | Refills: 0
Start: 2021-04-19 | End: 2021-05-18

## 2021-04-19 RX ORDER — INSULIN LISPRO 100/ML
10 VIAL (ML) SUBCUTANEOUS
Qty: 1 | Refills: 0
Start: 2021-04-19 | End: 2021-05-18

## 2021-04-19 RX ORDER — CEFUROXIME AXETIL 250 MG
1 TABLET ORAL
Qty: 60 | Refills: 0
Start: 2021-04-19 | End: 2021-05-18

## 2021-04-19 RX ORDER — QUETIAPINE FUMARATE 200 MG/1
1 TABLET, FILM COATED ORAL
Qty: 30 | Refills: 0
Start: 2021-04-19 | End: 2021-05-18

## 2021-04-19 RX ORDER — TRAZODONE HCL 50 MG
1 TABLET ORAL
Qty: 30 | Refills: 0
Start: 2021-04-19 | End: 2021-05-18

## 2021-04-19 RX ORDER — ISOPROPYL ALCOHOL, BENZOCAINE .7; .06 ML/ML; ML/ML
1 SWAB TOPICAL
Qty: 100 | Refills: 1
Start: 2021-04-19 | End: 2021-06-07

## 2021-04-19 RX ORDER — INSULIN GLARGINE 100 [IU]/ML
30 INJECTION, SOLUTION SUBCUTANEOUS
Qty: 1 | Refills: 0
Start: 2021-04-19 | End: 2021-05-18

## 2021-04-19 RX ORDER — PANTOPRAZOLE SODIUM 20 MG/1
1 TABLET, DELAYED RELEASE ORAL
Qty: 30 | Refills: 0
Start: 2021-04-19 | End: 2021-05-18

## 2021-04-19 RX ORDER — IPRATROPIUM BROMIDE 0.2 MG/ML
1 SOLUTION, NON-ORAL INHALATION ONCE
Refills: 0 | Status: DISCONTINUED | OUTPATIENT
Start: 2021-04-19 | End: 2021-04-19

## 2021-04-19 RX ORDER — ENOXAPARIN SODIUM 100 MG/ML
30 INJECTION SUBCUTANEOUS
Qty: 1 | Refills: 0
Start: 2021-04-19 | End: 2021-05-18

## 2021-04-19 RX ORDER — TAMSULOSIN HYDROCHLORIDE 0.4 MG/1
1 CAPSULE ORAL
Qty: 30 | Refills: 0
Start: 2021-04-19 | End: 2021-05-18

## 2021-04-19 RX ORDER — METRONIDAZOLE 500 MG
1 TABLET ORAL
Qty: 42 | Refills: 0
Start: 2021-04-19 | End: 2021-05-02

## 2021-04-19 RX ADMIN — SERTRALINE 200 MILLIGRAM(S): 25 TABLET, FILM COATED ORAL at 08:16

## 2021-04-19 RX ADMIN — Medication 600 MILLIGRAM(S): at 08:35

## 2021-04-19 RX ADMIN — Medication 500 MILLIGRAM(S): at 08:15

## 2021-04-19 RX ADMIN — Medication 10 UNIT(S): at 08:14

## 2021-04-19 RX ADMIN — Medication 30 MILLILITER(S): at 00:09

## 2021-04-19 RX ADMIN — Medication 10 UNIT(S): at 12:04

## 2021-04-19 RX ADMIN — Medication 500 MILLIGRAM(S): at 08:16

## 2021-04-19 RX ADMIN — Medication 6: at 12:02

## 2021-04-19 RX ADMIN — Medication 30 MILLILITER(S): at 12:08

## 2021-04-19 RX ADMIN — Medication 500 MILLIGRAM(S): at 12:08

## 2021-04-19 NOTE — ADVANCED PRACTICE NURSE CONSULT - ASSESSMENT
Patient is ready to be discharged  Patient taught how to use the contour glucometer; instructed patient how to set up the lancet device, where to prick his finger.  Patient was educated on normal range of blood glucose.  Patient was taught the signs and the symptoms for hypoglycemia and hyperglycemia.  Patient was educated on the importance to take medications as prescribed  Using the demonstration insulin pen patient was instructed to remove pen cap wipe the rubber end of the pen tip with alcohol, remove the protective seal from a new needle and slowly twist it into the pen tip until it is on then to do a safety test.  Once the pen is ready to be used to choose injectio sites.  Patient taught the different injection sites.    Patient was educated to follow up with endo, ophthalmologist yearly and dentist twice a year.      Patient was able to set up the lancet device and pricked his finger  Patient was able to demonstrated insulin injection using the injection pad and insulin pen inection.

## 2021-04-19 NOTE — BH INPATIENT PSYCHIATRY PROGRESS NOTE - NSBHADMITMEDEDUDETAILS_PSY_A_CORE FT
Symptom stabilization

## 2021-04-19 NOTE — BH INPATIENT PSYCHIATRY PROGRESS NOTE - NSBHADMITMEDEDUDETAILS_A_CORE FT
Risk and benefits discussed

## 2021-04-19 NOTE — BH INPATIENT PSYCHIATRY PROGRESS NOTE - PRN MEDS
MEDICATIONS  (PRN):  acetaminophen   Tablet .. 650 milliGRAM(s) Oral every 6 hours PRN Temp greater or equal to 38C (100.4F), Mild Pain (1 - 3)  haloperidol     Tablet 2 milliGRAM(s) Oral every 6 hours PRN agitation  haloperidol    Injectable 2 milliGRAM(s) IntraMuscular once PRN severe agitation  ibuprofen  Tablet. 600 milliGRAM(s) Oral every 6 hours PRN Moderate Pain (4 - 6)  
MEDICATIONS  (PRN):  acetaminophen   Tablet .. 650 milliGRAM(s) Oral every 6 hours PRN Temp greater or equal to 38C (100.4F), Mild Pain (1 - 3)  aluminum hydroxide/magnesium hydroxide/simethicone Suspension 30 milliLiter(s) Oral every 6 hours PRN Dyspepsia  haloperidol     Tablet 2 milliGRAM(s) Oral every 6 hours PRN agitation  haloperidol    Injectable 2 milliGRAM(s) IntraMuscular once PRN severe agitation  ibuprofen  Tablet. 600 milliGRAM(s) Oral every 6 hours PRN Moderate Pain (4 - 6)  
MEDICATIONS  (PRN):  acetaminophen   Tablet .. 650 milliGRAM(s) Oral every 6 hours PRN Temp greater or equal to 38C (100.4F), Mild Pain (1 - 3)  haloperidol     Tablet 2 milliGRAM(s) Oral every 6 hours PRN agitation  haloperidol    Injectable 2 milliGRAM(s) IntraMuscular once PRN severe agitation  ibuprofen  Tablet. 600 milliGRAM(s) Oral every 6 hours PRN Moderate Pain (4 - 6)  
MEDICATIONS  (PRN):  acetaminophen   Tablet .. 650 milliGRAM(s) Oral every 6 hours PRN Temp greater or equal to 38C (100.4F), Mild Pain (1 - 3)  haloperidol     Tablet 2 milliGRAM(s) Oral every 6 hours PRN agitation  haloperidol    Injectable 2 milliGRAM(s) IntraMuscular once PRN severe agitation  ibuprofen  Tablet. 600 milliGRAM(s) Oral every 6 hours PRN Moderate Pain (4 - 6)  
MEDICATIONS  (PRN):  acetaminophen   Tablet .. 650 milliGRAM(s) Oral every 6 hours PRN Temp greater or equal to 38C (100.4F), Mild Pain (1 - 3)  aluminum hydroxide/magnesium hydroxide/simethicone Suspension 30 milliLiter(s) Oral every 6 hours PRN Dyspepsia  haloperidol     Tablet 2 milliGRAM(s) Oral every 6 hours PRN agitation  haloperidol    Injectable 2 milliGRAM(s) IntraMuscular once PRN severe agitation  ibuprofen  Tablet. 600 milliGRAM(s) Oral every 6 hours PRN Moderate Pain (4 - 6)

## 2021-04-19 NOTE — BH INPATIENT PSYCHIATRY PROGRESS NOTE - NSBHINPTBILLING_PSY_ALL_CORE
52512 - Hospital Discharge Day Management; 30 min or less
11150 - Inpatient Moderate Complexity
55288 - Inpatient Moderate Complexity
05852 - Inpatient Moderate Complexity
08828 - Inpatient Moderate Complexity

## 2021-04-19 NOTE — BH INPATIENT PSYCHIATRY PROGRESS NOTE - NSBHFUPINTERVALCCFT_PSY_A_CORE
seen individually for discharge day management. 
"I said I'd kill myself out of frustration"

## 2021-04-19 NOTE — BH INPATIENT PSYCHIATRY PROGRESS NOTE - NSBHFUPINTERVALHXFT_PSY_A_CORE
Patient seen individually for discharge day management. The patient's case has been reviewed with the treatment team.  Spent performing discharge risk assessment, safety planning, performing clinical eval, planning for dc and providing psychoeducation about meds, sx and aftercare. Symptom improvement is noted. Patient’s symptoms of depressive process have lessened, his sleep patterns have improved and are better.  He has been in behavioral control no prn’s required during his hospitalization. Denies suicidal thoughts no plan or intent. Denies HI/AVH, delusions, or other symptoms of psychotic process are reported at this time.  He showed some improvement in his symptoms, with a gradual reduction of his anxiety, depression, and suicidal thinking. The patient was provided with motivational counseling to tackle stressors and enhance coping skills.   He has remained compliant with medications, no adverse effects noted. He has remained in good behavioral control and has not required emergent intramuscular medications or seclusion / restraints.  He denied any suicidal or homicidal ideations throughout hospitalization. Patient able to identify protective factors, and is future-oriented. Patient no longer requires inpatient treatment and care. There are no other acute risk factors that could be mitigated by further inpatient hospitalization.   He will be discharged today to home and outpatient follow up.

## 2021-04-19 NOTE — BH INPATIENT PSYCHIATRY PROGRESS NOTE - NSBHROSSYSTEMS_PSY_ALL_CORE
Eyes.../Musculoskeletal.../Neurological...
Eyes.../Ears/Nose/Throat/Mouth.../Neurological...
Eyes.../Musculoskeletal.../Neurological...
Eyes.../Musculoskeletal...
Eyes.../Musculoskeletal...

## 2021-04-19 NOTE — BH INPATIENT PSYCHIATRY PROGRESS NOTE - NSBHMETABOLIC_PSY_ALL_CORE_FT
BMI: BMI (kg/m2): 29.6 (04-12-21 @ 19:53)  HbA1c: A1C with Estimated Average Glucose Result: >15.5 % (03-27-21 @ 05:00)    Glucose: POCT Blood Glucose.: 180 mg/dL (04-13-21 @ 07:45)    BP: 122/79 (04-12-21 @ 21:29) (122/79 - 122/79)  Lipid Panel: Date/Time: 10-07-20 @ 08:06  Cholesterol, Serum: 109  Direct LDL: 45  HDL Cholesterol, Serum: 56  Total Cholesterol/HDL Ration Measurement: --  Triglycerides, Serum: 88  
BMI: BMI (kg/m2): 29.6 (04-12-21 @ 19:53)  HbA1c: A1C with Estimated Average Glucose Result: >15.5 % (03-27-21 @ 05:00)    Glucose: POCT Blood Glucose.: 124 mg/dL (04-14-21 @ 08:00)    BP: 122/79 (04-12-21 @ 21:29) (122/79 - 122/79)  Lipid Panel: Date/Time: 10-07-20 @ 08:06  Cholesterol, Serum: 109  Direct LDL: 45  HDL Cholesterol, Serum: 56  Total Cholesterol/HDL Ration Measurement: --  Triglycerides, Serum: 88  
BMI: BMI (kg/m2): 29.6 (04-12-21 @ 19:53)  HbA1c: A1C with Estimated Average Glucose Result: >15.5 % (03-27-21 @ 05:00)    Glucose: POCT Blood Glucose.: 151 mg/dL (04-15-21 @ 07:44)    BP: 123/85 (04-15-21 @ 08:13) (122/79 - 123/85)  Lipid Panel: Date/Time: 10-07-20 @ 08:06  Cholesterol, Serum: 109  Direct LDL: 45  HDL Cholesterol, Serum: 56  Total Cholesterol/HDL Ration Measurement: --  Triglycerides, Serum: 88  
BMI: BMI (kg/m2): 29.6 (04-12-21 @ 19:53)  HbA1c: A1C with Estimated Average Glucose Result: >15.5 % (03-27-21 @ 05:00)    Glucose: POCT Blood Glucose.: 259 mg/dL (04-19-21 @ 11:09)    BP: 118/68 (04-17-21 @ 08:01) (118/68 - 118/68)  Lipid Panel: Date/Time: 10-07-20 @ 08:06  Cholesterol, Serum: 109  Direct LDL: 45  HDL Cholesterol, Serum: 56  Total Cholesterol/HDL Ration Measurement: --  Triglycerides, Serum: 88  
BMI: BMI (kg/m2): 29.6 (04-12-21 @ 19:53)  HbA1c: A1C with Estimated Average Glucose Result: >15.5 % (03-27-21 @ 05:00)    Glucose: POCT Blood Glucose.: 139 mg/dL (04-15-21 @ 19:29)    BP: 123/85 (04-15-21 @ 08:13) (123/85 - 123/85)  Lipid Panel: Date/Time: 10-07-20 @ 08:06  Cholesterol, Serum: 109  Direct LDL: 45  HDL Cholesterol, Serum: 56  Total Cholesterol/HDL Ration Measurement: --  Triglycerides, Serum: 88

## 2021-04-19 NOTE — BH INPATIENT PSYCHIATRY PROGRESS NOTE - NSTXDCOTHRGOAL_PSY_ALL_CORE
Pt will show a reduction of symptoms and engage meaningfully with writer to identify a safe discharge plan.

## 2021-04-19 NOTE — BH INPATIENT PSYCHIATRY PROGRESS NOTE - NSTXPROBDCOTHR_PSY_ALL_CORE
DISCHARGE ISSUE - OTHER

## 2021-04-19 NOTE — BH INPATIENT PSYCHIATRY PROGRESS NOTE - NSBHCHARTREVIEWVS_PSY_A_CORE FT
Vital Signs Last 24 Hrs  T(C): 36.3 (14 Apr 2021 06:28), Max: 36.5 (13 Apr 2021 22:23)  T(F): 97.3 (14 Apr 2021 06:28), Max: 97.7 (13 Apr 2021 22:23)  HR: --  BP: --  BP(mean): --  RR: --  SpO2: --
Vital Signs Last 24 Hrs  T(C): 36.3 (16 Apr 2021 06:14), Max: 36.4 (15 Apr 2021 19:09)  T(F): 97.3 (16 Apr 2021 06:14), Max: 97.5 (15 Apr 2021 19:09)  HR: 75 (15 Apr 2021 08:13) (75 - 75)  BP: 123/85 (15 Apr 2021 08:13) (123/85 - 123/85)  BP(mean): --  RR: 16 (15 Apr 2021 21:49) (16 - 18)  SpO2: 97% (15 Apr 2021 21:49) (97% - 97%)
Vital Signs Last 24 Hrs  T(C): 36.4 (13 Apr 2021 06:37), Max: 37 (12 Apr 2021 12:00)  T(F): 97.6 (13 Apr 2021 06:37), Max: 98.6 (12 Apr 2021 12:00)  HR: 92 (12 Apr 2021 12:00) (92 - 92)  BP: 122/79 (12 Apr 2021 21:29) (120/78 - 122/79)  BP(mean): --  RR: 18 (12 Apr 2021 21:29) (18 - 18)  SpO2: 99% (12 Apr 2021 21:29) (97% - 99%)
Vital Signs Last 24 Hrs  T(C): 36.7 (15 Apr 2021 06:40), Max: 36.7 (15 Apr 2021 06:40)  T(F): 98.1 (15 Apr 2021 06:40), Max: 98.1 (15 Apr 2021 06:40)  HR: 75 (15 Apr 2021 08:13) (75 - 75)  BP: 123/85 (15 Apr 2021 08:13) (123/85 - 123/85)  BP(mean): --  RR: 16 (14 Apr 2021 22:29) (16 - 16)  SpO2: 99% (14 Apr 2021 22:29) (99% - 99%)
Vital Signs Last 24 Hrs  T(C): 36.5 (18 Apr 2021 22:02), Max: 36.7 (18 Apr 2021 14:29)  T(F): 97.7 (18 Apr 2021 22:02), Max: 98.1 (18 Apr 2021 14:29)  HR: --  BP: --  BP(mean): --  RR: --  SpO2: --

## 2021-04-19 NOTE — BH INPATIENT PSYCHIATRY PROGRESS NOTE - NSICDXBHPRIMARYDX_PSY_ALL_CORE
Depression   F32.9  

## 2021-04-19 NOTE — BH INPATIENT PSYCHIATRY PROGRESS NOTE - NSDCCRITERIA_PSY_ALL_CORE
when symptoms reduced

## 2021-04-19 NOTE — BH INPATIENT PSYCHIATRY PROGRESS NOTE - CURRENT MEDICATION
MEDICATIONS  (STANDING):  atorvastatin 40 milliGRAM(s) Oral at bedtime  cefuroxime   Tablet 500 milliGRAM(s) Oral every 12 hours  dextrose 40% Gel 15 Gram(s) Oral once  glucagon  Injectable 1 milliGRAM(s) IntraMuscular once  insulin glargine Injectable (LANTUS) 30 Unit(s) SubCutaneous at bedtime  insulin lispro (ADMELOG) corrective regimen sliding scale   SubCutaneous three times a day before meals  insulin lispro (ADMELOG) corrective regimen sliding scale   SubCutaneous at bedtime  insulin lispro Injectable (ADMELOG) 10 Unit(s) SubCutaneous three times a day before meals  metroNIDAZOLE    Tablet 500 milliGRAM(s) Oral three times a day  sertraline 200 milliGRAM(s) Oral daily  tamsulosin 0.4 milliGRAM(s) Oral at bedtime  traZODone 150 milliGRAM(s) Oral at bedtime    MEDICATIONS  (PRN):  acetaminophen   Tablet .. 650 milliGRAM(s) Oral every 6 hours PRN Temp greater or equal to 38C (100.4F), Mild Pain (1 - 3)  haloperidol     Tablet 2 milliGRAM(s) Oral every 6 hours PRN agitation  haloperidol    Injectable 2 milliGRAM(s) IntraMuscular once PRN severe agitation  ibuprofen  Tablet. 600 milliGRAM(s) Oral every 6 hours PRN Moderate Pain (4 - 6)  
MEDICATIONS  (STANDING):  atorvastatin 40 milliGRAM(s) Oral at bedtime  cefuroxime   Tablet 500 milliGRAM(s) Oral every 12 hours  dextrose 40% Gel 15 Gram(s) Oral once  glucagon  Injectable 1 milliGRAM(s) IntraMuscular once  insulin glargine Injectable (LANTUS) 30 Unit(s) SubCutaneous at bedtime  insulin lispro (ADMELOG) corrective regimen sliding scale   SubCutaneous three times a day before meals  insulin lispro (ADMELOG) corrective regimen sliding scale   SubCutaneous at bedtime  insulin lispro Injectable (ADMELOG) 10 Unit(s) SubCutaneous three times a day before meals  melatonin. 3 milliGRAM(s) Oral at bedtime  melatonin. 3 milliGRAM(s) Oral once  metroNIDAZOLE    Tablet 500 milliGRAM(s) Oral three times a day  QUEtiapine 100 milliGRAM(s) Oral at bedtime  sertraline 200 milliGRAM(s) Oral daily  tamsulosin 0.4 milliGRAM(s) Oral at bedtime  traZODone 150 milliGRAM(s) Oral at bedtime    MEDICATIONS  (PRN):  acetaminophen   Tablet .. 650 milliGRAM(s) Oral every 6 hours PRN Temp greater or equal to 38C (100.4F), Mild Pain (1 - 3)  aluminum hydroxide/magnesium hydroxide/simethicone Suspension 30 milliLiter(s) Oral every 6 hours PRN Dyspepsia  haloperidol     Tablet 2 milliGRAM(s) Oral every 6 hours PRN agitation  haloperidol    Injectable 2 milliGRAM(s) IntraMuscular once PRN severe agitation  ibuprofen  Tablet. 600 milliGRAM(s) Oral every 6 hours PRN Moderate Pain (4 - 6)  
MEDICATIONS  (STANDING):  atorvastatin 40 milliGRAM(s) Oral at bedtime  cefuroxime   Tablet 500 milliGRAM(s) Oral every 12 hours  dextrose 40% Gel 15 Gram(s) Oral once  glucagon  Injectable 1 milliGRAM(s) IntraMuscular once  insulin glargine Injectable (LANTUS) 30 Unit(s) SubCutaneous at bedtime  insulin lispro (ADMELOG) corrective regimen sliding scale   SubCutaneous three times a day before meals  insulin lispro (ADMELOG) corrective regimen sliding scale   SubCutaneous at bedtime  insulin lispro Injectable (ADMELOG) 10 Unit(s) SubCutaneous three times a day before meals  melatonin. 3 milliGRAM(s) Oral at bedtime  metroNIDAZOLE    Tablet 500 milliGRAM(s) Oral three times a day  QUEtiapine 100 milliGRAM(s) Oral at bedtime  sertraline 200 milliGRAM(s) Oral daily  tamsulosin 0.4 milliGRAM(s) Oral at bedtime  traZODone 150 milliGRAM(s) Oral at bedtime    MEDICATIONS  (PRN):  acetaminophen   Tablet .. 650 milliGRAM(s) Oral every 6 hours PRN Temp greater or equal to 38C (100.4F), Mild Pain (1 - 3)  aluminum hydroxide/magnesium hydroxide/simethicone Suspension 30 milliLiter(s) Oral every 6 hours PRN Dyspepsia  haloperidol     Tablet 2 milliGRAM(s) Oral every 6 hours PRN agitation  haloperidol    Injectable 2 milliGRAM(s) IntraMuscular once PRN severe agitation  ibuprofen  Tablet. 600 milliGRAM(s) Oral every 6 hours PRN Moderate Pain (4 - 6)  
MEDICATIONS  (STANDING):  atorvastatin 40 milliGRAM(s) Oral at bedtime  cefuroxime   Tablet 500 milliGRAM(s) Oral every 12 hours  dextrose 40% Gel 15 Gram(s) Oral once  glucagon  Injectable 1 milliGRAM(s) IntraMuscular once  insulin glargine Injectable (LANTUS) 30 Unit(s) SubCutaneous at bedtime  insulin lispro (ADMELOG) corrective regimen sliding scale   SubCutaneous three times a day before meals  insulin lispro (ADMELOG) corrective regimen sliding scale   SubCutaneous at bedtime  insulin lispro Injectable (ADMELOG) 10 Unit(s) SubCutaneous three times a day before meals  metroNIDAZOLE    Tablet 500 milliGRAM(s) Oral three times a day  sertraline 200 milliGRAM(s) Oral daily  tamsulosin 0.4 milliGRAM(s) Oral at bedtime  traZODone 150 milliGRAM(s) Oral at bedtime    MEDICATIONS  (PRN):  acetaminophen   Tablet .. 650 milliGRAM(s) Oral every 6 hours PRN Temp greater or equal to 38C (100.4F), Mild Pain (1 - 3)  haloperidol     Tablet 2 milliGRAM(s) Oral every 6 hours PRN agitation  haloperidol    Injectable 2 milliGRAM(s) IntraMuscular once PRN severe agitation  ibuprofen  Tablet. 600 milliGRAM(s) Oral every 6 hours PRN Moderate Pain (4 - 6)  
MEDICATIONS  (STANDING):  atorvastatin 40 milliGRAM(s) Oral at bedtime  cefuroxime   Tablet 500 milliGRAM(s) Oral every 12 hours  dextrose 40% Gel 15 Gram(s) Oral once  glucagon  Injectable 1 milliGRAM(s) IntraMuscular once  insulin glargine Injectable (LANTUS) 30 Unit(s) SubCutaneous at bedtime  insulin lispro (ADMELOG) corrective regimen sliding scale   SubCutaneous three times a day before meals  insulin lispro (ADMELOG) corrective regimen sliding scale   SubCutaneous at bedtime  insulin lispro Injectable (ADMELOG) 10 Unit(s) SubCutaneous three times a day before meals  melatonin. 3 milliGRAM(s) Oral once  metroNIDAZOLE    Tablet 500 milliGRAM(s) Oral three times a day  QUEtiapine 50 milliGRAM(s) Oral at bedtime  sertraline 200 milliGRAM(s) Oral daily  tamsulosin 0.4 milliGRAM(s) Oral at bedtime  traZODone 150 milliGRAM(s) Oral at bedtime    MEDICATIONS  (PRN):  acetaminophen   Tablet .. 650 milliGRAM(s) Oral every 6 hours PRN Temp greater or equal to 38C (100.4F), Mild Pain (1 - 3)  haloperidol     Tablet 2 milliGRAM(s) Oral every 6 hours PRN agitation  haloperidol    Injectable 2 milliGRAM(s) IntraMuscular once PRN severe agitation  ibuprofen  Tablet. 600 milliGRAM(s) Oral every 6 hours PRN Moderate Pain (4 - 6)

## 2021-04-19 NOTE — ADVANCED PRACTICE NURSE CONSULT - RECOMMEDATIONS
Reached out to NP Solage to send script for glucometer with strips and Lancet, Insulin Admelog and Lantus.  recommended for patient to follow with PMD or Endo in two weeks

## 2021-04-19 NOTE — BH INPATIENT PSYCHIATRY PROGRESS NOTE - NSCGISEVERILLNESS_PSY_ALL_CORE
3 = Mildly ill – clearly established symptoms with minimal, if any, distress or difficulty in social and occupational function

## 2021-04-20 RX ORDER — INSULIN GLARGINE 100 [IU]/ML
30 INJECTION, SOLUTION SUBCUTANEOUS
Qty: 1 | Refills: 0
Start: 2021-04-20 | End: 2021-05-19

## 2021-04-21 ENCOUNTER — OUTPATIENT (OUTPATIENT)
Dept: OUTPATIENT SERVICES | Facility: HOSPITAL | Age: 61
LOS: 1 days | End: 2021-04-21
Payer: MEDICAID

## 2021-04-21 ENCOUNTER — APPOINTMENT (OUTPATIENT)
Dept: FAMILY MEDICINE | Facility: HOSPITAL | Age: 61
End: 2021-04-21

## 2021-04-21 VITALS
SYSTOLIC BLOOD PRESSURE: 104 MMHG | TEMPERATURE: 96 F | WEIGHT: 207 LBS | BODY MASS INDEX: 28.87 KG/M2 | HEART RATE: 67 BPM | OXYGEN SATURATION: 99 % | DIASTOLIC BLOOD PRESSURE: 76 MMHG | RESPIRATION RATE: 16 BRPM

## 2021-04-21 DIAGNOSIS — Z00.00 ENCOUNTER FOR GENERAL ADULT MEDICAL EXAMINATION W/OUT ABNORMAL FINDINGS: ICD-10-CM

## 2021-04-21 DIAGNOSIS — J18.9 PNEUMONIA, UNSPECIFIED ORGANISM: ICD-10-CM

## 2021-04-21 DIAGNOSIS — M19.019 PRIMARY OSTEOARTHRITIS, UNSPECIFIED SHOULDER: Chronic | ICD-10-CM

## 2021-04-21 DIAGNOSIS — Z00.00 ENCOUNTER FOR GENERAL ADULT MEDICAL EXAMINATION WITHOUT ABNORMAL FINDINGS: ICD-10-CM

## 2021-04-21 PROCEDURE — 99238 HOSP IP/OBS DSCHRG MGMT 30/<: CPT

## 2021-04-21 PROCEDURE — G0463: CPT

## 2021-04-22 NOTE — HISTORY OF PRESENT ILLNESS
[FreeTextEntry2] : 60M with T2DM, HTN, depression, OA, and NAFLD presents for post-discharge f/u\par -Admitted to [facility] 3/26/21-4/3/21 for DKA and HARRISON. Managed initially in the ICU with insulin drip\par -Found to have RLL mass with air fluid levels. Bronchoscopy was performed and cultures grew streptococcus parasanguinis. Patient also with hemoptysis. Was transferred to [facility] for evaluation by CT surgery; intervention was not required. 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 Patient was managed with initially with IV antibiotics and discharged with flagyl and ceftin to take through May 3rd. Admitted to [facility] 4/4/21-4/12/21\par -Patient was also found to have dilated extrahepatic CBD with cholelithiasis and pneumobilia. Surgical intervention was not performed, likely due to comorbid conditions\par -Patient also had suicidal ideation while admitted. Was evaluated by psychiatry who recommended sertraline 150mg daily, trazodone 150mg qhs, quetiapine 25mg Q6H PRN agitation, and olanzapine 10mg intramuscular Q6H PRN severe agitation. Patient was discharged from [facility] to [facility], where he was admitted 4/12/21-4/19/21\par -Today, patient reports chronic joint pain. Also reports mild "tightness" of his breathing, but no dyspnea. Denies chest pain \par -Reports he has taken all medications as prescribed on discharge. However, due to some confusion about the two types of insulin vhe was prescribed, has been taking only his pre-meal insulin\par -Discharged with Lantus 30U qhs and lispro 10U TID AC\par -Reports compliance with psychiatric medications. Has f/u with psychiatry in 3 days. Denies SI and HI

## 2021-04-22 NOTE — ASSESSMENT
[FreeTextEntry1] : 60M with T2DM, HTN, depression, OA, and NAFLD presents for post-discharge f/u. Problem specific plan as above.\par

## 2021-04-22 NOTE — REVIEW OF SYSTEMS
[Joint Pain] : joint pain [Headache] : headache [Depression] : depression [Fever] : no fever [Chills] : no chills [Chest Pain] : no chest pain [Palpitations] : no palpitations [Shortness Of Breath] : no shortness of breath [Cough] : no cough [Suicidal] : not suicidal

## 2021-04-28 ENCOUNTER — APPOINTMENT (OUTPATIENT)
Dept: FAMILY MEDICINE | Facility: HOSPITAL | Age: 61
End: 2021-04-28

## 2021-04-28 ENCOUNTER — OUTPATIENT (OUTPATIENT)
Dept: OUTPATIENT SERVICES | Facility: HOSPITAL | Age: 61
LOS: 1 days | End: 2021-04-28
Payer: MEDICAID

## 2021-04-28 ENCOUNTER — RESULT CHARGE (OUTPATIENT)
Age: 61
End: 2021-04-28

## 2021-04-28 VITALS
RESPIRATION RATE: 14 BRPM | OXYGEN SATURATION: 98 % | BODY MASS INDEX: 30.96 KG/M2 | SYSTOLIC BLOOD PRESSURE: 119 MMHG | TEMPERATURE: 97.8 F | WEIGHT: 222 LBS | HEART RATE: 70 BPM | DIASTOLIC BLOOD PRESSURE: 79 MMHG

## 2021-04-28 DIAGNOSIS — M19.019 PRIMARY OSTEOARTHRITIS, UNSPECIFIED SHOULDER: Chronic | ICD-10-CM

## 2021-04-28 DIAGNOSIS — Z00.00 ENCOUNTER FOR GENERAL ADULT MEDICAL EXAMINATION WITHOUT ABNORMAL FINDINGS: ICD-10-CM

## 2021-04-28 PROCEDURE — G0463: CPT

## 2021-04-29 DIAGNOSIS — J18.9 PNEUMONIA, UNSPECIFIED ORGANISM: ICD-10-CM

## 2021-04-29 LAB — HBA1C MFR BLD HPLC: 10.8

## 2021-04-30 NOTE — PHYSICAL EXAM
[Normal Oropharynx] : the oropharynx was normal [Normal Nasal Mucosa] : the nasal mucosa was normal [No Lymphadenopathy] : no lymphadenopathy [Supple] : supple [Normal] : normal rate, regular rhythm, normal S1 and S2 and no murmur heard [Soft] : abdomen soft [de-identified] : Hepatomegaly, RUQ and RLQ TTP [de-identified] : Limited abduction. Surgical scars over L shoulder

## 2021-04-30 NOTE — REVIEW OF SYSTEMS
[Joint Pain] : joint pain [Back Pain] : back pain [Fever] : no fever [Chills] : no chills [Chest Pain] : no chest pain [Palpitations] : no palpitations [Shortness Of Breath] : no shortness of breath [Cough] : no cough

## 2021-04-30 NOTE — ASSESSMENT
[FreeTextEntry1] : 60M with T2DM, HTN, depression, OA, and NAFLD presents for f/u T2DM. Problem specific plan as above.\par

## 2021-04-30 NOTE — HISTORY OF PRESENT ILLNESS
[FreeTextEntry1] : f/u T2DM [de-identified] : 60M with T2DM, HTN, depression, OA, and NAFLD presents for f/u T2DM\par -Had not yet met with diabetic educator\par -Taking Lantus and lispro as indicated\par -Taking fingerstick twice daily. Fasting usually 120s. Post-meal 150s-170s\par -Has been cutting down on sugary drinks and replacing foods with low sugar alternatives\par -Works in construction, walks as well\par -Independent in ADLs and IADLs\par -Taking abx as prescribed for cavitary PNA. No cough, hemoptysis, or dyspnea\par -Reports rhinorrhea. Denies allergies\par -Saw psychiatrist yesterday for f/u depression. Denies SI and HI\par -Reports diffuse joint pain including b/l knees, shoulders R>L, low back, ankle L>R. Follows with ortho. Also interested in trying a pain management clinic

## 2021-05-14 ENCOUNTER — APPOINTMENT (OUTPATIENT)
Dept: PULMONOLOGY | Facility: CLINIC | Age: 61
End: 2021-05-14

## 2021-06-05 ENCOUNTER — EMERGENCY (EMERGENCY)
Facility: HOSPITAL | Age: 61
LOS: 1 days | Discharge: HOME | End: 2021-06-05
Attending: EMERGENCY MEDICINE | Admitting: EMERGENCY MEDICINE
Payer: MEDICAID

## 2021-06-05 VITALS
WEIGHT: 225.09 LBS | HEART RATE: 70 BPM | TEMPERATURE: 98 F | SYSTOLIC BLOOD PRESSURE: 112 MMHG | RESPIRATION RATE: 17 BRPM | HEIGHT: 69 IN | DIASTOLIC BLOOD PRESSURE: 75 MMHG | OXYGEN SATURATION: 96 %

## 2021-06-05 VITALS
TEMPERATURE: 98 F | DIASTOLIC BLOOD PRESSURE: 81 MMHG | OXYGEN SATURATION: 96 % | SYSTOLIC BLOOD PRESSURE: 135 MMHG | RESPIRATION RATE: 18 BRPM

## 2021-06-05 DIAGNOSIS — M19.019 PRIMARY OSTEOARTHRITIS, UNSPECIFIED SHOULDER: Chronic | ICD-10-CM

## 2021-06-05 LAB
ALBUMIN SERPL ELPH-MCNC: 3.9 G/DL — SIGNIFICANT CHANGE UP (ref 3.3–5)
ALP SERPL-CCNC: 75 U/L — SIGNIFICANT CHANGE UP (ref 40–120)
ALT FLD-CCNC: 23 U/L — SIGNIFICANT CHANGE UP (ref 10–45)
ANION GAP SERPL CALC-SCNC: 13 MMOL/L — SIGNIFICANT CHANGE UP (ref 5–17)
APPEARANCE UR: CLEAR — SIGNIFICANT CHANGE UP
AST SERPL-CCNC: 25 U/L — SIGNIFICANT CHANGE UP (ref 10–40)
BACTERIA # UR AUTO: ABNORMAL /HPF
BASOPHILS # BLD AUTO: 0.06 K/UL — SIGNIFICANT CHANGE UP (ref 0–0.2)
BASOPHILS NFR BLD AUTO: 0.5 % — SIGNIFICANT CHANGE UP (ref 0–2)
BILIRUB SERPL-MCNC: 0.4 MG/DL — SIGNIFICANT CHANGE UP (ref 0.2–1.2)
BILIRUB UR-MCNC: NEGATIVE — SIGNIFICANT CHANGE UP
BUN SERPL-MCNC: 18 MG/DL — SIGNIFICANT CHANGE UP (ref 7–23)
CALCIUM SERPL-MCNC: 10.1 MG/DL — SIGNIFICANT CHANGE UP (ref 8.4–10.5)
CHLORIDE SERPL-SCNC: 104 MMOL/L — SIGNIFICANT CHANGE UP (ref 96–108)
CO2 SERPL-SCNC: 25 MMOL/L — SIGNIFICANT CHANGE UP (ref 22–31)
COLOR SPEC: YELLOW — SIGNIFICANT CHANGE UP
CREAT SERPL-MCNC: 1.16 MG/DL — SIGNIFICANT CHANGE UP (ref 0.5–1.3)
DIFF PNL FLD: NEGATIVE — SIGNIFICANT CHANGE UP
EOSINOPHIL # BLD AUTO: 0.17 K/UL — SIGNIFICANT CHANGE UP (ref 0–0.5)
EOSINOPHIL NFR BLD AUTO: 1.4 % — SIGNIFICANT CHANGE UP (ref 0–6)
EPI CELLS # UR: SIGNIFICANT CHANGE UP
GLUCOSE SERPL-MCNC: 226 MG/DL — HIGH (ref 70–99)
GLUCOSE UR QL: NEGATIVE — SIGNIFICANT CHANGE UP
HCT VFR BLD CALC: 43.2 % — SIGNIFICANT CHANGE UP (ref 39–50)
HGB BLD-MCNC: 15 G/DL — SIGNIFICANT CHANGE UP (ref 13–17)
HYALINE CASTS # UR AUTO: ABNORMAL (ref 0–7)
IMM GRANULOCYTES NFR BLD AUTO: 0.3 % — SIGNIFICANT CHANGE UP (ref 0–1.5)
KETONES UR-MCNC: ABNORMAL
LEUKOCYTE ESTERASE UR-ACNC: ABNORMAL
LIDOCAIN IGE QN: 65 U/L — LOW (ref 73–393)
LYMPHOCYTES # BLD AUTO: 1.54 K/UL — SIGNIFICANT CHANGE UP (ref 1–3.3)
LYMPHOCYTES # BLD AUTO: 13.1 % — SIGNIFICANT CHANGE UP (ref 13–44)
MCHC RBC-ENTMCNC: 32.9 PG — SIGNIFICANT CHANGE UP (ref 27–34)
MCHC RBC-ENTMCNC: 34.7 GM/DL — SIGNIFICANT CHANGE UP (ref 32–36)
MCV RBC AUTO: 94.7 FL — SIGNIFICANT CHANGE UP (ref 80–100)
MONOCYTES # BLD AUTO: 0.64 K/UL — SIGNIFICANT CHANGE UP (ref 0–0.9)
MONOCYTES NFR BLD AUTO: 5.5 % — SIGNIFICANT CHANGE UP (ref 2–14)
NEUTROPHILS # BLD AUTO: 9.28 K/UL — HIGH (ref 1.8–7.4)
NEUTROPHILS NFR BLD AUTO: 79.2 % — HIGH (ref 43–77)
NITRITE UR-MCNC: NEGATIVE — SIGNIFICANT CHANGE UP
NRBC # BLD: 0 /100 WBCS — SIGNIFICANT CHANGE UP (ref 0–0)
PH UR: 6 — SIGNIFICANT CHANGE UP (ref 5–8)
PLATELET # BLD AUTO: 334 K/UL — SIGNIFICANT CHANGE UP (ref 150–400)
POTASSIUM SERPL-MCNC: 4 MMOL/L — SIGNIFICANT CHANGE UP (ref 3.5–5.3)
POTASSIUM SERPL-SCNC: 4 MMOL/L — SIGNIFICANT CHANGE UP (ref 3.5–5.3)
PROT SERPL-MCNC: 8.5 G/DL — HIGH (ref 6–8.3)
PROT UR-MCNC: 30 MG/DL
RBC # BLD: 4.56 M/UL — SIGNIFICANT CHANGE UP (ref 4.2–5.8)
RBC # FLD: 12.1 % — SIGNIFICANT CHANGE UP (ref 10.3–14.5)
RBC CASTS # UR COMP ASSIST: NEGATIVE /HPF — SIGNIFICANT CHANGE UP (ref 0–4)
SODIUM SERPL-SCNC: 142 MMOL/L — SIGNIFICANT CHANGE UP (ref 135–145)
SP GR SPEC: 1.02 — SIGNIFICANT CHANGE UP (ref 1.01–1.02)
UROBILINOGEN FLD QL: NEGATIVE — SIGNIFICANT CHANGE UP
WBC # BLD: 11.73 K/UL — HIGH (ref 3.8–10.5)
WBC # FLD AUTO: 11.73 K/UL — HIGH (ref 3.8–10.5)
WBC UR QL: SIGNIFICANT CHANGE UP /HPF (ref 0–5)

## 2021-06-05 PROCEDURE — 80053 COMPREHEN METABOLIC PANEL: CPT

## 2021-06-05 PROCEDURE — 83690 ASSAY OF LIPASE: CPT

## 2021-06-05 PROCEDURE — 99285 EMERGENCY DEPT VISIT HI MDM: CPT

## 2021-06-05 PROCEDURE — 96374 THER/PROPH/DIAG INJ IV PUSH: CPT | Mod: XU

## 2021-06-05 PROCEDURE — 74177 CT ABD & PELVIS W/CONTRAST: CPT

## 2021-06-05 PROCEDURE — 85025 COMPLETE CBC W/AUTO DIFF WBC: CPT

## 2021-06-05 PROCEDURE — 74177 CT ABD & PELVIS W/CONTRAST: CPT | Mod: 26,MA

## 2021-06-05 PROCEDURE — 87086 URINE CULTURE/COLONY COUNT: CPT

## 2021-06-05 PROCEDURE — 36415 COLL VENOUS BLD VENIPUNCTURE: CPT

## 2021-06-05 PROCEDURE — 99284 EMERGENCY DEPT VISIT MOD MDM: CPT | Mod: 25

## 2021-06-05 PROCEDURE — 81001 URINALYSIS AUTO W/SCOPE: CPT

## 2021-06-05 RX ORDER — MORPHINE SULFATE 50 MG/1
4 CAPSULE, EXTENDED RELEASE ORAL ONCE
Refills: 0 | Status: DISCONTINUED | OUTPATIENT
Start: 2021-06-05 | End: 2021-06-05

## 2021-06-05 RX ORDER — SODIUM CHLORIDE 9 MG/ML
1000 INJECTION INTRAMUSCULAR; INTRAVENOUS; SUBCUTANEOUS ONCE
Refills: 0 | Status: COMPLETED | OUTPATIENT
Start: 2021-06-05 | End: 2021-06-05

## 2021-06-05 RX ORDER — OXYCODONE AND ACETAMINOPHEN 5; 325 MG/1; MG/1
1 TABLET ORAL ONCE
Refills: 0 | Status: DISCONTINUED | OUTPATIENT
Start: 2021-06-05 | End: 2021-06-05

## 2021-06-05 RX ADMIN — SODIUM CHLORIDE 1000 MILLILITER(S): 9 INJECTION INTRAMUSCULAR; INTRAVENOUS; SUBCUTANEOUS at 17:35

## 2021-06-05 RX ADMIN — OXYCODONE AND ACETAMINOPHEN 1 TABLET(S): 5; 325 TABLET ORAL at 19:46

## 2021-06-05 RX ADMIN — MORPHINE SULFATE 4 MILLIGRAM(S): 50 CAPSULE, EXTENDED RELEASE ORAL at 18:15

## 2021-06-05 RX ADMIN — MORPHINE SULFATE 4 MILLIGRAM(S): 50 CAPSULE, EXTENDED RELEASE ORAL at 17:35

## 2021-06-05 NOTE — ED PROVIDER NOTE - OBJECTIVE STATEMENT
61 y/o M with PMH of gallstones, DM, HTN, HLD, presents to the ED with c/o right sided abd pain and groin pain x few weeks, worsened today. He is following with Dr. Flynn for the gallstones. + dysuria at times. pt denies hematuria, f/c, v/d, URI symptoms, CP, SOB or all other complaints

## 2021-06-05 NOTE — ED ADULT NURSE NOTE - NSIMPLEMENTINTERV_GEN_ALL_ED
Implemented All Fall with Harm Risk Interventions:  [place] to call system. Call bell, personal items and telephone within reach. Instruct patient to call for assistance. Room bathroom lighting operational. Non-slip footwear when patient is off stretcher. Physically safe environment: no spills, clutter or unnecessary equipment. Stretcher in lowest position, wheels locked, appropriate side rails in place. Provide visual cue, wrist band, yellow gown, etc. Monitor gait and stability. Monitor for mental status changes and reorient to person, place, and time. Review medications for side effects contributing to fall risk. Reinforce activity limits and safety measures with patient and family. Provide visual clues: red socks.

## 2021-06-05 NOTE — ED ADULT NURSE NOTE - OBJECTIVE STATEMENT
Pt c/o right groin pain. He also c/o right blank and abdominal pain that he states he's had for a little while. He recently saw his doctor who he states told him he has "gallbladder problems." Pt c/o right groin pain. He also c/o right sided abdominal pain that he states he's had for a little while, but has worsened. He recently saw his doctor who he states told him he has "gallbladder problems." Belly soft, tender to palpation on right side. No resp distress. Color pink. Pt able to lift right leg and bend his knee.

## 2021-06-05 NOTE — ED PROVIDER NOTE - PMH
2019 novel coronavirus disease (COVID-19)  5/2020  Back pain  Chronic- mid and upper back  Benign hypertension    BPH associated with nocturia    Cholelithiasis  Discovered at [facility] 3/2021  Diverticulosis  Discovered at [facility] 3/2021  DKA (diabetic ketoacidosis)  [facility] 3/2021  Hepatitis C  in remission  HLD (hyperlipidemia)    Insomnia    Lung abscess  RLL found at [facility] 3/2021  OA (osteoarthritis)  BL shoulders  Thyroid nodule  Found at [facility] 3/2021  Type 2 diabetes mellitus without complication, without long-term current use of insulin  Newly diagnosed at [facility] 3/2021

## 2021-06-05 NOTE — ED ADULT NURSE REASSESSMENT NOTE - NS ED NURSE REASSESS COMMENT FT1
Pt d/carito to home with dc instructions. Pt continues c/o right groin pain and right lower back pain. Dr. Colletta and Dr. Rodríguez aware. Pt states he can't lift his right leg up, but he lifts and ranges his right hip well. Ambulating well. Pt instructed to follow up with his doctor. Percocet given at d/c.

## 2021-06-05 NOTE — ED PROVIDER NOTE - ATTENDING CONTRIBUTION TO CARE
Dr. Rodríguez: I performed a face to face bedside interview with patient regarding history of present illness, review of symptoms and past medical history. I completed an independent physical exam.  I have discussed patient's plan of care with PA.   I agree with note as stated above, having amended the EMR as needed to reflect my findings.   This includes HISTORY OF PRESENT ILLNESS, HIV, PAST MEDICAL/SURGICAL/FAMILY/SOCIAL HISTORY, ALLERGIES AND HOME MEDICATIONS, REVIEW OF SYSTEMS, PHYSICAL EXAM, and any PROGRESS NOTES during the time I functioned as the attending physician for this patient.    see mdm

## 2021-06-05 NOTE — ED PROVIDER NOTE - CARE PROVIDER_API CALL
Hussein Plasencia)  Gastroenterology; Internal Medicine  [address], [address]  [place] [address]  Phone: [phone]  Fax: [phone]  Follow Up Time:     Mani Flynn)  Surgery  [address], [address]  [place] [address]  Phone: [phone]  Fax: [phone]  Follow Up Time:

## 2021-06-05 NOTE — ED ADULT NURSE NOTE - PMH
2019 novel coronavirus disease (COVID-19)  5/2020  Back pain  Chronic- mid and upper back  Benign hypertension    BPH associated with nocturia    Cholelithiasis  Discovered at [facility] 3/2021  Diverticulosis  Discovered at [facility] 3/2021  DKA (diabetic ketoacidosis)  [facility] 3/2021  Hepatitis C  in remission  HLD (hyperlipidemia)    Insomnia    Lung abscess  RLL found at [facility] 3/2021  OA (osteoarthritis)  BL shoulders  Thyroid nodule  Found at [facility] 3/2021  Type 2 diabetes mellitus without complication, without long-term current use of insulin  Newly diagnosed at [facility] 3/2021

## 2021-06-05 NOTE — ED PROVIDER NOTE - NSFOLLOWUPINSTRUCTIONS_ED_ALL_ED_FT
Diverticulosis       Diverticulosis is a condition that develops when small pouches (diverticula) form in the wall of the large intestine (colon). The colon is where water is absorbed and stool (feces) is formed. The pouches form when the inside layer of the colon pushes through weak spots in the outer layers of the colon. You may have a few pouches or many of them.    The pouches usually do not cause problems unless they become inflamed or infected. When this happens, the condition is called diverticulitis.      What are the causes?    The cause of this condition is not known.      What increases the risk?  The following factors may make you more likely to develop this condition:  •Being older than age 60. Your risk for this condition increases with age. Diverticulosis is rare among people younger than age 30. By age 80, many people have it.      •Eating a low-fiber diet.      •Having frequent constipation.      •Being overweight.      •Not getting enough exercise.      •Smoking.      •Taking over-the-counter pain medicines, like aspirin and ibuprofen.      •Having a family history of diverticulosis.        What are the signs or symptoms?  In most people, there are no symptoms of this condition. If you do have symptoms, they may include:  •Bloating.      •Cramps in the abdomen.      •Constipation or diarrhea.      •Pain in the lower left side of the abdomen.        How is this diagnosed?  Because diverticulosis usually has no symptoms, it is most often diagnosed during an exam for other colon problems. The condition may be diagnosed by:  •Using a flexible scope to examine the colon (colonoscopy).      •Taking an X-ray of the colon after dye has been put into the colon (barium enema).      •Having a CT scan.        How is this treated?  You may not need treatment for this condition. Your health care provider may recommend treatment to prevent problems. You may need treatment if you have symptoms or if you previously had diverticulitis. Treatment may include:  •Eating a high-fiber diet.      •Taking a fiber supplement.      •Taking a live bacteria supplement (probiotic).      •Taking medicine to relax your colon.        Follow these instructions at home:    Medicines     •Take over-the-counter and prescription medicines only as told by your health care provider.      •If told by your health care provider, take a fiber supplement or probiotic.        Constipation prevention   Your condition may cause constipation. To prevent or treat constipation, you may need to:  •Drink enough fluid to keep your urine pale yellow.      •Take over-the-counter or prescription medicines.      •Eat foods that are high in fiber, such as beans, whole grains, and fresh fruits and vegetables.      •Limit foods that are high in fat and processed sugars, such as fried or sweet foods.      General instructions     •Try not to strain when you have a bowel movement.      •Keep all follow-up visits as told by your health care provider. This is important.        Contact a health care provider if you:    •Have pain in your abdomen.      •Have bloating.      •Have cramps.      •Have not had a bowel movement in 3 days.        Get help right away if:    •Your pain gets worse.      •Your bloating becomes very bad.      •You have a fever or chills, and your symptoms suddenly get worse.      •You vomit.      •You have bowel movements that are bloody or black.      •You have bleeding from your rectum.        Summary    •Diverticulosis is a condition that develops when small pouches (diverticula) form in the wall of the large intestine (colon).      •You may have a few pouches or many of them.      •This condition is most often diagnosed during an exam for other colon problems.      •Treatment may include increasing the fiber in your diet, taking supplements, or taking medicines.      This information is not intended to replace advice given to you by your health care provider. Make sure you discuss any questions you have with your health care provider.  ***    Cholelithiasis       Cholelithiasis is a disease in which gallstones form in the gallbladder. The gallbladder is an organ that stores bile. Bile is a fluid that helps to digest fats. Gallstones begin as small crystals and can slowly grow into stones. They may cause no symptoms until they block the gallbladder duct, or cystic duct, when the gallbladder tightens (contracts) after food is eaten. This can cause pain and is known as a gallbladder attack, or biliary colic.  There are two main types of gallstones:  •Cholesterol stones. These are the most common type of gallstone. These stones are made of hardened cholesterol and are usually yellow-green in color. Cholesterol is a fat-like substance that is made in the liver.      •Pigment stones. These are dark in color and are made of a red-yellow substance, called bilirubin,that forms when hemoglobin from red blood cells breaks down.        What are the causes?  This condition may be caused by an imbalance in the different parts that make bile. This can happen if the bile:  •Has too much bilirubin. This can happen in certain blood diseases, such as sickle cell anemia.      •Has too much cholesterol.      •Does not have enough bile salts. These salts help the body absorb and digest fats.      In some cases, this condition can also be caused by the gallbladder not emptying completely or often enough. This is common during pregnancy.      What increases the risk?  The following factors may make you more likely to develop this condition:  •Being female.      •Having multiple pregnancies. Health care providers sometimes advise removing diseased gallbladders before future pregnancies.      •Eating a diet that is heavy in fried foods, fat, and refined carbohydrates, such as white bread and white rice.      •Being obese.      •Being older than age 40.      •Using medicines that contain female hormones (estrogen) for a long time.      •Losing weight quickly.      •Having a family history of gallstones.    •Having certain medical problems, such as:  •Diabetes mellitus.      •Cystic fibrosis.      •Crohn's disease.      •Cirrhosis or other long-term (chronic) liver disease.      •Certain blood diseases, such as sickle cell anemia or leukemia.          What are the signs or symptoms?  In many cases, having gallstones causes no symptoms. When you have gallstones but do not have symptoms, you have silent gallstones. If a gallstone blocks your bile duct, it can cause a gallbladder attack. The main symptom of a gallbladder attack is sudden pain in the upper right part of the abdomen. The pain:  •Usually comes at night or after eating.       •Can last for one hour or more.      •Can spread to your right shoulder, back, or chest.      •Can feel like indigestion. This is discomfort, burning, or fullness in your upper abdomen.    If the bile duct is blocked for more than a few hours, it can cause an infection or inflammation of your gallbladder (cholecystitis), liver, or pancreas. This can cause:  •Nausea or vomiting.      •Bloating.      •Pain in your abdomen that lasts for 5 hours or longer.      •Tenderness in your upper abdomen, often in the upper right section and under your rib cage.      •Fever or chills.      •Skin or the white parts of your eyes turning yellow (jaundice). This usually happens when a stone has blocked bile from passing through the common bile duct.      •Dark urine or light-colored stools.        How is this diagnosed?  This condition may be diagnosed based on:  •A physical exam.      •Your medical history.      •Ultrasound.      •CT scan.      •MRI.    You may also have other tests, including:  •Blood tests to check for signs of an infection or inflammation.      •Cholescintigraphy, or HIDA scan. This is a scan of your gallbladder and bile ducts (biliary system) using non-harmful radioactive material and special cameras that can see the radioactive material.      •Endoscopic retrograde cholangiopancreatogram. This involves inserting a small tube with a camera on the end (endoscope) through your mouth to look at bile ducts and check for blockages.        How is this treated?  Treatment for this condition depends on the severity of the condition. Silent gallstones do not need treatment. Treatment may be needed if a blockage causes a gallbladder attack or other symptoms. Treatment may include:•Home care, if symptoms are not severe.  •During a simple gallbladder attack, stop eating and drinking for 12–24 hours (except for water and clear liquids). This helps to "cool down" your gallbladder. After 1 or 2 days, you can start to eat a diet of simple or clear foods, such as broths and crackers.      •You may also need medicines for pain or nausea or both.       •If you have cholecystitis and an infection, you will need antibiotics.        •A hospital stay, if needed for pain control or for cholecystitis with severe infection.      •Cholecystectomy, or surgery to remove your gallbladder. This is the most common treatment if all other treatments have not worked.      •Medicines to break up gallstones. These are most effective at treating small gallstones. Medicines may be used for up to 6–12 months.      •Endoscopic retrograde cholangiopancreatogram. A small basket can be attached to the endoscope and used to capture and remove gallstones, mainly those that are in the common bile duct.        Follow these instructions at home:    Medicines     •Take over-the-counter and prescription medicines only as told by your health care provider.      •If you were prescribed an antibiotic medicine, take it as told by your health care provider. Do not stop taking the antibiotic even if you start to feel better.      •Ask your health care provider if the medicine prescribed to you requires you to avoid driving or using machinery.      Eating and drinking     •Drink enough fluid to keep your urine pale yellow. This is important during a gallbladder attack. Water and clear liquids are preferred.    •Follow a healthy diet. This includes:  •Reducing fatty foods, such as fried food and foods high in cholesterol.      •Reducing refined carbohydrates, such as white bread and white rice.      •Eating more fiber. Aim for foods such as almonds, fruit, and beans.        Alcohol use   •If you drink alcohol:•Limit how much you use to:  •0–1 drink a day for nonpregnant women.      •0–2 drinks a day for men.        •Be aware of how much alcohol is in your drink. In the [place] one drink equals one 12 oz bottle of beer (355 mL), one 5 oz glass of wine (148 mL), or one 1½ oz glass of hard liquor (44 mL).        General instructions     • Do not use any products that contain nicotine or tobacco, such as cigarettes, e-cigarettes, and chewing tobacco. If you need help quitting, ask your health care provider.      •Maintain a healthy weight.      •Keep all follow-up visits as told by your health care provider. These may include consultations with a surgeon or specialist. This is important.        Where to find more information    •[employer] [place] [employer] and [employer]estive [employer] Kidney Diseases: www.niddk.nih.gov        Contact a health care provider if:    •You think you have had a gallbladder attack.      •You have been diagnosed with silent gallstones and you develop pain in your abdomen or indigestion.      •You begin to have attacks more often.      •You have dark urine or light-colored stools.        Get help right away if:    •You have pain from a gallbladder attack that lasts for more than 2 hours.      •You have pain in your abdomen that lasts for more than 5 hours or is getting worse.      •You have a fever or chills.      •You have nausea and vomiting that do not go away.      •You develop jaundice.        Summary    •Cholelithiasis is a disease in which gallstones form in the gallbladder.      •This condition may be caused by an imbalance in the different parts that make bile. This can happen if your bile has too much bilirubin or cholesterol, or does not have enough bile salts.      •Treatment for gallstones depends on the severity of the condition. Silent gallstones do not need treatment.      •If gallstones cause a gallbladder attack or other symptoms, treatment usually involves not eating or drinking anything. Treatment may also include pain medicines and antibiotics, and it sometimes includes a hospital stay.      •Surgery to remove the gallbladder is common if all other treatments have not worked.      This information is not intended to replace advice given to you by your health care provider. Make sure you discuss any questions you have with your health care provider.

## 2021-06-05 NOTE — ED PROVIDER NOTE - PATIENT PORTAL LINK FT
You can access the FollowMyHealth Patient Portal offered by [facility] by registering at the following website: [email or web address]. By joining [facility]’s FollowMyHealth portal, you will also be able to view your health information using other applications (apps) compatible with our system.

## 2021-06-05 NOTE — ED PROVIDER NOTE - CLINICAL SUMMARY MEDICAL DECISION MAKING FREE TEXT BOX
Dr. Rodríguez: 60M h/o gallstones, DM, HTN, HLD p/w right sided abdo pain x few weeks, worse today. +dysuria, no hematuria, no fevers or chills, no nausea or vomiting. On exam pt is well appearing, nad, rrr, ctab, +diffuse abdo ttp worse in RLQ, no cvat. Will check labs, UA, CT, reassess Dr. Rodríguez: 60M h/o gallstones, DM, HTN, HLD p/w right sided abdo pain x few weeks, worse today. +dysuria, no hematuria, no fevers or chills, no nausea or vomiting. On exam pt is well appearing, nad, rrr, ctab, +diffuse abdo ttp worse in RLQ, no cvat. Will check labs, UA, CT, reassess  Pt reassessed, doing well, results explained, will dc

## 2021-06-06 LAB
CULTURE RESULTS: SIGNIFICANT CHANGE UP
SPECIMEN SOURCE: SIGNIFICANT CHANGE UP

## 2021-06-11 ENCOUNTER — APPOINTMENT (OUTPATIENT)
Dept: FAMILY MEDICINE | Facility: HOSPITAL | Age: 61
End: 2021-06-11

## 2021-06-11 ENCOUNTER — OUTPATIENT (OUTPATIENT)
Dept: OUTPATIENT SERVICES | Facility: HOSPITAL | Age: 61
LOS: 1 days | End: 2021-06-11
Payer: MEDICAID

## 2021-06-11 ENCOUNTER — APPOINTMENT (OUTPATIENT)
Dept: SURGERY | Facility: CLINIC | Age: 61
End: 2021-06-11
Payer: MEDICAID

## 2021-06-11 VITALS
SYSTOLIC BLOOD PRESSURE: 120 MMHG | OXYGEN SATURATION: 96 % | DIASTOLIC BLOOD PRESSURE: 80 MMHG | WEIGHT: 222 LBS | HEART RATE: 85 BPM | BODY MASS INDEX: 31.08 KG/M2 | TEMPERATURE: 98 F | HEIGHT: 71 IN

## 2021-06-11 VITALS
RESPIRATION RATE: 17 BRPM | DIASTOLIC BLOOD PRESSURE: 80 MMHG | OXYGEN SATURATION: 98 % | BODY MASS INDEX: 30.27 KG/M2 | HEART RATE: 65 BPM | TEMPERATURE: 97 F | SYSTOLIC BLOOD PRESSURE: 123 MMHG | WEIGHT: 217 LBS

## 2021-06-11 DIAGNOSIS — Z00.00 ENCOUNTER FOR GENERAL ADULT MEDICAL EXAMINATION WITHOUT ABNORMAL FINDINGS: ICD-10-CM

## 2021-06-11 DIAGNOSIS — M19.019 PRIMARY OSTEOARTHRITIS, UNSPECIFIED SHOULDER: Chronic | ICD-10-CM

## 2021-06-11 PROCEDURE — G0463: CPT

## 2021-06-11 PROCEDURE — 99204 OFFICE O/P NEW MOD 45 MIN: CPT

## 2021-06-15 DIAGNOSIS — M54.9 DORSALGIA, UNSPECIFIED: ICD-10-CM

## 2021-06-17 ENCOUNTER — APPOINTMENT (OUTPATIENT)
Dept: FAMILY MEDICINE | Facility: HOSPITAL | Age: 61
End: 2021-06-17

## 2021-06-18 ENCOUNTER — APPOINTMENT (OUTPATIENT)
Dept: PULMONOLOGY | Facility: CLINIC | Age: 61
End: 2021-06-18

## 2021-06-25 ENCOUNTER — APPOINTMENT (OUTPATIENT)
Dept: ULTRASOUND IMAGING | Facility: HOSPITAL | Age: 61
End: 2021-06-25
Payer: MEDICAID

## 2021-06-25 ENCOUNTER — OUTPATIENT (OUTPATIENT)
Dept: OUTPATIENT SERVICES | Facility: HOSPITAL | Age: 61
LOS: 1 days | End: 2021-06-25
Payer: MEDICAID

## 2021-06-25 ENCOUNTER — APPOINTMENT (OUTPATIENT)
Dept: CT IMAGING | Facility: HOSPITAL | Age: 61
End: 2021-06-25
Payer: MEDICAID

## 2021-06-25 ENCOUNTER — RESULT REVIEW (OUTPATIENT)
Age: 61
End: 2021-06-25

## 2021-06-25 DIAGNOSIS — M19.019 PRIMARY OSTEOARTHRITIS, UNSPECIFIED SHOULDER: Chronic | ICD-10-CM

## 2021-06-25 DIAGNOSIS — J18.9 PNEUMONIA, UNSPECIFIED ORGANISM: ICD-10-CM

## 2021-06-25 PROCEDURE — 71250 CT THORAX DX C-: CPT

## 2021-06-25 PROCEDURE — 76705 ECHO EXAM OF ABDOMEN: CPT | Mod: 26

## 2021-06-25 PROCEDURE — 71250 CT THORAX DX C-: CPT | Mod: 26

## 2021-06-25 PROCEDURE — 76705 ECHO EXAM OF ABDOMEN: CPT

## 2021-07-09 ENCOUNTER — APPOINTMENT (OUTPATIENT)
Dept: PULMONOLOGY | Facility: CLINIC | Age: 61
End: 2021-07-09

## 2021-07-16 ENCOUNTER — APPOINTMENT (OUTPATIENT)
Dept: PULMONOLOGY | Facility: CLINIC | Age: 61
End: 2021-07-16
Payer: MEDICAID

## 2021-07-16 VITALS
TEMPERATURE: 97.6 F | WEIGHT: 217 LBS | HEIGHT: 71 IN | BODY MASS INDEX: 30.38 KG/M2 | OXYGEN SATURATION: 97 % | HEART RATE: 70 BPM | DIASTOLIC BLOOD PRESSURE: 80 MMHG | SYSTOLIC BLOOD PRESSURE: 120 MMHG

## 2021-07-16 PROCEDURE — 99406 BEHAV CHNG SMOKING 3-10 MIN: CPT

## 2021-07-16 PROCEDURE — 99204 OFFICE O/P NEW MOD 45 MIN: CPT

## 2021-07-16 NOTE — REVIEW OF SYSTEMS
[Dyspnea] : dyspnea [SOB on Exertion] : sob on exertion [Arthralgias] : arthralgias [Negative] : Endocrine

## 2021-07-16 NOTE — ASSESSMENT
[FreeTextEntry1] : Patient has dyspnea of unclear etiology.  Most likely it is multifactorial with a component of COPD along with post Covid syndrome with possible cardiac component.  Echocardiogram ordered.  Repeat CAT scan ordered.  Breo inhaler ordered.  Patient will follow up in 1 month we will consider pulmonary function testing at that time.

## 2021-07-16 NOTE — REASON FOR VISIT
[Initial] : an initial visit [Abnormal CXR/ Chest CT] : an abnormal CXR/ chest CT [Shortness of Breath] : shortness of breath [TextBox_44] : post covid

## 2021-07-16 NOTE — COUNSELING
[Risk of tobacco use and health benefits of smoking cessation discussed] : Risk of tobacco use and health benefits of smoking cessation discussed [Cessation strategies including cessation program discussed] : Cessation strategies including cessation program discussed [Use of nicotine replacement therapies and other medications discussed] : Use of nicotine replacement therapies and other medications discussed [Encouraged to pick a quit date and identify support needed to quit] : Encouraged to pick a quit date and identify support needed to quit [Tobacco Use Cessation Intermediate Greater Than 3 Minutes Up to 10 Minutes] : Tobacco Use Cessation Intermediate Greater Than 3 Minutes Up to 10 Minutes [FreeTextEntry1] : 10 minutes

## 2021-07-23 ENCOUNTER — APPOINTMENT (OUTPATIENT)
Dept: FAMILY MEDICINE | Facility: HOSPITAL | Age: 61
End: 2021-07-23

## 2021-07-23 ENCOUNTER — APPOINTMENT (OUTPATIENT)
Dept: SURGERY | Facility: CLINIC | Age: 61
End: 2021-07-23
Payer: MEDICAID

## 2021-07-23 VITALS
TEMPERATURE: 98.2 F | DIASTOLIC BLOOD PRESSURE: 80 MMHG | WEIGHT: 217 LBS | SYSTOLIC BLOOD PRESSURE: 120 MMHG | HEART RATE: 67 BPM | OXYGEN SATURATION: 97 % | BODY MASS INDEX: 30.38 KG/M2 | HEIGHT: 71 IN

## 2021-07-23 PROCEDURE — 99214 OFFICE O/P EST MOD 30 MIN: CPT

## 2021-07-27 ENCOUNTER — TRANSCRIPTION ENCOUNTER (OUTPATIENT)
Age: 61
End: 2021-07-27

## 2021-08-04 ENCOUNTER — OUTPATIENT (OUTPATIENT)
Dept: OUTPATIENT SERVICES | Facility: HOSPITAL | Age: 61
LOS: 1 days | End: 2021-08-04
Payer: MEDICAID

## 2021-08-04 ENCOUNTER — RESULT CHARGE (OUTPATIENT)
Age: 61
End: 2021-08-04

## 2021-08-04 ENCOUNTER — APPOINTMENT (OUTPATIENT)
Dept: FAMILY MEDICINE | Facility: HOSPITAL | Age: 61
End: 2021-08-04

## 2021-08-04 VITALS
TEMPERATURE: 97.8 F | HEART RATE: 60 BPM | SYSTOLIC BLOOD PRESSURE: 127 MMHG | DIASTOLIC BLOOD PRESSURE: 84 MMHG | OXYGEN SATURATION: 97 % | RESPIRATION RATE: 18 BRPM

## 2021-08-04 VITALS
TEMPERATURE: 97 F | OXYGEN SATURATION: 97 % | HEIGHT: 70.5 IN | WEIGHT: 211.2 LBS | RESPIRATION RATE: 16 BRPM | HEART RATE: 62 BPM | SYSTOLIC BLOOD PRESSURE: 113 MMHG | DIASTOLIC BLOOD PRESSURE: 75 MMHG

## 2021-08-04 DIAGNOSIS — K80.20 CALCULUS OF GALLBLADDER WITHOUT CHOLECYSTITIS WITHOUT OBSTRUCTION: ICD-10-CM

## 2021-08-04 DIAGNOSIS — Z00.00 ENCOUNTER FOR GENERAL ADULT MEDICAL EXAMINATION WITHOUT ABNORMAL FINDINGS: ICD-10-CM

## 2021-08-04 DIAGNOSIS — K21.9 GASTRO-ESOPHAGEAL REFLUX DISEASE WITHOUT ESOPHAGITIS: ICD-10-CM

## 2021-08-04 DIAGNOSIS — Z01.818 ENCOUNTER FOR OTHER PREPROCEDURAL EXAMINATION: ICD-10-CM

## 2021-08-04 DIAGNOSIS — M19.019 PRIMARY OSTEOARTHRITIS, UNSPECIFIED SHOULDER: Chronic | ICD-10-CM

## 2021-08-04 DIAGNOSIS — I10 ESSENTIAL (PRIMARY) HYPERTENSION: ICD-10-CM

## 2021-08-04 DIAGNOSIS — F32.9 MAJOR DEPRESSIVE DISORDER, SINGLE EPISODE, UNSPECIFIED: ICD-10-CM

## 2021-08-04 LAB
A1C WITH ESTIMATED AVERAGE GLUCOSE RESULT: 6.4 % — HIGH (ref 4–5.6)
ALBUMIN SERPL ELPH-MCNC: 3.9 G/DL — SIGNIFICANT CHANGE UP (ref 3.3–5)
ALP SERPL-CCNC: 97 U/L — SIGNIFICANT CHANGE UP (ref 40–120)
ALT FLD-CCNC: 23 U/L — SIGNIFICANT CHANGE UP (ref 10–45)
ANION GAP SERPL CALC-SCNC: 10 MMOL/L — SIGNIFICANT CHANGE UP (ref 5–17)
AST SERPL-CCNC: 22 U/L — SIGNIFICANT CHANGE UP (ref 10–40)
BILIRUB SERPL-MCNC: 0.2 MG/DL — SIGNIFICANT CHANGE UP (ref 0.2–1.2)
BLD GP AB SCN SERPL QL: SIGNIFICANT CHANGE UP
BUN SERPL-MCNC: 15 MG/DL — SIGNIFICANT CHANGE UP (ref 7–23)
CALCIUM SERPL-MCNC: 9.7 MG/DL — SIGNIFICANT CHANGE UP (ref 8.4–10.5)
CHLORIDE SERPL-SCNC: 104 MMOL/L — SIGNIFICANT CHANGE UP (ref 96–108)
CO2 SERPL-SCNC: 25 MMOL/L — SIGNIFICANT CHANGE UP (ref 22–31)
CREAT SERPL-MCNC: 0.94 MG/DL — SIGNIFICANT CHANGE UP (ref 0.5–1.3)
ESTIMATED AVERAGE GLUCOSE: 137 MG/DL — HIGH (ref 68–114)
GLUCOSE SERPL-MCNC: 101 MG/DL — HIGH (ref 70–99)
HBA1C MFR BLD HPLC: 6.4
HCT VFR BLD CALC: 42.7 % — SIGNIFICANT CHANGE UP (ref 39–50)
HGB BLD-MCNC: 14.3 G/DL — SIGNIFICANT CHANGE UP (ref 13–17)
MCHC RBC-ENTMCNC: 30.9 PG — SIGNIFICANT CHANGE UP (ref 27–34)
MCHC RBC-ENTMCNC: 33.5 GM/DL — SIGNIFICANT CHANGE UP (ref 32–36)
MCV RBC AUTO: 92.2 FL — SIGNIFICANT CHANGE UP (ref 80–100)
NRBC # BLD: 0 /100 WBCS — SIGNIFICANT CHANGE UP (ref 0–0)
PLATELET # BLD AUTO: 381 K/UL — SIGNIFICANT CHANGE UP (ref 150–400)
POTASSIUM SERPL-MCNC: 4.1 MMOL/L — SIGNIFICANT CHANGE UP (ref 3.5–5.3)
POTASSIUM SERPL-SCNC: 4.1 MMOL/L — SIGNIFICANT CHANGE UP (ref 3.5–5.3)
PROT SERPL-MCNC: 8.4 G/DL — HIGH (ref 6–8.3)
RBC # BLD: 4.63 M/UL — SIGNIFICANT CHANGE UP (ref 4.2–5.8)
RBC # FLD: 12.6 % — SIGNIFICANT CHANGE UP (ref 10.3–14.5)
SODIUM SERPL-SCNC: 139 MMOL/L — SIGNIFICANT CHANGE UP (ref 135–145)
WBC # BLD: 11.9 K/UL — HIGH (ref 3.8–10.5)
WBC # FLD AUTO: 11.9 K/UL — HIGH (ref 3.8–10.5)

## 2021-08-04 PROCEDURE — 82043 UR ALBUMIN QUANTITATIVE: CPT

## 2021-08-04 PROCEDURE — 80053 COMPREHEN METABOLIC PANEL: CPT

## 2021-08-04 PROCEDURE — 86901 BLOOD TYPING SEROLOGIC RH(D): CPT

## 2021-08-04 PROCEDURE — G0463: CPT

## 2021-08-04 PROCEDURE — 36415 COLL VENOUS BLD VENIPUNCTURE: CPT

## 2021-08-04 PROCEDURE — 86900 BLOOD TYPING SEROLOGIC ABO: CPT

## 2021-08-04 PROCEDURE — 83036 HEMOGLOBIN GLYCOSYLATED A1C: CPT

## 2021-08-04 PROCEDURE — 85027 COMPLETE CBC AUTOMATED: CPT

## 2021-08-04 PROCEDURE — 86850 RBC ANTIBODY SCREEN: CPT

## 2021-08-04 NOTE — H&P PST ADULT - NSICDXPROBLEM_GEN_ALL_CORE_FT
PROBLEM DIAGNOSES  Problem: Cholelithiasis  Assessment and Plan: Patient provided with pre-operative instructions and verbalized understanding.  Patient can take Metroprolol, Sertraline, Losartan, Omeprazole but otherwise will be NPO on day of surgery. Patient will stop NSAIDs, aspirin, herbal supplements or vitamins 1 week prior to surgery.  Chlorohexadine wash provided with instructions.  IS provided with instructions and patient able to demonstrate correctlyPending medical clearance as per surgeon    Problem: GERD (gastroesophageal reflux disease)  Assessment and Plan: continue medications      Problem: Benign hypertension  Assessment and Plan: continue medications    Problem: Depression  Assessment and Plan: continue medication

## 2021-08-04 NOTE — H&P PST ADULT - NSICDXPASTMEDICALHX_GEN_ALL_CORE_FT
PAST MEDICAL HISTORY:  2019 novel coronavirus disease (COVID-19) 5/2020    Back pain Chronic- mid and upper back    Benign hypertension     BPH associated with nocturia     Cholelithiasis Discovered at [facility] 3/2021    Depression     Diverticulosis Discovered at [facility] 3/2021    DKA (diabetic ketoacidosis) [facility] 3/2021    Hepatitis C in remission    HLD (hyperlipidemia)     Insomnia     Lung abscess RLL found at [facility] 3/2021    OA (osteoarthritis) BL shoulders    Thyroid nodule Found at [facility] 3/2021    Type 2 diabetes mellitus without complication, without long-term current use of insulin Newly diagnosed at [facility] 3/2021

## 2021-08-04 NOTE — H&P PST ADULT - NEUROLOGICAL
Called and spoke with pt regarding patch placement/testing. Informed that she will not be able to shower or get patches wet. Agreeable. Pt had to end call suddenly due to children but states she will call back to schedule.     Clarice SALDAÑA   Allergy RN       details… Alert & oriented; no sensory, motor or coordination deficits, normal reflexes

## 2021-08-04 NOTE — H&P PST ADULT - HISTORY OF PRESENT ILLNESS
This is sa 60 year old male who presents to office with pre-operative diagnosis of Cholelithiasis.   Therefore scheduled for Laparascopic cholecystectomy on 8/9/21.  PMHx DM2, HTN, HLD, BPH, Depression, GERD.  s/o COVID in 3/2020 , denies hospitalization/intubation. CXR done 4/2021 WNL.  Otherwise feels well today.  Denies any complaints.

## 2021-08-05 ENCOUNTER — RESULT CHARGE (OUTPATIENT)
Age: 61
End: 2021-08-05

## 2021-08-05 NOTE — HISTORY OF PRESENT ILLNESS
[Diabetes Mellitus] : Diabetes Mellitus [No episodes] : No hypoglycemic episodes since the last visit. [Check glucose ___ x/day] : Patient checks  blood glucose [unfilled]  times a day [Understanding of foot care] : Patient expressed understanding of foot care [Most Recent A1C: ___] : Most recent A1C was [unfilled] [Target goal met] : A1C target goal met [High Intensity] : Patient is currently on high intensity statin therapy [FreeTextEntry6] : 61 y/o M PMH T2DM, HTN, depression, OA, NAFLD presents for DM mgmt. Pt also needs preop clearance for cholecystectomy next Monday, discussed with pt will need to rtc on Friday for preop clearance as surgical pretesting bloodwork done this AM and still pending. LS in April for diabetes f/u, A1c at time 10.8. Patient denies polyuria or polydipsia, hypoglycemic episodes, weight gain/loss, peripheral extremity tingling/pain, vision changes. Patient reports good/poor compliance with the medications. Patient reports blood glucose ranges of 115-120s and after dinner 150-160s. Pt due for eye exam. Last foot exam 2018.

## 2021-08-05 NOTE — HISTORY OF PRESENT ILLNESS
[Diabetes Mellitus] : Diabetes Mellitus [No episodes] : No hypoglycemic episodes since the last visit. [Check glucose ___ x/day] : Patient checks  blood glucose [unfilled]  times a day [Understanding of foot care] : Patient expressed understanding of foot care [Most Recent A1C: ___] : Most recent A1C was [unfilled] [Target goal met] : A1C target goal met [High Intensity] : Patient is currently on high intensity statin therapy [FreeTextEntry6] : 59 y/o M PMH T2DM, HTN, depression, OA, NAFLD presents for DM mgmt. Pt also needs preop clearance for cholecystectomy next Monday, discussed with pt will need to rtc on Friday for preop clearance as surgical pretesting bloodwork done this AM and still pending. LS in April for diabetes f/u, A1c at time 10.8. Patient denies polyuria or polydipsia, hypoglycemic episodes, weight gain/loss, peripheral extremity tingling/pain, vision changes. Patient reports good/poor compliance with the medications. Patient reports blood glucose ranges of 115-120s and after dinner 150-160s. Pt due for eye exam. Last foot exam 2018.

## 2021-08-05 NOTE — PLAN
[FreeTextEntry1] : 61 y/o M PMH T2DM, HTN, depression, OA, NAFLD presents for DM mgmt\par \par #Diabetes mellitus \par HBG A1c in clinic today 6.4. Last A1c 10.8\par -Issues discussed include maintaining a diabetic diet, weight loss, continued home glucose monitoring, maintaining medication compliance, long term diabetes complications, foot care and podiatry visits, annual eye exam recommendations. \par -Continue current regimen lantus 30u qhs and lispro 10u TID AC\par -f/u urine microalbumin/creatinine\par -Ordered referral to ophthalmology\par \par #Preop clearance\par -pt scheduled for lap sandi 8/9/21\par -pt came to [facility] ED earlier today for presurgical testing, bloodwork pending\par -EKG reviewed 4/2021\par \par RTC on Friday 8/6/21 for preop clearance\par \par Case discussed with Dr. Dale\par \par \par

## 2021-08-05 NOTE — PLAN
[FreeTextEntry1] : 59 y/o M PMH T2DM, HTN, depression, OA, NAFLD presents for DM mgmt\par \par #Diabetes mellitus \par HBG A1c in clinic today 6.4. Last A1c 10.8\par -Issues discussed include maintaining a diabetic diet, weight loss, continued home glucose monitoring, maintaining medication compliance, long term diabetes complications, foot care and podiatry visits, annual eye exam recommendations. \par -Continue current regimen lantus 30u qhs and lispro 10u TID AC\par -f/u urine microalbumin/creatinine\par -Ordered referral to ophthalmology\par \par #Preop clearance\par -pt scheduled for lap sandi 8/9/21\par -pt came to [facility] ED earlier today for presurgical testing, bloodwork pending\par -EKG reviewed 4/2021\par \par RTC on Friday 8/6/21 for preop clearance\par \par Case discussed with Dr. Dale\par \par \par

## 2021-08-05 NOTE — PHYSICAL EXAM
[No Acute Distress] : no acute distress [Well Nourished] : well nourished [Well Developed] : well developed [Well-Appearing] : well-appearing [No Respiratory Distress] : no respiratory distress  [No Accessory Muscle Use] : no accessory muscle use [Clear to Auscultation] : lungs were clear to auscultation bilaterally [Normal Rate] : normal rate  [Regular Rhythm] : with a regular rhythm [Normal S1, S2] : normal S1 and S2 [Soft] : abdomen soft [Non Tender] : non-tender [Non-distended] : non-distended [No HSM] : no HSM [Normal Bowel Sounds] : normal bowel sounds [None] : no ulcers in either foot were found [] : with full ROM [TWNoteComboBox3] : +2 [TWNoteComboBox4] : +2

## 2021-08-06 ENCOUNTER — APPOINTMENT (OUTPATIENT)
Dept: FAMILY MEDICINE | Facility: HOSPITAL | Age: 61
End: 2021-08-06
Payer: MEDICAID

## 2021-08-06 ENCOUNTER — OUTPATIENT (OUTPATIENT)
Dept: OUTPATIENT SERVICES | Facility: HOSPITAL | Age: 61
LOS: 1 days | End: 2021-08-06
Payer: MEDICAID

## 2021-08-06 VITALS
RESPIRATION RATE: 18 BRPM | BODY MASS INDEX: 29.96 KG/M2 | TEMPERATURE: 97.1 F | HEIGHT: 71 IN | OXYGEN SATURATION: 97 % | DIASTOLIC BLOOD PRESSURE: 77 MMHG | SYSTOLIC BLOOD PRESSURE: 115 MMHG | HEART RATE: 75 BPM | WEIGHT: 214 LBS

## 2021-08-06 DIAGNOSIS — Z00.00 ENCOUNTER FOR GENERAL ADULT MEDICAL EXAMINATION WITHOUT ABNORMAL FINDINGS: ICD-10-CM

## 2021-08-06 DIAGNOSIS — M19.019 PRIMARY OSTEOARTHRITIS, UNSPECIFIED SHOULDER: Chronic | ICD-10-CM

## 2021-08-06 DIAGNOSIS — E11.9 TYPE 2 DIABETES MELLITUS WITHOUT COMPLICATIONS: ICD-10-CM

## 2021-08-06 PROBLEM — F32.9 MAJOR DEPRESSIVE DISORDER, SINGLE EPISODE, UNSPECIFIED: Chronic | Status: ACTIVE | Noted: 2021-08-04

## 2021-08-06 PROBLEM — K21.9 GASTRO-ESOPHAGEAL REFLUX DISEASE WITHOUT ESOPHAGITIS: Chronic | Status: ACTIVE | Noted: 2021-08-04

## 2021-08-06 LAB
CREAT SPEC-SCNC: 51 MG/DL
MICROALBUMIN 24H UR DL<=1MG/L-MCNC: <1.2 MG/DL
MICROALBUMIN/CREAT 24H UR-RTO: NORMAL MG/G

## 2021-08-06 PROCEDURE — 93005 ELECTROCARDIOGRAM TRACING: CPT

## 2021-08-06 PROCEDURE — 93010 ELECTROCARDIOGRAM REPORT: CPT

## 2021-08-06 PROCEDURE — G0463: CPT

## 2021-08-06 NOTE — ASSESSMENT
[High Risk Surgery - Intraperitoneal, Intrathoracic or Supringuinal Vascular Procedures] : High Risk Surgery - Intraperitoneal, Intrathoracic or Supringuinal Vascular Procedures - Yes (1) [Ischemic Heart Disease] : Ischemic Heart Disease  - Yes (1) [Congestive Heart Failure] : Congestive Heart Failure - No (0) [Prior Cerebrovascular Accident or TIA] : Prior Cerebrovascular Accident or TIA - No (0) [Insulin-dependent Diabetic (1 point)] : Insulin-dependent Diabetic - Yes (1) [Creatinine >= 2mg/dL (1 Point)] : Creatinine >= 2mg/dL - No (0) [3 - 6] : 3 - 6 , RCRI Class: IV, Risk of Post-Op Cardiac Complications: 15.0%, 95% CI for Risk Estimate: 11.1% - 20.0% [ECG] : ECG [Continue medications as is] : Continue current medications

## 2021-08-06 NOTE — REVIEW OF SYSTEMS
[Anxiety] : anxiety [Depression] : depression [Fever] : no fever [Chills] : no chills [Discharge] : no discharge [Pain] : no pain [Earache] : no earache [Hearing Loss] : no hearing loss [Chest Pain] : no chest pain [Palpitations] : no palpitations [Shortness Of Breath] : no shortness of breath [Wheezing] : no wheezing [Cough] : no cough [Abdominal Pain] : no abdominal pain [Nausea] : no nausea [Vomiting] : no vomiting [Dysuria] : no dysuria [Joint Pain] : no joint pain [Headache] : no headache [Dizziness] : no dizziness

## 2021-08-06 NOTE — PHYSICAL EXAM
[No Acute Distress] : no acute distress [Normal Sclera/Conjunctiva] : normal sclera/conjunctiva [PERRL] : pupils equal round and reactive to light [Normal Outer Ear/Nose] : the outer ears and nose were normal in appearance [Normal Oropharynx] : the oropharynx was normal [No JVD] : no jugular venous distention [No Respiratory Distress] : no respiratory distress  [No Accessory Muscle Use] : no accessory muscle use [Clear to Auscultation] : lungs were clear to auscultation bilaterally [Normal Rate] : normal rate  [Regular Rhythm] : with a regular rhythm [Normal S1, S2] : normal S1 and S2 [No Carotid Bruits] : no carotid bruits [Soft] : abdomen soft [Non Tender] : non-tender [No HSM] : no HSM [Normal Anterior Cervical Nodes] : no anterior cervical lymphadenopathy [No Rash] : no rash [Normal Gait] : normal gait [Normal Affect] : the affect was normal [Alert and Oriented x3] : oriented to person, place, and time [Normal Insight/Judgement] : insight and judgment were intact [de-identified] : lower back muscle pain

## 2021-08-06 NOTE — HISTORY OF PRESENT ILLNESS
[Sleep Apnea] : sleep apnea [Smoker] : smoker [Diabetes] : diabetes [Good (7-10 METs)] : Good (7-10 METs) [Aortic Stenosis] : no aortic stenosis [Atrial Fibrillation] : no atrial fibrillation [Coronary Artery Disease] : no coronary artery disease [Recent Myocardial Infarction] : no recent myocardial infarction [Implantable Device/Pacemaker] : no implantable device/pacemaker [Asthma] : no asthma [COPD] : no COPD [Family Member] : no family member with adverse anesthesia reaction/sudden death [Self] : no previous adverse anesthesia reaction [Chronic Anticoagulation] : no chronic anticoagulation [Chronic Kidney Disease] : no chronic kidney disease [FreeTextEntry4] : 61 y/o M with hx of anxiety, HTN, HLD, DM, presenting for medical clearance. Patient is on nicotine patch and nicotine gum, continues to smoke 1-2 cigarette's a day. Patient goes through a pack a week.  [FreeTextEntry6] : Patient says if he walks up hills he has to stop to breathe, patient uses 2 pillows under his head,  [FreeTextEntry7] : Patient has received EKG's

## 2021-08-06 NOTE — PLAN
[FreeTextEntry1] : #Medical Clearance\par Patient to have surgery for cholecystomy\par Elevated WBC likely due to infection of gallbladder\par No current systemic symptoms\par Patient has a Class IV risk at 15% risk of post-op complications\par Patient to halve insulin dose at night, basaglar before bed.\par Patient to be NPO after midnight for procedure\par WBC elevated slightly in the setting of gallbladder process, should re-evaluate post procedure\par \par Discussed with Dr. Vasquez\par

## 2021-08-08 ENCOUNTER — TRANSCRIPTION ENCOUNTER (OUTPATIENT)
Age: 61
End: 2021-08-08

## 2021-08-08 ENCOUNTER — OUTPATIENT (OUTPATIENT)
Dept: OUTPATIENT SERVICES | Facility: HOSPITAL | Age: 61
LOS: 1 days | End: 2021-08-08
Payer: MEDICAID

## 2021-08-08 DIAGNOSIS — M19.019 PRIMARY OSTEOARTHRITIS, UNSPECIFIED SHOULDER: Chronic | ICD-10-CM

## 2021-08-08 DIAGNOSIS — Z20.828 CONTACT WITH AND (SUSPECTED) EXPOSURE TO OTHER VIRAL COMMUNICABLE DISEASES: ICD-10-CM

## 2021-08-08 LAB — SARS-COV-2 RNA SPEC QL NAA+PROBE: SIGNIFICANT CHANGE UP

## 2021-08-08 PROCEDURE — 87635 SARS-COV-2 COVID-19 AMP PRB: CPT

## 2021-08-09 ENCOUNTER — APPOINTMENT (OUTPATIENT)
Dept: SURGERY | Facility: HOSPITAL | Age: 61
End: 2021-08-09

## 2021-08-09 ENCOUNTER — OUTPATIENT (OUTPATIENT)
Dept: OUTPATIENT SERVICES | Facility: HOSPITAL | Age: 61
LOS: 1 days | End: 2021-08-09
Payer: MEDICAID

## 2021-08-09 ENCOUNTER — RESULT REVIEW (OUTPATIENT)
Age: 61
End: 2021-08-09

## 2021-08-09 VITALS
OXYGEN SATURATION: 95 % | HEART RATE: 65 BPM | RESPIRATION RATE: 14 BRPM | SYSTOLIC BLOOD PRESSURE: 114 MMHG | TEMPERATURE: 98 F | HEIGHT: 70.5 IN | DIASTOLIC BLOOD PRESSURE: 76 MMHG | WEIGHT: 211.2 LBS

## 2021-08-09 VITALS
DIASTOLIC BLOOD PRESSURE: 65 MMHG | TEMPERATURE: 97 F | RESPIRATION RATE: 16 BRPM | OXYGEN SATURATION: 95 % | HEART RATE: 65 BPM | SYSTOLIC BLOOD PRESSURE: 98 MMHG

## 2021-08-09 DIAGNOSIS — Z01.818 ENCOUNTER FOR OTHER PREPROCEDURAL EXAMINATION: ICD-10-CM

## 2021-08-09 DIAGNOSIS — M19.019 PRIMARY OSTEOARTHRITIS, UNSPECIFIED SHOULDER: Chronic | ICD-10-CM

## 2021-08-09 DIAGNOSIS — K80.20 CALCULUS OF GALLBLADDER WITHOUT CHOLECYSTITIS WITHOUT OBSTRUCTION: ICD-10-CM

## 2021-08-09 LAB — GLUCOSE BLDC GLUCOMTR-MCNC: 121 MG/DL — HIGH (ref 70–99)

## 2021-08-09 PROCEDURE — 47562 LAPAROSCOPIC CHOLECYSTECTOMY: CPT

## 2021-08-09 PROCEDURE — C1889: CPT

## 2021-08-09 PROCEDURE — 88304 TISSUE EXAM BY PATHOLOGIST: CPT | Mod: 26

## 2021-08-09 PROCEDURE — 82962 GLUCOSE BLOOD TEST: CPT

## 2021-08-09 PROCEDURE — 47562 LAPAROSCOPIC CHOLECYSTECTOMY: CPT | Mod: AS

## 2021-08-09 PROCEDURE — 88304 TISSUE EXAM BY PATHOLOGIST: CPT

## 2021-08-09 RX ORDER — SODIUM CHLORIDE 9 MG/ML
1000 INJECTION, SOLUTION INTRAVENOUS
Refills: 0 | Status: DISCONTINUED | OUTPATIENT
Start: 2021-08-09 | End: 2021-08-09

## 2021-08-09 RX ORDER — ONDANSETRON 8 MG/1
4 TABLET, FILM COATED ORAL ONCE
Refills: 0 | Status: DISCONTINUED | OUTPATIENT
Start: 2021-08-09 | End: 2021-08-09

## 2021-08-09 RX ORDER — OXYCODONE HYDROCHLORIDE 5 MG/1
10 TABLET ORAL EVERY 6 HOURS
Refills: 0 | Status: DISCONTINUED | OUTPATIENT
Start: 2021-08-09 | End: 2021-08-09

## 2021-08-09 RX ORDER — OXYCODONE HYDROCHLORIDE 5 MG/1
1 TABLET ORAL
Qty: 7 | Refills: 0
Start: 2021-08-09 | End: 2021-08-11

## 2021-08-09 RX ORDER — OXYCODONE HYDROCHLORIDE 5 MG/1
5 TABLET ORAL EVERY 6 HOURS
Refills: 0 | Status: DISCONTINUED | OUTPATIENT
Start: 2021-08-09 | End: 2021-08-09

## 2021-08-09 RX ORDER — HYDROMORPHONE HYDROCHLORIDE 2 MG/ML
0.5 INJECTION INTRAMUSCULAR; INTRAVENOUS; SUBCUTANEOUS
Refills: 0 | Status: DISCONTINUED | OUTPATIENT
Start: 2021-08-09 | End: 2021-08-09

## 2021-08-09 RX ADMIN — HYDROMORPHONE HYDROCHLORIDE 0.5 MILLIGRAM(S): 2 INJECTION INTRAMUSCULAR; INTRAVENOUS; SUBCUTANEOUS at 14:10

## 2021-08-09 RX ADMIN — HYDROMORPHONE HYDROCHLORIDE 0.5 MILLIGRAM(S): 2 INJECTION INTRAMUSCULAR; INTRAVENOUS; SUBCUTANEOUS at 13:05

## 2021-08-09 RX ADMIN — HYDROMORPHONE HYDROCHLORIDE 0.5 MILLIGRAM(S): 2 INJECTION INTRAMUSCULAR; INTRAVENOUS; SUBCUTANEOUS at 13:20

## 2021-08-09 RX ADMIN — HYDROMORPHONE HYDROCHLORIDE 0.5 MILLIGRAM(S): 2 INJECTION INTRAMUSCULAR; INTRAVENOUS; SUBCUTANEOUS at 13:48

## 2021-08-09 RX ADMIN — HYDROMORPHONE HYDROCHLORIDE 0.5 MILLIGRAM(S): 2 INJECTION INTRAMUSCULAR; INTRAVENOUS; SUBCUTANEOUS at 13:33

## 2021-08-09 RX ADMIN — HYDROMORPHONE HYDROCHLORIDE 0.5 MILLIGRAM(S): 2 INJECTION INTRAMUSCULAR; INTRAVENOUS; SUBCUTANEOUS at 13:56

## 2021-08-09 RX ADMIN — OXYCODONE HYDROCHLORIDE 5 MILLIGRAM(S): 5 TABLET ORAL at 15:25

## 2021-08-09 RX ADMIN — HYDROMORPHONE HYDROCHLORIDE 0.5 MILLIGRAM(S): 2 INJECTION INTRAMUSCULAR; INTRAVENOUS; SUBCUTANEOUS at 14:25

## 2021-08-09 RX ADMIN — OXYCODONE HYDROCHLORIDE 5 MILLIGRAM(S): 5 TABLET ORAL at 16:00

## 2021-08-09 RX ADMIN — SODIUM CHLORIDE 50 MILLILITER(S): 9 INJECTION, SOLUTION INTRAVENOUS at 10:42

## 2021-08-09 NOTE — ASU DISCHARGE PLAN (ADULT/PEDIATRIC) - CARE PROVIDER_API CALL
Mani Flynn)  Surgery  [address] [facility], [address]  [place] [address]  Phone: [phone]  Fax: [phone]  Follow Up Time: 1 week

## 2021-08-09 NOTE — ASU DISCHARGE PLAN (ADULT/PEDIATRIC) - NURSING INSTRUCTIONS
drink plenty fluids,  do not take Oxycodone on empty stomach,  use ice packs as needed 20-30 min on/off

## 2021-08-09 NOTE — ASU DISCHARGE PLAN (ADULT/PEDIATRIC) - CALL YOUR DOCTOR IF YOU HAVE ANY OF THE FOLLOWING:
Bleeding that does not stop/Pain not relieved by Medications/Fever greater than (need to indicate Fahrenheit or Celsius)/Wound/Surgical Site with redness, or foul smelling discharge or pus/Nausea and vomiting that does not stop/Inability to tolerate liquids or foods/Increased irritability or sluggishness Bleeding that does not stop/Swelling that gets worse/Pain not relieved by Medications/Fever greater than (need to indicate Fahrenheit or Celsius)/Wound/Surgical Site with redness, or foul smelling discharge or pus/Nausea and vomiting that does not stop/Inability to tolerate liquids or foods/Increased irritability or sluggishness

## 2021-08-09 NOTE — BRIEF OPERATIVE NOTE - OPERATION/FINDINGS
fat invested gallbladder  critical view identified  got into gallbladder with stone spillage(solitary stone which I crushed and suctioned out with 10mm suction)

## 2021-08-11 LAB — SURGICAL PATHOLOGY STUDY: SIGNIFICANT CHANGE UP

## 2021-08-16 ENCOUNTER — APPOINTMENT (OUTPATIENT)
Dept: FAMILY MEDICINE | Facility: HOSPITAL | Age: 61
End: 2021-08-16

## 2021-08-16 ENCOUNTER — OUTPATIENT (OUTPATIENT)
Dept: OUTPATIENT SERVICES | Facility: HOSPITAL | Age: 61
LOS: 1 days | End: 2021-08-16
Payer: MEDICAID

## 2021-08-16 VITALS
WEIGHT: 214 LBS | HEIGHT: 71 IN | RESPIRATION RATE: 18 BRPM | SYSTOLIC BLOOD PRESSURE: 123 MMHG | HEART RATE: 88 BPM | BODY MASS INDEX: 29.96 KG/M2 | DIASTOLIC BLOOD PRESSURE: 77 MMHG | TEMPERATURE: 97 F | OXYGEN SATURATION: 95 %

## 2021-08-16 DIAGNOSIS — M19.019 PRIMARY OSTEOARTHRITIS, UNSPECIFIED SHOULDER: Chronic | ICD-10-CM

## 2021-08-16 DIAGNOSIS — Z00.00 ENCOUNTER FOR GENERAL ADULT MEDICAL EXAMINATION WITHOUT ABNORMAL FINDINGS: ICD-10-CM

## 2021-08-16 PROCEDURE — G0463: CPT

## 2021-08-17 ENCOUNTER — APPOINTMENT (OUTPATIENT)
Dept: PULMONOLOGY | Facility: CLINIC | Age: 61
End: 2021-08-17
Payer: MEDICAID

## 2021-08-17 VITALS
HEART RATE: 71 BPM | OXYGEN SATURATION: 96 % | WEIGHT: 212 LBS | TEMPERATURE: 97.2 F | HEIGHT: 71 IN | DIASTOLIC BLOOD PRESSURE: 84 MMHG | BODY MASS INDEX: 29.68 KG/M2 | SYSTOLIC BLOOD PRESSURE: 110 MMHG

## 2021-08-17 PROCEDURE — 99212 OFFICE O/P EST SF 10 MIN: CPT

## 2021-08-17 NOTE — HISTORY OF PRESENT ILLNESS
[FreeTextEntry1] : Disability Paper Work [de-identified] : 61 y/o M PMH T2DM, HTN, depression, OA, NAFLD presents for Disability Paper work. Patient reports chronic issues of trigger finger, chronic right and left shoulder pain secondary to osteoarthritis and rotator cuff tendinopathy. 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 Patient reports he can lift 20lbs, ambulate without difficulties, and fully independent of all his adls and iadls.Patient denies history of traumatic injury at work.

## 2021-08-17 NOTE — PHYSICAL EXAM
[No Acute Distress] : no acute distress [Well Nourished] : well nourished [Well Developed] : well developed [No JVD] : no jugular venous distention [No Lymphadenopathy] : no lymphadenopathy [Supple] : supple [No Respiratory Distress] : no respiratory distress  [No Accessory Muscle Use] : no accessory muscle use [Clear to Auscultation] : lungs were clear to auscultation bilaterally [Normal Rate] : normal rate  [Regular Rhythm] : with a regular rhythm [Normal S1, S2] : normal S1 and S2 [No Carotid Bruits] : no carotid bruits [Soft] : abdomen soft [Normal Posterior Cervical Nodes] : no posterior cervical lymphadenopathy [Normal Anterior Cervical Nodes] : no anterior cervical lymphadenopathy [Coordination Grossly Intact] : coordination grossly intact [No Focal Deficits] : no focal deficits [Normal Gait] : normal gait [Normal Affect] : the affect was normal [Normal Insight/Judgement] : insight and judgment were intact [de-identified] : Postsurgical incisions S/P Lap Davis. Incisions clean and intact.

## 2021-08-17 NOTE — PLAN
[FreeTextEntry1] : \par \par \par #Diabetes Mellitus\par -Hbg A1c 6.4\par -COnt humalog and basaglar as prescribed.\par \par #Osteoarthritis/Trigger finger\par -Stable and followed by Sports medicine\par -Sports medicine referral ordered for follow up \par \par -Advised patient that there are no glaring disabilities in his record that would prevent him from returning back to work patient advised  to follow up with Ortho for a complete evaluation of his reported disabilities and have them fill out forms\par \par RTC in 3 months for Hbg A1c CHeck

## 2021-08-17 NOTE — REVIEW OF SYSTEMS
[Vision Problems] : vision problems [Hesitancy] : hesitancy [Joint Pain] : joint pain [Joint Stiffness] : joint stiffness [Back Pain] : back pain [Fever] : no fever [Chills] : no chills [Night Sweats] : no night sweats [Earache] : no earache [Hearing Loss] : no hearing loss [Nasal Discharge] : no nasal discharge [Sore Throat] : no sore throat [Chest Pain] : no chest pain [Palpitations] : no palpitations [Claudication] : no  leg claudication [Orthopena] : no orthopnea [Paroxysmal Nocturnal Dyspnea] : no paroxysmal nocturnal dyspnea [Shortness Of Breath] : no shortness of breath [Wheezing] : no wheezing [Cough] : no cough [Abdominal Pain] : no abdominal pain [Nausea] : no nausea [Constipation] : no constipation [Vomiting] : no vomiting [Heartburn] : no heartburn [Melena] : no melena [Dysuria] : no dysuria [Incontinence] : no incontinence [Nocturia] : no nocturia [Hematuria] : no hematuria [Muscle Pain] : no muscle pain [Frequency] : no frequency [Muscle Weakness] : no muscle weakness [Joint Swelling] : no joint swelling [FreeTextEntry3] : Uses glasses at baseline for farsightedness.

## 2021-08-20 ENCOUNTER — APPOINTMENT (OUTPATIENT)
Dept: SURGERY | Facility: CLINIC | Age: 61
End: 2021-08-20
Payer: MEDICAID

## 2021-08-20 VITALS
HEIGHT: 71 IN | WEIGHT: 212 LBS | SYSTOLIC BLOOD PRESSURE: 112 MMHG | HEART RATE: 66 BPM | BODY MASS INDEX: 29.68 KG/M2 | TEMPERATURE: 97.4 F | DIASTOLIC BLOOD PRESSURE: 80 MMHG | OXYGEN SATURATION: 98 %

## 2021-08-20 PROCEDURE — 99024 POSTOP FOLLOW-UP VISIT: CPT

## 2021-08-27 ENCOUNTER — APPOINTMENT (OUTPATIENT)
Dept: FAMILY MEDICINE | Facility: HOSPITAL | Age: 61
End: 2021-08-27

## 2021-08-27 ENCOUNTER — OUTPATIENT (OUTPATIENT)
Dept: OUTPATIENT SERVICES | Facility: HOSPITAL | Age: 61
LOS: 1 days | End: 2021-08-27
Payer: MEDICAID

## 2021-08-27 ENCOUNTER — LABORATORY RESULT (OUTPATIENT)
Age: 61
End: 2021-08-27

## 2021-08-27 VITALS
WEIGHT: 218 LBS | RESPIRATION RATE: 18 BRPM | HEART RATE: 58 BPM | DIASTOLIC BLOOD PRESSURE: 76 MMHG | OXYGEN SATURATION: 96 % | TEMPERATURE: 97.3 F | SYSTOLIC BLOOD PRESSURE: 118 MMHG | BODY MASS INDEX: 30.41 KG/M2

## 2021-08-27 DIAGNOSIS — Z00.00 ENCOUNTER FOR GENERAL ADULT MEDICAL EXAMINATION WITHOUT ABNORMAL FINDINGS: ICD-10-CM

## 2021-08-27 DIAGNOSIS — M19.019 PRIMARY OSTEOARTHRITIS, UNSPECIFIED SHOULDER: Chronic | ICD-10-CM

## 2021-08-27 PROCEDURE — 80061 LIPID PANEL: CPT

## 2021-08-27 PROCEDURE — 80053 COMPREHEN METABOLIC PANEL: CPT

## 2021-08-27 PROCEDURE — 84443 ASSAY THYROID STIM HORMONE: CPT

## 2021-08-27 PROCEDURE — G0463: CPT

## 2021-08-27 PROCEDURE — 85025 COMPLETE CBC W/AUTO DIFF WBC: CPT

## 2021-08-27 PROCEDURE — 86803 HEPATITIS C AB TEST: CPT

## 2021-08-27 PROCEDURE — 87521 HEPATITIS C PROBE&RVRS TRNSC: CPT

## 2021-08-30 DIAGNOSIS — E66.9 OBESITY, UNSPECIFIED: ICD-10-CM

## 2021-08-30 DIAGNOSIS — M54.9 DORSALGIA, UNSPECIFIED: ICD-10-CM

## 2021-09-01 PROCEDURE — G9005: CPT

## 2021-09-08 NOTE — PROCEDURE
[de-identified] : At this point I recommended a therapeutic injection and under sterile precautions an injection of 4 cc 1% lidocaine with 0.5 cc of Kenalog and 0.5 cc of Dexamethasone- was placed into the subacromial space of the Right shoulder without complication, and after several minutes, the patient felt significant relief.\par \par \par

## 2021-09-08 NOTE — PHYSICAL EXAM
[de-identified] : Cervical Spine/Neck\par Inspection/Palpation :\par ¦ Inspection : alignment midline, normal degree of lordosis present\par ¦ Skin : normal appearance, no masses, no tenderness, trachea midline\par ¦ Palpation : right paraspinal and periscapular musculature is tender to palpation with palpable tightness. \par ¦ Tests and Signs : Spurling’s (+ mild to the right), Lhermitte’s (-) Sangita’s Reflex (-) \par ¦ Range of Motion : arc of motion full in all planes, no crepitus or pain with ROM\par ¦ Stability : no subluxations or other evidence of instability demonstrated during range of motion testing\par o Muscle Strength : paraspinal muscle strength within normal limits\par o Muscle Tone : paraspinal muscle tone within normal limits\par o Muscle Bulk : normal, no atrophy\par o Cervical Lymph Nodes : no lymphadenopathy present\par \par Right Upper Extremity\par o Shoulder :\par ¦ Inspection/Palpation : tenderness to palpation greater tuberosity,  no swelling, no deformities\par ¦ Range of Motion : ACTIVE FORWARD ELEVATION: Measured at 145 degrees, ACTIVE EXTERNAL ROTATION: Measured at 45 degrees, ACTIVE INTERNAL ROTATION: Measured at T12\par ¦ Strength : external rotation 5/5, internal rotation 5/5, supraspinatus 5/5\par ¦ Stability : no joint instability on provocative testing\par ¦ Tests/Signs : Neer (+), Bañuelos (+)\par o Upper Arm : no tenderness, no swelling, no deformities\par o Muscle Bulk : no atrophy\par o Sensation : sensation intact to light touch\par o Skin : no skin rash or discoloration\par o Vascular Exam : no edema, no cyanosis, radial and ulnar pulses normal\par \par Right Lower Extremity\par o Hip :\par ¦ Inspection/Palpation : no tenderness to palpation, no swelling, no deformity\par ¦ Range of Motion : full with pain on flexion and internal rotation\par ¦ Stability : joint stability intact\par ¦ Strength : hip flexion 5/5\par ¦ Tests and Signs : all tests for stability normal\par o Muscle Tone : tone normal\par o Muscle Bulk : normal muscle bulk present\par o Skin : no erythema, no ecchymosis\par o Sensation : sensation to light touch intact\par o Vascular Exam : diffuse lower extremity edema, no cyanosis, dorsalis pedis artery pulse 2+, posterior tibial artery pulse 2+\par o Special Tests: FADIR Test (+ groin pain) JACINDA Test (-)\par \par o Knee :\par ¦ Inspection/Palpation : gastrocnemius tenderness to palpation with moderate diffuse lower leg swelling no deformity\par ¦ Range of Motion : 0-120 degrees, no crepitus\par ¦ Stability : no valgus or varus instability present on provocative testing, Lachman’s Test (-)\par ¦ Strength : flexion and extension 5/5 with pain. \par \par Left Lower Extremity\par o Hip :\par ¦ Inspection/Palpation : no tenderness, no swelling, no deformity\par ¦ Range of Motion : full and painless in all planes, no crepitus\par ¦ Stability : joint stability intact\par ¦ Strength : hip flexion 5/5\par ¦ Tests and Signs : all tests for stability normal\par o Muscle Tone : tone normal\par o Muscle Bulk : normal muscle bulk present\par o Skin : no erythema, no ecchymosis\par o Sensation : sensation to light touch intact\par o Vascular Exam : no edema, no cyanosis, dorsalis pedis artery pulse 2+, posterior tibial artery pulse 2+\par

## 2021-09-08 NOTE — HISTORY OF PRESENT ILLNESS
[de-identified] : LEIF GOODWIN is a 60 year old RHD male presenting to the office complaining of right shoulder pain.  Patient reports pain intermittently for years. Patient denies injury or trauma to the area. He has been under the care of  for his shoulder.  MRI of the right shoulder performed on 12/18/2020 at [facility] revealed Tiny low-grade tear at the distal infraspinatus footprint, which would likely be concealed at arthroscopy,  Tendinosis of the subscapularis tendon,  No full-thickness rotator cuff tear is seen, Small fluid in the subacromial/subdeltoid bursa, likely bursitis, The intra-articular long head of biceps tendon is not well visualized and may be torn. Evaluation is limited by motion artifact., Mild AC joint arthrosis. Small to moderate AC joint effusion,  Degeneration and fraying of the labrum and There is likely high-grade chondrosis in the glenoid, with small subchondral cystic change. He  has been performing a home exercise noting improvements in range of motion and strength but not in pain. \par The patient describes the pain as a dull aching, and occasionally sharp pain localized to the anterior aspect of his right shoulder that is intermittent in nature. His  symptoms are exacerbated with any movement of the shoulder. Patient reports the pain is waking him up at night.  Patient reports associated weakness. Denies numbness and tingling in the upper extremity.. Patient also reports right hip pain. Denies injury or trauma to the area. Patient reports increased pain with internal rotation and adduction of the hip. The pain is located in the groin region. Denies numbness and tingling. Patient notes increased swelling of his right lower leg assocaited with pain. He cannot attribute this swelling to any one action or activity.  Patient is taking NSAIDs for pain relief with mild relief in symptoms. Patient denies any other complaints at this time.

## 2021-09-08 NOTE — DISCUSSION/SUMMARY
[de-identified] : The underlying pathophysiology was reviewed in great detail with the patient as well as the various treatment options, including ice, analgesics, NSAIDs, Physical therapy, steroid injections.\par \par A prescription was provided for a Duplex US of the right lower extremity to rule out DVT. \par \par A prescription for Physical Therapy was provided for the cervical spine and right shoulder. \par \par Continue home exercise program. A home exercise sheet was given and discussed with the patient to follow.\par \par Activity modifications and restrictions were discussed. I advised avoiding overhead lifting. I advised the patient to work on good posture.\par \par FU once US results are obtained and/or in 6 weeks. \par \par All questions were answered, all alternatives discussed and the patient is in complete agreement with that plan. Follow-up appointment as instructed. Any issues and the patient will call or come in sooner.

## 2021-09-09 ENCOUNTER — APPOINTMENT (OUTPATIENT)
Dept: ORTHOPEDIC SURGERY | Facility: CLINIC | Age: 61
End: 2021-09-09

## 2021-09-11 ENCOUNTER — APPOINTMENT (OUTPATIENT)
Dept: FAMILY MEDICINE | Facility: HOSPITAL | Age: 61
End: 2021-09-11

## 2021-09-11 ENCOUNTER — OUTPATIENT (OUTPATIENT)
Dept: OUTPATIENT SERVICES | Facility: HOSPITAL | Age: 61
LOS: 1 days | End: 2021-09-11
Payer: MEDICAID

## 2021-09-11 ENCOUNTER — MED ADMIN CHARGE (OUTPATIENT)
Age: 61
End: 2021-09-11

## 2021-09-11 VITALS
WEIGHT: 218 LBS | DIASTOLIC BLOOD PRESSURE: 77 MMHG | BODY MASS INDEX: 30.52 KG/M2 | RESPIRATION RATE: 18 BRPM | SYSTOLIC BLOOD PRESSURE: 118 MMHG | TEMPERATURE: 96.8 F | HEIGHT: 71 IN | HEART RATE: 75 BPM | OXYGEN SATURATION: 97 %

## 2021-09-11 DIAGNOSIS — Z86.19 PERSONAL HISTORY OF OTHER INFECTIOUS AND PARASITIC DISEASES: ICD-10-CM

## 2021-09-11 DIAGNOSIS — J18.9 PNEUMONIA, UNSPECIFIED ORGANISM: ICD-10-CM

## 2021-09-11 DIAGNOSIS — Z20.822 CONTACT WITH AND (SUSPECTED) EXPOSURE TO COVID-19: ICD-10-CM

## 2021-09-11 DIAGNOSIS — E04.1 NONTOXIC SINGLE THYROID NODULE: ICD-10-CM

## 2021-09-11 DIAGNOSIS — M19.072 PRIMARY OSTEOARTHRITIS, LEFT ANKLE AND FOOT: ICD-10-CM

## 2021-09-11 DIAGNOSIS — M16.11 UNILATERAL PRIMARY OSTEOARTHRITIS, RIGHT HIP: ICD-10-CM

## 2021-09-11 DIAGNOSIS — Z87.898 PERSONAL HISTORY OF OTHER SPECIFIED CONDITIONS: ICD-10-CM

## 2021-09-11 DIAGNOSIS — K80.20 CALCULUS OF GALLBLADDER W/OUT CHOLECYSTITIS W/OUT OBSTRUCTION: ICD-10-CM

## 2021-09-11 DIAGNOSIS — Z00.00 ENCOUNTER FOR GENERAL ADULT MEDICAL EXAMINATION WITHOUT ABNORMAL FINDINGS: ICD-10-CM

## 2021-09-11 DIAGNOSIS — Z76.89 PERSONS ENCOUNTERING HEALTH SERVICES IN OTHER SPECIFIED CIRCUMSTANCES: ICD-10-CM

## 2021-09-11 DIAGNOSIS — K21.9 GASTRO-ESOPHAGEAL REFLUX DISEASE W/OUT ESOPHAGITIS: ICD-10-CM

## 2021-09-11 DIAGNOSIS — J98.4 PNEUMONIA, UNSPECIFIED ORGANISM: ICD-10-CM

## 2021-09-11 DIAGNOSIS — M19.019 PRIMARY OSTEOARTHRITIS, UNSPECIFIED SHOULDER: Chronic | ICD-10-CM

## 2021-09-11 DIAGNOSIS — M19.011 PRIMARY OSTEOARTHRITIS, RIGHT SHOULDER: ICD-10-CM

## 2021-09-11 DIAGNOSIS — K83.8 OTHER SPECIFIED DISEASES OF BILIARY TRACT: ICD-10-CM

## 2021-09-11 LAB
ALBUMIN SERPL ELPH-MCNC: 4.3 G/DL
ALP BLD-CCNC: 76 U/L
ALT SERPL-CCNC: 18 U/L
ANION GAP SERPL CALC-SCNC: 10 MMOL/L
AST SERPL-CCNC: 21 U/L
BASOPHILS # BLD AUTO: 0.08 K/UL
BASOPHILS NFR BLD AUTO: 0.7 %
BILIRUB SERPL-MCNC: 0.2 MG/DL
BUN SERPL-MCNC: 20 MG/DL
CALCIUM SERPL-MCNC: 9.7 MG/DL
CHLORIDE SERPL-SCNC: 107 MMOL/L
CHOLEST SERPL-MCNC: 158 MG/DL
CO2 SERPL-SCNC: 25 MMOL/L
CREAT SERPL-MCNC: 1 MG/DL
EOSINOPHIL # BLD AUTO: 0.27 K/UL
EOSINOPHIL NFR BLD AUTO: 2.3 %
GLUCOSE SERPL-MCNC: 83 MG/DL
HCT VFR BLD CALC: 39.5 %
HCV AB SER QL: REACTIVE
HCV S/CO RATIO: 9.43 S/CO
HDLC SERPL-MCNC: 36 MG/DL
HGB BLD-MCNC: 12.7 G/DL
IMM GRANULOCYTES NFR BLD AUTO: 1.1 %
LDLC SERPL CALC-MCNC: 70 MG/DL
LYMPHOCYTES # BLD AUTO: 3.2 K/UL
LYMPHOCYTES NFR BLD AUTO: 27.8 %
MAN DIFF?: NORMAL
MCHC RBC-ENTMCNC: 31.7 PG
MCHC RBC-ENTMCNC: 32.2 GM/DL
MCV RBC AUTO: 98.5 FL
MONOCYTES # BLD AUTO: 0.68 K/UL
MONOCYTES NFR BLD AUTO: 5.9 %
NEUTROPHILS # BLD AUTO: 7.15 K/UL
NEUTROPHILS NFR BLD AUTO: 62.2 %
NONHDLC SERPL-MCNC: 122 MG/DL
PLATELET # BLD AUTO: 408 K/UL
POTASSIUM SERPL-SCNC: 5 MMOL/L
PROT SERPL-MCNC: 7.4 G/DL
RBC # BLD: 4.01 M/UL
RBC # FLD: 14.1 %
SODIUM SERPL-SCNC: 143 MMOL/L
TRIGL SERPL-MCNC: 261 MG/DL
TSH SERPL-ACNC: 1.66 UIU/ML
WBC # FLD AUTO: 11.51 K/UL

## 2021-09-11 PROCEDURE — G0463: CPT

## 2021-09-11 RX ORDER — CEFUROXIME AXETIL 500 MG/1
500 TABLET ORAL
Refills: 0 | Status: DISCONTINUED | COMMUNITY
Start: 2021-04-21 | End: 2021-09-11

## 2021-09-11 RX ORDER — NAPROXEN SODIUM 550 MG/1
550 TABLET ORAL
Qty: 60 | Refills: 0 | Status: DISCONTINUED | COMMUNITY
Start: 2020-11-30 | End: 2021-09-11

## 2021-09-11 RX ORDER — METRONIDAZOLE 500 MG/1
500 TABLET ORAL 3 TIMES DAILY
Refills: 0 | Status: COMPLETED | COMMUNITY
Start: 2021-04-21 | End: 2021-09-11

## 2021-09-11 NOTE — PLAN
[FreeTextEntry1] : \par #Shoulder pain\par -F/u with ortho and PT\par -Pain control PRN\par \par #HCM\par -Lipid panel, CBC, CMP, TSH, HCV ordered in anticipation for CPE\par -FIT negative 3/2021\par \par RTC in 1 week for CPE\par \par

## 2021-09-11 NOTE — PHYSICAL EXAM
[Normal] : soft, non-tender, non-distended, no masses palpated, no HSM and normal bowel sounds [de-identified] : lap sandi incisions healing well

## 2021-09-11 NOTE — REVIEW OF SYSTEMS
[Joint Pain] : joint pain [Joint Stiffness] : joint stiffness [Back Pain] : back pain [Negative] : Gastrointestinal

## 2021-09-11 NOTE — HISTORY OF PRESENT ILLNESS
[de-identified] : 61 y/o M PMHx HMII, obesity, depression, post covid syndrome presenting for f/u. Reports chronic b/l shoulder pain. Following with ortho and PT for management. Also requires repeat HCV testing for reactive HCV in 2019. Pt also recently had laparascopic cholecystectomy 8/9/21 and healing well. Denies fever, chills, HA, cough, SOB, CP, n/v/d, abd pain.

## 2021-09-12 DIAGNOSIS — E66.9 OBESITY, UNSPECIFIED: ICD-10-CM

## 2021-09-12 DIAGNOSIS — F32.2 MAJOR DEPRESSIVE DISORDER, SINGLE EPISODE, SEVERE WITHOUT PSYCHOTIC FEATURES: ICD-10-CM

## 2021-09-12 DIAGNOSIS — E11.40 TYPE 2 DIABETES MELLITUS WITH DIABETIC NEUROPATHY, UNSPECIFIED: ICD-10-CM

## 2021-09-15 PROBLEM — Z87.898 HISTORY OF RIGHT FLANK PAIN: Status: RESOLVED | Noted: 2021-06-11 | Resolved: 2021-09-15

## 2021-09-15 PROBLEM — K80.20 GALLSTONE: Status: RESOLVED | Noted: 2021-07-23 | Resolved: 2021-09-15

## 2021-09-15 PROBLEM — J18.9 CAVITARY PNEUMONIA: Status: RESOLVED | Noted: 2021-04-21 | Resolved: 2021-09-15

## 2021-09-15 PROBLEM — M19.011 PRIMARY OSTEOARTHRITIS OF RIGHT SHOULDER: Status: ACTIVE | Noted: 2021-01-04

## 2021-09-15 PROBLEM — Z20.822 ENCOUNTER FOR SCREENING LABORATORY TESTING FOR COVID-19 VIRUS: Status: RESOLVED | Noted: 2020-08-05 | Resolved: 2021-09-15

## 2021-09-15 PROBLEM — K83.8 DILATED CBD, ACQUIRED: Status: RESOLVED | Noted: 2021-04-28 | Resolved: 2021-09-15

## 2021-09-15 PROBLEM — Z76.89 ENCOUNTER FOR CHOLECYSTECTOMY: Status: RESOLVED | Noted: 2021-08-06 | Resolved: 2021-09-15

## 2021-09-15 PROBLEM — M16.11 PRIMARY OSTEOARTHRITIS OF RIGHT HIP: Status: ACTIVE | Noted: 2021-01-04

## 2021-09-15 NOTE — HISTORY OF PRESENT ILLNESS
[FreeTextEntry1] : 62 y/o M presents for CPE  [de-identified] : 61 y/o M PMH T2DM, obesity, HTN, severe depression, OA, NAFLD, post-COVID syndrome presents for CPE. Pt LS 2 weeks ago for followup. Pt s/p lap sandi 8/9/21, no complications. Pt with chronic b/l shoulder pain, following with ortho and PT. Pt today complains of worsening stiffness of fingers, states get stuck in bent position. Pt has had this for a while but states worsening over past month, especially in the morning.  Pt takes OTC NSAIDs which help slightly. Pt was supposed to see ortho this week but unable to make appt, states need to reschedule appt. Pt noted to have high PHQ-9 of 21. Pt with hx of depression, denies any suicidal or homicidal ideation today. Pt following with psychiatrist, states spoke to them 2 weeks ago and has appt in 2 weeks. Pt also following with pulm for post covid syndrome which has improved, being observed off breo inhaler this week, has f/u appt in one week. Denies fever, chills, chest pain, SOB, N/V, abdominal pain, headache, cough, palpitations, leg pain, dizziness, weakness. Denies alcohol use, smoking few cigarettes a day intermittently for 10 years.

## 2021-09-15 NOTE — REVIEW OF SYSTEMS
[Fever] : no fever [Chills] : no chills [Pain] : no pain [Discharge] : no discharge [Redness] : no redness [Vision Problems] : no vision problems [Itching] : no itching [Earache] : no earache [Hearing Loss] : no hearing loss [Nosebleeds] : no nosebleeds [Nasal Discharge] : no nasal discharge [Sore Throat] : no sore throat [Chest Pain] : no chest pain [Palpitations] : no palpitations [Shortness Of Breath] : no shortness of breath [Wheezing] : no wheezing [Abdominal Pain] : no abdominal pain [Nausea] : no nausea [Constipation] : no constipation [Vomiting] : no vomiting [Heartburn] : no heartburn [Dysuria] : no dysuria [Incontinence] : no incontinence [Hesitancy] : no hesitancy [Nocturia] : no nocturia [Hematuria] : no hematuria [Frequency] : no frequency [Skin Rash] : no skin rash [Headache] : no headache [Dizziness] : no dizziness [Fainting] : no fainting [Suicidal] : not suicidal [FreeTextEntry9] : Shoulder pain, finger stiffness and locking

## 2021-09-15 NOTE — PHYSICAL EXAM
[No Acute Distress] : no acute distress [Well Nourished] : well nourished [Well Developed] : well developed [Well-Appearing] : well-appearing [Normal Sclera/Conjunctiva] : normal sclera/conjunctiva [PERRL] : pupils equal round and reactive to light [EOMI] : extraocular movements intact [Normal Outer Ear/Nose] : the outer ears and nose were normal in appearance [Normal Oropharynx] : the oropharynx was normal [No JVD] : no jugular venous distention [No Lymphadenopathy] : no lymphadenopathy [No Respiratory Distress] : no respiratory distress  [Supple] : supple [No Accessory Muscle Use] : no accessory muscle use [Clear to Auscultation] : lungs were clear to auscultation bilaterally [Normal Rate] : normal rate  [Regular Rhythm] : with a regular rhythm [Normal S1, S2] : normal S1 and S2 [Soft] : abdomen soft [Non Tender] : non-tender [Non-distended] : non-distended [No HSM] : no HSM [Normal Bowel Sounds] : normal bowel sounds [de-identified] : lap sandi post surgical incisions noted, healing well [de-identified] : Locking of fingers at PIP joint b/l hands with flexion and extension of fingers

## 2021-09-15 NOTE — PLAN
[FreeTextEntry1] : T2DM, obesity, HTN, HLD, severe depression, OA, NAFLD, post-COVID syndrome presents for CPE\par \par #Trigger finger\par -will prescribe mobic 7.5 mg BID for 1 week\par -continue to follow with ortho, missed recent appt\par \par #Chronic shoulder pain\par -f/u with ortho and PT\par -pain control PRN\par \par #Diabetes mellitus \par -A1c 6.4 8/4/21\par -continue current regimen lantus 30u qhs and lispro 10u TID AC\par -Issues discussed include maintaining a diabetic diet, weight loss, continued home glucose monitoring, maintaining medication compliance, long term diabetes complications, foot care and podiatry visits, annual eye exam recommendations. \par -urine microalbumin/creatinine wnl 8/4/21\par -f/u 3 months for DM mgmt\par \par #Severe Depression\par -PHQ-9 21 today\par -pt denies suicidal or homicidal ideation\par -following with psychiatrist, seen 2 weeks ago. Has f/u in 2 weeks\par -continue sertraline 50 mg TID, trazodone 150 mg qhs\par \par #HTN\par -BP controlled\par -continue losartan 50 mg\par -continue metoprolol succinate  mg daily \par -DASH diet\par \par #HLD\par -continue atorvastatin 40 mg \par \par #Hx of hepatitis C\par -repeat hep c testing positive antibody, negative RNA 8/24/21\par -pt seen by hepatologist in the past, will give referral for f/u\par \par #Obesity\par -Discussed the benefits of weight loss with pt, and risks associated with obesity. Pt is receptive to lifestyle modification techniques to lose weight - at least 30mins-1hr aerobic exercises/day x5 days per week advised \par -Decreased food portion sizes, increase in whole grains, assorted vegetables and fruits and decreased sugar beverages discussed and encouraged \par \par #HCM\par -routine blood work reviewed\par -FIT negative 3/2021, as per chart pt had colonoscopy 1/2013, with 3 polyps removed. Was rec repeat in 3-5 years for surveillance. WIll give referral for colonoscopy today\par -flu shot administered today\par \par Case discussed with Dr. Quintero

## 2021-09-15 NOTE — HEALTH RISK ASSESSMENT
[Alone] : lives alone [Single] : single [Fully functional (bathing, dressing, toileting, transferring, walking, feeding)] : Fully functional (bathing, dressing, toileting, transferring, walking, feeding) [Fully functional (using the telephone, shopping, preparing meals, housekeeping, doing laundry, using] : Fully functional and needs no help or supervision to perform IADLs (using the telephone, shopping, preparing meals, housekeeping, doing laundry, using transportation, managing medications and managing finances) [] : Yes [5-9] : 5-9 [No] : No [No falls in past year] : Patient reported no falls in the past year [Unemployed] : unemployed [On disability] : on disability [Change in mental status noted] : No change in mental status noted [Sexually Active] : not sexually active [Reports changes in hearing] : Reports no changes in hearing [Reports changes in vision] : Reports no changes in vision [Reports changes in dental health] : Reports no changes in dental health

## 2021-09-17 ENCOUNTER — APPOINTMENT (OUTPATIENT)
Dept: PULMONOLOGY | Facility: CLINIC | Age: 61
End: 2021-09-17
Payer: MEDICAID

## 2021-09-17 VITALS
HEIGHT: 71 IN | HEART RATE: 66 BPM | OXYGEN SATURATION: 98 % | BODY MASS INDEX: 30.52 KG/M2 | DIASTOLIC BLOOD PRESSURE: 75 MMHG | WEIGHT: 218 LBS | TEMPERATURE: 97.3 F | SYSTOLIC BLOOD PRESSURE: 120 MMHG

## 2021-09-17 PROCEDURE — 99212 OFFICE O/P EST SF 10 MIN: CPT

## 2021-09-17 NOTE — REVIEW OF SYSTEMS
[Dyspnea] : no dyspnea [SOB on Exertion] : sob on exertion [Arthralgias] : arthralgias [Negative] : Endocrine

## 2021-10-01 ENCOUNTER — OUTPATIENT (OUTPATIENT)
Dept: OUTPATIENT SERVICES | Facility: HOSPITAL | Age: 61
LOS: 1 days | End: 2021-10-01
Payer: MEDICAID

## 2021-10-01 DIAGNOSIS — M19.019 PRIMARY OSTEOARTHRITIS, UNSPECIFIED SHOULDER: Chronic | ICD-10-CM

## 2021-10-02 ENCOUNTER — RX RENEWAL (OUTPATIENT)
Age: 61
End: 2021-10-02

## 2021-10-04 DIAGNOSIS — Z71.89 OTHER SPECIFIED COUNSELING: ICD-10-CM

## 2021-10-12 ENCOUNTER — RX RENEWAL (OUTPATIENT)
Age: 61
End: 2021-10-12

## 2021-10-19 ENCOUNTER — APPOINTMENT (OUTPATIENT)
Dept: PULMONOLOGY | Facility: CLINIC | Age: 61
End: 2021-10-19

## 2021-10-22 ENCOUNTER — APPOINTMENT (OUTPATIENT)
Dept: PULMONOLOGY | Facility: CLINIC | Age: 61
End: 2021-10-22
Payer: MEDICAID

## 2021-10-22 VITALS
DIASTOLIC BLOOD PRESSURE: 80 MMHG | OXYGEN SATURATION: 95 % | HEIGHT: 71 IN | BODY MASS INDEX: 31.5 KG/M2 | WEIGHT: 225 LBS | TEMPERATURE: 97.8 F | HEART RATE: 73 BPM | SYSTOLIC BLOOD PRESSURE: 121 MMHG

## 2021-10-22 PROCEDURE — 99214 OFFICE O/P EST MOD 30 MIN: CPT

## 2021-10-25 ENCOUNTER — RX RENEWAL (OUTPATIENT)
Age: 61
End: 2021-10-25

## 2021-11-08 LAB — SARS-COV-2 N GENE NPH QL NAA+PROBE: NOT DETECTED

## 2021-11-10 RX ORDER — MULTIVITAMIN
TABLET ORAL DAILY
Qty: 90 | Refills: 3 | Status: ACTIVE | COMMUNITY
Start: 2020-12-22 | End: 1900-01-01

## 2021-11-11 ENCOUNTER — OUTPATIENT (OUTPATIENT)
Dept: OUTPATIENT SERVICES | Facility: HOSPITAL | Age: 61
LOS: 1 days | End: 2021-11-11
Payer: MEDICAID

## 2021-11-11 DIAGNOSIS — R06.00 DYSPNEA, UNSPECIFIED: ICD-10-CM

## 2021-11-11 DIAGNOSIS — M19.019 PRIMARY OSTEOARTHRITIS, UNSPECIFIED SHOULDER: Chronic | ICD-10-CM

## 2021-11-11 PROCEDURE — 94060 EVALUATION OF WHEEZING: CPT

## 2021-11-11 PROCEDURE — 94729 DIFFUSING CAPACITY: CPT | Mod: 26

## 2021-11-11 PROCEDURE — 94726 PLETHYSMOGRAPHY LUNG VOLUMES: CPT

## 2021-11-11 PROCEDURE — 94726 PLETHYSMOGRAPHY LUNG VOLUMES: CPT | Mod: 26

## 2021-11-11 PROCEDURE — 94060 EVALUATION OF WHEEZING: CPT | Mod: 26

## 2021-11-11 PROCEDURE — 94729 DIFFUSING CAPACITY: CPT

## 2021-11-21 ENCOUNTER — RX RENEWAL (OUTPATIENT)
Age: 61
End: 2021-11-21

## 2021-12-01 PROCEDURE — G9005: CPT

## 2021-12-08 NOTE — BH INPATIENT PSYCHIATRY PROGRESS NOTE - NSBHCONSBHPROVDETAILS_PSY_A_CORE  FT
[FreeTextEntry2] : Follow-up left total hip replacement July 26, 2019 Collateral from outpatient Psychiatrist and therapist (Christina Hernandez)  at the [facility] ([phone]),left VM with call back number.

## 2021-12-09 ENCOUNTER — RESULT CHARGE (OUTPATIENT)
Age: 61
End: 2021-12-09

## 2021-12-09 ENCOUNTER — RESULT REVIEW (OUTPATIENT)
Age: 61
End: 2021-12-09

## 2021-12-09 ENCOUNTER — NON-APPOINTMENT (OUTPATIENT)
Age: 61
End: 2021-12-09

## 2021-12-09 ENCOUNTER — APPOINTMENT (OUTPATIENT)
Dept: FAMILY MEDICINE | Facility: HOSPITAL | Age: 61
End: 2021-12-09
Payer: MEDICAID

## 2021-12-09 ENCOUNTER — OUTPATIENT (OUTPATIENT)
Dept: OUTPATIENT SERVICES | Facility: HOSPITAL | Age: 61
LOS: 1 days | End: 2021-12-09
Payer: MEDICAID

## 2021-12-09 VITALS
SYSTOLIC BLOOD PRESSURE: 130 MMHG | OXYGEN SATURATION: 98 % | TEMPERATURE: 97.1 F | DIASTOLIC BLOOD PRESSURE: 80 MMHG | HEART RATE: 88 BPM | WEIGHT: 259 LBS | RESPIRATION RATE: 18 BRPM | BODY MASS INDEX: 36.12 KG/M2

## 2021-12-09 DIAGNOSIS — Z00.00 ENCOUNTER FOR GENERAL ADULT MEDICAL EXAMINATION WITHOUT ABNORMAL FINDINGS: ICD-10-CM

## 2021-12-09 DIAGNOSIS — M19.019 PRIMARY OSTEOARTHRITIS, UNSPECIFIED SHOULDER: Chronic | ICD-10-CM

## 2021-12-09 PROCEDURE — G0463: CPT

## 2021-12-09 PROCEDURE — 73070 X-RAY EXAM OF ELBOW: CPT

## 2021-12-09 PROCEDURE — 73070 X-RAY EXAM OF ELBOW: CPT | Mod: 26,LT

## 2021-12-09 PROCEDURE — 85025 COMPLETE CBC W/AUTO DIFF WBC: CPT

## 2021-12-10 ENCOUNTER — OUTPATIENT (OUTPATIENT)
Dept: OUTPATIENT SERVICES | Facility: HOSPITAL | Age: 61
LOS: 1 days | End: 2021-12-10
Payer: MEDICAID

## 2021-12-10 DIAGNOSIS — M19.019 PRIMARY OSTEOARTHRITIS, UNSPECIFIED SHOULDER: Chronic | ICD-10-CM

## 2021-12-10 DIAGNOSIS — Z20.828 CONTACT WITH AND (SUSPECTED) EXPOSURE TO OTHER VIRAL COMMUNICABLE DISEASES: ICD-10-CM

## 2021-12-10 LAB — SARS-COV-2 RNA SPEC QL NAA+PROBE: SIGNIFICANT CHANGE UP

## 2021-12-10 PROCEDURE — U0003: CPT

## 2021-12-10 PROCEDURE — U0005: CPT

## 2021-12-11 RX ORDER — PEN NEEDLE, DIABETIC 32GX 5/32"
32G X 4 MM NEEDLE, DISPOSABLE MISCELLANEOUS
Qty: 2 | Refills: 2 | Status: DISCONTINUED | COMMUNITY
Start: 2021-06-25 | End: 2021-12-11

## 2021-12-12 ENCOUNTER — TRANSCRIPTION ENCOUNTER (OUTPATIENT)
Age: 61
End: 2021-12-12

## 2021-12-12 LAB
BASOPHILS # BLD AUTO: 0.04 K/UL
BASOPHILS NFR BLD AUTO: 0.4 %
EOSINOPHIL # BLD AUTO: 0.21 K/UL
EOSINOPHIL NFR BLD AUTO: 2.3 %
HCT VFR BLD CALC: 38.1 %
HGB BLD-MCNC: 12.9 G/DL
IMM GRANULOCYTES NFR BLD AUTO: 0.3 %
LYMPHOCYTES # BLD AUTO: 2.32 K/UL
LYMPHOCYTES NFR BLD AUTO: 25.4 %
MAN DIFF?: NORMAL
MCHC RBC-ENTMCNC: 32.9 PG
MCHC RBC-ENTMCNC: 33.9 GM/DL
MCV RBC AUTO: 97.2 FL
MONOCYTES # BLD AUTO: 0.94 K/UL
MONOCYTES NFR BLD AUTO: 10.3 %
NEUTROPHILS # BLD AUTO: 5.6 K/UL
NEUTROPHILS NFR BLD AUTO: 61.3 %
PLATELET # BLD AUTO: 273 K/UL
RBC # BLD: 3.92 M/UL
RBC # FLD: 12.6 %
WBC # FLD AUTO: 9.14 K/UL

## 2021-12-13 ENCOUNTER — OUTPATIENT (OUTPATIENT)
Dept: OUTPATIENT SERVICES | Facility: HOSPITAL | Age: 61
LOS: 1 days | End: 2021-12-13
Payer: MEDICAID

## 2021-12-13 DIAGNOSIS — R21 RASH AND OTHER NONSPECIFIC SKIN ERUPTION: ICD-10-CM

## 2021-12-13 DIAGNOSIS — E11.9 TYPE 2 DIABETES MELLITUS WITHOUT COMPLICATIONS: ICD-10-CM

## 2021-12-13 DIAGNOSIS — R06.02 SHORTNESS OF BREATH: ICD-10-CM

## 2021-12-13 DIAGNOSIS — M25.429 EFFUSION, UNSPECIFIED ELBOW: ICD-10-CM

## 2021-12-13 DIAGNOSIS — R10.11 RIGHT UPPER QUADRANT PAIN: ICD-10-CM

## 2021-12-13 DIAGNOSIS — M19.019 PRIMARY OSTEOARTHRITIS, UNSPECIFIED SHOULDER: Chronic | ICD-10-CM

## 2021-12-13 DIAGNOSIS — Z86.010 PERSONAL HISTORY OF COLONIC POLYPS: ICD-10-CM

## 2021-12-13 DIAGNOSIS — M54.9 DORSALGIA, UNSPECIFIED: ICD-10-CM

## 2021-12-13 LAB — GLUCOSE BLDC GLUCOMTR-MCNC: 143 MG/DL — HIGH (ref 70–99)

## 2021-12-13 PROCEDURE — G0105: CPT

## 2021-12-13 PROCEDURE — 82962 GLUCOSE BLOOD TEST: CPT

## 2021-12-13 RX ORDER — SODIUM CHLORIDE 9 MG/ML
500 INJECTION INTRAMUSCULAR; INTRAVENOUS; SUBCUTANEOUS
Refills: 0 | Status: COMPLETED | OUTPATIENT
Start: 2021-12-13 | End: 2021-12-13

## 2021-12-13 RX ADMIN — SODIUM CHLORIDE 75 MILLILITER(S): 9 INJECTION INTRAMUSCULAR; INTRAVENOUS; SUBCUTANEOUS at 09:30

## 2021-12-15 ENCOUNTER — RX RENEWAL (OUTPATIENT)
Age: 61
End: 2021-12-15

## 2021-12-15 NOTE — PLAN
[FreeTextEntry1] : case dw Dr. Quintero Hypercholesterolemia Monitoring: I explained this is common when taking isotretinoin. We will monitor closely. Retinoid Dermatitis Normal Treatment: I recommended more frequent application of Cetaphil or CeraVe to the areas of dermatitis. Retinoid Dermatitis Aggressive Treatment: I recommended more frequent application of Cetaphil or CeraVe to the areas of dermatitis. I also prescribed a topical steroid for twice daily use until the dermatitis resolves. Are Labs Available For Review?: Yes Nosebleeds Normal Treatment: I explained this is common when taking isotretinoin. I recommended saline mist in each nostril multiple times a day. If this worsens they will contact us. Xerosis Normal Treatment: I recommended application of Cetaphil or CeraVe numerous times a day going to bed to all dry areas. Headache Monitoring: I recommended monitoring the headaches for now. There is no evidence of increased intracranial pressure. They were instructed to call if the headaches are worsening. Next Month's Dosage: Continue Current Dosage Xerosis Aggressive Treatment: I recommended application of Cetaphil or CeraVe numerous times a day going to bed to all dry areas. I also prescribed a topical steroid for twice daily use. Weight Units: pounds Patient Weight (Optional But Required For Cumulative Dose-Numbers And Decimals Only): 150 Myalgia Monitoring: I explained this is common when taking isotretinoin. If this worsens they will contact us. Months Of Therapy Completed: 4 Xerosis Normal Treatment: I recommended application of Cetaphil or CeraVe numerous times a day and before going to bed to all dry areas. Myalgia Treatment: I explained this is common when taking isotretinoin. If this worsens they will contact us. They may try OTC ibuprofen. Xerosis Aggressive Treatment: I recommended application of Cetaphil or CeraVe numerous times a day and before going to bed to all dry areas. I also prescribed a topical steroid for twice daily use. Calculate Months Of Therapy Based On Documented Dosages (Will Hide Months Of Therapy Question)?: No Dosing Month 1 (Required For Cumulative Dosing): 30mg Daily Counseling Text: I reviewed the side effect in detail. Patient should get monthly blood tests, not donate blood, not drive at night if vision affected, and not share medication. Female Pregnancy Counseling Text: Female patients should also be on two forms of birth control while taking this medication and for one month after their last dose. Cheilitis Normal Treatment: I recommended application of Vaseline or Aquaphor numerous times a day (as often as every hour) and before going to bed. Dosing Month 2 (Required For Cumulative Dosing): 30mg BID Use Therapeutic Ranged Or Therapeutic Target: please select Range or Target Detail Level: Zone Cheilitis Aggressive Treatment: I recommended application of Vaseline or Aquaphor numerous times a day (as often as every hour) and before going to bed. I also prescribed a topical steroid for twice daily use. Dosing Month 3 (Required For Cumulative Dosing): 40mg BID Pounds Preamble Statement (Weight Entered In Details Tab): Reported Weight in pounds: Lower Range (In Mg/Kg): 120 What Is The Patient's Gender: Male Kilograms Preamble Statement (Weight Entered In Details Tab): Reported Weight in kilograms: Upper Range (In Mg/Kg): 150 Female Completion Statement: After discussing her treatment course we decided to discontinue isotretinoin therapy at this time. I explained that she would need to continue her birth control methods for at least one month after the last dosage. She should also get a pregnancy test one month after the last dose. She shouldn't donate blood for one month after the last dose. She should call with any new symptoms of depression. Target Cumulative Dosage (In Mg/Kg): 135

## 2021-12-27 NOTE — HISTORY OF PRESENT ILLNESS
[High Intensity] : Patient is currently on high intensity statin therapy [Does not check BP] : The patient is not checking blood pressure [<140/90] : Target blood pressure is <140/90 [Target goal met] : BP target goal met [FreeTextEntry6] : 62 y/o M PMH T2DM, obesity, HTN, severe depression, OA, NAFLD, post-COVID syndrome presents for diabetes and HTN mgmt. Pt LS in September for CPE. Patient most recent Hbg A1c was 6.4. Pt denies polyuria or polydipsia, hypoglycemic episodes, peripheral extremity tingling/pain, vision changes. Pt does not consistently check blood glucose, mentions before meals about 140. \par Today pt c/o of L elbow lump for past month. No recent trauma. Denies elbow pain, erythema, warmth of lump. Feels that lump has grown in size. Pt also c/ of lower back pain for several months. Pt states pain worse when he walks. No recent trauma. Pt used to work in construction for 30 years, stopped a  few months ago but has not been past few months. Takes ibuprofen sometimes with minimal relief. Given referral for PT in past but never went. Pt states has been trying to walk more but unable to because gets SOB with exertion. States SOB improved with rest. Pt sleeps with 2-3 pillows at night. Pt also mentions weight gain but states because has not been able to exercise because of SOB and back pain. Pt also mentions has noted dry erythematous rash on face past few months.  Denies itching, bleeding of skin. Has not used any new products on face. Denies fever, chills, chest pain, N/V, abdominal pain, headache, cough, palpitations, leg pain, dizziness, weakness

## 2021-12-27 NOTE — REVIEW OF SYSTEMS
[Recent Change In Weight] : ~T recent weight change [Dyspnea on Exertion] : dyspnea on exertion [Fever] : no fever [Chills] : no chills [Fatigue] : no fatigue [Hot Flashes] : no hot flashes [FreeTextEntry9] : back pain

## 2021-12-27 NOTE — PLAN
[FreeTextEntry1] : 62 y/o M PMH T2DM, obesity, HTN, severe depression, OA, NAFLD, post-COVID syndrome presents for diabetes/ HTN mgmt and c/o of L elbow swelling, lower back pain and skin rash\par \par #Diabetes mellitus\par -A1c 6.9 today\par -continue current regimen lantus 30u qhs and lispro 10u TID AC\par -Issues discussed include maintaining a diabetic diet, weight loss, continued home glucose monitoring, maintaining medication compliance, long term diabetes complications, foot care and podiatry visits, annual eye exam recommendations. 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\par -will give dermatology referral for possible biopsy \par \par #Dyspnea on exertion \par -f/u echo\par -cardio referral provided\par \par #Lower back pain\par -OTC pain medication PRN\par -back exercises\par -ortho referral provided\par -PT referral provided\par \par #Leukocytosis\par -elevated WBC 11/51 previous CBC in August \par -f/u CBC to see if resolved\par \par #HTN\par -BP controlled\par -continue losartan 50 mg\par -continue metoprolol succinate  mg daily \par -DASH diet\par \par #HLD\par -increase to atorvastatin 80 mg\par \par #Hx of hepatitis C\par -repeat hep c testing positive antibody, negative RNA 8/24/21\par -pt seen by hepatologist in the past, provided referral for f/u previous visit. \par \par #HCM\par -routine blood work reviewed\par -FIT negative 3/2021, as per chart pt had colonoscopy 1/2013, with 3 polyps removed. Was rec repeat in 3-5 years for surveillance. Pt has appt for colonoscopy 12/16\par \par RTC in 1 month for f/u \par \par Case discussed with Dr. Dale

## 2021-12-27 NOTE — PHYSICAL EXAM
[No CVA Tenderness] : no CVA  tenderness [No Spinal Tenderness] : no spinal tenderness [Grossly Normal Strength/Tone] : grossly normal strength/tone [de-identified] : paraspinal tenderness lumbar region, negative straight leg test [de-identified] : L elbow soft, moveable, circumscribed lump palpated. No erythema, no TTP, no warmth. Scaly erythematous rash noted on b/l cheeks and forehead [TWNoteComboBox3] : +2 [TWNoteComboBox4] : +2

## 2021-12-28 ENCOUNTER — APPOINTMENT (OUTPATIENT)
Dept: ORTHOPEDIC SURGERY | Facility: HOSPITAL | Age: 61
End: 2021-12-28

## 2021-12-28 ENCOUNTER — OUTPATIENT (OUTPATIENT)
Dept: OUTPATIENT SERVICES | Facility: HOSPITAL | Age: 61
LOS: 1 days | End: 2021-12-28
Payer: MEDICAID

## 2021-12-28 VITALS
OXYGEN SATURATION: 94 % | HEART RATE: 75 BPM | TEMPERATURE: 97.5 F | SYSTOLIC BLOOD PRESSURE: 117 MMHG | WEIGHT: 258 LBS | RESPIRATION RATE: 18 BRPM | DIASTOLIC BLOOD PRESSURE: 73 MMHG | BODY MASS INDEX: 35.98 KG/M2

## 2021-12-28 DIAGNOSIS — M19.019 PRIMARY OSTEOARTHRITIS, UNSPECIFIED SHOULDER: Chronic | ICD-10-CM

## 2021-12-28 DIAGNOSIS — Z00.00 ENCOUNTER FOR GENERAL ADULT MEDICAL EXAMINATION WITHOUT ABNORMAL FINDINGS: ICD-10-CM

## 2021-12-28 PROCEDURE — G0463: CPT

## 2021-12-30 DIAGNOSIS — M65.30 TRIGGER FINGER, UNSPECIFIED FINGER: ICD-10-CM

## 2021-12-30 DIAGNOSIS — M54.42 LUMBAGO WITH SCIATICA, LEFT SIDE: ICD-10-CM

## 2022-01-07 ENCOUNTER — RX RENEWAL (OUTPATIENT)
Age: 62
End: 2022-01-07

## 2022-01-10 ENCOUNTER — OUTPATIENT (OUTPATIENT)
Dept: OUTPATIENT SERVICES | Facility: HOSPITAL | Age: 62
LOS: 1 days | End: 2022-01-10
Payer: MEDICAID

## 2022-01-10 ENCOUNTER — APPOINTMENT (OUTPATIENT)
Dept: FAMILY MEDICINE | Facility: HOSPITAL | Age: 62
End: 2022-01-10

## 2022-01-10 VITALS
BODY MASS INDEX: 36.4 KG/M2 | RESPIRATION RATE: 16 BRPM | SYSTOLIC BLOOD PRESSURE: 127 MMHG | TEMPERATURE: 97.4 F | DIASTOLIC BLOOD PRESSURE: 77 MMHG | HEART RATE: 71 BPM | WEIGHT: 260 LBS | OXYGEN SATURATION: 96 % | HEIGHT: 71 IN

## 2022-01-10 DIAGNOSIS — Z86.2 PERSONAL HISTORY OF DISEASES OF THE BLOOD AND BLOOD-FORMING ORGANS AND CERTAIN DISORDERS INVOLVING THE IMMUNE MECHANISM: ICD-10-CM

## 2022-01-10 DIAGNOSIS — Z00.00 ENCOUNTER FOR GENERAL ADULT MEDICAL EXAMINATION WITHOUT ABNORMAL FINDINGS: ICD-10-CM

## 2022-01-10 DIAGNOSIS — R93.89 ABNORMAL FINDINGS ON DIAGNOSTIC IMAGING OF OTHER SPECIFIED BODY STRUCTURES: ICD-10-CM

## 2022-01-10 DIAGNOSIS — Z12.11 ENCOUNTER FOR SCREENING FOR MALIGNANT NEOPLASM OF COLON: ICD-10-CM

## 2022-01-10 DIAGNOSIS — M19.019 PRIMARY OSTEOARTHRITIS, UNSPECIFIED SHOULDER: Chronic | ICD-10-CM

## 2022-01-10 DIAGNOSIS — Z87.898 PERSONAL HISTORY OF OTHER SPECIFIED CONDITIONS: ICD-10-CM

## 2022-01-10 DIAGNOSIS — M54.9 DORSALGIA, UNSPECIFIED: ICD-10-CM

## 2022-01-11 ENCOUNTER — NON-APPOINTMENT (OUTPATIENT)
Age: 62
End: 2022-01-11

## 2022-01-11 DIAGNOSIS — R06.02 SHORTNESS OF BREATH: ICD-10-CM

## 2022-01-11 PROCEDURE — G0463: CPT

## 2022-01-11 PROCEDURE — 93306 TTE W/DOPPLER COMPLETE: CPT

## 2022-01-11 PROCEDURE — 93306 TTE W/DOPPLER COMPLETE: CPT | Mod: 26

## 2022-01-13 DIAGNOSIS — M54.42 LUMBAGO WITH SCIATICA, LEFT SIDE: ICD-10-CM

## 2022-01-13 PROBLEM — R93.89 ABNORMAL CHEST CT: Status: RESOLVED | Noted: 2021-07-16 | Resolved: 2021-09-15

## 2022-01-13 PROBLEM — M54.9 MUSCULOSKELETAL BACK PAIN: Status: RESOLVED | Noted: 2020-07-22 | Resolved: 2022-01-13

## 2022-01-13 PROBLEM — Z12.11 COLON CANCER SCREENING: Status: RESOLVED | Noted: 2021-09-11 | Resolved: 2022-01-13

## 2022-01-14 ENCOUNTER — APPOINTMENT (OUTPATIENT)
Dept: PULMONOLOGY | Facility: CLINIC | Age: 62
End: 2022-01-14
Payer: MEDICAID

## 2022-01-14 VITALS
SYSTOLIC BLOOD PRESSURE: 114 MMHG | TEMPERATURE: 97.5 F | BODY MASS INDEX: 36.4 KG/M2 | WEIGHT: 260 LBS | OXYGEN SATURATION: 93 % | DIASTOLIC BLOOD PRESSURE: 76 MMHG | HEIGHT: 71 IN | HEART RATE: 69 BPM

## 2022-01-14 PROCEDURE — 99213 OFFICE O/P EST LOW 20 MIN: CPT

## 2022-01-14 PROCEDURE — 99406 BEHAV CHNG SMOKING 3-10 MIN: CPT

## 2022-01-14 NOTE — ASSESSMENT
[FreeTextEntry1] : Patient continues with dyspnea.  He continues to smoke.  He has been advised that smoking cessation would help with dyspnea along with weight loss.  Patient has been encouraged to take his inhalers on a scheduled basis as opposed to as needed

## 2022-01-14 NOTE — COUNSELING
[Cessation strategies including cessation program discussed] : Cessation strategies including cessation program discussed [Use of nicotine replacement therapies and other medications discussed] : Use of nicotine replacement therapies and other medications discussed [Encouraged to pick a quit date and identify support needed to quit] : Encouraged to pick a quit date and identify support needed to quit [Smoking Cessation Program Referral] : Smoking Cessation Program Referral  [FreeTextEntry2] : Patient is on Nicotrol inhaler [FreeTextEntry1] : 10 [Potential consequences of obesity discussed] : Potential consequences of obesity discussed [Benefits of weight loss discussed] : Benefits of weight loss discussed

## 2022-01-17 PROBLEM — Z86.2 HISTORY OF LEUKOCYTOSIS: Status: RESOLVED | Noted: 2021-12-09 | Resolved: 2022-01-17

## 2022-01-17 RX ORDER — NICOTINE 21 MG/24HR
14 PATCH, TRANSDERMAL 24 HOURS TRANSDERMAL DAILY
Qty: 2 | Refills: 3 | Status: DISCONTINUED | COMMUNITY
Start: 2020-10-28 | End: 2022-01-17

## 2022-01-17 RX ORDER — NICOTINE POLACRILEX 4 MG/1
4 GUM, CHEWING BUCCAL
Qty: 2 | Refills: 2 | Status: DISCONTINUED | COMMUNITY
Start: 2020-12-31 | End: 2022-01-17

## 2022-01-17 RX ORDER — NICOTINE 21 MG/24HR
21 PATCH, TRANSDERMAL 24 HOURS TRANSDERMAL DAILY
Qty: 1 | Refills: 1 | Status: DISCONTINUED | COMMUNITY
Start: 2020-12-28 | End: 2022-01-17

## 2022-01-17 NOTE — COUNSELING
[Cessation strategies including cessation program discussed] : Cessation strategies including cessation program discussed [Use of nicotine replacement therapies and other medications discussed] : Use of nicotine replacement therapies and other medications discussed [Encouraged to pick a quit date and identify support needed to quit] : Encouraged to pick a quit date and identify support needed to quit [Potential consequences of obesity discussed] : Potential consequences of obesity discussed [Benefits of weight loss discussed] : Benefits of weight loss discussed [Structured Weight Management Program suggested:] : Structured weight management program suggested [Encouraged to maintain food diary] : Encouraged to maintain food diary [Encouraged to increase physical activity] : Encouraged to increase physical activity [Encouraged to use exercise tracking device] : Encouraged to use exercise tracking device

## 2022-01-18 NOTE — PHYSICAL EXAM
[No CVA Tenderness] : no CVA  tenderness [Coordination Grossly Intact] : coordination grossly intact [No Focal Deficits] : no focal deficits [Normal] : affect was normal and insight and judgment were intact [de-identified] : mild paraspinal tenderness lumbar region, positive straight leg test L side [de-identified] : Locking of fingers at PIP joint b/l hands with flexion and extension of multiple fingers

## 2022-01-18 NOTE — REVIEW OF SYSTEMS
[Paroxysmal Nocturnal Dyspnea] : paroxysmal nocturnal dyspnea [Dyspnea on Exertion] : dyspnea on exertion [Back Pain] : back pain [Depression] : depression [Fever] : no fever [Chills] : no chills [Fatigue] : no fatigue [Hot Flashes] : no hot flashes [Chest Pain] : no chest pain [Palpitations] : no palpitations [Lower Ext Edema] : no lower extremity edema [Orthopena] : no orthopnea [Wheezing] : no wheezing [Cough] : no cough [Abdominal Pain] : no abdominal pain [Nausea] : no nausea [Constipation] : no constipation [Diarrhea] : no diarrhea [Vomiting] : no vomiting [Heartburn] : no heartburn [Melena] : no melena [Dysuria] : no dysuria [Incontinence] : no incontinence [Hesitancy] : no hesitancy [Nocturia] : no nocturia [Hematuria] : no hematuria [Frequency] : no frequency [Impotence] : no impotency [Muscle Pain] : no muscle pain [Muscle Weakness] : no muscle weakness [Joint Swelling] : no joint swelling [Headache] : no headache [Dizziness] : no dizziness [Suicidal] : not suicidal [Insomnia] : no insomnia [Anxiety] : no anxiety [FreeTextEntry9] : lower back pain, finger locking

## 2022-01-18 NOTE — PLAN
[FreeTextEntry1] : 62 y/o M PMH diabetes, obesity, NAFLD, HTN, severe depression, post COVID syndrome presents for followup.\par \par #Lower back pain\par -last x-ray 2017 mild scoliosis and spondylosis\par -pt was provided referral for x-ray at ortho appt but left before getting referral\par -will provide referral for MRI today \par -OTC pain medication PRN\par -back exercises\par -f/u with ortho 2/8/22\par -PT referral reprinted\par \par #Dyspnea on exertion \par -echo referral reprinted, pt states will get echo today\par -cardio referral provided\par -pt also currently following pulmonary for post covid syndrome, has followup appt 1/14/22\par -continue breo inhaler\par \par #Constipation\par -will prescribe miralax\par -high fiber diet, encourage hydration\par \par #Trigger finger\par -f/u with ortho for possible steroid injection\par \par #L elbow mass\par -resolved\par \par #Diabetes mellitus\par -A1c 6.9 12/21\par -continue current regimen lantus 30u qhs and lispro 10u TID AC\par -Issues discussed include maintaining a diabetic diet, weight loss, continued home glucose monitoring, maintaining medication compliance, long term diabetes complications, foot care and podiatry visits, annual eye exam recommendations. \par -urine microalbumin/creatinine wnl 8/4/21\par -optho referral to schedule retinal exam in clinic\par \par #HTN\par -BP controlled\par -continue losartan 50 mg\par -continue metoprolol succinate  mg daily \par -DASH diet\par \par #Severe Depression\par -PHQ-9 22 today\par -pt denies suicidal or homicidal ideation\par -pt is currently following with psychiatrist, has upcoming appt\par -continue sertraline 50 mg TID, trazodone 150 mg qhs\par \par #HLD\par -continue atorvastatin 80 mg \par \par #Obesity\par - Discussed the benefits of weight loss with pt, and risks associated with obesity. Pt is receptive to lifestyle modification techniques to lose weight - at least 30mins-1hr aerobic exercises/day x5 days per week advised \par - Decreased food portion sizes, increase in whole grains, assorted vegetables and fruits and decreased sugar beverages discussed and encouraged \par \par #HCM\par -colonoscopy UTD 12/23/21, showed nonbleeding internal hemorrhoids and diverticulosis. Repeat in 5 years recommended\par -immunizations UTD\par \par RTC in 1 month for followup \par \par Case discussed with Dr. Josue

## 2022-01-18 NOTE — HISTORY OF PRESENT ILLNESS
[FreeTextEntry1] : 60 y/o M presents for f/u [de-identified] : 60 y/o M PMH diabetes, obesity, HTN, HLD, NAFLD, OA, severe depression, post COVID syndrome presents for followup of multiple problems. PT LS 1 month ago for DM and HTN management. At last visit pt was c/o of L elbow bump for 1 month, x-ray ordered WNL. Today pt states elbow bump has resolved. Pt also had c/o of chronic lower back pain several months, worse when walking, radiating sometimes to posterior legs. Pt has history of working as  for 30 years, not currently working due to pain. Pt was given PT referral, seen by ortho on 12/28, suspected sciatic pain. X-ray was ordered but pt had left clinic before requisition given. Was prescribed naproxen with minimal relief. At last visit pt also c/o of dyspnea on exertion, echo ordered which pt states he will get today. Pt sleeps with 2-3 pillows at night, states SOB improved with rest. Sometimes feels wakes up from sleep feeling SOB. Pt is currently following with pulmonologist for post-covid syndrome, using breo inhaler intermittently. Pt smokes a few cigarettes a day intermittently for 10 years. Pt also c/o of constipation since colonoscopy in December, states having BM every day but is straining and comes out in chon.  Denies fever, chills, chest pain, N/V, abdominal pain, headache, cough, bloody stools, palpitations, leg pain, leg swelling, dizziness, weakness,

## 2022-01-21 ENCOUNTER — APPOINTMENT (OUTPATIENT)
Dept: CARDIOLOGY | Facility: HOSPITAL | Age: 62
End: 2022-01-21

## 2022-01-26 ENCOUNTER — NON-APPOINTMENT (OUTPATIENT)
Age: 62
End: 2022-01-26

## 2022-02-06 ENCOUNTER — RX RENEWAL (OUTPATIENT)
Age: 62
End: 2022-02-06

## 2022-02-08 ENCOUNTER — OUTPATIENT (OUTPATIENT)
Dept: OUTPATIENT SERVICES | Facility: HOSPITAL | Age: 62
LOS: 1 days | End: 2022-02-08
Payer: MEDICAID

## 2022-02-08 ENCOUNTER — APPOINTMENT (OUTPATIENT)
Dept: ORTHOPEDIC SURGERY | Facility: HOSPITAL | Age: 62
End: 2022-02-08

## 2022-02-08 ENCOUNTER — LABORATORY RESULT (OUTPATIENT)
Age: 62
End: 2022-02-08

## 2022-02-08 VITALS
WEIGHT: 260 LBS | DIASTOLIC BLOOD PRESSURE: 78 MMHG | HEIGHT: 71 IN | SYSTOLIC BLOOD PRESSURE: 111 MMHG | HEART RATE: 62 BPM | RESPIRATION RATE: 14 BRPM | OXYGEN SATURATION: 97 % | BODY MASS INDEX: 36.4 KG/M2 | TEMPERATURE: 97 F

## 2022-02-08 DIAGNOSIS — Z00.00 ENCOUNTER FOR GENERAL ADULT MEDICAL EXAMINATION WITHOUT ABNORMAL FINDINGS: ICD-10-CM

## 2022-02-08 DIAGNOSIS — M19.019 PRIMARY OSTEOARTHRITIS, UNSPECIFIED SHOULDER: Chronic | ICD-10-CM

## 2022-02-08 PROCEDURE — G0463: CPT

## 2022-02-09 ENCOUNTER — OUTPATIENT (OUTPATIENT)
Dept: OUTPATIENT SERVICES | Facility: HOSPITAL | Age: 62
LOS: 1 days | End: 2022-02-09
Payer: MEDICAID

## 2022-02-09 ENCOUNTER — APPOINTMENT (OUTPATIENT)
Dept: MRI IMAGING | Facility: HOSPITAL | Age: 62
End: 2022-02-09
Payer: MEDICAID

## 2022-02-09 DIAGNOSIS — M54.42 LUMBAGO WITH SCIATICA, LEFT SIDE: ICD-10-CM

## 2022-02-09 DIAGNOSIS — M19.019 PRIMARY OSTEOARTHRITIS, UNSPECIFIED SHOULDER: Chronic | ICD-10-CM

## 2022-02-09 PROCEDURE — 72148 MRI LUMBAR SPINE W/O DYE: CPT

## 2022-02-09 PROCEDURE — 36415 COLL VENOUS BLD VENIPUNCTURE: CPT

## 2022-02-09 PROCEDURE — 72148 MRI LUMBAR SPINE W/O DYE: CPT | Mod: 26

## 2022-02-09 PROCEDURE — 86481 TB AG RESPONSE T-CELL SUSP: CPT

## 2022-02-10 ENCOUNTER — RX RENEWAL (OUTPATIENT)
Age: 62
End: 2022-02-10

## 2022-02-14 DIAGNOSIS — M65.30 TRIGGER FINGER, UNSPECIFIED FINGER: ICD-10-CM

## 2022-02-15 ENCOUNTER — APPOINTMENT (OUTPATIENT)
Dept: FAMILY MEDICINE | Facility: HOSPITAL | Age: 62
End: 2022-02-15

## 2022-02-15 ENCOUNTER — OUTPATIENT (OUTPATIENT)
Dept: OUTPATIENT SERVICES | Facility: HOSPITAL | Age: 62
LOS: 1 days | End: 2022-02-15
Payer: MEDICAID

## 2022-02-15 VITALS
HEIGHT: 71 IN | HEART RATE: 63 BPM | DIASTOLIC BLOOD PRESSURE: 67 MMHG | SYSTOLIC BLOOD PRESSURE: 120 MMHG | WEIGHT: 258 LBS | BODY MASS INDEX: 36.12 KG/M2 | TEMPERATURE: 96.6 F | RESPIRATION RATE: 14 BRPM | OXYGEN SATURATION: 94 %

## 2022-02-15 DIAGNOSIS — M19.019 PRIMARY OSTEOARTHRITIS, UNSPECIFIED SHOULDER: Chronic | ICD-10-CM

## 2022-02-15 DIAGNOSIS — Z00.00 ENCOUNTER FOR GENERAL ADULT MEDICAL EXAMINATION WITHOUT ABNORMAL FINDINGS: ICD-10-CM

## 2022-02-15 PROCEDURE — G0463: CPT

## 2022-02-17 DIAGNOSIS — R06.00 DYSPNEA, UNSPECIFIED: ICD-10-CM

## 2022-02-17 DIAGNOSIS — B02.9 ZOSTER WITHOUT COMPLICATIONS: ICD-10-CM

## 2022-02-22 ENCOUNTER — OUTPATIENT (OUTPATIENT)
Dept: OUTPATIENT SERVICES | Facility: HOSPITAL | Age: 62
LOS: 1 days | End: 2022-02-22
Payer: MEDICAID

## 2022-02-22 ENCOUNTER — RESULT REVIEW (OUTPATIENT)
Age: 62
End: 2022-02-22

## 2022-02-22 DIAGNOSIS — M54.42 LUMBAGO WITH SCIATICA, LEFT SIDE: ICD-10-CM

## 2022-02-22 DIAGNOSIS — M19.019 PRIMARY OSTEOARTHRITIS, UNSPECIFIED SHOULDER: Chronic | ICD-10-CM

## 2022-02-22 DIAGNOSIS — Z00.00 ENCOUNTER FOR GENERAL ADULT MEDICAL EXAMINATION WITHOUT ABNORMAL FINDINGS: ICD-10-CM

## 2022-02-22 PROCEDURE — 72131 CT LUMBAR SPINE W/O DYE: CPT | Mod: 26,MH

## 2022-02-22 PROCEDURE — 72131 CT LUMBAR SPINE W/O DYE: CPT | Mod: MH

## 2022-02-27 NOTE — PHYSICAL EXAM
[No JVD] : no jugular venous distention [No Lymphadenopathy] : no lymphadenopathy [Supple] : supple [Thyroid Normal, No Nodules] : the thyroid was normal and there were no nodules present [Normal] : affect was normal and insight and judgment were intact [de-identified] : improved locking of 4th and 5th digits, L middle and 5th digits [de-identified] : erythematous, vesicular rash on back of neck, TTP  and upper lip

## 2022-02-27 NOTE — ASSESSMENT
[FreeTextEntry1] : 60 y/o M PMH diabetes, obesity, NAFLD, HTN, severe depression, post COVID syndrome presents for f/u and c/o of rash\par \par #Shingles\par -prescribed acyclovir course\par -OTC pain medicine PRN\par -keep skin clean and dry\par \par #Lower back pain\par -MRI lumbar spine: Prominent bone marrow edema and low T1 signal within the central aspect of the right 12th rib as well as within the T12 vertebral body at its articulation with the rib. Lkely degenerative in nature. Fracture cannot be excluded. Focal CT through the T12 level recommended\par -will order focal CT \par -OTC pain medication PRN\par -back exercises\par -f/u with ortho 3/8/22\par -encouraged to go to PT\par \par #Dyspnea on exertion \par -echo 1/22: normal LV systolic fxn. EF 60-65. Dilated proximal ascending aorta 3.8 cm\par -f/u with cardio\par -pt also currently following pulmonary for post covid syndrome, continue breo inhaler\par -importance of smoking cessation and weight loss discussed\par \par #Dilated ascending aorta\par -echo 1/22 shows dilated proximal ascending aorta 3.8 cm\par -f/u with cardio\par -importance of smoking cessation discussed \par \par #Trigger finger\par -received steroid injection with ortho, has had improvement\par -has another /u with ortho for 2nd steroid injection\par \par #Diabetes mellitus\par -A1c 6.9 12/21\par -continue current regimen lantus 30u qhs and lispro 10u TID AC\par -Issues discussed include maintaining a diabetic diet, weight loss, continued home glucose monitoring, maintaining medication compliance, long term diabetes complications, foot care and podiatry visits, annual eye exam recommendations. \par -urine microalbumin/creatinine wnl 8/4/21\par -optho referral to schedule retinal exam in clinic\par \par #Obesity\par -Discussed the benefits of weight loss with pt, and risks associated with obesity. Pt is receptive to lifestyle modification techniques to lose weight - at least 30mins-1hr aerobic exercises/day x5 days per week advised \par -Decreased food portion sizes, increase in whole grains, assorted vegetables and fruits and decreased sugar beverages discussed and encouraged \par \par #HCM\par -colonoscopy UTD 12/23/21, showed nonbleeding internal hemorrhoids and diverticulosis. Repeat in 5 years recommended\par -immunizations UTD\par \par RTC in 1 month for followup \par \par Case discussed with Dr. Vasquez

## 2022-02-27 NOTE — REVIEW OF SYSTEMS
[Shortness Of Breath] : shortness of breath [Dyspnea on Exertion] : dyspnea on exertion [Back Pain] : back pain [Itching] : itching [Skin Rash] : skin rash [Depression] : depression [Fever] : no fever [Chills] : no chills [Fatigue] : no fatigue [Hot Flashes] : no hot flashes [Chest Pain] : no chest pain [Palpitations] : no palpitations [Orthopena] : no orthopnea [Paroxysmal Nocturnal Dyspnea] : no paroxysmal nocturnal dyspnea [Wheezing] : no wheezing [Cough] : no cough [Abdominal Pain] : no abdominal pain [Nausea] : no nausea [Constipation] : no constipation [Diarrhea] : no diarrhea [Vomiting] : no vomiting [Heartburn] : no heartburn [Melena] : no melena [Dysuria] : no dysuria [Incontinence] : no incontinence [Hesitancy] : no hesitancy [Nocturia] : no nocturia [Hematuria] : no hematuria [Frequency] : no frequency [Impotence] : no impotency [Poor Libido] : libido not poor [Dizziness] : no dizziness [FreeTextEntry9] : trigger finger

## 2022-02-27 NOTE — HISTORY OF PRESENT ILLNESS
[FreeTextEntry1] : 69 y/o M presents for f/u and c/o of rash [de-identified] : 60 y/o M with PMHx of DM, obesity, HTN, HLD, fatty liver, OA, depression, post-covid syndrome, history of recurrent shingles presents for f/u and c/o of rash. Pt states rash started 4 days ago. Rash is located on upper lip, overlying back of the neck, and subauricular on the right side. Associated with blisters, is pruritic and painful. Pt had had this rash in the past, has history of shingles as a child and this happens every year. Doesn't normally do anything for it . Mentions hx of precancerous lesions, had a skin biopsy two weeks ago at [facility] on ear, has not received results yet. Pt also seen by ortho for back pain and trigger finger 2/8, received steroid injection. Has been taking naproxen with minimal relief. MRI LS showed prominent BM edema and low TI signal central aspect R 12 rib and within T12 vertebral body, likely degenerative but fracture cannot be excluded. Pt also has been having MELENDEZ for few months, following with pulm for post-covid syndrome, pt is using inhaler daily. Echo ordered last visit showed normal LV systolic fxn, EF 60-65% and dilated proximal ascending aorta 3.8 cm. Pt denies fever, chills, chest pain, SOB, N/V, abdominal pain, headache, cough, palpitations, leg pain, dizziness, weakness. Pt continues to smoke few cigarettes a day intermittently, has smoked for 10 years.

## 2022-03-08 ENCOUNTER — OUTPATIENT (OUTPATIENT)
Dept: OUTPATIENT SERVICES | Facility: HOSPITAL | Age: 62
LOS: 1 days | End: 2022-03-08
Payer: MEDICAID

## 2022-03-08 ENCOUNTER — RESULT REVIEW (OUTPATIENT)
Age: 62
End: 2022-03-08

## 2022-03-08 ENCOUNTER — APPOINTMENT (OUTPATIENT)
Dept: ORTHOPEDIC SURGERY | Facility: HOSPITAL | Age: 62
End: 2022-03-08

## 2022-03-08 VITALS
WEIGHT: 253 LBS | HEART RATE: 72 BPM | TEMPERATURE: 98.1 F | HEIGHT: 71 IN | SYSTOLIC BLOOD PRESSURE: 125 MMHG | DIASTOLIC BLOOD PRESSURE: 72 MMHG | RESPIRATION RATE: 14 BRPM | BODY MASS INDEX: 35.42 KG/M2 | OXYGEN SATURATION: 97 %

## 2022-03-08 DIAGNOSIS — M19.019 PRIMARY OSTEOARTHRITIS, UNSPECIFIED SHOULDER: Chronic | ICD-10-CM

## 2022-03-08 DIAGNOSIS — Z00.00 ENCOUNTER FOR GENERAL ADULT MEDICAL EXAMINATION WITHOUT ABNORMAL FINDINGS: ICD-10-CM

## 2022-03-08 PROCEDURE — 86140 C-REACTIVE PROTEIN: CPT

## 2022-03-08 PROCEDURE — 85652 RBC SED RATE AUTOMATED: CPT

## 2022-03-08 PROCEDURE — 86200 CCP ANTIBODY: CPT

## 2022-03-08 PROCEDURE — 86038 ANTINUCLEAR ANTIBODIES: CPT

## 2022-03-08 PROCEDURE — 86431 RHEUMATOID FACTOR QUANT: CPT

## 2022-03-08 PROCEDURE — G0463: CPT

## 2022-03-09 ENCOUNTER — RX RENEWAL (OUTPATIENT)
Age: 62
End: 2022-03-09

## 2022-03-09 ENCOUNTER — APPOINTMENT (OUTPATIENT)
Dept: CT IMAGING | Facility: HOSPITAL | Age: 62
End: 2022-03-09

## 2022-03-09 DIAGNOSIS — M65.30 TRIGGER FINGER, UNSPECIFIED FINGER: ICD-10-CM

## 2022-03-09 DIAGNOSIS — M54.12 RADICULOPATHY, CERVICAL REGION: ICD-10-CM

## 2022-03-14 ENCOUNTER — APPOINTMENT (OUTPATIENT)
Dept: CT IMAGING | Facility: HOSPITAL | Age: 62
End: 2022-03-14
Payer: MEDICAID

## 2022-03-14 ENCOUNTER — OUTPATIENT (OUTPATIENT)
Dept: OUTPATIENT SERVICES | Facility: HOSPITAL | Age: 62
LOS: 1 days | End: 2022-03-14
Payer: MEDICAID

## 2022-03-14 DIAGNOSIS — M54.9 DORSALGIA, UNSPECIFIED: ICD-10-CM

## 2022-03-14 DIAGNOSIS — M19.019 PRIMARY OSTEOARTHRITIS, UNSPECIFIED SHOULDER: Chronic | ICD-10-CM

## 2022-03-14 PROCEDURE — 72128 CT CHEST SPINE W/O DYE: CPT

## 2022-03-14 PROCEDURE — 72128 CT CHEST SPINE W/O DYE: CPT | Mod: 26

## 2022-03-15 ENCOUNTER — APPOINTMENT (OUTPATIENT)
Dept: PULMONOLOGY | Facility: CLINIC | Age: 62
End: 2022-03-15
Payer: MEDICAID

## 2022-03-15 VITALS
DIASTOLIC BLOOD PRESSURE: 70 MMHG | BODY MASS INDEX: 35.42 KG/M2 | WEIGHT: 253 LBS | TEMPERATURE: 97.7 F | HEART RATE: 74 BPM | HEIGHT: 71 IN | SYSTOLIC BLOOD PRESSURE: 124 MMHG | OXYGEN SATURATION: 92 %

## 2022-03-15 LAB
ANA SER IF-ACNC: NEGATIVE
CCP AB SER IA-ACNC: <8 UNITS
CRP SERPL-MCNC: 4 MG/L
ERYTHROCYTE [SEDIMENTATION RATE] IN BLOOD BY WESTERGREN METHOD: 29 MM/HR
RF+CCP IGG SER-IMP: NEGATIVE
RHEUMATOID FACT SER QL: <10 IU/ML

## 2022-03-15 PROCEDURE — 99213 OFFICE O/P EST LOW 20 MIN: CPT

## 2022-03-15 PROCEDURE — 99406 BEHAV CHNG SMOKING 3-10 MIN: CPT

## 2022-03-15 NOTE — ASSESSMENT
[FreeTextEntry1] : Patient continues to smoke.  Smoking cessation counseling given for approximately 9 minutes.  We will switch to Trelegy for better COPD control.  Repeat CAT scan ordered because of history of post COVID changes on his last CT approximately 6 months ago

## 2022-03-15 NOTE — COUNSELING
[Cessation strategies including cessation program discussed] : Cessation strategies including cessation program discussed [Use of nicotine replacement therapies and other medications discussed] : Use of nicotine replacement therapies and other medications discussed [Encouraged to pick a quit date and identify support needed to quit] : Encouraged to pick a quit date and identify support needed to quit [Smoking Cessation Program Referral] : Smoking Cessation Program Referral  [No] : Not willing to quit smoking [FreeTextEntry1] : 9

## 2022-03-16 ENCOUNTER — OUTPATIENT (OUTPATIENT)
Dept: OUTPATIENT SERVICES | Facility: HOSPITAL | Age: 62
LOS: 1 days | End: 2022-03-16
Payer: MEDICAID

## 2022-03-16 ENCOUNTER — APPOINTMENT (OUTPATIENT)
Dept: FAMILY MEDICINE | Facility: HOSPITAL | Age: 62
End: 2022-03-16

## 2022-03-16 ENCOUNTER — RESULT CHARGE (OUTPATIENT)
Age: 62
End: 2022-03-16

## 2022-03-16 VITALS
RESPIRATION RATE: 17 BRPM | BODY MASS INDEX: 37.24 KG/M2 | TEMPERATURE: 97.8 F | WEIGHT: 267 LBS | OXYGEN SATURATION: 99 % | SYSTOLIC BLOOD PRESSURE: 121 MMHG | HEART RATE: 78 BPM | DIASTOLIC BLOOD PRESSURE: 70 MMHG

## 2022-03-16 DIAGNOSIS — M25.429 EFFUSION, UNSPECIFIED ELBOW: ICD-10-CM

## 2022-03-16 DIAGNOSIS — Z00.00 ENCOUNTER FOR GENERAL ADULT MEDICAL EXAMINATION WITHOUT ABNORMAL FINDINGS: ICD-10-CM

## 2022-03-16 DIAGNOSIS — M19.019 PRIMARY OSTEOARTHRITIS, UNSPECIFIED SHOULDER: Chronic | ICD-10-CM

## 2022-03-16 DIAGNOSIS — M75.41 IMPINGEMENT SYNDROME OF RIGHT SHOULDER: ICD-10-CM

## 2022-03-16 DIAGNOSIS — R21 RASH AND OTHER NONSPECIFIC SKIN ERUPTION: ICD-10-CM

## 2022-03-16 LAB — HBA1C MFR BLD HPLC: 7.2

## 2022-03-16 PROCEDURE — G0463: CPT

## 2022-03-16 NOTE — ASSESSMENT
[FreeTextEntry1] : ~ Patient has follow up tomorrow 3/18/2022 with PCP Dr. Muse\par \par - needs referral to ophthalmology \par \par \par Above discussed with Dr. Lane

## 2022-03-16 NOTE — HISTORY OF PRESENT ILLNESS
[FreeTextEntry1] : DM eye exam  [de-identified] : Pt is a 62 yo M w PMH of Type 2 DM, HLD, depression, osteoarthritis, presenting for funduscopic exam. Pt c/o back pain. Denies any changes in vision since last being seen. Pt's last HbA1C is 7.2% today. Pt's last CMP/Urine Creatine demonstrates normal renal function. Pt's diabetes is currently managed on basagar and admelog. \par \par \par \par

## 2022-03-17 ENCOUNTER — APPOINTMENT (OUTPATIENT)
Dept: FAMILY MEDICINE | Facility: HOSPITAL | Age: 62
End: 2022-03-17

## 2022-03-18 DIAGNOSIS — E11.65 TYPE 2 DIABETES MELLITUS WITH HYPERGLYCEMIA: ICD-10-CM

## 2022-03-18 DIAGNOSIS — Z01.01 ENCOUNTER FOR EXAMINATION OF EYES AND VISION WITH ABNORMAL FINDINGS: ICD-10-CM

## 2022-04-10 ENCOUNTER — RX RENEWAL (OUTPATIENT)
Age: 62
End: 2022-04-10

## 2022-04-15 ENCOUNTER — APPOINTMENT (OUTPATIENT)
Dept: PULMONOLOGY | Facility: CLINIC | Age: 62
End: 2022-04-15

## 2022-05-06 ENCOUNTER — APPOINTMENT (OUTPATIENT)
Dept: CT IMAGING | Facility: HOSPITAL | Age: 62
End: 2022-05-06
Payer: MEDICAID

## 2022-05-06 ENCOUNTER — OUTPATIENT (OUTPATIENT)
Dept: OUTPATIENT SERVICES | Facility: HOSPITAL | Age: 62
LOS: 1 days | End: 2022-05-06
Payer: MEDICAID

## 2022-05-06 ENCOUNTER — RESULT REVIEW (OUTPATIENT)
Age: 62
End: 2022-05-06

## 2022-05-06 ENCOUNTER — APPOINTMENT (OUTPATIENT)
Dept: CARDIOLOGY | Facility: HOSPITAL | Age: 62
End: 2022-05-06

## 2022-05-06 DIAGNOSIS — M19.019 PRIMARY OSTEOARTHRITIS, UNSPECIFIED SHOULDER: Chronic | ICD-10-CM

## 2022-05-06 DIAGNOSIS — U09.9 POST COVID-19 CONDITION, UNSPECIFIED: ICD-10-CM

## 2022-05-06 PROCEDURE — 71250 CT THORAX DX C-: CPT

## 2022-05-06 PROCEDURE — 71250 CT THORAX DX C-: CPT | Mod: 26

## 2022-05-11 ENCOUNTER — APPOINTMENT (OUTPATIENT)
Dept: PULMONOLOGY | Facility: CLINIC | Age: 62
End: 2022-05-11
Payer: MEDICAID

## 2022-05-11 VITALS
WEIGHT: 267 LBS | TEMPERATURE: 97.6 F | OXYGEN SATURATION: 95 % | SYSTOLIC BLOOD PRESSURE: 120 MMHG | BODY MASS INDEX: 37.38 KG/M2 | HEIGHT: 71 IN | HEART RATE: 60 BPM | DIASTOLIC BLOOD PRESSURE: 70 MMHG

## 2022-05-11 PROCEDURE — 99214 OFFICE O/P EST MOD 30 MIN: CPT

## 2022-05-11 NOTE — PHYSICAL EXAM
[No Acute Distress] : no acute distress [Normal Oropharynx] : normal oropharynx [2+] : Right Tonsil: 2+ [Normal Appearance] : normal appearance [Neck Circumference: ___] : neck circumference: [unfilled] [No Neck Mass] : no neck mass [Normal Rate/Rhythm] : normal rate/rhythm [Normal S1, S2] : normal s1, s2 [No Murmurs] : no murmurs [No Resp Distress] : no resp distress [Clear to Auscultation Bilaterally] : clear to auscultation bilaterally [No Abnormalities] : no abnormalities [Normal Gait] : normal gait [No Clubbing] : no clubbing [No Cyanosis] : no cyanosis [No Edema] : no edema [FROM] : FROM [Normal Color/ Pigmentation] : normal color/ pigmentation [No Focal Deficits] : no focal deficits [Oriented x3] : oriented x3 [Normal Affect] : normal affect

## 2022-05-11 NOTE — ASSESSMENT
[FreeTextEntry1] : Dyspnea.  Patient not responding to bronchodilators and he continues to smoke.  Smoking cessation advised patient is aware of risks of lung cancer along with emphysema associated with smoking along with coronary artery disease.\par \par His dyspnea may also be related to cardiac disease.  Patient to see cardiologist soon and possible stress test suggested.\par \par Smoking cessation discussed and advised.  \par Hypersomnolence.  Must rule out sleep apnea.  Overnight sleep study ordered

## 2022-05-20 ENCOUNTER — APPOINTMENT (OUTPATIENT)
Dept: FAMILY MEDICINE | Facility: HOSPITAL | Age: 62
End: 2022-05-20

## 2022-05-20 ENCOUNTER — OUTPATIENT (OUTPATIENT)
Dept: OUTPATIENT SERVICES | Facility: HOSPITAL | Age: 62
LOS: 1 days | End: 2022-05-20
Payer: MEDICAID

## 2022-05-20 VITALS
WEIGHT: 263 LBS | HEIGHT: 71 IN | OXYGEN SATURATION: 97 % | HEART RATE: 84 BPM | SYSTOLIC BLOOD PRESSURE: 112 MMHG | TEMPERATURE: 98.2 F | DIASTOLIC BLOOD PRESSURE: 65 MMHG | BODY MASS INDEX: 36.82 KG/M2 | RESPIRATION RATE: 17 BRPM

## 2022-05-20 DIAGNOSIS — M79.641 PAIN IN RIGHT HAND: ICD-10-CM

## 2022-05-20 DIAGNOSIS — M54.42 LUMBAGO WITH SCIATICA, LEFT SIDE: ICD-10-CM

## 2022-05-20 DIAGNOSIS — E11.9 ENCOUNTER FOR EXAMINATION OF EYES AND VISION W/OUT ABNORMAL FINDINGS: ICD-10-CM

## 2022-05-20 DIAGNOSIS — M54.9 DORSALGIA, UNSPECIFIED: ICD-10-CM

## 2022-05-20 DIAGNOSIS — Z87.19 PERSONAL HISTORY OF OTHER DISEASES OF THE DIGESTIVE SYSTEM: ICD-10-CM

## 2022-05-20 DIAGNOSIS — M79.642 PAIN IN RIGHT HAND: ICD-10-CM

## 2022-05-20 DIAGNOSIS — E66.9 OBESITY, UNSPECIFIED: ICD-10-CM

## 2022-05-20 DIAGNOSIS — Z01.00 ENCOUNTER FOR EXAMINATION OF EYES AND VISION W/OUT ABNORMAL FINDINGS: ICD-10-CM

## 2022-05-20 DIAGNOSIS — M19.019 PRIMARY OSTEOARTHRITIS, UNSPECIFIED SHOULDER: Chronic | ICD-10-CM

## 2022-05-20 DIAGNOSIS — Z00.00 ENCOUNTER FOR GENERAL ADULT MEDICAL EXAMINATION WITHOUT ABNORMAL FINDINGS: ICD-10-CM

## 2022-05-20 PROCEDURE — 85025 COMPLETE CBC W/AUTO DIFF WBC: CPT

## 2022-05-20 PROCEDURE — 80061 LIPID PANEL: CPT

## 2022-05-20 PROCEDURE — 84443 ASSAY THYROID STIM HORMONE: CPT

## 2022-05-20 PROCEDURE — 80053 COMPREHEN METABOLIC PANEL: CPT

## 2022-05-20 PROCEDURE — G0463: CPT

## 2022-05-20 RX ORDER — POLYETHYLENE GLYCOL 3350 17 G/17G
17 POWDER, FOR SOLUTION ORAL DAILY
Qty: 1 | Refills: 0 | Status: DISCONTINUED | COMMUNITY
Start: 2022-01-10 | End: 2022-05-20

## 2022-05-23 LAB
ALBUMIN SERPL ELPH-MCNC: 4.6 G/DL
ALP BLD-CCNC: 83 U/L
ALT SERPL-CCNC: 27 U/L
ANION GAP SERPL CALC-SCNC: 13 MMOL/L
AST SERPL-CCNC: 36 U/L
BASOPHILS # BLD AUTO: 0.05 K/UL
BASOPHILS NFR BLD AUTO: 0.5 %
BILIRUB SERPL-MCNC: 0.3 MG/DL
BUN SERPL-MCNC: 14 MG/DL
CALCIUM SERPL-MCNC: 9.2 MG/DL
CHLORIDE SERPL-SCNC: 99 MMOL/L
CHOLEST SERPL-MCNC: 100 MG/DL
CO2 SERPL-SCNC: 26 MMOL/L
CREAT SERPL-MCNC: 1.09 MG/DL
EGFR: 78 ML/MIN/1.73M2
EOSINOPHIL # BLD AUTO: 0.28 K/UL
EOSINOPHIL NFR BLD AUTO: 3 %
GLUCOSE SERPL-MCNC: 120 MG/DL
HCT VFR BLD CALC: 40.2 %
HDLC SERPL-MCNC: 32 MG/DL
HGB BLD-MCNC: 13 G/DL
IMM GRANULOCYTES NFR BLD AUTO: 0.5 %
LDLC SERPL CALC-MCNC: 31 MG/DL
LYMPHOCYTES # BLD AUTO: 2.76 K/UL
LYMPHOCYTES NFR BLD AUTO: 29.2 %
MAN DIFF?: NORMAL
MCHC RBC-ENTMCNC: 32.3 GM/DL
MCHC RBC-ENTMCNC: 33.5 PG
MCV RBC AUTO: 103.6 FL
MONOCYTES # BLD AUTO: 0.74 K/UL
MONOCYTES NFR BLD AUTO: 7.8 %
NEUTROPHILS # BLD AUTO: 5.57 K/UL
NEUTROPHILS NFR BLD AUTO: 59 %
NONHDLC SERPL-MCNC: 68 MG/DL
PLATELET # BLD AUTO: 169 K/UL
POTASSIUM SERPL-SCNC: 4.9 MMOL/L
PROT SERPL-MCNC: 7.3 G/DL
RBC # BLD: 3.88 M/UL
RBC # FLD: 13.6 %
SODIUM SERPL-SCNC: 138 MMOL/L
TRIGL SERPL-MCNC: 181 MG/DL
TSH SERPL-ACNC: 3.84 UIU/ML
WBC # FLD AUTO: 9.45 K/UL

## 2022-05-25 DIAGNOSIS — E66.9 OBESITY, UNSPECIFIED: ICD-10-CM

## 2022-05-25 DIAGNOSIS — M54.9 DORSALGIA, UNSPECIFIED: ICD-10-CM

## 2022-05-25 NOTE — PLAN
Lm for pt to call back for lab results, make sure to see note below also. Urine analysis is normal. See other encounter. [FreeTextEntry1] : \par 62 y/o M PMH diabetes, obesity, NAFLD, HTN, severe depression, post COVID syndrome, ascending aortic aneurysm presents for CPE\par \par #Shingles\par -prescribed acyclovir course\par -states has received shingles vaccine \par -naproxen for pain mgmt\par -keep skin clean and dry\par \par #Lower back pain\par -MRI lumbar spine: Prominent bone marrow edema and low T1 signal within the central aspect of the right 12th rib as well as within the T12 vertebral body at its articulation with the rib. Lkely degenerative in nature. Fracture cannot be excluded. 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 Similar appearance is present on the left at T11. \par -will prescribe naproxen course\par -back exercises\par -seen by ortho 3/8/2022\par -encouraged to go to PT however pt states does not have time to come for physical therapy and cost him $20 to come here for visit\par -discussed with pt findings and recommended naproxen course and PT however but upset, states does not want to take medication prescribed because does not help him and does not want to go to physical therapy. Discussed with pt that understand his frustration, recommend pain mgmt referral for further mgmt of pain\par -pain mgmt referral ordered however pt walked out of office before taking referral\par \par #Dyspnea on exertion \par -echo 1/22: normal LV systolic fxn. EF 60-65. Dilated proximal ascending aorta 3.8 cm\par -f/u with cardio\par -pt also currently following pulmonary for post covid syndrome, bronchodilators have not improved sxs. Sleep study pending. Rec cardio f/u as dyspnea may be related to cardiac disease \par -importance of smoking cessation and weight loss discussed\par \par #Dilated ascending aorta\par -echo 1/22 shows dilated proximal ascending aorta 3.8 cm\par -f/u with cardio 6/17/22\par -importance of smoking cessation discussed \par \par #Diabetes mellitus\par -A1c 7.2 3/16/22\par -continue current regimen lantus 30u qhs and lispro 10u TID AC\par -Issues discussed include maintaining a diabetic diet, weight loss, continued home glucose monitoring, maintaining medication compliance, long term diabetes complications, foot care and podiatry visits, annual eye exam recommendations. \par -urine microalbumin/creatinine wnl 8/4/21\par -optho referral provided last visit as retinal exam attempted in clinic but unsuccessful due to pt not being able to keep head still \par \par \par #Depression\par -PHQ-9 0 today\par -pt denies suicidal or homicidal ideation\par -following with psychiatrist at [facility], [place] once a month \par -continue sertraline 50 mg TID, trazodone 150 mg qhs\par \par #HTN\par -BP controlled\par -continue losartan 50 mg\par -continue metoprolol succinate  mg daily \par -DASH diet\par \par #HLD\par -f/u lipid profile\par -continue atorvastatin 40 mg \par \par #Hx of hepatitis C\par -repeat hep c testing positive antibody, negative RNA 8/24/21\par -pt seen by hepatologist in the past, referral provided previously for f/u \par \par #Obesity\par -Discussed the benefits of weight loss with pt, and risks associated with obesity. Pt is receptive to lifestyle modification techniques to lose weight - at least 30mins-1hr aerobic exercises/day x5 days per week advised \par -Decreased food portion sizes, increase in whole grains, assorted vegetables and fruits and decreased sugar beverages discussed and encouraged \par -f/u lipid profile, CMP\par \par #HCM\par -colonoscopy UTD 12/23/21, showed nonbleeding internal hemorrhoids and diverticulosis. Repeat in 5 years recommended\par -immunizations UTD\par \par Case discussed with Dr. Figueroa

## 2022-05-25 NOTE — PHYSICAL EXAM
[No CVA Tenderness] : no CVA  tenderness [Normal] : normal gait, coordination grossly intact, no focal deficits and deep tendon reflexes were 2+ and symmetric [de-identified] : lower paraspinal tenderness [de-identified] : erythematous, vesicular rash on upper back and neck

## 2022-05-25 NOTE — HISTORY OF PRESENT ILLNESS
[FreeTextEntry1] : 60 y/o M presents for physical  [de-identified] : 62 y/o M PMH T2DM, obesity, HTN, severe depression, OA, NAFLD, post-COVID syndrome presents for CPE. Pt c/o of rash overlying back and neck started 2 days ago . Rash is painful with blisters. Was treated for shingles in February but rash has returned again. Pt has been vaccinated against shingles. Pt also c/o of chronic lower back pain for several months. Has been seen by ortho for back pain 2/8, had MRI done showed prominent BM edema and low TI signal central aspect R 12 rib and within T12 vertebral body, likely degenerative but fracture cannot be excluded. Focal CT done showing costovertebral arthrosis on the R at T12. Pain is worse with walking. Patient denies urinary/rectal incontinence, fevers/chills, motor or sensory changes, bowel/bladder changes. Pt has history of working in construction for 20 years. Pt has not been taking anything for pain because states nothing helps.  Pt has been given referral for physical therapy but has not gone. Pt also has been having MELENDEZ for few months, following with pulm for post-covid syndrome. Echo ordered last visit showed normal LV systolic fxn, EF 60-65% and dilated proximal ascending aorta 3.8 cm. Pt has not responded to inhalers and continues to smoke. Pt smokes few cigarettes a day intermittently for 10 years, has been cutting down. Denies alcohol use or recreational drug use. Denies fever, chills, chest pain, N/V, abdominal pain, headache, cough, orthopnea, leg swelling, palpitations, leg pain, dizziness, weakness.

## 2022-05-25 NOTE — HISTORY OF PRESENT ILLNESS
[FreeTextEntry1] : 60 y/o M presents for physical  [de-identified] : 62 y/o M PMH T2DM, obesity, HTN, severe depression, OA, NAFLD, post-COVID syndrome presents for CPE. Pt c/o of rash overlying back and neck started 2 days ago . Rash is painful with blisters. Was treated for shingles in February but rash has returned again. Pt has been vaccinated against shingles. Pt also c/o of chronic lower back pain for several months. Has been seen by ortho for back pain 2/8, had MRI done showed prominent BM edema and low TI signal central aspect R 12 rib and within T12 vertebral body, likely degenerative but fracture cannot be excluded. Focal CT done showing costovertebral arthrosis on the R at T12. Pain is worse with walking. Patient denies urinary/rectal incontinence, fevers/chills, motor or sensory changes, bowel/bladder changes. Pt has history of working in construction for 20 years. Pt has not been taking anything for pain because states nothing helps.  Pt has been given referral for physical therapy but has not gone. Pt also has been having MELENDEZ for few months, following with pulm for post-covid syndrome. Echo ordered last visit showed normal LV systolic fxn, EF 60-65% and dilated proximal ascending aorta 3.8 cm. Pt has not responded to inhalers and continues to smoke. Pt smokes few cigarettes a day intermittently for 10 years, has been cutting down. Denies alcohol use or recreational drug use. Denies fever, chills, chest pain, N/V, abdominal pain, headache, cough, orthopnea, leg swelling, palpitations, leg pain, dizziness, weakness.

## 2022-05-25 NOTE — PLAN
[FreeTextEntry1] : \par 62 y/o M PMH diabetes, obesity, NAFLD, HTN, severe depression, post COVID syndrome, ascending aortic aneurysm presents for CPE\par \par #Shingles\par -prescribed acyclovir course\par -states has received shingles vaccine \par -naproxen for pain mgmt\par -keep skin clean and dry\par \par #Lower back pain\par -MRI lumbar spine: Prominent bone marrow edema and low T1 signal within the central aspect of the right 12th rib as well as within the T12 vertebral body at its articulation with the rib. Lkely degenerative in nature. Fracture cannot be excluded. 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 Similar appearance is present on the left at T11. \par -will prescribe naproxen course\par -back exercises\par -seen by ortho 3/8/2022\par -encouraged to go to PT however pt states does not have time to come for physical therapy and cost him $20 to come here for visit\par -discussed with pt findings and recommended naproxen course and PT however but upset, states does not want to take medication prescribed because does not help him and does not want to go to physical therapy. Discussed with pt that understand his frustration, recommend pain mgmt referral for further mgmt of pain\par -pain mgmt referral ordered however pt walked out of office before taking referral\par \par #Dyspnea on exertion \par -echo 1/22: normal LV systolic fxn. EF 60-65. Dilated proximal ascending aorta 3.8 cm\par -f/u with cardio\par -pt also currently following pulmonary for post covid syndrome, bronchodilators have not improved sxs. Sleep study pending. Rec cardio f/u as dyspnea may be related to cardiac disease \par -importance of smoking cessation and weight loss discussed\par \par #Dilated ascending aorta\par -echo 1/22 shows dilated proximal ascending aorta 3.8 cm\par -f/u with cardio 6/17/22\par -importance of smoking cessation discussed \par \par #Diabetes mellitus\par -A1c 7.2 3/16/22\par -continue current regimen lantus 30u qhs and lispro 10u TID AC\par -Issues discussed include maintaining a diabetic diet, weight loss, continued home glucose monitoring, maintaining medication compliance, long term diabetes complications, foot care and podiatry visits, annual eye exam recommendations. \par -urine microalbumin/creatinine wnl 8/4/21\par -optho referral provided last visit as retinal exam attempted in clinic but unsuccessful due to pt not being able to keep head still \par \par \par #Depression\par -PHQ-9 0 today\par -pt denies suicidal or homicidal ideation\par -following with psychiatrist at [facility] once a month \par -continue sertraline 50 mg TID, trazodone 150 mg qhs\par \par #HTN\par -BP controlled\par -continue losartan 50 mg\par -continue metoprolol succinate  mg daily \par -DASH diet\par \par #HLD\par -f/u lipid profile\par -continue atorvastatin 40 mg \par \par #Hx of hepatitis C\par -repeat hep c testing positive antibody, negative RNA 8/24/21\par -pt seen by hepatologist in the past, referral provided previously for f/u \par \par #Obesity\par -Discussed the benefits of weight loss with pt, and risks associated with obesity. Pt is receptive to lifestyle modification techniques to lose weight - at least 30mins-1hr aerobic exercises/day x5 days per week advised \par -Decreased food portion sizes, increase in whole grains, assorted vegetables and fruits and decreased sugar beverages discussed and encouraged \par -f/u lipid profile, CMP\par \par #HCM\par -colonoscopy UTD 12/23/21, showed nonbleeding internal hemorrhoids and diverticulosis. Repeat in 5 years recommended\par -immunizations UTD\par \par Case discussed with Dr. Figueroa

## 2022-05-25 NOTE — PHYSICAL EXAM
[No CVA Tenderness] : no CVA  tenderness [Normal] : normal gait, coordination grossly intact, no focal deficits and deep tendon reflexes were 2+ and symmetric [de-identified] : lower paraspinal tenderness [de-identified] : erythematous, vesicular rash on upper back and neck

## 2022-05-25 NOTE — HEALTH RISK ASSESSMENT
[Alone] : lives alone [Current] : Current [5-9] : 5-9 [No] : No [No falls in past year] : Patient reported no falls in the past year [Single] : single [Sexually Active] : not sexually active [de-identified] :

## 2022-05-25 NOTE — HEALTH RISK ASSESSMENT
[Alone] : lives alone [Current] : Current [5-9] : 5-9 [No] : No [No falls in past year] : Patient reported no falls in the past year [Single] : single [Sexually Active] : not sexually active [de-identified] :

## 2022-05-25 NOTE — REVIEW OF SYSTEMS
[Dyspnea on Exertion] : dyspnea on exertion [Back Pain] : back pain [Skin Rash] : skin rash [Fever] : no fever [Chills] : no chills [Fatigue] : no fatigue [Hot Flashes] : no hot flashes [Discharge] : no discharge [Pain] : no pain [Redness] : no redness [Dryness] : no dryness [Vision Problems] : no vision problems [Itching] : no itching [Earache] : no earache [Hearing Loss] : no hearing loss [Nosebleeds] : no nosebleeds [Postnasal Drip] : no postnasal drip [Nasal Discharge] : no nasal discharge [Sore Throat] : no sore throat [Hoarseness] : no hoarseness [Chest Pain] : no chest pain [Palpitations] : no palpitations [Claudication] : no  leg claudication [Lower Ext Edema] : no lower extremity edema [Orthopena] : no orthopnea [Shortness Of Breath] : no shortness of breath [Wheezing] : no wheezing [Cough] : no cough [Abdominal Pain] : no abdominal pain [Nausea] : no nausea [Constipation] : no constipation [Diarrhea] : no diarrhea [Vomiting] : no vomiting [Heartburn] : no heartburn [Melena] : no melena [Dysuria] : no dysuria [Incontinence] : no incontinence [Hesitancy] : no hesitancy [Nocturia] : no nocturia [Hematuria] : no hematuria [Frequency] : no frequency [Impotence] : no impotency [Muscle Pain] : no muscle pain [Muscle Weakness] : no muscle weakness [Joint Swelling] : no joint swelling [Itching] : no itching [Mole Changes] : no mole changes [Nail Changes] : no nail changes [Headache] : no headache [Dizziness] : no dizziness [Fainting] : no fainting [Unsteady Walk] : no ataxia [Memory Loss] : no memory loss

## 2022-06-07 ENCOUNTER — RX RENEWAL (OUTPATIENT)
Age: 62
End: 2022-06-07

## 2022-06-16 ENCOUNTER — RESULT CHARGE (OUTPATIENT)
Age: 62
End: 2022-06-16

## 2022-06-17 ENCOUNTER — OUTPATIENT (OUTPATIENT)
Dept: OUTPATIENT SERVICES | Facility: HOSPITAL | Age: 62
LOS: 1 days | End: 2022-06-17
Payer: MEDICAID

## 2022-06-17 ENCOUNTER — APPOINTMENT (OUTPATIENT)
Dept: CARDIOLOGY | Facility: HOSPITAL | Age: 62
End: 2022-06-17
Payer: MEDICAID

## 2022-06-17 VITALS
WEIGHT: 262 LBS | OXYGEN SATURATION: 91 % | HEART RATE: 71 BPM | BODY MASS INDEX: 36.54 KG/M2 | SYSTOLIC BLOOD PRESSURE: 98 MMHG | DIASTOLIC BLOOD PRESSURE: 59 MMHG | RESPIRATION RATE: 16 BRPM | TEMPERATURE: 98 F

## 2022-06-17 VITALS — OXYGEN SATURATION: 92 %

## 2022-06-17 DIAGNOSIS — R06.00 DYSPNEA, UNSPECIFIED: ICD-10-CM

## 2022-06-17 DIAGNOSIS — M19.019 PRIMARY OSTEOARTHRITIS, UNSPECIFIED SHOULDER: Chronic | ICD-10-CM

## 2022-06-17 DIAGNOSIS — Z00.00 ENCOUNTER FOR GENERAL ADULT MEDICAL EXAMINATION WITHOUT ABNORMAL FINDINGS: ICD-10-CM

## 2022-06-17 PROCEDURE — G0463: CPT

## 2022-06-17 PROCEDURE — 93010 ELECTROCARDIOGRAM REPORT: CPT

## 2022-06-17 PROCEDURE — 93005 ELECTROCARDIOGRAM TRACING: CPT

## 2022-06-19 DIAGNOSIS — R00.0 TACHYCARDIA, UNSPECIFIED: ICD-10-CM

## 2022-06-19 DIAGNOSIS — R06.00 DYSPNEA, UNSPECIFIED: ICD-10-CM

## 2022-07-12 ENCOUNTER — APPOINTMENT (OUTPATIENT)
Dept: PULMONOLOGY | Facility: CLINIC | Age: 62
End: 2022-07-12

## 2022-07-29 ENCOUNTER — RESULT CHARGE (OUTPATIENT)
Age: 62
End: 2022-07-29

## 2022-07-29 ENCOUNTER — OUTPATIENT (OUTPATIENT)
Dept: OUTPATIENT SERVICES | Facility: HOSPITAL | Age: 62
LOS: 1 days | End: 2022-07-29
Payer: MEDICAID

## 2022-07-29 ENCOUNTER — APPOINTMENT (OUTPATIENT)
Dept: FAMILY MEDICINE | Facility: HOSPITAL | Age: 62
End: 2022-07-29

## 2022-07-29 VITALS
SYSTOLIC BLOOD PRESSURE: 121 MMHG | HEART RATE: 69 BPM | DIASTOLIC BLOOD PRESSURE: 82 MMHG | WEIGHT: 251 LBS | TEMPERATURE: 98 F | BODY MASS INDEX: 35.01 KG/M2 | OXYGEN SATURATION: 98 % | RESPIRATION RATE: 16 BRPM

## 2022-07-29 DIAGNOSIS — Z00.00 ENCOUNTER FOR GENERAL ADULT MEDICAL EXAMINATION WITHOUT ABNORMAL FINDINGS: ICD-10-CM

## 2022-07-29 DIAGNOSIS — M19.019 PRIMARY OSTEOARTHRITIS, UNSPECIFIED SHOULDER: Chronic | ICD-10-CM

## 2022-07-29 LAB — HBA1C MFR BLD HPLC: 7.4

## 2022-07-29 PROCEDURE — 82043 UR ALBUMIN QUANTITATIVE: CPT

## 2022-07-29 PROCEDURE — G0463: CPT

## 2022-07-29 RX ORDER — BETAMETHASONE DIPROPIONATE 0.5 MG/G
0.05 OINTMENT, AUGMENTED TOPICAL
Qty: 45 | Refills: 0 | Status: DISCONTINUED | COMMUNITY
Start: 2022-05-21

## 2022-07-29 RX ORDER — NAPROXEN 500 MG/1
500 TABLET ORAL
Qty: 20 | Refills: 0 | Status: DISCONTINUED | COMMUNITY
End: 2022-07-29

## 2022-07-29 RX ORDER — ACYCLOVIR 800 MG/1
800 TABLET ORAL
Qty: 50 | Refills: 0 | Status: DISCONTINUED | COMMUNITY
End: 2022-07-29

## 2022-07-30 DIAGNOSIS — E11.9 TYPE 2 DIABETES MELLITUS WITHOUT COMPLICATIONS: ICD-10-CM

## 2022-07-31 LAB
CREAT SPEC-SCNC: 50 MG/DL
MICROALBUMIN 24H UR DL<=1MG/L-MCNC: <1.2 MG/DL
MICROALBUMIN/CREAT 24H UR-RTO: NORMAL MG/G

## 2022-07-31 NOTE — PLAN
[FreeTextEntry1] : 60 y/o M PMH diabetes, obesity, NAFLD, HTN, severe depression, post COVID syndrome, ascending aortic aneurysm presents for DM f/u\par \par #Diabetes mellitus\par -A1c 7.4 in clinic today\par -continue current regimen lantus 30u qhs and lispro 10u TID AC\par -continue metformin\par -Issues discussed include maintaining a diabetic diet, weight loss, continued home glucose monitoring, maintaining medication compliance, long term diabetes complications, foot care and podiatry visits, annual eye exam recommendations. \par -urine microalbumin/creatinine wnl 8/4/21\par -optho referral provided last visit as retinal exam attempted in clinic but unsuccessful due to pt not being able to keep head still \par \par #Dilated ascending aorta\par -echo 1/22 shows dilated proximal ascending aorta 3.8 cm\par -seen by cardio 6/17, rec repeat US of aorta in 1 year\par -importance of smoking cessation discussed \par \par #Depression\par -following with psychiatrist at [facility] once a month \par -continue sertraline 50 mg TID, trazodone 150 mg qhs\par \par #HTN\par -BP controlled\par -continue losartan 50 mg\par -continue metoprolol succinate  mg daily \par -DASH diet\par \par #HLD\par -continue atorvastatin 40 mg \par \par #Hx of hepatitis C\par -repeat hep c testing positive antibody, negative RNA 8/24/21\par -pt seen by hepatologist in the past, referral provided previously for f/u \par \par #Obesity\par -Discussed the benefits of weight loss with pt, and risks associated with obesity. Pt is receptive to lifestyle modification techniques to lose weight - at least 30mins-1hr aerobic exercises/day x5 days per week advised \par -Decreased food portion sizes, increase in whole grains, assorted vegetables and fruits and decreased sugar beverages discussed and encouraged \par \par #HCM\par -colonoscopy UTD 12/23/21, showed nonbleeding internal hemorrhoids and diverticulosis. Repeat in 5 years recommended\par -immunizations UTD\par \par RTC in 3 months for f/u \par \par Case discussed with Dr. Quintero

## 2022-07-31 NOTE — PHYSICAL EXAM
[Normal] : soft, non-tender, non-distended, no masses palpated, no HSM and normal bowel sounds [None] : no ulcers in either foot were found [] : both feet [TWNoteComboBox3] : +2 [TWNoteComboBox4] : +2

## 2022-07-31 NOTE — HISTORY OF PRESENT ILLNESS
[No episodes] : No hypoglycemic episodes since the last visit. [Understanding of foot care] : Patient expressed understanding of foot care [Most Recent A1C: ___] : Most recent A1C was [unfilled] [Target A1C:  ___] : Target A1C is [unfilled] [High Intensity] : Patient is currently on high intensity statin therapy [FreeTextEntry6] : 62 y/o M PMH T2DM, obesity, HTN, severe depression, OA, NAFLD, Patient's A1c in clinic is 7.4. Pt on lantus 30u qhs and lispro 10u TID AC Currently patient denies the following; polyuria or polydipsia, hypoglycemic episodes, weight gain/loss, peripheral extremity tingling/pain, vision changes. Patient reports good compliance with the medications. Patient does not always check blood sugar but if checks will check in morning around 114-120. Denies any hypoglycemic symptoms. Pt needs optho referral to [address] [place] [address] because retinal exam previously attempted in clinic but unsuccesful as pt unable to keep head still. Denies fever, chills, chest pain, SOB, N/V, abdominal pain, headache, cough, palpitations, leg pain, dizziness, weakness

## 2022-08-08 ENCOUNTER — APPOINTMENT (OUTPATIENT)
Dept: FAMILY MEDICINE | Facility: HOSPITAL | Age: 62
End: 2022-08-08

## 2022-08-15 ENCOUNTER — APPOINTMENT (OUTPATIENT)
Dept: FAMILY MEDICINE | Facility: HOSPITAL | Age: 62
End: 2022-08-15

## 2022-08-15 VITALS
BODY MASS INDEX: 35.14 KG/M2 | SYSTOLIC BLOOD PRESSURE: 108 MMHG | DIASTOLIC BLOOD PRESSURE: 68 MMHG | WEIGHT: 251 LBS | OXYGEN SATURATION: 97 % | HEART RATE: 70 BPM | HEIGHT: 71 IN | RESPIRATION RATE: 16 BRPM | TEMPERATURE: 97 F

## 2022-09-01 NOTE — REASON FOR VISIT
[Initial Visit] : an initial visit for [Shoulder Pain] : shoulder pain Epidermal Sutures: 5-0 Fast Absorbing Gut

## 2022-10-31 ENCOUNTER — RX RENEWAL (OUTPATIENT)
Age: 62
End: 2022-10-31

## 2022-11-10 ENCOUNTER — RX RENEWAL (OUTPATIENT)
Age: 62
End: 2022-11-10

## 2022-11-15 DIAGNOSIS — U09.9 POST COVID-19 CONDITION, UNSPECIFIED: ICD-10-CM

## 2022-11-16 ENCOUNTER — OUTPATIENT (OUTPATIENT)
Dept: OUTPATIENT SERVICES | Facility: HOSPITAL | Age: 62
LOS: 1 days | End: 2022-11-16
Payer: MEDICAID

## 2022-11-16 ENCOUNTER — APPOINTMENT (OUTPATIENT)
Dept: FAMILY MEDICINE | Facility: HOSPITAL | Age: 62
End: 2022-11-16

## 2022-11-16 ENCOUNTER — RESULT CHARGE (OUTPATIENT)
Age: 62
End: 2022-11-16

## 2022-11-16 DIAGNOSIS — E11.9 TYPE 2 DIABETES MELLITUS WITHOUT COMPLICATIONS: ICD-10-CM

## 2022-11-16 DIAGNOSIS — Z00.00 ENCOUNTER FOR GENERAL ADULT MEDICAL EXAMINATION WITHOUT ABNORMAL FINDINGS: ICD-10-CM

## 2022-11-16 DIAGNOSIS — M65.30 TRIGGER FINGER, UNSPECIFIED FINGER: ICD-10-CM

## 2022-11-16 DIAGNOSIS — M19.019 PRIMARY OSTEOARTHRITIS, UNSPECIFIED SHOULDER: Chronic | ICD-10-CM

## 2022-11-16 PROCEDURE — G0463: CPT

## 2022-11-16 RX ORDER — VIT A/VIT C/VIT E/ZINC/COPPER 2148-113
TABLET ORAL DAILY
Qty: 90 | Refills: 3 | Status: DISCONTINUED | COMMUNITY
Start: 2021-04-21 | End: 2022-11-16

## 2022-11-20 PROBLEM — M65.30 TRIGGER FINGER: Status: ACTIVE | Noted: 2021-09-11

## 2022-11-20 LAB — HBA1C MFR BLD HPLC: 7.1

## 2022-11-21 NOTE — ASSESSMENT
[FreeTextEntry1] : 61 y/o M PMH diabetes, obesity, NAFLD, HTN, severe depression, post COVID syndrome, ascending aortic aneurysm presents for DM f/u\par \par #Diabetes mellitus\par -A1c 7.1 in clinic today\par -continue current regimen lantus 30u qhs and lispro 10u TID AC\par -continue metformin\par -Issues discussed include maintaining a diabetic diet, weight loss, continued home glucose monitoring, maintaining medication compliance, long term diabetes complications, foot care and podiatry visits, annual eye exam recommendations. \par -urine microalbumin/creatinine wnl 7/29/22\par -seen by optho 8/22, rec f/u in one year \par \par #Trigger Finger\par -trigger finger to multiple fingers\par -s/p steroid injection to R 5ht digit 2/22 with minimal relief\par -did not get further injections due to having trigger finger in multiple digits\par -will refer to ortho surgery for eval for surgical intervention of trigger finger\par -was ordered x-ray b/l hand last ortho visit not done, will reorder x-ray\par -will prescribe course of meloxicam\par \par #Dilated ascending aorta\par -echo 1/22 shows dilated proximal ascending aorta 3.8 cm\par -seen by cardio 6/17, rec repeat US of aorta in 1 year\par -importance of smoking cessation discussed \par \par #Depression\par -following with psychiatrist at [facility] once a month \par -continue sertraline 50 mg TID, trazodone 150 mg qhs\par \par #HTN\par -BP controlled\par -continue losartan 50 mg\par -continue metoprolol succinate  mg daily \par -DASH diet\par \par #HLD\par -continue atorvastatin 40 mg \par \par #Hx of hepatitis C\par -repeat hep c testing positive antibody, negative RNA 8/24/21\par -pt seen by hepatologist in the past, referral provided previously for f/u \par \par #Obesity\par -Discussed the benefits of weight loss with pt, and risks associated with obesity. Pt is receptive to lifestyle modification techniques to lose weight - at least 30mins-1hr aerobic exercises/day x5 days per week advised \par -Decreased food portion sizes, increase in whole grains, assorted vegetables and fruits and decreased sugar beverages discussed and encouraged \par \par #HCM\par -colonoscopy UTD 12/23/21, showed nonbleeding internal hemorrhoids and diverticulosis. Repeat in 5 years recommended\par -immunizations UTD\par \par RTC in 3 months for f/u DM\par \par Case discussed with Dr. Josue

## 2022-11-21 NOTE — PHYSICAL EXAM
[Normal] : soft, non-tender, non-distended, no masses palpated, no HSM and normal bowel sounds [None] : no ulcers in either foot were found [] : both feet [de-identified] : normal  strength, lock of R 4th and 5th digits on extension, no sensory loss b/l hands. \par noted locking of 3rd, 4th and 5th digits b/l hands. Normal ROM and  strength [TWNoteComboBox3] : +2 [TWNoteComboBox4] : +2

## 2022-11-21 NOTE — REVIEW OF SYSTEMS
[Fever] : no fever [Chills] : no chills [Fatigue] : no fatigue [Hot Flashes] : no hot flashes [Night Sweats] : no night sweats [Recent Change In Weight] : ~T no recent weight change [Chest Pain] : no chest pain [Palpitations] : no palpitations [Lower Ext Edema] : no lower extremity edema [Orthopena] : no orthopnea [Paroxysmal Nocturnal Dyspnea] : no paroxysmal nocturnal dyspnea [Shortness Of Breath] : no shortness of breath [Wheezing] : no wheezing [Cough] : no cough [Dyspnea on Exertion] : not dyspnea on exertion [Abdominal Pain] : no abdominal pain [Nausea] : no nausea [Constipation] : no constipation [Diarrhea] : no diarrhea [Vomiting] : no vomiting [Heartburn] : no heartburn [Melena] : no melena [Dysuria] : no dysuria [Incontinence] : no incontinence [Hesitancy] : no hesitancy [Nocturia] : no nocturia [Hematuria] : no hematuria [Frequency] : no frequency [Impotence] : no impotency [Poor Libido] : libido not poor [Headache] : no headache [Dizziness] : no dizziness [FreeTextEntry9] : b/l hand pain and stiffness

## 2022-12-19 ENCOUNTER — RX RENEWAL (OUTPATIENT)
Age: 62
End: 2022-12-19

## 2023-01-16 ENCOUNTER — RX RENEWAL (OUTPATIENT)
Age: 63
End: 2023-01-16

## 2023-02-02 NOTE — ED ADULT NURSE NOTE - PAIN RATING/NUMBER SCALE (0-10): ACTIVITY
Done.  
Please move up her 2/21/23 apt and add pt to 2/8/23 1115am, daughter/pt are confirmed.     Thank you.  
3

## 2023-02-24 ENCOUNTER — OUTPATIENT (OUTPATIENT)
Dept: OUTPATIENT SERVICES | Facility: HOSPITAL | Age: 63
LOS: 1 days | End: 2023-02-24
Payer: MEDICAID

## 2023-02-24 ENCOUNTER — APPOINTMENT (OUTPATIENT)
Dept: FAMILY MEDICINE | Facility: HOSPITAL | Age: 63
End: 2023-02-24

## 2023-02-24 ENCOUNTER — RESULT CHARGE (OUTPATIENT)
Age: 63
End: 2023-02-24

## 2023-02-24 VITALS
DIASTOLIC BLOOD PRESSURE: 65 MMHG | WEIGHT: 258 LBS | OXYGEN SATURATION: 94 % | TEMPERATURE: 97.9 F | SYSTOLIC BLOOD PRESSURE: 108 MMHG | RESPIRATION RATE: 18 BRPM | HEART RATE: 67 BPM | BODY MASS INDEX: 35.98 KG/M2

## 2023-02-24 DIAGNOSIS — M19.019 PRIMARY OSTEOARTHRITIS, UNSPECIFIED SHOULDER: Chronic | ICD-10-CM

## 2023-02-24 DIAGNOSIS — Z00.00 ENCOUNTER FOR GENERAL ADULT MEDICAL EXAMINATION WITHOUT ABNORMAL FINDINGS: ICD-10-CM

## 2023-02-24 DIAGNOSIS — G47.10 HYPERSOMNIA, UNSPECIFIED: ICD-10-CM

## 2023-02-24 DIAGNOSIS — Z86.19 PERSONAL HISTORY OF OTHER INFECTIOUS AND PARASITIC DISEASES: ICD-10-CM

## 2023-02-24 PROCEDURE — 83036 HEMOGLOBIN GLYCOSYLATED A1C: CPT

## 2023-02-24 PROCEDURE — 80053 COMPREHEN METABOLIC PANEL: CPT

## 2023-02-24 PROCEDURE — G0463: CPT

## 2023-02-24 PROCEDURE — 84443 ASSAY THYROID STIM HORMONE: CPT

## 2023-02-24 PROCEDURE — 80061 LIPID PANEL: CPT

## 2023-02-26 PROBLEM — G47.10 HYPERSOMNOLENCE: Status: RESOLVED | Noted: 2022-05-11 | Resolved: 2023-02-26

## 2023-02-26 PROBLEM — Z86.19 HISTORY OF HERPES ZOSTER: Status: RESOLVED | Noted: 2022-02-15 | Resolved: 2023-02-26

## 2023-02-26 LAB
ALBUMIN SERPL ELPH-MCNC: 4.3 G/DL
ALP BLD-CCNC: 71 U/L
ALT SERPL-CCNC: 20 U/L
ANION GAP SERPL CALC-SCNC: 13 MMOL/L
AST SERPL-CCNC: 22 U/L
BILIRUB SERPL-MCNC: 0.2 MG/DL
BUN SERPL-MCNC: 5 MG/DL
CALCIUM SERPL-MCNC: 9.5 MG/DL
CHLORIDE SERPL-SCNC: 100 MMOL/L
CHOLEST SERPL-MCNC: 137 MG/DL
CO2 SERPL-SCNC: 25 MMOL/L
CREAT SERPL-MCNC: 0.88 MG/DL
EGFR: 98 ML/MIN/1.73M2
GLUCOSE SERPL-MCNC: 117 MG/DL
HBA1C MFR BLD HPLC: 6.5
HDLC SERPL-MCNC: 27 MG/DL
LDLC SERPL CALC-MCNC: NORMAL MG/DL
NONHDLC SERPL-MCNC: 111 MG/DL
POTASSIUM SERPL-SCNC: 4.4 MMOL/L
PROT SERPL-MCNC: 7.3 G/DL
SODIUM SERPL-SCNC: 138 MMOL/L
TRIGL SERPL-MCNC: 458 MG/DL
TSH SERPL-ACNC: 2.37 UIU/ML

## 2023-02-26 RX ORDER — MELOXICAM 7.5 MG/1
7.5 TABLET ORAL TWICE DAILY
Qty: 30 | Refills: 0 | Status: DISCONTINUED | COMMUNITY
Start: 2021-09-11 | End: 2023-02-26

## 2023-02-26 RX ORDER — FLUTICASONE PROPIONATE AND SALMETEROL 250; 50 UG/1; UG/1
250-50 POWDER RESPIRATORY (INHALATION)
Qty: 60 | Refills: 3 | Status: DISCONTINUED | COMMUNITY
Start: 2021-09-17 | End: 2023-02-26

## 2023-02-26 RX ORDER — MELATONIN 3 MG
3 CAPSULE ORAL
Qty: 90 | Refills: 0 | Status: DISCONTINUED | COMMUNITY
Start: 2020-10-28 | End: 2023-02-26

## 2023-02-26 RX ORDER — CAPSAICIN 0.1 G/100G
0.1 CREAM TOPICAL 4 TIMES DAILY
Qty: 1 | Refills: 3 | Status: DISCONTINUED | COMMUNITY
Start: 2022-02-18 | End: 2023-02-26

## 2023-02-26 RX ORDER — PEN NEEDLE, DIABETIC 32GX 5/32"
32G X 4 MM NEEDLE, DISPOSABLE MISCELLANEOUS
Qty: 1 | Refills: 0 | Status: DISCONTINUED | COMMUNITY
Start: 2022-02-10 | End: 2023-02-26

## 2023-02-26 RX ORDER — FLUTICASONE FUROATE AND VILANTEROL TRIFENATATE 100; 25 UG/1; UG/1
100-25 POWDER RESPIRATORY (INHALATION)
Qty: 3 | Refills: 3 | Status: DISCONTINUED | COMMUNITY
Start: 2021-07-16 | End: 2023-02-26

## 2023-02-27 NOTE — PHYSICAL EXAM
[Normal] : soft, non-tender, non-distended, no masses palpated, no HSM and normal bowel sounds [] : with full ROM [TWNoteComboBox3] : +2 [TWNoteComboBox4] : +2

## 2023-02-27 NOTE — HISTORY OF PRESENT ILLNESS
[Does not check] : Patient does not check blood glucose regularly [Understanding of foot care] : Patient expressed understanding of foot care [Most Recent A1C: ___] : Most recent A1C was [unfilled] [Target goal met] : A1C target goal met [High Intensity] : Patient is currently on high intensity statin therapy [FreeTextEntry6] : 63 y/o M PMH T2DM, obesity, HTN, severe depression, OA, NAFLD presents for DM mgmt. Patient's A1c in clinic today 6.5, last in November was 7.1. Patient takes ademelog 10 units TID (lunch and dinner) basaglar 30 units at bedtime and metformin. Pt does not consistently check sugar at home because of work. Pt is interested in getting sensor. Does endorse episodes of feeling dizzy occasionally. Currently patient denies the following; polyuria or polydipsia, peripheral extremity tingling/pain, vision changes. Patient reports good compliance with the medications except states most of the time only takes premeal twice a day as does not wake up early in morning. Pt does endorse weight gain, states interested in trying ozempic.  [EyeExamDate] : 6

## 2023-02-27 NOTE — ASSESSMENT
[FreeTextEntry1] : 61 y/o M PMH diabetes, obesity, NAFLD, HTN, severe depression, post COVID syndrome, ascending aortic aneurysm presents for DM f/u\par \par #Diabetes mellitus\par -A1c 6.5 in clinic today\par -will titrate down on insulin regimen to lantus 25u qhs and lispro 7u TID AC\par -continue metformin\par -Issues discussed include maintaining a diabetic diet, weight loss, continued home glucose monitoring, maintaining medication compliance, long term diabetes complications, foot care and podiatry visits, annual eye exam recommendations. \par -urine microalbumin/creatinine wnl 7/29/22\par -seen by optho 8/22, rec f/u in one year \par -will refer to DM educator for blood glucose sensor\par \par #Dilated ascending aorta\par -echo 1/22 shows dilated proximal ascending aorta 3.8 cm\par -seen by cardio 6/17, rec repeat US of aorta in 1 year\par -importance of smoking cessation discussed \par -pt has appt with cardio Dr. Walton 3/6/23, referral provided\par \par #Depression\par -following with psychiatrist at [facility] once a month \par -continue sertraline 50 mg TID, trazodone 150 mg qhs\par \par #HTN\par -BP controlled\par -continue losartan 50 mg\par -continue metoprolol succinate  mg daily \par -DASH diet\par \par #HLD\par -continue atorvastatin \par \par #Hx of hepatitis C\par -repeat hep c testing positive antibody, negative RNA 8/24/21\par -pt seen by hepatologist in the past, referral provided previously for f/u \par \par #Obesity\par -Discussed the benefits of weight loss with pt, and risks associated with obesity. Pt is receptive to lifestyle modification techniques to lose weight - at least 30mins-1hr aerobic exercises/day x5 days per week advised \par -Decreased food portion sizes, increase in whole grains, assorted vegetables and fruits and decreased sugar beverages discussed and encouraged \par -f/u CMP, lipid profile, TSH\par \par #HCM\par -colonoscopy UTD 12/23/21, showed nonbleeding internal hemorrhoids and diverticulosis. Repeat in 5 years recommended\par -immunizations UTD\par \par RTC in 3 months for f/u DM\par \par Case discussed with Dr. You

## 2023-02-28 DIAGNOSIS — E11.9 TYPE 2 DIABETES MELLITUS WITHOUT COMPLICATIONS: ICD-10-CM

## 2023-03-03 ENCOUNTER — APPOINTMENT (OUTPATIENT)
Dept: FAMILY MEDICINE | Facility: HOSPITAL | Age: 63
End: 2023-03-03

## 2023-03-03 ENCOUNTER — OUTPATIENT (OUTPATIENT)
Dept: OUTPATIENT SERVICES | Facility: HOSPITAL | Age: 63
LOS: 1 days | End: 2023-03-03
Payer: MEDICAID

## 2023-03-03 DIAGNOSIS — F17.200 NICOTINE DEPENDENCE, UNSPECIFIED, UNCOMPLICATED: ICD-10-CM

## 2023-03-03 DIAGNOSIS — E11.9 TYPE 2 DIABETES MELLITUS WITHOUT COMPLICATIONS: ICD-10-CM

## 2023-03-03 DIAGNOSIS — K76.0 FATTY (CHANGE OF) LIVER, NOT ELSEWHERE CLASSIFIED: ICD-10-CM

## 2023-03-03 DIAGNOSIS — M19.019 PRIMARY OSTEOARTHRITIS, UNSPECIFIED SHOULDER: Chronic | ICD-10-CM

## 2023-03-03 RX ORDER — INSULIN LISPRO 100 U/ML
100 INJECTION, SOLUTION SUBCUTANEOUS 3 TIMES DAILY
Qty: 1 | Refills: 0 | Status: COMPLETED | COMMUNITY
Start: 2021-04-21 | End: 2023-03-03

## 2023-03-03 RX ORDER — PEN NEEDLE, DIABETIC 32 GX 1/4"
32G X 6 MM NEEDLE, DISPOSABLE MISCELLANEOUS
Qty: 1 | Refills: 2 | Status: COMPLETED | COMMUNITY
Start: 2021-12-11 | End: 2023-03-03

## 2023-03-03 NOTE — ASSESSMENT
[Diabetes Foot Care] : diabetes foot care [Long Term Vascular Complications] : long term vascular complications of diabetes [Carbohydrate Consistent Diet] : carbohydrate consistent diet [Importance of Diet and Exercise] : importance of diet and exercise to improve glycemic control, achieve weight loss and improve cardiovascular health [Exercise/Effect on Glucose] : exercise/effect on glucose [Hypoglycemia Management] : hypoglycemia management [Action and use of Insulin] : action and use of short and long-acting insulin [Self Monitoring of Blood Glucose] : self monitoring of blood glucose [Insulin Self-Administration] : insulin self-administration [Injection Technique, Storage, Sharps Disposal] : injection technique, storage, and sharps disposal [Retinopathy Screening] : Patient was referred to ophthalmology for retinopathy screening [Weight Loss] : weight loss [Smoking Cessation] : smoking cessation [FreeTextEntry1] : PT WAS EDUCATED ON SIGNIFICANCE OF A1C OF 6.5% AND DISCUSSED GENERAL A1C GOAL OF 7% AND BG GOALS  BEFORE MEALS AND < 180 2 HOURS AFTER MEALS TO HELP REDUCE INCIDENCE OF COMPLICATIONS  \par EXPLAINED THE PATHOPHYSIOLOGY OF TYPE 2 DIABETES AND TREATMENT.  EXPLAINED THAT PEOPLE OFTEN NEED FOR MULTIPLE AGENTS IN ADDITION TO LIFESTYLE CHANGES TO CONTROL BG AND HELP PREVENT COMPLICATIONS. \par \par \par GOALS DISCUSSED TO REDUCE INSULIN AND ADD ADDITIONAL AGENTS.  WILL OBTAIN C-PEPTIDE TO ASSESS ABILITY TO PRODUCE ADEQUATE INSULIN IF EXOGENOUS INSULIN REDUCED \par \par TREATMENT OPTIONS DISCUSSED TO MAINTAIN  A1C GOAL - GLP1 vs SGLT2.  RISKS/BENEFITS AND SIDE EFFECT PROFILE EXPLAINED AND DISCUSSED.  PT OPTED FOR GLP1\par INSTRUCTED ON USE OF TRULICITY.    WILL START 0.75 MG WEEKLY x 4 WEEKS.  IF TOLERATING, WILL INCREASE TO 1.5 MG\par DENIES ANY PERSONAL OR FAMILY HX OF THYROID CANCER OR PANCREATITIS\par INSTRUCTED TO STORE PENS IN [place].  RECOMMEND THE DAY BEFORE OR DAY OF TO HAVE AT ROOM TEMP\par EXPLAINED THERE MAY BE SOME NAUSEA ASSOCIATED W/ THIS MEDICATION BUT USUALLY LESSENS IN TIME\par EDUCATED ON POTENTIAL SIDE EFFECTS.   INSTRUCTED TO REPORT ANY LUMP OR SWELLING IN THE NECK, HOARSENESS, TROUBLE SWALLOWING OR SHORTNESS OF BREATH, INTOLERABLE GI SIDE EFFECTS OR ABDOMINAL PAIN.  EDUCATED ON IMPORTANCE OF ADEQUATE HYDRATION AND NOT TAKING TRULICITY IF UNABLE TO EAT OR DRINK INSTRUCTED ON IMPORTANCE OF STOPPING MEDICATION  AND CALLING MD PROMPTLY \par PT WAS ABLE TO SUCCESSFULLY USE DEMO PEN INTO DEMO PILLOW\par \par INSTRUCTED TO USE ADMELOG PRE-MEAL/CORRECTION SCALE STARTING AT 4 UNITS + 2 UNITS FOR EVERY 50 MG/DL OVER 150.  DO NOT TAKE IF NOT EATING WITHIN 15 MINUTES\par DECREASE LANTUS FROM 25 TO 15 UNITS QHS\par CONTINUE METFORMIN\par \par PLAN TO EVENTUALLY ADD SGLT2\par SAMPLE KIRSTEN 2 CGM PLACED ON LEFT  UPPER ARM USING [employer] PHONE AS READER\par LOT # [id number] SERIAL # [id number] EXP: 7/31/23\par INSTRUCTED TO AVOID MORE THAN 500 MG OF VITAMIN C (PT DOES NOT TAKE)\par EDUCATED ON NEED TO SCAN AT LEAST QH 8 TO AVOID GAPS IN DATA\par INSTRUCTED TO REMOVE PRIOR TO ANY X-RAY, MRI OR CT SCAN\par EXPLAINED THAT CGM IS TESTING INTERSTITIAL FLUID, RATHER THAN THE CAPILLARY BLOOD MEASURED WHEN PT PERFORMS A FINGERSTICK\par INSTRUCTED TO VERIFY ANY RESULTS THAT SEEM INACCURATE OR IF HAVING FEELINGS OF LOW BG BY PERFORMING FINGERSTICK\par EDUCATED ON SIGNS AND SYMPTOMS OF SITE INFECTION AND IMPORTANCE OF REMOVING SENSOR AND CALLING OFFICE ASAP \par INSTRUCTED TO REMOVE SENSOR AFTER 14 DAYS.  SENSOR MAY BE DISCARDED.  \par .INSTRUCTED TO CALL OFFICE FOR BG < 70, > 250 OR FOR ANY QUESTIONS OR CONCERNS\par  \par WRITTEN INSTRUCTIONS GIVEN AND REVIEWED \par WAS ABLE TO "TEACH BACK" ALL INSTRUCTIONS\par SCHEDULED TO RETURN TO OFFICE ON 3/17/23\par

## 2023-03-03 NOTE — HISTORY OF PRESENT ILLNESS
[FreeTextEntry1] : HERE TODAY FOR DIABETES FOLLOW UP\par FEELS WELL\par DENIES POLYURIA, POLYDIPSIA OR UNINTENTIONAL WEIGHT LOSS \par NEWLY DX IN 2021 DURING A 3 WEEK HOSPITALIZATION FOR  COVID W/ A1C 10.8%.  WAS STARTED ON BASAL BOLUS INSULIN AND \par REPORTS COMPLIANCE W/ BASAGLAR  25 UNITS QHS (RECENTLY REDUCED FROM 30 UNITS AND ADMELOG 7 UNITS (DOWN FROM 10 UNITS) PRE-MEAL.  RARELY MISSES DOSES\par NOT STARTED ON METFORMIN UNTIL JULY 2022 (NO HX HARRISON)\par REPORTS SYMPTOMS OF HYPOGLYCEMIA - HEADACHE, DIZZINESS . 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  HAS CATARACTS\par \par

## 2023-03-06 ENCOUNTER — APPOINTMENT (OUTPATIENT)
Dept: CARDIOLOGY | Facility: CLINIC | Age: 63
End: 2023-03-06
Payer: MEDICAID

## 2023-03-06 ENCOUNTER — NON-APPOINTMENT (OUTPATIENT)
Age: 63
End: 2023-03-06

## 2023-03-06 VITALS
HEIGHT: 71 IN | WEIGHT: 250 LBS | TEMPERATURE: 96.4 F | RESPIRATION RATE: 19 BRPM | OXYGEN SATURATION: 93 % | BODY MASS INDEX: 35 KG/M2 | HEART RATE: 64 BPM | DIASTOLIC BLOOD PRESSURE: 71 MMHG | SYSTOLIC BLOOD PRESSURE: 117 MMHG

## 2023-03-06 DIAGNOSIS — I71.21 ANEURYSM OF THE ASCENDING AORTA, WITHOUT RUPTURE: ICD-10-CM

## 2023-03-06 DIAGNOSIS — R07.9 CHEST PAIN, UNSPECIFIED: ICD-10-CM

## 2023-03-06 PROCEDURE — 99205 OFFICE O/P NEW HI 60 MIN: CPT | Mod: 25

## 2023-03-06 PROCEDURE — 93000 ELECTROCARDIOGRAM COMPLETE: CPT

## 2023-03-06 NOTE — REASON FOR VISIT
An angiography of the  right femoral artery was performed to evaluate for the placement of a closure device. [FreeTextEntry1] : Initial cardiology office visit due to concern for chest pain, dyspnea on exertion.  Patient has hypertension and aortic aneurysm.\par \par

## 2023-03-06 NOTE — DISCUSSION/SUMMARY
[FreeTextEntry1] : 62-year-old man referred for cardiology evaluation due to complaints of chest pain, dyspnea on exertion.  He also has hypertension and prior echocardiogram demonstrating an ascending aortic aneurysm.\par Medical issues include depression, diabetes, obesity, post Covid syndrome \par Blood pressure stable.  There is no JVD.  Lung fields are clear.  No edema.  He is euvolemic.\par EKG is normal sinus rhythm, within normal limits.\par Chest pain appears to be mostly atypical in nature.  However, given cardiac risk factors of obesity, diabetes, prior smoking and drug use, aortic aneurysm, further cardiac testing and evaluation is indicated.\par \par Plan\par 1.  Echocardiogram to evaluate for structural heart disease.\par 2.  Exercise stress test to evaluate functional capacity and evaluate for stress-induced coronary insufficiency.\par 3.  He was counseled regarding weight loss.\par 4.  Further cardiac recommendations pending above work-up.\par 5.  The above was reviewed with the patient, all of his questions have been answered to his satisfaction.\par \par \par

## 2023-03-06 NOTE — PHYSICAL EXAM
[Obese] : obese [Normal S1, S2] : normal S1, S2 [No Rub] : no rub [No Gallop] : no gallop [Murmur] : murmur [Normal] : alert and oriented, normal memory [de-identified] : l/Vl systolic murmur

## 2023-03-06 NOTE — REVIEW OF SYSTEMS
[Feeling Fatigued] : feeling fatigued [Dyspnea on exertion] : dyspnea during exertion [Chest Discomfort] : chest discomfort [Joint Pain] : joint pain [Depression] : depression [Anxiety] : anxiety [Negative] : Heme/Lymph [Suicidal] : not suicidal

## 2023-03-07 DIAGNOSIS — K76.0 FATTY (CHANGE OF) LIVER, NOT ELSEWHERE CLASSIFIED: ICD-10-CM

## 2023-03-07 DIAGNOSIS — E11.649 TYPE 2 DIABETES MELLITUS WITH HYPOGLYCEMIA WITHOUT COMA: ICD-10-CM

## 2023-03-07 DIAGNOSIS — E66.9 OBESITY, UNSPECIFIED: ICD-10-CM

## 2023-03-07 DIAGNOSIS — F17.200 NICOTINE DEPENDENCE, UNSPECIFIED, UNCOMPLICATED: ICD-10-CM

## 2023-03-13 PROCEDURE — G0463: CPT

## 2023-03-13 PROCEDURE — 84681 ASSAY OF C-PEPTIDE: CPT

## 2023-03-14 NOTE — REVIEW OF SYSTEMS
Structural Heart and Valve Team          Pt cleared by GI for discharge home via secure chat with Dr. Moreno.  Can have work up outpt.  Will plan discharge today.  Pt and wife decline [facility] services.  Will order cardiac rehab on discharge      ECHO reviewed  Final Impressions  Normal left ventricular chamber size. Hyperdynamic left ventricular systolic function. LVEF= 77 %.  Left ventricular regional wall motion abnormalities (see diagram)-Apical pouch is visualized in setting of hyperdynamic function.  Dynamic mid LV ventricular peak gradient = 26 mmHg.  Normal right ventricular chamber size. Normal right ventricular systolic function.  S/P 29 mm Scarlett 3 TAVR. Valve is well seated. Aortic valve mean gradient 8 mmHg. No paravalvular aortic regurgitation. No  transvalvular aortic regurgitation.  No pericardial effusion.  Compared to prior study (01/13/2023), pt is sp TAVR. Echocontrast is visualized and endocardial borders and apex are better visualized   [Joint Pain] : joint pain [Negative] : Heme/Lymph

## 2023-03-16 ENCOUNTER — NON-APPOINTMENT (OUTPATIENT)
Age: 63
End: 2023-03-16

## 2023-03-17 ENCOUNTER — OUTPATIENT (OUTPATIENT)
Dept: OUTPATIENT SERVICES | Facility: HOSPITAL | Age: 63
LOS: 1 days | End: 2023-03-17
Payer: MEDICAID

## 2023-03-17 ENCOUNTER — APPOINTMENT (OUTPATIENT)
Dept: FAMILY MEDICINE | Facility: HOSPITAL | Age: 63
End: 2023-03-17

## 2023-03-17 DIAGNOSIS — E11.649 TYPE 2 DIABETES MELLITUS WITH HYPOGLYCEMIA W/OUT COMA: ICD-10-CM

## 2023-03-17 DIAGNOSIS — Z00.00 ENCOUNTER FOR GENERAL ADULT MEDICAL EXAMINATION WITHOUT ABNORMAL FINDINGS: ICD-10-CM

## 2023-03-17 DIAGNOSIS — Z79.4 TYPE 2 DIABETES MELLITUS WITH HYPOGLYCEMIA W/OUT COMA: ICD-10-CM

## 2023-03-17 LAB — C PEPTIDE SERPL-MCNC: 3.6 NG/ML

## 2023-03-17 PROCEDURE — G0463: CPT

## 2023-03-17 RX ORDER — INSULIN GLARGINE 100 [IU]/ML
100 INJECTION, SOLUTION SUBCUTANEOUS
Qty: 1 | Refills: 2 | Status: COMPLETED | COMMUNITY
Start: 2021-04-21 | End: 2023-03-17

## 2023-03-17 RX ORDER — PEN NEEDLE, DIABETIC 29 G X1/2"
32G X 4 MM NEEDLE, DISPOSABLE MISCELLANEOUS
Qty: 1 | Refills: 7 | Status: COMPLETED | COMMUNITY
Start: 2021-09-21 | End: 2023-03-17

## 2023-03-17 RX ORDER — INSULIN LISPRO 100 U/ML
100 INJECTION, SOLUTION SUBCUTANEOUS
Qty: 1 | Refills: 1 | Status: COMPLETED | COMMUNITY
Start: 2023-03-03 | End: 2023-03-08

## 2023-03-17 NOTE — REVIEW OF SYSTEMS
[Fatigue] : no fatigue [Blurred Vision] : no blurred vision [Dysphagia] : no dysphagia [Chest Pain] : no chest pain [Palpitations] : no palpitations [Nausea] : no nausea [Diarrhea] : no diarrhea [Gas/Bloating] : no gas/bloating [Polyuria] : no polyuria [Headaches] : no headaches [Polydipsia] : no polydipsia

## 2023-03-17 NOTE — HISTORY OF PRESENT ILLNESS
[FreeTextEntry1] : HERE TODAY FOR DIABETES FOLLOW UP\par FEELS WELL\par DENIES POLYURIA, POLYDIPSIA OR UNINTENTIONAL WEIGHT LOSS \par NEWLY DX IN 2021 DURING A 3 WEEK HOSPITALIZATION FOR COVID W/ A1C 10.8%. WAS STARTED ON BASAL BOLUS INSULIN AT THAT TIME\par REPORTS COMPLIANCE W/ BASAGLAR 8 UNITS QHS (RECENTLY REDUCED FROM  15 UNITS)\par  ADMELOG  PRE-MEAL STOPPED AT LAST VISIT\par .REPORTS COMPLIANCE W/ METFORMIN 1,000 MG WITH BREAKFAST AND DINNER \par TOLERATING TRULICITY 0.75 MG WEEKLY.  DENIES NAUSEA, VOMITING OR ABDOMINAL PAIN\par CGM DOWNLOAD\par BG AVERAGE  \par WORN FOR 14 DAYS\par BG    \par         < 54         1%\par          54-69      10%\par              86%\par         181-250   3%\par          > 250       0%\par GLUCOSE VARIABILITY 31.4%(TARGET LESS THAN OR EQUAL TO 36%)  \par LOWS CGM READINGS AND FEELINGS OF HYPOGLYCEMIA.  TREATED W/ JUICE\par \par UTD W/ EYE EXAM- NO RETINOPATHY. HAS CATARACTS

## 2023-03-17 NOTE — PHYSICAL EXAM
[Alert] : alert [Obese] : obese [No Acute Distress] : no acute distress [No Respiratory Distress] : no respiratory distress [Right foot was examined, including] : right foot ~C was examined, including visual inspection with sensory and pulse exams [Left foot was examined, including] : left foot ~C was examined, including visual inspection with sensory and pulse exams [Normal] : normal [2+] : 2+ in the dorsalis pedis [Oriented x3] : oriented to person, place, and time [Normal Affect] : the affect was normal [Recent Memory Normal] : recent memory was not impaired [Normal Insight/Judgement] : insight and judgment were intact [Normal Mood] : the mood was normal [Remote Memory Normal] : remote memory was not impaired [Foot Ulcers] : no foot ulcers [Diminished Throughout Both Feet] : normal tactile sensation with monofilament testing throughout both feet

## 2023-03-17 NOTE — ASSESSMENT
[Diabetes Foot Care] : diabetes foot care [Long Term Vascular Complications] : long term vascular complications of diabetes [Carbohydrate Consistent Diet] : carbohydrate consistent diet [Importance of Diet and Exercise] : importance of diet and exercise to improve glycemic control, achieve weight loss and improve cardiovascular health [Exercise/Effect on Glucose] : exercise/effect on glucose [Hypoglycemia Management] : hypoglycemia management [FreeTextEntry1] : A1C AT GOAL AT 6.5%\par INSTRUCTED TO STOP BASAGLAR\par CONTINUE LAST 2 DOSES OF TRULICITY 0.75 MG WEEKLY,  THEN INCREASE TO 1.5 MG WEEKLY\par CONTINUE METFORMIN\par DISCUSSED GENERAL A1C GOAL OF 7% AND BG GOALS  BEFORE MEALS AND < 180 2 HOURS AFTER MEALS TO HELP REDUCE INCIDENCE OF COMPLICATIONS\par LENGTHY DISCUSSION ON TIPS AND TECHNIQUES FOR CHOOSING SMALLER PORTIONS AND CHOOSING HIGHER QUALITY CARBOHYDRATES AND LEAN PROTEINS.  USE OF MEASURING CUPS, SMALLER PLATES AND EATING OUT.\par ENCOURAGED SLOW, STEADY WEIGHT LOSS OF 1-2 LBS EACH WEEK\par ENCOURAGED PHYSICAL ACTIVITY 30 MINUTES 5 DAYS EACH WEEK AS TOLERATED\par .INSTRUCTED TO CALL OFFICE FOR BG < 70, > 250 OR FOR ANY QUESTIONS OR CONCERNS\par SCHEDULED TO RETURN TO OFFICE ON 5/19/23

## 2023-03-19 DIAGNOSIS — E11.9 TYPE 2 DIABETES MELLITUS WITHOUT COMPLICATIONS: ICD-10-CM

## 2023-03-19 DIAGNOSIS — E78.5 HYPERLIPIDEMIA, UNSPECIFIED: ICD-10-CM

## 2023-03-19 DIAGNOSIS — E11.649 TYPE 2 DIABETES MELLITUS WITH HYPOGLYCEMIA WITHOUT COMA: ICD-10-CM

## 2023-03-19 DIAGNOSIS — E66.9 OBESITY, UNSPECIFIED: ICD-10-CM

## 2023-03-22 ENCOUNTER — APPOINTMENT (OUTPATIENT)
Dept: CARDIOLOGY | Facility: CLINIC | Age: 63
End: 2023-03-22
Payer: MEDICAID

## 2023-03-22 PROCEDURE — 93015 CV STRESS TEST SUPVJ I&R: CPT

## 2023-03-22 PROCEDURE — 93306 TTE W/DOPPLER COMPLETE: CPT

## 2023-04-07 ENCOUNTER — RX RENEWAL (OUTPATIENT)
Age: 63
End: 2023-04-07

## 2023-04-07 RX ORDER — TRAZODONE HYDROCHLORIDE 150 MG/1
150 TABLET ORAL
Qty: 90 | Refills: 0 | Status: ACTIVE | COMMUNITY
Start: 2020-10-28 | End: 1900-01-01

## 2023-04-26 ENCOUNTER — APPOINTMENT (OUTPATIENT)
Dept: PULMONOLOGY | Facility: CLINIC | Age: 63
End: 2023-04-26
Payer: MEDICAID

## 2023-04-26 VITALS
TEMPERATURE: 97 F | RESPIRATION RATE: 17 BRPM | HEART RATE: 78 BPM | OXYGEN SATURATION: 93 % | HEIGHT: 71 IN | SYSTOLIC BLOOD PRESSURE: 150 MMHG | DIASTOLIC BLOOD PRESSURE: 90 MMHG | BODY MASS INDEX: 33.6 KG/M2 | WEIGHT: 240 LBS

## 2023-04-26 DIAGNOSIS — Z87.891 PERSONAL HISTORY OF NICOTINE DEPENDENCE: ICD-10-CM

## 2023-04-26 PROCEDURE — 99214 OFFICE O/P EST MOD 30 MIN: CPT

## 2023-04-26 RX ORDER — MONTELUKAST 10 MG/1
10 TABLET, FILM COATED ORAL
Qty: 30 | Refills: 6 | Status: DISCONTINUED | COMMUNITY
Start: 2021-09-17 | End: 2023-04-26

## 2023-04-26 RX ORDER — FLUTICASONE FUROATE, UMECLIDINIUM BROMIDE AND VILANTEROL TRIFENATATE 100; 62.5; 25 UG/1; UG/1; UG/1
100-62.5-25 POWDER RESPIRATORY (INHALATION) DAILY
Qty: 1 | Refills: 6 | Status: DISCONTINUED | COMMUNITY
Start: 2022-03-15 | End: 2023-04-26

## 2023-04-26 NOTE — HISTORY OF PRESENT ILLNESS
[Former] : former [>= 20 pack years] : >= 20 pack years [Never] : never [Awakes Unrefreshed] : awakes unrefreshed [Awakes with Dry Mouth] : awakes with dry mouth [Awakes with Headache] : awakes with headache [Fatigue] : fatigue [Nonrestorative Sleep] : nonrestorative sleep [Recent  Weight Gain] : recent weight gain [Snoring] : snoring [TextBox_4] : 63y/o   male  born in [place]  ex smoker (   started 1/2- one pack  ppd -   20-  late 50's    > 20 pack year )  ex -ETOH  h/o DM  - HTN   in past work bar / construction \par - sob  - since covid  2020 \par -fatigue ++ \par daytime   sleepiness\par - no naps \par -no cough   + wheezing \par -  stopped  trelegy  did not feel was helping \par -at times notices some wheezing   does not have albuterol prn \par - on singulair pm  - h/o depression on meds \par -no nasal congesion  [TextBox_11] : 1 [YearQuit] : 2017? [TextBox_77] : 10 [TextBox_79] : 5 [ESS] : 10

## 2023-04-26 NOTE — REASON FOR VISIT
[Follow-Up] : a follow-up visit [Sleep Evaluation] : sleep evaluation [Shortness of Breath] : shortness of breath

## 2023-04-26 NOTE — REVIEW OF SYSTEMS
[Fatigue] : fatigue [Recent Wt Gain (___ Lbs)] : ~T recent [unfilled] lb weight gain [Dry Mouth] : dry mouth [Wheezing] : wheezing [SOB on Exertion] : sob on exertion [GERD] : gerd [Diabetes] : diabetes [Obesity] : obesity [Fever] : no fever [Recent Wt Loss (___ Lbs)] : ~T no recent weight loss [Sore Throat] : no sore throat [Nasal Congestion] : no nasal congestion [Cough] : no cough [Hemoptysis] : no hemoptysis [Sputum] : no sputum [Dyspnea] : no dyspnea [Chest Discomfort] : no chest discomfort [Palpitations] : no palpitations [Abdominal Pain] : no abdominal pain [Nausea] : no nausea [Vomiting] : no vomiting [Dysphagia] : no dysphagia [Bleeding] : no bleeding [Myalgias] : no myalgias [Chronic Pain] : no chronic pain [Rash] : no rash [Blood Transfusion] : no blood transfusion [Clotting Disorder/ Frequent bleeding] : no clotting disorder/ frequent bleeding [Thyroid Problem] : no thyroid problem [TextBox_69] : controlled with meds

## 2023-04-26 NOTE — PHYSICAL EXAM
[Enlarged Base of the Tongue] : enlarged base of the tongue [IV] : Mallampati Class: IV [Supple] : supple [No Neck Mass] : no neck mass [No JVD] : no jvd [Normal Rate/Rhythm] : normal rate/rhythm [Normal S1, S2] : normal s1, s2 [Clear to Auscultation Bilaterally] : clear to auscultation bilaterally [Benign] : benign [Soft] : soft [No Hernias] : no hernias [Normal Bowel Sounds] : normal bowel sounds [Normal Gait] : normal gait [No Clubbing] : no clubbing [No Edema] : no edema [No Rash] : no rash [Normal Affect] : normal affect [No Focal Deficits] : no focal deficits [TextBox_2] : pleasant male no distress speaking full sentences no cough  [TextBox_11] : moist no lesions

## 2023-04-26 NOTE — ASSESSMENT
[FreeTextEntry1] : 61y/o    male\par \par 1- reactive airways - asthma vs COPD   likely ex smoker > 20 pack years\par 2- sleep disordered breathing / obesity \par 3- depression /  DM / HTN \par 4- vaccinations covid ( h/o covid 2020 )    moderna    pneumonia\par \par Recommendations\par 1- sleep study \par 2- PFT\par 3- off trelegy  did not llike\par 3- d/c Singulair\par 4- albuterol with teaching  today \par 5- exercise and diet reviewed \par \par return in three months

## 2023-05-06 ENCOUNTER — APPOINTMENT (OUTPATIENT)
Dept: THORACIC SURGERY | Facility: CLINIC | Age: 63
End: 2023-05-06
Payer: MEDICAID

## 2023-05-06 ENCOUNTER — NON-APPOINTMENT (OUTPATIENT)
Age: 63
End: 2023-05-06

## 2023-05-06 VITALS — HEIGHT: 71 IN | BODY MASS INDEX: 35 KG/M2 | WEIGHT: 250 LBS

## 2023-05-06 DIAGNOSIS — F17.200 NICOTINE DEPENDENCE, UNSPECIFIED, UNCOMPLICATED: ICD-10-CM

## 2023-05-06 PROCEDURE — G0296 VISIT TO DETERM LDCT ELIG: CPT | Mod: 95

## 2023-05-08 ENCOUNTER — APPOINTMENT (OUTPATIENT)
Dept: CT IMAGING | Facility: HOSPITAL | Age: 63
End: 2023-05-08
Payer: MEDICAID

## 2023-05-08 ENCOUNTER — OUTPATIENT (OUTPATIENT)
Dept: OUTPATIENT SERVICES | Facility: HOSPITAL | Age: 63
LOS: 1 days | End: 2023-05-08
Payer: MEDICAID

## 2023-05-08 DIAGNOSIS — M19.019 PRIMARY OSTEOARTHRITIS, UNSPECIFIED SHOULDER: Chronic | ICD-10-CM

## 2023-05-08 DIAGNOSIS — Z87.891 PERSONAL HISTORY OF NICOTINE DEPENDENCE: ICD-10-CM

## 2023-05-08 DIAGNOSIS — R06.09 OTHER FORMS OF DYSPNEA: ICD-10-CM

## 2023-05-08 PROCEDURE — 71271 CT THORAX LUNG CANCER SCR C-: CPT

## 2023-05-08 PROCEDURE — 71271 CT THORAX LUNG CANCER SCR C-: CPT | Mod: 26

## 2023-05-08 NOTE — H&P ADULT - NSCORESITESY/N_GEN_A_CORE_RD
C3 nurse spoke with Heri Muhammad and wife for a TCC post hospital discharge follow up call. The patient has a scheduled HOS appointment with Ivonne Robles MD  on 5/16/23 @ 1000.           Yes

## 2023-05-09 ENCOUNTER — NON-APPOINTMENT (OUTPATIENT)
Age: 63
End: 2023-05-09

## 2023-05-09 ENCOUNTER — APPOINTMENT (OUTPATIENT)
Dept: CARDIOLOGY | Facility: CLINIC | Age: 63
End: 2023-05-09
Payer: MEDICAID

## 2023-05-09 VITALS
HEART RATE: 71 BPM | HEIGHT: 71 IN | SYSTOLIC BLOOD PRESSURE: 170 MMHG | TEMPERATURE: 95.2 F | WEIGHT: 240 LBS | OXYGEN SATURATION: 92 % | BODY MASS INDEX: 33.6 KG/M2 | RESPIRATION RATE: 20 BRPM | DIASTOLIC BLOOD PRESSURE: 86 MMHG

## 2023-05-09 DIAGNOSIS — R06.09 OTHER FORMS OF DYSPNEA: ICD-10-CM

## 2023-05-09 PROCEDURE — 93000 ELECTROCARDIOGRAM COMPLETE: CPT

## 2023-05-09 PROCEDURE — 99215 OFFICE O/P EST HI 40 MIN: CPT | Mod: 25

## 2023-05-09 NOTE — PHYSICAL EXAM
[Obese] : obese [Normal S1, S2] : normal S1, S2 [No Rub] : no rub [No Gallop] : no gallop [Murmur] : murmur [Normal] : alert and oriented, normal memory [de-identified] : l/Vl systolic murmur

## 2023-05-09 NOTE — CARDIOLOGY SUMMARY
[de-identified] : May 9 2023. Sinus  Rhythm \par WITHIN NORMAL LIMITS [de-identified] : March 22, 2023. Normal LV function. Moderate pulmonary HTN

## 2023-05-09 NOTE — HISTORY OF PRESENT ILLNESS
[FreeTextEntry1] : Since last visit with me, I did reduce dose of metoprolol.  Previously was on metoprolol succinate 200 mg daily and is now on 100 mg daily.\par He feels somewhat improved since that change in medications.  He has less frequent headaches but still feels fatigued though this is somewhat improved.\par He still has symptoms of dyspnea with exertion.  Denies any complaints of chest pain or chest pressure.  No palpitations.  No dizziness, lightheadedness, syncope or near syncope.  No edema, no orthopnea.  No PND.\par \par 62-year-old man with hypertension, hyperlipidemia, complaints of dyspnea with exertion.\par Previously was on high-dose metoprolol.  Metoprolol dose was decreased and he is symptomatically improved though still has complaints of dyspnea with exertion and fatigue.\par Blood pressure stable.  No JVD.  Lung fields clear.  No edema.  He is euvolemic.\par EKG sinus rhythm, within normal limits.\par Noninvasive cardiac testing done previously.\par Echocardiogram demonstrated normal left ventricular systolic function without significant valvular pericardial disease.\par Stress test… Submaximal heart rate response due to beta-blocker.\par No EKG changes at that level of exercise.\par \par Plan\par 1.  Current medication list is reviewed, no changes.\par 2.  Favor repeating stress test off beta-blocker.  Evaluate for functional capacity and evaluate for stress-induced coronary insufficiency.  He will hold metoprolol 24 hours prior to stress test.\par 3.  Further recommendations pending above.\par 4.  The above was reviewed with him, all questions answered.\par

## 2023-05-09 NOTE — DISCUSSION/SUMMARY
[FreeTextEntry1] : Since last visit with me, I did reduce dose of metoprolol.  Previously was on metoprolol succinate 200 mg daily and is now on 100 mg daily.\par He feels somewhat improved since that change in medications.  He has less frequent headaches but still feels fatigued though this is somewhat improved.\par He still has symptoms of dyspnea with exertion.  Denies any complaints of chest pain or chest pressure.  No palpitations.  No dizziness, lightheadedness, syncope or near syncope.  No edema, no orthopnea.  No PND.\par \par 62-year-old man with hypertension, hyperlipidemia, complaints of dyspnea with exertion.\par Previously was on high-dose metoprolol.  Metoprolol dose was decreased and he is symptomatically improved though still has complaints of dyspnea with exertion and fatigue.\par Blood pressure stable.  No JVD.  Lung fields clear.  No edema.  He is euvolemic.\par EKG sinus rhythm, within normal limits.\par Noninvasive cardiac testing done previously.\par Echocardiogram demonstrated normal left ventricular systolic function without significant valvular pericardial disease. Moderate pulmonary HTN \par Stress test… Submaximal heart rate response due to beta-blocker.\par No EKG changes at that level of exercise.\par \par Plan\par 1.  Current medication list is reviewed, no changes.\par 2.  Favor repeating stress test off beta-blocker.  Evaluate for functional capacity and evaluate for stress-induced coronary insufficiency.  He will hold metoprolol 24 hours prior to stress test.\par 3.  Further recommendations pending above.\par 4.  The above was reviewed with him, all questions answered.\par

## 2023-05-09 NOTE — REASON FOR VISIT
[FreeTextEntry1] : Cardiology follow-up visit for evaluation management of hypertension, hyperlipidemia, dyspnea on exertion.  He has aortic aneurysm.\par \par

## 2023-05-09 NOTE — HISTORY OF PRESENT ILLNESS
[TextBox_13] : Referred by Dr. Richey\par \par Mr. LEIF GOODWIN is a 62 year old man with a history of nicotine dependence\par \par  Over the telephone today we reviewed and confirmed that the patient meets screening eligibility criteria:\par \par -Age: 62 years old\par \par Smoking status:\par \par -Current smoker, .[id number], now 1-2/day\par \par \par \par \par Mr. GOODWIN denies any personal history of lung cancer. Denies any s/s of lung cancer. H/o lung ca in mother. Denies any history of lung disease. Denies any history of occupational exposures\par  [PacksperDay] : .5 [N_Years] : 40 [PacksperYear] : 20

## 2023-05-16 ENCOUNTER — NON-APPOINTMENT (OUTPATIENT)
Age: 63
End: 2023-05-16

## 2023-05-19 ENCOUNTER — APPOINTMENT (OUTPATIENT)
Dept: CARDIOLOGY | Facility: HOSPITAL | Age: 63
End: 2023-05-19

## 2023-05-19 ENCOUNTER — APPOINTMENT (OUTPATIENT)
Dept: FAMILY MEDICINE | Facility: HOSPITAL | Age: 63
End: 2023-05-19

## 2023-05-24 ENCOUNTER — APPOINTMENT (OUTPATIENT)
Dept: CARDIOLOGY | Facility: CLINIC | Age: 63
End: 2023-05-24
Payer: MEDICAID

## 2023-05-24 PROCEDURE — 93015 CV STRESS TEST SUPVJ I&R: CPT

## 2023-06-15 ENCOUNTER — OUTPATIENT (OUTPATIENT)
Dept: OUTPATIENT SERVICES | Facility: HOSPITAL | Age: 63
LOS: 1 days | End: 2023-06-15
Payer: MEDICAID

## 2023-06-15 DIAGNOSIS — J44.1 CHRONIC OBSTRUCTIVE PULMONARY DISEASE WITH (ACUTE) EXACERBATION: ICD-10-CM

## 2023-06-15 DIAGNOSIS — M19.019 PRIMARY OSTEOARTHRITIS, UNSPECIFIED SHOULDER: Chronic | ICD-10-CM

## 2023-06-15 PROCEDURE — 94726 PLETHYSMOGRAPHY LUNG VOLUMES: CPT

## 2023-06-15 PROCEDURE — 94729 DIFFUSING CAPACITY: CPT

## 2023-06-15 PROCEDURE — 94060 EVALUATION OF WHEEZING: CPT

## 2023-06-21 ENCOUNTER — NON-APPOINTMENT (OUTPATIENT)
Age: 63
End: 2023-06-21

## 2023-06-22 NOTE — REVIEW OF SYSTEMS
Refer to Initial Dietitian Evaluation for complete recommendations.  [Fatigue] : no fatigue [Blurred Vision] : no blurred vision [Dysphagia] : no dysphagia [Chest Pain] : no chest pain [Palpitations] : no palpitations [Nausea] : no nausea [Diarrhea] : no diarrhea [Gas/Bloating] : no gas/bloating [Polyuria] : no polyuria [Headaches] : no headaches

## 2023-06-26 ENCOUNTER — APPOINTMENT (OUTPATIENT)
Dept: PULMONOLOGY | Facility: CLINIC | Age: 63
End: 2023-06-26
Payer: MEDICAID

## 2023-06-26 VITALS
TEMPERATURE: 96.5 F | WEIGHT: 240 LBS | BODY MASS INDEX: 33.6 KG/M2 | HEIGHT: 71 IN | OXYGEN SATURATION: 94 % | HEART RATE: 71 BPM | SYSTOLIC BLOOD PRESSURE: 130 MMHG | DIASTOLIC BLOOD PRESSURE: 84 MMHG

## 2023-06-26 DIAGNOSIS — E66.9 OBESITY, UNSPECIFIED: ICD-10-CM

## 2023-06-26 PROCEDURE — 99214 OFFICE O/P EST MOD 30 MIN: CPT

## 2023-06-26 NOTE — HISTORY OF PRESENT ILLNESS
[FreeTextEntry1] : C/O snoring, EDS, fragmented sleep. No known apneas, choking. \par \par Reports was ordered for a sleep study but couldn't get it due to insurance issues.\par \par To bed: 9:30\par Latency: 1.5hrs\par OOB: 6:30\par \par Works construction. \par \par Hx HTN, asthma. \par \par Sees Dr Richey for pulmonary.

## 2023-06-26 NOTE — PHYSICAL EXAM
[General Appearance - In No Acute Distress] : no acute distress [Normal Oropharynx] : abnormal oropharynx [Low Lying Soft Palate] : low lying soft palate [Elongated Uvula] : elongated uvula [Enlarged Base of the Tongue] : enlargement of the base of the tongue [IV] : IV [] : no respiratory distress [Respiration, Rhythm And Depth] : normal respiratory rhythm and effort [Exaggerated Use Of Accessory Muscles For Inspiration] : no accessory muscle use [Auscultation Breath Sounds / Voice Sounds] : lungs were clear to auscultation bilaterally [Abnormal Walk] : normal gait [Skin Color & Pigmentation] : normal skin color and pigmentation

## 2023-07-04 ENCOUNTER — RX RENEWAL (OUTPATIENT)
Age: 63
End: 2023-07-04

## 2023-07-26 ENCOUNTER — APPOINTMENT (OUTPATIENT)
Dept: PULMONOLOGY | Facility: CLINIC | Age: 63
End: 2023-07-26
Payer: MEDICAID

## 2023-07-26 VITALS
HEART RATE: 68 BPM | DIASTOLIC BLOOD PRESSURE: 84 MMHG | OXYGEN SATURATION: 96 % | BODY MASS INDEX: 33.6 KG/M2 | WEIGHT: 240 LBS | HEIGHT: 71 IN | SYSTOLIC BLOOD PRESSURE: 140 MMHG | TEMPERATURE: 98.2 F

## 2023-07-26 PROCEDURE — 99213 OFFICE O/P EST LOW 20 MIN: CPT

## 2023-07-26 NOTE — PROCEDURE
[FreeTextEntry1] : ovider Note\par 63y/o male ex smoker > 20 Pack years BMI 33\par \par PFT 6/15/2023\par Fair efforts\par FVC , FEV1 FEV1/FVC are normal fef 25-75 is normal MVV is normal\par Lung volumes by body plethysmography reveal normal TLC and RV/TLC \par DLCo and DLCO/Va are normal \par \par Impression \par Normal spirometry,TLC and DLCO\par Recommend clinical correlation \par  \par \par \par PFT   2021 normal spirometry  normal TLC and DLCO   MVV reduced

## 2023-07-26 NOTE — ASSESSMENT
[FreeTextEntry1] : 63y/o    male\par \par 1- reactive airways - asthma vs COPD   likely ex smoker > 20 pack years\par 2- sleep disordered breathing / obesity \par 3- depression /  DM / HTN \par 4- vaccinations covid ( h/o covid 2020 )    moderna    pneumonia\par \par Recommendations\par 1- sleep study  refused by insurance  - sleep f/u  - sleep   hygiene   sleep on side or recliner reviewed \par 2- PFT\par 3- albuterol with teaching  today \par 4- exercise and diet reviewed \par 5- ct  5/24\par \par return in  six months

## 2023-07-26 NOTE — HISTORY OF PRESENT ILLNESS
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 [TextBox_77] : 10 [TextBox_79] : 5 [ESS] : 10

## 2023-07-26 NOTE — PHYSICAL EXAM
[No Acute Distress] : no acute distress [No Deformities] : no deformities [Enlarged Base of the Tongue] : enlarged base of the tongue [IV] : Mallampati Class: IV [Normal Appearance] : normal appearance [Supple] : supple [No Neck Mass] : no neck mass [No JVD] : no jvd [Normal Rate/Rhythm] : normal rate/rhythm [Normal S1, S2] : normal s1, s2 [No Murmurs] : no murmurs [No Resp Distress] : no resp distress [Clear to Auscultation Bilaterally] : clear to auscultation bilaterally [Benign] : benign [Not Tender] : not tender [No Masses] : no masses [Soft] : soft [Normal Bowel Sounds] : normal bowel sounds [Normal Gait] : normal gait [No Clubbing] : no clubbing [No Edema] : no edema [No Sensory Deficits] : no sensory deficits [Normal Affect] : normal affect [No Acc Muscle Use] : no acc muscle use [No Rash] : no rash [TextBox_2] : pleasant male no distress speaking full sentences  no cough  [TextBox_11] : crowded airway  no lesion

## 2023-08-23 ENCOUNTER — NON-APPOINTMENT (OUTPATIENT)
Age: 63
End: 2023-08-23

## 2023-08-23 ENCOUNTER — APPOINTMENT (OUTPATIENT)
Dept: CARDIOLOGY | Facility: CLINIC | Age: 63
End: 2023-08-23
Payer: MEDICAID

## 2023-08-23 VITALS
WEIGHT: 240 LBS | SYSTOLIC BLOOD PRESSURE: 137 MMHG | HEART RATE: 79 BPM | RESPIRATION RATE: 19 BRPM | DIASTOLIC BLOOD PRESSURE: 91 MMHG | TEMPERATURE: 96.7 F | OXYGEN SATURATION: 95 % | BODY MASS INDEX: 33.6 KG/M2 | HEIGHT: 71 IN

## 2023-08-23 PROCEDURE — 99214 OFFICE O/P EST MOD 30 MIN: CPT | Mod: 25

## 2023-08-23 PROCEDURE — 93000 ELECTROCARDIOGRAM COMPLETE: CPT

## 2023-08-23 NOTE — HISTORY OF PRESENT ILLNESS
[FreeTextEntry1] : Since his last visit with me, he has been at his baseline. He feels fatigued but this has improved since dose of metoprolol was decreased. He has been following with sleep disorder specialist, he has been trying to schedule sleep study but has been having trouble with insurance for approval. He does get dyspneic occasionally which he attributes to his lung disease. No chest pain or chest pressure.  No palpitations.  No dizziness.  No syncope.  No edema, no orthopnea.  No PND. Recent echocardiogram demonstrating normal aortic root.

## 2023-08-23 NOTE — PHYSICAL EXAM
[Obese] : obese [Normal S1, S2] : normal S1, S2 [No Rub] : no rub [No Gallop] : no gallop [Murmur] : murmur [Normal] : alert and oriented, normal memory [de-identified] : l/Vl systolic murmur

## 2023-08-23 NOTE — DISCUSSION/SUMMARY
[FreeTextEntry1] : 62-year-old man with hypertension and hyperlipidemia. He has chronic lung disease, possible sleep apnea. Blood pressure stable.  There is no JVD.  Lung fields clear.  No edema. EKG is sinus rhythm, within normal limits. Current cardiac status appears to be at its baseline.  Plan 1.  Current medication list is reviewed, no changes. 2.  He will continue with current antihypertensive regimen and high intensity statin. 3.  He will follow-up with me in the office in 6 months. 4.  The above was reviewed with him, all questions answered.

## 2023-08-23 NOTE — REASON FOR VISIT
[FreeTextEntry1] : Cardiology follow-up visit for evaluation management of hypertension, hyperlipidemia.

## 2023-08-23 NOTE — CARDIOLOGY SUMMARY
[de-identified] : August 23, 2023. Sinus Rhythm  WITHIN NORMAL LIMITS [de-identified] : May 24, 2023.  Exercise stress test negative for ischemia.  [de-identified] : March 22, 2023. Normal LV function. Moderate pulmonary HTN . Normal aortic root size

## 2023-08-27 NOTE — BH INPATIENT PSYCHIATRY PROGRESS NOTE - NSBHCONTPROVIDER_PSY_ALL_CORE
Problem: At Risk for Falls  Goal: # Patient does not fall  Outcome: Outcome Met, Continue evaluating goal progress toward completion  Goal: # Takes action to control fall-related risks  Outcome: Outcome Met, Continue evaluating goal progress toward completion  Goal: # Verbalizes understanding of fall risk/precautions  Description: Document education using the patient education activity  Outcome: Outcome Met, Continue evaluating goal progress toward completion     Problem: VTE, Risk for  Goal: # No s/s of VTE  Outcome: Outcome Met, Continue evaluating goal progress toward completion  Goal: # Verbalizes understanding of VTE risk factors and prevention  Description: Document education using the patient education activity.   Outcome: Outcome Met, Continue evaluating goal progress toward completion  Goal: Demonstrates ability to administer injectable anticoagulants if ordered for d/c  Description: Document education using the patient education activity.  Outcome: Outcome Met, Continue evaluating goal progress toward completion      Yes...

## 2023-08-29 ENCOUNTER — EMERGENCY (EMERGENCY)
Facility: HOSPITAL | Age: 63
LOS: 1 days | Discharge: HOME | End: 2023-08-29
Attending: EMERGENCY MEDICINE | Admitting: EMERGENCY MEDICINE
Payer: MEDICAID

## 2023-08-29 VITALS
RESPIRATION RATE: 18 BRPM | OXYGEN SATURATION: 95 % | TEMPERATURE: 98 F | HEART RATE: 77 BPM | SYSTOLIC BLOOD PRESSURE: 141 MMHG | WEIGHT: 244.93 LBS | DIASTOLIC BLOOD PRESSURE: 89 MMHG | HEIGHT: 71 IN

## 2023-08-29 DIAGNOSIS — Z90.49 ACQUIRED ABSENCE OF OTHER SPECIFIED PARTS OF DIGESTIVE TRACT: Chronic | ICD-10-CM

## 2023-08-29 DIAGNOSIS — M19.019 PRIMARY OSTEOARTHRITIS, UNSPECIFIED SHOULDER: Chronic | ICD-10-CM

## 2023-08-29 PROCEDURE — 72131 CT LUMBAR SPINE W/O DYE: CPT | Mod: 26,MA

## 2023-08-29 PROCEDURE — 70486 CT MAXILLOFACIAL W/O DYE: CPT | Mod: MA

## 2023-08-29 PROCEDURE — 70450 CT HEAD/BRAIN W/O DYE: CPT | Mod: 26,MA

## 2023-08-29 PROCEDURE — 72125 CT NECK SPINE W/O DYE: CPT | Mod: MA

## 2023-08-29 PROCEDURE — 70450 CT HEAD/BRAIN W/O DYE: CPT | Mod: MA

## 2023-08-29 PROCEDURE — 99284 EMERGENCY DEPT VISIT MOD MDM: CPT

## 2023-08-29 PROCEDURE — 70486 CT MAXILLOFACIAL W/O DYE: CPT | Mod: 26,MA

## 2023-08-29 PROCEDURE — 72128 CT CHEST SPINE W/O DYE: CPT | Mod: MA

## 2023-08-29 PROCEDURE — 72128 CT CHEST SPINE W/O DYE: CPT | Mod: 26,MA

## 2023-08-29 PROCEDURE — 72125 CT NECK SPINE W/O DYE: CPT | Mod: 26,MA

## 2023-08-29 PROCEDURE — 72131 CT LUMBAR SPINE W/O DYE: CPT | Mod: MA

## 2023-08-29 RX ORDER — LOSARTAN POTASSIUM 100 MG/1
1 TABLET, FILM COATED ORAL
Qty: 0 | Refills: 0 | DISCHARGE

## 2023-08-29 RX ORDER — OMEPRAZOLE 10 MG/1
1 CAPSULE, DELAYED RELEASE ORAL
Qty: 0 | Refills: 0 | DISCHARGE

## 2023-08-29 RX ORDER — ACETAMINOPHEN 500 MG
650 TABLET ORAL ONCE
Refills: 0 | Status: COMPLETED | OUTPATIENT
Start: 2023-08-29 | End: 2023-08-29

## 2023-08-29 RX ORDER — FOLIC ACID 0.8 MG
1 TABLET ORAL
Qty: 0 | Refills: 0 | DISCHARGE

## 2023-08-29 RX ORDER — METOPROLOL TARTRATE 50 MG
1 TABLET ORAL
Qty: 0 | Refills: 0 | DISCHARGE

## 2023-08-29 RX ORDER — ASPIRIN/CALCIUM CARB/MAGNESIUM 324 MG
0 TABLET ORAL
Qty: 0 | Refills: 0 | DISCHARGE

## 2023-08-29 RX ADMIN — Medication 650 MILLIGRAM(S): at 17:23

## 2023-08-29 NOTE — ED PROVIDER NOTE - NS ED ATTENDING STATEMENT MOD
I have seen and examined this patient and fully participated in the care of this patient as the teaching attending.  The service was shared with the VISHNU.  I reviewed and verified the documentation and independently performed the documented:
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
## 2023-08-29 NOTE — ED PROVIDER NOTE - NSICDXPASTMEDICALHX_GEN_ALL_CORE_FT
PAST MEDICAL HISTORY:  Back pain Chronic- mid and upper back    Benign hypertension     BPH associated with nocturia     Cholelithiasis Discovered at [facility] 3/2021    Depression     Diverticulosis Discovered at [facility] 3/2021    GERD (gastroesophageal reflux disease)     Hepatitis C in remission    HLD (hyperlipidemia)     Insomnia     Lung abscess RLL found at [facility] 3/2021    OA (osteoarthritis) BL shoulders    Thyroid nodule Found at [facility] 3/2021    Type 2 diabetes mellitus without complication, without long-term current use of insulin Newly diagnosed at [facility] 3/2021
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
## 2023-08-29 NOTE — ED PROVIDER NOTE - PHYSICAL EXAMINATION
cervical spine- no tenderness noted   thoracic and lumbar spine- mild tenderness, no ecchymosis seen   pt ambulating.   left eye: ecchymosis noted     vision test both eyes 20/40, left eye 20/70, right eye 20/100 with glasses  neurologically intact

## 2023-08-29 NOTE — ED ADULT NURSE NOTE - NSICDXPASTMEDICALHX_GEN_ALL_CORE_FT
PAST MEDICAL HISTORY:  2019 novel coronavirus disease (COVID-19) 5/2020    Back pain Chronic- mid and upper back    Benign hypertension     BPH associated with nocturia     Cholelithiasis Discovered at [facility] 3/2021    Depression     Diverticulosis Discovered at [facility] 3/2021    DKA (diabetic ketoacidosis) [facility] 3/2021    GERD (gastroesophageal reflux disease)     Hepatitis C in remission    HLD (hyperlipidemia)     Insomnia     Lung abscess RLL found at [facility] 3/2021    OA (osteoarthritis) BL shoulders    Shingles 5/2021    Thyroid nodule Found at [facility] 3/2021    Type 2 diabetes mellitus without complication, without long-term current use of insulin Newly diagnosed at [facility] 3/2021
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
## 2023-08-29 NOTE — ED PROVIDER NOTE - NSFOLLOWUPINSTRUCTIONS_ED_ALL_ED_FT
Take tylenol 650 mg every 6 hours as needed for pain   Rest.    limit phone or TV use - and rest your brain    Follow up with your pmd within 48 hours- show copies of ER results   Return to the ED if any worsening or persistent symptoms.       Head Injury, Adult    There are many types of head injuries. Head injuries can be as minor as a small bump, or they can be a serious medical issue. More severe head injuries include:  A jarring injury to the brain (concussion).  A bruise (contusion) of the brain. This means there is bleeding in the brain that can cause swelling.  A cracked skull (skull fracture).  Bleeding in the brain that collects, clots, and forms a bump (hematoma).  After a head injury, most problems occur within the first 24 hours, but side effects may occur up to 7–10 days after the injury. It is important to watch your condition for any changes. You may need to be observed in the emergency department or urgent care, or you may be admitted to the hospital.    What are the causes?  There are many possible causes of a head injury. Serious head injuries may be caused by car accidents, bicycle or motorcycle accidents, sports injuries, falls, or being struck by an object.    What are the symptoms?  Symptoms of a head injury include a contusion, bump, or bleeding at the site of the injury. Other physical symptoms may include:  Headache.  Nausea or vomiting.  Dizziness.  Blurred or double vision.  Being uncomfortable around bright lights or loud noises.  Seizures.  Feeling tired.  Trouble being awakened.  Loss of consciousness.  Mental or emotional symptoms may include:  Irritability.  Confusion and memory problems.  Poor attention and concentration.  Changes in eating or sleeping habits.  Anxiety or depression.  How is this diagnosed?  This condition can usually be diagnosed based on your symptoms, a description of the injury, and a physical exam. You may also have imaging tests done, such as a CT scan or an MRI.    How is this treated?  Treatment for this condition depends on the severity and type of injury you have. The main goal of treatment is to prevent complications and allow the brain time to heal.    Mild head injury    If you have a mild head injury, you may be sent home, and treatment may include:  Observation. A responsible adult should stay with you for 24 hours after your injury and check on you often.  Physical rest.  Brain rest.  Pain medicines.  Severe head injury    If you have a severe head injury, treatment may include:  Close observation. This includes hospitalization with the following care:  Frequent physical exams.  Frequent checks of how your brain and nervous system are working (neurological status).  Checking your blood pressure and oxygen levels.  Medicines to relieve pain, prevent seizures, and decrease brain swelling.  Airway protection and breathing support. This may include using a ventilator.  Treatments that monitor and manage swelling inside the brain.  Brain surgery. This may be needed to:  Remove a collection of blood or blood clots.  Stop the bleeding.  Remove a part of the skull to allow room for the brain to swell.  Follow these instructions at home:  Activity    Rest and avoid activities that are physically hard or tiring.  Make sure you get enough sleep.  Let your brain rest by limiting activities that require a lot of thought or attention, such as:  Watching TV.  Playing memory games and puzzles.  Job-related work or homework.  Working on the computer, using social media, and texting.  Avoid activities that could cause another head injury, such as playing sports, until your health care provider approves. Having another head injury, especially before the first one has healed, can be dangerous.  Ask your health care provider when it is safe for you to return to your regular activities, including work or school. Ask your health care provider for a step-by-step plan for gradually returning to activities.  Ask your health care provider when you can drive, ride a bicycle, or use heavy machinery. Your ability to react may be slower after a brain injury. Do not do these activities if you are dizzy.  Lifestyle      Do not drink alcohol until your health care provider approves. Do not use drugs. Alcohol and certain drugs may slow your recovery and can put you at risk of further injury.  If it is harder than usual to remember things, write them down.  If you are easily distracted, try to do one thing at a time.  Talk with family members or close friends when making important decisions.  Tell your friends, family, a trusted colleague, and  about your injury, symptoms, and restrictions. Have them watch for any new or worsening problems.  General instructions    Take over-the-counter and prescription medicines only as told by your health care provider.  Have someone stay with you for 24 hours after your head injury. This person should watch you for any changes in your symptoms and be ready to seek medical help.  Keep all follow-up visits as told by your health care provider. This is important.  How is this prevented?  Work on improving your balance and strength to avoid falls.  Wear a seat belt when you are in a moving vehicle.  Wear a helmet when riding a bicycle, skiing, or doing any other sport or activity that has a risk of injury.  If you drink alcohol:  Limit how much you use to:  0–1 drink a day for nonpregnant women.  0–2 drinks a day for men.  Be aware of how much alcohol is in your drink. In the [place] one drink equals one 12 oz bottle of beer (355 mL), one 5 oz glass of wine (148 mL), or one 1½ oz glass of hard liquor (44 mL).  Take safety measures in your home, such as:  Removing clutter and tripping hazards from floors and stairways.  Using grab bars in bathrooms and handrails by stairs.  Placing non-slip mats on floors and in bathtubs.  Improving lighting in dim areas.  Where to find more information  Centers for Disease Control and Prevention: www.cdc.gov  Get help right away if:  You have:  A severe headache that is not helped by medicine.  Trouble walking or weakness in your arms and legs.  Clear or bloody fluid coming from your nose or ears.  Changes in your vision.  A seizure.  Increased confusion or irritability.  Your symptoms get worse.  You are sleepier than normal and have trouble staying awake.  You lose your balance.  Your pupils change size.  Your speech is slurred.  Your dizziness gets worse.  You vomit.  These symptoms may represent a serious problem that is an emergency. Do not wait to see if the symptoms will go away. Get medical help right away. Call your local emergency services (911 in the [place] Do not drive yourself to the hospital.    Summary  Head injuries can be minor, or they can be a serious medical issue requiring immediate attention.  Treatment for this condition depends on the severity and type of injury you have.  Have someone stay with you for 24 hours after your injury and check on you often.  Ask your health care provider when it is safe for you to return to your regular activities, including work or school.  Head injury prevention includes wearing a seat belt in a motor vehicle, using a helmet on a bicycle, limiting alcohol use, and taking safety measures in your home.  This information is not intended to replace advice given to you by your health care provider. Make sure you discuss any questions you have with your health care provider.    Document Revised: 10/30/2020 Document Reviewed: 10/30/2020

## 2023-08-29 NOTE — ED ADULT NURSE NOTE - NSFALLUNIVINTERV_ED_ALL_ED
Bed/Stretcher in lowest position, wheels locked, appropriate side rails in place/Call bell, personal items and telephone in reach/Instruct patient to call for assistance before getting out of bed/chair/stretcher/Non-slip footwear applied when patient is off stretcher/[place] to call system/Physically safe environment - no spills, clutter or unnecessary equipment/Purposeful proactive rounding/Room/bathroom lighting operational, light cord in reach
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
## 2023-08-29 NOTE — ED PROVIDER NOTE - NSICDXPASTSURGICALHX_GEN_ALL_CORE_FT
PAST SURGICAL HISTORY:  History of cholecystectomy     Shoulder arthritis Multiple surgeries on left shoulder
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
## 2023-08-29 NOTE — ED PROVIDER NOTE - PATIENT PORTAL LINK FT
Statement Selected
You can access the FollowMyHealth Patient Portal offered by [facility] by registering at the following website: [email or web address]. By joining [employer]’s FollowMyHealth portal, you will also be able to view your health information using other applications (apps) compatible with our system.
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
## 2023-08-30 ENCOUNTER — NON-APPOINTMENT (OUTPATIENT)
Age: 63
End: 2023-08-30

## 2023-09-12 LAB — SARS-COV-2 N GENE NPH QL NAA+PROBE: NOT DETECTED

## 2023-09-19 ENCOUNTER — RX RENEWAL (OUTPATIENT)
Age: 63
End: 2023-09-19

## 2023-10-02 ENCOUNTER — RX RENEWAL (OUTPATIENT)
Age: 63
End: 2023-10-02

## 2023-10-12 ENCOUNTER — APPOINTMENT (OUTPATIENT)
Dept: FAMILY MEDICINE | Facility: HOSPITAL | Age: 63
End: 2023-10-12

## 2023-10-16 ENCOUNTER — RX RENEWAL (OUTPATIENT)
Age: 63
End: 2023-10-16

## 2023-10-18 NOTE — ED PROVIDER NOTE - INCLUDE COVID-19 DISCHARGE INSTRUCTIONS
----- Message from Pari Carcamo sent at 10/18/2023  9:06 AM CDT -----  Type:  Needs Medical Advice    Who Called: pt  Symptoms (please be specific): pt needs a call right back pt has a question she needs to ask   Would the patient rather a call back or a response via [employer]? call  Best Call Back Number: [phone]  Additional Information:          
Called patient to schedule appt with them. Scheduled over the phone with patient.   
<-------- Click here to INCLUDE CoVID-19 Discharge Instructions

## 2023-10-25 ENCOUNTER — OUTPATIENT (OUTPATIENT)
Dept: OUTPATIENT SERVICES | Facility: HOSPITAL | Age: 63
LOS: 1 days | End: 2023-10-25
Payer: MEDICAID

## 2023-10-25 ENCOUNTER — APPOINTMENT (OUTPATIENT)
Dept: FAMILY MEDICINE | Facility: HOSPITAL | Age: 63
End: 2023-10-25

## 2023-10-25 VITALS
SYSTOLIC BLOOD PRESSURE: 150 MMHG | DIASTOLIC BLOOD PRESSURE: 92 MMHG | BODY MASS INDEX: 33.19 KG/M2 | OXYGEN SATURATION: 99 % | RESPIRATION RATE: 17 BRPM | TEMPERATURE: 98.7 F | WEIGHT: 238 LBS | HEART RATE: 83 BPM

## 2023-10-25 DIAGNOSIS — Z00.00 ENCOUNTER FOR GENERAL ADULT MEDICAL EXAMINATION WITHOUT ABNORMAL FINDINGS: ICD-10-CM

## 2023-10-25 DIAGNOSIS — Z23 ENCOUNTER FOR IMMUNIZATION: ICD-10-CM

## 2023-10-25 DIAGNOSIS — M19.019 PRIMARY OSTEOARTHRITIS, UNSPECIFIED SHOULDER: Chronic | ICD-10-CM

## 2023-10-25 DIAGNOSIS — Z90.49 ACQUIRED ABSENCE OF OTHER SPECIFIED PARTS OF DIGESTIVE TRACT: Chronic | ICD-10-CM

## 2023-10-25 DIAGNOSIS — E11.9 TYPE 2 DIABETES MELLITUS WITHOUT COMPLICATIONS: ICD-10-CM

## 2023-10-25 PROCEDURE — 82043 UR ALBUMIN QUANTITATIVE: CPT

## 2023-10-25 PROCEDURE — G0463: CPT

## 2023-10-25 PROCEDURE — 90656 IIV3 VACC NO PRSV 0.5 ML IM: CPT

## 2023-10-30 ENCOUNTER — RX RENEWAL (OUTPATIENT)
Age: 63
End: 2023-10-30

## 2023-11-12 LAB
CREAT SPEC-SCNC: 205 MG/DL
HBA1C MFR BLD HPLC: 5.5
MICROALBUMIN 24H UR DL<=1MG/L-MCNC: 25.9 MG/DL
MICROALBUMIN/CREAT 24H UR-RTO: 127 MG/G

## 2023-11-16 RX ORDER — LOSARTAN POTASSIUM 50 MG/1
50 TABLET, FILM COATED ORAL DAILY
Qty: 90 | Refills: 3 | Status: ACTIVE | COMMUNITY
Start: 2018-09-12 | End: 1900-01-01

## 2023-12-14 NOTE — PROGRESS NOTE ADULT - SUBJECTIVE AND OBJECTIVE BOX
CC/Reason for Consult: gout flare    SUBJECTIVE / OVERNIGHT EVENTS: Pt has no complaints today. left ankle swelling resolved. able to ambulate, minimal pain now.     MEDICATIONS  (STANDING):  aspirin  chewable 81 milliGRAM(s) Oral daily  colchicine 0.6 milliGRAM(s) Oral two times a day  lidocaine   Patch 2 Patch Transdermal daily  LORazepam     Tablet 0.5 milliGRAM(s) Oral three times a day  losartan 50 milliGRAM(s) Oral daily  melatonin. 5 milliGRAM(s) Oral at bedtime  metoprolol succinate  milliGRAM(s) Oral daily  naproxen 500 milliGRAM(s) Oral two times a day  nicotine -  14 mG/24Hr(s) Patch 1 patch Transdermal daily  pantoprazole    Tablet 40 milliGRAM(s) Oral before breakfast  QUEtiapine 200 milliGRAM(s) Oral at bedtime  sertraline 150 milliGRAM(s) Oral daily  sertraline 50 milliGRAM(s) Oral once  tamsulosin 0.4 milliGRAM(s) Oral at bedtime  traZODone 150 milliGRAM(s) Oral at bedtime    MEDICATIONS  (PRN):  acetaminophen   Tablet .. 650 milliGRAM(s) Oral every 6 hours PRN Moderate Pain (4 - 6)  calcium carbonate    500 mG (Tums) Chewable 1 Tablet(s) Chew two times a day PRN heart burn  cyclobenzaprine 5 milliGRAM(s) Oral three times a day PRN Muscle Spasm  diphenhydrAMINE 100 milliGRAM(s) Oral at bedtime PRN insomnia  haloperidol     Tablet 5 milliGRAM(s) Oral every 6 hours PRN agitation  LORazepam     Tablet 2 milliGRAM(s) Oral every 6 hours PRN Agitation  LORazepam   Injectable 2 milliGRAM(s) IntraMuscular every 6 hours PRN SEVERE AGITATION  nicotine  Polacrilex Gum 2 milliGRAM(s) Oral every 3 hours PRN nicotine cravings      Vital Signs Last 24 Hrs  T(C): 36.7 (15 Oct 2020 07:43), Max: 36.7 (15 Oct 2020 07:43)  T(F): 98.1 (15 Oct 2020 07:43), Max: 98.1 (15 Oct 2020 07:43)  HR: --59  BP: --170/92  BP(mean): --  RR: --  SpO2: --  CAPILLARY BLOOD GLUCOSE            PHYSICAL EXAM:  GENERAL: NAD, well-developed  HEAD:  Atraumatic, Normocephalic  EYES: EOMI, conjunctiva and sclera clear  NECK: Supple, No JVD  CHEST/LUNG: Clear to auscultation bilaterally; No wheeze  HEART: Regular rate and rhythm; No murmurs, rubs, or gallops  ABDOMEN: Soft, Nontender, Nondistended; Bowel sounds present  EXTREMITIES:  2+ Peripheral Pulses, No clubbing, cyanosis, or edema. Left ankle: no more swelling, (+) full ROM of left ankle.   PSYCH: AAOx3  NEUROLOGY: non-focal  SKIN: No rashes or lesions    LABS:                    RADIOLOGY & ADDITIONAL TESTS:    Imaging Personally Reviewed:    Consultant(s) Notes Reviewed:      Care Discussed with Consultants/Other Providers: Detail Level: Detailed Hide Include Location In Plan Question?: No Detail Level: Zone

## 2023-12-27 ENCOUNTER — RX RENEWAL (OUTPATIENT)
Age: 63
End: 2023-12-27

## 2024-01-12 ENCOUNTER — RX RENEWAL (OUTPATIENT)
Age: 64
End: 2024-01-12

## 2024-02-05 RX ORDER — DULAGLUTIDE 1.5 MG/.5ML
1.5 INJECTION, SOLUTION SUBCUTANEOUS
Qty: 1 | Refills: 4 | Status: COMPLETED | COMMUNITY
Start: 2023-03-03 | End: 2024-02-05

## 2024-02-20 ENCOUNTER — APPOINTMENT (OUTPATIENT)
Dept: CARDIOLOGY | Facility: CLINIC | Age: 64
End: 2024-02-20

## 2024-03-25 RX ORDER — ALBUTEROL SULFATE 90 UG/1
108 (90 BASE) INHALANT RESPIRATORY (INHALATION)
Qty: 3 | Refills: 3 | Status: ACTIVE | COMMUNITY
Start: 2023-04-26 | End: 1900-01-01

## 2024-04-04 ENCOUNTER — RX RENEWAL (OUTPATIENT)
Age: 64
End: 2024-04-04

## 2024-04-05 ENCOUNTER — RX RENEWAL (OUTPATIENT)
Age: 64
End: 2024-04-05

## 2024-04-08 ENCOUNTER — RX RENEWAL (OUTPATIENT)
Age: 64
End: 2024-04-08

## 2024-04-08 RX ORDER — ASPIRIN 81 MG/1
81 TABLET, COATED ORAL
Qty: 90 | Refills: 3 | Status: ACTIVE | COMMUNITY
Start: 2024-04-08 | End: 1900-01-01

## 2024-04-09 ENCOUNTER — OUTPATIENT (OUTPATIENT)
Dept: OUTPATIENT SERVICES | Facility: HOSPITAL | Age: 64
LOS: 1 days | End: 2024-04-09
Payer: MEDICAID

## 2024-04-09 ENCOUNTER — APPOINTMENT (OUTPATIENT)
Dept: FAMILY MEDICINE | Facility: HOSPITAL | Age: 64
End: 2024-04-09
Payer: MEDICAID

## 2024-04-09 VITALS
OXYGEN SATURATION: 94 % | TEMPERATURE: 98 F | RESPIRATION RATE: 17 BRPM | HEART RATE: 77 BPM | SYSTOLIC BLOOD PRESSURE: 119 MMHG | DIASTOLIC BLOOD PRESSURE: 79 MMHG | BODY MASS INDEX: 34.87 KG/M2 | WEIGHT: 250 LBS

## 2024-04-09 DIAGNOSIS — R42 DIZZINESS AND GIDDINESS: ICD-10-CM

## 2024-04-09 DIAGNOSIS — G47.33 OBSTRUCTIVE SLEEP APNEA (ADULT) (PEDIATRIC): ICD-10-CM

## 2024-04-09 DIAGNOSIS — Z90.49 ACQUIRED ABSENCE OF OTHER SPECIFIED PARTS OF DIGESTIVE TRACT: Chronic | ICD-10-CM

## 2024-04-09 DIAGNOSIS — H25.093 OTHER AGE-RELATED INCIPIENT CATARACT, BILATERAL: ICD-10-CM

## 2024-04-09 DIAGNOSIS — F32.2 MAJOR DEPRESSIVE DISORDER, SINGLE EPISODE, SEVERE WITHOUT PSYCHOTIC FEATURES: ICD-10-CM

## 2024-04-09 DIAGNOSIS — E11.9 TYPE 2 DIABETES MELLITUS WITHOUT COMPLICATIONS: ICD-10-CM

## 2024-04-09 DIAGNOSIS — Z00.00 ENCOUNTER FOR GENERAL ADULT MEDICAL EXAMINATION WITHOUT ABNORMAL FINDINGS: ICD-10-CM

## 2024-04-09 DIAGNOSIS — R94.31 ABNORMAL ELECTROCARDIOGRAM [ECG] [EKG]: ICD-10-CM

## 2024-04-09 DIAGNOSIS — Z72.820 SLEEP DEPRIVATION: ICD-10-CM

## 2024-04-09 DIAGNOSIS — F32.2 MAJOR DEPRESSIVE DISORDER, SINGLE EPISODE, SEVERE W/OUT PSYCHOTIC FEATURES: ICD-10-CM

## 2024-04-09 DIAGNOSIS — M19.019 PRIMARY OSTEOARTHRITIS, UNSPECIFIED SHOULDER: Chronic | ICD-10-CM

## 2024-04-09 DIAGNOSIS — J45.909 UNSPECIFIED ASTHMA, UNCOMPLICATED: ICD-10-CM

## 2024-04-09 PROCEDURE — 93010 ELECTROCARDIOGRAM REPORT: CPT

## 2024-04-09 RX ORDER — ATORVASTATIN CALCIUM 80 MG/1
80 TABLET, FILM COATED ORAL
Qty: 90 | Refills: 0 | Status: ACTIVE | COMMUNITY
Start: 2018-02-21 | End: 1900-01-01

## 2024-04-10 PROBLEM — Z72.820 POOR SLEEP: Status: ACTIVE | Noted: 2023-04-26

## 2024-04-10 PROBLEM — H25.093 AGE-RELATED INCIPIENT CATARACT OF BOTH EYES: Status: ACTIVE | Noted: 2023-03-03

## 2024-04-10 PROBLEM — F32.2 SEVERE DEPRESSION: Status: ACTIVE | Noted: 2021-04-21

## 2024-04-10 PROBLEM — G47.33 OSA (OBSTRUCTIVE SLEEP APNEA): Status: ACTIVE | Noted: 2023-06-26

## 2024-04-11 ENCOUNTER — LABORATORY RESULT (OUTPATIENT)
Age: 64
End: 2024-04-11

## 2024-04-11 PROCEDURE — 93005 ELECTROCARDIOGRAM TRACING: CPT

## 2024-04-11 PROCEDURE — 36415 COLL VENOUS BLD VENIPUNCTURE: CPT

## 2024-04-11 PROCEDURE — 83036 HEMOGLOBIN GLYCOSYLATED A1C: CPT

## 2024-04-11 PROCEDURE — 85027 COMPLETE CBC AUTOMATED: CPT

## 2024-04-11 PROCEDURE — 84443 ASSAY THYROID STIM HORMONE: CPT

## 2024-04-11 PROCEDURE — 80061 LIPID PANEL: CPT

## 2024-04-11 PROCEDURE — 84439 ASSAY OF FREE THYROXINE: CPT

## 2024-04-11 PROCEDURE — 80053 COMPREHEN METABOLIC PANEL: CPT

## 2024-04-17 ENCOUNTER — NON-APPOINTMENT (OUTPATIENT)
Age: 64
End: 2024-04-17

## 2024-04-17 ENCOUNTER — APPOINTMENT (OUTPATIENT)
Dept: CARDIOLOGY | Facility: CLINIC | Age: 64
End: 2024-04-17
Payer: MEDICAID

## 2024-04-17 VITALS
HEART RATE: 68 BPM | OXYGEN SATURATION: 95 % | WEIGHT: 245 LBS | BODY MASS INDEX: 34.3 KG/M2 | DIASTOLIC BLOOD PRESSURE: 87 MMHG | RESPIRATION RATE: 19 BRPM | HEIGHT: 71 IN | SYSTOLIC BLOOD PRESSURE: 133 MMHG

## 2024-04-17 VITALS
DIASTOLIC BLOOD PRESSURE: 76 MMHG | OXYGEN SATURATION: 95 % | RESPIRATION RATE: 19 BRPM | WEIGHT: 245 LBS | HEIGHT: 71 IN | HEART RATE: 68 BPM | BODY MASS INDEX: 34.3 KG/M2 | SYSTOLIC BLOOD PRESSURE: 126 MMHG

## 2024-04-17 DIAGNOSIS — I10 ESSENTIAL (PRIMARY) HYPERTENSION: ICD-10-CM

## 2024-04-17 PROCEDURE — 93000 ELECTROCARDIOGRAM COMPLETE: CPT

## 2024-04-17 PROCEDURE — 99215 OFFICE O/P EST HI 40 MIN: CPT | Mod: 25

## 2024-04-17 NOTE — PHYSICAL EXAM
[Obese] : obese [Normal S1, S2] : normal S1, S2 [No Rub] : no rub [No Gallop] : no gallop [Murmur] : murmur [Normal] : alert and oriented, normal memory [de-identified] : l/Vl systolic murmur

## 2024-04-17 NOTE — HISTORY OF PRESENT ILLNESS
[FreeTextEntry1] : Since last visit with me, he has been getting more active. 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 He has intermittent feelings of headache and gets dizzy and lightheaded if he gets up too quickly.  No syncope or near syncope. No complaints of chest pain or chest pressure.  No shortness of breath.  No palpitations.  No edema.  No orthopnea.  No PND. He is still waiting for referral to see sleep disorder specialist.

## 2024-04-17 NOTE — DISCUSSION/SUMMARY
[FreeTextEntry1] : 63-year-old man with hypertension and hyperlipidemia. He has a good functional capacity has been asymptomatic with respect to cardiac issues. Blood pressure stable.  There is no JVD.  Lung fields clear.  No edema.  He is euvolemic. EKG sinus rhythm, within normal limits. The current cardiac status appears to be stable.  Plan 1.  Current medications are reviewed, no changes. 2.  Lifestyle modifications including try to maintain a low-fat low-cholesterol weight reducing diet was also discussed. 3.  He will follow-up with me in the office in 1 year. 4.  Advised to monitor for any cardiac symptoms and to report back for any changes or if he has any other concerns.  Cardiac issues were discussed, all questions answered.    [EKG obtained to assist in diagnosis and management of assessed problem(s)] : EKG obtained to assist in diagnosis and management of assessed problem(s)

## 2024-04-17 NOTE — REASON FOR VISIT
[FreeTextEntry1] : Cardiology follow-up visit for evaluation management of hypertension and hyperlipidemia.  
No

## 2024-04-17 NOTE — CARDIOLOGY SUMMARY
[de-identified] : April 17 2024. Sinus Rhythm  WITHIN NORMAL LIMITS [de-identified] : May 24, 2023.  Exercise stress test negative for ischemia.  [de-identified] : March 22, 2023. Normal LV function. Moderate pulmonary HTN . Normal aortic root size

## 2024-04-22 ENCOUNTER — APPOINTMENT (OUTPATIENT)
Dept: FAMILY MEDICINE | Facility: HOSPITAL | Age: 64
End: 2024-04-22

## 2024-04-22 ENCOUNTER — OUTPATIENT (OUTPATIENT)
Dept: OUTPATIENT SERVICES | Facility: HOSPITAL | Age: 64
LOS: 1 days | End: 2024-04-22
Payer: MEDICAID

## 2024-04-22 DIAGNOSIS — E11.9 TYPE 2 DIABETES MELLITUS W/OUT COMPLICATIONS: ICD-10-CM

## 2024-04-22 DIAGNOSIS — E66.9 OBESITY, UNSPECIFIED: ICD-10-CM

## 2024-04-22 DIAGNOSIS — E78.6 LIPOPROTEIN DEFICIENCY: ICD-10-CM

## 2024-04-22 DIAGNOSIS — Z90.49 ACQUIRED ABSENCE OF OTHER SPECIFIED PARTS OF DIGESTIVE TRACT: Chronic | ICD-10-CM

## 2024-04-22 DIAGNOSIS — M19.019 PRIMARY OSTEOARTHRITIS, UNSPECIFIED SHOULDER: Chronic | ICD-10-CM

## 2024-04-22 DIAGNOSIS — E11.9 TYPE 2 DIABETES MELLITUS WITHOUT COMPLICATIONS: ICD-10-CM

## 2024-04-22 DIAGNOSIS — E78.5 HYPERLIPIDEMIA, UNSPECIFIED: ICD-10-CM

## 2024-04-22 DIAGNOSIS — Z00.00 ENCOUNTER FOR GENERAL ADULT MEDICAL EXAMINATION WITHOUT ABNORMAL FINDINGS: ICD-10-CM

## 2024-04-22 PROCEDURE — G0463: CPT

## 2024-04-22 RX ORDER — TIRZEPATIDE 7.5 MG/.5ML
7.5 INJECTION, SOLUTION SUBCUTANEOUS
Qty: 1 | Refills: 3 | Status: ACTIVE | COMMUNITY
Start: 2024-02-05 | End: 1900-01-01

## 2024-04-22 RX ORDER — SERTRALINE HYDROCHLORIDE 50 MG/1
50 TABLET, FILM COATED ORAL
Qty: 270 | Refills: 0 | Status: ACTIVE | COMMUNITY
Start: 2020-10-28 | End: 1900-01-01

## 2024-04-22 RX ORDER — FLASH GLUCOSE SENSOR
KIT MISCELLANEOUS
Qty: 2 | Refills: 5 | Status: COMPLETED | COMMUNITY
Start: 2023-03-03 | End: 2024-04-22

## 2024-04-22 NOTE — HISTORY OF PRESENT ILLNESS
[FreeTextEntry1] : HERE TODAY FOR DIABETES FOLLOW UP LAST SEEN BY ME MARCH 2023 FEELS WELL DENIES POLYURIA, POLYDIPSIA OR UNINTENTIONAL WEIGHT LOSS NEWLY DX IN 2021 DURING A 3 WEEK HOSPITALIZATION FOR COVID W/ A1C 10.8%. WAS STARTED ON BASAL BOLUS INSULIN AT THAT TIME.   A1C 6.6%  4/9/24 INSULIN STOPPED  SHORTLY AFTER WHEN A1C NORMALIZED AND PT STARTED TRULICITY .REPORTS COMPLIANCE W/ METFORMIN 500 MG WITH BREAKFAST AND DINNER TOLERATING MOUNJARO 5 MG WEEKLY. DENIES NAUSEA, VOMITING OR ABDOMINAL PAIN DRINKS MOSTLY WATER AND ZERO SUGAR POWER AIDE EATS REASONABLE DIET, CAKE ON OCCASION  UTD W/ EYE - DENIES RETINOPATHY - + CARACT RIGHT EYE .  E[facility] C[facility] AT [facility]

## 2024-04-22 NOTE — ASSESSMENT
[Diabetes Foot Care] : diabetes foot care [Long Term Vascular Complications] : long term vascular complications of diabetes [Carbohydrate Consistent Diet] : carbohydrate consistent diet [Importance of Diet and Exercise] : importance of diet and exercise to improve glycemic control, achieve weight loss and improve cardiovascular health [Exercise/Effect on Glucose] : exercise/effect on glucose [Self Monitoring of Blood Glucose] : self monitoring of blood glucose [Weight Loss] : weight loss [FreeTextEntry1] : A1C AT GOAL AT 6.6% DISCUSSED GENERAL A1C GOAL OF 7% AND BG GOALS  BEFORE MEALS AND < 180 2 HOURS AFTER MEALS TO HELP REDUCE INCIDENCE OF COMPLICATIONS  PT WOULD BENEFIT FROM ADDED WEIGHT LOSS PT IN AGREEMENT TO INCREASE MOUNJARO FROM 5 TO 7.5 MG WEEKLY.  CALL OFFICE IF UNABLE TO OBTAIN  ENCOURAGED SLOW, STEADY WEIGHT LOSS OF 1-2 LBS EACH WEEK ENCOURAGED PHYSICAL ACTIVITY 30 MINUTES 5 DAYS EACH WEEK AS TOLERATED LENGTHY DISCUSSION ON TIPS AND TECHNIQUES FOR CHOOSING SMALLER PORTIONS AND CHOOSING HIGHER QUALITY CARBOHYDRATES AND LEAN PROTEINS.  USE OF MEASURING CUPS, SMALLER PLATES AND EATING OUT. INSTRUCTED TO TEST BG BEFORE BREAKFAST AND DINNER AND RECORD ON LOG SHEETS .INSTRUCTED TO CALL OFFICE FOR BG < 70, > 250 OR FOR ANY QUESTIONS OR CONCERNS  PT MADE AWARE OF ELEVATED TSH - HAS APPT W/ DR FIELDS 5/7/24 - PT MADE AWARE AND STRESSED IMPORTANCE OF ATTENDING APPT.  PT VERBALIZED UNDERSTANDING FOLLOW UP AS NEEDED

## 2024-04-22 NOTE — HISTORY OF PRESENT ILLNESS
[FreeTextEntry1] : CPE [de-identified] : 63 M with PMH of T2DM on mounjaro 5mg q weekly and Metformin, Asthma on rescue inhaler, HTN on losartan and metoprolol, severe depression on trazodone, sertraline, and seroquel presenting for a CPE. Pt reporting that he has been having headache for the past couple months, happens on the left occipital region. Stays for 2 hrs. Relieved after resting. Sometimes gets dizzy. Dizziness not associated with CP, palpitations, or dyspnea. Does not appear to have any orthostatic quality to it. Appears to happen at random.

## 2024-04-22 NOTE — PLAN
[FreeTextEntry1] :  CPE  - Bloodwork ordered as above  - do it on an empty stomach  Dizziness - EKG unchanged from prior  - TSH ordered  - Keep a diary - may need to work up more   HTN  - c/w Metoprolol and losartan  - Unclear why on metoprolol - pt reporting prior Afib episode - not on blood thinners at the moment  - prior cardio note does not talk about afib - Cardio referral provided   Ascending aortic aneurysm  - screening US ordered  - f/u discussion with cardiology   T2DM  - A1C 6.6  - c/w Mounjaro  - c/w Metformin 500mg BID  Asthma  - c/w PRN albuterol  - Pulmonary referral given - at which point should get another screening CT scan for lung cancer  - Pt has been a long time smoker  MAI - has morning somnolence and poor sleep - snores   - will order sleep study - pulm follow up   Cataract  - ophthalmologist referral   Severe depression  - no SI or HI  - PHQ9 score as above  - c/w trazodone, sertraline, and seroquel  - f/u with [facility]   Colon cancer screen  - FIT was not ordered today - please order at the next visit   RTC 1 months for dizziness follow up   Case d/w Dr. Dale

## 2024-04-22 NOTE — REVIEW OF SYSTEMS
[Fatigue] : no fatigue [Blurred Vision] : no blurred vision [Dysphagia] : no dysphagia [Chest Pain] : no chest pain [Shortness Of Breath] : no shortness of breath [Nausea] : no nausea [Vomiting] : no vomiting [Diarrhea] : no diarrhea [Gas/Bloating] : no gas/bloating [Polyuria] : no polyuria [Polydipsia] : no polydipsia

## 2024-04-22 NOTE — PHYSICAL EXAM
[Well Nourished] : well nourished [No Respiratory Distress] : no respiratory distress  [Clear to Auscultation] : lungs were clear to auscultation bilaterally [Normal Rate] : normal rate  [Normal S1, S2] : normal S1 and S2 [Soft] : abdomen soft [Non-distended] : non-distended [No HSM] : no HSM [Normal Supraclavicular Nodes] : no supraclavicular lymphadenopathy [Normal Posterior Cervical Nodes] : no posterior cervical lymphadenopathy [Normal Anterior Cervical Nodes] : no anterior cervical lymphadenopathy [Normal Affect] : the affect was normal [Normal Insight/Judgement] : insight and judgment were intact [PERRL] : pupils equal round and reactive to light [EOMI] : extraocular movements intact [No Lymphadenopathy] : no lymphadenopathy [Coordination Grossly Intact] : coordination grossly intact [No Focal Deficits] : no focal deficits [de-identified] : haziness of the lens?? [de-identified] : Newton hallpike was negative

## 2024-04-22 NOTE — HEALTH RISK ASSESSMENT
[Good] : ~his/her~  mood as  good [No] : No [1 or 2 (0 pts)] : 1 or 2 (0 points) [Never (0 pts)] : Never (0 points) [2] : 2) Feeling down, depressed, or hopeless for more than half of the days (2) [Several Days (1)] : 2.) Feeling down, depressed or hopeless? Several days [1/2 of Days or More (2)] : 5.) Poor appetite or overeating? Half the days or more [Nearly Every Day (3)] : 7.) Trouble concentrating on things, such as reading a newspaper or watching television? Nearly every day [Not at All (0)] : 9.) Thoughts that you would be off dead or of hurting yourself in some way? Not at all [Moderate] : Severity of Depression is Moderate [HIV test declined] : HIV test declined [None] : None [Alone] : lives alone [Employed] : employed [High School] : high school [Sexually Active] : sexually active [Fully functional (bathing, dressing, toileting, transferring, walking, feeding)] : Fully functional (bathing, dressing, toileting, transferring, walking, feeding) [Fully functional (using the telephone, shopping, preparing meals, housekeeping, doing laundry, using] : Fully functional and needs no help or supervision to perform IADLs (using the telephone, shopping, preparing meals, housekeeping, doing laundry, using transportation, managing medications and managing finances) [Reports changes in vision] : Reports changes in vision [Former] : Former [> 15 Years] : > 15 Years [de-identified] : quit 10 years  [de-identified] : walking [de-identified] : Good mix of fruits and vegetables, has pasta sometimes, does not eats fast food too much [[id number]] : 11 [Change in mental status noted] : No change in mental status noted [Language] : denies difficulty with language [Reports changes in hearing] : Reports no changes in hearing [HepatitisCComments] : Done - Ab positive but RNA negative [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

## 2024-04-22 NOTE — REVIEW OF SYSTEMS
[Dizziness] : dizziness [Negative] : Gastrointestinal [Suicidal] : not suicidal [FreeTextEntry4] : Headache along the left occipital region [de-identified] : No SI or HI

## 2024-04-24 DIAGNOSIS — E78.1 PURE HYPERGLYCERIDEMIA: ICD-10-CM

## 2024-04-27 ENCOUNTER — NON-APPOINTMENT (OUTPATIENT)
Age: 64
End: 2024-04-27

## 2024-04-28 PROBLEM — E78.1 HYPERTRIGLYCERIDEMIA: Status: ACTIVE | Noted: 2024-04-28

## 2024-04-28 LAB
ALBUMIN SERPL ELPH-MCNC: 4.7 G/DL
ALP BLD-CCNC: 71 U/L
ALT SERPL-CCNC: 34 U/L
ANION GAP SERPL CALC-SCNC: 16 MMOL/L
AST SERPL-CCNC: 32 U/L
BILIRUB SERPL-MCNC: 0.4 MG/DL
BUN SERPL-MCNC: 17 MG/DL
CALCIUM SERPL-MCNC: 10.1 MG/DL
CHLORIDE SERPL-SCNC: 101 MMOL/L
CHOLEST SERPL-MCNC: 135 MG/DL
CO2 SERPL-SCNC: 25 MMOL/L
CREAT SERPL-MCNC: 1.19 MG/DL
EGFR: 69 ML/MIN/1.73M2
GLUCOSE SERPL-MCNC: 129 MG/DL
HBA1C MFR BLD HPLC: 6.6
HCT VFR BLD CALC: 40 %
HDLC SERPL-MCNC: 30 MG/DL
HGB BLD-MCNC: 13.8 G/DL
LDLC SERPL CALC-MCNC: 52 MG/DL
MCHC RBC-ENTMCNC: 32.8 PG
MCHC RBC-ENTMCNC: 34.5 GM/DL
MCV RBC AUTO: 95 FL
NONHDLC SERPL-MCNC: 105 MG/DL
PLATELET # BLD AUTO: 259 K/UL
POTASSIUM SERPL-SCNC: 5.2 MMOL/L
PROT SERPL-MCNC: 7.6 G/DL
RBC # BLD: 4.21 M/UL
RBC # FLD: 12.6 %
SODIUM SERPL-SCNC: 141 MMOL/L
TRIGL SERPL-MCNC: 347 MG/DL
TSH SERPL-ACNC: 5.42 UIU/ML
WBC # FLD AUTO: 10 K/UL

## 2024-04-28 RX ORDER — LEVOTHYROXINE SODIUM 0.03 MG/1
25 TABLET ORAL
Qty: 90 | Refills: 0 | Status: ACTIVE | COMMUNITY
Start: 2024-04-28 | End: 1900-01-01

## 2024-05-05 NOTE — ED ADULT NURSE REASSESSMENT NOTE - NS ED NURSE REASSESS COMMENT FT1
Pt changed into gown and all belongings placed in patient bag and given to mother.    Covid result negative, Avelino RN given report and current VS. Awaiting security for safe transport with PES for voluntary admission.

## 2024-05-07 ENCOUNTER — APPOINTMENT (OUTPATIENT)
Dept: FAMILY MEDICINE | Facility: HOSPITAL | Age: 64
End: 2024-05-07

## 2024-05-07 ENCOUNTER — OUTPATIENT (OUTPATIENT)
Dept: OUTPATIENT SERVICES | Facility: HOSPITAL | Age: 64
LOS: 1 days | End: 2024-05-07
Payer: MEDICAID

## 2024-05-07 VITALS — SYSTOLIC BLOOD PRESSURE: 125 MMHG | DIASTOLIC BLOOD PRESSURE: 84 MMHG | HEART RATE: 74 BPM

## 2024-05-07 VITALS
TEMPERATURE: 98.7 F | HEART RATE: 73 BPM | RESPIRATION RATE: 17 BRPM | OXYGEN SATURATION: 99 % | DIASTOLIC BLOOD PRESSURE: 88 MMHG | BODY MASS INDEX: 34.87 KG/M2 | WEIGHT: 250 LBS | SYSTOLIC BLOOD PRESSURE: 132 MMHG

## 2024-05-07 VITALS — SYSTOLIC BLOOD PRESSURE: 122 MMHG | DIASTOLIC BLOOD PRESSURE: 78 MMHG | HEART RATE: 75 BPM

## 2024-05-07 VITALS — TEMPERATURE: 98 F

## 2024-05-07 DIAGNOSIS — Z00.00 ENCOUNTER FOR GENERAL ADULT MEDICAL EXAMINATION WITHOUT ABNORMAL FINDINGS: ICD-10-CM

## 2024-05-07 DIAGNOSIS — M19.019 PRIMARY OSTEOARTHRITIS, UNSPECIFIED SHOULDER: Chronic | ICD-10-CM

## 2024-05-07 DIAGNOSIS — R42 DIZZINESS AND GIDDINESS: ICD-10-CM

## 2024-05-07 DIAGNOSIS — R51.9 HEADACHE, UNSPECIFIED: ICD-10-CM

## 2024-05-07 DIAGNOSIS — Z90.49 ACQUIRED ABSENCE OF OTHER SPECIFIED PARTS OF DIGESTIVE TRACT: Chronic | ICD-10-CM

## 2024-05-07 PROCEDURE — G0463: CPT

## 2024-05-08 NOTE — HISTORY OF PRESENT ILLNESS
[FreeTextEntry1] : headache and dizziness f/u  [de-identified] : 63M with PMH of T2DM, Asthma, HTN, severe depression presenting for dizziness and headache f/u. Pt was last seen on 4/9/24 c/o headache associated with intermittent dizziness x 2 months. Pt continues to have constant headache in the left occipital region. Reports of mild dizziness when standing up from sitting to stand. Headache lasting up to 2hrs, and relieved after resting.  Pt denies of syncope or near syncope, chest pain, SOB, palpitations, vision changes, focal weakness, nausea, vomiting, ear pain, hearing loss, fever, chills.  EKG was done in cardio clinic on 4/17/24, which showed NSR. Pt scheduled for PULM on 5/29 for evaluation of MAI.

## 2024-05-08 NOTE — REVIEW OF SYSTEMS
[Headache] : headache [Dizziness] : dizziness [Fever] : no fever [Chills] : no chills [Night Sweats] : no night sweats [Recent Change In Weight] : ~T no recent weight change [Discharge] : no discharge [Pain] : no pain [Vision Problems] : no vision problems [Earache] : no earache [Hearing Loss] : no hearing loss [Postnasal Drip] : no postnasal drip [Nasal Discharge] : no nasal discharge [Sore Throat] : no sore throat [Hoarseness] : no hoarseness [Chest Pain] : no chest pain [Palpitations] : no palpitations [Lower Ext Edema] : no lower extremity edema [Orthopena] : no orthopnea [Paroxysmal Nocturnal Dyspnea] : no paroxysmal nocturnal dyspnea [Shortness Of Breath] : no shortness of breath [Cough] : no cough [Dyspnea on Exertion] : not dyspnea on exertion [Abdominal Pain] : no abdominal pain [Nausea] : no nausea [Vomiting] : no vomiting [Muscle Weakness] : no muscle weakness [Itching] : no itching [Skin Rash] : no skin rash [Fainting] : no fainting [Confusion] : no confusion [Unsteady Walk] : no ataxia [Memory Loss] : no memory loss [Easy Bleeding] : no easy bleeding [Easy Bruising] : no easy bruising [de-identified] : Denies SI/HI

## 2024-05-08 NOTE — PHYSICAL EXAM
[No Acute Distress] : no acute distress [Well Nourished] : well nourished [Well-Appearing] : well-appearing [PERRL] : pupils equal round and reactive to light [EOMI] : extraocular movements intact [Normal Outer Ear/Nose] : the outer ears and nose were normal in appearance [Normal Oropharynx] : the oropharynx was normal [Normal TMs] : both tympanic membranes were normal [Supple] : supple [No Respiratory Distress] : no respiratory distress  [No Accessory Muscle Use] : no accessory muscle use [Clear to Auscultation] : lungs were clear to auscultation bilaterally [Normal Rate] : normal rate  [Regular Rhythm] : with a regular rhythm [Normal S1, S2] : normal S1 and S2 [No Edema] : there was no peripheral edema [Grossly Normal Strength/Tone] : grossly normal strength/tone [No Rash] : no rash [Normal Gait] : normal gait [Normal Affect] : the affect was normal [Alert and Oriented x3] : oriented to person, place, and time [Normal Insight/Judgement] : insight and judgment were intact [de-identified] : Alexandria alfonso negative

## 2024-05-10 ENCOUNTER — OUTPATIENT (OUTPATIENT)
Dept: OUTPATIENT SERVICES | Facility: HOSPITAL | Age: 64
LOS: 1 days | End: 2024-05-10
Payer: MEDICAID

## 2024-05-10 ENCOUNTER — RESULT REVIEW (OUTPATIENT)
Age: 64
End: 2024-05-10

## 2024-05-10 DIAGNOSIS — R51.9 HEADACHE, UNSPECIFIED: ICD-10-CM

## 2024-05-10 DIAGNOSIS — M19.019 PRIMARY OSTEOARTHRITIS, UNSPECIFIED SHOULDER: Chronic | ICD-10-CM

## 2024-05-10 DIAGNOSIS — R42 DIZZINESS AND GIDDINESS: ICD-10-CM

## 2024-05-10 DIAGNOSIS — Z90.49 ACQUIRED ABSENCE OF OTHER SPECIFIED PARTS OF DIGESTIVE TRACT: Chronic | ICD-10-CM

## 2024-05-10 PROCEDURE — 70450 CT HEAD/BRAIN W/O DYE: CPT

## 2024-05-10 PROCEDURE — 70450 CT HEAD/BRAIN W/O DYE: CPT | Mod: 26

## 2024-05-11 ENCOUNTER — RX RENEWAL (OUTPATIENT)
Age: 64
End: 2024-05-11

## 2024-05-11 ENCOUNTER — NON-APPOINTMENT (OUTPATIENT)
Age: 64
End: 2024-05-11

## 2024-05-11 RX ORDER — METOPROLOL SUCCINATE 100 MG/1
100 TABLET, EXTENDED RELEASE ORAL DAILY
Qty: 90 | Refills: 3 | Status: ACTIVE | COMMUNITY
Start: 2020-10-28 | End: 1900-01-01

## 2024-05-11 RX ORDER — METFORMIN HYDROCHLORIDE 500 MG/1
500 TABLET, COATED ORAL
Qty: 60 | Refills: 0 | Status: ACTIVE | COMMUNITY
Start: 2024-04-09 | End: 1900-01-01

## 2024-05-29 ENCOUNTER — APPOINTMENT (OUTPATIENT)
Dept: PULMONOLOGY | Facility: CLINIC | Age: 64
End: 2024-05-29
Payer: MEDICAID

## 2024-05-29 VITALS
HEIGHT: 71 IN | OXYGEN SATURATION: 99 % | WEIGHT: 250 LBS | TEMPERATURE: 97.5 F | DIASTOLIC BLOOD PRESSURE: 80 MMHG | HEART RATE: 82 BPM | SYSTOLIC BLOOD PRESSURE: 120 MMHG | BODY MASS INDEX: 35 KG/M2

## 2024-05-29 DIAGNOSIS — R06.83 SNORING: ICD-10-CM

## 2024-05-29 PROCEDURE — G2211 COMPLEX E/M VISIT ADD ON: CPT | Mod: NC

## 2024-05-29 PROCEDURE — 99213 OFFICE O/P EST LOW 20 MIN: CPT

## 2024-05-29 NOTE — HISTORY OF PRESENT ILLNESS
[Former] : former [>= 20 pack years] : >= 20 pack years [Never] : never [Awakes Unrefreshed] : awakes unrefreshed [Awakes with Dry Mouth] : awakes with dry mouth [Awakes with Headache] : awakes with headache [Fatigue] : fatigue [Nonrestorative Sleep] : nonrestorative sleep [Recent  Weight Gain] : recent weight gain [Snoring] : snoring [TextBox_4] : 61y/o   male  born in [place]  ex smoker (   started 1/2- one pack  ppd -   20-  late 50's    > 20 pack year )  ex -ETOH  h/o DM  - HTN   in past work bar / construction  - sob  - since covid  2020  -fatigue ++  daytime   sleepiness - no naps  -no cough   + wheezing  -  stopped  trelegy  did not feel was helping  -at times notices some wheezing   does not have albuterol prn  - on singulair pm  - h/o depression on meds  -no nasal congesion   7/26/2023 61y/o male   born in [place] ex smoker    > 20 pack year   DM HTN    follow up   likely asthma ? - albuterol MDi prn only  -PFT normal  - CT chest   no acute findkings - sleep study not  approved - 5/29/2024 64y/o male   ex smoker    > 20 pack   DM on majaroo      hypothyroid    on synthroid    depression followed by psychiatry  sleepiness and  dizziness   here for sleep and    asthma    f/u ct -refused sleep in past now agreeable  - needs    ct  screening next month - some dizziness and snoring + - some dizziness --   fatigue to   get sleep evaluation  - [TextBox_11] : 1 [YearQuit] : 2017? [TextBox_77] : 10 [TextBox_79] : 5 [ESS] : 10

## 2024-05-29 NOTE — ASSESSMENT
[FreeTextEntry1] : 62y/o    male  1- reactive airways - asthma vs COPD   likely ex smoker > 20 pack years 2- sleep disordered breathing / obesity / am headaches now  3- depression /  DM / HTN  4- GERD on PPI 5- vaccinations covid ( h/o covid 2020 )    moderna    pneumonia  Recommendations 1- sleep study now agrees  2- PFT 3- albuterol  refuses  4- exercise and diet reviewed  5- ct  5/24 6-pscyhatrist      on seroquel      trazadone  7- GI   return  after sleep study agrees  to above sleep reviewed     imaging

## 2024-05-29 NOTE — REVIEW OF SYSTEMS
[Fatigue] : fatigue [Recent Wt Gain (___ Lbs)] : ~T recent [unfilled] lb weight gain [Dry Mouth] : dry mouth [Wheezing] : wheezing [SOB on Exertion] : sob on exertion [GERD] : gerd [Diabetes] : diabetes [Obesity] : obesity [Dizziness] : dizziness [Fever] : no fever [Recent Wt Loss (___ Lbs)] : ~T no recent weight loss [Sore Throat] : no sore throat [Nasal Congestion] : no nasal congestion [Cough] : no cough [Hemoptysis] : no hemoptysis [Sputum] : no sputum [Dyspnea] : no dyspnea [Chest Discomfort] : no chest discomfort [Palpitations] : no palpitations [Abdominal Pain] : no abdominal pain [Nausea] : no nausea [Vomiting] : no vomiting [Dysphagia] : no dysphagia [Bleeding] : no bleeding [Myalgias] : no myalgias [Chronic Pain] : no chronic pain [Rash] : no rash [Blood Transfusion] : no blood transfusion [Clotting Disorder/ Frequent bleeding] : no clotting disorder/ frequent bleeding [Thyroid Problem] : no thyroid problem [TextBox_69] : controlled with meds

## 2024-05-29 NOTE — PHYSICAL EXAM
[Enlarged Base of the Tongue] : enlarged base of the tongue [IV] : Mallampati Class: IV [Normal Appearance] : normal appearance [Supple] : supple [No Neck Mass] : no neck mass [No JVD] : no jvd [Normal Rate/Rhythm] : normal rate/rhythm [Normal S1, S2] : normal s1, s2 [No Murmurs] : no murmurs [No Resp Distress] : no resp distress [No Acc Muscle Use] : no acc muscle use [Clear to Auscultation Bilaterally] : clear to auscultation bilaterally [Benign] : benign [Not Tender] : not tender [No Hernias] : no hernias [Normal Gait] : normal gait [No Clubbing] : no clubbing [No Edema] : no edema [No Rash] : no rash [No Motor Deficits] : no motor deficits [Normal Affect] : normal affect [TextBox_2] : pleasant male no distress speakign full sentences no cough no sob   [TextBox_11] : crowded nolesion moist

## 2024-06-04 ENCOUNTER — OUTPATIENT (OUTPATIENT)
Dept: OUTPATIENT SERVICES | Facility: HOSPITAL | Age: 64
LOS: 1 days | End: 2024-06-04
Payer: MEDICAID

## 2024-06-04 ENCOUNTER — APPOINTMENT (OUTPATIENT)
Dept: FAMILY MEDICINE | Facility: HOSPITAL | Age: 64
End: 2024-06-04

## 2024-06-04 VITALS
WEIGHT: 260 LBS | TEMPERATURE: 98.2 F | RESPIRATION RATE: 18 BRPM | OXYGEN SATURATION: 95 % | BODY MASS INDEX: 36.26 KG/M2 | DIASTOLIC BLOOD PRESSURE: 80 MMHG | HEART RATE: 66 BPM | SYSTOLIC BLOOD PRESSURE: 136 MMHG

## 2024-06-04 DIAGNOSIS — R51.9 HEADACHE, UNSPECIFIED: ICD-10-CM

## 2024-06-04 DIAGNOSIS — R42 DIZZINESS AND GIDDINESS: ICD-10-CM

## 2024-06-04 DIAGNOSIS — Z13.5 ENCOUNTER FOR SCREENING FOR EYE AND EAR DISORDERS: ICD-10-CM

## 2024-06-04 DIAGNOSIS — E03.9 HYPOTHYROIDISM, UNSPECIFIED: ICD-10-CM

## 2024-06-04 DIAGNOSIS — Z00.00 ENCOUNTER FOR GENERAL ADULT MEDICAL EXAMINATION WITHOUT ABNORMAL FINDINGS: ICD-10-CM

## 2024-06-04 DIAGNOSIS — Z90.49 ACQUIRED ABSENCE OF OTHER SPECIFIED PARTS OF DIGESTIVE TRACT: Chronic | ICD-10-CM

## 2024-06-04 DIAGNOSIS — M19.019 PRIMARY OSTEOARTHRITIS, UNSPECIFIED SHOULDER: Chronic | ICD-10-CM

## 2024-06-04 PROCEDURE — 36415 COLL VENOUS BLD VENIPUNCTURE: CPT

## 2024-06-04 PROCEDURE — G0463: CPT

## 2024-06-04 PROCEDURE — 84443 ASSAY THYROID STIM HORMONE: CPT

## 2024-06-04 NOTE — PHYSICAL EXAM
[No Acute Distress] : no acute distress [Well Nourished] : well nourished [Normal Sclera/Conjunctiva] : normal sclera/conjunctiva [PERRL] : pupils equal round and reactive to light [EOMI] : extraocular movements intact [Normal Outer Ear/Nose] : the outer ears and nose were normal in appearance [Normal Oropharynx] : the oropharynx was normal [Normal TMs] : both tympanic membranes were normal [Supple] : supple [No Respiratory Distress] : no respiratory distress  [No Accessory Muscle Use] : no accessory muscle use [Clear to Auscultation] : lungs were clear to auscultation bilaterally [Normal Rate] : normal rate  [Regular Rhythm] : with a regular rhythm [Normal S1, S2] : normal S1 and S2 [No Murmur] : no murmur heard [No Edema] : there was no peripheral edema [Soft] : abdomen soft [Non Tender] : non-tender [Non-distended] : non-distended [No Masses] : no abdominal mass palpated [Normal Bowel Sounds] : normal bowel sounds [No CVA Tenderness] : no CVA  tenderness [No Spinal Tenderness] : no spinal tenderness [Grossly Normal Strength/Tone] : grossly normal strength/tone [No Rash] : no rash [Coordination Grossly Intact] : coordination grossly intact [No Focal Deficits] : no focal deficits [Normal Gait] : normal gait [Alert and Oriented x3] : oriented to person, place, and time

## 2024-06-05 ENCOUNTER — NON-APPOINTMENT (OUTPATIENT)
Age: 64
End: 2024-06-05

## 2024-06-05 DIAGNOSIS — J45.909 UNSPECIFIED ASTHMA, UNCOMPLICATED: ICD-10-CM

## 2024-06-05 DIAGNOSIS — F17.210 NICOTINE DEPENDENCE, CIGARETTES, UNCOMPLICATED: ICD-10-CM

## 2024-06-05 NOTE — REVIEW OF SYSTEMS
[Headache] : headache [Dizziness] : dizziness [Fatigue] : fatigue [Fever] : no fever [Night Sweats] : no night sweats [Chills] : no chills [Recent Change In Weight] : ~T no recent weight change [Discharge] : no discharge [Pain] : no pain [Redness] : no redness [Vision Problems] : no vision problems [Itching] : no itching [Earache] : no earache [Hearing Loss] : no hearing loss [Nasal Discharge] : no nasal discharge [Chest Pain] : no chest pain [Palpitations] : no palpitations [Lower Ext Edema] : no lower extremity edema [Orthopena] : no orthopnea [Paroxysmal Nocturnal Dyspnea] : no paroxysmal nocturnal dyspnea [Shortness Of Breath] : no shortness of breath [Cough] : no cough [Abdominal Pain] : no abdominal pain [Nausea] : no nausea [Vomiting] : no vomiting [Muscle Weakness] : no muscle weakness [Fainting] : no fainting [Unsteady Walk] : no ataxia [Easy Bleeding] : no easy bleeding [Easy Bruising] : no easy bruising

## 2024-06-05 NOTE — HISTORY OF PRESENT ILLNESS
[FreeTextEntry1] : dizziness and headache f/u  [de-identified] : 63M with PMH of T2DM, Asthma, HTN, snoring (pending sleep study for diagnosis of MAI), and severe depression presenting headache and dizziness f/u.  Pt was first seen on 4/9/24 then followed up on 5/7 for for dizziness and headache. During the visit on 5/7, pt c/o constant headache in the left occipital region associated with intermittent dizziness x 2 months. Pt reported dizziness occurred when standing up from sitting. Headache lasting up to 2 hrs and relieved after resting. [place] hallpike and orthostatic hypotension neg.  CT head neg. EKG from 4/17/24 NSR. Today, pt reports headache and dizziness is improving. Pt denies of syncope or near syncope, chest pain, SOB, palpitations, vision changes, scalp tenderness or jaw/tongue caludication, focal weakness, nausea, vomiting, ear pain, hearing loss, fever, chills.  #hypothyroidism: started on levo on 4/28/24. TSH 5.42 (4/25/24). Med compliant, however pt reports of feeling fatigued.

## 2024-06-05 NOTE — PLAN
[FreeTextEntry1] : Plan: -Patient is eligible for LDCT based on documented information obtained from chart review -Low Dose CT chest for lung cancer screening to be completed  -Patient to follow up with Dr. Eufemia Richey

## 2024-06-05 NOTE — HISTORY OF PRESENT ILLNESS
[Current] : Current [TextBox_13] :  Patient is scheduled for a annual LDCT for lung cancer screening. Shared decision making managed and documented by Dr. Eufemia Richey Multiple attempts to reach the patient were unsuccessful. Chart review performed to confirm eligibility for LDCT.  No documented personal or family history of lung cancer. No documented s/s of lung cancer.  [PacksperYear] : >20

## 2024-06-05 NOTE — VITALS
[TextEntry] : Height and weight obtained from last documented vitals: Height:  5 foot 11 in  Weight: 250

## 2024-06-17 ENCOUNTER — RX RENEWAL (OUTPATIENT)
Age: 64
End: 2024-06-17

## 2024-06-17 ENCOUNTER — NON-APPOINTMENT (OUTPATIENT)
Age: 64
End: 2024-06-17

## 2024-06-17 ENCOUNTER — APPOINTMENT (OUTPATIENT)
Dept: CT IMAGING | Facility: HOSPITAL | Age: 64
End: 2024-06-17

## 2024-06-17 LAB — TSH SERPL-ACNC: 3.12 UIU/ML

## 2024-06-17 RX ORDER — METFORMIN HYDROCHLORIDE 1000 MG/1
1000 TABLET, COATED ORAL TWICE DAILY
Qty: 60 | Refills: 5 | Status: ACTIVE | COMMUNITY
Start: 2022-07-29 | End: 1900-01-01

## 2024-06-19 ENCOUNTER — OUTPATIENT (OUTPATIENT)
Dept: OUTPATIENT SERVICES | Facility: HOSPITAL | Age: 64
LOS: 1 days | End: 2024-06-19
Payer: MEDICAID

## 2024-06-19 DIAGNOSIS — M19.019 PRIMARY OSTEOARTHRITIS, UNSPECIFIED SHOULDER: Chronic | ICD-10-CM

## 2024-06-19 DIAGNOSIS — Z90.49 ACQUIRED ABSENCE OF OTHER SPECIFIED PARTS OF DIGESTIVE TRACT: Chronic | ICD-10-CM

## 2024-06-19 DIAGNOSIS — G47.33 OBSTRUCTIVE SLEEP APNEA (ADULT) (PEDIATRIC): ICD-10-CM

## 2024-06-19 PROCEDURE — 95810 POLYSOM 6/> YRS 4/> PARAM: CPT

## 2024-06-19 PROCEDURE — 95800 SLP STDY UNATTENDED: CPT | Mod: 26

## 2024-06-19 PROCEDURE — 95800 SLP STDY UNATTENDED: CPT

## 2024-07-01 ENCOUNTER — APPOINTMENT (OUTPATIENT)
Dept: PULMONOLOGY | Facility: CLINIC | Age: 64
End: 2024-07-01

## 2024-07-05 ENCOUNTER — APPOINTMENT (OUTPATIENT)
Dept: FAMILY MEDICINE | Facility: HOSPITAL | Age: 64
End: 2024-07-05

## 2024-07-05 ENCOUNTER — OUTPATIENT (OUTPATIENT)
Dept: OUTPATIENT SERVICES | Facility: HOSPITAL | Age: 64
LOS: 1 days | End: 2024-07-05
Payer: MEDICAID

## 2024-07-05 VITALS
OXYGEN SATURATION: 95 % | DIASTOLIC BLOOD PRESSURE: 72 MMHG | TEMPERATURE: 98.1 F | SYSTOLIC BLOOD PRESSURE: 109 MMHG | RESPIRATION RATE: 18 BRPM | HEART RATE: 84 BPM

## 2024-07-05 DIAGNOSIS — M19.019 PRIMARY OSTEOARTHRITIS, UNSPECIFIED SHOULDER: Chronic | ICD-10-CM

## 2024-07-05 DIAGNOSIS — Z90.49 ACQUIRED ABSENCE OF OTHER SPECIFIED PARTS OF DIGESTIVE TRACT: Chronic | ICD-10-CM

## 2024-07-05 DIAGNOSIS — R51.9 HEADACHE, UNSPECIFIED: ICD-10-CM

## 2024-07-05 DIAGNOSIS — Z00.00 ENCOUNTER FOR GENERAL ADULT MEDICAL EXAMINATION WITHOUT ABNORMAL FINDINGS: ICD-10-CM

## 2024-07-05 PROCEDURE — G0463: CPT

## 2024-07-10 DIAGNOSIS — R51.9 HEADACHE, UNSPECIFIED: ICD-10-CM

## 2024-07-10 DIAGNOSIS — G47.33 OBSTRUCTIVE SLEEP APNEA (ADULT) (PEDIATRIC): ICD-10-CM

## 2024-07-17 ENCOUNTER — APPOINTMENT (OUTPATIENT)
Dept: RADIOLOGY | Facility: HOSPITAL | Age: 64
End: 2024-07-17

## 2024-07-17 ENCOUNTER — APPOINTMENT (OUTPATIENT)
Dept: PULMONOLOGY | Facility: CLINIC | Age: 64
End: 2024-07-17
Payer: MEDICAID

## 2024-07-17 ENCOUNTER — OUTPATIENT (OUTPATIENT)
Dept: OUTPATIENT SERVICES | Facility: HOSPITAL | Age: 64
LOS: 1 days | End: 2024-07-17
Payer: MEDICAID

## 2024-07-17 VITALS
TEMPERATURE: 97.5 F | HEIGHT: 71 IN | WEIGHT: 245 LBS | DIASTOLIC BLOOD PRESSURE: 70 MMHG | BODY MASS INDEX: 34.3 KG/M2 | HEART RATE: 76 BPM | SYSTOLIC BLOOD PRESSURE: 110 MMHG | OXYGEN SATURATION: 95 %

## 2024-07-17 DIAGNOSIS — J45.909 UNSPECIFIED ASTHMA, UNCOMPLICATED: ICD-10-CM

## 2024-07-17 DIAGNOSIS — G47.33 OBSTRUCTIVE SLEEP APNEA (ADULT) (PEDIATRIC): ICD-10-CM

## 2024-07-17 DIAGNOSIS — M19.019 PRIMARY OSTEOARTHRITIS, UNSPECIFIED SHOULDER: Chronic | ICD-10-CM

## 2024-07-17 DIAGNOSIS — Z90.49 ACQUIRED ABSENCE OF OTHER SPECIFIED PARTS OF DIGESTIVE TRACT: Chronic | ICD-10-CM

## 2024-07-17 PROCEDURE — G2211 COMPLEX E/M VISIT ADD ON: CPT | Mod: NC

## 2024-07-17 PROCEDURE — 99214 OFFICE O/P EST MOD 30 MIN: CPT

## 2024-07-17 PROCEDURE — 71046 X-RAY EXAM CHEST 2 VIEWS: CPT

## 2024-07-17 PROCEDURE — 71046 X-RAY EXAM CHEST 2 VIEWS: CPT | Mod: 26

## 2024-07-17 RX ORDER — FLUTICASONE PROPIONATE AND SALMETEROL 250; 50 UG/1; UG/1
250-50 POWDER RESPIRATORY (INHALATION)
Qty: 3 | Refills: 3 | Status: ACTIVE | COMMUNITY
Start: 2024-07-17 | End: 1900-01-01

## 2024-07-18 NOTE — PROGRESS NOTE ADULT - SUBJECTIVE AND OBJECTIVE BOX
Insert Arterial Line  Date/Time:  07/18/24, 5:35 PM  Performed by: EV WHITTINGTON RCP    Patient identity confirmed: arm band and provided demographic data   Time out: Immediately prior to procedure a \"time out\" was called to verify the correct patient, procedure, equipment, support staff.    Preparation: Patient was prepped and draped in the usual sterile fashion.    Location:right radial    Pb's test normal: yes  Needle gauge: 20     Number of attempts: 2  Post-procedure: transparent dressing applied and line secured    Patient tolerance: well   Patient is a 60y old  Male who presents with a chief complaint of Hemoptysis (07 Apr 2021 11:48)      HPI:  This 60 year old male who had had COVID-19 in May 2020 was admitted to the [facility] ICU on 3/26/21 with DKA after presenting to the ER c/o weakness, N/V, abdominal pain and chest pain and being found to have very elevated blood glucose levels, BUN, and Creatinine.  He was started on an insulin drip and was given antibiotics.  His blood glucose improved but he was considered to be a newly diagnosed diabetic as his A1c was also newly elevated.  During his initial work up, he was found to have a RLL lung mass-like lesion measuring 4.5 cm on a chest CT scan and a dilated extrahepatic CBD with cholelithiasis and pneumobilia.  A repeat CT scan revealed enlargement of the RLL lesion with air fluid levels.  A bronchoscopy with BAL was performed on 4/1/21 and cultures revealed Streptococcus parasanguinis. On 4/2/2012 a HIDA scan was negative for acute cholecystitis.  During his admission, he had multiple issues including worsening of his chronic back pain and headache.  However, his blood glucose was controlled with basal insulin and pre-meal insulin and he was getting ready for discharge on PO Augemntin as recommended by ID when he developed mild hemoptysis.  This was originally thought to be traumatic in nature until it worsened.  A CT Angio revealed  a small focus of contrast extravasation within the cavitary lesion, which was possibly a small pseudoaneurysm or an active extravasation with the history of hemoptysis.  CT Surgery at [facility] was contacted and the pt was transferred to the [facility] CTICU for observation and possible intervention.  He states that he has been spitting up small amounts of dark blood.  He continues to c/o headaches, diarrhea, and generalized abdominal pain.  He denies dyspnea or SOB.   (04 Apr 2021 20:48)      PAST MEDICAL & SURGICAL HISTORY:  Type 2 diabetes mellitus without complication, without long-term current use of insulin  Newly diagnosed at [facility] 3/2021    Benign hypertension    2019 novel coronavirus disease (COVID-19)  5/2020    BPH associated with nocturia    Insomnia    Cholelithiasis  Discovered at [facility] 3/2021    Diverticulosis  Discovered at [facility] 3/2021    DKA (diabetic ketoacidosis)  [facility] 3/2021    OA (osteoarthritis)  BL shoulders    Lung abscess  RLL found at [facility] 3/2021    HLD (hyperlipidemia)    Back pain  Chronic- mid and upper back    Hepatitis C  in remission    Thyroid nodule  Found at [facility] 3/2021    Shoulder arthritis  Multiple surgeries on left shoulder    MEDICATIONS  (STANDING):  atorvastatin 40 milliGRAM(s) Oral at bedtime  cefTRIAXone   IVPB 1000 milliGRAM(s) IV Intermittent every 24 hours  dextrose 50% Injectable 25 Gram(s) IV Push once  heparin   Injectable 5000 Unit(s) SubCutaneous every 8 hours  insulin glargine Injectable (LANTUS) 36 Unit(s) SubCutaneous at bedtime  insulin lispro (ADMELOG) corrective regimen sliding scale   SubCutaneous three times a day before meals  insulin lispro (ADMELOG) corrective regimen sliding scale   SubCutaneous at bedtime  insulin lispro Injectable (ADMELOG) 12 Unit(s) SubCutaneous three times a day before meals  metroNIDAZOLE  IVPB 500 milliGRAM(s) IV Intermittent every 8 hours  pantoprazole    Tablet 40 milliGRAM(s) Oral before breakfast  sertraline 50 milliGRAM(s) Oral daily  tamsulosin 0.4 milliGRAM(s) Oral at bedtime    MEDICATIONS  (PRN):  acetaminophen   Tablet .. 650 milliGRAM(s) Oral every 6 hours PRN Mild Pain (1 - 3)  benzonatate 100 milliGRAM(s) Oral every 8 hours PRN Cough  oxyCODONE    IR 5 milliGRAM(s) Oral every 6 hours PRN Severe Pain (7 - 10)  traMADol 50 milliGRAM(s) Oral every 6 hours PRN Moderate Pain (4 - 6)  traZODone 150 milliGRAM(s) Oral at bedtime PRN insomnia      Allergies    No Known Drug Allergies  shellfish (Unknown)    Intolerances      Vital Signs Last 24 Hrs  T(C): 36.7 (07 Apr 2021 13:10), Max: 37.3 (06 Apr 2021 16:49)  T(F): 98.1 (07 Apr 2021 13:10), Max: 99.1 (06 Apr 2021 16:49)  HR: 77 (07 Apr 2021 13:10) (70 - 85)  BP: 125/81 (07 Apr 2021 13:10) (118/76 - 127/81)  BP(mean): --  RR: 18 (07 Apr 2021 13:10) (18 - 18)  SpO2: 98% (07 Apr 2021 13:10) (92% - 98%)    Radiographs: Periapical radiographs of teeth #18, 23, 24, 25, and 26 obtained and interpreted. Severe recession and bone loss noted.     LABS:                        10.5   9.78  )-----------( 477      ( 07 Apr 2021 07:32 )             32.4     04-07    137  |  105  |  10  ----------------------------<  162<H>  3.7   |  20<L>  |  0.72    Ca    9.5      07 Apr 2021 07:32  Mg     1.8     04-06    TPro  7.4  /  Alb  3.3  /  TBili  0.3  /  DBili  x   /  AST  20  /  ALT  13  /  AlkPhos  65  04-07    WBC Count: 9.78 K/uL [3.80 - 10.50] (04-07 @ 07:32)    Platelet Count - Automated: 477 K/uL *H* [150 - 400] (04-07 @ 07:32)  Platelet Count - Automated: 417 K/uL *H* [150 - 400] (04-06 @ 07:08)    ASSESSMENT: Limited oral evaluation completed at [facility]. Periapical radiographs of teeth #18, 23, 24, 25, and 26 obtained and interpreted. Severe recession and bone loss noted. Teeth are mobile and pt reports constant pain, most notably on tooth #18. Tooth #18 is fractured in half. Distal portion #18 is a root tip. Explained to pt these teeth are not restorable and recommend extraction. Reviewed risks, benefits, and alternatives to extractions. Pt elected to have all teeth extracted during today's visit.    PROCEDURE:  Verbal and written consent obtained from pt. Topical benzocaine applied to gingiva around teeth #23, 24, 25, 26, and 26. Two carpules 2% lidocaine with 1:100,000 epinephrine administered via local infiltration. Teeth elevated and extracted with forceps without complications. Blood loss was minimal. Sockets curetted and irrigated with sterile saline. Two dissolvable chromic gut sutures placed in simple interrupted over exo site #23 and 25. Firm gauze pressure applied to achieve hemostasis. Post-op instructions reviewed. Goody bag with written instructions provided. Recommended pt to follow up with outside dentist for comprehensive care to address replacing these teeth. All questions welcomed and answered.    RECOMMENDATIONS:   1) OTC pain management as per Med Team. If bleeding occurs in extraction site, apply firm gauze pressure.  2) Dental F/U with outpatient dentist for comprehensive dental care.   3) If any difficulty swallowing/breathing, fever occur, page dental.     Milena Alvarenga DMD #[id number]  Lisa Morgan DDS #[id number]  Supervised by Dr. Elly De La Rosa, DMD

## 2024-07-24 ENCOUNTER — RX RENEWAL (OUTPATIENT)
Age: 64
End: 2024-07-24

## 2024-07-29 ENCOUNTER — OUTPATIENT (OUTPATIENT)
Dept: OUTPATIENT SERVICES | Facility: HOSPITAL | Age: 64
LOS: 1 days | End: 2024-07-29
Payer: MEDICAID

## 2024-07-29 ENCOUNTER — APPOINTMENT (OUTPATIENT)
Dept: CT IMAGING | Facility: HOSPITAL | Age: 64
End: 2024-07-29
Payer: MEDICAID

## 2024-07-29 DIAGNOSIS — M19.019 PRIMARY OSTEOARTHRITIS, UNSPECIFIED SHOULDER: Chronic | ICD-10-CM

## 2024-07-29 DIAGNOSIS — Z87.891 PERSONAL HISTORY OF NICOTINE DEPENDENCE: ICD-10-CM

## 2024-07-29 DIAGNOSIS — Z90.49 ACQUIRED ABSENCE OF OTHER SPECIFIED PARTS OF DIGESTIVE TRACT: Chronic | ICD-10-CM

## 2024-07-29 PROCEDURE — 71271 CT THORAX LUNG CANCER SCR C-: CPT | Mod: 26

## 2024-07-29 PROCEDURE — 71271 CT THORAX LUNG CANCER SCR C-: CPT

## 2024-07-30 NOTE — HEALTH RISK ASSESSMENT
Anesthesia Transfer of Care Note    Patient: Donnie Camarillo    Procedure(s) Performed: Procedure(s) (LRB):  ROBOTIC REPAIR, HERNIA, INGUINAL (Right)    Patient location: PACU    Anesthesia Type: general    Transport from OR: Transported from OR on 2-3 L/min O2 by NC with adequate spontaneous ventilation    Post pain: adequate analgesia    Post assessment: no apparent anesthetic complications and tolerated procedure well    Post vital signs: stable    Level of consciousness: sedated and responds to stimulation    Nausea/Vomiting: no nausea/vomiting    Complications: none    Transfer of care protocol was followed      Last vitals: Visit Vitals  /68   Pulse 68   Temp 37.2 °C (99 °F) (Skin)   Resp 18   Ht 6' (1.829 m)   Wt 99.3 kg (219 lb)   SpO2 97%   BMI 29.70 kg/m²     
[de-identified] : pot once in a while, helps me sleep

## 2024-08-02 ENCOUNTER — NON-APPOINTMENT (OUTPATIENT)
Age: 64
End: 2024-08-02

## 2024-08-07 ENCOUNTER — RESULT REVIEW (OUTPATIENT)
Age: 64
End: 2024-08-07

## 2024-08-07 ENCOUNTER — APPOINTMENT (OUTPATIENT)
Dept: FAMILY MEDICINE | Facility: HOSPITAL | Age: 64
End: 2024-08-07

## 2024-08-07 ENCOUNTER — OUTPATIENT (OUTPATIENT)
Dept: OUTPATIENT SERVICES | Facility: HOSPITAL | Age: 64
LOS: 1 days | End: 2024-08-07
Payer: MEDICAID

## 2024-08-07 DIAGNOSIS — Z00.00 ENCOUNTER FOR GENERAL ADULT MEDICAL EXAMINATION WITHOUT ABNORMAL FINDINGS: ICD-10-CM

## 2024-08-07 DIAGNOSIS — R10.9 UNSPECIFIED ABDOMINAL PAIN: ICD-10-CM

## 2024-08-07 DIAGNOSIS — G47.33 OBSTRUCTIVE SLEEP APNEA (ADULT) (PEDIATRIC): ICD-10-CM

## 2024-08-07 DIAGNOSIS — R19.7 DIARRHEA, UNSPECIFIED: ICD-10-CM

## 2024-08-07 DIAGNOSIS — Z90.49 ACQUIRED ABSENCE OF OTHER SPECIFIED PARTS OF DIGESTIVE TRACT: Chronic | ICD-10-CM

## 2024-08-07 PROCEDURE — 74019 RADEX ABDOMEN 2 VIEWS: CPT

## 2024-08-07 PROCEDURE — G0463: CPT

## 2024-08-07 PROCEDURE — 74019 RADEX ABDOMEN 2 VIEWS: CPT | Mod: 26

## 2024-08-12 ENCOUNTER — NON-APPOINTMENT (OUTPATIENT)
Age: 64
End: 2024-08-12

## 2024-08-12 ENCOUNTER — RX RENEWAL (OUTPATIENT)
Age: 64
End: 2024-08-12

## 2024-08-13 NOTE — PLAN
[FreeTextEntry1] : # LLQ abdominal pain and diarrhea  - Concerning for overflow diarrhea since patient does not have BM for 3 days in between diarrhea episodes  - Send for abd Xray to assess stool burden  - Miralax and senna to have regular BM   # MAI from obesity  - Advised weight loss  - Advised to follow up with pulm to get his CPAP machine   # T2DM  - Well controlled - Continue with mounjaro and metformin as prescribed - Has albuminuria as shown in labs from Oct 2023. but patient is on Arb -- continue with arb  - Needs to follow up with ophthalmologist   # Sever depression  - c/w Sertraline, trazodone, and seroquel  - I am unclear about the dosing of seroquel  - patient is managed at [facility] - Advised patient to bring records from [facility]   RTC for abd pain and diarrhea in 2 weeks   Case d/w Dr. Dale

## 2024-08-13 NOTE — PLAN
[FreeTextEntry1] : # LLQ abdominal pain and diarrhea  - Concerning for overflow diarrhea since patient does not have BM for 3 days in between diarrhea episodes  - Send for abd Xray to assess stool burden  - Miralax and senna to have regular BM   # MAI from obesity  - Advised weight loss  - Advised to follow up with pulm to get his CPAP machine   # T2DM  - Well controlled - Continue with mounjaro and metformin as prescribed - Has albuminuria as shown in labs from Oct 2023. but patient is on Arb -- continue with arb  - Needs to follow up with ophthalmologist   # Sever depression  - c/w Sertraline, trazodone, and seroquel  - I am unclear about the dosing of seroquel  - patient is managed at [facility] - Advised patient to bring records from [facility]   RTC for abd pain and diarrhea in 2 weeks   Case d/w Dr. Dale

## 2024-08-13 NOTE — PLAN
[FreeTextEntry1] : # LLQ abdominal pain and diarrhea  - Concerning for overflow diarrhea since patient does not have BM for 3 days in between diarrhea episodes  - Send for abd Xray to assess stool burden  - Miralax and senna to have regular BM   # MAI from obesity  - Advised weight loss  - Advised to follow up with pulm to get his CPAP machine   # T2DM  - Well controlled - Continue with mounjaro and metformin as prescribed - Has albuminuria as shown in labs from Oct 2023. but patient is on Arb -- continue with arb  - Needs to follow up with ophthalmologist   # Sever depression  - c/w Sertraline, trazodone, and seroquel  - I am unclear about the dosing of seroquel  - patient is managed at [facility] - Advised patient to bring records from [email or web address]   RTC for abd pain and diarrhea in 2 weeks   Case d/w Dr. Dale

## 2024-08-13 NOTE — HISTORY OF PRESENT ILLNESS
[FreeTextEntry1] : Follow up for T2DM and acute visit for diarrhea [de-identified] : A 63-year-old male with a medical history of asthma, depression, diabetes, obesity, obstructive sleep apnea (MAI), hypothyroidism, and headaches is here for a follow-up visit to address his diabetes. His last A1c in April 2024 was 6.6, and today it has improved to 5.9. His PHQ-9 score today is 13. The patient is currently taking sertraline 50 mg daily, Seroquel, and trazodone 150 mg at bedtime. His depression is managed by the [facility]. Additionally, he reports experiencing diarrhea for the past few weeks, primarily occurring at night, and visits the bathroom three times. He experiences 3 to 4 days without a bowel movement between these episodes. The patient notes that he is not bloated and has no loss of appetite, describing the diarrhea as watery. He denies any fevers, chills, or unusual food intake.

## 2024-08-13 NOTE — END OF VISIT
[] : Resident [FreeTextEntry3] : Patient confirmed with me that he is suffering from constipation and in the recent 3 months it has been worse and recently cannot defecate completely and just pass some watery stool. On my exam I found that abd is bloated and felt full of stool in different part of abdomen. abdominal x-ray ordered which later on and I reviewed result which confirmed constipation.

## 2024-08-13 NOTE — HISTORY OF PRESENT ILLNESS
[FreeTextEntry1] : Follow up for T2DM and acute visit for diarrhea [de-identified] : A 63-year-old male with a medical history of asthma, depression, diabetes, obesity, obstructive sleep apnea (MAI), hypothyroidism, and headaches is here for a follow-up visit to address his diabetes. His last A1c in April 2024 was 6.6, and today it has improved to 5.9. His PHQ-9 score today is 13. The patient is currently taking sertraline 50 mg daily, Seroquel, and trazodone 150 mg at bedtime. His depression is managed by the [facility]. Additionally, he reports experiencing diarrhea for the past few weeks, primarily occurring at night, and visits the bathroom three times. He experiences 3 to 4 days without a bowel movement between these episodes. The patient notes that he is not bloated and has no loss of appetite, describing the diarrhea as watery. He denies any fevers, chills, or unusual food intake.

## 2024-08-13 NOTE — REVIEW OF SYSTEMS
[Abdominal Pain] : abdominal pain [Constipation] : constipation [Diarrhea] : diarrhea [Negative] : Musculoskeletal [Melena] : no melena [Suicidal] : not suicidal [FreeTextEntry7] : LLQ abd pain

## 2024-08-13 NOTE — PHYSICAL EXAM
[No Acute Distress] : no acute distress [PERRL] : pupils equal round and reactive to light [EOMI] : extraocular movements intact [No Lymphadenopathy] : no lymphadenopathy [No Respiratory Distress] : no respiratory distress  [Clear to Auscultation] : lungs were clear to auscultation bilaterally [Normal Rate] : normal rate  [Normal S1, S2] : normal S1 and S2 [Soft] : abdomen soft [Non-distended] : non-distended [No Masses] : no abdominal mass palpated [Normal Supraclavicular Nodes] : no supraclavicular lymphadenopathy [Normal Posterior Cervical Nodes] : no posterior cervical lymphadenopathy [Normal Anterior Cervical Nodes] : no anterior cervical lymphadenopathy [Coordination Grossly Intact] : coordination grossly intact [No Focal Deficits] : no focal deficits [No Rash] : no rash [de-identified] : LLQ TTP. no rebound or guarding

## 2024-08-13 NOTE — HISTORY OF PRESENT ILLNESS
[FreeTextEntry1] : Follow up for T2DM and acute visit for diarrhea [de-identified] : A 63-year-old male with a medical history of asthma, depression, diabetes, obesity, obstructive sleep apnea (MAI), hypothyroidism, and headaches is here for a follow-up visit to address his diabetes. His last A1c in April 2024 was 6.6, and today it has improved to 5.9. His PHQ-9 score today is 13. The patient is currently taking sertraline 50 mg daily, Seroquel, and trazodone 150 mg at bedtime. His depression is managed by the [facility]. Additionally, he reports experiencing diarrhea for the past few weeks, primarily occurring at night, and visits the bathroom three times. He experiences 3 to 4 days without a bowel movement between these episodes. The patient notes that he is not bloated and has no loss of appetite, describing the diarrhea as watery. He denies any fevers, chills, or unusual food intake.

## 2024-08-13 NOTE — PHYSICAL EXAM
[No Acute Distress] : no acute distress [PERRL] : pupils equal round and reactive to light [EOMI] : extraocular movements intact [No Lymphadenopathy] : no lymphadenopathy [No Respiratory Distress] : no respiratory distress  [Clear to Auscultation] : lungs were clear to auscultation bilaterally [Normal Rate] : normal rate  [Normal S1, S2] : normal S1 and S2 [Soft] : abdomen soft [Non-distended] : non-distended [No Masses] : no abdominal mass palpated [Normal Supraclavicular Nodes] : no supraclavicular lymphadenopathy [Normal Posterior Cervical Nodes] : no posterior cervical lymphadenopathy [Normal Anterior Cervical Nodes] : no anterior cervical lymphadenopathy [Coordination Grossly Intact] : coordination grossly intact [No Focal Deficits] : no focal deficits [No Rash] : no rash [de-identified] : LLQ TTP. no rebound or guarding

## 2024-08-13 NOTE — PHYSICAL EXAM
[No Acute Distress] : no acute distress [PERRL] : pupils equal round and reactive to light [EOMI] : extraocular movements intact [No Lymphadenopathy] : no lymphadenopathy [No Respiratory Distress] : no respiratory distress  [Clear to Auscultation] : lungs were clear to auscultation bilaterally [Normal Rate] : normal rate  [Normal S1, S2] : normal S1 and S2 [Soft] : abdomen soft [Non-distended] : non-distended [No Masses] : no abdominal mass palpated [Normal Supraclavicular Nodes] : no supraclavicular lymphadenopathy [Normal Posterior Cervical Nodes] : no posterior cervical lymphadenopathy [Normal Anterior Cervical Nodes] : no anterior cervical lymphadenopathy [Coordination Grossly Intact] : coordination grossly intact [No Focal Deficits] : no focal deficits [No Rash] : no rash [de-identified] : LLQ TTP. no rebound or guarding

## 2024-08-14 ENCOUNTER — APPOINTMENT (OUTPATIENT)
Dept: PULMONOLOGY | Facility: CLINIC | Age: 64
End: 2024-08-14
Payer: MEDICAID

## 2024-08-14 VITALS
TEMPERATURE: 97.6 F | DIASTOLIC BLOOD PRESSURE: 80 MMHG | HEART RATE: 92 BPM | BODY MASS INDEX: 32.9 KG/M2 | RESPIRATION RATE: 16 BRPM | WEIGHT: 235 LBS | HEIGHT: 71 IN | OXYGEN SATURATION: 96 % | SYSTOLIC BLOOD PRESSURE: 118 MMHG

## 2024-08-14 DIAGNOSIS — Z87.891 PERSONAL HISTORY OF NICOTINE DEPENDENCE: ICD-10-CM

## 2024-08-14 DIAGNOSIS — J45.909 UNSPECIFIED ASTHMA, UNCOMPLICATED: ICD-10-CM

## 2024-08-14 PROCEDURE — 99212 OFFICE O/P EST SF 10 MIN: CPT

## 2024-08-14 PROCEDURE — G2211 COMPLEX E/M VISIT ADD ON: CPT | Mod: NC

## 2024-08-14 NOTE — HISTORY OF PRESENT ILLNESS
[Former] : former [>= 20 pack years] : >= 20 pack years [Never] : never [Obstructive Sleep Apnea] : obstructive sleep apnea [Awakes Unrefreshed] : awakes unrefreshed [Awakes with Dry Mouth] : awakes with dry mouth [Awakes with Headache] : awakes with headache [Fatigue] : fatigue [Nonrestorative Sleep] : nonrestorative sleep [Recent  Weight Gain] : recent weight gain [Snoring] : snoring [TextBox_4] : 61y/o   male  born in [place]  ex smoker (   started 1/2- one pack  ppd -   20-  late 50's    > 20 pack year )  ex -ETOH  h/o DM  - HTN   in past work bar / construction  - sob  - since covid  2020  -fatigue ++  daytime   sleepiness - no naps  -no cough   + wheezing  -  stopped  trelegy  did not feel was helping  -at times notices some wheezing   does not have albuterol prn  - on singulair pm  - h/o depression on meds  -no nasal congesion   7/26/2023 61y/o male   born in [place] ex smoker    > 20 pack year   DM HTN    follow up   likely asthma ? - albuterol MDi prn only  -PFT normal  - CT chest   no acute findkings - sleep study not  approved - 5/29/2024 62y/o male   ex smoker    > 20 pack   DM on majaroo      hypothyroid    on synthroid    depression followed by psychiatry  sleepiness and  dizziness   here for sleep and    asthma    f/u ct -refused sleep in past now agreeable  - needs    ct  screening next month - some dizziness and snoring + - some dizziness --   fatigue to   get sleep evaluation  -  7/17/2024 62y/o male ex smoker  DM   hypothyroid no Synthroid  depression on medications   asthma   ct and sleep study  -reviewed sleep study today and options - did not get ct performed  discussion to do   - no cough  - no chest pain  -with heat   and humidify  more sob  with  albuterol  using daily  - 8/14/2024 62y/o male ex smoker DM hypothyroid   asthma  MAI  - receiving machine tomorrow - currently doing well  - advair tolerated - no cough no sob -  [TextBox_11] : 1 [YearQuit] : 2017? [TextBox_77] : 10 [TextBox_79] : 5 [TextBox_100] : 6/2024  [TextBox_108] : 28.3  [TextBox_112] : 30 [TextBox_116] : 75% [TextBox_120] : supine   36  [ESS] : 10

## 2024-08-14 NOTE — HISTORY OF PRESENT ILLNESS
[Former] : former [>= 20 pack years] : >= 20 pack years [Never] : never [Obstructive Sleep Apnea] : obstructive sleep apnea [Awakes Unrefreshed] : awakes unrefreshed [Awakes with Dry Mouth] : awakes with dry mouth [Awakes with Headache] : awakes with headache [Fatigue] : fatigue [Nonrestorative Sleep] : nonrestorative sleep [Recent  Weight Gain] : recent weight gain [Snoring] : snoring [TextBox_4] : 63y/o   male  born in [place]  ex smoker (   started 1/2- one pack  ppd -   20-  late 50's    > 20 pack year )  ex -ETOH  h/o DM  - HTN   in past work bar / construction  - sob  - since covid  2020  -fatigue ++  daytime   sleepiness - no naps  -no cough   + wheezing  -  stopped  trelegy  did not feel was helping  -at times notices some wheezing   does not have albuterol prn  - on singulair pm  - h/o depression on meds  -no nasal congesion   7/26/2023 63y/o male   born in [place] ex smoker    > 20 pack year   DM HTN    follow up   likely asthma ? - albuterol MDi prn only  -PFT normal  - CT chest   no acute findkings - sleep study not  approved - 5/29/2024 62y/o male   ex smoker    > 20 pack   DM on majaroo      hypothyroid    on synthroid    depression followed by psychiatry  sleepiness and  dizziness   here for sleep and    asthma    f/u ct -refused sleep in past now agreeable  - needs    ct  screening next month - some dizziness and snoring + - some dizziness --   fatigue to   get sleep evaluation  -  7/17/2024 62y/o male ex smoker  DM   hypothyroid no Synthroid  depression on medications   asthma   ct and sleep study  -reviewed sleep study today and options - did not get ct performed  discussion to do   - no cough  - no chest pain  -with heat   and humidify  more sob  with  albuterol  using daily  - 8/14/2024 62y/o male ex smoker DM hypothyroid   asthma  MAI  - receiving machine tomorrow - currently doing well  - advair tolerated - no cough no sob -  [TextBox_11] : 1 [YearQuit] : 2017? [TextBox_77] : 10 [TextBox_79] : 5 [TextBox_100] : 6/2024  [TextBox_108] : 28.3  [TextBox_112] : 30 [TextBox_116] : 75% [TextBox_120] : supine   36  [ESS] : 10

## 2024-08-14 NOTE — REVIEW OF SYSTEMS
[Fatigue] : fatigue [Recent Wt Gain (___ Lbs)] : ~T recent [unfilled] lb weight gain [Dry Mouth] : dry mouth [Wheezing] : wheezing [SOB on Exertion] : sob on exertion [GERD] : gerd [Dizziness] : dizziness [Diabetes] : diabetes [Obesity] : obesity [Fever] : no fever [Recent Wt Loss (___ Lbs)] : ~T no recent weight loss [Sore Throat] : no sore throat [Nasal Congestion] : no nasal congestion [Cough] : no cough [Hemoptysis] : no hemoptysis [Sputum] : no sputum [Dyspnea] : no dyspnea [Chest Discomfort] : no chest discomfort [Palpitations] : no palpitations [Abdominal Pain] : no abdominal pain [Nausea] : no nausea [Vomiting] : no vomiting [Dysphagia] : no dysphagia [Bleeding] : no bleeding [Myalgias] : no myalgias [Chronic Pain] : no chronic pain [Rash] : no rash [Blood Transfusion] : no blood transfusion [Clotting Disorder/ Frequent bleeding] : no clotting disorder/ frequent bleeding [Thyroid Problem] : no thyroid problem [TextBox_69] : controlled with meds

## 2024-08-14 NOTE — ASSESSMENT
[FreeTextEntry1] : 64y/o    male  1- reactive airways - asthma vs COPD   likely ex smoker > 20 pack years ct7/24 clear  2- 6/24    Moderate   MAI  with desaturation   3- depression /  DM / HTN  4- GERD on PPI 5- vaccinations covid ( h/o covid 2020 )    moderna    pneumonia  Recommendations 1-     autopap delivery tomorrow 2- ct 7/25  3- albuterol  MDI   use trigger weather  4- exercise and diet reviewed  5-  tolerating adviar bid  6-pscyhatrist      on seroquel      trazadone    -discussion and reviewed  ct   stable

## 2024-08-14 NOTE — PHYSICAL EXAM
[IV] : Mallampati Class: IV [Normal Appearance] : normal appearance [Supple] : supple [No Neck Mass] : no neck mass [No JVD] : no jvd [Normal Rate/Rhythm] : normal rate/rhythm [Normal S1, S2] : normal s1, s2 [No Murmurs] : no murmurs [No Resp Distress] : no resp distress [No Acc Muscle Use] : no acc muscle use [Clear to Auscultation Bilaterally] : clear to auscultation bilaterally [Benign] : benign [Not Tender] : not tender [No Hernias] : no hernias [Normal Gait] : normal gait [No Clubbing] : no clubbing [No Edema] : no edema [No Rash] : no rash [No Motor Deficits] : no motor deficits [Normal Affect] : normal affect [TextBox_2] : pleasant male no distress obese  [TextBox_11] : crowded no lesion moist

## 2024-08-14 NOTE — ASSESSMENT
[FreeTextEntry1] : 62y/o    male  1- reactive airways - asthma vs COPD   likely ex smoker > 20 pack years ct7/24 clear  2- 6/24    Moderate   MAI  with desaturation   3- depression /  DM / HTN  4- GERD on PPI 5- vaccinations covid ( h/o covid 2020 )    moderna    pneumonia  Recommendations 1-     autopap delivery tomorrow 2- ct 7/25  3- albuterol  MDI   use trigger weather  4- exercise and diet reviewed  5-  tolerating adviar bid  6-pscyhatrist      on seroquel      trazadone    -discussion and reviewed  ct   stable

## 2024-08-19 NOTE — PRE-OP CHECKLIST - VERIFY SURGICAL SITE/SIDE WITH PATIENT
As a follow-up, a second attempt has been made for outreach via fax to facility. Please see Contacts section for details.    Thank you  Lanie Moses MA    Bronchoscopy/done

## 2024-08-21 ENCOUNTER — APPOINTMENT (OUTPATIENT)
Dept: FAMILY MEDICINE | Facility: HOSPITAL | Age: 64
End: 2024-08-21

## 2024-08-21 VITALS
HEART RATE: 80 BPM | TEMPERATURE: 98.1 F | SYSTOLIC BLOOD PRESSURE: 121 MMHG | RESPIRATION RATE: 16 BRPM | DIASTOLIC BLOOD PRESSURE: 83 MMHG | OXYGEN SATURATION: 98 % | WEIGHT: 245 LBS | BODY MASS INDEX: 34.17 KG/M2

## 2024-08-21 DIAGNOSIS — R19.7 DIARRHEA, UNSPECIFIED: ICD-10-CM

## 2024-08-21 DIAGNOSIS — E78.1 PURE HYPERGLYCERIDEMIA: ICD-10-CM

## 2024-08-21 DIAGNOSIS — G47.33 OBSTRUCTIVE SLEEP APNEA (ADULT) (PEDIATRIC): ICD-10-CM

## 2024-08-21 RX ORDER — PSYLLIUM SEED (WITH DEXTROSE)
25 POWDER (GRAM) ORAL DAILY
Qty: 1 | Refills: 0 | Status: ACTIVE | COMMUNITY
Start: 2024-08-21 | End: 1900-01-01

## 2024-08-21 RX ORDER — STOOL SOFTENER 240 MG/1
240 CAPSULE, LIQUID FILLED ORAL
Qty: 1 | Refills: 0 | Status: ACTIVE | COMMUNITY
Start: 2024-08-21 | End: 1900-01-01

## 2024-08-21 NOTE — PHYSICAL EXAM
[Well Nourished] : well nourished [PERRL] : pupils equal round and reactive to light [EOMI] : extraocular movements intact [No Lymphadenopathy] : no lymphadenopathy [No Respiratory Distress] : no respiratory distress  [Clear to Auscultation] : lungs were clear to auscultation bilaterally [Normal Rate] : normal rate  [Normal S1, S2] : normal S1 and S2 [Soft] : abdomen soft [Non Tender] : non-tender [Non-distended] : non-distended [Normal Supraclavicular Nodes] : no supraclavicular lymphadenopathy [Normal Posterior Cervical Nodes] : no posterior cervical lymphadenopathy [Normal Anterior Cervical Nodes] : no anterior cervical lymphadenopathy [No Rash] : no rash [Coordination Grossly Intact] : coordination grossly intact [No Focal Deficits] : no focal deficits [Normal Gait] : normal gait

## 2024-08-22 NOTE — HISTORY OF PRESENT ILLNESS
[FreeTextEntry1] : Abd pain follow up  [de-identified] : A 63-year-old male with a medical history of asthma, depression, diabetes, obesity, obstructive sleep apnea (MAI), hypothyroidism, and headaches is presenting for a follow-up concerning diarrhea and abdominal pain. During the last visit, an abdominal X-ray indicated a significant stool burden within the colon, leading to a suspicion of overflow diarrhea. The patient reports nocturnal awakenings to pass a small amount of watery stool and experiences bloating despite passing gas. Following the prescription of MiraLAX and Senna, there has been an improvement in his symptoms. However, he continues to experience mild tenderness in the left lower quadrant together with diarrhea albeit with symptomatic improvement.   Patient was diagnosed with obstructive sleep apnea and provided with a CPAP machine. However, Patient has reported a malfunction in the device, specifically a leak in the tubing, which results in the machine shutting down after a few minutes of use.

## 2024-08-22 NOTE — REVIEW OF SYSTEMS
[Abdominal Pain] : abdominal pain [Diarrhea] : diarrhea [Heartburn] : heartburn [Negative] : Neurological [Nausea] : no nausea [Vomiting] : no vomiting [FreeTextEntry6] : snoring

## 2024-08-22 NOTE — HISTORY OF PRESENT ILLNESS
[FreeTextEntry1] : Abd pain follow up  [de-identified] : A 63-year-old male with a medical history of asthma, depression, diabetes, obesity, obstructive sleep apnea (MAI), hypothyroidism, and headaches is presenting for a follow-up concerning diarrhea and abdominal pain. During the last visit, an abdominal X-ray indicated a significant stool burden within the colon, leading to a suspicion of overflow diarrhea. The patient reports nocturnal awakenings to pass a small amount of watery stool and experiences bloating despite passing gas. Following the prescription of MiraLAX and Senna, there has been an improvement in his symptoms. However, he continues to experience mild tenderness in the left lower quadrant together with diarrhea albeit with symptomatic improvement.   Patient was diagnosed with obstructive sleep apnea and provided with a CPAP machine. However, Patient has reported a malfunction in the device, specifically a leak in the tubing, which results in the machine shutting down after a few minutes of use.

## 2024-08-22 NOTE — PLAN
[FreeTextEntry1] : # Overflow diarrhea - Will trial docusate on top of miralax and senna  - Advised patient to take metamucil as well  - Since patient has diverticulosis, should try to keep stool soft  - High fiber diet encouraged  # MAI  - Needs CPAP machine fixed  - Advised to call Dr. Richey's office   # The rest of the plan as stated above  RTC 2 months for diarrhea follow up   Case d/w Dr. Dale

## 2024-09-03 ENCOUNTER — RX RENEWAL (OUTPATIENT)
Age: 64
End: 2024-09-03

## 2024-09-16 NOTE — ED ADULT NURSE NOTE - CAS EDN DISCHARGE INTERVENTIONS
[FreeTextEntry1] : post consent flex sig done Inserted to 40 cm Diverticulosis noted and torturous colon. Scan seen in left posterior area near anus.  Biopsy x 1 taken and sent to pathology No polyps seen. No colitis.  Anoscopy also done and also revealed no lesions.        IV discontinued, cath removed intact

## 2024-09-23 ENCOUNTER — RX RENEWAL (OUTPATIENT)
Age: 64
End: 2024-09-23

## 2024-09-23 RX ORDER — TIRZEPATIDE 10 MG/.5ML
10 INJECTION, SOLUTION SUBCUTANEOUS
Qty: 1 | Refills: 3 | Status: ACTIVE | COMMUNITY
Start: 2024-09-23 | End: 1900-01-01

## 2024-09-26 ENCOUNTER — RX RENEWAL (OUTPATIENT)
Age: 64
End: 2024-09-26

## 2024-09-27 ENCOUNTER — RX RENEWAL (OUTPATIENT)
Age: 64
End: 2024-09-27

## 2024-09-30 ENCOUNTER — OUTPATIENT (OUTPATIENT)
Dept: OUTPATIENT SERVICES | Facility: HOSPITAL | Age: 64
LOS: 1 days | End: 2024-09-30
Payer: MEDICAID

## 2024-09-30 ENCOUNTER — APPOINTMENT (OUTPATIENT)
Dept: FAMILY MEDICINE | Facility: HOSPITAL | Age: 64
End: 2024-09-30

## 2024-09-30 VITALS
BODY MASS INDEX: 34.17 KG/M2 | RESPIRATION RATE: 15 BRPM | OXYGEN SATURATION: 96 % | TEMPERATURE: 97.6 F | HEART RATE: 68 BPM | SYSTOLIC BLOOD PRESSURE: 136 MMHG | DIASTOLIC BLOOD PRESSURE: 83 MMHG | WEIGHT: 245 LBS

## 2024-09-30 DIAGNOSIS — Z00.00 ENCOUNTER FOR GENERAL ADULT MEDICAL EXAMINATION WITHOUT ABNORMAL FINDINGS: ICD-10-CM

## 2024-09-30 DIAGNOSIS — L57.0 ACTINIC KERATOSIS: ICD-10-CM

## 2024-09-30 DIAGNOSIS — M19.019 PRIMARY OSTEOARTHRITIS, UNSPECIFIED SHOULDER: Chronic | ICD-10-CM

## 2024-09-30 DIAGNOSIS — R19.7 DIARRHEA, UNSPECIFIED: ICD-10-CM

## 2024-09-30 DIAGNOSIS — Z90.49 ACQUIRED ABSENCE OF OTHER SPECIFIED PARTS OF DIGESTIVE TRACT: Chronic | ICD-10-CM

## 2024-09-30 DIAGNOSIS — L98.9 DISORDER OF THE SKIN AND SUBCUTANEOUS TISSUE, UNSPECIFIED: ICD-10-CM

## 2024-09-30 PROCEDURE — G0463: CPT

## 2024-09-30 RX ORDER — POLYETHYLENE GLYCOL 3350 17 G/17G
17 POWDER, FOR SOLUTION ORAL DAILY
Qty: 30 | Refills: 2 | Status: ACTIVE | COMMUNITY
Start: 2024-09-30 | End: 1900-01-01

## 2024-10-01 NOTE — HISTORY OF PRESENT ILLNESS
[FreeTextEntry1] : Presenting for follow up of constipation and diarrhea [de-identified] : 64-year-old male with past medical history of asthma, depression, diabetes, obesity, MAI, hypothyroidism presenting for follow up of overflow diarrhea. Patient was seen on August 21 for overflow diarrhea. He was advised to take docusate nightly as well as start Metamucil. Patient has also tried MiraLAX before, but is currently not taking it. X-ray on August 27 showed large stool burden in the colon. Today the patient is stating that he is having bowel movements every two days. He does have diarrhea from time to time, which is a small amount, but for the most part, he has been having formed bowel movements. Denied any significant abdominal tenderness. However, he still feels full and bloated although it is better from before.

## 2024-10-01 NOTE — REVIEW OF SYSTEMS
[Abdominal Pain] : abdominal pain [Constipation] : constipation [Diarrhea] : diarrhea [Negative] : Neurological [Nausea] : no nausea [Vomiting] : no vomiting [FreeTextEntry7] : LLQ mild pain

## 2024-10-01 NOTE — PHYSICAL EXAM
[Well Nourished] : well nourished [PERRL] : pupils equal round and reactive to light [EOMI] : extraocular movements intact [No Lymphadenopathy] : no lymphadenopathy [No Respiratory Distress] : no respiratory distress  [Clear to Auscultation] : lungs were clear to auscultation bilaterally [Normal Rate] : normal rate  [Normal S1, S2] : normal S1 and S2 [Soft] : abdomen soft [Normal Supraclavicular Nodes] : no supraclavicular lymphadenopathy [Normal Posterior Cervical Nodes] : no posterior cervical lymphadenopathy [Normal Anterior Cervical Nodes] : no anterior cervical lymphadenopathy [Normal Affect] : the affect was normal [Normal Insight/Judgement] : insight and judgment were intact [de-identified] : Mild tenderness to deep palpation of the LLQ. Mildly distended  abdomen in general  [de-identified] : Actinic keratosis of the face and an erythematous, non blacking bump on the nose

## 2024-10-01 NOTE — PHYSICAL EXAM
[Well Nourished] : well nourished [PERRL] : pupils equal round and reactive to light [EOMI] : extraocular movements intact [No Lymphadenopathy] : no lymphadenopathy [No Respiratory Distress] : no respiratory distress  [Clear to Auscultation] : lungs were clear to auscultation bilaterally [Normal Rate] : normal rate  [Normal S1, S2] : normal S1 and S2 [Soft] : abdomen soft [Normal Supraclavicular Nodes] : no supraclavicular lymphadenopathy [Normal Posterior Cervical Nodes] : no posterior cervical lymphadenopathy [Normal Anterior Cervical Nodes] : no anterior cervical lymphadenopathy [Normal Affect] : the affect was normal [Normal Insight/Judgement] : insight and judgment were intact [de-identified] : Mild tenderness to deep palpation of the LLQ. Mildly distended  abdomen in general  [de-identified] : Actinic keratosis of the face and an erythematous, non blacking bump on the nose

## 2024-10-01 NOTE — HISTORY OF PRESENT ILLNESS
[FreeTextEntry1] : Presenting for follow up of constipation and diarrhea [de-identified] : 64-year-old male with past medical history of asthma, depression, diabetes, obesity, MAI, hypothyroidism presenting for follow up of overflow diarrhea. Patient was seen on August 21 for overflow diarrhea. He was advised to take docusate nightly as well as start Metamucil. Patient has also tried MiraLAX before, but is currently not taking it. X-ray on August 27 showed large stool burden in the colon. Today the patient is stating that he is having bowel movements every two days. He does have diarrhea from time to time, which is a small amount, but for the most part, he has been having formed bowel movements. Denied any significant abdominal tenderness. However, he still feels full and bloated although it is better from before.

## 2024-10-01 NOTE — PLAN
[FreeTextEntry1] :  # Overflow diarrhea  - c/w Docusate nightly and miralax - Try to use bulk forming agents  - Keep taking metamucil  - This condition seems to be improving  - Needs to keep constipation under control since patient has history of diverticulosis   # Actinic keratosis and lesion on nose  - Send to dermatology   RTC Nov for follow up of constipation and possibly diabetes   Case d/w Dr. Dixon

## 2024-10-05 ENCOUNTER — RX RENEWAL (OUTPATIENT)
Age: 64
End: 2024-10-05

## 2024-10-16 ENCOUNTER — APPOINTMENT (OUTPATIENT)
Dept: PULMONOLOGY | Facility: CLINIC | Age: 64
End: 2024-10-16

## 2024-11-08 ENCOUNTER — NON-APPOINTMENT (OUTPATIENT)
Age: 64
End: 2024-11-08

## 2024-11-18 ENCOUNTER — APPOINTMENT (OUTPATIENT)
Dept: FAMILY MEDICINE | Facility: HOSPITAL | Age: 64
End: 2024-11-18

## 2024-11-21 ENCOUNTER — NON-APPOINTMENT (OUTPATIENT)
Age: 64
End: 2024-11-21

## 2025-01-02 ENCOUNTER — RX RENEWAL (OUTPATIENT)
Age: 65
End: 2025-01-02

## 2025-01-14 NOTE — HISTORY OF PRESENT ILLNESS
Medical Week 3 Survey      Flowsheet Row Responses   [facility], [place], operated by [facility] patient discharged from? [place]   Does the patient have one of the following disease processes/diagnoses(primary or secondary)? Other   Week 3 attempt successful? Yes   Call start time 1008   Call end time 1013   Discharge diagnosis *SIRS (systemic inflammatory response syndrome)   Is the patient taking all medications as directed (includes completed medication regime)? Yes   Has the patient kept scheduled appointments due by today? Yes   Comments Patient reports that he is doing well. Patient denies fever, chills or dysuria at this time.   What is the patient's perception of their health status since discharge? Returned to baseline/stable   Is the patient/caregiver able to teach back signs and symptoms related to disease process for when to call PCP? Yes   Is the patient/caregiver able to teach back signs and symptoms related to disease process for when to call 911? Yes   Week 3 Call Completed? Yes   Graduated Yes   Call end time 1013            Brittney RUSSELL - Registered Nurse   [No episodes] : No hypoglycemic episodes since the last visit. [Check glucose ___ x/week] : Patient checks blood glucose [unfilled]  times a week [Most Recent A1C: ___] : Most recent A1C was [unfilled] [FreeTextEntry6] : 61 y/o M PMH T2DM, obesity, HTN, severe depression, OA, NAFLD presents for DM mgmt. Patient's A1c in clinic today 7.1. Patient's A1c in clinic today 7.1, last A1c 7.4 (7/29/22). Patient takes ademelog 10 units TId, basaglar 30 units at bedtime and metformin. Patient checks sugar at  home intermittently. Denies hypoglycemic episodes. Patient has mostly stopped smoking, says only smokes about once a month. Denies alcohol use or recreational drug use. Seen by optho in August, recommend f/u in one year. Patient c/o of trigger finger over past year which he feels is worsening. Seen by ortho for trigger finger last in March, s/p R 5th digit steroid injection 2/22. States had mild relief of symptoms but has sxs in multiple finger so was not given any further injections. X-ray of hand was ordered at the time which was not done. Patient occasionally takes pain medicine at home with minimal relief. States pain and locking of fingers worse especially in the morning. Hx of working in construction. Denies fever, chills, chest pain, SOB, N/V, abdominal pain, headache, cough, palpitations, leg pain, dizziness, weakness.

## 2025-01-24 ENCOUNTER — NON-APPOINTMENT (OUTPATIENT)
Age: 65
End: 2025-01-24

## 2025-01-24 RX ORDER — TIRZEPATIDE 12.5 MG/.5ML
12.5 INJECTION, SOLUTION SUBCUTANEOUS
Qty: 1 | Refills: 5 | Status: ACTIVE | COMMUNITY
Start: 2025-01-24 | End: 1900-01-01

## 2025-02-05 ENCOUNTER — APPOINTMENT (OUTPATIENT)
Age: 65
End: 2025-02-05

## 2025-02-25 ENCOUNTER — RX RENEWAL (OUTPATIENT)
Age: 65
End: 2025-02-25

## 2025-03-13 ENCOUNTER — RX RENEWAL (OUTPATIENT)
Age: 65
End: 2025-03-13

## 2025-03-14 ENCOUNTER — OUTPATIENT (OUTPATIENT)
Dept: OUTPATIENT SERVICES | Facility: HOSPITAL | Age: 65
LOS: 1 days | End: 2025-03-14
Payer: MEDICAID

## 2025-03-14 ENCOUNTER — APPOINTMENT (OUTPATIENT)
Age: 65
End: 2025-03-14

## 2025-03-14 VITALS
DIASTOLIC BLOOD PRESSURE: 73 MMHG | TEMPERATURE: 97.8 F | SYSTOLIC BLOOD PRESSURE: 120 MMHG | WEIGHT: 232 LBS | BODY MASS INDEX: 32.48 KG/M2 | OXYGEN SATURATION: 98 % | RESPIRATION RATE: 15 BRPM | HEIGHT: 71 IN | HEART RATE: 70 BPM

## 2025-03-14 DIAGNOSIS — Z00.00 ENCOUNTER FOR GENERAL ADULT MEDICAL EXAMINATION WITHOUT ABNORMAL FINDINGS: ICD-10-CM

## 2025-03-14 DIAGNOSIS — K21.9 GASTRO-ESOPHAGEAL REFLUX DISEASE W/OUT ESOPHAGITIS: ICD-10-CM

## 2025-03-14 DIAGNOSIS — F32.2 MAJOR DEPRESSIVE DISORDER, SINGLE EPISODE, SEVERE W/OUT PSYCHOTIC FEATURES: ICD-10-CM

## 2025-03-14 DIAGNOSIS — Z12.11 ENCOUNTER FOR SCREENING FOR MALIGNANT NEOPLASM OF COLON: ICD-10-CM

## 2025-03-14 DIAGNOSIS — M19.019 PRIMARY OSTEOARTHRITIS, UNSPECIFIED SHOULDER: Chronic | ICD-10-CM

## 2025-03-14 DIAGNOSIS — E11.9 TYPE 2 DIABETES MELLITUS WITHOUT COMPLICATIONS: ICD-10-CM

## 2025-03-14 DIAGNOSIS — Z90.49 ACQUIRED ABSENCE OF OTHER SPECIFIED PARTS OF DIGESTIVE TRACT: Chronic | ICD-10-CM

## 2025-03-14 DIAGNOSIS — E11.9 TYPE 2 DIABETES MELLITUS W/OUT COMPLICATIONS: ICD-10-CM

## 2025-03-14 DIAGNOSIS — K21.9 GASTRO-ESOPHAGEAL REFLUX DISEASE WITHOUT ESOPHAGITIS: ICD-10-CM

## 2025-03-14 DIAGNOSIS — K63.5 POLYP OF COLON: ICD-10-CM

## 2025-03-14 DIAGNOSIS — K57.90 DIVERTICULOSIS OF INTESTINE, PART UNSPECIFIED, W/OUT PERFORATION OR ABSCESS W/OUT BLEEDING: ICD-10-CM

## 2025-03-15 ENCOUNTER — NON-APPOINTMENT (OUTPATIENT)
Age: 65
End: 2025-03-15

## 2025-03-15 LAB
CREAT SPEC-SCNC: 174 MG/DL
MICROALBUMIN 24H UR DL<=1MG/L-MCNC: 5.5 MG/DL
MICROALBUMIN/CREAT 24H UR-RTO: 32 MG/G

## 2025-04-04 ENCOUNTER — APPOINTMENT (OUTPATIENT)
Age: 65
End: 2025-04-04

## 2025-04-04 ENCOUNTER — OUTPATIENT (OUTPATIENT)
Dept: OUTPATIENT SERVICES | Facility: HOSPITAL | Age: 65
LOS: 1 days | End: 2025-04-04
Payer: MEDICAID

## 2025-04-04 DIAGNOSIS — E78.5 HYPERLIPIDEMIA, UNSPECIFIED: ICD-10-CM

## 2025-04-04 DIAGNOSIS — Z00.00 ENCOUNTER FOR GENERAL ADULT MEDICAL EXAMINATION WITHOUT ABNORMAL FINDINGS: ICD-10-CM

## 2025-04-04 DIAGNOSIS — E11.9 TYPE 2 DIABETES MELLITUS WITHOUT COMPLICATIONS: ICD-10-CM

## 2025-04-04 DIAGNOSIS — E66.811 OBESITY, CLASS 1: ICD-10-CM

## 2025-04-04 DIAGNOSIS — E78.6 LIPOPROTEIN DEFICIENCY: ICD-10-CM

## 2025-04-04 DIAGNOSIS — M19.019 PRIMARY OSTEOARTHRITIS, UNSPECIFIED SHOULDER: Chronic | ICD-10-CM

## 2025-04-04 DIAGNOSIS — E11.9 TYPE 2 DIABETES MELLITUS W/OUT COMPLICATIONS: ICD-10-CM

## 2025-04-04 DIAGNOSIS — Z90.49 ACQUIRED ABSENCE OF OTHER SPECIFIED PARTS OF DIGESTIVE TRACT: Chronic | ICD-10-CM

## 2025-04-04 PROCEDURE — 84443 ASSAY THYROID STIM HORMONE: CPT

## 2025-04-04 PROCEDURE — 83036 HEMOGLOBIN GLYCOSYLATED A1C: CPT

## 2025-04-04 PROCEDURE — 82043 UR ALBUMIN QUANTITATIVE: CPT

## 2025-04-04 PROCEDURE — 85027 COMPLETE CBC AUTOMATED: CPT

## 2025-04-04 PROCEDURE — G0463: CPT

## 2025-04-04 PROCEDURE — 80061 LIPID PANEL: CPT

## 2025-04-04 PROCEDURE — 80053 COMPREHEN METABOLIC PANEL: CPT

## 2025-04-15 ENCOUNTER — APPOINTMENT (OUTPATIENT)
Dept: GASTROENTEROLOGY | Facility: CLINIC | Age: 65
End: 2025-04-15
Payer: MEDICAID

## 2025-04-15 ENCOUNTER — RX RENEWAL (OUTPATIENT)
Age: 65
End: 2025-04-15

## 2025-04-15 VITALS
DIASTOLIC BLOOD PRESSURE: 77 MMHG | HEIGHT: 71 IN | BODY MASS INDEX: 31.22 KG/M2 | WEIGHT: 223 LBS | RESPIRATION RATE: 15 BRPM | HEART RATE: 75 BPM | TEMPERATURE: 98.5 F | OXYGEN SATURATION: 98 % | SYSTOLIC BLOOD PRESSURE: 107 MMHG

## 2025-04-15 DIAGNOSIS — K76.0 FATTY (CHANGE OF) LIVER, NOT ELSEWHERE CLASSIFIED: ICD-10-CM

## 2025-04-15 DIAGNOSIS — K21.9 GASTRO-ESOPHAGEAL REFLUX DISEASE W/OUT ESOPHAGITIS: ICD-10-CM

## 2025-04-15 DIAGNOSIS — Z12.11 ENCOUNTER FOR SCREENING FOR MALIGNANT NEOPLASM OF COLON: ICD-10-CM

## 2025-04-15 PROCEDURE — 99205 OFFICE O/P NEW HI 60 MIN: CPT

## 2025-04-15 RX ORDER — POLYETHYLENE GLYCOL-3350 AND ELECTROLYTES WITH FLAVOR PACK 240; 5.84; 2.98; 6.72; 22.72 G/278.26G; G/278.26G; G/278.26G; G/278.26G; G/278.26G
240 POWDER, FOR SOLUTION ORAL
Qty: 1 | Refills: 0 | Status: ACTIVE | COMMUNITY
Start: 2025-04-15 | End: 1900-01-01

## 2025-04-16 ENCOUNTER — NON-APPOINTMENT (OUTPATIENT)
Age: 65
End: 2025-04-16

## 2025-04-16 ENCOUNTER — APPOINTMENT (OUTPATIENT)
Dept: CARDIOLOGY | Facility: CLINIC | Age: 65
End: 2025-04-16
Payer: MEDICAID

## 2025-04-16 ENCOUNTER — APPOINTMENT (OUTPATIENT)
Dept: CARDIOLOGY | Facility: CLINIC | Age: 65
End: 2025-04-16

## 2025-04-16 VITALS
DIASTOLIC BLOOD PRESSURE: 62 MMHG | OXYGEN SATURATION: 93 % | SYSTOLIC BLOOD PRESSURE: 115 MMHG | HEART RATE: 73 BPM | BODY MASS INDEX: 31.36 KG/M2 | WEIGHT: 224 LBS | RESPIRATION RATE: 18 BRPM | HEIGHT: 71 IN

## 2025-04-16 DIAGNOSIS — I10 ESSENTIAL (PRIMARY) HYPERTENSION: ICD-10-CM

## 2025-04-16 DIAGNOSIS — E78.5 HYPERLIPIDEMIA, UNSPECIFIED: ICD-10-CM

## 2025-04-16 PROCEDURE — 99213 OFFICE O/P EST LOW 20 MIN: CPT | Mod: 25

## 2025-04-16 PROCEDURE — 93000 ELECTROCARDIOGRAM COMPLETE: CPT

## 2025-04-29 ENCOUNTER — NON-APPOINTMENT (OUTPATIENT)
Age: 65
End: 2025-04-29

## 2025-05-02 ENCOUNTER — NON-APPOINTMENT (OUTPATIENT)
Age: 65
End: 2025-05-02

## 2025-05-02 NOTE — DIETITIAN INITIAL EVALUATION ADULT. - ENTER FROM (CAL/KG)
28
[facility] provides services at a reduced cost to those who are determined to be eligible through [facility]’s financial assistance program. Information regarding [facility]’s financial assistance program can be found by going to [email or web address] or by calling [phone].

## 2025-05-07 ENCOUNTER — OUTPATIENT (OUTPATIENT)
Dept: OUTPATIENT SERVICES | Facility: HOSPITAL | Age: 65
LOS: 1 days | End: 2025-05-07
Payer: MEDICAID

## 2025-05-07 ENCOUNTER — RX RENEWAL (OUTPATIENT)
Age: 65
End: 2025-05-07

## 2025-05-07 ENCOUNTER — NON-APPOINTMENT (OUTPATIENT)
Age: 65
End: 2025-05-07

## 2025-05-07 ENCOUNTER — APPOINTMENT (OUTPATIENT)
Dept: GASTROENTEROLOGY | Facility: HOSPITAL | Age: 65
End: 2025-05-07

## 2025-05-07 ENCOUNTER — RESULT REVIEW (OUTPATIENT)
Age: 65
End: 2025-05-07

## 2025-05-07 DIAGNOSIS — M19.019 PRIMARY OSTEOARTHRITIS, UNSPECIFIED SHOULDER: Chronic | ICD-10-CM

## 2025-05-07 DIAGNOSIS — Z12.11 ENCOUNTER FOR SCREENING FOR MALIGNANT NEOPLASM OF COLON: ICD-10-CM

## 2025-05-07 DIAGNOSIS — Z90.49 ACQUIRED ABSENCE OF OTHER SPECIFIED PARTS OF DIGESTIVE TRACT: Chronic | ICD-10-CM

## 2025-05-07 DIAGNOSIS — K21.9 GASTRO-ESOPHAGEAL REFLUX DISEASE WITHOUT ESOPHAGITIS: ICD-10-CM

## 2025-05-07 DIAGNOSIS — K76.0 FATTY (CHANGE OF) LIVER, NOT ELSEWHERE CLASSIFIED: ICD-10-CM

## 2025-05-07 LAB — GLUCOSE BLDC GLUCOMTR-MCNC: 126 MG/DL — HIGH (ref 70–99)

## 2025-05-07 PROCEDURE — 82962 GLUCOSE BLOOD TEST: CPT

## 2025-05-07 PROCEDURE — 88312 SPECIAL STAINS GROUP 1: CPT

## 2025-05-07 PROCEDURE — 43239 EGD BIOPSY SINGLE/MULTIPLE: CPT

## 2025-05-07 PROCEDURE — 45378 DIAGNOSTIC COLONOSCOPY: CPT

## 2025-05-07 PROCEDURE — 88305 TISSUE EXAM BY PATHOLOGIST: CPT | Mod: 26

## 2025-05-07 PROCEDURE — 88312 SPECIAL STAINS GROUP 1: CPT | Mod: 26

## 2025-05-07 PROCEDURE — 43239 EGD BIOPSY SINGLE/MULTIPLE: CPT | Mod: 59

## 2025-05-07 PROCEDURE — 88305 TISSUE EXAM BY PATHOLOGIST: CPT

## 2025-05-07 PROCEDURE — G0121: CPT

## 2025-05-07 RX ADMIN — Medication 75 MILLILITER(S): at 08:00

## 2025-05-08 LAB — SURGICAL PATHOLOGY STUDY: SIGNIFICANT CHANGE UP

## 2025-05-09 ENCOUNTER — RX RENEWAL (OUTPATIENT)
Age: 65
End: 2025-05-09

## 2025-05-12 ENCOUNTER — RX RENEWAL (OUTPATIENT)
Age: 65
End: 2025-05-12

## 2025-05-22 NOTE — DISCHARGE NOTE NURSING/CASE MANAGEMENT/SOCIAL WORK - PATIENT PORTAL LINK FT
1.95
You can access the FollowMyHealth Patient Portal offered by [facility] by registering at the following website: [email or web address]. By joining [facility]’s FollowMyHealth portal, you will also be able to view your health information using other applications (apps) compatible with our system.

## 2025-05-30 ENCOUNTER — APPOINTMENT (OUTPATIENT)
Age: 65
End: 2025-05-30
Payer: MEDICAID

## 2025-05-30 ENCOUNTER — OUTPATIENT (OUTPATIENT)
Dept: OUTPATIENT SERVICES | Facility: HOSPITAL | Age: 65
LOS: 1 days | End: 2025-05-30
Payer: MEDICAID

## 2025-05-30 VITALS
WEIGHT: 226 LBS | BODY MASS INDEX: 31.52 KG/M2 | OXYGEN SATURATION: 96 % | DIASTOLIC BLOOD PRESSURE: 76 MMHG | HEART RATE: 81 BPM | TEMPERATURE: 98.3 F | SYSTOLIC BLOOD PRESSURE: 108 MMHG | RESPIRATION RATE: 18 BRPM

## 2025-05-30 DIAGNOSIS — H26.9 UNSPECIFIED CATARACT: ICD-10-CM

## 2025-05-30 DIAGNOSIS — M19.019 PRIMARY OSTEOARTHRITIS, UNSPECIFIED SHOULDER: Chronic | ICD-10-CM

## 2025-05-30 DIAGNOSIS — Z00.00 ENCOUNTER FOR GENERAL ADULT MEDICAL EXAMINATION WITHOUT ABNORMAL FINDINGS: ICD-10-CM

## 2025-05-30 DIAGNOSIS — Z01.818 ENCOUNTER FOR OTHER PREPROCEDURAL EXAMINATION: ICD-10-CM

## 2025-05-30 DIAGNOSIS — Z90.49 ACQUIRED ABSENCE OF OTHER SPECIFIED PARTS OF DIGESTIVE TRACT: Chronic | ICD-10-CM

## 2025-05-30 PROCEDURE — 85027 COMPLETE CBC AUTOMATED: CPT

## 2025-05-30 PROCEDURE — 93010 ELECTROCARDIOGRAM REPORT: CPT | Mod: NC

## 2025-05-30 PROCEDURE — 83036 HEMOGLOBIN GLYCOSYLATED A1C: CPT

## 2025-05-30 PROCEDURE — 85610 PROTHROMBIN TIME: CPT

## 2025-05-30 PROCEDURE — 36415 COLL VENOUS BLD VENIPUNCTURE: CPT

## 2025-05-30 PROCEDURE — G0463: CPT

## 2025-05-30 PROCEDURE — 80053 COMPREHEN METABOLIC PANEL: CPT

## 2025-05-30 PROCEDURE — 93005 ELECTROCARDIOGRAM TRACING: CPT

## 2025-05-31 PROBLEM — Z01.818 PRE-OP EXAMINATION: Status: ACTIVE | Noted: 2025-05-30 | Resolved: 2025-06-13

## 2025-05-31 PROBLEM — H26.9 CATARACT: Status: ACTIVE | Noted: 2025-05-31

## 2025-06-02 ENCOUNTER — NON-APPOINTMENT (OUTPATIENT)
Age: 65
End: 2025-06-02

## 2025-06-02 LAB
ALBUMIN SERPL ELPH-MCNC: 4.9 G/DL
ALP BLD-CCNC: 72 U/L
ALT SERPL-CCNC: 19 U/L
ANION GAP SERPL CALC-SCNC: 16 MMOL/L
AST SERPL-CCNC: 19 U/L
BILIRUB SERPL-MCNC: 0.2 MG/DL
BUN SERPL-MCNC: 20 MG/DL
CALCIUM SERPL-MCNC: 10 MG/DL
CHLORIDE SERPL-SCNC: 103 MMOL/L
CO2 SERPL-SCNC: 24 MMOL/L
CREAT SERPL-MCNC: 1.18 MG/DL
EGFRCR SERPLBLD CKD-EPI 2021: 69 ML/MIN/1.73M2
ESTIMATED AVERAGE GLUCOSE: 114 MG/DL
GLUCOSE SERPL-MCNC: 95 MG/DL
HBA1C MFR BLD HPLC: 5.6 %
HCT VFR BLD CALC: 42.2 %
HGB BLD-MCNC: 13.8 G/DL
INR PPP: 0.97 RATIO
MCHC RBC-ENTMCNC: 31.9 PG
MCHC RBC-ENTMCNC: 32.7 G/DL
MCV RBC AUTO: 97.7 FL
PLATELET # BLD AUTO: 301 K/UL
POTASSIUM SERPL-SCNC: 5 MMOL/L
PROT SERPL-MCNC: 7.8 G/DL
PT BLD: 11.5 SEC
RBC # BLD: 4.32 M/UL
RBC # FLD: 12.4 %
SODIUM SERPL-SCNC: 142 MMOL/L
WBC # FLD AUTO: 13.1 K/UL

## 2025-06-05 ENCOUNTER — RESULT REVIEW (OUTPATIENT)
Age: 65
End: 2025-06-05

## 2025-06-05 ENCOUNTER — OUTPATIENT (OUTPATIENT)
Dept: OUTPATIENT SERVICES | Facility: HOSPITAL | Age: 65
LOS: 1 days | End: 2025-06-05
Payer: MEDICAID

## 2025-06-05 ENCOUNTER — APPOINTMENT (OUTPATIENT)
Dept: ULTRASOUND IMAGING | Facility: HOSPITAL | Age: 65
End: 2025-06-05
Payer: MEDICAID

## 2025-06-05 DIAGNOSIS — M19.019 PRIMARY OSTEOARTHRITIS, UNSPECIFIED SHOULDER: Chronic | ICD-10-CM

## 2025-06-05 DIAGNOSIS — Z90.49 ACQUIRED ABSENCE OF OTHER SPECIFIED PARTS OF DIGESTIVE TRACT: Chronic | ICD-10-CM

## 2025-06-05 DIAGNOSIS — I71.21 ANEURYSM OF THE ASCENDING AORTA, WITHOUT RUPTURE: ICD-10-CM

## 2025-06-05 PROCEDURE — 76775 US EXAM ABDO BACK WALL LIM: CPT

## 2025-06-05 PROCEDURE — 76775 US EXAM ABDO BACK WALL LIM: CPT | Mod: 26

## 2025-06-09 ENCOUNTER — OUTPATIENT (OUTPATIENT)
Dept: OUTPATIENT SERVICES | Facility: HOSPITAL | Age: 65
LOS: 1 days | End: 2025-06-09
Payer: MEDICAID

## 2025-06-09 ENCOUNTER — APPOINTMENT (OUTPATIENT)
Age: 65
End: 2025-06-09

## 2025-06-09 VITALS
BODY MASS INDEX: 32.22 KG/M2 | SYSTOLIC BLOOD PRESSURE: 113 MMHG | WEIGHT: 231 LBS | DIASTOLIC BLOOD PRESSURE: 57 MMHG | RESPIRATION RATE: 18 BRPM | TEMPERATURE: 97.4 F | OXYGEN SATURATION: 95 % | HEART RATE: 65 BPM

## 2025-06-09 DIAGNOSIS — M19.019 PRIMARY OSTEOARTHRITIS, UNSPECIFIED SHOULDER: Chronic | ICD-10-CM

## 2025-06-09 DIAGNOSIS — Z00.00 ENCOUNTER FOR GENERAL ADULT MEDICAL EXAMINATION WITHOUT ABNORMAL FINDINGS: ICD-10-CM

## 2025-06-09 DIAGNOSIS — Z01.818 ENCOUNTER FOR OTHER PREPROCEDURAL EXAMINATION: ICD-10-CM

## 2025-06-09 DIAGNOSIS — Z90.49 ACQUIRED ABSENCE OF OTHER SPECIFIED PARTS OF DIGESTIVE TRACT: Chronic | ICD-10-CM

## 2025-06-09 DIAGNOSIS — H26.9 UNSPECIFIED CATARACT: ICD-10-CM

## 2025-06-09 PROBLEM — D72.829 LEUKOCYTOSIS: Status: ACTIVE | Noted: 2025-06-09

## 2025-06-09 PROCEDURE — G0463: CPT

## 2025-06-09 PROCEDURE — 85027 COMPLETE CBC AUTOMATED: CPT

## 2025-06-13 ENCOUNTER — APPOINTMENT (OUTPATIENT)
Age: 65
End: 2025-06-13

## 2025-06-13 ENCOUNTER — OUTPATIENT (OUTPATIENT)
Dept: OUTPATIENT SERVICES | Facility: HOSPITAL | Age: 65
LOS: 1 days | End: 2025-06-13
Payer: MEDICAID

## 2025-06-13 VITALS
OXYGEN SATURATION: 99 % | HEART RATE: 69 BPM | TEMPERATURE: 98 F | WEIGHT: 225 LBS | DIASTOLIC BLOOD PRESSURE: 80 MMHG | BODY MASS INDEX: 31.5 KG/M2 | SYSTOLIC BLOOD PRESSURE: 116 MMHG | HEIGHT: 71 IN | RESPIRATION RATE: 14 BRPM

## 2025-06-13 DIAGNOSIS — Z90.49 ACQUIRED ABSENCE OF OTHER SPECIFIED PARTS OF DIGESTIVE TRACT: Chronic | ICD-10-CM

## 2025-06-13 DIAGNOSIS — Z01.818 ENCOUNTER FOR OTHER PREPROCEDURAL EXAMINATION: ICD-10-CM

## 2025-06-13 DIAGNOSIS — Z00.00 ENCOUNTER FOR GENERAL ADULT MEDICAL EXAMINATION WITHOUT ABNORMAL FINDINGS: ICD-10-CM

## 2025-06-13 LAB
HCT VFR BLD CALC: 38 %
HGB BLD-MCNC: 12.6 G/DL
MCHC RBC-ENTMCNC: 32.7 PG
MCHC RBC-ENTMCNC: 33.2 G/DL
MCV RBC AUTO: 98.7 FL
PLATELET # BLD AUTO: 261 K/UL
RBC # BLD: 3.85 M/UL
RBC # FLD: 12.6 %
WBC # FLD AUTO: 10.8 K/UL

## 2025-06-13 PROCEDURE — G0463: CPT

## 2025-07-28 ENCOUNTER — OUTPATIENT (OUTPATIENT)
Dept: OUTPATIENT SERVICES | Facility: HOSPITAL | Age: 65
LOS: 1 days | End: 2025-07-28
Payer: MEDICAID

## 2025-07-28 ENCOUNTER — APPOINTMENT (OUTPATIENT)
Age: 65
End: 2025-07-28

## 2025-07-28 VITALS
RESPIRATION RATE: 16 BRPM | SYSTOLIC BLOOD PRESSURE: 101 MMHG | TEMPERATURE: 97.5 F | DIASTOLIC BLOOD PRESSURE: 66 MMHG | OXYGEN SATURATION: 96 % | HEART RATE: 69 BPM

## 2025-07-28 DIAGNOSIS — K59.00 CONSTIPATION, UNSPECIFIED: ICD-10-CM

## 2025-07-28 DIAGNOSIS — E11.9 TYPE 2 DIABETES MELLITUS WITHOUT COMPLICATIONS: ICD-10-CM

## 2025-07-28 DIAGNOSIS — M19.019 PRIMARY OSTEOARTHRITIS, UNSPECIFIED SHOULDER: Chronic | ICD-10-CM

## 2025-07-28 DIAGNOSIS — H57.8A9: ICD-10-CM

## 2025-07-28 DIAGNOSIS — E11.9 TYPE 2 DIABETES MELLITUS W/OUT COMPLICATIONS: ICD-10-CM

## 2025-07-28 DIAGNOSIS — Z00.00 ENCOUNTER FOR GENERAL ADULT MEDICAL EXAMINATION WITHOUT ABNORMAL FINDINGS: ICD-10-CM

## 2025-07-28 DIAGNOSIS — Z90.49 ACQUIRED ABSENCE OF OTHER SPECIFIED PARTS OF DIGESTIVE TRACT: Chronic | ICD-10-CM

## 2025-07-28 PROCEDURE — G0463: CPT

## 2025-07-29 PROBLEM — H57.8A9 SENSATION OF FOREIGN BODY IN EYE: Status: ACTIVE | Noted: 2025-07-28

## 2025-07-29 PROBLEM — K59.00 CONSTIPATION, UNSPECIFIED CONSTIPATION TYPE: Status: ACTIVE | Noted: 2022-01-10

## 2025-08-11 ENCOUNTER — RX RENEWAL (OUTPATIENT)
Age: 65
End: 2025-08-11

## 2025-08-26 ENCOUNTER — OUTPATIENT (OUTPATIENT)
Dept: OUTPATIENT SERVICES | Facility: HOSPITAL | Age: 65
LOS: 1 days | End: 2025-08-26
Payer: MEDICAID

## 2025-08-26 ENCOUNTER — APPOINTMENT (OUTPATIENT)
Age: 65
End: 2025-08-26

## 2025-08-26 VITALS
SYSTOLIC BLOOD PRESSURE: 125 MMHG | HEIGHT: 71 IN | DIASTOLIC BLOOD PRESSURE: 79 MMHG | OXYGEN SATURATION: 98 % | RESPIRATION RATE: 16 BRPM | WEIGHT: 228 LBS | BODY MASS INDEX: 31.92 KG/M2 | TEMPERATURE: 98 F | HEART RATE: 77 BPM

## 2025-08-26 DIAGNOSIS — M19.019 PRIMARY OSTEOARTHRITIS, UNSPECIFIED SHOULDER: Chronic | ICD-10-CM

## 2025-08-26 DIAGNOSIS — Z00.00 ENCOUNTER FOR GENERAL ADULT MEDICAL EXAMINATION WITHOUT ABNORMAL FINDINGS: ICD-10-CM

## 2025-08-26 DIAGNOSIS — Z80.1 FAMILY HISTORY OF MALIGNANT NEOPLASM OF TRACHEA, BRONCHUS AND LUNG: ICD-10-CM

## 2025-08-26 DIAGNOSIS — K57.90 DIVERTICULOSIS OF INTESTINE, PART UNSPECIFIED, W/OUT PERFORATION OR ABSCESS W/OUT BLEEDING: ICD-10-CM

## 2025-08-26 DIAGNOSIS — K57.90 DIVERTICULOSIS OF INTESTINE, PART UNSPECIFIED, WITHOUT PERFORATION OR ABSCESS WITHOUT BLEEDING: ICD-10-CM

## 2025-08-26 DIAGNOSIS — Z00.00 ENCOUNTER FOR GENERAL ADULT MEDICAL EXAMINATION W/OUT ABNORMAL FINDINGS: ICD-10-CM

## 2025-08-26 DIAGNOSIS — Z80.3 FAMILY HISTORY OF MALIGNANT NEOPLASM OF BREAST: ICD-10-CM

## 2025-08-26 DIAGNOSIS — Z90.49 ACQUIRED ABSENCE OF OTHER SPECIFIED PARTS OF DIGESTIVE TRACT: Chronic | ICD-10-CM

## 2025-08-26 LAB
BASOPHILS # BLD AUTO: 0.09 K/UL
BASOPHILS NFR BLD AUTO: 0.6 %
EOSINOPHIL # BLD AUTO: 0.25 K/UL
EOSINOPHIL NFR BLD AUTO: 1.8 %
HCT VFR BLD CALC: 37.6 %
HGB BLD-MCNC: 12.6 G/DL
IMM GRANULOCYTES NFR BLD AUTO: 0.8 %
LYMPHOCYTES # BLD AUTO: 2.26 K/UL
LYMPHOCYTES NFR BLD AUTO: 16.2 %
MAN DIFF?: NORMAL
MCHC RBC-ENTMCNC: 32.2 PG
MCHC RBC-ENTMCNC: 33.5 G/DL
MCV RBC AUTO: 96.2 FL
MONOCYTES # BLD AUTO: 0.72 K/UL
MONOCYTES NFR BLD AUTO: 5.2 %
NEUTROPHILS # BLD AUTO: 10.52 K/UL
NEUTROPHILS NFR BLD AUTO: 75.4 %
PLATELET # BLD AUTO: 321 K/UL
RBC # BLD: 3.91 M/UL
RBC # FLD: 12.5 %
WBC # FLD AUTO: 13.95 K/UL

## 2025-08-26 PROCEDURE — G0463: CPT

## 2025-08-26 PROCEDURE — 36415 COLL VENOUS BLD VENIPUNCTURE: CPT

## 2025-08-26 PROCEDURE — 85025 COMPLETE CBC W/AUTO DIFF WBC: CPT

## 2025-08-28 PROBLEM — Z00.00 ANNUAL PHYSICAL EXAM: Status: ACTIVE | Noted: 2025-08-28

## (undated) DEVICE — SNARE EXACTO COLD 9MMX230CM

## (undated) DEVICE — FORCEP RADIAL JAW 4 W NDL 2.4MM 2.8MM 240CM ORANGE DISP

## (undated) DEVICE — Device

## (undated) DEVICE — SNARE POLYP SENS SM 13MM 240CM

## (undated) DEVICE — POLY TRAP ETRAP

## (undated) DEVICE — ENDOCUFF VISION SZ 2 LG GRN

## (undated) DEVICE — SOL IRR POUR H2O 1000ML

## (undated) DEVICE — PLATE NESSY 170

## (undated) DEVICE — TUBING CAP SET ERBEFLO CLEVERCAP HYBRID CO2 FOR OLYMPUS SCOPES AND UCR

## (undated) DEVICE — FORCEP RADIAL JAW 4 240CM DISP

## (undated) DEVICE — KIT ENDO PROCEDURE CUST W/VLV